# Patient Record
Sex: MALE | Race: WHITE | NOT HISPANIC OR LATINO | Employment: OTHER | URBAN - METROPOLITAN AREA
[De-identification: names, ages, dates, MRNs, and addresses within clinical notes are randomized per-mention and may not be internally consistent; named-entity substitution may affect disease eponyms.]

---

## 2017-01-27 ENCOUNTER — ALLSCRIPTS OFFICE VISIT (OUTPATIENT)
Dept: OTHER | Facility: OTHER | Age: 52
End: 2017-01-27

## 2017-02-01 ENCOUNTER — GENERIC CONVERSION - ENCOUNTER (OUTPATIENT)
Dept: OTHER | Facility: OTHER | Age: 52
End: 2017-02-01

## 2017-04-21 ENCOUNTER — ALLSCRIPTS OFFICE VISIT (OUTPATIENT)
Dept: OTHER | Facility: OTHER | Age: 52
End: 2017-04-21

## 2017-05-04 ENCOUNTER — ALLSCRIPTS OFFICE VISIT (OUTPATIENT)
Dept: OTHER | Facility: OTHER | Age: 52
End: 2017-05-04

## 2017-05-04 DIAGNOSIS — I10 ESSENTIAL (PRIMARY) HYPERTENSION: ICD-10-CM

## 2017-05-04 DIAGNOSIS — E78.2 MIXED HYPERLIPIDEMIA: ICD-10-CM

## 2017-05-09 ENCOUNTER — ALLSCRIPTS OFFICE VISIT (OUTPATIENT)
Dept: OTHER | Facility: OTHER | Age: 52
End: 2017-05-09

## 2017-07-21 ENCOUNTER — ALLSCRIPTS OFFICE VISIT (OUTPATIENT)
Dept: OTHER | Facility: OTHER | Age: 52
End: 2017-07-21

## 2017-08-09 ENCOUNTER — GENERIC CONVERSION - ENCOUNTER (OUTPATIENT)
Dept: OTHER | Facility: OTHER | Age: 52
End: 2017-08-09

## 2017-08-09 LAB
A/G RATIO (HISTORICAL): 1.6 (ref 1.2–2.2)
ALBUMIN SERPL BCP-MCNC: 4.7 G/DL (ref 3.5–5.5)
ALP SERPL-CCNC: 93 IU/L (ref 39–117)
ALT SERPL W P-5'-P-CCNC: 23 IU/L (ref 0–44)
AMBIG ABBREV CMP14 DEFAULT (HISTORICAL): NORMAL
AMBIG ABBREV LP DEFAULT (HISTORICAL): NORMAL
AST SERPL W P-5'-P-CCNC: 22 IU/L (ref 0–40)
BASOPHILS # BLD AUTO: 0 %
BASOPHILS # BLD AUTO: 0 X10E3/UL (ref 0–0.2)
BILIRUB SERPL-MCNC: 0.7 MG/DL (ref 0–1.2)
BUN SERPL-MCNC: 22 MG/DL (ref 6–24)
BUN/CREA RATIO (HISTORICAL): 17 (ref 9–20)
CALCIUM SERPL-MCNC: 9.3 MG/DL (ref 8.7–10.2)
CHLORIDE SERPL-SCNC: 100 MMOL/L (ref 96–106)
CHOLEST SERPL-MCNC: 164 MG/DL (ref 100–199)
CO2 SERPL-SCNC: 25 MMOL/L (ref 18–29)
CREAT SERPL-MCNC: 1.28 MG/DL (ref 0.76–1.27)
DEPRECATED RDW RBC AUTO: 13.2 % (ref 12.3–15.4)
EGFR AFRICAN AMERICAN (HISTORICAL): 74 ML/MIN/1.73
EGFR-AMERICAN CALC (HISTORICAL): 64 ML/MIN/1.73
EOSINOPHIL # BLD AUTO: 0.1 X10E3/UL (ref 0–0.4)
EOSINOPHIL # BLD AUTO: 1 %
GLUCOSE SERPL-MCNC: 86 MG/DL (ref 65–99)
HCT VFR BLD AUTO: 50.7 % (ref 37.5–51)
HDLC SERPL-MCNC: 57 MG/DL
HGB BLD-MCNC: 17.7 G/DL (ref 12.6–17.7)
IMM.GRANULOCYTES (CD4/8) (HISTORICAL): 0 %
IMM.GRANULOCYTES (CD4/8) (HISTORICAL): 0 X10E3/UL (ref 0–0.1)
INTERPRETATION (HISTORICAL): NORMAL
LDLC SERPL CALC-MCNC: 94 MG/DL (ref 0–99)
LYMPHOCYTES # BLD AUTO: 1.6 X10E3/UL (ref 0.7–3.1)
LYMPHOCYTES # BLD AUTO: 24 %
MCH RBC QN AUTO: 30.1 PG (ref 26.6–33)
MCHC RBC AUTO-ENTMCNC: 34.9 G/DL (ref 31.5–35.7)
MCV RBC AUTO: 86 FL (ref 79–97)
MONOCYTES # BLD AUTO: 0.4 X10E3/UL (ref 0.1–0.9)
MONOCYTES (HISTORICAL): 7 %
NEUTROPHILS # BLD AUTO: 4.5 X10E3/UL (ref 1.4–7)
NEUTROPHILS # BLD AUTO: 68 %
PLATELET # BLD AUTO: 198 X10E3/UL (ref 150–379)
POTASSIUM SERPL-SCNC: 4.2 MMOL/L (ref 3.5–5.2)
RBC (HISTORICAL): 5.89 X10E6/UL (ref 4.14–5.8)
SODIUM SERPL-SCNC: 140 MMOL/L (ref 134–144)
TOT. GLOBULIN, SERUM (HISTORICAL): 2.9 G/DL (ref 1.5–4.5)
TOTAL PROTEIN (HISTORICAL): 7.6 G/DL (ref 6–8.5)
TRIGL SERPL-MCNC: 66 MG/DL (ref 0–149)
VLDLC SERPL CALC-MCNC: 13 MG/DL (ref 5–40)
WBC # BLD AUTO: 6.5 X10E3/UL (ref 3.4–10.8)

## 2017-08-10 ENCOUNTER — GENERIC CONVERSION - ENCOUNTER (OUTPATIENT)
Dept: OTHER | Facility: OTHER | Age: 52
End: 2017-08-10

## 2017-08-23 ENCOUNTER — GENERIC CONVERSION - ENCOUNTER (OUTPATIENT)
Dept: OTHER | Facility: OTHER | Age: 52
End: 2017-08-23

## 2017-10-13 ENCOUNTER — ALLSCRIPTS OFFICE VISIT (OUTPATIENT)
Dept: OTHER | Facility: OTHER | Age: 52
End: 2017-10-13

## 2017-10-25 NOTE — PROGRESS NOTES
Assessment  1  Ingrowing nail (703 0) (L60 0)   2  Pain in both feet (729 5) (M79 671,M79 672)    Plan   · You can treat and prevent ingrown toenails ; Status:Complete;   Done: 86PPL3943   · Follow-up visit in 3 months Evaluation and Treatment  Follow-up  Status: Complete   Done: 53VZD3674        Follow-up visit in 3 months Evaluation and Treatment  Follow-up  Status: Hold For - Scheduling  Requested for: 71Fua7896  Ordered; For: Pain in both feet;  Ordered By: Denise Roman  Performed:   Due: 55AHU7316   Follow-up visit in 2 months Evaluation and Treatment  Follow-up  Status: Hold For - Scheduling  Requested for: 74Xjg0442  Ordered; For: Pain in both feet;  Ordered By: Denise Roman  Performed:   Due: 61YDU5828     Discussion/Summary    Daily foot checks monitor for signs of infection  Suggest antifungal powder between toes daily  The patient's caretaker was counseled regarding instructions for management,-- patient and family education,-- importance of compliance with treatment  The treatment plan was reviewed with the patient/guardian  The patient/guardian understands and agrees with the treatment plan      Chief Complaint  Patient Has recurrent ingrown nails bilateral big toes  some pain when walking and wearing shoes  Aids have not noticed any redness or signs of infection      History of Present Illness  HPI: Patient is referred from 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   stage 4 chronic kidney disease (404 10) (I13 10)   8   BMI 30 0-30 9,adult (V85 30) (Z68 30)   9  Capsulitis (726 90) (M77 9)   10  Chronic kidney disease, stage 2 (mild) (585 2) (N18 2)   11  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 (275 03) (E83 10)   12  Constipation (564 00) (K59 00)   13  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)   14  Cracked lip (528 5) (K13 0)   15  Difficulty in walking (719 7) (R26 2)   16  Episodic mood disorder (296 90) (F39)   17  Erythrocytosis (289 0) (D75 1)   18  Generalized nonconvulsive seizure (345 00) (G40 309)   19  Hallux rigidus of both feet (735 2) (M20 21,M20 22)   20  Hemiplegia Dominant Side (342 81)   21  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)   22  Ingrowing nail (703 0) (L60 0)   23  Mild depression (311) (F32 0)   24  Mixed hyperlipidemia (272 2) (E78 2)   25  Nephrocalcinosis (275 49) (E83 59,N29)   26  Obesity, Class I, BMI 30-34 9 (278 00) (E66 9)   27  Osteoporosis (733 00) (M81 0)   28  Pain in both feet (729 5) (M79 671,M79 672)   29   Well adult on routine health check (V70 0) (Z00 00)    Past Medical History   · History of Ankle pain, unspecified laterality   · History of Bilateral impacted cerumen (380 4) (H61 23)   · History of Cellulitis of finger (681 00) (L03 019)   · History of Closed Fracture Of The Phalanges Of The Foot (826 0)   · History of Colon cancer screening (V76 51) (Z12 11)   · History of Encounter for screening for cardiovascular disorders (V81 2) (Z13 6)   · History of Encounter for screening for malignant neoplasm of colon (V76 51) (Z12 11)   · History of Encounter for screening for malignant neoplasm of prostate (V76 44) (Z12 5)   · History of Epilepsy And Recurrent Seizures (345 90)   · History of Foot joint pain (719 47) (M25 579)   · History of athlete's foot (V12 09) (Z86 19)   · History of hypertension (V12 59) (Z86 79)   · History of hypoglycemia (V12 29) (Z86 39)   · History of hypokalemia (V12 29) (Z86 39)   · History of ingrown nail (V13 3) (Z87 2)   · History of ingrown nail (V13 3) (Z87 2)   · History of mental retardation (V11 8) (Z86 59)   · History of onychomycosis (V12 09) (Z86 19)   · History of Impacted cerumen, unspecified laterality (380 4) (H61 20)   · History of Learning difficulty (315 9) (F81 9)   · History of Maceration of skin (709 8) (L98 8)   · History of Need for vaccination (V05 9) (Z23)   · History of Needs flu shot (V04 81) (Z23)   · History of Pain in joints of both feet (719 47) (M79 671,M79 672)   · History of Paronychia of toe (681 11) (L03 039)   · History of Screening for diabetes mellitus (DM) (V77 1) (Z13 1)   · History of Screening for genitourinary condition (V81 6) (Z13 89)   · History of Screening for thyroid disorder (V77 0) (Z13 29)   · History of Special screening examination for neoplasm of prostate (V76 44) (Z12 5)    Surgical History   · History of Brain Surgery    Family History  Mother    · Denied: Family history of Crohn's disease without complication, unspecified  gastrointestinal tract location   · Family history of emphysema (V17 6) (Z82 5)   · Denied: Family history of liver disease   · Denied: Family history of Malignant neoplasm of colon, unspecified part of colon   · Family history of Nephrolithiasis  Father    · Denied: Family history of Crohn's disease without complication, unspecified  gastrointestinal tract location   · Family history of acute myocardial infarction (V17 3) (Z82 49)   · Denied: Family history of liver disease   · Family history of Hypertension (V17 49)   · Denied: Family history of Malignant neoplasm of colon, unspecified part of colon   · Family history of Nephrolithiasis  Brother    · Family history of Nephrolithiasis    Social History   · Denied: History of Alcohol Use (History)   · Never a smoker    Current Meds   1  Atorvastatin Calcium 10 MG Oral Tablet; TAKE ONE TABLET ORALLY DAILY AT 8PM;   Therapy: 88TQY6086 to (Last Rx:10Aug2017)  Requested for: 10Aug2017 Ordered   2   Bystolic 2 5 MG Oral Tablet; TAKE ONE TABLET BY MOUTH DAILY AT 8 A M;   Therapy: 26TMD2432 to (Last CRUZ:64GJC0604)  Requested for: 40OGC6958 Ordered   3  ClonazePAM 0 25 MG Oral Tablet Disintegrating; 2 Every Day; Therapy: 19Apr2012 to  Requested for: 18NJR3910 Recorded   4  LamoTRIgine 150 MG Oral Tablet; 2 Every Day; Therapy: 19Apr2012 to  Requested for: 95BWK0168 Recorded   5  Metamucil 48 57 % Oral Powder; sugar free orange - 1 tsp mixed with 8 oz water daily in   the am;   Therapy: 24YWX7251 to (Last Rx:01Jun2015) Ordered   6  Reguloid 58 6 % Oral Powder; TAKE 1 TEASPOONFUL MIXED WITH 8 OUNCES OF   WATER ORALLY EVERY DAY AT 8AM;   Therapy: 93AOH1933 to (Last Ritu Prost)  Requested for: 28Jun2017 Ordered   7  RisperDAL 0 25 MG Oral Tablet; TAKE 0 5 TABLET Twice daily; Therapy: 19Apr2012 to  Requested for: 89IUH9584 Recorded   8  Senna 8 6 MG Oral Tablet; TAKE ONE TABLET BY MOUTH DAILY AT 8AM FOR   CONSTIPATION; Therapy: 67IXC0153 to (Last Rx:03Oct2017)  Requested for: 03Oct2017 Ordered    The medication list was reviewed and updated today  Allergies  1  ASPIRIN    Vitals   Recorded: 23BHP8380 06:31GO   Systolic 844   Diastolic 77   Height 5 ft 6 in   Weight 187 lb    BMI Calculated 30 18   BSA Calculated 1 94     Physical Exam    Constitutional: no acute distress, well appearing and well nourished  Orthopedic/Biomechanical: abnormal foot type-- and-- hammertoe(s)  Left Foot: Appearance: Normal except as noted: a deformity-- and-- pes planus  Great toe deformities include a bunion  Second toe deformities include hammer toe  Forth toe deformities include hammer toe  Fifth toe deformities include hammer toe  Evaluation of the great toe nail demonstrates paronychia-- and-- an ingrown nail  Tenderness: distal first metatarsal  Hallux limitus first MPJ bilateral,, mild pain on end range of motion, no hypermobility first ray muscle strength is 5  Special Tests: Left hallux ingrown tibial border no erythema no exudate no signs of infection mild pain on palpation     Right Foot: Appearance: Normal except as noted: a deformity-- and-- pes planus  Great toe deformities include a bunion  Second toe deformities include hammer toe  Third toe deformities include hammer toe  Forth toe deformities include hammer toe  Fifth toe deformities include hammer toe  Evaluation of the great toe nail demonstrates paronychia-- and-- an ingrown nail  Tenderness: distal first metatarsal  Hammertoes 2 through 5 bilateral  Plantarflexed metatarsal heads  Right second MPJ plantarflexed metatarsal capsulitis second positive edema negative erythema negative signs of infection  Special Tests: Ingrown bilateral hallux positive pain on palpation negative exudate  Mild edema bilateral legs mild venous stasis no signs of infection ulceration, no maceration  Neurological Exam: performed  Light touch was intact bilaterally  Vibratory sensation was intact bilaterally  Response to monofilament test was intact bilaterally  Vascular Exam: performed Dorsalis pedis pulses were 1/4 bilaterally  Posterior tibial pulses were 1/4 bilaterally  Capillary refill time was greater than 3 seconds bilaterally  Procedure  Aseptic debridement and planing of nails x10, manually, and mechanically       Future Appointments    Date/Time Provider Specialty Site   04/13/2018 03:30 PM Didi Sinclair DPM Podiat[facility] [address] [facility]     Signatures   Electronically signed by :  Ferdinand Mills DPM; Oct 24 2017 10:37AM EST                       (Author)

## 2017-11-09 ENCOUNTER — ALLSCRIPTS OFFICE VISIT (OUTPATIENT)
Dept: OTHER | Facility: OTHER | Age: 52
End: 2017-11-09

## 2017-11-09 DIAGNOSIS — Z00.00 ENCOUNTER FOR GENERAL ADULT MEDICAL EXAMINATION WITHOUT ABNORMAL FINDINGS: ICD-10-CM

## 2017-11-09 LAB — OCCULT BLD, FECAL IMMUNOLOGICAL (HISTORICAL): NEGATIVE

## 2017-11-10 ENCOUNTER — APPOINTMENT (OUTPATIENT)
Dept: AUDIOLOGY | Facility: CLINIC | Age: 52
End: 2017-11-10
Payer: MEDICARE

## 2017-11-10 ENCOUNTER — GENERIC CONVERSION - ENCOUNTER (OUTPATIENT)
Dept: OTHER | Facility: OTHER | Age: 52
End: 2017-11-10

## 2017-11-10 LAB
A/G RATIO (HISTORICAL): 1.6 (ref 1.2–2.2)
ALBUMIN SERPL BCP-MCNC: 4.5 G/DL (ref 3.5–5.5)
ALP SERPL-CCNC: 89 IU/L (ref 39–117)
ALT SERPL W P-5'-P-CCNC: 17 IU/L (ref 0–44)
AST SERPL W P-5'-P-CCNC: 19 IU/L (ref 0–40)
BASOPHILS # BLD AUTO: 0 %
BASOPHILS # BLD AUTO: 0 X10E3/UL (ref 0–0.2)
BILIRUB SERPL-MCNC: 0.6 MG/DL (ref 0–1.2)
BUN SERPL-MCNC: 18 MG/DL (ref 6–24)
BUN/CREA RATIO (HISTORICAL): 14 (ref 9–20)
CALCIUM SERPL-MCNC: 9.5 MG/DL (ref 8.7–10.2)
CHLORIDE SERPL-SCNC: 99 MMOL/L (ref 96–106)
CHOLEST SERPL-MCNC: 172 MG/DL (ref 100–199)
CHOLEST/HDLC SERPL: 2.9 RATIO UNITS (ref 0–5)
CO2 SERPL-SCNC: 27 MMOL/L (ref 18–29)
CREAT SERPL-MCNC: 1.33 MG/DL (ref 0.76–1.27)
DEPRECATED RDW RBC AUTO: 13.1 % (ref 12.3–15.4)
EGFR AFRICAN AMERICAN (HISTORICAL): 71 ML/MIN/1.73
EGFR-AMERICAN CALC (HISTORICAL): 61 ML/MIN/1.73
EOSINOPHIL # BLD AUTO: 0.1 X10E3/UL (ref 0–0.4)
EOSINOPHIL # BLD AUTO: 1 %
GLUCOSE SERPL-MCNC: 83 MG/DL (ref 65–99)
HCT VFR BLD AUTO: 51 % (ref 37.5–51)
HDLC SERPL-MCNC: 59 MG/DL
HGB BLD-MCNC: 17.5 G/DL (ref 12.6–17.7)
IMM.GRANULOCYTES (CD4/8) (HISTORICAL): 0 %
IMM.GRANULOCYTES (CD4/8) (HISTORICAL): 0 X10E3/UL (ref 0–0.1)
LDLC SERPL CALC-MCNC: 96 MG/DL (ref 0–99)
LYMPHOCYTES # BLD AUTO: 1.8 X10E3/UL (ref 0.7–3.1)
LYMPHOCYTES # BLD AUTO: 19 %
MCH RBC QN AUTO: 30.1 PG (ref 26.6–33)
MCHC RBC AUTO-ENTMCNC: 34.3 G/DL (ref 31.5–35.7)
MCV RBC AUTO: 88 FL (ref 79–97)
MONOCYTES # BLD AUTO: 0.7 X10E3/UL (ref 0.1–0.9)
MONOCYTES (HISTORICAL): 7 %
NEUTROPHILS # BLD AUTO: 6.7 X10E3/UL (ref 1.4–7)
NEUTROPHILS # BLD AUTO: 73 %
PLATELET # BLD AUTO: 205 X10E3/UL (ref 150–379)
POTASSIUM SERPL-SCNC: 4.1 MMOL/L (ref 3.5–5.2)
PROSTATE SPECIFIC ANTIGEN (HISTORICAL): 1 NG/ML (ref 0–4)
RBC (HISTORICAL): 5.82 X10E6/UL (ref 4.14–5.8)
SODIUM SERPL-SCNC: 142 MMOL/L (ref 134–144)
TOT. GLOBULIN, SERUM (HISTORICAL): 2.9 G/DL (ref 1.5–4.5)
TOTAL PROTEIN (HISTORICAL): 7.4 G/DL (ref 6–8.5)
TRIGL SERPL-MCNC: 86 MG/DL (ref 0–149)
VLDLC SERPL CALC-MCNC: 17 MG/DL (ref 5–40)
WBC # BLD AUTO: 9.2 X10E3/UL (ref 3.4–10.8)

## 2017-11-10 PROCEDURE — 92557 COMPREHENSIVE HEARING TEST: CPT | Performed by: AUDIOLOGIST

## 2017-11-10 PROCEDURE — 92567 TYMPANOMETRY: CPT | Performed by: AUDIOLOGIST

## 2017-11-11 LAB — INTERPRETATION (HISTORICAL): NORMAL

## 2017-11-13 ENCOUNTER — GENERIC CONVERSION - ENCOUNTER (OUTPATIENT)
Dept: OTHER | Facility: OTHER | Age: 52
End: 2017-11-13

## 2017-11-28 ENCOUNTER — ALLSCRIPTS OFFICE VISIT (OUTPATIENT)
Dept: OTHER | Facility: OTHER | Age: 52
End: 2017-11-28

## 2017-11-29 NOTE — PROGRESS NOTES
Assessment    1  Low back pain with left-sided sciatica (724 3) (M54 42)   2  BMI 30 0-30 9,adult (V85 30) (Z68 30)    Plan  Low back pain with left-sided sciatica    · Acetaminophen 325 MG Oral Tablet; 2 tablets every 4-6 hours as needed for pain   · PredniSONE 20 MG Oral Tablet; 3 tabs for three days, 2 tabs for three days, onetab for three days, 1/2 tab for 4 days    Discussion/Summary    Prednisone taper as directed  May take Tylenol as needed for pain  No work until 12/4/17  Return to office as needed for persistent/worsening symptoms  Consider PT if no improvement  Possible side effects of new medications were reviewed with the patient/guardian today  The treatment plan was reviewed with the patient/guardian  The patient/guardian understands and agrees with the treatment plan      Chief Complaint  Discomfort lower back radiating down L leg started a few weeks ago rmklpn      History of Present Illness  HPI: For the past 2 weeks he has been experiencing left low back radiating into buttocks and upper leg  No known injury  It is very painful to lay flat  He has been given Aleve which alleviates the pain temporarily  Review of Systems   Constitutional: no fever or chills, feels well, no tiredness, no recent weight loss or weight gain  Musculoskeletal: as noted in HPI  Neurological: no numbness-- and-- no tingling  Active Problems    1  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 (736 70) (M21 962)   2  Acquired deformity of right ankle and foot (736 70) (M21 961)   3  Acquired hallux rigidus of left foot (735 2) (M20 22)   4  Acquired valgus deformity of right ankle (736 79) (M21 071)   5  Anxiety, generalized (300 02) (F41 1)   6  Benign essential hypertension (401 1) (I10)   7  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 stage 4 chronic kidney disease (404 10) (I13 10)   8  BMI 30 0-30 9,adult (V85 30) (Z68 30)   9  Capsulitis (726 90) (M77 9)   10  Chronic kidney disease, stage 2 (mild) (585 2) (N18 2)   11  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 (275 03) (E83 10)   12  Constipation (564 00) (K59 00)   13  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)   14  Cracked lip (528 5) (K13 0)   15  Difficulty in walking (719 7) (R26 2)   16  Encounter for screening for cardiovascular disorders (V81 2) (Z13 6)   17  Episodic mood disorder (296 90) (F39)   18  Erythrocytosis (289 0) (D75 1)   19  Generalized nonconvulsive seizure (345 00) (G40 309)   20  Hallux rigidus of both feet (735 2) (M20 21,M20 22)   21  Hemiplegia Dominant Side (342 81)   22  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)   23  Influenza vaccine needed (V04 81) (Z23)   24  Ingrowing nail (703 0) (L60 0)   25  Mild depression (311) (F32 0)   26  Mixed hyperlipidemia (272 2) (E78 2)   27  Nephrocalcinosis (275 49) (E83 59,N29)   28  Obesity, Class I, BMI 30-34 9 (278 00) (E66 9)   29  Osteoporosis (733 00) (M81 0)   30  Pain in both feet (729 5) (M79 671,M79 672)   31  Special screening, prostate cancer (V76 44) (Z12 5)   32  Well adult on routine health check (V70 0) (Z00 00)    Past Medical History    1  History of Ankle pain, unspecified laterality   2  History of Bilateral impacted cerumen (380 4) (H61 23)   3  History of Cellulitis of finger (681 00) (L03 019)   4  History of Closed Fracture Of The Phalanges Of The Foot (826 0)   5  History of Colon cancer screening (V76 51) (Z12 11)   6  History of Encounter for screening for malignant neoplasm of colon (V76 51) (Z12 11)   7  History of Epilepsy And Recurrent Seizures (345 90)   8  History of Foot joint pain (719 47) (M25 579)   9  History of athlete's foot (V12 09) (Z86 19)   10  History of hypertension (V12 59) (Z86 79)   11  History of hypoglycemia (V12 29) (Z86 39)   12  History of hypokalemia (V12 29) (Z86 39)   13  History of ingrown nail (V13 3) (Z87 2)   14  History of ingrown nail (V13 3) (Z87 2)   15  History of mental retardation (V11 8) (Z86 59)   16  History of onychomycosis (V12 09) (Z86 19)   17  History of Impacted cerumen, unspecified laterality (380 4) (H61 20)   18  History of Learning difficulty (315 9) (F81 9)   19  History of Maceration of skin (709 8) (L98 8)   20  History of Need for vaccination (V05 9) (Z23)   21  History of Needs flu shot (V04 81) (Z23)   22  History of Pain in joints of both feet (719 47) (M79 671,M79 672)   23  History of Paronychia of toe (681 11) (L03 039)   24  History of Screening for diabetes mellitus (DM) (V77 1) (Z13 1)   25  History of Screening for genitourinary condition (V81 6) (Z13 89)   26  History of Screening for thyroid disorder (V77 0) (Z13 29)   27  History of Special screening examination for neoplasm of prostate (V76 44) (Z12 5)  Active Problems And Past Medical History Reviewed: The active problems and past medical history were reviewed and updated today  Family History  Mother    1  Denied: Family history of Crohn's disease without complication, unspecified gastrointestinal tract location   2  Family history of emphysema (V17 6) (Z82 5)   3  Denied: Family history of liver disease   4  Denied: Family history of Malignant neoplasm of colon, unspecified part of colon   5  Family history of Nephrolithiasis  Father    6  Denied: Family history of Crohn's disease without complication, unspecified gastrointestinal tract location   7  Family history of acute myocardial infarction (V17 3) (Z82 49)   8  Denied: Family history of liver disease   9  Family history of Hypertension (V17 49)   10  Denied: Family history of Malignant neoplasm of colon, unspecified part of colon   11  Family history of Nephrolithiasis  Brother    15  Family history of Nephrolithiasis    Social History   · Denied: History of Alcohol Use (History)   · Never a smoker  The social history was reviewed and is unchanged  Surgical History    1  History of Brain Surgery    Current Meds   1   Atorvastatin Calcium 10 MG Oral Tablet; TAKE ONE TABLET ORALLY DAILY AT 8PM; Therapy: 34WCP7921 to (Last Rx:10Aug2017)  Requested for: 03WHX8857 Ordered   2  Bystolic 2 5 MG Oral Tablet; TAKE ONE TABLET BY MOUTH DAILY AT 8 A M; Therapy: 54AYW6430 to (Last Rx:14Nov2016)  Requested for: 11MDE5896 Ordered   3  ClonazePAM 0 25 MG Oral Tablet Disintegrating; 2 Every Day; Therapy: 19Apr2012 to  Requested for: 00ZKN2729 Recorded   4  LamoTRIgine 150 MG Oral Tablet; 2 Every Day; Therapy: 19Apr2012 to  Requested for: 25DEC9178 Recorded   5  Metamucil 48 57 % Oral Powder; sugar free orange - 1 tsp mixed with 8 oz water daily in the am; Therapy: 52JCY7989 to (Last Rx:01Jun2015) Ordered   6  Reguloid 58 6 % Oral Powder; TAKE 1 TEASPOONFUL MIXED WITH 8 OUNCES OF WATER ORALLY EVERY DAY AT 8AM; Therapy: 21YNN2306 to (Last Lorenza Overlie)  Requested for: 28Jun2017 Ordered   7  RisperDAL 0 25 MG Oral Tablet; TAKE 0 5 TABLET Twice daily; Therapy: 19Apr2012 to  Requested for: 19OFM7135 Recorded   8  Senna 8 6 MG Oral Tablet; TAKE ONE TABLET BY MOUTH DAILY AT 8AM FOR CONSTIPATION; Therapy: 03DVO0518 to (Last Rx:22Nov2017)  Requested for: 22Nov2017 Ordered    The medication list was reviewed and updated today  Allergies  1  ASPIRIN    Vitals   Recorded: 69ZHW4860 03:58PM   Temperature 97 4 F   Heart Rate 72   Respiration 18   Systolic 059   Diastolic 70   Height 5 ft 6 in   Weight 187 lb    BMI Calculated 30 18   BSA Calculated 1 94       Physical Exam   Constitutional  General appearance: No acute distress, well appearing and well nourished  Eyes  Conjunctiva and lids: No swelling, erythema, or discharge  Ears, Nose, Mouth, and Throat  Otoscopic examination: Tympanic membrance translucent with normal light reflex  Canals patent without erythema  Nasal mucosa, septum, and turbinates: Normal without edema or erythema  Oropharynx: Normal with no erythema, edema, exudate or lesions     Pulmonary  Respiratory effort: No increased work of breathing or signs of respiratory distress  Auscultation of lungs: Clear to auscultation, equal breath sounds bilaterally, no wheezes, no rales, no rhonci  Cardiovascular  Auscultation of heart: Normal rate and rhythm, normal S1 and S2, without murmurs  Examination of extremities for edema and/or varicosities: Normal    Lymphatic  Palpation of lymph nodes in neck: No lymphadenopathy  Musculoskeletal  Inspection/palpation of joints, bones, and muscles: Abnormal  -- left low  on palpation  Positive left SLR  Psychiatric  Mood and affect: Normal          Attending Note  Collaborating Physician Note: Collaborating Note: I agree with the Advanced Practitioner note  Message  Return to work or school:   Princess Jones is under my professional care  He was seen in my office on 11/28/17   He is able to return to work on  12/4/17      Jeanette Stephenson, [facility] Appointments    Date/Time Provider Specialty Site   04/13/2018 03:30 PM Abril Dhillon DPM [facility]       Signatures   Electronically signed by : Nayana Spear; Nov 28 2017  4:57PM EST                       (Author)    Electronically signed by : Raul Tucker DO; Nov 28 2017  9:13PM EST                       (Author)

## 2017-12-27 ENCOUNTER — ALLSCRIPTS OFFICE VISIT (OUTPATIENT)
Dept: OTHER | Facility: OTHER | Age: 52
End: 2017-12-27

## 2017-12-27 DIAGNOSIS — M54.42 LOW BACK PAIN WITH LEFT-SIDED SCIATICA: ICD-10-CM

## 2017-12-28 NOTE — PROGRESS NOTES
Assessment   1  Low back pain with left-sided sciatica (724 3) (M54 42)   2  Never a smoker    Plan   Low back pain with left-sided sciatica    · Cyclobenzaprine HCl - 10 MG Oral Tablet; take one tab at 8 o clock pm prior to    bedtime till done   · PredniSONE 20 MG Oral Tablet; 4 tabs for three days 3 tabs for three days, 2 tabs    for three days, one tab for three days, 1/2 tab for 4 days  Ok to take at 7 am   · * XR SPINE LUMBAR MINIMUM 4 VIEWS NON INJURY; Status:Active; Requested    for:16Xsz2516;    · *1 - [facility] [address] [facility]  *  Status: Active  Requested    for: [id number]  Care Summary provided  : Yes    Chief Complaint   pt present to follow up on lower back pain, states pain is getting better but still there  af/rma      History of Present Illness   pt states he has pain in his left leg - states this started in the end of october  states the pain starts in his left buttock rads down left leg states he took the steroids given at his appt the pain improved with the meds he took but it came back      Review of Systems        Constitutional: No fever or chills, feels well, no tiredness, no recent weight gain or weight loss  Eyes: No complaints of eye pain, no red eyes, no discharge from eyes, no itchy eyes  ENT: no complaints of earache, no hearing loss, no nosebleeds, no nasal discharge, no sore throat, no hoarseness  Cardiovascular: No complaints of slow heart rate, no fast heart rate, no chest pain, no palpitations, no leg claudication, no lower extremity  Musculoskeletal: arthralgias,-- limb pain,-- myalgias-- and-- joint stiffness, but-- as noted in HPI  Active Problems   1  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 (736 70) (M21 962)   2  Acquired deformity of right ankle and foot (736 70) (M21 961)   3  Acquired hallux rigidus of left foot (735 2) (M20 22)   4  Acquired valgus deformity of right ankle (736 79) (M21 071)   5   Anxiety, generalized (300 02) (F41 1)   6  Benign essential hypertension (401 1) (I10)   7  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     stage 4 chronic kidney disease (404 10) (I13 10)   8  BMI 30 0-30 9,adult (V85 30) (Z68 30)   9  Chronic kidney disease, stage 2 (mild) (585 2) (N18 2)   10  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 (275 03) (E83 10)   11  Constipation (564 00) (K59 00)   12  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)   13  Cracked lip (528 5) (K13 0)   14  Difficulty in walking (719 7) (R26 2)   15  Encounter for screening for cardiovascular disorders (V81 2) (Z13 6)   16  Episodic mood disorder (296 90) (F39)   17  Erythrocytosis (289 0) (D75 1)   18  Generalized nonconvulsive seizure (345 00) (G40 309)   19  Hallux rigidus of both feet (735 2) (M20 21,M20 22)   20  Hemiplegia Dominant Side (342 81)   21  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)   22  Influenza vaccine needed (V04 81) (Z23)   23  Ingrowing nail (703 0) (L60 0)   24  Low back pain with left-sided sciatica (724 3) (M54 42)   25  Mild depression (311) (F32 0)   26  Mixed hyperlipidemia (272 2) (E78 2)   27  Nephrocalcinosis (275 49) (E83 59,N29)   28  Obesity, Class I, BMI 30-34 9 (278 00) (E66 9)   29  Osteoporosis (733 00) (M81 0)   30  Pain in both feet (729 5) (M79 671,M79 672)   31  Special screening, prostate cancer (V76 44) (Z12 5)   32  Well adult on routine health check (V70 0) (Z00 00)    Past Medical History   1  History of Ankle pain, unspecified laterality   2  History of Bilateral impacted cerumen (380 4) (H61 23)   3  History of Capsulitis (726 90) (M77 9)   4  History of Cellulitis of finger (681 00) (L03 019)   5  History of Closed Fracture Of The Phalanges Of The Foot (826 0)   6  History of Colon cancer screening (V76 51) (Z12 11)   7  History of Encounter for screening for malignant neoplasm of colon (V76 51) (Z12 11)   8  History of Epilepsy And Recurrent Seizures (141 90)   9   History of Foot joint pain (919 47) (M21 579)   10  History of athlete's foot (V12 09) (Z86 19)   11  History of hypertension (V12 59) (Z86 79)   12  History of hypoglycemia (V12 29) (Z86 39)   13  History of hypokalemia (V12 29) (Z86 39)   14  History of ingrown nail (V13 3) (Z87 2)   15  History of ingrown nail (V13 3) (Z87 2)   16  History of mental retardation (V11 8) (Z86 59)   17  History of onychomycosis (V12 09) (Z86 19)   18  History of Impacted cerumen, unspecified laterality (380 4) (H61 20)   19  History of Learning difficulty (315 9) (F81 9)   20  History of Maceration of skin (709 8) (L98 8)   21  History of Need for vaccination (V05 9) (Z23)   22  History of Needs flu shot (V04 81) (Z23)   23  History of Pain in joints of both feet (719 47) (M79 671,M79 672)   24  History of Paronychia of toe (681 11) (L03 039)   25  History of Screening for diabetes mellitus (DM) (V77 1) (Z13 1)   26  History of Screening for genitourinary condition (V81 6) (Z13 89)   27  History of Screening for thyroid disorder (V77 0) (Z13 29)   28  History of Special screening examination for neoplasm of prostate (V76 44) (Z12 5)     The active problems and past medical history were reviewed and updated today  Surgical History   1  History of Brain Surgery     The surgical history was reviewed and updated today  Family History   Mother    1  Denied: Family history of Crohn's disease without complication, unspecified     gastrointestinal tract location   2  Family history of emphysema (V17 6) (Z82 5)   3  Denied: Family history of liver disease   4  Denied: Family history of Malignant neoplasm of colon, unspecified part of colon   5  Family history of Nephrolithiasis  Father    6  Denied: Family history of Crohn's disease without complication, unspecified     gastrointestinal tract location   7  Family history of acute myocardial infarction (V17 3) (Z82 49)   8  Denied: Family history of liver disease   9  Family history of Hypertension (V17 49)   10  Denied: Family history of Malignant neoplasm of colon, unspecified part of colon   11  Family history of Nephrolithiasis  Brother    15  Family history of Nephrolithiasis     The family history was reviewed and updated today  Social History    · Denied: History of Alcohol Use (History)   · Never a smoker  The social history was reviewed and updated today  Current Meds    1  Acetaminophen 325 MG Oral Tablet; 2 tablets every 4-6 hours as needed for pain; Therapy: 33FPE4754 to (Last 9138 4541)  Requested for: 28Nov2017 Ordered   2  Atorvastatin Calcium 10 MG Oral Tablet; TAKE ONE TABLET ORALLY DAILY AT 8PM;     Therapy: 97GUW1651 to (Last Rx:26Hrj5878)  Requested for: 85AGC9554 Ordered   3  Bystolic 2 5 MG Oral Tablet; TAKE ONE TABLET BY MOUTH DAILY AT 8 A M;     Therapy: 17AZW9377 to (Last Rx:14Nov2016)  Requested for: 42IHQ0592 Ordered   4  ClonazePAM 0 25 MG Oral Tablet Disintegrating; 2 Every Day; Therapy: 19Apr2012 to  Requested for: 02CGI5438 Recorded   5  LamoTRIgine 150 MG Oral Tablet; 2 Every Day; Therapy: 19Apr2012 to  Requested for: 79BQI3925 Recorded   6  Metamucil 48 57 % Oral Powder; sugar free orange - 1 tsp mixed with 8 oz water daily in     the am;     Therapy: 06SQN1516 to (Last Rx:01Jun2015) Ordered   7  Reguloid 58 6 % Oral Powder; TAKE 1 TEASPOONFUL MIXED WITH 8 OUNCES OF     WATER ORALLY EVERY DAY AT 8AM;     Therapy: 53NHK9760 to (Last Matthew Mixer)  Requested for: 28Jun2017 Ordered   8  RisperDAL 0 25 MG Oral Tablet; TAKE 0 5 TABLET Twice daily; Therapy: 19Apr2012 to  Requested for: 14UJJ4446 Recorded   9  Senna 8 6 MG Oral Tablet; TAKE ONE TABLET BY MOUTH DAILY AT 8AM FOR     CONSTIPATION; Therapy: 41VHF7523 to (Last Rx:22Nov2017)  Requested for: 22Nov2017 Ordered     The medication list was reviewed and updated today  Allergies   1   ASPIRIN    Vitals   Vital Signs    Recorded: 79TMH6374 05:45PM   Temperature 97 1 F   Heart Rate 70   Respiration 18 Systolic 209   Diastolic 70   Height 5 ft 6 in   Weight 195 lb 4 oz   BMI Calculated 31 51   BSA Calculated 1 98     Physical Exam        Constitutional      General appearance: No acute distress, well appearing and well nourished  Eyes      Conjunctiva and lids: No swelling, erythema, or discharge  Pupils and irises: Equal, round and reactive to light  Ears, Nose, Mouth, and Throat      External inspection of ears and nose: Normal        Pulmonary      Auscultation of lungs: Clear to auscultation, equal breath sounds bilaterally, no wheezes, no rales, no rhonci  Cardiovascular      Auscultation of heart: Normal rate and rhythm, normal S1 and S2, without murmurs  Musculoskeletal      Gait and station: Abnormal  -- abnormal gait  Inspection/palpation of joints, bones, and muscles: Abnormal  -- pvms l spine, l >R  Skin      Skin and subcutaneous tissue: Normal without rashes or lesions  Health Management   History of Encounter for screening for malignant neoplasm of colon   COLONOSCOPY; every 10 years; Last 97ZSZ2060; Next Due: 43Muk0597;  Active    Future Appointments      Date/Time Provider Specialty Site   04/13/2018 03:30 PM F[facility] Pham [facility]     Signatures    Electronically signed by : Freeman Izaguirre DO; Dec 27 2017  6:03PM EST                       (Author)

## 2017-12-29 ENCOUNTER — HOSPITAL ENCOUNTER (OUTPATIENT)
Dept: RADIOLOGY | Facility: HOSPITAL | Age: 52
Discharge: HOME | End: 2017-12-29
Attending: FAMILY MEDICINE
Payer: MEDICARE

## 2017-12-29 ENCOUNTER — TRANSCRIBE ORDERS (OUTPATIENT)
Dept: ADMINISTRATIVE | Facility: HOSPITAL | Age: 52
End: 2017-12-29

## 2017-12-29 DIAGNOSIS — M54.42 LOW BACK PAIN WITH LEFT-SIDED SCIATICA: ICD-10-CM

## 2017-12-29 PROCEDURE — 72110 X-RAY EXAM L-2 SPINE 4/>VWS: CPT

## 2018-01-02 ENCOUNTER — GENERIC CONVERSION - ENCOUNTER (OUTPATIENT)
Dept: OTHER | Facility: OTHER | Age: 53
End: 2018-01-02

## 2018-01-10 NOTE — RESULT NOTES
Discussion/Summary   labs acceptable     Verified Results  (1) CBC/PLT/DIFF 09LUP7742 11:32AM [employer]     Test Name Result Flag Reference   WBC 9 2 x10E3/uL  3 4-10 8   RBC 5 82 x10E6/uL H 4 14-5 80   Hemoglobin 17 5 g/dL  12 6-17 7   Hematocrit 51 0 %  37 5-51 0   MCV 88 fL  79-97   MCH 30 1 pg  26 6-33 0   MCHC 34 3 g/dL  31 5-35 7   RDW 13 1 %  12 3-15 4   Platelets 719 N63N4/MU  150-379   Neutrophils 73 %  Not Estab  Lymphs 19 %  Not Estab  Monocytes 7 %  Not Estab  Eos 1 %  Not Estab  Basos 0 %  Not Estab  Neutrophils (Absolute) 6 7 x10E3/uL  1 4-7 0   Lymphs (Absolute) 1 8 x10E3/uL  0 7-3 1   Monocytes(Absolute) 0 7 x10E3/uL  0 1-0 9   Eos (Absolute) 0 1 x10E3/uL  0 0-0 4   Baso (Absolute) 0 0 x10E3/uL  0 0-0 2   Immature Granulocytes 0 %  Not Estab  Immature Grans (Abs) 0 0 x10E3/uL  0 0-0 1     (1) COMPREHENSIVE METABOLIC PANEL 14TVQ8632 23:53UE [employer]     Test Name Result Flag Reference   Glucose, Serum 83 mg/dL  65-99   BUN 18 mg/dL  6-24   Creatinine, Serum 1 33 mg/dL H 0 76-1 27   BUN/Creatinine Ratio 14  9-20   Sodium, Serum 142 mmol/L  134-144   Potassium, Serum 4 1 mmol/L  3 5-5 2   Chloride, Serum 99 mmol/L     Carbon Dioxide, Total 27 mmol/L  18-29   Calcium, Serum 9 5 mg/dL  8 7-10 2   Protein, Total, Serum 7 4 g/dL  6 0-8 5   Albumin, Serum 4 5 g/dL  3 5-5 5   Globulin, Total 2 9 g/dL  1 5-4 5   A/G Ratio 1 6  1 2-2 2   Bilirubin, Total 0 6 mg/dL  0 0-1 2   Alkaline Phosphatase, S 89 IU/L     AST (SGOT) 19 IU/L  0-40   ALT (SGPT) 17 IU/L  0-44   eGFR If NonAfricn Am 61 mL/min/1 73  >59   eGFR If Africn Am 71 mL/min/1 73  >59     (1) LIPID PANEL, FASTING 47UNT0376 11:32AM [facility]     Test Name Result Flag Reference   Cholesterol, Total 172 mg/dL  100-199   Triglycerides 86 mg/dL  0-149   HDL Cholesterol 59 mg/dL  >39   VLDL Cholesterol Derek 17 mg/dL  5-40   LDL Cholesterol Calc 96 mg/dL  0-99   T   Chol/HDL Ratio 2 9 ratio units  0 0-5 0   T  Chol/HDL Ratio                                                             Men  Women                                               1/2 Avg  Risk  3 4    3 3                                                   Avg Risk  5 0    4 4                                                2X Avg  Risk  9 6    7 1                                                3X Avg  Risk 23 4   11 0     (1) PSA (SCREEN) (Dx V76 44 Screen for Prostate Cancer) 02FXT9593 11:32AM Matt Baptiste     Test Name Result Flag Reference   Prostate Specific Ag, Serum 1 0 ng/mL  0 0-4 0   Roche ECLIA methodology  According to the American [employer] Association, Serum PSA should  decrease and remain at undetectable levels after radical  prostatectomy  The AUA defines biochemical recurrence as an initial  PSA value 0 2 ng/mL or greater followed by a subsequent confirmatory  PSA value 0 2 ng/mL or greater  Values obtained with different assay methods or kits cannot be used  interchangeably  Results cannot be interpreted as absolute evidence  of the presence or absence of malignant disease  [facility]) Cardiovascular Risk Assessment [id number] 11:32AM Matt Baptiste     Test Name Result Flag Reference   Interpretation Note     Supplemental report is available  PDF Image

## 2018-01-12 VITALS
WEIGHT: 187 LBS | RESPIRATION RATE: 18 BRPM | HEART RATE: 72 BPM | BODY MASS INDEX: 30.05 KG/M2 | DIASTOLIC BLOOD PRESSURE: 70 MMHG | HEIGHT: 66 IN | SYSTOLIC BLOOD PRESSURE: 110 MMHG | TEMPERATURE: 97.4 F

## 2018-01-12 VITALS
DIASTOLIC BLOOD PRESSURE: 70 MMHG | TEMPERATURE: 97.2 F | SYSTOLIC BLOOD PRESSURE: 130 MMHG | HEIGHT: 66 IN | WEIGHT: 188 LBS | RESPIRATION RATE: 18 BRPM | BODY MASS INDEX: 30.22 KG/M2 | HEART RATE: 78 BPM

## 2018-01-13 VITALS
BODY MASS INDEX: 30.05 KG/M2 | HEIGHT: 66 IN | WEIGHT: 187 LBS | DIASTOLIC BLOOD PRESSURE: 77 MMHG | SYSTOLIC BLOOD PRESSURE: 130 MMHG

## 2018-01-13 VITALS
HEIGHT: 66 IN | BODY MASS INDEX: 29.25 KG/M2 | SYSTOLIC BLOOD PRESSURE: 110 MMHG | RESPIRATION RATE: 16 BRPM | WEIGHT: 182 LBS | HEART RATE: 60 BPM | DIASTOLIC BLOOD PRESSURE: 78 MMHG

## 2018-01-13 VITALS
DIASTOLIC BLOOD PRESSURE: 76 MMHG | SYSTOLIC BLOOD PRESSURE: 128 MMHG | TEMPERATURE: 96.7 F | RESPIRATION RATE: 16 BRPM | WEIGHT: 187 LBS | BODY MASS INDEX: 30.05 KG/M2 | HEART RATE: 66 BPM | HEIGHT: 66 IN

## 2018-01-13 VITALS
TEMPERATURE: 97 F | HEIGHT: 66 IN | RESPIRATION RATE: 16 BRPM | WEIGHT: 191 LBS | DIASTOLIC BLOOD PRESSURE: 70 MMHG | HEART RATE: 72 BPM | SYSTOLIC BLOOD PRESSURE: 120 MMHG | BODY MASS INDEX: 30.7 KG/M2

## 2018-01-13 VITALS
DIASTOLIC BLOOD PRESSURE: 78 MMHG | WEIGHT: 182 LBS | RESPIRATION RATE: 16 BRPM | BODY MASS INDEX: 29.25 KG/M2 | SYSTOLIC BLOOD PRESSURE: 110 MMHG | HEART RATE: 60 BPM | HEIGHT: 66 IN

## 2018-01-13 VITALS — BODY MASS INDEX: 30.05 KG/M2 | WEIGHT: 187 LBS | RESPIRATION RATE: 15 BRPM | HEIGHT: 66 IN | HEART RATE: 66 BPM

## 2018-01-13 NOTE — PROGRESS NOTES
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)   6  Encounter for screening for cardiovascular disorders (V81 2) (Z13 6)   7  Special screening, prostate cancer (V76 44) (Z12 5)    Plan  Benign essential hypertension    · Bystolic 2 5 MG Oral Tablet; TAKE ONE TABLET BY MOUTH DAILY AT 8 A  M  Benign essential hypertension, Encounter for screening for cardiovascular disorders, Mild  depression, Mixed hyperlipidemia, Special screening, prostate cancer    · (1) CBC/PLT/DIFF; Status:Active; Requested for:09Nov2017;    · (1) COMPREHENSIVE METABOLIC PANEL; Status:Active; Requested RBM:[id number];    · (1) LIPID PANEL, FASTING; Status:Active; Requested for:09Nov2017;    · (1) PSA (SCREEN) (Dx V76 44 Screen for Prostate Cancer); Status:Active; Requested  [id number]; Health Maintenance    · *1 - [facility]) Co-Management  *  Status: Active  Requested for:  [id number]  Care Summary provided  : Yes   · DIGITAL RECTAL EXAM; Status:Complete;   Done: 75UXM7650 02:03PM   · Hemoccult Screening - POC; Status:Complete;   Done: 63ZSK8478 02:03PM  Mixed hyperlipidemia    · Atorvastatin Calcium 10 MG Oral Tablet (Lipitor); TAKE ONE TABLET ORALLY  DAILY AT 8PM    Chief Complaint  CPE rmklpn      History of Present Illness  HM, Adult Male: The patient is being seen for a health maintenance evaluation  General Health:   Screening:   HPI: pt is here for a full physical    pt denies seizure since being seen last    Does states on occasiona he is confused    caretaker requesting order for ear eval      Review of Systems    Constitutional: No fever or chills, feels well, no tiredness, no recent weight gain or weight loss  Eyes: No complaints of eye pain, no red eyes, no discharge from eyes, no itchy eyes     ENT: no complaints of earache, no hearing loss, no nosebleeds, no nasal discharge, no sore throat, no hoarseness  Cardiovascular: No complaints of slow heart rate, no fast heart rate, no chest pain, no palpitations, no leg claudication, no lower extremity  Respiratory: No complaints of shortness of breath, no wheezing, no cough, no SOB on exertion, no orthopnea or PND  Gastrointestinal: No complaints of abdominal pain, no constipation, no nausea or vomiting, no diarrhea or bloody stools  Genitourinary: No complaints of dysuria, no incontinence, no hesitancy, no nocturia, no genital lesion, no testicular pain  Musculoskeletal: No complaints of arthralgia, no myalgias, no joint swelling or stiffness, no limb pain or swelling  Integumentary: No complaints of skin rash or skin lesions, no itching, no skin wound, no dry skin  Neurological: No compliants of headache, no confusion, no convulsions, no numbness or tingling, no dizziness or fainting, no limb weakness, no difficulty walking  Psychiatric: Is not suicidal, no sleep disturbances, no anxiety or depression, no change in personality, no emotional problems  Endocrine: No complaints of proptosis, no hot flashes, no muscle weakness, no erectile dysfunction, no deepening of the voice, no feelings of weakness  Hematologic/Lymphatic: No complaints of swollen glands, no swollen glands in the neck, does not bleed easily, no easy bruising  Active Problems    1  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 (736 70) (M21 962)   2  Acquired deformity of right ankle and foot (736 70) (M21 961)   3  Acquired hallux rigidus of left foot (735 2) (M20 22)   4  Acquired valgus deformity of right ankle (736 79) (M21 071)   5  Anxiety, generalized (300 02) (F41 1)   6  Benign essential hypertension (401 1) (I10)   7  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   stage 4 chronic kidney disease (404 10) (I13 10)   8   BMI 30 0-30 9,adult (V85 30) (Z68 30)   9  Capsulitis (726 90) (M77 9)   10  Chronic kidney disease, stage 2 (mild) (585 2) (N18 2)   11  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 (275 03) (E83 10)   12  Constipation (564 00) (K59 00)   13  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)   14  Cracked lip (528 5) (K13 0)   15  Difficulty in walking (719 7) (R26 2)   16  Episodic mood disorder (296 90) (F39)   17  Erythrocytosis (289 0) (D75 1)   18  Generalized nonconvulsive seizure (345 00) (G40 309)   19  Hallux rigidus of both feet (735 2) (M20 21,M20 22)   20  Hemiplegia Dominant Side (342 81)   21  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)   22  Influenza vaccine needed (V04 81) (Z23)   23  Ingrowing nail (703 0) (L60 0)   24  Mild depression (311) (F32 0)   25  Mixed hyperlipidemia (272 2) (E78 2)   26  Nephrocalcinosis (275 49) (E83 59,N29)   27  Obesity, Class I, BMI 30-34 9 (278 00) (E66 9)   28  Osteoporosis (733 00) (M81 0)   29  Pain in both feet (729 5) (M79 671,M79 672)   30   Well adult on routine health check (V70 0) (Z00 00)    Past Medical History    · History of Ankle pain, unspecified laterality   · History of Bilateral impacted cerumen (380 4) (H61 23)   · History of Cellulitis of finger (681 00) (L03 019)   · History of Closed Fracture Of The Phalanges Of The Foot (826 0)   · History of Colon cancer screening (V76 51) (Z12 11)   · History of Encounter for screening for malignant neoplasm of colon (V76 51) (Z12 11)   · History of Epilepsy And Recurrent Seizures (345 90)   · History of Foot joint pain (719 47) (M25 579)   · History of athlete's foot (V12 09) (Z86 19)   · History of hypertension (V12 59) (Z86 79)   · History of hypoglycemia (V12 29) (Z86 39)   · History of hypokalemia (V12 29) (Z86 39)   · History of ingrown nail (V13 3) (Z87 2)   · History of ingrown nail (V13 3) (Z87 2)   · History of mental retardation (V11 8) (Z86 59)   · History of onychomycosis (V12 09) (Z86 19)   · History of Impacted cerumen, unspecified laterality (380  4) (H61 20)   · History of Learning difficulty (315 9) (F81 9)   · History of Maceration of skin (709 8) (L98 8)   · History of Need for vaccination (V05 9) (Z23)   · History of Needs flu shot (V04 81) (Z23)   · History of Pain in joints of both feet (719 47) (M79 671,M79 672)   · History of Paronychia of toe (681 11) (L03 039)   · History of Screening for diabetes mellitus (DM) (V77 1) (Z13 1)   · History of Screening for genitourinary condition (V81 6) (Z13 89)   · History of Screening for thyroid disorder (V77 0) (Z13 29)   · History of Special screening examination for neoplasm of prostate (V76 44) (Z12 5)    Surgical History    · History of Brain Surgery    Family History  Mother    · Denied: Family history of Crohn's disease without complication, unspecified  gastrointestinal tract location   · Family history of emphysema (V17 6) (Z82 5)   · Denied: Family history of liver disease   · Denied: Family history of Malignant neoplasm of colon, unspecified part of colon   · Family history of Nephrolithiasis  Father    · Denied: Family history of Crohn's disease without complication, unspecified  gastrointestinal tract location   · Family history of acute myocardial infarction (V17 3) (Z82 49)   · Denied: Family history of liver disease   · Family history of Hypertension (V17 49)   · Denied: Family history of Malignant neoplasm of colon, unspecified part of colon   · Family history of Nephrolithiasis  Brother    · Family history of Nephrolithiasis    Social History    · Denied: History of Alcohol Use (History)   · Never a smoker    Current Meds   1  Atorvastatin Calcium 10 MG Oral Tablet; TAKE ONE TABLET ORALLY DAILY AT 8PM;   Therapy: 25WZA3123 to (Last Rx:10Aug2017)  Requested for: 65Xcw3961 Ordered   2  Bystolic 2 5 MG Oral Tablet; TAKE ONE TABLET BY MOUTH DAILY AT 8 A M;   Therapy: 15JUL7159 to (Last Rx:14Nov2016)  Requested for: 49RFO2145 Ordered   3   ClonazePAM 0 25 MG Oral Tablet Disintegrating; 2 Every Day;   Therapy: 31VLT9935 to  Requested for: 09GAS3986 Recorded   4  LamoTRIgine 150 MG Oral Tablet; 2 Every Day; Therapy: 19Apr2012 to  Requested for: 48JHL8675 Recorded   5  Metamucil 48 57 % Oral Powder; sugar free orange - 1 tsp mixed with 8 oz water daily in   the am;   Therapy: 19CQV8254 to (Last Rx:01Jun2015) Ordered   6  Reguloid 58 6 % Oral Powder; TAKE 1 TEASPOONFUL MIXED WITH 8 OUNCES OF   WATER ORALLY EVERY DAY AT 8AM;   Therapy: 98NSP8044 to (Last Junior Coma)  Requested for: 28Jun2017 Ordered   7  RisperDAL 0 25 MG Oral Tablet; TAKE 0 5 TABLET Twice daily; Therapy: 19Apr2012 to  Requested for: 69RXC2825 Recorded   8  Senna 8 6 MG Oral Tablet; TAKE ONE TABLET BY MOUTH DAILY AT 8AM FOR   CONSTIPATION; Therapy: 75ECF6014 to (Last Rx:72Lnp2736)  Requested for: 93Jou7673 Ordered    Allergies    1  ASPIRIN    Vitals   Recorded: 94VDQ7459 01:42PM   Temperature 97 2 F   Heart Rate 78   Respiration 18   Systolic 062   Diastolic 70   Height 5 ft 6 in   Weight 188 lb    BMI Calculated 30 34   BSA Calculated 1 95     Physical Exam    Constitutional   General appearance: No acute distress, well appearing and well nourished  Head and Face   Head and face: Normal     Eyes   Conjunctiva and lids: No erythema, swelling or discharge  Pupils and irises: Equal, round, reactive to light  Ears, Nose, Mouth, and Throat   External inspection of ears and nose: Normal     Otoscopic examination: Tympanic membranes translucent with normal light reflex  Canals patent without erythema  Neck   Neck: Supple, symmetric, trachea midline, no masses  Thyroid: Normal, no thyromegaly  Pulmonary   Respiratory effort: No increased work of breathing or signs of respiratory distress  Cardiovascular   Palpation of heart: Normal PMI, no thrills  Auscultation of heart: Normal rate and rhythm, normal S1 and S2, no murmurs  Abdomen   Abdomen: Non-tender, no masses      Liver and spleen: No hepatomegaly or splenomegaly  Stool sample for occult blood: Negative  Genitourinary   Scrotal contents: Normal testes, no masses  Penis: Normal, no lesions  Digital rectal exam of prostate: Normal size, no masses  Lymphatic   Palpation of lymph nodes in neck: No lymphadenopathy  Palpation of lymph nodes in axillae: No lymphadenopathy  Musculoskeletal   Muscle strength/tone: Abnormal   rt sided ext weakness and atrophy upper, deformity on rt lower  Results/Data  PHQ-9 Adult Depression Screening 70FLZ8066 01:42PM User, [employer]     Test Name Result Flag Reference   PHQ-9 Adult Depression Score 4     Over the last two weeks, how often have you been bothered by any of the following problems? Little interest or pleasure in doing things: More than half the days - 2  Feeling down, depressed, or hopeless: Several days - 1  Trouble falling or staying asleep, or sleeping too much: Not at all - 0  Feeling tired or having little energy: Several days - 1  Poor appetite or over eating: Not at all - 0  Feeling bad about yourself - or that you are a failure or have let yourself or your family down: Not at all - 0  Trouble concentrating on things, such as reading the newspaper or watching television: Not at all - 0  Moving or speaking so slowly that other people could have noticed  Or the opposite -  being so fidgety or restless that you have been moving around a lot more than usual: Not at all - 0  Thoughts that you would be better off dead, or of hurting yourself in some way: Not at all - 0   PHQ-9 Adult Depression Screening Negative     PHQ-9 Difficulty Level Somewhat difficult     PHQ-9 Severity Minimal Depression         Procedure    Procedure: Visual Acuity Test    Indication: routine screening  Inforrmation supplied by a Snellen chart     Results: 20/20 in both eyes without corrective device, 20/20 in the right eye without corrective device, 20/20 in the left eye without corrective device      Future Appointments    Date/Time Provider Specialty Site   04/13/2018 03:30 PM Gae Hamman, DPM [facility]     Signatures   Electronically signed by : Russ Padilla DO; Nov 9 2017  2:03PM EST                       (Author)

## 2018-01-15 NOTE — PROGRESS NOTES
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   stage 4 chronic kidney disease (404 10) (I13 10)  7  Chronic kidney disease, stage 2 (mild) (585 2) (N18 2)  8  Depression (311) (F32 9)  9  Anxiety, generalized (300 02) (F41 1)  10  Mixed hyperlipidemia (272 2) (E78 2)  11  Need for vaccination (V05 9) (Z23)    Plan  Anxiety, generalized, Benign essential hypertension, Benign hypertensive heart and  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 stage 4  chronic kidney disease, Chronic kidney disease, stage 2 (mild), Convulsion disorder,  Depression, Encounter for screening for malignant neoplasm of prostate, Episodic mood  disorder, Generalized nonconvulsive seizure, Mixed hyperlipidemia, Need for  vaccination, Screening for diabetes mellitus (DM), Well adult on routine  health check    · (1) PSA (SCREEN) (Dx V76 44 Screen for Prostate Cancer); Status:Active; Requested  [facility]:[id number]; Anxiety, generalized, Benign essential hypertension, Benign hypertensive heart and  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 stage 4  chronic kidney disease, Chronic kidney disease, stage 2 (mild), Convulsion disorder,  Depression, Episodic mood disorder, Generalized nonconvulsive seizure, Mixed  hyperlipidemia, Need for vaccination, Screening for diabetes mellitus (DM),  Well adult on routine health check    · (1) CBC/PLT/DIFF; Status:Active; Requested for:02Nov2016;    · (1) COMPREHENSIVE METABOLIC PANEL; Status:Active; Requested for:02Nov2016;    · (1) LIPID PANEL, FASTING; Status:Active;  Requested J:[id number];   Need for vaccination    · Administer: Fluzone Quadrivalent Intramuscular Suspension; 0 5ml; To Be Done:  [id number]    Discussion/Summary    I can refill the psych meds for a month if they need but they will need to acquire psychiatry  Chief Complaint  pt present for CPE  ac/cma      History of Present Illness  HM, Adult Male: The patient is being seen for a health maintenance evaluation  General Health:   Screening:   HPI: Pt is here for a full physical  pt's care taker states his psych doc has retired and want to know if i can do his psych meds      Review of Systems    Constitutional: No fever or chills, feels well, no tiredness, no recent weight gain or weight loss  Eyes: No complaints of eye pain, no red eyes, no discharge from eyes, no itchy eyes  ENT: no complaints of earache, no hearing loss, no nosebleeds, no nasal discharge, no sore throat, no hoarseness  Cardiovascular: No complaints of slow heart rate, no fast heart rate, no chest pain, no palpitations, no leg claudication, no lower extremity  Respiratory: No complaints of shortness of breath, no wheezing, no cough, no SOB on exertion, no orthopnea or PND  Gastrointestinal: No complaints of abdominal pain, no constipation, no nausea or vomiting, no diarrhea or bloody stools  Genitourinary: No complaints of dysuria, no incontinence, no hesitancy, no nocturia, no genital lesion, no testicular pain  Musculoskeletal: No complaints of arthralgia, no myalgias, no joint swelling or stiffness, no limb pain or swelling  Integumentary: No complaints of skin rash or skin lesions, no itching, no skin wound, no dry skin  Neurological: No compliants of headache, no confusion, no convulsions, no numbness or tingling, no dizziness or fainting, no limb weakness, no difficulty walking  Psychiatric: Is not suicidal, no sleep disturbances, no anxiety or depression, no change in personality, no emotional problems     Endocrine: No complaints of proptosis, no hot flashes, no muscle weakness, no erectile dysfunction, no deepening of the voice, no feelings of weakness  Hematologic/Lymphatic: No complaints of swollen glands, no swollen glands in the neck, does not bleed easily, no easy bruising  Active Problems   1  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 (736 70) (M21 962)  2  Acquired deformity of right ankle and foot (736 70) (M21 961)  3  Acquired hallux rigidus of left foot (735 2) (M20 22)  4  Ankle pain, unspecified laterality  5  Anxiety, generalized (300 02) (F41 1)  6  Benign essential hypertension (401 1) (I10)  7  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   stage 4 chronic kidney disease (404 10) (I13 10)  8  Capsulitis (726 90) (M77 9)  9  Chronic kidney disease, stage 2 (mild) (585 2) (N18 2)  10  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 (275 03) (E83 10)  11  Constipation (564 00) (K59 00)  12  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)  13  Depression (311) (F32 9)  14  Difficulty in walking (719 7) (R26 2)  15  Encounter for screening for cardiovascular disorders (V81 2) (Z13 6)  16  Encounter for screening for malignant neoplasm of colon (V76 51) (Z12 11)  17  Encounter for screening for malignant neoplasm of prostate (V76 44) (Z12 5)  18  Episodic mood disorder (296 90) (F39)  19  Erythrocytosis (289 0) (D75 1)  20  Foot joint pain (719 47) (M25 579)  21  Generalized nonconvulsive seizure (345 00) (G40 309)  22  Hallux rigidus of both feet (735 2) (M20 22,M20 21)  23  Hemiplegia Dominant Side (342 81)  24  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)  25  Ingrowing nail (703 0) (L60 0)  26  Mixed hyperlipidemia (272 2) (E78 2)  27  Needs flu shot (V04 81) (Z23)  28  Nephrocalcinosis (275 49) (E83 59,N29)  29  Onychomycosis (110 1) (B35 1)  30  Osteoporosis (733 00) (M81 0)  31  Pain in joints of both feet (719 47) (M79 671,M79 672)  32  Screening for diabetes mellitus (DM) (V77 1) (Z13 1)  33  Screening for genitourinary condition (V81 6) (Z13 89)  34   Screening for thyroid disorder (V77 0) (Z13 29)  35  Well adult on routine health check (V70 0) (Z00 00)    Past Medical History    · History of Bilateral impacted cerumen (380 4) (H61 23)   · History of Cellulitis of finger (681 00) (L03 019)   · History of Closed Fracture Of The Phalanges Of The Foot (826 0)   · History of Colon cancer screening (V76 51) (Z12 11)   · History of Epilepsy And Recurrent Seizures (345 90)   · History of athlete's foot (V12 09) (Z86 19)   · History of hypertension (V12 59) (Z86 79)   · History of hypoglycemia (V12 29) (Z86 39)   · History of hypokalemia (V12 29) (Z86 39)   · History of ingrown nail (V13 3) (Z87 2)   · History of mental retardation (V11 8) (Z86 59)   · History of Impacted cerumen, unspecified laterality (380 4) (H61 20)   · History of Learning difficulty (315 9) (F81 9)   · History of Maceration of skin (709 8) (L98 8)   · History of Paronychia of toe (681 11) (L03 039)   · History of Special screening examination for neoplasm of prostate (V76 44) (Z12 5)    Surgical History    · History of Brain Surgery    Family History  Mother    · Denied: Family history of Crohn's disease without complication, unspecified  gastrointestinal tract location   · Family history of emphysema (V17 6) (Z82 5)   · Denied: Family history of liver disease   · Denied: Family history of Malignant neoplasm of colon, unspecified part of colon   · Family history of Nephrolithiasis  Father    · Denied: Family history of Crohn's disease without complication, unspecified  gastrointestinal tract location   · Family history of acute myocardial infarction (V17 3) (Z82 49)   · Denied: Family history of liver disease   · Family history of Hypertension (V17 49)   · Denied: Family history of Malignant neoplasm of colon, unspecified part of colon   · Family history of Nephrolithiasis  Brother    · Family history of Nephrolithiasis    Social History    · Denied: History of Alcohol Use (History)   · Never A Smoker    Current Meds  1  Atorvastatin Calcium 10 MG Oral Tablet; TAKE ONE TABLET ORALLY DAILY AT 8PM;   Therapy: 34UUK6890 to (Last Rx:01Nov2016)  Requested for: 10SIR8235 Ordered  2  Bystolic 2 5 MG Oral Tablet; TAKE 1 TABLET DAILY; Therapy: 61ZHV8563 to (Evaluate:30Oct2016)  Requested for: 57IML6504; Last   Rx:79Gzh8626 Ordered  3  ClonazePAM 0 25 MG Oral Tablet Dispersible; 2 Every Day; Therapy: 19Apr2012 to  Requested for: 31UML8761 Recorded  4  LamoTRIgine 150 MG Oral Tablet; 2 Every Day; Therapy: 19Apr2012 to  Requested for: 47IOX8128 Recorded  5  Metamucil 48 57 % Oral Powder; sugar free orange - 1 tsp mixed with 8 oz water daily in   the am;   Therapy: 87UZX2078 to (Last Rx:01Jun2015) Ordered  6  Reguloid 58 6 % Oral Powder; TAKE 1 TEASPOONFUL MIXED WITH 8 OUNCES OF   WATER ORALLY EVERY DAY AT 8AM;   Therapy: 42QTK3816 to (Last Rx:22Jun2016)  Requested for: 78QWF7390 Ordered  7  RisperDAL 0 25 MG Oral Tablet; TAKE 0 5 TABLET Twice daily; Therapy: 19Apr2012 to  Requested for: 99RVM0344 Recorded  8  Senna 8 6 MG Oral Tablet; TAKE ONE TABLET BY MOUTH DAILY AT 8 A M  FOR   CONSTIPATION; Therapy: 17AUW4333 to (Last U[facility]:56HJX7171)  Requested for: 25OXS4358 Ordered    Allergies   1  ASPIRIN    Vitals   Recorded: 08PPB0867 78:28TC   Systolic 745   Diastolic 78   Heart Rate 60   Respiration 16   Temperature 96 1 F   Height 5 ft 6 in   Weight 182 lb    BMI Calculated 29 38   BSA Calculated 1 93     Physical Exam    Constitutional   General appearance: No acute distress, well appearing and well nourished  Head and Face   Head and face: Normal     Palpation of the face and sinuses: No sinus tenderness  Eyes   Conjunctiva and lids: No erythema, swelling or discharge  Pupils and irises: Equal, round, reactive to light  Ophthalmoscopic examination: Normal fundi and optic discs      Ears, Nose, Mouth, and Throat   External inspection of ears and nose: Normal     Otoscopic examination: Tympanic membranes translucent with normal light reflex  Canals patent without erythema  Oropharynx: Normal with no erythema, edema, exudate or lesions  Neck   Neck: Supple, symmetric, trachea midline, no masses  Thyroid: Normal, no thyromegaly  Pulmonary   Respiratory effort: No increased work of breathing or signs of respiratory distress  Percussion of chest: Normal     Palpation of chest: Normal     Auscultation of lungs: Clear to auscultation  Cardiovascular   Palpation of heart: Normal PMI, no thrills  Auscultation of heart: Normal rate and rhythm, normal S1 and S2, no murmurs  Carotid pulses: 2+ bilaterally  Abdomen   Abdomen: Non-tender, no masses  Liver and spleen: No hepatomegaly or splenomegaly  Genitourinary   Scrotal contents: Normal testes, no masses  Penis: Normal, no lesions  Digital rectal exam of prostate: Normal size, no masses  Lymphatic   Palpation of lymph nodes in neck: No lymphadenopathy  Palpation of lymph nodes in axillae: No lymphadenopathy  Palpation of lymph nodes in groin: No lymphadenopathy  Palpation of lymph nodes in other areas: No lymphadenopathy  Musculoskeletal   Gait and station: Abnormal   limp  Inspection/palpation of digits and nails: Normal without clubbing or cyanosis  Inspection/palpation of joints, bones, and muscles: Abnormal   rt upper ext contracture  Range of motion: Normal     Stability: Normal     Muscle strength/tone: Abnormal   weak in rt  Skin   Skin and subcutaneous tissue: Normal without rashes or lesions  Palpation of skin and subcutaneous tissue: Normal turgor  Neurologic   Cranial nerves: Cranial nerves 2-12 intact  Procedure    Procedure: Visual Acuity Test    Indication: routine screening  Inforrmation supplied by a Snellen chart     Results: 20/15 in both eyes without corrective device, 20/20 in the right eye without corrective device, 20/20 in the left eye without corrective device      Health Management  Encounter for screening for malignant neoplasm of colon   COLONOSCOPY; every 10 years; Last 75KBA0849; Next Due: 69Kqb9613;  Active    Future Appointments    Date/Time Provider Specialty Site   01/27/2017 03:15 PM Cami Morley DPM Podiatry [address] [facility]     Signatures   Electronically signed by : Jayro Bajwa DO; Nov 2 2016  4:31PM EST                       (Author)

## 2018-01-16 ENCOUNTER — APPOINTMENT (OUTPATIENT)
Dept: PHYSICAL THERAPY | Facility: CLINIC | Age: 53
End: 2018-01-16
Payer: MEDICARE

## 2018-01-16 PROCEDURE — G8978 MOBILITY CURRENT STATUS: HCPCS

## 2018-01-16 PROCEDURE — G8979 MOBILITY GOAL STATUS: HCPCS

## 2018-01-16 PROCEDURE — 97162 PT EVAL MOD COMPLEX 30 MIN: CPT

## 2018-01-16 NOTE — RESULT NOTES
Verified Results  (1) COMPREHENSIVE METABOLIC PANEL [id number] 99:76NJ Remigio Brumfield     Test Name Result Flag Reference   Glucose, Serum 87 mg/dL  65-99   BUN 16 mg/dL  6-24   Creatinine, Serum 1 29 mg/dL H 0 76-1 27   eGFR If NonAfricn Am 64 mL/min/1 73  >59   eGFR If Africn Am 74 mL/min/1 73  >59   BUN/Creatinine Ratio 12  9-20   Sodium, Serum 138 mmol/L  134-144   Potassium, Serum 4 3 mmol/L  3 5-5 2   Chloride, Serum 98 mmol/L     Carbon Dioxide, Total 26 mmol/L  18-29   Calcium, Serum 9 6 mg/dL  8 7-10 2   Protein, Total, Serum 7 1 g/dL  6 0-8 5   Albumin, Serum 4 7 g/dL  3 5-5 5   Globulin, Total 2 4 g/dL  1 5-4 5   A/G Ratio 2 0  1 1-2 5   Bilirubin, Total 0 8 mg/dL  0 0-1 2   Alkaline Phosphatase, S 93 IU/L     AST (SGOT) 23 IU/L  0-40   ALT (SGPT) 26 IU/L  0-44     [facility]) Cardiovascular Risk Assessment [id number] 09:30AM Remigio Brumfield     Test Name Result Flag Reference   Interpretation Note     Supplement report is available  PDF Image   [facility]) Lipid Panel [id number] 09:30AM Remigio Brumfield     Test Name Result Flag Reference   Cholesterol, Total 139 mg/dL  100-199   Triglycerides 42 mg/dL  0-149   HDL Cholesterol 55 mg/dL  >39   According to ATP-III Guidelines, HDL-C >59 mg/dL is considered a  negative risk factor for CHD  VLDL Cholesterol Derek 8 mg/dL  5-40   LDL Cholesterol Calc 76 mg/dL  0-99     [facility]) Ceci Doles CMP14 Default [id number] 09:30AM Remigio Danielzen     Test Name Result Flag Reference   Ceci Doles CMP14 Default Comment     A hand-written panel/profile was received from your office  In  accordance with the [facility] Ambiguous Test Code Policy dated July 0022, we have completed your order by using the closest currently  or formerly recognized AMA panel    We have assigned Comprehensive  Metabolic Panel (14), Test Code #466461 to this request   If this  is not the testing you wished to receive on this specimen, please  contact the [facility] Client Inquiry/Technical Services Department  to clarify the test order  We appreciate your business  [facility]) Cy Pyo LP Default [id number] 09:30AM Damaris Bone     Test Name Result Flag Reference   David Villegas LP Default Comment     A hand-written panel/profile was received from your office  In  accordance with the [facility] Ambiguous Test Code Policy dated July 4404, we have completed your order by using the closest currently  or formerly recognized AMA panel  We have assigned Lipid Panel,  Test Code #792081 to this request  If this is not the testing you  wished to receive on this specimen, please contact the [address] [place] [address]  Client Inquiry/Technical Services Department to clarify the test  order  We appreciate your business         Discussion/Summary   Will discuss labs at follow up appt

## 2018-01-16 NOTE — PROGRESS NOTES
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 (736 70) (M21 962)   3  Acquired deformity of right ankle and foot (736 70) (M21 961)   4  Capsulitis (726 90) (M77 9)    Plan    · Follow-up visit in 2 months Evaluation and Treatment  Follow-up  Status: Hold For -  Scheduling  Requested for: [id number]   · Stretch your plantar fascia 4 times a day ; Status:Complete;   Done: 33NMA3051 04:47PM    Discussion/Summary    Discuss orthotics discussed injection or physical therapy if pain not resolving  Discussed icing NSAIDs discussed removal of ingrown if recurrent  The treatment plan was reviewed with the patient/guardian  The patient/guardian understands and agrees with the treatment plan      Chief Complaint  Patient has been getting some pain in metatarsal heads when walking and standing  Patient has contracted hammertoes and some pain in metatarsal phalangeal joints with standing right worse in left  Patient has history of hemiplegia right  Patient also gets recurrent ingrown nails bilateral big toes  History of Present Illness  HPI: Patient is referred from [facility] for foot care      Active Problems    1  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 (736 70) (M21 962)   2  Acquired deformity of right ankle and foot (736 70) (M21 961)   3  Ankle pain, unspecified laterality (719 47) (M25 579)   4  Anxiety, generalized (300 02) (F41 1)   5  Benign essential hypertension (401 1) (I10)   6  Benign Hypertensive Heart And Chronic Kidney Disease (404 10)   7  Bilateral impacted cerumen (380 4) (H61 23)   8  Capsulitis (726 90) (M77 9)   9  Chronic kidney disease, stage 2 (mild) (585 2) (N18 2)   10  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 (275 03) (E83 10)   11  Constipation (564 00) (K59 00)   12  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)   13  Depression (311) (F32 9)   14  Difficulty in walking (719 7) (R26 2)   15   Encounter for screening for cardiovascular disorders (V81 2) (Z13 6)   16  Encounter for screening for malignant neoplasm of colon (V76 51) (Z12 11)   17  Encounter for screening for malignant neoplasm of prostate (V76 44) (Z12 5)   18  Episodic mood disorder (296 90) (F39)   19  Erythrocytosis (289 0) (D75 1)   20  Foot joint pain (719 47) (M25 579)   21  Generalized nonconvulsive seizure (345 00) (G40 309)   22  Hallux rigidus (735 2) (M20 20)   23  Hemiplegia Dominant Side (342 81)   24  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)   25  Ingrowing nail (703 0) (L60 0)   26  Mixed hyperlipidemia (272 2) (E78 2)   27  Needs flu shot (V04 81) (Z23)   28  Nephrocalcinosis (275 49) (E83 59,N29)   29  Osteoporosis (733 00) (M81 0)   30  Screening for genitourinary condition (V81 6) (Z13 89)   31  Screening for thyroid disorder (V77 0) (Z13 29)   32   Well adult on routine health check (V70 0) (Z00 00)    Past Medical History    · History of Cellulitis of finger (681 00) (L03 019)   · History of Closed Fracture Of The Phalanges Of The Foot (826 0)   · History of Colon cancer screening (V76 51) (Z12 11)   · History of Epilepsy And Recurrent Seizures (345 90)   · History of athlete's foot (V12 09) (Z86 19)   · History of hypertension (V12 59) (Z86 79)   · History of hypoglycemia (V12 29) (Z86 39)   · History of hypokalemia (V12 29) (Z86 39)   · History of ingrown nail (V13 3) (Z87 2)   · History of mental retardation (V11 8) (Z86 59)   · History of Impacted cerumen, unspecified laterality (380 4) (H61 20)   · History of Learning difficulty (315 9) (F81 9)   · History of Maceration of skin (709 8) (L98 8)   · History of Paronychia of toe (681 11) (L03 039)   · History of Special screening examination for neoplasm of prostate (V76 44) (Z12 5)    Surgical History    · History of Brain Surgery    Family History    · Denied: Family history of Crohn's disease without complication, unspecified  gastrointestinal tract location   · Family history of emphysema (V17 6) (Z82 5)   · Denied: Family history of liver disease   · Denied: Family history of Malignant neoplasm of colon, unspecified part of colon   · Family history of Nephrolithiasis    · Denied: Family history of Crohn's disease without complication, unspecified  gastrointestinal tract location   · Family history of acute myocardial infarction (V17 3) (Z82 49)   · Denied: Family history of liver disease   · Family history of Hypertension (V17 49)   · Denied: Family history of Malignant neoplasm of colon, unspecified part of colon   · Family history of Nephrolithiasis    · Family history of Nephrolithiasis    Social History    · Denied: History of Alcohol Use (History)   · Never A Smoker    Current Meds   1  Atorvastatin Calcium 10 MG Oral Tablet; TAKE 1 TABLET Daily at 8pm;   Therapy: 33RYB4547 to (Evaluate:20Jan2016)  Requested for: 06MOY4543; Last   Rx:18Jan2016 Ordered   2  Bystolic 2 5 MG Oral Tablet; TAKE 1 TABLET DAILY; Therapy: 74WSL5070 to Recorded   3  Ciprodex 0 3-0 1 % Otic Suspension; Instill 4 drops in affected ear twice daily; Therapy: 37PRS7955 to (Last Rx:24Nov2015)  Requested for: 99IIA4252 Ordered   4  ClonazePAM 0 25 MG Oral Tablet Dispersible; 2 Every Day; Therapy: 19Apr2012 to  Requested for: 58ULM3388 Recorded   5  LamoTRIgine 150 MG Oral Tablet; 2 Every Day; Therapy: 19Apr2012 to  Requested for: 57LAB9810 Recorded   6  Metamucil 48 57 % Oral Powder; sugar free orange - 1 tsp mixed with 8 oz water daily in   the am;   Therapy: 42IEF4543 to (Last Rx:01Jun2015) Ordered   7  MoviPrep 100 GM Oral Solution Reconstituted; USE AS DIRECTED; Therapy: 27GLC5920 to (Last Rx:82Aob8318)  Requested for: 29MUK1810 Ordered   8  RisperDAL 0 25 MG Oral Tablet; TAKE 0 5 TABLET Twice daily; Therapy: 19Apr2012 to  Requested for: 91UEQ5590 Recorded   9  Senna 8 6 MG Oral Tablet; TAKE ONE TABLET BY MOUTH DAILY AT 8 A M  FOR   CONSTIPATION;    Therapy: 89ANJ8750 to (Last NK:59JZU8275)  Requested for: 85TYK1426 Ordered    The medication list was reviewed and updated today  Allergies    1  ASPIRIN    Physical Exam    Constitutional: no acute distress, well appearing and well nourished  Orthopedic/Biomechanical: abnormal foot type and hammertoe(s)  Left Foot: Appearance: Normal except as noted: a deformity and pes planus  Great toe deformities include a bunion  Second toe deformities include hammer toe  Forth toe deformities include hammer toe  Fifth toe deformities include hammer toe  Evaluation of the great toe nail demonstrates paronychia and an ingrown nail  Tenderness: distal first metatarsal  Hallux limitus first MPJ bilateral, pain on range of motion, no crepitus,  Special Tests: Rigid hammertoes 2 through 5 bilateral, plantar flexed first and fifth metatarsal heads bilateral    Right Foot: Appearance: Normal except as noted: a deformity and pes planus  Great toe deformities include a bunion  Second toe deformities include hammer toe  Third toe deformities include hammer toe  Forth toe deformities include hammer toe  Fifth toe deformities include hammer toe  Evaluation of the great toe nail demonstrates paronychia and an ingrown nail  Tenderness: distal first metatarsal  Hammertoes 2 through 5 bilateral  Plantarflexed metatarsal heads  Right second MPJ plantarflexed metatarsal capsulitis second positive edema negative erythema negative signs of infection  Special Tests: Chronic ingrown nail bilateral big toes negative purulence negative abscess  Neurological Exam: performed  Light touch was intact bilaterally  Vibratory sensation was intact bilaterally  Response to monofilament test was intact bilaterally  Vascular Exam: performed Dorsalis pedis pulses were 1/4 bilaterally  Posterior tibial pulses were 1/4 bilaterally  Capillary refill time was greater than 3 seconds bilaterally        Procedure  Aseptic debridement and planing of nails x10, manually, and mechanically      Future Appointments    Date/Time Provider Specialty Site   02/12/2016 08:45 AM TONY Quinn  Gastroenterology Adult [facility] OUTPATIENT   04/19/2016 01:30 PM Son Méndez, [address] [place] [address]     Signatures   Electronically signed by :  Allison Sebastian DPM; Jan 19 2016  4:49PM EST                       (Author)

## 2018-01-16 NOTE — RESULT NOTES
Verified Results  (1) CBC/PLT/DIFF [id number] 09:25AM Marshall Monas     Test Name Result Flag Reference   WBC 8 6 x10E3/uL  3 4-10 8   RBC 5 68 x10E6/uL  4 14-5 80   Hemoglobin 17 2 g/dL  12 6-17 7   Hematocrit 48 8 %  37 5-51 0   MCV 86 fL  79-97   MCH 30 3 pg  26 6-33 0   MCHC 35 2 g/dL  31 5-35 7   RDW 13 0 %  12 3-15 4   Platelets 792 C35J1/WO  150-379   Neutrophils 70 %     Lymphs 21 %     Monocytes 8 %     Eos 1 %     Basos 0 %     Neutrophils (Absolute) 6 0 x10E3/uL  1 4-7 0   Lymphs (Absolute) 1 8 x10E3/uL  0 7-3 1   Monocytes(Absolute) 0 7 x10E3/uL  0 1-0 9   Eos (Absolute) 0 1 x10E3/uL  0 0-0 4   Baso (Absolute) 0 0 x10E3/uL  0 0-0 2   Immature Granulocytes 0 %     Immature Grans (Abs) 0 0 x10E3/uL  0 0-0 1     [facility]) Winifred Sebastian CMP14 Default [id number] 09:25AM Marshall Monas     Test Name Result Flag Reference   Winifred Sebastian CMP14 Default Comment     A hand-written panel/profile was received from your office  In  accordance with the [facility] Ambiguous Test Code Policy dated July 6286, we have completed your order by using the closest currently  or formerly recognized AMA panel  We have assigned Comprehensive  Metabolic Panel (14), Test Code #493797 to this request   If this  is not the testing you wished to receive on this specimen, please  contact the [address] [place] [address] Client Inquiry/Technical Services Department  to clarify the test order  We appreciate your business  [facility]) [facility] [place] LP Default [id number] 09:25AM Marshall Monas     Test Name Result Flag Reference   Ambig Abbrev LP Default Comment     A hand-written panel/profile was received from your office  In  accordance with the [facility] Ambiguous Test Code Policy dated July 3657, we have completed your order by using the closest currently  or formerly recognized AMA panel    We have assigned Lipid Panel,  Test Code #[id number] to this request  If this is not the testing you  wished to receive on this specimen, please contact the [address] [place] [address] Inquiry/Technical Services Department to clarify the test  order  We appreciate your business  (1) COMPREHENSIVE METABOLIC PANEL [id number] 29:04RE Lorna Ramirez     Test Name Result Flag Reference   Glucose, Serum 79 mg/dL  65-99   BUN 18 mg/dL  6-24   Creatinine, Serum 1 21 mg/dL  0 76-1 27   eGFR If NonAfricn Am 69 mL/min/1 73  >59   eGFR If Africn Am 80 mL/min/1 73  >59   BUN/Creatinine Ratio 15  9-20   Sodium, Serum 133 mmol/L L 134-144   **Please note reference interval change**   Potassium, Serum 4 0 mmol/L  3 5-5 2   Chloride, Serum 93 mmol/L L    **Please note reference interval change**   Carbon Dioxide, Total 24 mmol/L  18-29   Calcium, Serum 9 0 mg/dL  8 7-10 2   Protein, Total, Serum 7 1 g/dL  6 0-8 5   Albumin, Serum 4 5 g/dL  3 5-5 5   Globulin, Total 2 6 g/dL  1 5-4 5   A/G Ratio 1 7  1 1-2 5   Bilirubin, Total 1 2 mg/dL  0 0-1 2   Alkaline Phosphatase, S 82 IU/L     AST (SGOT) 21 IU/L  0-40   ALT (SGPT) 21 IU/L  0-44     ([facility]) Lipid Panel [id number] 09:25AM Lorna Ramirez     Test Name Result Flag Reference   Cholesterol, Total 147 mg/dL  100-199   Triglycerides 54 mg/dL  0-149   HDL Cholesterol 53 mg/dL  >39   VLDL Cholesterol Derek 11 mg/dL  5-40   LDL Cholesterol Calc 83 mg/dL  0-99     (1) PSA (SCREEN) (Dx V76 44 Screen for Prostate Cancer) [id number] 09:25AM Lorna Ramirez     Test Name Result Flag Reference   Prostate Specific Ag, Serum 1 2 ng/mL  0 0-4 0   Roche ECLIA methodology  According to the American Urological Association, Serum PSA should  decrease and remain at undetectable levels after radical  prostatectomy  The AUA defines biochemical recurrence as an initial  PSA value 0 2 ng/mL or greater followed by a subsequent confirmatory  PSA value 0 2 ng/mL or greater  Values obtained with different assay methods or kits cannot be used  interchangeably  Results cannot be interpreted as absolute evidence  of the presence or absence of malignant disease       ([facility]) Cardiovascular Risk Assessment [id number] 09:25AM Barbara Stallworthre     Test Name Result Flag Reference   Interpretation NTAP     PDF Image Not applicable       [facility]) [facility] CKD Program [id number] 09:25AM Barbara Rodriguez     Test Name Result Flag Reference   Interpretation Note     -------------------------------  CHRONIC KIDNEY DISEASE:  EGFR, BLOOD PRESSURE, AND PROTEINURIA ASSESSMENT  Estimated GFR is 60 mL/min/1 73mE2 or above; this patient is  not presently in CKD stage 3 or higher, therefore we are  unable to provide suggestions for treatment or follow-up  EGFR, BLOOD PRESSURE, AND PROTEINURIA TREATMENT SUGGESTIONS  -  Assessment of albuminuria (urine albumin:creatinine ratio or  urine protein:creatinine ratio preferred) is recommended at  least annually in CKD patients for staging and disease  prognosis  -  -------------------------------  DISCLAIMER  These assessments and treatment suggestions are provided as  a convenience in support of the physician-patient  relationship and are not intended to replace the physician's  clinical judgment  They are derived from the national  guidelines in addition to other evidence and expert opinion  The clinician should consider this information within the  context of clinical opinion and the individual patient  SEE GUIDANCE FOR CHRONIC KIDNEY DISEASE PROGRAM: National  Kidney Foundation Kidney Disease Outcomes Quality Initiative  (KDOQI (TM)), with its limitations and disclaimers, are at  www kidney  org/professionals/KDOQI  Kidney Disease Improving  Global Outcomes (KDIGO) clinical practice guidelines are at  http://kdigo  org/home/guidelines/  The members of  Melania Curry national advisory panel are listed at  www  [employer] com  This program is intended for patients who  have been diagnosed with stages 3, 4, or pre-dialysis 5 CKD  It is not intended for children, pregnant patients, or  transplant patients  PDF Image            Discussion/Summary   labs stable

## 2018-01-17 ENCOUNTER — ALLSCRIPTS OFFICE VISIT (OUTPATIENT)
Dept: OTHER | Facility: OTHER | Age: 53
End: 2018-01-17

## 2018-01-17 ENCOUNTER — GENERIC CONVERSION - ENCOUNTER (OUTPATIENT)
Dept: OTHER | Facility: OTHER | Age: 53
End: 2018-01-17

## 2018-01-17 NOTE — MISCELLANEOUS
Message  Return to work or school:   Marline Barboza is under my professional care  He was seen in my office on 11/28/17   He is able to return to work on  12/4/17       Raynaldo Husbands, [address]        Future Appointments    Signatures   Electronically signed by : Brenna Bocanegra; Nov 28 2017  4:57PM EST                       (Author)    Electronically signed by : Agnieszka Holly DO; Nov 28 2017  9:13PM EST                       (Author)

## 2018-01-18 ENCOUNTER — APPOINTMENT (OUTPATIENT)
Dept: PHYSICAL THERAPY | Facility: CLINIC | Age: 53
End: 2018-01-18
Payer: MEDICARE

## 2018-01-18 PROCEDURE — 97140 MANUAL THERAPY 1/> REGIONS: CPT

## 2018-01-18 PROCEDURE — 97110 THERAPEUTIC EXERCISES: CPT

## 2018-01-18 PROCEDURE — 97112 NEUROMUSCULAR REEDUCATION: CPT

## 2018-01-22 ENCOUNTER — APPOINTMENT (OUTPATIENT)
Dept: PHYSICAL THERAPY | Facility: CLINIC | Age: 53
End: 2018-01-22
Payer: MEDICARE

## 2018-01-22 PROCEDURE — 97112 NEUROMUSCULAR REEDUCATION: CPT

## 2018-01-22 PROCEDURE — 97110 THERAPEUTIC EXERCISES: CPT

## 2018-01-23 VITALS
RESPIRATION RATE: 18 BRPM | HEIGHT: 66 IN | BODY MASS INDEX: 31.38 KG/M2 | HEART RATE: 70 BPM | DIASTOLIC BLOOD PRESSURE: 70 MMHG | TEMPERATURE: 97.1 F | WEIGHT: 195.25 LBS | SYSTOLIC BLOOD PRESSURE: 114 MMHG

## 2018-01-23 NOTE — RESULT NOTES
Discussion/Summary   arthritis seen on x ray, if pain persists please call office     Verified Results  * XR SPINE LUMBAR MINIMUM 4 VIEWS NON INJURY [id number] 01:26PM Katia Mc Order Number: [id number]     Test Name Result Flag Reference   XR SPINE LUMBAR MINIMUM 4 VIEWS (Report)     LUMBAR SPINE     INDICATION: Lower back pain  COMPARISON: None     VIEWS: AP, lateral and bilateral oblique projections;      IMAGES: 4     FINDINGS:     Alignment is unremarkable  A mild anterolisthesis at L4-L5 noted     There is no radiographic evidence of acute fracture or destructive osseous lesion  L4-5 disc space narrowing and osteophytosis noted  Bilateral nephrocalcinosis identified  IMPRESSION:     No acute osseous abnormality  Degenerative changes as described         Workstation performed: [id number]     Signed by:   Mihai Mir MD   1/2/18

## 2018-01-24 VITALS
RESPIRATION RATE: 20 BRPM | WEIGHT: 190 LBS | HEART RATE: 82 BPM | DIASTOLIC BLOOD PRESSURE: 70 MMHG | TEMPERATURE: 97.5 F | BODY MASS INDEX: 30.53 KG/M2 | HEIGHT: 66 IN | SYSTOLIC BLOOD PRESSURE: 110 MMHG

## 2018-01-25 ENCOUNTER — OFFICE VISIT (OUTPATIENT)
Dept: PHYSICAL THERAPY | Facility: CLINIC | Age: 53
End: 2018-01-25
Payer: MEDICARE

## 2018-01-25 DIAGNOSIS — M54.42 ACUTE BACK PAIN WITH SCIATICA, LEFT: Primary | ICD-10-CM

## 2018-01-25 PROCEDURE — 97110 THERAPEUTIC EXERCISES: CPT

## 2018-01-25 PROCEDURE — 97112 NEUROMUSCULAR REEDUCATION: CPT

## 2018-01-26 NOTE — PROGRESS NOTES
Daily Note     Today's date: 2018  Patient name: Gema Santiago  : 1965  MRN: [id number]  Referring provider: Peewee Mims DO  Dx:   Encounter Diagnosis   Name Primary?  Acute back pain with sciatica, left Yes                  Subjective: Pt reports improvement in sx since last session  Has had to walk around less in the morning to loosen up as compared to last week  Objective: Pt shows fair upright stability w/ hip abd kicks, requires min UE support throughout     Precautions: R sided hemiplegia, developmental delay, altered cognition, brain surgery and resulting stroke at young age    Specialty Daily Treatment Diary     Manual         hooklying lumbar traction 2x2 min holds                                            Exercise Diary         LTR  x20       Manual HS stretching  3x30 sec holds B        Bridging  2x10       Single airex sit<>stand 2x10       Heel raises in standing  2x10         Standing hip abd kicks  2x10       Standing mini squats 2x10       RTB shoulder rows x15       1# scaption w/ focus on upright posture x10 B       Wall push ups  2x10 total                                                                                           Modalities                                            Assessment: Tolerated treatment well  Patient could benefit from continued PT  He does well w/ new exercises, reporting no pain throughout  Requires cueing for upright postural control and improved core contraction throughout but corrects well  Appropriate for higher level stability drills as sx allow at next session  Plan: Continue per plan of care   Higher level stability training as able, attempt airex balance, med ball pass off

## 2018-01-29 ENCOUNTER — OFFICE VISIT (OUTPATIENT)
Dept: PHYSICAL THERAPY | Facility: CLINIC | Age: 53
End: 2018-01-29
Payer: MEDICARE

## 2018-01-29 DIAGNOSIS — M54.42 ACUTE BACK PAIN WITH SCIATICA, LEFT: Primary | ICD-10-CM

## 2018-01-29 PROCEDURE — 97110 THERAPEUTIC EXERCISES: CPT

## 2018-01-29 PROCEDURE — 97112 NEUROMUSCULAR REEDUCATION: CPT

## 2018-01-29 NOTE — PROGRESS NOTES
Daily Note     Today's date: 2018  Patient name: Pattie Horowitz  : 1965  MRN: [id number]  Referring provider: Aleksandr Strange DO  Dx:   Encounter Diagnosis   Name Primary?  Acute back pain with sciatica, left Yes                  Subjective: Pt reports that pain in L side posterior hip and leg is better, felt good after last session  Offers no significant new complaints overall  Objective: Pt requires cueing for form and CG throughout standing opp arm/leg lifts, no LOB noted throughout  Mod ankle strategy noted throughout SLS airex stance       Precautions: R sided hemiplegia, developmental delay, altered cognition, brain surgery and resulting stroke at young age     Specialty Daily Treatment Diary      Manual             hooklying lumbar traction 2x2 min holds                                                                          Exercise Diary           LTR  x20  x20 B          Manual HS  stretching  3x30 sec holds B   3x30 sec holds B         Bridging  2x10  2x10         Single airex sit<>stand 2x10           Heel raises in standing  2x10              Standing hip abd kicks  2x10           Standing mini squats 2x10           RTB shoulder rows x15           1# scaption w/ focus on upright posture x10 B           Wall push ups  2x10 total            1 5# cuff scaption   2x10 B           TrA isos   3 sec hold x10          feet together on airex w/ EC   3x10-20 sec holds          feet together on airex 4# ball pass off w/ rotation    5 mins total          1 5# cuff opp arm and leg lift in standing   x15 total B           manual glute stretching     2x30-45 sec holds B                                                                       Modalities                                                              Assessment: Tolerated treatment well  Patient demonstrated fatigue post treatment and could benefit from continued PT   He does well w/ higher level balance and stability, initially relying on min UE support w/ airex activities but progressing to CG by end  He does well w/ ball pass off, tolerating mod challenges before need for UE support  Plan: Continue per plan of care   Higher level stability, marching, tandem walking, heel raises, higher level upright shoulder stability

## 2018-02-01 ENCOUNTER — OFFICE VISIT (OUTPATIENT)
Dept: PHYSICAL THERAPY | Facility: CLINIC | Age: 53
End: 2018-02-01
Payer: MEDICARE

## 2018-02-01 DIAGNOSIS — M54.42 ACUTE BACK PAIN WITH SCIATICA, LEFT: Primary | ICD-10-CM

## 2018-02-01 PROCEDURE — G8979 MOBILITY GOAL STATUS: HCPCS

## 2018-02-01 PROCEDURE — 97112 NEUROMUSCULAR REEDUCATION: CPT

## 2018-02-01 PROCEDURE — G8980 MOBILITY D/C STATUS: HCPCS

## 2018-02-01 NOTE — PROGRESS NOTES
Daily Note     Today's date: 2018  Patient name: Gunner Oropeza  : 1965  MRN: [id number]  Referring provider: Damaris Pickering DO  Dx:   Encounter Diagnosis   Name Primary?  Acute back pain with sciatica, left Yes                  Subjective: Pt notes minimal pain through L side low back and hip, better than at last session  Arrives 30 min late s/t schedule mix up  Objective: See treatment diary below    Precautions: R sided hemiplegia, developmental delay, altered cognition, brain surgery and resulting stroke at young age     Specialty Daily Treatment Diary      Manual         hooklying lumbar traction 2x2 min holds                                                                          Exercise Diary         LTR  x20  x20 B   x15 B        Manual HS  stretching  3x30 sec holds B   3x30 sec holds B 3x30 sec holds on L only       Bridging  2x10  2x10         Single airex sit<>stand 2x10           Heel raises in standing  2x10       2x10 total       Standing hip abd kicks  2x10           Standing mini squats 2x10           RTB shoulder rows x15           1# scaption w/ focus on upright posture x10 B          Wall push ups  2x10 total            1 5# cuff scaption   2x10 B    2# 2x10        TrA isos   3 sec hold x10  3 sec hold x10        feet together on airex w/ EC   3x10-20 sec holds  3x10-20 sec holds        feet together on airex 4# ball pass off w/ rotation    5 mins total          1 5# cuff opp arm and leg lift in standing   x15 total B    2# x 10 B        manual glute stretching     2x30-45 sec holds B 2x30-45 sec holds B        standing marching on airex     2x10 B        2# cuff biceps curls and shoulder flexion     x15 eachB                                          Modalities                                                            Assessment: Tolerated treatment well  Patient would benefit from continued PT   Pt shows good tolerance to today's abbreviated session  Further progressions not made s/t time constraints from pt arriving late  He does well w/ all exercises and shows good tolerance to higher level balance and UE conditioning  Pt and caregiver note at end of session that they will call regarding future visits  Pt feels good and may not require future services  Future progressions will be made if pt decides to return  Emphasis on pain free functional movement  Plan: Progress treatment as tolerated  Update 3/13/2018: Pt never returned for further services and has been d/c  Pt was making good progress and was given HEP to continue with

## 2018-02-21 DIAGNOSIS — K59.00 CONSTIPATION, UNSPECIFIED CONSTIPATION TYPE: Primary | ICD-10-CM

## 2018-02-21 DIAGNOSIS — E78.2 MIXED HYPERLIPIDEMIA: Primary | ICD-10-CM

## 2018-02-21 RX ORDER — SENNA PLUS 8.6 MG/1
1 TABLET ORAL DAILY
Qty: 30 TABLET | Refills: 5 | Status: SHIPPED | OUTPATIENT
Start: 2018-02-21 | End: 2018-08-08 | Stop reason: SDUPTHER

## 2018-02-21 RX ORDER — ATORVASTATIN CALCIUM 10 MG/1
10 TABLET, FILM COATED ORAL DAILY
Qty: 30 TABLET | Refills: 3 | Status: SHIPPED | OUTPATIENT
Start: 2018-02-21 | End: 2018-06-21 | Stop reason: SDUPTHER

## 2018-04-10 ENCOUNTER — TELEPHONE (OUTPATIENT)
Dept: FAMILY MEDICINE CLINIC | Facility: CLINIC | Age: 53
End: 2018-04-10

## 2018-04-10 NOTE — TELEPHONE ENCOUNTER
The pharmacy called and left a message on the hotline requesting a sugar free orange powder which is mixed with 1 tsp water  We need to call the pharmacy to clarify what exactly they are looking for  There is something on the med list "unable to find" that states 5 ml daily reguloid) I am not sure if that is what they are asking for?   Vignesh Donato

## 2018-04-11 DIAGNOSIS — Z00.00 WELL ADULT EXAM: Primary | ICD-10-CM

## 2018-04-12 NOTE — TELEPHONE ENCOUNTER
Since pt is in a group home, they need a script for this even though it is OTC  Per Dr Dennis Lovett (since you were in a room) a verbal was given for Reguloid 426mL (which was previous written) continuously so they wont need to contact us every time they need a new script  If you do no agree with this will call back to change if so this message can be complete  No further action needed   Moisés Wadsworth

## 2018-04-13 ENCOUNTER — OFFICE VISIT (OUTPATIENT)
Dept: PODIATRY | Facility: CLINIC | Age: 53
End: 2018-04-13
Payer: MEDICARE

## 2018-04-13 VITALS — HEIGHT: 66 IN | BODY MASS INDEX: 29.41 KG/M2 | WEIGHT: 183 LBS

## 2018-04-13 DIAGNOSIS — M79.671 PAIN IN BOTH FEET: Primary | ICD-10-CM

## 2018-04-13 DIAGNOSIS — M21.961 ACQUIRED DEFORMITY OF RIGHT ANKLE AND FOOT: ICD-10-CM

## 2018-04-13 DIAGNOSIS — M79.672 PAIN IN BOTH FEET: Primary | ICD-10-CM

## 2018-04-13 DIAGNOSIS — L60.0 INGROWN NAIL: ICD-10-CM

## 2018-04-13 DIAGNOSIS — M21.962 ACQUIRED DEFORMITY OF LEFT ANKLE AND FOOT: ICD-10-CM

## 2018-04-13 DIAGNOSIS — B35.1 ONYCHOMYCOSIS: ICD-10-CM

## 2018-04-13 PROCEDURE — 99213 OFFICE O/P EST LOW 20 MIN: CPT | Performed by: PODIATRIST

## 2018-04-13 NOTE — PROGRESS NOTES
Assessment/Plan:  Aseptic debridement and planning of nails x10 and manually and mechanically  Daily foot checks monitor for signs of infection  Follow-up 3 months     Diagnoses and all orders for this visit:    Pain in both feet    Acquired deformity of right ankle and foot    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    Ingrown nail    Onychomycosis          Subjective:      Patient ID: Tanesha Roque is a 48 y o  male  Patient has mild pain in recurrent ingrown nails bilateral big toes right worse than left  Patient has not noticed any redness or signs of infection  Patient does contracted hammertoes  Some pain walking  Has not noticed any redness or signs of infection  Past Medical History:   Diagnosis Date    Ambulatory dysfunction     Anxiety     Chronic kidney disease     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     Constipation     Deformity     left and right foot and ankle ,right hand    Depression     Erythrocytosis     Hemiplegia affecting dominant side (HCC)     Hypertension     Mood disorder (HCC)     Nephrocalcinosis     Seizures ([facility] [address] )     2001         Current Outpatient Prescriptions:     atorvastatin (LIPITOR) 10 mg tablet, Take 1 tablet (10 mg total) by mouth daily, Disp: 30 tablet, Rfl: 3    clonazePAM (KlonoPIN) 0 5 mg tablet, Take 0 25 mg by mouth 2 (two) times a day , Disp: , Rfl:     lamoTRIgine (LaMICtal) 150 MG tablet, Take 150 mg by mouth 2 (two) times a day , Disp: , Rfl:     nebivolol (BYSTOLIC) 2 5 mg tablet, Take 2 5 mg by mouth daily  , Disp: , Rfl:     psyllium (REGULOID) 58 6 % powder, Take by mouth daily, Disp: 425 g, Rfl: 0    risperiDONE (RisperDAL) 0 25 mg tablet, Take 0 25 mg by mouth 2 (two) times a day , Disp: , Rfl:     senna (SENOKOT) 8 6 MG tablet, Take 1 tablet (8 6 mg total) by mouth daily, Disp: 30 tablet, Rfl: 5    UNABLE TO FIND, Take 5 mL by mouth daily   Med Name: reguloid, Disp: , Rfl:     Past Surgical History:   Procedure Laterality Date    BRAIN SURGERY      KS COLONOSCOPY FLX DX W/COLLJ SPEC WHEN PFRMD N/A 2/12/2016    Procedure: COLONOSCOPY;  Surgeon: Fabien Matias MD;  Location: [facility]; Service: Gastroenterology       Allergies   Allergen Reactions    Asa [Aspirin] Other (See Comments)     unknown       Patient Active Problem List   Diagnosis    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    Acquired deformity of right ankle and foot    Ingrown nail    Pain in both feet       Review of Systems   Constitutional: Negative  HENT: Negative  Eyes: Negative  Respiratory: Negative  Cardiovascular: Negative  Gastrointestinal: Negative  Endocrine: Negative  Genitourinary: Negative  Musculoskeletal: Negative  Skin: Negative  Allergic/Immunologic: Negative  Neurological: Negative  Hematological: Negative  Psychiatric/Behavioral: Negative  Objective:      Ht 5' 6" (1 676 m)   Wt 83 kg (183 lb)   BMI 29 54 kg/m²          Physical Exam    Constitutional: no acute distress, well appearing and well nourished  Orthopedic/Biomechanical: abnormal foot type-- and-- hammertoe(s)  Left Foot: Appearance: Normal except as noted: a deformity-- and-- pes planus  Great toe deformities include a bunion  Second toe deformities include hammer toe  Forth toe deformities include hammer toe  Fifth toe deformities include hammer toe  Evaluation of the great toe nail demonstrates paronychia-- and-- an ingrown nail  Tenderness: distal first metatarsal  Hallux limitus first MPJ bilateral,, mild pain on end range of motion, no hypermobility first ray muscle strength is 5  Special Tests: Left hallux ingrown tibial border no erythema no exudate no signs of infection mild pain on palpation  Right Foot: Appearance: Normal except as noted: a deformity-- and-- pes planus  Great toe deformities include a bunion  Second toe deformities include hammer toe  Third toe deformities include hammer toe   Forth toe deformities include hammer toe  Fifth toe deformities include hammer toe  Evaluation of the great toe nail demonstrates paronychia-- and-- an ingrown nail  Tenderness: distal first metatarsal  Hammertoes 2 through 5 bilateral  Plantarflexed metatarsal heads  Right second MPJ plantarflexed metatarsal capsulitis second positive edema negative erythema negative signs of infection  Special Tests: I    Neurological Exam: performed  Light touch was intact bilaterally  Vibratory sensation was intact bilaterally  Response to monofilament test was intact bilaterally  Vascular Exam: performed Dorsalis pedis pulses were 1/4 bilaterally  Posterior tibial pulses were 1/4 bilaterally  Capillary refill time was greater than 3 seconds bilaterally  Rigidly contracted hammertoes 2 through 5 bilateral   Mild ingrown bilateral hallux  No signs of infection no exudate    Discoloration of hallux nails bilateral

## 2018-05-17 ENCOUNTER — TELEPHONE (OUTPATIENT)
Dept: FAMILY MEDICINE CLINIC | Facility: CLINIC | Age: 53
End: 2018-05-17

## 2018-05-17 NOTE — TELEPHONE ENCOUNTER
FORM WAS DROPPED FOR PATIENT BECAUSE THE FAX DOESN'T COME FAST ENOUGH AND THIS FORM IS NEEDED QUICKLY, PLEASE COMPLETE AND CALL WHEN READY FOR   FORM AT THE NURSE'S DESK

## 2018-05-24 DIAGNOSIS — Z00.00 WELL ADULT EXAM: ICD-10-CM

## 2018-06-12 DIAGNOSIS — Z00.00 WELL ADULT EXAM: ICD-10-CM

## 2018-06-12 DIAGNOSIS — K59.00 CONSTIPATION, UNSPECIFIED CONSTIPATION TYPE: Primary | ICD-10-CM

## 2018-06-12 RX ORDER — PSYLLIUM HUSK 3 G/5.8 G
POWDER (GRAM) ORAL
Qty: 425 G | Refills: 0 | Status: SHIPPED | OUTPATIENT
Start: 2018-06-12 | End: 2018-08-02 | Stop reason: SDUPTHER

## 2018-06-21 DIAGNOSIS — E78.2 MIXED HYPERLIPIDEMIA: ICD-10-CM

## 2018-06-21 RX ORDER — ATORVASTATIN CALCIUM 10 MG/1
10 TABLET, FILM COATED ORAL DAILY
Qty: 30 TABLET | Refills: 0 | Status: SHIPPED | OUTPATIENT
Start: 2018-06-21 | End: 2018-07-09 | Stop reason: SDUPTHER

## 2018-07-06 ENCOUNTER — OFFICE VISIT (OUTPATIENT)
Dept: PODIATRY | Facility: CLINIC | Age: 53
End: 2018-07-06
Payer: MEDICARE

## 2018-07-06 VITALS — HEIGHT: 66 IN | BODY MASS INDEX: 29.41 KG/M2 | WEIGHT: 183 LBS

## 2018-07-06 DIAGNOSIS — M79.671 PAIN IN BOTH FEET: ICD-10-CM

## 2018-07-06 DIAGNOSIS — B35.1 ONYCHOMYCOSIS: Primary | ICD-10-CM

## 2018-07-06 DIAGNOSIS — M79.672 PAIN IN BOTH FEET: ICD-10-CM

## 2018-07-06 DIAGNOSIS — L60.0 INGROWN NAIL: ICD-10-CM

## 2018-07-06 PROCEDURE — 99213 OFFICE O/P EST LOW 20 MIN: CPT | Performed by: PODIATRIST

## 2018-07-06 NOTE — PROGRESS NOTES
Assessment/Plan:  Aseptic debridement and planning of nails x10 and manually and mechanically    Discussed permanent removal of ingrown if recurrent  Patient call if any signs of infection  Daily foot checks monitor for signs of infection  Follow-up 3 months     Diagnoses and all orders for this visit:    Onychomycosis    Ingrown nail    Pain in both feet          Subjective:      Patient ID: Jos Espinal is a 48 y o  male  Patient gets recurrent ingrown nails bilateral great toes  Aids have not noticed any redness or signs of infection  Patient does have thick dystrophic nails  Past Medical History:   Diagnosis Date    Ambulatory dysfunction     Anxiety     Chronic kidney disease     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     Constipation     Deformity     left and right foot and ankle ,right hand    Depression     Erythrocytosis     Hemiplegia affecting dominant side (HCC)     Hypertension     Mood disorder (HCC)     Nephrocalcinosis     Seizures ([facility] [address] )     2001         Current Outpatient Prescriptions:     atorvastatin (LIPITOR) 10 mg tablet, Take 1 tablet (10 mg total) by mouth daily, Disp: 30 tablet, Rfl: 0    clonazePAM (KlonoPIN) 0 5 mg tablet, Take 0 25 mg by mouth 2 (two) times a day , Disp: , Rfl:     lamoTRIgine (LaMICtal) 150 MG tablet, Take 150 mg by mouth 2 (two) times a day , Disp: , Rfl:     nebivolol (BYSTOLIC) 2 5 mg tablet, Take 2 5 mg by mouth daily  , Disp: , Rfl:     REGULOID 58 6 % powder, TAKE 1 TEASPOONFUL MIXED WITH 8 OUNCES OF WATER ORALLY EVERY DAY AT 8AM, Disp: 425 g, Rfl: 0    risperiDONE (RisperDAL) 0 25 mg tablet, Take 0 25 mg by mouth 2 (two) times a day , Disp: , Rfl:     senna (SENOKOT) 8 6 MG tablet, Take 1 tablet (8 6 mg total) by mouth daily, Disp: 30 tablet, Rfl: 5    UNABLE TO FIND, Take 5 mL by mouth daily   Med Name: reguloid, Disp: , Rfl:     Past Surgical History:   Procedure Laterality Date    BRAIN SURGERY      [place] COLONOSCOPY FLX DX W/COLLJ SPEC WHEN PFRMD N/A 2/12/2016    Procedure: COLONOSCOPY;  Surgeon: Willian Cameron MD;  Location: [facility]; Service: Gastroenterology       Allergies   Allergen Reactions    Asa [Aspirin] Other (See Comments)     unknown       Patient Active Problem List   Diagnosis    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    Acquired deformity of right ankle and foot    Ingrown nail    Pain in both feet    Constipation       Review of Systems   Constitutional: Negative  HENT: Negative  Eyes: Negative  Respiratory: Negative  Cardiovascular: Negative  Gastrointestinal: Negative  Endocrine: Negative  Genitourinary: Negative  Musculoskeletal: Negative  Skin: Negative  Allergic/Immunologic: Negative  Neurological: Negative  Hematological: Negative  Psychiatric/Behavioral: Negative  Objective:      Ht 5' 6" (1 676 m)   Wt 83 kg (183 lb)   BMI 29 54 kg/m²          Physical Exam    Constitutional: no acute distress, well appearing and well nourished  Orthopedic/Biomechanical: abnormal foot type-- and-- hammertoe(s)  Left Foot: Appearance: Normal except as noted: a deformity-- and-- pes planus  Great toe deformities include a bunion  Second toe deformities include hammer toe  Forth toe deformities include hammer toe  Fifth toe deformities include hammer toe  Evaluation of the great toe nail demonstrates paronychia-- and-- an ingrown nail  Tenderness: distal first metatarsal  Hallux limitus first MPJ bilateral,, mild pain on end range of motion, no hypermobility first ray muscle strength is 5  Special Tests: Left hallux ingrown tibial border no erythema no exudate no signs of infection mild pain on palpation  Right Foot: Appearance: Normal except as noted: a deformity-- and-- pes planus  Great toe deformities include a bunion  Second toe deformities include hammer toe  Third toe deformities include hammer toe  Forth toe deformities include hammer toe   Fifth toe deformities include hammer toe  Evaluation of the great toe nail demonstrates paronychia-- and-- an ingrown nail  Tenderness: distal first metatarsal  Hammertoes 2 through 5 bilateral  Plantarflexed metatarsal heads  Right second MPJ plantarflexed metatarsal capsulitis second positive edema negative erythema negative signs of infection  Special Tests: I    Neurological Exam: performed  Light touch was intact bilaterally  Vibratory sensation was intact bilaterally  Response to monofilament test was intact bilaterally  Vascular Exam: performed Dorsalis pedis pulses were 1/4 bilaterally  Posterior tibial pulses were 1/4 bilaterally  Capillary refill time was greater than 3 seconds bilaterally  Rigidly contracted hammertoes 2 through 5 bilateral       Ingrown bilateral hallux  Mild paronychia  Negative erythema no drainage no signs of infection  Positive pain on palpation

## 2018-07-09 DIAGNOSIS — E78.2 MIXED HYPERLIPIDEMIA: ICD-10-CM

## 2018-07-09 RX ORDER — ATORVASTATIN CALCIUM 10 MG/1
TABLET, FILM COATED ORAL
Qty: 90 TABLET | Refills: 1 | Status: SHIPPED | OUTPATIENT
Start: 2018-07-09 | End: 2018-08-06 | Stop reason: SDUPTHER

## 2018-08-02 DIAGNOSIS — K59.00 CONSTIPATION, UNSPECIFIED CONSTIPATION TYPE: ICD-10-CM

## 2018-08-02 RX ORDER — PSYLLIUM HUSK 3 G/5.8 G
POWDER (GRAM) ORAL
Qty: 425 G | Refills: 0 | Status: SHIPPED | OUTPATIENT
Start: 2018-08-02 | End: 2018-11-09 | Stop reason: SDUPTHER

## 2018-08-06 DIAGNOSIS — E78.2 MIXED HYPERLIPIDEMIA: ICD-10-CM

## 2018-08-06 RX ORDER — ATORVASTATIN CALCIUM 10 MG/1
TABLET, FILM COATED ORAL
Qty: 90 TABLET | Refills: 1 | Status: SHIPPED | OUTPATIENT
Start: 2018-08-06 | End: 2019-02-21 | Stop reason: SDUPTHER

## 2018-08-07 ENCOUNTER — OFFICE VISIT (OUTPATIENT)
Dept: FAMILY MEDICINE CLINIC | Facility: CLINIC | Age: 53
End: 2018-08-07
Payer: MEDICARE

## 2018-08-07 VITALS
DIASTOLIC BLOOD PRESSURE: 80 MMHG | BODY MASS INDEX: 30.67 KG/M2 | HEART RATE: 59 BPM | TEMPERATURE: 97 F | RESPIRATION RATE: 16 BRPM | WEIGHT: 190 LBS | OXYGEN SATURATION: 96 % | SYSTOLIC BLOOD PRESSURE: 120 MMHG

## 2018-08-07 DIAGNOSIS — E78.5 HYPERLIPIDEMIA, UNSPECIFIED HYPERLIPIDEMIA TYPE: Primary | ICD-10-CM

## 2018-08-07 DIAGNOSIS — M54.32 SCIATICA OF LEFT SIDE: ICD-10-CM

## 2018-08-07 DIAGNOSIS — E66.9 CLASS 1 OBESITY WITH BODY MASS INDEX (BMI) OF 30.0 TO 30.9 IN ADULT, UNSPECIFIED OBESITY TYPE, UNSPECIFIED WHETHER SERIOUS COMORBIDITY PRESENT: ICD-10-CM

## 2018-08-07 PROBLEM — N28.1 RENAL CYST: Status: ACTIVE | Noted: 2018-08-07

## 2018-08-07 PROBLEM — G80.9 CEREBRAL PALSY (HCC): Status: ACTIVE | Noted: 2018-08-07

## 2018-08-07 PROBLEM — E66.811 CLASS 1 OBESITY WITH BODY MASS INDEX (BMI) OF 30.0 TO 30.9 IN ADULT: Status: ACTIVE | Noted: 2018-08-07

## 2018-08-07 PROCEDURE — 99202 OFFICE O/P NEW SF 15 MIN: CPT | Performed by: FAMILY MEDICINE

## 2018-08-07 RX ORDER — SENNOSIDES 8.6 MG
CAPSULE ORAL
COMMUNITY
Start: 2015-06-01 | End: 2018-08-07 | Stop reason: SDUPTHER

## 2018-08-07 RX ORDER — ACETAMINOPHEN 325 MG/1
TABLET ORAL
COMMUNITY
Start: 2017-11-28 | End: 2018-10-22 | Stop reason: SDUPTHER

## 2018-08-07 RX ORDER — QUETIAPINE FUMARATE 50 MG/1
50 TABLET, FILM COATED ORAL
COMMUNITY
Start: 2018-07-18

## 2018-08-07 RX ORDER — LAMOTRIGINE 25 MG/1
TABLET ORAL
COMMUNITY
Start: 2018-01-17 | End: 2019-01-24 | Stop reason: SDUPTHER

## 2018-08-07 NOTE — PROGRESS NOTES
[facility]    NAME: Quin Clemente  AGE: 48 y o  SEX: male  : 1965     DATE: 2018    Assessment and Plan     Problem List Items Addressed This Visit     Sciatica of left side    Relevant Orders    Ambulatory referral to Orthopedic Surgery    Class 1 obesity with body mass index (BMI) of 30 0 to 30 9 in adult    Relevant Orders    HEMOGLOBIN A1C W/ EAG ESTIMATION    Comprehensive metabolic panel    Hyperlipidemia - Primary    Relevant Orders    Lipid panel          · Patient Counseling:   · Nutrition: Stressed importance of a well balanced diet, moderation of sodium/saturated fat, caloric balance and sufficient intake of fiber  · Exercise: Stressed the importance of regular exercise with a goal of 150 minutes per week  · Dental Health: Discussed daily flossing and brushing and regular dental visits   · Immunizations reviewed: instructed to return in September for influenza vaccine   · Discussed benefits of screening with patient and representative  · Discussed the patient's BMI with him  The BMI is above average; BMI management plan is completed       History of Present Illness     Patient with past medical history including cerebral palsy and epilepsy presents reporting resolving right upper extremity pain and left lower back and left buttocks pain that radiates down his left lower extremity occasionally  Patient's representative attributes this to history sciatica  Patient localizes pain around right biceps region that he first noted a few months ago  He describes the pain at the time as "sharp" but, it is now not as bothersome  Patient states left sided pain begins in left lower back and radiates down extremity to ankle  Increased physical activity exacerbates the pain  Admits to some associated numbness and tingling especially when first rises in the morning   Received physical therapy for sciatica, patient does home exercises for sciatica daily at 8am  Patient and representative request steroid shot for pain management as this was done in past and successful by PCP Dr Kristine Lindo  Patient reports he had colonoscopy 3 years ago  Patient follows with Neurologist Dr Jo Osuna  It was explained to patient and representative that record release from previous and current specialists including Neurologist, Gastroenterologist for colonoscopy results, and Nephrologist he is following for renal cyst (s) will be needed for appropriate care from our practice  Diet and Physical Activity  Diet: well balanced diet  Weight concerns: Patient has class 1 obesity (BMI 30-34  9)  Exercise: daily      Depression Screen  PHQ-9 Depression Screening    PHQ-9:    Frequency of the following problems over the past two weeks:            8/7/18: PHQ-9 Score: 0; negative     General Health   Hearing: will perform screening at annual well visit  Vision: will perform screening at annual well visit  Dental: regular dental visits and brushes teeth once daily    Reproductive Health    Patient not sexually active  Denies any urethral discharge or penile pain  The following portions of the patient's history were reviewed and updated as appropriate: allergies, current medications, past family history, past medical history, past social history, past surgical history and problem list     Review of Systems     Review of Systems   Constitutional: Negative for activity change, chills, fatigue and fever  HENT: Negative for congestion, dental problem, ear pain, hearing loss, rhinorrhea, sneezing and sore throat  Eyes: Negative for pain  Respiratory: Negative for shortness of breath  Cardiovascular: Negative for chest pain, palpitations and leg swelling  Gastrointestinal: Negative for abdominal pain, blood in stool, constipation, diarrhea, nausea and vomiting  Endocrine: Negative  Genitourinary: Negative for difficulty urinating, dysuria, frequency, penile pain and urgency  Musculoskeletal: Positive for back pain (Left lower back, used heating pad in past) and myalgias  Negative for gait problem, joint swelling and neck pain  Left sided sciatica    Right UE pain occasional "sharp pain"   Skin: Negative for rash and wound  Reports jock itch several years ago   Allergic/Immunologic: Negative for environmental allergies and food allergies  Neurological: Positive for dizziness (several years ago reprots due to hypoglycemia), seizures (past history includes epilepsy, not recently  Last seizure 2001 ) and numbness (LLE)  Negative for light-headedness and headaches  Hematological: Negative  Psychiatric/Behavioral: Positive for agitation (when loud)  Negative for behavioral problems, confusion, decreased concentration and hallucinations  The patient is not nervous/anxious  Past Medical History     Past Medical History:   Diagnosis Date    Ambulatory dysfunction     Anxiety     Bilateral impacted cerumen     last assessed 05/30/2013    Capsulitis     last assessed 04/26/2016    Cellulitis of finger 01/13/2012    Chronic kidney disease     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     Closed fracture of one or more phalanges of foot 01/06/2009    Constipation     Deformity     left and right foot and ankle ,right hand    Depression     Epilepsy ([address] )     Erythrocytosis     Hemiplegia affecting dominant side ([address] )     Hypertension     Hypoglycemia 11/08/2011    Hypokalemia     last assessed 05/30/2013    Mental retardation     Mood disorder ([address] )     Nephrocalcinosis     Seizures ([address] )     2001       Past Surgical History     Past Surgical History:   Procedure Laterality Date    BRAIN SURGERY      DE COLONOSCOPY FLX DX W/COLLJ SPEC WHEN PFRMD N/A 2/12/2016    Procedure: COLONOSCOPY;  Surgeon: Sony Garzon MD;  Location[facility];   Service: Gastroenterology       Social History     Social History     Social History    Marital status: Single Spouse name: N/A    Number of children: N/A    Years of education: N/A     Social History Main Topics    Smoking status: Never Smoker    Smokeless tobacco: Never Used    Alcohol use No    Drug use: No    Sexual activity: Not on file     Other Topics Concern    Not on file     Social History Narrative    No narrative on file       Family History     Family History   Problem Relation Age of Onset    Emphysema Mother     Nephrolithiasis Mother     Heart attack Father         acute MI    Hypertension Father     Nephrolithiasis Father     Nephrolithiasis Brother        Current Medications       Current Outpatient Prescriptions:     acetaminophen (TYLENOL) 325 mg tablet, Take by mouth, Disp: , Rfl:     atorvastatin (LIPITOR) 10 mg tablet, TAKE ONE TABLET BY MOUTH DAILY AT 8PM, Disp: 90 tablet, Rfl: 1    lamoTRIgine (LaMICtal) 150 MG tablet, Take 150 mg by mouth 2 (two) times a day , Disp: , Rfl:     lamoTRIgine (LAMICTAL) 25 mg tablet, Take by mouth, Disp: , Rfl:     nebivolol (BYSTOLIC) 2 5 mg tablet, Take 2 5 mg by mouth daily  , Disp: , Rfl:     QUEtiapine (SEROquel) 50 mg tablet, , Disp: , Rfl:     REGULOID 58 6 % powder, TAKE 1 TEASPOONFUL MIXED WITH 8 OUNCES OF WATER ORALLY EVERY DAY AT 8AM, Disp: 425 g, Rfl: 0    senna (SENOKOT) 8 6 MG tablet, Take 1 tablet (8 6 mg total) by mouth daily, Disp: 30 tablet, Rfl: 5    sertraline (ZOLOFT) 50 mg tablet, Take by mouth daily, Disp: , Rfl:      Allergies     Allergies   Allergen Reactions    Asa [Aspirin] Other (See Comments)     Reaction Date: 25Aug2008;   unknown       Objective     /80   Pulse 59   Temp (!) 97 °F (36 1 °C)   Resp 16   Wt 86 2 kg (190 lb)   SpO2 96%   BMI 30 67 kg/m²      Physical Exam   Constitutional: He is oriented to person, place, and time  He appears well-developed and well-nourished  No distress  HENT:   Head: Normocephalic and atraumatic     Right Ear: External ear normal    Left Ear: External ear normal  Nose: Nose normal    Mouth/Throat: Oropharynx is clear and moist  No oropharyngeal exudate  Tongue fasiculations   Eyes: Conjunctivae and EOM are normal  Pupils are equal, round, and reactive to light  Neck: Normal range of motion  Neck supple  Cardiovascular: Normal rate, regular rhythm and intact distal pulses  Exam reveals no gallop and no friction rub  No murmur heard  Pulmonary/Chest: Effort normal and breath sounds normal  No respiratory distress  He has no wheezes  Abdominal: Soft  Bowel sounds are normal  There is no tenderness  Musculoskeletal: He exhibits deformity  He exhibits no edema  RUE decreased strength    Neurological: He is alert and oriented to person, place, and time  He has normal reflexes  No cranial nerve deficit  Skin: Skin is warm and dry  Psychiatric: He has a normal mood and affect           Health Maintenance     Health Maintenance   Topic Date Due    HIV SCREENING  1965    Hepatitis C Screening  1965    Depression Screening PHQ-9  1965    PT PLAN OF CARE  1965    INFLUENZA VACCINE  09/01/2018    DTaP,Tdap,and Td Vaccines (2 - Td) 09/23/2019    CRC Screening: Colonoscopy  02/12/2026     Immunization History   Administered Date(s) Administered    Hep B, adult 06/13/2011, 07/13/2011    Influenza Quadrivalent Preservative Free 3 years and older IM 10/21/2014    Influenza Quadrivalent, 6-35 Months IM 10/29/2015, 11/02/2016, 10/20/2017    Influenza TIV (IM) 11/11/2008, 09/23/2009, 10/11/2011, 12/13/2011, 10/09/2012, 10/15/2013    Tdap 09/23/2009    Tuberculin Skin Test-PPD Intradermal 05/05/2009, 10/08/2010, 10/11/2011, 10/09/2012, 10/15/2013, 10/21/2014, 10/29/2015       Merced Mccartney, DO  [address]

## 2018-08-08 DIAGNOSIS — K59.00 CONSTIPATION, UNSPECIFIED CONSTIPATION TYPE: ICD-10-CM

## 2018-08-08 RX ORDER — SENNA PLUS 8.6 MG/1
1 TABLET ORAL DAILY
Qty: 30 TABLET | Refills: 5 | Status: SHIPPED | OUTPATIENT
Start: 2018-08-08 | End: 2019-02-21 | Stop reason: SDUPTHER

## 2018-08-23 ENCOUNTER — TRANSCRIBE ORDERS (OUTPATIENT)
Dept: ADMINISTRATIVE | Facility: HOSPITAL | Age: 53
End: 2018-08-23

## 2018-08-23 ENCOUNTER — APPOINTMENT (OUTPATIENT)
Dept: LAB | Facility: HOSPITAL | Age: 53
End: 2018-08-23
Payer: MEDICARE

## 2018-08-23 DIAGNOSIS — E66.9 CLASS 1 OBESITY WITH BODY MASS INDEX (BMI) OF 30.0 TO 30.9 IN ADULT, UNSPECIFIED OBESITY TYPE, UNSPECIFIED WHETHER SERIOUS COMORBIDITY PRESENT: ICD-10-CM

## 2018-08-23 DIAGNOSIS — E78.5 HYPERLIPIDEMIA, UNSPECIFIED HYPERLIPIDEMIA TYPE: ICD-10-CM

## 2018-08-23 LAB
ALBUMIN SERPL BCP-MCNC: 3.6 G/DL (ref 3.5–5)
ALP SERPL-CCNC: 86 U/L (ref 46–116)
ALT SERPL W P-5'-P-CCNC: 29 U/L (ref 12–78)
ANION GAP SERPL CALCULATED.3IONS-SCNC: 7 MMOL/L (ref 4–13)
AST SERPL W P-5'-P-CCNC: 22 U/L (ref 5–45)
BILIRUB SERPL-MCNC: 0.5 MG/DL (ref 0.2–1)
BUN SERPL-MCNC: 17 MG/DL (ref 5–25)
CALCIUM SERPL-MCNC: 8.7 MG/DL (ref 8.3–10.1)
CHLORIDE SERPL-SCNC: 104 MMOL/L (ref 100–108)
CHOLEST SERPL-MCNC: 160 MG/DL (ref 50–200)
CO2 SERPL-SCNC: 30 MMOL/L (ref 21–32)
CREAT SERPL-MCNC: 1.21 MG/DL (ref 0.6–1.3)
EST. AVERAGE GLUCOSE BLD GHB EST-MCNC: 100 MG/DL
GFR SERPL CREATININE-BSD FRML MDRD: 68 ML/MIN/1.73SQ M
GLUCOSE P FAST SERPL-MCNC: 81 MG/DL (ref 65–99)
HBA1C MFR BLD: 5.1 % (ref 4.2–6.3)
HDLC SERPL-MCNC: 52 MG/DL (ref 40–60)
LDLC SERPL CALC-MCNC: 88 MG/DL (ref 0–100)
NONHDLC SERPL-MCNC: 108 MG/DL
POTASSIUM SERPL-SCNC: 3.8 MMOL/L (ref 3.5–5.3)
PROT SERPL-MCNC: 7.4 G/DL (ref 6.4–8.2)
SODIUM SERPL-SCNC: 141 MMOL/L (ref 136–145)
TRIGL SERPL-MCNC: 100 MG/DL

## 2018-08-23 PROCEDURE — 80053 COMPREHEN METABOLIC PANEL: CPT

## 2018-08-23 PROCEDURE — 83036 HEMOGLOBIN GLYCOSYLATED A1C: CPT

## 2018-08-23 PROCEDURE — 80061 LIPID PANEL: CPT

## 2018-08-23 PROCEDURE — 36415 COLL VENOUS BLD VENIPUNCTURE: CPT

## 2018-08-28 ENCOUNTER — OFFICE VISIT (OUTPATIENT)
Dept: OBGYN CLINIC | Facility: CLINIC | Age: 53
End: 2018-08-28
Payer: MEDICARE

## 2018-08-28 VITALS
BODY MASS INDEX: 29.01 KG/M2 | HEIGHT: 68 IN | HEART RATE: 65 BPM | SYSTOLIC BLOOD PRESSURE: 115 MMHG | WEIGHT: 191.4 LBS | DIASTOLIC BLOOD PRESSURE: 80 MMHG

## 2018-08-28 DIAGNOSIS — M54.16 LEFT LUMBAR RADICULOPATHY: Primary | ICD-10-CM

## 2018-08-28 PROCEDURE — 99203 OFFICE O/P NEW LOW 30 MIN: CPT | Performed by: ORTHOPAEDIC SURGERY

## 2018-08-28 NOTE — PROGRESS NOTES
Assessment/Plan:  1  Left lumbar radiculopathy  MRI lumbar spine wo contrast     Pete Dia has left-sided lumbar radiculopathy  His history is difficult to obtained today due to his other medical comorbidities and lack of knowledge by his accompanying staff today  It does seem that he has been dealing with this pain that seems to be worsening for over a year  He does not seem to be responding to any attempts at conservative treatment to this point including physical therapy and over-the-counter medication  I do think it is pertinent to obtain an MRI of the lumbar spine at this time to evaluate for possible disc herniation or nerve impingement causing him his pain  I will see him back after the MRI is complete  Subjective:   Beryl Prince is a 48 y o  male who presents for evaluation of his low back and left-sided sciatica  He was referred by his primary care physician  He is a member of a group home and presents with a caretaker  He has a history of multiple medical issues including hemiplegia on the right side  His caretaker is new to his care and does not know any of his history  The patient is limited in his ability to recall as own history  Most of his history is gathered from the chart and confirmed with him  He initially presented with low back pain and radiating nerve pain down his left leg  He initially presented to his primary care physician where he had an x-ray of lumbar spine showing a grade 1 anterolisthesis without evidence of fracture  He was sent to physical therapy at that time  It does appear that he attended several sessions and stated that it was not helping him and physical therapy was stopped  He has tried conservative treatments of anti-inflammatories which do not seem to have helped either  He states that the pain seems to be getting worse he feels pain that is sharp and stabbing in nature in the left buttock region radiating down the back of his left leg   It worsens with activity and improves with rest   He has tried heating pad at home which he states helps occasionally  He denies any new injury  Review of Systems   Constitutional: Negative for chills, fever and unexpected weight change  HENT: Negative for hearing loss, nosebleeds and sore throat  Eyes: Negative for pain, redness and visual disturbance  Respiratory: Negative for cough, shortness of breath and wheezing  Cardiovascular: Negative for chest pain, palpitations and leg swelling  Gastrointestinal: Negative for abdominal pain, nausea and vomiting  Endocrine: Negative for polyphagia and polyuria  Genitourinary: Negative for dysuria and hematuria  Musculoskeletal:        See HPI   Skin: Negative for rash and wound  Neurological: Negative for dizziness, numbness and headaches  Psychiatric/Behavioral: Negative for decreased concentration and suicidal ideas  The patient is not nervous/anxious  Past Medical History:   Diagnosis Date    Ambulatory dysfunction     Anxiety     Bilateral impacted cerumen     last assessed 05/30/2013    Capsulitis     last assessed 04/26/2016    Cellulitis of finger 01/13/2012    Chronic kidney disease     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     Closed fracture of one or more phalanges of foot 01/06/2009    Constipation     Deformity     left and right foot and ankle ,right hand    Depression     Epilepsy ([address]     Erythrocytosis     Hemiplegia affecting dominant side ([address] )     Hypertension     Hypoglycemia 11/08/2011    Hypokalemia     last assessed 05/30/2013    Mental retardation     Mood disorder ([address] )     Nephrocalcinosis     Seizures ([address] )     2001       Past Surgical History:   Procedure Laterality Date    BRAIN SURGERY      DC COLONOSCOPY FLX DX W/COLLJ SPEC WHEN PFRMD N/A 2/12/2016    Procedure: COLONOSCOPY;  Surgeon: Daksha Angel MD;  Location: [facility];   Service: Gastroenterology       Family History   Problem Relation Age of Onset    Emphysema Mother     Nephrolithiasis Mother     Heart attack Father         acute MI    Hypertension Father     Nephrolithiasis Father     Nephrolithiasis Brother        Social History     Occupational History    Not on file  Social History Main Topics    Smoking status: Never Smoker    Smokeless tobacco: Never Used    Alcohol use No    Drug use: No    Sexual activity: Not on file         Current Outpatient Prescriptions:     acetaminophen (TYLENOL) 325 mg tablet, Take by mouth, Disp: , Rfl:     atorvastatin (LIPITOR) 10 mg tablet, TAKE ONE TABLET BY MOUTH DAILY AT 8PM, Disp: 90 tablet, Rfl: 1    lamoTRIgine (LaMICtal) 150 MG tablet, Take 150 mg by mouth 2 (two) times a day , Disp: , Rfl:     lamoTRIgine (LAMICTAL) 25 mg tablet, Take by mouth, Disp: , Rfl:     nebivolol (BYSTOLIC) 2 5 mg tablet, Take 2 5 mg by mouth daily  , Disp: , Rfl:     QUEtiapine (SEROquel) 50 mg tablet, , Disp: , Rfl:     REGULOID 58 6 % powder, TAKE 1 TEASPOONFUL MIXED WITH 8 OUNCES OF WATER ORALLY EVERY DAY AT 8AM, Disp: 425 g, Rfl: 0    senna (SENOKOT) 8 6 MG tablet, Take 1 tablet (8 6 mg total) by mouth daily, Disp: 30 tablet, Rfl: 5    sertraline (ZOLOFT) 50 mg tablet, Take by mouth daily, Disp: , Rfl:     Allergies   Allergen Reactions    Asa [Aspirin] Other (See Comments)     Reaction Date: 25Aug2008;   unknown       Objective:  Vitals:    08/28/18 1012   BP: 115/80   Pulse: 65       Back Exam     Tenderness   The patient is experiencing no tenderness  Range of Motion   Extension: normal   Flexion: normal     Muscle Strength   The patient has normal back strength  Tests   Straight leg raise right: negative  Straight leg raise left: positive at 90 deg    Reflexes   Patellar: normal    Other   Sensation: normal  Gait: normal             Physical Exam   Constitutional: He is oriented to person, place, and time  He appears well-developed  HENT:   Head: Normocephalic and atraumatic     Eyes: Conjunctivae are normal    Neck: Neck supple  Cardiovascular: Intact distal pulses  Pulmonary/Chest: Effort normal    Abdominal: Soft  Neurological: He is alert and oriented to person, place, and time  Skin: Skin is warm and dry  Psychiatric: He has a normal mood and affect  His behavior is normal    Vitals reviewed  I have personally reviewed pertinent films in PACS and my interpretation is as follows: Three-view x-rays of the lumbar spine dated 12/29/2017 demonstrate a grade 1 anterolisthesis of L4 on L5 without evidence of fracture  No other abnormality visible

## 2018-09-06 ENCOUNTER — HOSPITAL ENCOUNTER (OUTPATIENT)
Dept: RADIOLOGY | Facility: HOSPITAL | Age: 53
Discharge: HOME | End: 2018-09-06
Attending: ORTHOPAEDIC SURGERY

## 2018-09-06 DIAGNOSIS — M54.16 LEFT LUMBAR RADICULOPATHY: ICD-10-CM

## 2018-09-07 ENCOUNTER — TELEPHONE (OUTPATIENT)
Dept: OBGYN CLINIC | Facility: HOSPITAL | Age: 53
End: 2018-09-07

## 2018-09-07 NOTE — TELEPHONE ENCOUNTER
Patient sees Dr Sameer Diamond, [facility]   cb [phone]    Jeff Mosquera is calling because they found out that the patient has metal in his head so he cannot have an mri  Jeff Mosquera would like to know if we could change it to an ct scan? Please advise

## 2018-09-10 DIAGNOSIS — M54.16 LEFT LUMBAR RADICULOPATHY: Primary | ICD-10-CM

## 2018-09-10 NOTE — TELEPHONE ENCOUNTER
I did get a call that his MRI was canceled due to finding previous surgical clips in his brain  I will change his order to a CT arthrogram of his lumbar spine  Could you please call to help arrange this  He is part of a group home and does have some help setting up appointments      thanks

## 2018-09-10 NOTE — TELEPHONE ENCOUNTER
I called and spoke with Camille at [facility]    I have provided her with the central scheduling number to call to schedule his CT Arthrogram

## 2018-09-11 ENCOUNTER — TELEPHONE (OUTPATIENT)
Dept: OBGYN CLINIC | Facility: CLINIC | Age: 53
End: 2018-09-11

## 2018-09-11 NOTE — TELEPHONE ENCOUNTER
Patient is supposed to have MRI but has clips in his head  Should patient keep his 12:00 appointment or cancel?

## 2018-09-11 NOTE — TELEPHONE ENCOUNTER
Patient is no longer on todays schedule for his f/u  Attempted to call patient, keeps ringing  Will try again later

## 2018-09-18 ENCOUNTER — HOSPITAL ENCOUNTER (OUTPATIENT)
Dept: RADIOLOGY | Facility: HOSPITAL | Age: 53
Discharge: HOME | End: 2018-09-18
Attending: ORTHOPAEDIC SURGERY
Payer: MEDICARE

## 2018-09-18 DIAGNOSIS — M54.16 LEFT LUMBAR RADICULOPATHY: ICD-10-CM

## 2018-09-18 PROCEDURE — 72131 CT LUMBAR SPINE W/O DYE: CPT

## 2018-09-20 ENCOUNTER — TELEPHONE (OUTPATIENT)
Dept: FAMILY MEDICINE CLINIC | Facility: CLINIC | Age: 53
End: 2018-09-20

## 2018-09-25 ENCOUNTER — IMMUNIZATION (OUTPATIENT)
Dept: FAMILY MEDICINE CLINIC | Facility: CLINIC | Age: 53
End: 2018-09-25
Payer: MEDICARE

## 2018-09-25 ENCOUNTER — OFFICE VISIT (OUTPATIENT)
Dept: OBGYN CLINIC | Facility: CLINIC | Age: 53
End: 2018-09-25
Payer: MEDICARE

## 2018-09-25 VITALS
WEIGHT: 190 LBS | HEIGHT: 68 IN | BODY MASS INDEX: 28.79 KG/M2 | DIASTOLIC BLOOD PRESSURE: 78 MMHG | SYSTOLIC BLOOD PRESSURE: 120 MMHG | HEART RATE: 60 BPM

## 2018-09-25 DIAGNOSIS — M54.16 LEFT LUMBAR RADICULOPATHY: Primary | ICD-10-CM

## 2018-09-25 DIAGNOSIS — Z23 NEED FOR INFLUENZA VACCINATION: Primary | ICD-10-CM

## 2018-09-25 PROCEDURE — G0008 ADMIN INFLUENZA VIRUS VAC: HCPCS

## 2018-09-25 PROCEDURE — 90682 RIV4 VACC RECOMBINANT DNA IM: CPT

## 2018-09-25 PROCEDURE — 99213 OFFICE O/P EST LOW 20 MIN: CPT | Performed by: ORTHOPAEDIC SURGERY

## 2018-09-25 NOTE — PROGRESS NOTES
Assessment/Plan:  1  Left lumbar radiculopathy  Ambulatory referral to Pain Management       Scribe Attestation    I,:   Bridget Parnell am acting as a scribe while in the presence of the attending physician :        I,:   [place]ley,  personally performed the services described in this documentation    as scribed in my presence :              Darron Miller has continued left sided radiculopathy  Because his pain has improved slightly I think at this time to leave it alone is the best form of treatment  I discussed with him treatment options which could include continued observation and occasional conservative treatment such as therapy or pain medication if the pain continues or an alternative treatment which would include consultation with [facility] and possible epidural injection  He does not seem very interested in epidural injection at this time and states his pain is not bad  If his pain returns I do think he should follow up with Dr Sheron Castellon  He does not need to follow up with me at this time  Subjective:   Clay Bose is a 48 y o  male who returns to the office today along side with his caretaker for continued low back pain  His pain is about the same but has improved slightly  His pain increases after sitting for longer periods of time  He states his pain radiates to about his thigh not all the way down his legs  History is very difficult to obtain due to his limited speech and medical status  His caretaker states he complains on and off of pain in his back and down his left leg  Review of Systems   Constitutional: Negative for chills, fever and unexpected weight change  HENT: Negative for hearing loss, nosebleeds and sore throat  Eyes: Negative for pain, redness and visual disturbance  Respiratory: Negative for cough, shortness of breath and wheezing  Cardiovascular: Negative for chest pain, palpitations and leg swelling     Gastrointestinal: Negative for abdominal pain, nausea and vomiting  Endocrine: Negative for polyphagia and polyuria  Genitourinary: Negative for dysuria and hematuria  Musculoskeletal:        See HPI   Skin: Negative for rash and wound  Neurological: Negative for dizziness, numbness and headaches  Psychiatric/Behavioral: Negative for decreased concentration and suicidal ideas  The patient is not nervous/anxious  Past Medical History:   Diagnosis Date    Ambulatory dysfunction     Anxiety     Bilateral impacted cerumen     last assessed 05/30/2013    Capsulitis     last assessed 04/26/2016    Cellulitis of finger 01/13/2012    Chronic kidney disease     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     Closed fracture of one or more phalanges of foot 01/06/2009    Constipation     Deformity     left and right foot and ankle ,right hand    Depression     [address] [facility] [address]     Erythrocytosis     Hemiplegia affecting dominant side ([facility] [address]     Hypertension     Hypoglycemia 11/08/2011    Hypokalemia     last assessed 05/30/2013    Mental retardation     Mood disorder ([facility] [address] [facility] [address]     Nephrocalcinosis     Seizures ([facility] [address]     2001       Past Surgical History:   Procedure Laterality Date    BRAIN SURGERY      SC COLONOSCOPY FLX DX W/COLLJ SPEC WHEN PFRMD N/A 2/12/2016    Procedure: COLONOSCOPY;  Surgeon: Kirk Herrera MD;  Location: [facility]; Service: Gastroenterology       Family History   Problem Relation Age of Onset    Emphysema Mother     Nephrolithiasis Mother     Heart attack Father         acute MI    Hypertension Father     Nephrolithiasis Father     Nephrolithiasis Brother        Social History     Occupational History    Not on file       Social History Main Topics    Smoking status: Never Smoker    Smokeless tobacco: Never Used    Alcohol use No    Drug use: No    Sexual activity: Not on file         Current Outpatient Prescriptions:     acetaminophen (TYLENOL) 325 mg tablet, Take by mouth, Disp: , Rfl:     atorvastatin (LIPITOR) 10 mg tablet, TAKE ONE TABLET BY MOUTH DAILY AT 8PM, Disp: 90 tablet, Rfl: 1    lamoTRIgine (LaMICtal) 150 MG tablet, Take 150 mg by mouth 2 (two) times a day , Disp: , Rfl:     lamoTRIgine (LAMICTAL) 25 mg tablet, Take by mouth, Disp: , Rfl:     nebivolol (BYSTOLIC) 2 5 mg tablet, Take 2 5 mg by mouth daily  , Disp: , Rfl:     QUEtiapine (SEROquel) 50 mg tablet, , Disp: , Rfl:     REGULOID 58 6 % powder, TAKE 1 TEASPOONFUL MIXED WITH 8 OUNCES OF WATER ORALLY EVERY DAY AT 8AM, Disp: 425 g, Rfl: 0    senna (SENOKOT) 8 6 MG tablet, Take 1 tablet (8 6 mg total) by mouth daily, Disp: 30 tablet, Rfl: 5    sertraline (ZOLOFT) 50 mg tablet, Take by mouth daily, Disp: , Rfl:     Allergies   Allergen Reactions    Asa [Aspirin] Other (See Comments)     Reaction Date: 25Aug2008;   unknown       Objective:  Vitals:    09/25/18 1217   BP: 120/78   Pulse: 60       Back Exam     Tests   Straight leg raise right: negative  Straight leg raise left: negative    Comments:  (-) IR / ER pain of right hip             Physical Exam   Constitutional: He is oriented to person, place, and time  He appears well-developed  HENT:   Head: Normocephalic and atraumatic  Eyes: Conjunctivae are normal    Neck: Neck supple  Cardiovascular: Intact distal pulses  Pulmonary/Chest: Effort normal    Abdominal: Soft  Neurological: He is alert and oriented to person, place, and time  Skin: Skin is warm and dry  Psychiatric: He has a normal mood and affect  His behavior is normal    Vitals reviewed  I have personally reviewed pertinent films in PACS and my interpretation is as follows:  CT Lumbar spine shows multilevel degenerative disc disease worst at L4-L5 where moderate spinal stenosis greater on the left then right  Scoliosis and spondylolisthesis are also present

## 2018-10-01 ENCOUNTER — CONSULT (OUTPATIENT)
Dept: PAIN MEDICINE | Facility: CLINIC | Age: 53
End: 2018-10-01
Payer: MEDICARE

## 2018-10-01 VITALS
HEIGHT: 68 IN | HEART RATE: 55 BPM | WEIGHT: 194.4 LBS | SYSTOLIC BLOOD PRESSURE: 154 MMHG | BODY MASS INDEX: 29.46 KG/M2 | DIASTOLIC BLOOD PRESSURE: 90 MMHG | RESPIRATION RATE: 18 BRPM

## 2018-10-01 DIAGNOSIS — G89.4 CHRONIC PAIN SYNDROME: Primary | ICD-10-CM

## 2018-10-01 DIAGNOSIS — M54.16 LUMBAR RADICULOPATHY: ICD-10-CM

## 2018-10-01 DIAGNOSIS — G89.29 CHRONIC LEFT-SIDED LOW BACK PAIN WITH LEFT-SIDED SCIATICA: ICD-10-CM

## 2018-10-01 DIAGNOSIS — M48.061 SPINAL STENOSIS OF LUMBAR REGION, UNSPECIFIED WHETHER NEUROGENIC CLAUDICATION PRESENT: ICD-10-CM

## 2018-10-01 DIAGNOSIS — M54.42 CHRONIC LEFT-SIDED LOW BACK PAIN WITH LEFT-SIDED SCIATICA: ICD-10-CM

## 2018-10-01 PROCEDURE — 99204 OFFICE O/P NEW MOD 45 MIN: CPT | Performed by: ANESTHESIOLOGY

## 2018-10-01 NOTE — PROGRESS NOTES
Assessment:  1  Chronic pain syndrome    2  Chronic left-sided low back pain with left-sided sciatica    3  Spinal stenosis of lumbar region, unspecified whether neurogenic claudication present    4  Lumbar radiculopathy        Plan:  Orders Placed This Encounter   Procedures    Ambulatory referral to Physical Therapy     Standing Status:   Future     Standing Expiration Date:   4/1/2019     Referral Priority:   Routine     Referral Type:   Physical Therapy     Referral Reason:   Specialty Services Required     Requested Specialty:   Physical Therapy     Number of Visits Requested:   1     Expiration Date:   10/1/2019     My impressions and treatment recommendations were discussed in detail with the patient, who verbalized understanding and had no further questions  There was a significant communication barrier between myself and the patient at today's visit  It was very difficult to understand the responses to the questions I was asking  The patient does complain of low back pain and left lower extremity radicular symptoms in what appears to be the left L4 distribution  He states that involve the entirety of the left L4 distribution when he had the problem last December  He states that his pain is only involving his low back, left buttocks, and the posterior part of the left upper thigh at this point in time  I still believe that the patient's pain is due to the left-sided L4-L5 stenosis seen on the CT of the lumbar spine  I did feel reasonable to have the patient undergo a course of physical therapy 2-3 times per week for 4-6 weeks at this point in time  If the patient does not respond to physical therapy, I will consider either having him undergo left L4 and left L5 transforaminal epidural steroid injections or a trial of a neuro modulating medication such as gabapentin  Follow-up is planned in 6 weeks time or sooner as warranted  Discharge instructions were provided   I personally saw and examined the patient and I agree with the above discussed plan of care  History of Present Illness:    Terry Chirions is a 48 y o  male who presents to [facility] for initial evaluation of the above stated pain complaints  The patient has a past medical and chronic pain history as outlined in the assessment section  He was referred by Dr Jalyn Saucedo  The patient reports a 3 year history of low back pain and left lower extremity radicular symptoms in what appears to be the left L4 and left L5 distribution  He states that his pain has improved in only involves his low back and left buttocks along with the left of upper thigh at this point in time  He states that his pain is moderate to severe and 5/10 on the verbal numerical pain rating scale  His pain is intermittent in nature and worse in the morning and night  He describes his pain as shooting, dull/aching, and throbbing    The patient reports weakness in his left lower extremity  He states that lying down increases pain  There are no pain relieving factors  He states that physical therapy, home exercises, and heat/ice treatment provided moderate pain relief  He is currently unemployed  He is not currently using any pain medications  Review of Systems:    Review of Systems   Constitutional: Negative for fever and unexpected weight change  HENT: Negative for trouble swallowing  Eyes: Negative for visual disturbance  Respiratory: Negative for shortness of breath and wheezing  Cardiovascular: Negative for chest pain and palpitations  Gastrointestinal: Negative for constipation, diarrhea, nausea and vomiting  Endocrine: Negative for cold intolerance, heat intolerance and polydipsia  Genitourinary: Negative for difficulty urinating and frequency  Musculoskeletal: Negative for arthralgias, gait problem, joint swelling and myalgias  Skin: Negative for rash  Neurological: Positive for seizures  Negative for dizziness, syncope, weakness and headaches  Hematological: Does not bruise/bleed easily  Psychiatric/Behavioral: Positive for dysphoric mood (depression)  All other systems reviewed and are negative  Patient Active Problem List   Diagnosis    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    Acquired deformity of right ankle and foot    Ingrown nail    Pain in both feet    Constipation    Cerebral palsy ([address] )    Renal cyst    Sciatica of left side    Class 1 obesity with body mass index (BMI) of 30 0 to 30 9 in adult    Hyperlipidemia    Lumbar radiculopathy    Spinal stenosis of lumbar region    Chronic left-sided low back pain with left-sided sciatica    Chronic pain syndrome       Past Medical History:   Diagnosis Date    Ambulatory dysfunction     Anxiety     Bilateral impacted cerumen     last assessed 05/30/2013    Capsulitis     last assessed 04/26/2016    Cellulitis of finger 01/13/2012    Chronic kidney disease     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     Closed fracture of one or more phalanges of foot 01/06/2009    Constipation     Deformity     left and right foot and ankle ,right hand    Depression     Epilepsy ([address] )     Erythrocytosis     Hemiplegia affecting dominant side ([address] )     Hypertension     Hypoglycemia 11/08/2011    Hypokalemia     last assessed 05/30/2013    Mental retardation     Mood disorder ([address] )     Nephrocalcinosis     [address] [address]     2001       Past Surgical History:   Procedure Laterality Date    BRAIN SURGERY      [place] COLONOSCOPY FLX DX W/COLLJ SPEC WHEN PFRMD N/A 2/12/2016    Procedure: COLONOSCOPY;  Surgeon: Sarah Sweet MD;  Location: [place] [address] GI [facility];   Service: Gastroenterology       Family History   Problem Relation Age of Onset    Emphysema Mother     Nephrolithiasis Mother     Heart attack Father         acute MI    Hypertension Father     Nephrolithiasis Father     Nephrolithiasis Brother        Social History Occupational History    Not on file  Social History Main Topics    Smoking status: Never Smoker    Smokeless tobacco: Never Used    Alcohol use No    Drug use: No    Sexual activity: Not on file         Current Outpatient Prescriptions:     acetaminophen (TYLENOL) 325 mg tablet, Take by mouth, Disp: , Rfl:     atorvastatin (LIPITOR) 10 mg tablet, TAKE ONE TABLET BY MOUTH DAILY AT 8PM, Disp: 90 tablet, Rfl: 1    lamoTRIgine (LaMICtal) 150 MG tablet, Take 150 mg by mouth 2 (two) times a day , Disp: , Rfl:     lamoTRIgine (LAMICTAL) 25 mg tablet, Take by mouth, Disp: , Rfl:     nebivolol (BYSTOLIC) 2 5 mg tablet, Take 2 5 mg by mouth daily  , Disp: , Rfl:     QUEtiapine (SEROquel) 50 mg tablet, , Disp: , Rfl:     REGULOID 58 6 % powder, TAKE 1 TEASPOONFUL MIXED WITH 8 OUNCES OF WATER ORALLY EVERY DAY AT 8AM, Disp: 425 g, Rfl: 0    senna (SENOKOT) 8 6 MG tablet, Take 1 tablet (8 6 mg total) by mouth daily, Disp: 30 tablet, Rfl: 5    sertraline (ZOLOFT) 50 mg tablet, Take by mouth daily, Disp: , Rfl:     Allergies   Allergen Reactions    Asa [Aspirin] Other (See Comments)     Reaction Date: 25Aug2008;   unknown       Physical Exam:    /90 (BP Location: Left arm, Patient Position: Sitting, Cuff Size: Standard)   Pulse 55   Resp 18   Ht 5' 8" (1 727 m)   Wt 88 2 kg (194 lb 6 4 oz)   BMI 29 56 kg/m²     Constitutional: overweight  Eyes: anicteric  HEENT: grossly intact  Neck: supple, symmetric, trachea midline and no masses   Pulmonary:even and unlabored  Cardiovascular:No edema or pitting edema present  Skin:Normal without rashes or lesions and well hydrated  Psychiatric:Mood and affect appropriate  Neurologic:Cranial Nerves II-XII grossly intact  Musculoskeletal:antalgic and shuffling , muscle wasting noted in the right upper and right lower extremities      Lumbar Spine Exam    Appearance:  Normal lordosis  Palpation/Tenderness:  no tenderness or spasm  Sensory:  no sensory deficits noted  Range of Motion:  Flexion:  Minimally limited  with pain  Extension:  Minimally limited  with pain  Lateral Flexion - Left:  Minimally limited  with pain  Lateral Flexion - Right:  Minimally limited  with pain  Rotation - Left:  Minimally limited  with pain  Rotation - Right:  Minimally limited  without pain   Lumbar facet loading is negative bilaterally  Motor Strength:  Left hip flexion:  5/5  Left hip extension:  5/5  Right hip flexion:  5/5  Right hip extension:  5/5  Left knee flexion:  5/5  Left knee extension:  5/5  Right knee flexion:  5/5  Right knee extension:  5/5  Left foot dorsiflexion:  5/5  Left foot plantar flexion:  5/5  Right foot dorsiflexion:  5/5  Right foot plantar flexion:  5/5  Reflexes:  Left Patellar:  absent   Right Patellar:  absent   Left Achilles:  2+   Right Achilles:  2+   Special Tests:  Left Straight Leg Test:  negative  Right Straight Leg Test:  negative  Left Ever's Maneuver:  negative  Right Ever's Maneuver:  negative    Imaging  No orders to display   PACS Images     Show images for CT spine lumbar wo contrast   Order Report      Order Details   Study Result     CT LUMBAR SPINE     INDICATION:   M54 16: Radiculopathy, lumbar region  Left lumbar radiculopathy      COMPARISON: Radiographs of lumbar spine from December 29, 2017     TECHNIQUE:  Contiguous axial images through the lumbar spine were obtained  Sagittal and coronal reconstructions were performed        Radiation dose length product (DLP) for this visit:  626 42 mGy-cm   This examination, like all CT scans performed in the [facility], was performed utilizing techniques to minimize radiation dose exposure, including the use of iterative   reconstruction and automated exposure control        IMAGE QUALITY:  Diagnostic      FINDINGS:     ALIGNMENT:  There is a levoscoliotic curvature to the lumbar spine with the apex at the L5-S1 level    Asymmetric disc space narrowing is noted on the right at this level  There is also grade 1 anterolisthesis of L3 on L4 and L4 on L5  Minimal   degenerative retrolisthesis is suspected at L2-L3 and to a lesser degree L3-L4  Mild endplate concavities are noted with suggestion of underlying osteopenia  No acute fracture is suspected  There is no evidence of spondylolysis      VERTEBRAL BODIES:  Osteopenic with endplate concavities throughout the lumbar spine suggesting mild chronic compression deformities      DEGENERATIVE CHANGES:     Lower Thoracic spine:  Not visualized  ]     L1-2:  Circumferential disc bulge, partially contained by the PLL  Mild ventral indentation on the thecal sac and mild spinal canal encroachment  There is mild bilateral inferior foraminal encroachment by the disc bulge  No nerve root impingement      L2-3:  Circumferential disc bulge with mild spinal stenosis and mild inferior foraminal stenosis without nerve root impingement      L3-4:  Retrolisthesis with uncovering of the posterior disc margin  Circumferential disc bulge and asymmetric soft tissue in the right ventral paracentral region raising the question of right central protrusion  This is seen on the axial soft tissue   series, image 20  There is mild spinal stenosis and bilateral foraminal encroachment      L4-5:  Uncovering of the posterior disc margin with a circumferential disc bulge and severe bilateral facet arthropathy  There is a bony spur arising from the right articular pillar and calcified ligamentum flavum occupying much of the right lateral   recess  There is moderate spinal stenosis and right greater than left foraminal stenosis at this level      L5-S1:  Mild circumferential disc bulge and right greater than left facet arthropathy without spinal canal or foraminal stenosis      PARASPINAL SOFT TISSUES:  There are bilateral renal calculi and scattered nonspecific hypodensities which may represent renal cysts    These are incompletely characterized on this evaluation      Aortoiliac calcification and mild infrarenal aortic ectasia noted at the L2 vertebral level  The transverse diameter of the infrarenal aorta measures 2 2 cm in greatest transverse diameter      IMPRESSION:     Scoliosis, spondylolisthesis and multilevel degenerative discogenic disease and facet arthropathy, worst at the L4-5 level where there is moderate spinal stenosis and left greater than right foraminal stenosis  Of note is a calcified density in the   right lateral recess at L4-L5 which may be arising from the adjacent ligamentum flavum or the articular mass         Workstation performed: [id number]      Imaging     CT spine lumbar wo contrast (Order #[id number]) on 9/18/2018 - Imaging Information   Result History     CT spine lumbar wo contrast (Order #[id number]) on 9/19/2018 - Order Result History Report    Show result history   Result Comparison   CT spine lumbar wo contrast (Order [id number])   Newer Version Older Version   Final result    9/19/2018  9:25 AM    Interface, Radiology Results In         This is the newest version No older versions exist   Narrative     CT LUMBAR SPINE     INDICATION:   M54 16: Radiculopathy, lumbar region   Left lumbar radiculopathy  COMPARISON: Radiographs of lumbar spine from December 29, 2017     TECHNIQUE:  Contiguous axial images through the lumbar spine were obtained  Sagittal and coronal reconstructions were performed        Radiation dose length product (DLP) for this visit:  626 42 mGy-cm    This examination, like all CT scans performed in the [facility], was performed utilizing techniques to minimize radiation dose exposure, including the use of iterative    reconstruction and automated exposure control        IMAGE QUALITY:  Diagnostic       FINDINGS:     ALIGNMENT: [place] is a levoscoliotic curvature to the lumbar spine with the apex at the L5-S1 level   Asymmetric disc space narrowing is noted on the right at this level  Sebastian Tse is also grade 1 anterolisthesis of L3 on L4 and L4 on L5   Minimal    degenerative retrolisthesis is suspected at L2-L3 and to a lesser degree L3-L4   Mild endplate concavities are noted with suggestion of underlying osteopenia   No acute fracture is suspected  Juan Cheek is no evidence of spondylolysis  VERTEBRAL BODIES:  Osteopenic with endplate concavities throughout the lumbar spine suggesting mild chronic compression deformities  DEGENERATIVE CHANGES:     Lower Thoracic spine:  Not visualized ]     L1-2:  Circumferential disc bulge, partially contained by the PLL   Mild ventral indentation on the thecal sac and mild spinal canal encroachment   There is mild bilateral inferior foraminal encroachment by the disc bulge   No nerve root impingement  L2-3:  Circumferential disc bulge with mild spinal stenosis and mild inferior foraminal stenosis without nerve root impingement  L3-4:  Retrolisthesis with uncovering of the posterior disc margin   Circumferential disc bulge and asymmetric soft tissue in the right ventral paracentral region raising the question of right central protrusion   This is seen on the axial soft tissue    series, image 20   There is mild spinal stenosis and bilateral foraminal encroachment  L4-5:  Uncovering of the posterior disc margin with a circumferential disc bulge and severe bilateral facet arthropathy  Juan Cheek is a bony spur arising from the right articular pillar and calcified ligamentum flavum occupying much of the right lateral    recess   There is moderate spinal stenosis and right greater than left foraminal stenosis at this level  L5-S1:  Mild circumferential disc bulge and right greater than left facet arthropathy without spinal canal or foraminal stenosis  PARASPINAL SOFT TISSUES: Juan Cheek are bilateral renal calculi and scattered nonspecific hypodensities which may represent renal cysts   These are incompletely characterized on this evaluation       Aortoiliac calcification and mild infrarenal aortic ectasia noted at the L2 vertebral level   The transverse diameter of the infrarenal aorta measures 2 2 cm in greatest transverse diameter  Impression       Scoliosis, spondylolisthesis and multilevel degenerative discogenic disease and facet arthropathy, worst at the L4-5 level where there is moderate spinal stenosis and left greater than right foraminal stenosis   Of note is a calcified density in the    right lateral recess at L4-L5 which may be arising from the adjacent ligamentum flavum or the articular mass            Orders Placed This Encounter   Procedures    Ambulatory referral to Physical Therapy

## 2018-10-01 NOTE — LETTER
October 1, 2018     Nino Jules DO  [address]  [address] [place], [place] [address] [id number]    Patient: Payal Sender   YOB: 1965   Date of Visit: 10/1/2018       Dear Dr Torres Police: Thank you for referring Kylah Davila to me for evaluation  Below are my notes for this consultation  If you have questions, please do not hesitate to call me  I look forward to following your patient along with you  Sincerely,        Monica Martin MD        CC: DO Monica Fang MD  10/1/2018  2:42 PM  Sign at close encounter  Assessment:  1  Chronic pain syndrome    2  Chronic left-sided low back pain with left-sided sciatica    3  Spinal stenosis of lumbar region, unspecified whether neurogenic claudication present    4  Lumbar radiculopathy        Plan:  Orders Placed This Encounter   Procedures    Ambulatory referral to Physical Therapy     Standing Status:   Future     Standing Expiration Date:   4/1/2019     Referral Priority:   Routine     Referral Type:   Physical Therapy     Referral Reason:   Specialty Services Required     Requested Specialty:   Physical Therapy     Number of Visits Requested:   1     Expiration Date:   10/1/2019     My impressions and treatment recommendations were discussed in detail with the patient, who verbalized understanding and had no further questions  There was a significant communication barrier between myself and the patient at today's visit  It was very difficult to understand the responses to the questions I was asking  The patient does complain of low back pain and left lower extremity radicular symptoms in what appears to be the left L4 distribution  He states that involve the entirety of the left L4 distribution when he had the problem last December  He states that his pain is only involving his low back, left buttocks, and the posterior part of the left upper thigh at this point in time    I still believe that the patient's pain is due to the left-sided L4-L5 stenosis seen on the CT of the lumbar spine  I did feel reasonable to have the patient undergo a course of physical therapy 2-3 times per week for 4-6 weeks at this point in time  If the patient does not respond to physical therapy, I will consider either having him undergo left L4 and left L5 transforaminal epidural steroid injections or a trial of a neuro modulating medication such as gabapentin  Follow-up is planned in 6 weeks time or sooner as warranted  Discharge instructions were provided  I personally saw and examined the patient and I agree with the above discussed plan of care  History of Present Illness:    Carlota Doll is a 48 y o  male who presents to [facility] for initial evaluation of the above stated pain complaints  The patient has a past medical and chronic pain history as outlined in the assessment section  He was referred by Dr Tucker Lawson  The patient reports a 3 year history of low back pain and left lower extremity radicular symptoms in what appears to be the left L4 and left L5 distribution  He states that his pain has improved in only involves his low back and left buttocks along with the left of upper thigh at this point in time  He states that his pain is moderate to severe and 5/10 on the verbal numerical pain rating scale  His pain is intermittent in nature and worse in the morning and night  He describes his pain as shooting, dull/aching, and throbbing    The patient reports weakness in his left lower extremity  He states that lying down increases pain  There are no pain relieving factors  He states that physical therapy, home exercises, and heat/ice treatment provided moderate pain relief  He is currently unemployed  He is not currently using any pain medications  Review of Systems:    Review of Systems   Constitutional: Negative for fever and unexpected weight change     HENT: Negative for trouble swallowing  Eyes: Negative for visual disturbance  Respiratory: Negative for shortness of breath and wheezing  Cardiovascular: Negative for chest pain and palpitations  Gastrointestinal: Negative for constipation, diarrhea, nausea and vomiting  Endocrine: Negative for cold intolerance, heat intolerance and polydipsia  Genitourinary: Negative for difficulty urinating and frequency  Musculoskeletal: Negative for arthralgias, gait problem, joint swelling and myalgias  Skin: Negative for rash  Neurological: Positive for seizures  Negative for dizziness, syncope, weakness and headaches  Hematological: Does not bruise/bleed easily  Psychiatric/Behavioral: Positive for dysphoric mood (depression)  All other systems reviewed and are negative          Patient Active Problem List   Diagnosis    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    Acquired deformity of right ankle and foot    Ingrown nail    Pain in both feet    Constipation    Cerebral palsy (HCC)    Renal cyst    Sciatica of left side    Class 1 obesity with body mass index (BMI) of 30 0 to 30 9 in adult    Hyperlipidemia    Lumbar radiculopathy    Spinal stenosis of lumbar region    Chronic left-sided low back pain with left-sided sciatica    Chronic pain syndrome       Past Medical History:   Diagnosis Date    Ambulatory dysfunction     Anxiety     Bilateral impacted cerumen     last assessed 05/30/2013    Capsulitis     last assessed 04/26/2016    Cellulitis of finger 01/13/2012    Chronic kidney disease     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     Closed fracture of one or more phalanges of foot 01/06/2009    Constipation     Deformity     left and right foot and ankle ,right hand    Depression     Epilepsy ([address]     Erythrocytosis     Hemiplegia affecting dominant side ([address] )     Hypertension     Hypoglycemia 11/08/2011    Hypokalemia     last assessed 05/30/2013    Mental retardation     Mood disorder ([facility] )     Nephrocalcinosis     Seizures ([facility] )     2001       Past Surgical History:   Procedure Laterality Date    BRAIN SURGERY      NC COLONOSCOPY FLX DX W/COLLJ SPEC WHEN PFRMD N/A 2/12/2016    Procedure: COLONOSCOPY;  Surgeon: Brenda Ulloa MD;  Location: [facility]; Service: Gastroenterology       Family History   Problem Relation Age of Onset    Emphysema Mother     Nephrolithiasis Mother     Heart attack Father         acute MI    Hypertension Father     Nephrolithiasis Father     Nephrolithiasis Brother        Social History     Occupational History    Not on file  Social History Main Topics    Smoking status: Never Smoker    Smokeless tobacco: Never Used    Alcohol use No    Drug use: No    Sexual activity: Not on file         Current Outpatient Prescriptions:     acetaminophen (TYLENOL) 325 mg tablet, Take by mouth, Disp: , Rfl:     atorvastatin (LIPITOR) 10 mg tablet, TAKE ONE TABLET BY MOUTH DAILY AT 8PM, Disp: 90 tablet, Rfl: 1    lamoTRIgine (LaMICtal) 150 MG tablet, Take 150 mg by mouth 2 (two) times a day , Disp: , Rfl:     lamoTRIgine (LAMICTAL) 25 mg tablet, Take by mouth, Disp: , Rfl:     nebivolol (BYSTOLIC) 2 5 mg tablet, Take 2 5 mg by mouth daily  , Disp: , Rfl:     QUEtiapine (SEROquel) 50 mg tablet, , Disp: , Rfl:     REGULOID 58 6 % powder, TAKE 1 TEASPOONFUL MIXED WITH 8 OUNCES OF WATER ORALLY EVERY DAY AT 8AM, Disp: 425 g, Rfl: 0    senna (SENOKOT) 8 6 MG tablet, Take 1 tablet (8 6 mg total) by mouth daily, Disp: 30 tablet, Rfl: 5    sertraline (ZOLOFT) 50 mg tablet, Take by mouth daily, Disp: , Rfl:     Allergies   Allergen Reactions    Asa [Aspirin] Other (See Comments)     Reaction Date: 25Aug2008;   unknown       Physical Exam:    /90 (BP Location: Left arm, Patient Position: Sitting, Cuff Size: Standard)   Pulse 55   Resp 18   Ht 5' 8" (1 727 m)   Wt 88 2 kg (194 lb 6 4 oz)   BMI 29 56 kg/m²      Constitutional: overweight  Eyes: anicteric  HEENT: grossly intact  Neck: supple, symmetric, trachea midline and no masses   Pulmonary:even and unlabored  Cardiovascular:No edema or pitting edema present  Skin:Normal without rashes or lesions and well hydrated  Psychiatric:Mood and affect appropriate  Neurologic:Cranial Nerves II-XII grossly intact  Musculoskeletal:antalgic and shuffling , muscle wasting noted in the right upper and right lower extremities  Lumbar Spine Exam    Appearance:  Normal lordosis  Palpation/Tenderness:  no tenderness or spasm  Sensory:  no sensory deficits noted  Range of Motion:  Flexion:  Minimally limited  with pain  Extension:  Minimally limited  with pain  Lateral Flexion - Left:  Minimally limited  with pain  Lateral Flexion - Right:  Minimally limited  with pain  Rotation - Left:  Minimally limited  with pain  Rotation - Right:  Minimally limited  without pain   Lumbar facet loading is negative bilaterally  Motor Strength:  Left hip flexion:  5/5  Left hip extension:  5/5  Right hip flexion:  5/5  Right hip extension:  5/5  Left knee flexion:  5/5  Left knee extension:  5/5  Right knee flexion:  5/5  Right knee extension:  5/5  Left foot dorsiflexion:  5/5  Left foot plantar flexion:  5/5  Right foot dorsiflexion:  5/5  Right foot plantar flexion:  5/5  Reflexes:  Left Patellar:  absent   Right Patellar:  absent   Left Achilles:  2+   Right Achilles:  2+   Special Tests:  Left Straight Leg Test:  negative  Right Straight Leg Test:  negative  Left Ever's Maneuver:  negative  Right Ever's Maneuver:  negative    Imaging  No orders to display   PACS Images     Show images for CT spine lumbar wo contrast   Order Report      Order Details   Study Result     CT LUMBAR SPINE     INDICATION:   M54 16: Radiculopathy, lumbar region  Left lumbar radiculopathy      COMPARISON: Radiographs of lumbar spine from December 29, 2017     TECHNIQUE:  Contiguous axial images through the lumbar spine were obtained   Sagittal and coronal reconstructions were performed        Radiation dose length product (DLP) for this visit:  626 42 mGy-cm   This examination, like all CT scans performed in the [facility], was performed utilizing techniques to minimize radiation dose exposure, including the use of iterative   reconstruction and automated exposure control        IMAGE QUALITY:  Diagnostic      FINDINGS:     ALIGNMENT:  There is a levoscoliotic curvature to the lumbar spine with the apex at the L5-S1 level  Asymmetric disc space narrowing is noted on the right at this level  There is also grade 1 anterolisthesis of L3 on L4 and L4 on L5  Minimal   degenerative retrolisthesis is suspected at L2-L3 and to a lesser degree L3-L4  Mild endplate concavities are noted with suggestion of underlying osteopenia  No acute fracture is suspected  There is no evidence of spondylolysis      VERTEBRAL BODIES:  Osteopenic with endplate concavities throughout the lumbar spine suggesting mild chronic compression deformities      DEGENERATIVE CHANGES:     Lower Thoracic spine:  Not visualized  ]     L1-2:  Circumferential disc bulge, partially contained by the PLL  Mild ventral indentation on the thecal sac and mild spinal canal encroachment  There is mild bilateral inferior foraminal encroachment by the disc bulge  No nerve root impingement      L2-3:  Circumferential disc bulge with mild spinal stenosis and mild inferior foraminal stenosis without nerve root impingement      L3-4:  Retrolisthesis with uncovering of the posterior disc margin  Circumferential disc bulge and asymmetric soft tissue in the right ventral paracentral region raising the question of right central protrusion  This is seen on the axial soft tissue   series, image 20  There is mild spinal stenosis and bilateral foraminal encroachment      L4-5:  Uncovering of the posterior disc margin with a circumferential disc bulge and severe bilateral facet arthropathy  There is a bony spur arising from the right articular pillar and calcified ligamentum flavum occupying much of the right lateral   recess  There is moderate spinal stenosis and right greater than left foraminal stenosis at this level      L5-S1:  Mild circumferential disc bulge and right greater than left facet arthropathy without spinal canal or foraminal stenosis      PARASPINAL SOFT TISSUES:  There are bilateral renal calculi and scattered nonspecific hypodensities which may represent renal cysts  These are incompletely characterized on this evaluation      Aortoiliac calcification and mild infrarenal aortic ectasia noted at the L2 vertebral level  The transverse diameter of the infrarenal aorta measures 2 2 cm in greatest transverse diameter      IMPRESSION:     Scoliosis, spondylolisthesis and multilevel degenerative discogenic disease and facet arthropathy, worst at the L4-5 level where there is moderate spinal stenosis and left greater than right foraminal stenosis  Of note is a calcified density in the   right lateral recess at L4-L5 which may be arising from the adjacent ligamentum flavum or the articular mass         Workstation performed: [id number]      Imaging     CT spine lumbar wo contrast (Order #[id number]) on 9/18/2018 - Imaging Information   Result History     CT spine lumbar wo contrast (Order #[id number]) on 9/19/2018 - Order Result History Report    Show result history   Result Comparison   CT spine lumbar wo contrast (Order [id number])   Newer Version Older Version   Final result    9/19/2018  9:25 AM    Interface, Radiology Results In         This is the newest version No older versions exist   Narrative     CT LUMBAR SPINE     INDICATION:   M54 16: Radiculopathy, lumbar region   Left lumbar radiculopathy  COMPARISON: Radiographs of lumbar spine from December 29, 2017     TECHNIQUE:  Contiguous axial images through the lumbar spine were obtained   Sagittal and coronal reconstructions were performed        Radiation dose length product (DLP) for this visit:  626 42 mGy-cm    This examination, like all CT scans performed in the [facility], was performed utilizing techniques to minimize radiation dose exposure, including the use of iterative    reconstruction and automated exposure control        IMAGE QUALITY:  Diagnostic  FINDINGS:     ALIGNMENT: Annette Grace is a levoscoliotic curvature to the lumbar spine with the apex at the L5-S1 level   Asymmetric disc space narrowing is noted on the right at this level  Annettemaxx Grace is also grade 1 anterolisthesis of L3 on L4 and L4 on L5   Minimal    degenerative retrolisthesis is suspected at L2-L3 and to a lesser degree L3-L4   Mild endplate concavities are noted with suggestion of underlying osteopenia   No acute fracture is suspected  Annette Lesly is no evidence of spondylolysis  VERTEBRAL BODIES:  Osteopenic with endplate concavities throughout the lumbar spine suggesting mild chronic compression deformities  DEGENERATIVE CHANGES:     Lower Thoracic spine:  Not visualized ]     L1-2:  Circumferential disc bulge, partially contained by the PLL   Mild ventral indentation on the thecal sac and mild spinal canal encroachment   There is mild bilateral inferior foraminal encroachment by the disc bulge   No nerve root impingement  L2-3:  Circumferential disc bulge with mild spinal stenosis and mild inferior foraminal stenosis without nerve root impingement  L3-4:  Retrolisthesis with uncovering of the posterior disc margin   Circumferential disc bulge and asymmetric soft tissue in the right ventral paracentral region raising the question of right central protrusion   This is seen on the axial soft tissue    series, image 20   There is mild spinal stenosis and bilateral foraminal encroachment       L4-5:  Uncovering of the posterior disc margin with a circumferential disc bulge and severe bilateral facet arthropathy  Annettemaxx Grace is a bony spur arising from the right articular pillar and calcified ligamentum flavum occupying much of the right lateral    recess   There is moderate spinal stenosis and right greater than left foraminal stenosis at this level  L5-S1:  Mild circumferential disc bulge and right greater than left facet arthropathy without spinal canal or foraminal stenosis  PARASPINAL SOFT TISSUES: Dre Hammed are bilateral renal calculi and scattered nonspecific hypodensities which may represent renal cysts   These are incompletely characterized on this evaluation  Aortoiliac calcification and mild infrarenal aortic ectasia noted at the L2 vertebral level   The transverse diameter of the infrarenal aorta measures 2 2 cm in greatest transverse diameter  Impression       Scoliosis, spondylolisthesis and multilevel degenerative discogenic disease and facet arthropathy, worst at the L4-5 level where there is moderate spinal stenosis and left greater than right foraminal stenosis   Of note is a calcified density in the    right lateral recess at L4-L5 which may be arising from the adjacent ligamentum flavum or the articular mass            Orders Placed This Encounter   Procedures    Ambulatory referral to Physical Therapy

## 2018-10-08 ENCOUNTER — EVALUATION (OUTPATIENT)
Dept: PHYSICAL THERAPY | Facility: CLINIC | Age: 53
End: 2018-10-08
Payer: MEDICARE

## 2018-10-08 DIAGNOSIS — M54.42 CHRONIC LEFT-SIDED LOW BACK PAIN WITH LEFT-SIDED SCIATICA: Primary | ICD-10-CM

## 2018-10-08 DIAGNOSIS — M54.16 LUMBAR RADICULOPATHY: ICD-10-CM

## 2018-10-08 DIAGNOSIS — G89.4 CHRONIC PAIN SYNDROME: ICD-10-CM

## 2018-10-08 DIAGNOSIS — G89.29 CHRONIC LEFT-SIDED LOW BACK PAIN WITH LEFT-SIDED SCIATICA: Primary | ICD-10-CM

## 2018-10-08 DIAGNOSIS — M48.061 SPINAL STENOSIS OF LUMBAR REGION, UNSPECIFIED WHETHER NEUROGENIC CLAUDICATION PRESENT: ICD-10-CM

## 2018-10-08 PROCEDURE — 97161 PT EVAL LOW COMPLEX 20 MIN: CPT | Performed by: PHYSICAL THERAPIST

## 2018-10-08 PROCEDURE — G8979 MOBILITY GOAL STATUS: HCPCS | Performed by: PHYSICAL THERAPIST

## 2018-10-08 PROCEDURE — G8978 MOBILITY CURRENT STATUS: HCPCS | Performed by: PHYSICAL THERAPIST

## 2018-10-08 PROCEDURE — 97110 THERAPEUTIC EXERCISES: CPT | Performed by: PHYSICAL THERAPIST

## 2018-10-08 NOTE — PROGRESS NOTES
PT Evaluation     Today's date: 10/8/2018  Patient name: Shiv Chavarria  : 1965  MRN: [id number]  Referring provider: Temo Bui MD  Dx:   Encounter Diagnosis     ICD-10-CM    1  Chronic left-sided low back pain with left-sided sciatica M54 42 Ambulatory referral to Physical Therapy    G89 29    2  Chronic pain syndrome G89 4 Ambulatory referral to Physical Therapy   3  Spinal stenosis of lumbar region, unspecified whether neurogenic claudication present M48 061 Ambulatory referral to Physical Therapy   4  Lumbar radiculopathy M54 16 Ambulatory referral to Physical Therapy       Start Time: 1530  Stop Time: 1615  Total time in clinic (min): 45 minutes    Assessment  Impairments: abnormal gait, abnormal or restricted ROM, activity intolerance, impaired physical strength, lacks appropriate home exercise program, pain with function, poor posture  and poor body mechanics    Assessment details: Shiv Chavarria is a 48 y o  male who presents with pain, decreased strength, decreased ROM, ambulatory dysfunction and postural  dysfunction  Due to these impairments, patient has difficulty performing ADL's, work-related activities, ambulation, lifting/carrying  Patient's clinical presentation is consistent with their referring diagnosis of Chronic left-sided low back pain with left-sided sciatica  (primary encounter diagnosis), Chronic pain syndrome, Spinal stenosis of lumbar region, unspecified whether neurogenic claudication present, Lumbar radiculopathy  Patient has been educated in home exercise program and plan of care   Patient would benefit from skilled physical therapy services to address their aforementioned functional limitations and progress towards prior level of function and independence with home exercise program      Understanding of Dx/Px/POC: good   Prognosis: good    Goals  Short term goals to be accomplished in 3 weeks:  STG 1: Pt will demo independence with postural management  STG 2: Pt will demo I with HEP to maximize progress between therapy sessions  STG 3: Pt will demo L/S AROM < or = min loss throughout to promote improved functional mobility and body mechanics  STG 4: Pt will demo 1/2 MMT grade core stabilizers to improve lumbar stability with functional challenges  STG 5: Pt will reports pain dec freq and intensity 50%    Long term goals to be accomplished in 6 weeks:  LTG 1: Pt will demo good body mech with >75% functional challenges to prevent reinjury  LTG 2: Pt will be able to return to ADLs and work program activities pain free as per PLOF  LTG 3: Pt will demo Good strength core stabilizers to promote carryover with body mech and posture    Plan  Patient would benefit from: PT eval and skilled physical therapy  Planned modality interventions: cryotherapy and thermotherapy: hydrocollator packs  Planned therapy interventions: manual therapy, neuromuscular re-education, self care, therapeutic activities, therapeutic exercise and home exercise program  Frequency: 2x week (1-2x/week)  Duration in weeks: 6  Treatment plan discussed with: patient  Plan details:  HEP development, stretching, strengthening, A/AA/PROM, joint mobilizations, posture education, STM/MI as needed to reduce muscle tension, muscle reeducation, PLOC discussed and agreed upon with patient  Subjective Evaluation    History of Present Illness  Mechanism of injury: Pt reports long history of L lumbar pain radiating to L buttock, buttock primary location of apin, history of full LE radiation  Pt repots in past he would be rendered immobile form pain  Pt takes Tylenol and uses heating pad to relieve symptoms  Pt reports his pain comes and goes  Rest relieves symptoms whether sitting or laying down  Pt reports he participates in a work program, has back pain during said activities which vary  Pt verbally struggling to describe symptoms and elaborate on causative or relieving factors   Pt aide is present during today's session, confirms he continues to perform HEP from previous therapy HEP  Quality of life: good    Pain  Current pain ratin  At best pain ratin  At worst pain ratin      Diagnostic Tests  CT scan: abnormal (Scoliosis, spondylolisthesis and multilevel degenerative discogenic disease and facet arthropathy, worst at the L4-5 level where there is moderate spinal stenosis and left greater than right foraminal stenosis  Of note is a calcified density in the )  Treatments  Previous treatment: physical therapy and medication  Patient Goals  Patient goals for therapy: increased strength, independence with ADLs/IADLs, increased motion, decreased pain and return to work          Objective     Special Questions  Negative for night pain, disturbed sleep, bladder dysfunction, bowel dysfunction and saddle (S4) numbness    Additional Special Questions  R hand and R LE demo muscle atrophy and limited functionality consistent with hemiplegia    Postural Observations  Seated posture: poor  Standing posture: poor    Additional Postural Observation Details  Inc trunk lean to L sitting and standing  Thoracic scoliosis    Active Range of Motion     Additional Active Range of Motion Details  Lumbar AROM: Mod loss throughout pain free    Strength/Myotome Testing     Additional Strength Details  General Fair strength throughout TrvA and B LEs    General Comments     Lumbar Comments  Function:    Gait: Major trunk lurch and dec mobility for heel strike and push off R LE    STS with L UE dependency    Body mech: Unable to squat, poor body mech      Flowsheet Rows      Most Recent Value   PT/OT G-Codes   FOTO information reviewed  No   Assessment Type  Evaluation   G code set  Mobility: Walking & Moving Around   Mobility: Walking and Moving Around Current Status ()  CK   Mobility: Walking and Moving Around Goal Status ()  CI          Precautions: Standard  Cerebral palsy, R hemiplegia       Daily Treatment Diary     Manual 10/08                                                                                 Exercise Diary  1            B trunk rot 10x            B KTC ball 15x            NuStep 5'                                                                                                                                                                                                               HEP TBI            Time 13'                Modalities

## 2018-10-09 ENCOUNTER — OFFICE VISIT (OUTPATIENT)
Dept: PODIATRY | Facility: CLINIC | Age: 53
End: 2018-10-09
Payer: MEDICARE

## 2018-10-09 VITALS
DIASTOLIC BLOOD PRESSURE: 88 MMHG | BODY MASS INDEX: 29.4 KG/M2 | SYSTOLIC BLOOD PRESSURE: 148 MMHG | HEIGHT: 68 IN | WEIGHT: 194 LBS

## 2018-10-09 DIAGNOSIS — B35.1 ONYCHOMYCOSIS: ICD-10-CM

## 2018-10-09 DIAGNOSIS — M21.962 ACQUIRED DEFORMITY OF LEFT ANKLE AND FOOT: ICD-10-CM

## 2018-10-09 DIAGNOSIS — M79.671 PAIN IN BOTH FEET: Primary | ICD-10-CM

## 2018-10-09 DIAGNOSIS — L60.0 INGROWN NAIL: ICD-10-CM

## 2018-10-09 DIAGNOSIS — M21.961 ACQUIRED DEFORMITY OF RIGHT ANKLE AND FOOT: ICD-10-CM

## 2018-10-09 DIAGNOSIS — M79.672 PAIN IN BOTH FEET: Primary | ICD-10-CM

## 2018-10-09 PROCEDURE — 99213 OFFICE O/P EST LOW 20 MIN: CPT | Performed by: PODIATRIST

## 2018-10-09 NOTE — PROGRESS NOTES
Assessment/Plan:    Aseptic debridement and planning of nails x10 and manually and mechanically  Debrided hyperkeratotic lesions bilateral    Patient will need orthotics discussed proper shoes  Follow-up 3 months     Diagnoses and all orders for this visit:    Pain in both feet    Ingrown nail    Onychomycosis    Acquired deformity of right ankle and foot    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          Subjective:      Patient ID: Phil Reza is a 48 y o  male  Painful recurrent ingrown nails  Patient has flatfoot gets painful calluses pain when walking and standing  Has not noticed any redness or signs of infection  Past Medical History:   Diagnosis Date    Ambulatory dysfunction     Anxiety     Bilateral impacted cerumen     last assessed 05/30/2013    Capsulitis     last assessed 04/26/2016    Cellulitis of finger 01/13/2012    Chronic kidney disease     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     Closed fracture of one or more phalanges of foot 01/06/2009    Constipation     Deformity     left and right foot and ankle ,right hand    Depression     [facility] [address] [facility]     Erythrocytosis     Hemiplegia affecting dominant side ([facility] [address] [facility]     Hypertension     Hypoglycemia 11/08/2011    Hypokalemia     last assessed 05/30/2013    Mental retardation     Mood disorder (HCC)     Nephrocalcinosis     Seizures ([facility] [address] )     2001         Current Outpatient Prescriptions:     acetaminophen (TYLENOL) 325 mg tablet, Take by mouth, Disp: , Rfl:     atorvastatin (LIPITOR) 10 mg tablet, TAKE ONE TABLET BY MOUTH DAILY AT 8PM, Disp: 90 tablet, Rfl: 1    lamoTRIgine (LaMICtal) 150 MG tablet, Take 150 mg by mouth 2 (two) times a day , Disp: , Rfl:     lamoTRIgine (LAMICTAL) 25 mg tablet, Take by mouth, Disp: , Rfl:     nebivolol (BYSTOLIC) 2 5 mg tablet, Take 2 5 mg by mouth daily  , Disp: , Rfl:     QUEtiapine (SEROquel) 50 mg tablet, , Disp: , Rfl:     REGULOID 58 6 % powder, TAKE 1 TEASPOONFUL MIXED WITH 8 OUNCES OF WATER ORALLY EVERY DAY AT 8AM, Disp: 425 g, Rfl: 0    senna (SENOKOT) 8 6 MG tablet, Take 1 tablet (8 6 mg total) by mouth daily, Disp: 30 tablet, Rfl: 5    sertraline (ZOLOFT) 50 mg tablet, Take by mouth daily, Disp: , Rfl:     Past Surgical History:   Procedure Laterality Date    BRAIN SURGERY      MA COLONOSCOPY FLX DX W/COLLJ SPEC WHEN PFRMD N/A 2/12/2016    Procedure: COLONOSCOPY;  Surgeon: Louise Mcelroy MD;  Location: [facility] GI LAB; Service: Gastroenterology       Allergies   Allergen Reactions    Asa [Aspirin] Other (See Comments)     Reaction Date: 25Aug2008;   unknown       Patient Active Problem List   Diagnosis    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    Acquired deformity of right ankle and foot    Ingrown nail    Pain in both feet    Constipation    Cerebral palsy ([address] )    Renal cyst    Sciatica of left side    Class 1 obesity with body mass index (BMI) of 30 0 to 30 9 in adult    Hyperlipidemia    Lumbar radiculopathy    Spinal stenosis of lumbar region    Chronic left-sided low back pain with left-sided sciatica    Chronic pain syndrome       Review of Systems      Objective:  Patient's shoes and socks were removed, feet examined  /88   Ht 5' 8" (1 727 m)   Wt 88 kg (194 lb)   BMI 29 50 kg/m²       Foot Exam  Diabetic Foot Exam     Physical Exam  Assessment/Plan:  Aseptic debridement and planning of nails x10 and manually and mechanically    Discussed permanent removal of ingrown if recurrent  Patient call if any signs of infection  Daily foot checks monitor for signs of infection  Follow-up 3 months     Diagnoses and all orders for this visit:    Onychomycosis    Ingrown nail    Pain in both feet          Subjective:      Patient ID: Pipo Gaines is a 48 y o  male  Patient gets recurrent ingrown nails bilateral great toes  Aids have not noticed any redness or signs of infection  Patient does have thick dystrophic nails          Past Medical History:   Diagnosis Date    Ambulatory dysfunction     Anxiety     Chronic kidney disease     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     Constipation     Deformity     left and right foot and ankle ,right hand    Depression     Erythrocytosis     Hemiplegia affecting dominant side (HCC)     Hypertension     Mood disorder (HCC)     Nephrocalcinosis     Seizures ([facility] [address] )     2001         Current Outpatient Prescriptions:     atorvastatin (LIPITOR) 10 mg tablet, Take 1 tablet (10 mg total) by mouth daily, Disp: 30 tablet, Rfl: 0    clonazePAM (KlonoPIN) 0 5 mg tablet, Take 0 25 mg by mouth 2 (two) times a day , Disp: , Rfl:     lamoTRIgine (LaMICtal) 150 MG tablet, Take 150 mg by mouth 2 (two) times a day , Disp: , Rfl:     nebivolol (BYSTOLIC) 2 5 mg tablet, Take 2 5 mg by mouth daily  , Disp: , Rfl:     REGULOID 58 6 % powder, TAKE 1 TEASPOONFUL MIXED WITH 8 OUNCES OF WATER ORALLY EVERY DAY AT 8AM, Disp: 425 g, Rfl: 0    risperiDONE (RisperDAL) 0 25 mg tablet, Take 0 25 mg by mouth 2 (two) times a day , Disp: , Rfl:     senna (SENOKOT) 8 6 MG tablet, Take 1 tablet (8 6 mg total) by mouth daily, Disp: 30 tablet, Rfl: 5    UNABLE TO FIND, Take 5 mL by mouth daily  Med Name: reguloid, Disp: , Rfl:     Past Surgical History:   Procedure Laterality Date    BRAIN SURGERY      NE COLONOSCOPY FLX DX W/COLLJ SPEC WHEN PFRMD N/A 2/12/2016    Procedure: COLONOSCOPY;  Surgeon: Polo Kendrick MD;  Location: [place] [address] GI LAB; Service: Gastroenterology       Allergies   Allergen Reactions    Asa [Aspirin] Other (See Comments)     unknown       Patient Active Problem List   Diagnosis    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    Acquired deformity of right ankle and foot    Ingrown nail    Pain in both feet    Constipation       Review of Systems   Constitutional: Negative  HENT: Negative  Eyes: Negative  Respiratory: Negative  Cardiovascular: Negative  Gastrointestinal: Negative  Endocrine: Negative  Genitourinary: Negative  Musculoskeletal: Negative  Skin: Negative  Allergic/Immunologic: Negative  Neurological: Negative  Hematological: Negative  Psychiatric/Behavioral: Negative  Objective:      Ht 5' 6" (1 676 m)   Wt 83 kg (183 lb)   BMI 29 54 kg/m²          Physical Exam    Constitutional: no acute distress, well appearing and well nourished  Orthopedic/Biomechanical: abnormal foot type-- and-- hammertoe(s)  Left Foot: Appearance: Normal except as noted: a deformity-- and-- pes planus  Great toe deformities include a bunion  Second toe deformities include hammer toe  Forth toe deformities include hammer toe  Fifth toe deformities include hammer toe  Evaluation of the great toe nail demonstrates paronychia-- and-- an ingrown nail  Tenderness: distal first metatarsal  Hallux limitus first MPJ bilateral,, mild pain on end range of motion, no hypermobility first ray muscle strength is 5  Special Tests: Left hallux ingrown tibial border no erythema no exudate no signs of infection mild pain on palpation  Right Foot: Appearance: Normal except as noted: a deformity-- and-- pes planus  Great toe deformities include a bunion  Second toe deformities include hammer toe  Third toe deformities include hammer toe  Forth toe deformities include hammer toe  Fifth toe deformities include hammer toe  Evaluation of the great toe nail demonstrates paronychia-- and-- an ingrown nail  Tenderness: distal first metatarsal  Hammertoes 2 through 5 bilateral  Plantarflexed metatarsal heads  Right second MPJ plantarflexed metatarsal capsulitis second positive edema negative erythema negative signs of infection  Special Tests: I    Neurological Exam: performed  Light touch was intact bilaterally  Vibratory sensation was intact bilaterally  Response to monofilament test was intact bilaterally  Vascular Exam: performed Dorsalis pedis pulses were 1/4 bilaterally   Posterior tibial pulses were 1/4 bilaterally  Capillary refill time was greater than 3 seconds bilaterally  Rigidly contracted hammertoes 2 through 5 bilateral       Ingrown bilateral hallux  Mild swelling   Negative erythema negative exudate no purulence  Severe flatfoot  Painful hyperkeratotic lesions metatarsal heads and medial heel bilateral

## 2018-10-15 ENCOUNTER — OFFICE VISIT (OUTPATIENT)
Dept: PHYSICAL THERAPY | Facility: CLINIC | Age: 53
End: 2018-10-15
Payer: MEDICARE

## 2018-10-15 DIAGNOSIS — G89.4 CHRONIC PAIN SYNDROME: Primary | ICD-10-CM

## 2018-10-15 DIAGNOSIS — M54.42 CHRONIC LEFT-SIDED LOW BACK PAIN WITH LEFT-SIDED SCIATICA: ICD-10-CM

## 2018-10-15 DIAGNOSIS — M48.061 SPINAL STENOSIS OF LUMBAR REGION, UNSPECIFIED WHETHER NEUROGENIC CLAUDICATION PRESENT: ICD-10-CM

## 2018-10-15 DIAGNOSIS — M54.16 LUMBAR RADICULOPATHY: ICD-10-CM

## 2018-10-15 DIAGNOSIS — G89.29 CHRONIC LEFT-SIDED LOW BACK PAIN WITH LEFT-SIDED SCIATICA: ICD-10-CM

## 2018-10-15 PROCEDURE — 97110 THERAPEUTIC EXERCISES: CPT

## 2018-10-15 PROCEDURE — 97140 MANUAL THERAPY 1/> REGIONS: CPT

## 2018-10-15 NOTE — PROGRESS NOTES
Daily Note     Today's date: 10/15/2018  Patient name: Alexis Nunez  : 1965  MRN: [id number]  Referring provider: Pratima Bunn MD  Dx:   Encounter Diagnosis     ICD-10-CM    1  Chronic pain syndrome G89 4    2  Chronic left-sided low back pain with left-sided sciatica M54 42     G89 29    3  Spinal stenosis of lumbar region, unspecified whether neurogenic claudication present M48 061    4  Lumbar radiculopathy M54 16        Start Time: 1100  Stop Time: 1140  Total time in clinic (min): 40 minutes    Subjective: Pt does not report any new complaints prior to session  Objective: See treatment diary below    Precautions: Standard  Cerebral palsy, R hemiplegia  Daily Treatment Diary     Manual  10/08 10/15           HS str  5'           Piriformis str  5'                                                      Exercise Diary  1 2           B trunk rot 10x 20x           B KTC ball 15x 20x           NuStep 5' 8'           Hip add c ball  20x           Hip abd c tband  20x Red                                                                                                                                                                                    HEP TBI Rev           Time 15' 30'               Modalities                                                           Assessment: Tolerated treatment well  Patient exhibited good technique with therapeutic exercises and would benefit from continued PT  Pt tolerated exercises this session with no inc pain  Plan: Continue per plan of care  Progress treatment as tolerated

## 2018-10-22 ENCOUNTER — APPOINTMENT (OUTPATIENT)
Dept: PHYSICAL THERAPY | Facility: CLINIC | Age: 53
End: 2018-10-22
Payer: MEDICARE

## 2018-10-22 DIAGNOSIS — R52 PAIN: ICD-10-CM

## 2018-10-22 DIAGNOSIS — R52 PAIN: Primary | ICD-10-CM

## 2018-10-22 RX ORDER — ACETAMINOPHEN 325 MG/1
325 TABLET ORAL EVERY 4 HOURS PRN
Qty: 30 TABLET | Refills: 0 | Status: SHIPPED | OUTPATIENT
Start: 2018-10-22 | End: 2019-05-21 | Stop reason: ALTCHOICE

## 2018-10-22 RX ORDER — ACETAMINOPHEN 325 MG/1
325 TABLET ORAL EVERY 4 HOURS PRN
Qty: 30 TABLET | Refills: 2 | Status: SHIPPED | OUTPATIENT
Start: 2018-10-22 | End: 2018-10-22 | Stop reason: CLARIF

## 2018-10-24 ENCOUNTER — APPOINTMENT (OUTPATIENT)
Dept: PHYSICAL THERAPY | Facility: CLINIC | Age: 53
End: 2018-10-24
Payer: MEDICARE

## 2018-10-25 ENCOUNTER — OFFICE VISIT (OUTPATIENT)
Dept: PHYSICAL THERAPY | Facility: CLINIC | Age: 53
End: 2018-10-25
Payer: MEDICARE

## 2018-10-25 DIAGNOSIS — M54.16 LUMBAR RADICULOPATHY: ICD-10-CM

## 2018-10-25 DIAGNOSIS — G89.29 CHRONIC LEFT-SIDED LOW BACK PAIN WITH LEFT-SIDED SCIATICA: ICD-10-CM

## 2018-10-25 DIAGNOSIS — G89.4 CHRONIC PAIN SYNDROME: Primary | ICD-10-CM

## 2018-10-25 DIAGNOSIS — M54.42 CHRONIC LEFT-SIDED LOW BACK PAIN WITH LEFT-SIDED SCIATICA: ICD-10-CM

## 2018-10-25 DIAGNOSIS — M48.061 SPINAL STENOSIS OF LUMBAR REGION, UNSPECIFIED WHETHER NEUROGENIC CLAUDICATION PRESENT: ICD-10-CM

## 2018-10-25 PROCEDURE — 97140 MANUAL THERAPY 1/> REGIONS: CPT

## 2018-10-25 NOTE — PROGRESS NOTES
Daily Note     Today's date: 10/25/2018  Patient name: Kam Martinez  : 1965  MRN: [id number]  Referring provider: Jerry De La Rosa MD  Dx:   Encounter Diagnosis     ICD-10-CM    1  Chronic pain syndrome G89 4    2  Chronic left-sided low back pain with left-sided sciatica M54 42     G89 29    3  Spinal stenosis of lumbar region, unspecified whether neurogenic claudication present M48 061    4  Lumbar radiculopathy M54 16        Start Time: 1530  Stop Time: 1610  Total time in clinic (min): 40 minutes    Subjective: Pt does not report any new complaints prior to session  Pt states "good"      Objective: See treatment diary below    Precautions: Standard  Cerebral palsy, R hemiplegia  Daily Treatment Diary     Manual  10/08 10/15 10/25          HS str  5' 5'          Piriformis str  5' 5'                                                     Exercise Diary  1 2 3          B trunk rot 10x 20x 20x          B KTC ball 15x 20x 30x          NuStep 5' 8' 10'          Hip add c ball  20x 20x          Hip abd c tband  20x Red 20x  Red                                                                                                                                                                                   HEP TBI Rev Rev          Time 15' 30' 30'              Modalities                                                           Assessment: Tolerated treatment well  Patient exhibited good technique with therapeutic exercises and would benefit from continued PT  Tissue restrictions noted in R LE during manual  Pt reported dec symptoms post session  Plan: Continue per plan of care  Progress treatment as tolerated

## 2018-10-29 ENCOUNTER — APPOINTMENT (OUTPATIENT)
Dept: PHYSICAL THERAPY | Facility: CLINIC | Age: 53
End: 2018-10-29
Payer: MEDICARE

## 2018-10-31 ENCOUNTER — APPOINTMENT (OUTPATIENT)
Dept: PHYSICAL THERAPY | Facility: CLINIC | Age: 53
End: 2018-10-31
Payer: MEDICARE

## 2018-10-31 ENCOUNTER — OFFICE VISIT (OUTPATIENT)
Dept: PHYSICAL THERAPY | Facility: CLINIC | Age: 53
End: 2018-10-31
Payer: MEDICARE

## 2018-10-31 DIAGNOSIS — G89.4 CHRONIC PAIN SYNDROME: Primary | ICD-10-CM

## 2018-10-31 DIAGNOSIS — M48.061 SPINAL STENOSIS OF LUMBAR REGION, UNSPECIFIED WHETHER NEUROGENIC CLAUDICATION PRESENT: ICD-10-CM

## 2018-10-31 DIAGNOSIS — G89.29 CHRONIC LEFT-SIDED LOW BACK PAIN WITH LEFT-SIDED SCIATICA: ICD-10-CM

## 2018-10-31 DIAGNOSIS — M54.42 CHRONIC LEFT-SIDED LOW BACK PAIN WITH LEFT-SIDED SCIATICA: ICD-10-CM

## 2018-10-31 DIAGNOSIS — M54.16 LUMBAR RADICULOPATHY: ICD-10-CM

## 2018-10-31 PROCEDURE — 97110 THERAPEUTIC EXERCISES: CPT

## 2018-10-31 NOTE — PROGRESS NOTES
Daily Note     Today's date: 10/31/2018  Patient name: Candance Condon  : 1965  MRN: [id number]  Referring provider: Samantha Carter MD  Dx:   Encounter Diagnosis     ICD-10-CM    1  Chronic pain syndrome G89 4    2  Chronic left-sided low back pain with left-sided sciatica M54 42     G89 29    3  Spinal stenosis of lumbar region, unspecified whether neurogenic claudication present M48 061    4  Lumbar radiculopathy M54 16        Start Time: 1530          Subjective: Pt reports "Its better then when I started"  Pt reports he has been walking and his symptoms have improved  Objective: See treatment diary below    Precautions: Standard  Cerebral palsy, R hemiplegia  Daily Treatment Diary     Manual  10/08 10/15 10/25 10/31         HS str  5' 5' 5'         Piriformis str  5' 5' 5'                                                    Exercise Diary  1 2 3 4         B trunk rot 10x 20x 20x 20x         B KTC ball 15x 20x 30x 30x         NuStep 5' 8' 10' 10'         Hip add c ball  20x 20x 20x         Hip abd c tband  20x Red 20x  Red 20x  Green                                                                                                                                                                                  HEP TBI Rev Rev Rev         Time 15' 30' 30' 25'             Modalities                                                           Assessment: Tolerated treatment well  Patient exhibited good technique with therapeutic exercises and would benefit from continued PT  Pt is progressing with his exercises as he was able to inc his resistance during tband hip abd  Plan: Continue per plan of care  Progress treatment as tolerated

## 2018-11-01 ENCOUNTER — OFFICE VISIT (OUTPATIENT)
Dept: PHYSICAL THERAPY | Facility: CLINIC | Age: 53
End: 2018-11-01
Payer: MEDICARE

## 2018-11-01 DIAGNOSIS — M54.42 CHRONIC LEFT-SIDED LOW BACK PAIN WITH LEFT-SIDED SCIATICA: ICD-10-CM

## 2018-11-01 DIAGNOSIS — M54.16 LUMBAR RADICULOPATHY: ICD-10-CM

## 2018-11-01 DIAGNOSIS — G89.4 CHRONIC PAIN SYNDROME: Primary | ICD-10-CM

## 2018-11-01 DIAGNOSIS — G89.29 CHRONIC LEFT-SIDED LOW BACK PAIN WITH LEFT-SIDED SCIATICA: ICD-10-CM

## 2018-11-01 DIAGNOSIS — M48.061 SPINAL STENOSIS OF LUMBAR REGION, UNSPECIFIED WHETHER NEUROGENIC CLAUDICATION PRESENT: ICD-10-CM

## 2018-11-01 PROCEDURE — 97140 MANUAL THERAPY 1/> REGIONS: CPT

## 2018-11-01 PROCEDURE — 97110 THERAPEUTIC EXERCISES: CPT

## 2018-11-01 NOTE — PROGRESS NOTES
Daily Note     Today's date: 2018  Patient name: Anival Boateng  : 1965  MRN: [id number]  Referring provider: Dickson Mckoy MD  Dx:   Encounter Diagnosis     ICD-10-CM    1  Chronic pain syndrome G89 4    2  Chronic left-sided low back pain with left-sided sciatica M54 42     G89 29    3  Spinal stenosis of lumbar region, unspecified whether neurogenic claudication present M48 061    4  Lumbar radiculopathy M54 16        Start Time: 1530  Stop Time: 1610  Total time in clinic (min): 40 minutes    Subjective: Pt states he is "good" prior to session  He reports he uses heating pad couple times a week  Objective: See treatment diary below    Precautions: Standard  Cerebral palsy, R hemiplegia  Daily Treatment Diary     Manual  10/08 10/15 10/25 10/31 11/1        HS str  5' 5' 5' 5'        Piriformis str  5' 5' 5' 5'                                                   Exercise Diary  1 2 3 4 5        B trunk rot 10x 20x 20x 20x 20x        B KTC ball 15x 20x 30x 30x 30x        NuStep 5' 8' 10' 10' 10'        Hip add c ball  20x 20x 20x 20x        Hip abd c tband  20x Red 20x  Red 20x  Green 20x Green                                                                                                                                                                                 HEP TBI Rev Rev Rev Rev        Time 15' 30' 30' 25' 30'            Modalities                                                           Assessment: Tolerated treatment well  Patient exhibited good technique with therapeutic exercises and would benefit from continued PT  Tissue restrictions noted during manuals this session  Plan: Continue per plan of care  Progress treatment as tolerated

## 2018-11-08 ENCOUNTER — EVALUATION (OUTPATIENT)
Dept: PHYSICAL THERAPY | Facility: CLINIC | Age: 53
End: 2018-11-08
Payer: MEDICARE

## 2018-11-08 DIAGNOSIS — G89.29 CHRONIC LEFT-SIDED LOW BACK PAIN WITH LEFT-SIDED SCIATICA: Primary | ICD-10-CM

## 2018-11-08 DIAGNOSIS — G89.4 CHRONIC PAIN SYNDROME: ICD-10-CM

## 2018-11-08 DIAGNOSIS — M54.42 CHRONIC LEFT-SIDED LOW BACK PAIN WITH LEFT-SIDED SCIATICA: Primary | ICD-10-CM

## 2018-11-08 PROCEDURE — G8978 MOBILITY CURRENT STATUS: HCPCS | Performed by: PHYSICAL THERAPIST

## 2018-11-08 PROCEDURE — 97140 MANUAL THERAPY 1/> REGIONS: CPT | Performed by: PHYSICAL THERAPIST

## 2018-11-08 PROCEDURE — 97110 THERAPEUTIC EXERCISES: CPT | Performed by: PHYSICAL THERAPIST

## 2018-11-08 PROCEDURE — G8979 MOBILITY GOAL STATUS: HCPCS | Performed by: PHYSICAL THERAPIST

## 2018-11-08 NOTE — PROGRESS NOTES
PT Re-Evaluation     Today's date: 2018  Patient name: Kevyn Cortes  : 1965  MRN: [id number]  Referring provider: Ruth Gilmore MD  Dx:   Encounter Diagnosis     ICD-10-CM    1  Chronic left-sided low back pain with left-sided sciatica M54 42     G89 29    2  Chronic pain syndrome G89 4        Start Time: 1530  Stop Time: 1615  Total time in clinic (min): 45 minutes    Assessment  Impairments: abnormal gait, abnormal or restricted ROM, activity intolerance, impaired physical strength, lacks appropriate home exercise program, pain with function, poor posture  and poor body mechanics    Assessment details: Kevyn Cortes is a 48 y o  male who has been regularly participating in skilled PT and is subjectively reporting improvement in his pain however residual symptoms persist of lesser intensity  Pt acknowledges he is reentering work like duties at [employer] and denies limitations at this time, denies pain while working  Pt denies sleep disturbance however does acknowledge difficulty with bed mob, however not necessarily due to pain  At this time pt is able to complete HEP and cont to demo weakness and ROM impairment however dmeo good potential to achieve est LTGs and would benefit from ongoing skilled PT to maximize return to PLOF       Understanding of Dx/Px/POC: good   Prognosis: good    Goals  Short term goals to be accomplished in 3 weeks: - partially met/ongoing  STG 1: Pt will demo independence with postural management  STG 2: Pt will demo I with HEP to maximize progress between therapy sessions  STG 3: Pt will demo L/S AROM < or = min loss throughout to promote improved functional mobility and body mechanics  STG 4: Pt will demo 1/2 MMT grade core stabilizers to improve lumbar stability with functional challenges  STG 5: Pt will reports pain dec freq and intensity 50%    Long term goals to be accomplished in 6 weeks: - ongoing  LTG 1: Pt will demo good body mech with >75% functional challenges to prevent reinjury  LTG 2: Pt will be able to return to ADLs and work program activities pain free as per PLOF  LTG 3: Pt will demo Good strength core stabilizers to promote carryover with body mech and posture    Plan  Patient would benefit from: PT eval and skilled physical therapy  Planned modality interventions: cryotherapy and thermotherapy: hydrocollator packs  Planned therapy interventions: manual therapy, neuromuscular re-education, self care, therapeutic activities, therapeutic exercise and home exercise program  Frequency: 2x week (1-2x/week)  Duration in weeks: 6  Treatment plan discussed with: patient  Plan details: Cont HEP development, stretching, strengthening, A/AA/PROM, joint mobilizations, posture education, STM/MI as needed to reduce muscle tension, muscle reeducation, PLOC discussed and agreed upon with patient  Subjective Evaluation    History of Present Illness  Mechanism of injury: Pt reports long history of L lumbar pain radiating to L buttock, buttock primary location of apin, history of full LE radiation  Pt repots in past he would be rendered immobile form pain  Pt takes Tylenol and uses heating pad to relieve symptoms  Pt reports his pain comes and goes  Rest relieves symptoms whether sitting or laying down  Pt reports he participates in a work program, has back pain during said activities which vary  Pt verbally struggling to describe symptoms and elaborate on causative or relieving factors  Pt aide is present during today's session, confirms he continues to perform HEP from previous therapy HEP  Quality of life: good    Pain  Current pain ratin  At best pain ratin  At worst pain ratin      Diagnostic Tests  CT scan: abnormal (Scoliosis, spondylolisthesis and multilevel degenerative discogenic disease and facet arthropathy, worst at the L4-5 level where there is moderate spinal stenosis and left greater than right foraminal stenosis    Of note is a calcified density in the )  Treatments  Previous treatment: physical therapy and medication  Patient Goals  Patient goals for therapy: increased strength, independence with ADLs/IADLs, increased motion, decreased pain and return to work          Objective     Special Questions  Negative for night pain, disturbed sleep, bladder dysfunction, bowel dysfunction and saddle (S4) numbness    Additional Special Questions  R hand and R LE demo muscle atrophy and limited functionality consistent with hemiplegia    Postural Observations  Seated posture: poor  Standing posture: poor    Additional Postural Observation Details  Inc trunk lean to L sitting and standing  Thoracic scoliosis    Active Range of Motion     Additional Active Range of Motion Details  Lumbar AROM: Mod loss throughout pain free    Strength/Myotome Testing     Additional Strength Details  General Fair strength throughout TrvA and B LEs    General Comments     Lumbar Comments  Function:    Gait: Major trunk lurch and dec mobility for heel strike and push off R LE    STS with L UE dependency    Body mech: Unable to squat, poor body mech      Flowsheet Rows      Most Recent Value   PT/OT G-Codes   FOTO information reviewed  No   Assessment Type  Re-evaluation   G code set  Mobility: Walking & Moving Around   Mobility: Walking and Moving Around Current Status ()  CJ   Mobility: Walking and Moving Around Goal Status ()  CI            Precautions: Standard  Cerebral palsy, R hemiplegia       Daily Treatment Diary     Manual  10/08 10/15 10/25 10/31 11/1 11/8       HS str  5' 5' 5' 5' 5'       Piriformis str  5' 5' 5' 5' 5'                                                  Exercise Diary  1 2 3 4 5 6       B trunk rot 10x 20x 20x 20x 20x 20x       B KTC ball 15x 20x 30x 30x 30x 30x       NuStep 5' 8' 10' 10' 10' 10'       Hip add c ball  20x 20x 20x 20x 20x       Hip abd c tband  20x Red 20x  Red 20x  Green 20x Green 20x HEP TBI Rev Rev Rev Rev rev       Time 13' 30' 30' 25' 30' 30'           Modalities

## 2018-11-09 DIAGNOSIS — K59.00 CONSTIPATION, UNSPECIFIED CONSTIPATION TYPE: ICD-10-CM

## 2018-11-09 RX ORDER — PSYLLIUM HUSK 3 G/5.8 G
POWDER (GRAM) ORAL
Qty: 1040 G | Refills: 5 | Status: SHIPPED | OUTPATIENT
Start: 2018-11-09 | End: 2019-05-21 | Stop reason: SDUPTHER

## 2018-11-13 ENCOUNTER — OFFICE VISIT (OUTPATIENT)
Dept: PAIN MEDICINE | Facility: CLINIC | Age: 53
End: 2018-11-13
Payer: MEDICARE

## 2018-11-13 VITALS
RESPIRATION RATE: 18 BRPM | BODY MASS INDEX: 30.31 KG/M2 | HEIGHT: 68 IN | DIASTOLIC BLOOD PRESSURE: 68 MMHG | WEIGHT: 200 LBS | SYSTOLIC BLOOD PRESSURE: 116 MMHG | HEART RATE: 61 BPM

## 2018-11-13 DIAGNOSIS — M54.16 LUMBAR RADICULOPATHY: ICD-10-CM

## 2018-11-13 DIAGNOSIS — G89.29 CHRONIC LEFT-SIDED LOW BACK PAIN WITH LEFT-SIDED SCIATICA: ICD-10-CM

## 2018-11-13 DIAGNOSIS — M54.42 CHRONIC LEFT-SIDED LOW BACK PAIN WITH LEFT-SIDED SCIATICA: ICD-10-CM

## 2018-11-13 DIAGNOSIS — M48.062 SPINAL STENOSIS OF LUMBAR REGION WITH NEUROGENIC CLAUDICATION: ICD-10-CM

## 2018-11-13 DIAGNOSIS — G89.4 CHRONIC PAIN SYNDROME: Primary | ICD-10-CM

## 2018-11-13 PROCEDURE — 99214 OFFICE O/P EST MOD 30 MIN: CPT | Performed by: ANESTHESIOLOGY

## 2018-11-13 NOTE — LETTER
November 13, 2018     Wilson Kelly, [address]    Patient: Candance Condon   YOB: 1965   Date of Visit: 11/13/2018       Dear Dr Gloria Fontaine: Thank you for referring Shy Jacobo to me for evaluation  Below are my notes for this consultation  If you have questions, please do not hesitate to call me  I look forward to following your patient along with you  Sincerely,        Donell Bingham MD        CC: No Recipients  Donell Bingham MD  11/13/2018 10:36 AM  Sign at close encounter  Pain Medicine Follow-Up Note    Assessment:  1  Chronic pain syndrome    2  Chronic left-sided low back pain with left-sided sciatica    3  Lumbar radiculopathy    4  Spinal stenosis of lumbar region with neurogenic claudication        Plan:  Orders Placed This Encounter   Procedures    Ambulatory referral to Physical Therapy     Standing Status:   Future     Standing Expiration Date:   5/13/2019     Referral Priority:   Routine     Referral Type:   Physical Therapy     Referral Reason:   Specialty Services Required     Requested Specialty:   Physical Therapy     Number of Visits Requested:   1     Expiration Date:   11/13/2019     My impressions and treatment recommendations were discussed in detail with the patient who verbalized understanding and had no further questions  The patient is reporting excellent pain relief with physical therapy  He states that he has completed his course of physical therapy and still has pain  He is willing to trial another course of physical therapy since he did get good clinical benefit with it  As such, I felt a reasonable to continue physical therapy 2-3 times per week for 4-6 weeks  Discharge instructions were provided  I personally saw and examined the patient and I agree with the above discussed plan of care      History of Present Illness:    Candance Condon is a 48 y o  male who presents to [facility] for interval re-evaluation of the above stated pain complaints  The patient has a past medical and chronic pain history as outlined in the assessment section  He was last seen on October 1, 2018 at which time I had him undergo a course of physical therapy  At today's office visit, the patient's pain score is 7/10 on the verbal numerical pain rating scale  The patient states that his pain is primarily involving the right buttocks region  He states that his pain is worse in the morning and occasional in nature  He describes the quality of his pain as cramping    He does report excellent pain relief with physical therapy and states that he has completed his course  He would like to continue physical therapy at this time  Other than as stated above, the patient denies any interval changes in medications, medical condition, mental condition, symptoms, or allergies since the last office visit  Review of Systems:    Review of Systems   Respiratory: Negative for shortness of breath  Cardiovascular: Negative for chest pain  Gastrointestinal: Negative for constipation, diarrhea, nausea and vomiting  Musculoskeletal: Negative for arthralgias, gait problem, joint swelling and myalgias  Skin: Negative for rash  Neurological: Negative for dizziness, seizures and weakness  All other systems reviewed and are negative          Patient Active Problem List   Diagnosis    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    Acquired deformity of right ankle and foot    Ingrown nail    Pain in both feet    Constipation    Cerebral palsy (HCC)    Renal cyst    Sciatica of left side    Class 1 obesity with body mass index (BMI) of 30 0 to 30 9 in adult    Hyperlipidemia    Lumbar radiculopathy    Spinal stenosis of lumbar region    Chronic left-sided low back pain with left-sided sciatica    Chronic pain syndrome       Past Medical History:   Diagnosis Date    Ambulatory dysfunction     Anxiety     Bilateral impacted cerumen     last assessed 05/30/2013    Capsulitis     last assessed 04/26/2016    Cellulitis of finger 01/13/2012    Chronic kidney disease     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     Closed fracture of one or more phalanges of foot 01/06/2009    Constipation     Deformity     left and right foot and ankle ,right hand    Depression     Epile[facility]     Erythrocytosis     Hemiplegia affecting dominant side ([facility]     Hypertension     Hypoglycemia 11/08/2011    Hypokalemia     last assessed 05/30/2013    Mental retardation     [facility]     Nephrocalcinosis     Seizures ([facility] [facility]     2001       Past Surgical History:   Procedure Laterality Date    BRAIN SURGERY      [place] COLONOSCOPY FLX DX W/COLLJ SPEC WHEN PFRMD N/A 2/12/2016    Procedure: COLONOSCOPY;  Surgeon: Brenda Ulloa MD;  Location: [place] [address] [facility]; Service: Gastroenterology       Family History   Problem Relation Age of Onset    Emphysema Mother     Nephrolithiasis Mother     Heart attack Father         acute MI    Hypertension Father     Nephrolithiasis Father     Nephrolithiasis Brother        Social History     Occupational History    Not on file  Social History Main Topics    Smoking status: Never Smoker    Smokeless tobacco: Never Used    Alcohol use No    Drug use: No    Sexual activity: Not on file         Current Outpatient Prescriptions:     acetaminophen (TYLENOL) 325 mg tablet, Take 1 tablet (325 mg total) by mouth every 4 (four) hours as needed for mild pain, Disp: 30 tablet, Rfl: 0    atorvastatin (LIPITOR) 10 mg tablet, TAKE ONE TABLET BY MOUTH DAILY AT 8PM, Disp: 90 tablet, Rfl: 1    lamoTRIgine (LaMICtal) 150 MG tablet, Take 150 mg by mouth 2 (two) times a day , Disp: , Rfl:     lamoTRIgine (LAMICTAL) 25 mg tablet, Take by mouth, Disp: , Rfl:     nebivolol (BYSTOLIC) 2 5 mg tablet, Take 2 5 mg by mouth daily  , Disp: , Rfl:     QUEtiapine (SEROquel) 50 mg tablet, , Disp: , Rfl:     REGULOID 58 6 % powder, TAKE 1 TEASPOONFUL MIXED WITH 8 OUNCES OF WATER ORALLY EVERY DAY AT 8AM, Disp: 1040 g, Rfl: 5    senna (SENOKOT) 8 6 MG tablet, Take 1 tablet (8 6 mg total) by mouth daily, Disp: 30 tablet, Rfl: 5    sertraline (ZOLOFT) 50 mg tablet, Take by mouth daily, Disp: , Rfl:     Allergies   Allergen Reactions    Asa [Aspirin] Other (See Comments)     Reaction Date: 25Aug2008;   unknown       Physical Exam:    /68 (BP Location: Left arm, Patient Position: Sitting, Cuff Size: Standard)   Pulse 61   Resp 18   Ht 5' 8" (1 727 m)   Wt 90 7 kg (200 lb)   BMI 30 41 kg/m²      Constitutional:obese  Eyes:anicteric  HEENT:grossly intact  Neck:supple, symmetric, trachea midline and no masses   Pulmonary:even and unlabored  Cardiovascular:No edema or pitting edema present  Skin:Normal without rashes or lesions and well hydrated  Psychiatric:Mood and affect appropriate  Neurologic:Cranial Nerves II-XII grossly intact  Musculoskeletal:normal      Orders Placed This Encounter   Procedures    Ambulatory referral to Physical Therapy

## 2018-11-13 NOTE — PROGRESS NOTES
Pain Medicine Follow-Up Note    Assessment:  1  Chronic pain syndrome    2  Chronic left-sided low back pain with left-sided sciatica    3  Lumbar radiculopathy    4  Spinal stenosis of lumbar region with neurogenic claudication        Plan:  Orders Placed This Encounter   Procedures    Ambulatory referral to Physical Therapy     Standing Status:   Future     Standing Expiration Date:   5/13/2019     Referral Priority:   Routine     Referral Type:   Physical Therapy     Referral Reason:   Specialty Services Required     Requested Specialty:   Physical Therapy     Number of Visits Requested:   1     Expiration Date:   11/13/2019     My impressions and treatment recommendations were discussed in detail with the patient who verbalized understanding and had no further questions  The patient is reporting excellent pain relief with physical therapy  He states that he has completed his course of physical therapy and still has pain  He is willing to trial another course of physical therapy since he did get good clinical benefit with it  As such, I felt a reasonable to continue physical therapy 2-3 times per week for 4-6 weeks  Discharge instructions were provided  I personally saw and examined the patient and I agree with the above discussed plan of care  History of Present Illness:    Alexis Nunez is a 48 y o  male who presents to [facility] for interval re-evaluation of the above stated pain complaints  The patient has a past medical and chronic pain history as outlined in the assessment section  He was last seen on October 1, 2018 at which time I had him undergo a course of physical therapy  At today's office visit, the patient's pain score is 7/10 on the verbal numerical pain rating scale  The patient states that his pain is primarily involving the right buttocks region  He states that his pain is worse in the morning and occasional in nature    He describes the quality of his pain as cramping    He does report excellent pain relief with physical therapy and states that he has completed his course  He would like to continue physical therapy at this time  Other than as stated above, the patient denies any interval changes in medications, medical condition, mental condition, symptoms, or allergies since the last office visit  Review of Systems:    Review of Systems   Respiratory: Negative for shortness of breath  Cardiovascular: Negative for chest pain  Gastrointestinal: Negative for constipation, diarrhea, nausea and vomiting  Musculoskeletal: Negative for arthralgias, gait problem, joint swelling and myalgias  Skin: Negative for rash  Neurological: Negative for dizziness, seizures and weakness  All other systems reviewed and are negative          Patient Active Problem List   Diagnosis    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    Acquired deformity of right ankle and foot    Ingrown nail    Pain in both feet    Constipation    Cerebral palsy (HCC)    Renal cyst    Sciatica of left side    Class 1 obesity with body mass index (BMI) of 30 0 to 30 9 in adult    Hyperlipidemia    Lumbar radiculopathy    Spinal stenosis of lumbar region    Chronic left-sided low back pain with left-sided sciatica    Chronic pain syndrome       Past Medical History:   Diagnosis Date    Ambulatory dysfunction     Anxiety     Bilateral impacted cerumen     last assessed 05/30/2013    Capsulitis     last assessed 04/26/2016    Cellulitis of finger 01/13/2012    Chronic kidney disease     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     Closed fracture of one or more phalanges of foot 01/06/2009    Constipation     Deformity     left and right foot and ankle ,right hand    Depression     Epilepsy ([address]     Erythrocytosis     Hemiplegia affecting dominant side ([address]     Hypertension     Hypoglycemia 11/08/2011    Hypokalemia     last assessed 05/30/2013    Mental retardation     [facility]     Nephrocalcinosis     Seizures [facility]     2001       Past Surgical History:   Procedure Laterality Date    BRAIN SURGERY      [place] COLONOSCOPY FLX DX W/COLLJ SPEC WHEN PFRMD N/A 2/12/2016    Procedure: COLONOSCOPY;  Surgeon: Polo Kendrick MD;  Location: [facility]; Service: Gastroenterology       Family History   Problem Relation Age of Onset    Emphysema Mother     Nephrolithiasis Mother     Heart attack Father         acute MI    Hypertension Father     Nephrolithiasis Father     Nephrolithiasis Brother        Social History     Occupational History    Not on file  Social History Main Topics    Smoking status: Never Smoker    Smokeless tobacco: Never Used    Alcohol use No    Drug use: No    Sexual activity: Not on file         Current Outpatient Prescriptions:     acetaminophen (TYLENOL) 325 mg tablet, Take 1 tablet (325 mg total) by mouth every 4 (four) hours as needed for mild pain, Disp: 30 tablet, Rfl: 0    atorvastatin (LIPITOR) 10 mg tablet, TAKE ONE TABLET BY MOUTH DAILY AT 8PM, Disp: 90 tablet, Rfl: 1    lamoTRIgine (LaMICtal) 150 MG tablet, Take 150 mg by mouth 2 (two) times a day , Disp: , Rfl:     lamoTRIgine (LAMICTAL) 25 mg tablet, Take by mouth, Disp: , Rfl:     nebivolol (BYSTOLIC) 2 5 mg tablet, Take 2 5 mg by mouth daily  , Disp: , Rfl:     QUEtiapine (SEROquel) 50 mg tablet, , Disp: , Rfl:     REGULOID 58 6 % powder, TAKE 1 TEASPOONFUL MIXED WITH 8 OUNCES OF WATER ORALLY EVERY DAY AT 8AM, Disp: 1040 g, Rfl: 5    senna (SENOKOT) 8 6 MG tablet, Take 1 tablet (8 6 mg total) by mouth daily, Disp: 30 tablet, Rfl: 5    sertraline (ZOLOFT) 50 mg tablet, Take by mouth daily, Disp: , Rfl:     Allergies   Allergen Reactions    Asa [Aspirin] Other (See Comments)     Reaction Date: 25Aug2008;   unknown       Physical Exam:    /68 (BP Location: Left arm, Patient Position: Sitting, Cuff Size: Standard)   Pulse 61   Resp 18 Ht 5' 8" (1 727 m)   Wt 90 7 kg (200 lb)   BMI 30 41 kg/m²     Constitutional:obese  Eyes:anicteric  HEENT:grossly intact  Neck:supple, symmetric, trachea midline and no masses   Pulmonary:even and unlabored  Cardiovascular:No edema or pitting edema present  Skin:Normal without rashes or lesions and well hydrated  Psychiatric:Mood and affect appropriate  Neurologic:Cranial Nerves II-XII grossly intact  Musculoskeletal:normal      Orders Placed This Encounter   Procedures    Ambulatory referral to Physical Therapy

## 2018-11-28 ENCOUNTER — OFFICE VISIT (OUTPATIENT)
Dept: FAMILY MEDICINE CLINIC | Facility: CLINIC | Age: 53
End: 2018-11-28
Payer: MEDICARE

## 2018-11-28 ENCOUNTER — OFFICE VISIT (OUTPATIENT)
Dept: PHYSICAL THERAPY | Facility: CLINIC | Age: 53
End: 2018-11-28
Payer: MEDICARE

## 2018-11-28 VITALS
DIASTOLIC BLOOD PRESSURE: 80 MMHG | HEIGHT: 68 IN | WEIGHT: 194 LBS | BODY MASS INDEX: 29.4 KG/M2 | HEART RATE: 64 BPM | RESPIRATION RATE: 16 BRPM | OXYGEN SATURATION: 99 % | SYSTOLIC BLOOD PRESSURE: 120 MMHG

## 2018-11-28 DIAGNOSIS — M54.16 LUMBAR RADICULOPATHY: ICD-10-CM

## 2018-11-28 DIAGNOSIS — H61.23 BILATERAL IMPACTED CERUMEN: Primary | ICD-10-CM

## 2018-11-28 DIAGNOSIS — G89.29 CHRONIC LEFT-SIDED LOW BACK PAIN WITH LEFT-SIDED SCIATICA: ICD-10-CM

## 2018-11-28 DIAGNOSIS — M54.42 CHRONIC LEFT-SIDED LOW BACK PAIN WITH LEFT-SIDED SCIATICA: ICD-10-CM

## 2018-11-28 DIAGNOSIS — G89.4 CHRONIC PAIN SYNDROME: Primary | ICD-10-CM

## 2018-11-28 DIAGNOSIS — M48.061 SPINAL STENOSIS OF LUMBAR REGION, UNSPECIFIED WHETHER NEUROGENIC CLAUDICATION PRESENT: ICD-10-CM

## 2018-11-28 PROCEDURE — 97110 THERAPEUTIC EXERCISES: CPT

## 2018-11-28 PROCEDURE — 97140 MANUAL THERAPY 1/> REGIONS: CPT

## 2018-11-28 NOTE — PROGRESS NOTES
Daily Note     Today's date: 2018  Patient name: Kevyn Cortes  : 1965  MRN: [id number]  Referring provider: Ruth Gilmore MD  Dx:   Encounter Diagnosis     ICD-10-CM    1  Chronic pain syndrome G89 4    2  Chronic left-sided low back pain with left-sided sciatica M54 42     G89 29    3  Spinal stenosis of lumbar region, unspecified whether neurogenic claudication present M48 061    4  Lumbar radiculopathy M54 16        Start Time: 1545  Stop Time: 1615  Total time in clinic (min): 30 minutes    Subjective: Pt reports "Little bit on left side"      Objective: See treatment diary below    Precautions: Standard  Cerebral palsy, R hemiplegia  Daily Treatment Diary     Manual  10/08 10/15 10/25 10/31 11/1 11/8 11/28      HS str  5' 5' 5' 5' 5' 5'      Piriformis str  5' 5' 5' 5' 5' 5'                                                 Exercise Diary  1 2 3 4 5 6 7      B trunk rot 10x 20x 20x 20x 20x 20x 20x      B KTC ball 15x 20x 30x 30x 30x 30x 30x      NuStep 5' 8' 10' 10' 10' 10' 10'      Hip add c ball  20x 20x 20x 20x 20x 20x      Hip abd c tband  20x Red 20x  Red 20x  Green 20x Green 20x 20x                                                                                                                                                                               HEP TBI Rev Rev Rev Rev Rev Rev      Time 13' 30' 30' 25' 30' 30' 20'          Modalities                                                             Assessment: Tolerated treatment well  Patient would benefit from continued PT  Pt tolerated exercises well this session, with facial grimace during manual stretching but no pain  Plan: Continue per plan of care  Progress treatment as tolerated

## 2018-11-28 NOTE — PROGRESS NOTES
Assessment/Plan:    Bilateral impacted cerumen  A/P:  Patient had bilateral wax buildup, which was irrigated successfully during this visit  Patient tolerated procedure well  Diagnoses and all orders for this visit:    Bilateral impacted cerumen    Other orders  -     Ear cerumen removal          Subjective:      Patient ID: Gema Santiago is a 48 y o  male  Patient is a 17-year-old male with a past medical history of cerebral palsy, hyperlipidemia, lower back pain with radiculopathy here for a follow-up visit  [facility] at [facility] and was accompanied by caretaker  Patient is currently on Lipitor, Lamictal, Seroquel and Zoloft, however there is no recorded psych history on file  As caretaker who reported she has no idea why patient is on these meds  She seems very disinterested and stated that she is only here to accompany him but does not know anything else  Requested that caretaker call the facility in other to find out why patient is on these medications, which she did during this visit however the manager at the facility also seem to not know why patient is on these meds  They state that patient sees a psychiatrist and Sue Colon and believes he is on these meds for behavioral reasons  Advised them to get patient's diagnosis on upcoming psych visit  Last colonoscopy was 3 years ago which was normal   Patient does not smoke, drink alcohol or illicit drug use  He sees an audiologist for routine hearing test due to decreased hearing in the past   Patient also has a history of frequent cerumen impaction   and had a note from the Audiologist during this visit requesting an irrigation prior to his upcoming Audiology visit  Patient denies any loss of hearing  No complaints at this time          Ear cerumen removal  Date/Time: 12/2/2018 11:13 PM  Performed by: Steven Fung  Authorized by: Steven Fung     Consent:     Consent obtained:  Verbal    Consent given by:  Patient and healthcare agent    Risks discussed:  Bleeding, infection, pain, dizziness, incomplete removal and TM perforation    Alternatives discussed:  No treatment  Universal protocol:     Procedure explained and questions answered to patient or proxy's satisfaction: yes      Immediately prior to procedure a time out was called: yes      Patient identity confirmed:  Verbally with patient  Procedure details:     Local anesthetic:  None    Location:  L ear and R ear    Procedure type: irrigation      Approach:  External    Equipment used:  Syringe  Post-procedure details:     Complication:  None    Hearing quality:  Normal    Patient tolerance of procedure: Tolerated well, no immediate complications      The following portions of the patient's history were reviewed and updated as appropriate: allergies, current medications, past family history, past medical history, past social history, past surgical history and problem list     Review of Systems   Constitutional: Negative for fever and unexpected weight change  HENT: Negative  Eyes: Negative  Respiratory: Negative  Cardiovascular: Negative  Gastrointestinal: Negative for abdominal pain, blood in stool, constipation, diarrhea, nausea and vomiting  Endocrine: Negative  Genitourinary: Negative  Musculoskeletal: Positive for arthralgias, back pain and gait problem  Skin: Negative  Objective:      /80   Pulse 64   Resp 16   Ht 5' 7 5" (1 715 m)   Wt 88 kg (194 lb)   SpO2 99%   BMI 29 94 kg/m²          Physical Exam   Constitutional: He is oriented to person, place, and time  He appears well-developed and well-nourished  No distress  HENT:   Head: Normocephalic and atraumatic  Nose: Nose normal    Mouth/Throat: Oropharynx is clear and moist    Bilateral moderate wax buildup   Eyes: Pupils are equal, round, and reactive to light  Conjunctivae and EOM are normal    Neck: Normal range of motion  Neck supple     Cardiovascular: Normal rate, regular rhythm and normal heart sounds  Pulmonary/Chest: Effort normal and breath sounds normal  No respiratory distress  He has no wheezes  He has no rales  He exhibits no tenderness  Abdominal: Soft  Bowel sounds are normal  He exhibits no distension and no mass  There is no tenderness  There is no rebound and no guarding  Musculoskeletal: Normal range of motion  He exhibits deformity  He exhibits no edema or tenderness  Right arm atrophic and contracted, dfficulty ambulating  Lymphadenopathy:     He has no cervical adenopathy  Neurological: He is alert and oriented to person, place, and time  Skin: Skin is warm and dry  No rash noted  No erythema  No pallor  Psychiatric: He has a normal mood and affect  Very pleasant   Nursing note and vitals reviewed

## 2018-11-29 ENCOUNTER — APPOINTMENT (OUTPATIENT)
Dept: PHYSICAL THERAPY | Facility: CLINIC | Age: 53
End: 2018-11-29
Payer: MEDICARE

## 2018-12-02 PROCEDURE — 69209 REMOVE IMPACTED EAR WAX UNI: CPT | Performed by: FAMILY MEDICINE

## 2018-12-03 ENCOUNTER — OFFICE VISIT (OUTPATIENT)
Dept: PHYSICAL THERAPY | Facility: CLINIC | Age: 53
End: 2018-12-03
Payer: MEDICARE

## 2018-12-03 DIAGNOSIS — M54.16 LUMBAR RADICULOPATHY: ICD-10-CM

## 2018-12-03 DIAGNOSIS — G89.4 CHRONIC PAIN SYNDROME: Primary | ICD-10-CM

## 2018-12-03 DIAGNOSIS — M54.42 CHRONIC LEFT-SIDED LOW BACK PAIN WITH LEFT-SIDED SCIATICA: ICD-10-CM

## 2018-12-03 DIAGNOSIS — G89.29 CHRONIC LEFT-SIDED LOW BACK PAIN WITH LEFT-SIDED SCIATICA: ICD-10-CM

## 2018-12-03 DIAGNOSIS — M48.061 SPINAL STENOSIS OF LUMBAR REGION, UNSPECIFIED WHETHER NEUROGENIC CLAUDICATION PRESENT: ICD-10-CM

## 2018-12-03 PROCEDURE — 97110 THERAPEUTIC EXERCISES: CPT

## 2018-12-03 PROCEDURE — 97140 MANUAL THERAPY 1/> REGIONS: CPT

## 2018-12-03 NOTE — PROGRESS NOTES
Daily Note     Today's date: 12/3/2018  Patient name: Anival Boateng  : 1965  MRN: [id number]  Referring provider: Dickson Mckoy MD  Dx:   Encounter Diagnosis     ICD-10-CM    1  Chronic pain syndrome G89 4    2  Chronic left-sided low back pain with left-sided sciatica M54 42     G89 29    3  Spinal stenosis of lumbar region, unspecified whether neurogenic claudication present M48 061    4  Lumbar radiculopathy M54 16        Start Time: 1615  Stop Time: 1655  Total time in clinic (min): 40 minutes    Subjective: Pt reports his symptoms have improved since last session  Objective: See treatment diary below    Precautions: Standard  Cerebral palsy, R hemiplegia  Daily Treatment Diary     Manual  10/08 10/15 10/25 10/31 11/1 11/8 11/28 12/3     HS str  5' 5' 5' 5' 5' 5' 5'     Piriformis str  5' 5' 5' 5' 5' 5' 5'                                                Exercise Diary  1 2 3 4 5 6 7 8     B trunk rot 10x 20x 20x 20x 20x 20x 20x 20x     B KTC ball 15x 20x 30x 30x 30x 30x 30x 30x     NuStep 5' 8' 10' 10' 10' 10' 10' 10'     Hip add c ball  20x 20x 20x 20x 20x 20x 20x     Hip abd c tband  20x Red 20x  Red 20x  Green 20x Green 20x 20x 20x                                                                                                                                                                              HEP TBI Rev Rev Rev Rev Rev Rev Rev     Time 13' 30' 30' 25' 30' 30' 20' 20'         Modalities                                                             Assessment: Tolerated treatment well  Patient would benefit from continued PT  Pt improving exercise tolerance, attempt to progress exercises as tolerated  Plan: Continue per plan of care  Progress treatment as tolerated

## 2018-12-03 NOTE — ASSESSMENT & PLAN NOTE
A/P:  Patient had bilateral wax buildup, which was irrigated successfully during this visit  Patient tolerated procedure well

## 2018-12-05 ENCOUNTER — OFFICE VISIT (OUTPATIENT)
Dept: PHYSICAL THERAPY | Facility: CLINIC | Age: 53
End: 2018-12-05
Payer: MEDICARE

## 2018-12-05 DIAGNOSIS — M54.42 CHRONIC LEFT-SIDED LOW BACK PAIN WITH LEFT-SIDED SCIATICA: ICD-10-CM

## 2018-12-05 DIAGNOSIS — G89.29 CHRONIC LEFT-SIDED LOW BACK PAIN WITH LEFT-SIDED SCIATICA: ICD-10-CM

## 2018-12-05 DIAGNOSIS — M54.16 LUMBAR RADICULOPATHY: ICD-10-CM

## 2018-12-05 DIAGNOSIS — G89.4 CHRONIC PAIN SYNDROME: Primary | ICD-10-CM

## 2018-12-05 DIAGNOSIS — M48.061 SPINAL STENOSIS OF LUMBAR REGION, UNSPECIFIED WHETHER NEUROGENIC CLAUDICATION PRESENT: ICD-10-CM

## 2018-12-05 PROCEDURE — 97110 THERAPEUTIC EXERCISES: CPT

## 2018-12-05 NOTE — PROGRESS NOTES
Daily Note     Today's date: 2018  Patient name: Payal Sender  : 1965  MRN: [id number]  Referring provider: Oseas Jimenez MD  Dx:   Encounter Diagnosis     ICD-10-CM    1  Chronic pain syndrome G89 4    2  Chronic left-sided low back pain with left-sided sciatica M54 42     G89 29    3  Spinal stenosis of lumbar region, unspecified whether neurogenic claudication present M48 061    4  Lumbar radiculopathy M54 16        Start Time: 1615  Stop Time: 1650  Total time in clinic (min): 35 minutes    Subjective: Pt reports no complaints prior to session  Spoke with manager at his facility and agreed to possible discharge Monday 12/10/18  Objective: See treatment diary below    Precautions: Standard  Cerebral palsy, R hemiplegia  Daily Treatment Diary     Manual  10/08 10/15 10/25 10/31 11/1 11/8 11/28 12/3 12/5    HS str  5' 5' 5' 5' 5' 5' 5' 5'    Piriformis str  5' 5' 5' 5' 5' 5' 5' 5'                                               Exercise Diary  1 2 3 4 5 6 7 8 9    B trunk rot 10x 20x 20x 20x 20x 20x 20x 20x 20x    B KTC ball 15x 20x 30x 30x 30x 30x 30x 30x 30x    NuStep 5' 8' 10' 10' 10' 10' 10' 10' 11'    Hip add c ball  20x 20x 20x 20x 20x 20x 20x 20x    Hip abd c tband  20x Red 20x  Red 20x  Green 20x Green 20x 20x 20x 20x                                                                                                                                                                             HEP TBI Rev Rev Rev Rev Rev Rev Rev Rev    Time 13' 30' 30' 25' 30' 30' 20' 20' 20'        Modalities                                                             Assessment: Tolerated treatment well  Patient would benefit from continued PT  Pt improving exercise tolerance, possible discharge next week  Plan: Continue per plan of care  Progress treatment as tolerated

## 2018-12-06 ENCOUNTER — TELEPHONE (OUTPATIENT)
Dept: FAMILY MEDICINE CLINIC | Facility: CLINIC | Age: 53
End: 2018-12-06

## 2018-12-07 ENCOUNTER — OFFICE VISIT (OUTPATIENT)
Dept: FAMILY MEDICINE CLINIC | Facility: CLINIC | Age: 53
End: 2018-12-07
Payer: MEDICARE

## 2018-12-07 VITALS
WEIGHT: 200.6 LBS | OXYGEN SATURATION: 95 % | SYSTOLIC BLOOD PRESSURE: 112 MMHG | DIASTOLIC BLOOD PRESSURE: 80 MMHG | RESPIRATION RATE: 18 BRPM | BODY MASS INDEX: 30.95 KG/M2 | TEMPERATURE: 98.2 F | HEART RATE: 70 BPM

## 2018-12-07 DIAGNOSIS — Z76.89 REFERRAL OF PATIENT: Primary | ICD-10-CM

## 2018-12-07 PROCEDURE — 99212 OFFICE O/P EST SF 10 MIN: CPT | Performed by: FAMILY MEDICINE

## 2018-12-10 ENCOUNTER — OFFICE VISIT (OUTPATIENT)
Dept: AUDIOLOGY | Facility: CLINIC | Age: 53
End: 2018-12-10
Payer: MEDICARE

## 2018-12-10 ENCOUNTER — OFFICE VISIT (OUTPATIENT)
Dept: PHYSICAL THERAPY | Facility: CLINIC | Age: 53
End: 2018-12-10
Payer: MEDICARE

## 2018-12-10 DIAGNOSIS — M48.061 SPINAL STENOSIS OF LUMBAR REGION, UNSPECIFIED WHETHER NEUROGENIC CLAUDICATION PRESENT: ICD-10-CM

## 2018-12-10 DIAGNOSIS — M54.42 CHRONIC LEFT-SIDED LOW BACK PAIN WITH LEFT-SIDED SCIATICA: ICD-10-CM

## 2018-12-10 DIAGNOSIS — G89.29 CHRONIC LEFT-SIDED LOW BACK PAIN WITH LEFT-SIDED SCIATICA: ICD-10-CM

## 2018-12-10 DIAGNOSIS — G89.4 CHRONIC PAIN SYNDROME: Primary | ICD-10-CM

## 2018-12-10 DIAGNOSIS — H90.3 SENSORINEURAL HEARING LOSS, BILATERAL: Primary | ICD-10-CM

## 2018-12-10 DIAGNOSIS — M54.16 LUMBAR RADICULOPATHY: ICD-10-CM

## 2018-12-10 PROCEDURE — G8979 MOBILITY GOAL STATUS: HCPCS | Performed by: PHYSICAL THERAPIST

## 2018-12-10 PROCEDURE — 92557 COMPREHENSIVE HEARING TEST: CPT | Performed by: AUDIOLOGIST

## 2018-12-10 PROCEDURE — 97110 THERAPEUTIC EXERCISES: CPT | Performed by: PHYSICAL THERAPIST

## 2018-12-10 PROCEDURE — G8980 MOBILITY D/C STATUS: HCPCS | Performed by: PHYSICAL THERAPIST

## 2018-12-10 PROCEDURE — 92567 TYMPANOMETRY: CPT | Performed by: AUDIOLOGIST

## 2018-12-10 NOTE — PROGRESS NOTES
HEARING EVALUATION    Name:  Radha William  :  1965  Age:  48 y o  Date of Evaluation: 12/10/18     History: Annual hearing evaluation  Reason for visit: Radha William is being seen today at the request of Dr Michelle Anna for an evaluation of hearing  Caregiver reports no noticeable changes to hearing  Main Mckeon had his ears cleaned last week  EVALUATION:    Otoscopic Evaluation:   Right Ear: Moderate cerumen   Left Ear: Occluded, Could not visualize tympanic membrane    Tympanometry:   Right: Type A - normal middle ear pressure and compliance   Left: Type B - middle ear disorder, 0 4 ECV    Audiogram Results:  Normal with a mild sensorineural notch at 3-4KHz bilaterally  WRS is excellent bilaterally  Audiogram is stable to /[id number] hearing test     *see attached audiogram      RECOMMENDATIONS:  Annual hearing eval, Ear Cleaning and Copy to Patient/Caregiver    PATIENT EDUCATION:   Discussed results and recommendations with patient and caregiver  Questions were addressed and the patient was encouraged to contact our department should concerns arise        Hudson Waterman, Audiology Intern  Patrizia Conn , [address] [facility]  Clinical Audiologist

## 2018-12-10 NOTE — PROGRESS NOTES
Daily Note     Today's date: 12/10/2018  Patient name: Gema Santiago  : 1965  MRN: [id number]  Referring provider: Howard Cunha MD  Dx:   Encounter Diagnosis     ICD-10-CM    1  Chronic pain syndrome G89 4    2  Chronic left-sided low back pain with left-sided sciatica M54 42     G89 29    3  Spinal stenosis of lumbar region, unspecified whether neurogenic claudication present M48 061    4  Lumbar radiculopathy M54 16        Start Time: 1530  Stop Time: 1615  Total time in clinic (min): 45 minutes    Subjective: No new complaints  Objective: See treatment diary below  MD appt next week  Precautions: Standard  Cerebral palsy, R hemiplegia  Daily Treatment Diary     Manual  10/08 10/15 10/25 10/31 11/1 11/8 11/28 12/3 12/5 12/10   HS str  5' 5' 5' 5' 5' 5' 5' 5' 5'   Piriformis str  5' 5' 5' 5' 5' 5' 5' 5' 5'                                              Exercise Diary  1 2 3 4 5 6 7 8 9 10   B trunk rot 10x 20x 20x 20x 20x 20x 20x 20x 20x 20x   B KTC ball 15x 20x 30x 30x 30x 30x 30x 30x 30x 30x   NuStep 5' 8' 10' 10' 10' 10' 10' 10' 11' 15'   Hip add c ball  20x 20x 20x 20x 20x 20x 20x 20x 20x   Hip abd c tband  20x Red 20x  Red 20x  Green 20x Green 20x 20x 20x 20x 20x                                                                                                                                                                            HEP TBI Rev Rev Rev Rev Rev Rev Rev Rev Rev   Time 15' 30' 30' 25' 30' 30' 20' 20' 20' 40'       Modalities                                                         Assessment: Tolerated treatment well  Patient able to complete altasks wqithout fatigue  Plan: Continue per plan of care

## 2018-12-10 NOTE — LETTER
December 10, 2018     Coleen Em, [address]     Patient: Ana M Flores   YOB: 1965   Date of Visit: 12/10/2018       Dear Dr Adele Gerard: Thank you for referring Windle Habermann to me for evaluation  Below are my notes for this consultation  If you have questions, please do not hesitate to call me  I look forward to following your patient along with you  Sincerely,        Sugar Vela        CC: No Recipients  Sugar Vela  12/10/2018 10:16 AM  Sign at close encounter  HEARING EVALUATION    Name:  Ana M Flores  :  1965  Age:  48 y o  Date of Evaluation: 12/10/18     History: Annual hearing evaluation  Reason for visit: Ana M Flores is being seen today at the request of Dr Adele Gerard for an evaluation of hearing  Caregiver reports no noticeable changes to hearing  Jonna Francis had his ears cleaned last week  EVALUATION:    Otoscopic Evaluation:   Right Ear: Moderate cerumen   Left Ear: Occluded, Could not visualize tympanic membrane    Tympanometry:   Right: Type A - normal middle ear pressure and compliance   Left: Type B - middle ear disorder, 0 4 ECV    Audiogram Results:  Normal with a mild sensorineural notch at 3-4KHz bilaterally  WRS is excellent bilaterally  Audiogram is stable to [id number] hearing test     *see attached audiogram      RECOMMENDATIONS:  Annual hearing eval, Ear Cleaning and Copy to Patient/Caregiver    PATIENT EDUCATION:   Discussed results and recommendations with patient and caregiver  Questions were addressed and the patient was encouraged to contact our department should concerns arise        Sugar Vela, Audiology Intern  Patrizia Hargrove , [address]  Clinical Audiologist

## 2018-12-12 ENCOUNTER — OFFICE VISIT (OUTPATIENT)
Dept: PAIN MEDICINE | Facility: CLINIC | Age: 53
End: 2018-12-12
Payer: MEDICARE

## 2018-12-12 VITALS
HEART RATE: 66 BPM | RESPIRATION RATE: 18 BRPM | SYSTOLIC BLOOD PRESSURE: 120 MMHG | WEIGHT: 197.8 LBS | HEIGHT: 68 IN | BODY MASS INDEX: 29.98 KG/M2 | DIASTOLIC BLOOD PRESSURE: 80 MMHG

## 2018-12-12 DIAGNOSIS — M48.062 SPINAL STENOSIS OF LUMBAR REGION WITH NEUROGENIC CLAUDICATION: ICD-10-CM

## 2018-12-12 DIAGNOSIS — G89.4 CHRONIC PAIN SYNDROME: Primary | ICD-10-CM

## 2018-12-12 DIAGNOSIS — M54.42 CHRONIC LEFT-SIDED LOW BACK PAIN WITH LEFT-SIDED SCIATICA: ICD-10-CM

## 2018-12-12 DIAGNOSIS — G89.29 CHRONIC LEFT-SIDED LOW BACK PAIN WITH LEFT-SIDED SCIATICA: ICD-10-CM

## 2018-12-12 DIAGNOSIS — M54.16 LUMBAR RADICULOPATHY: ICD-10-CM

## 2018-12-12 PROBLEM — Z76.89 REFERRAL OF PATIENT: Status: ACTIVE | Noted: 2018-12-12

## 2018-12-12 PROCEDURE — 99213 OFFICE O/P EST LOW 20 MIN: CPT | Performed by: ANESTHESIOLOGY

## 2018-12-12 NOTE — PROGRESS NOTES
Pain Medicine Follow-Up Note    Assessment:  1  Chronic pain syndrome    2  Chronic left-sided low back pain with left-sided sciatica    3  Lumbar radiculopathy    4  Spinal stenosis of lumbar region with neurogenic claudication        Plan:  My impressions and treatment recommendations were discussed in detail with the patient who verbalized understanding and had no further questions  At today's visit, the patient's current pain score is 4/10 on the verbal numerical pain rating scale  The patient's advocate that did accompany him at today's visit states that the physical therapists are stating that he has completed his level of physical therapy and they do not think additional physical therapy would be helpful  As such, I feel reasonable to discontinue physical therapy at this time  The patient does feel as though he is still benefitting from home exercises, so I felt it reasonable to continue him with home exercises as discussed with his physical therapists as long as they continue to help him  At this point in time, the patient does not need to see me on a regular basis  I did discuss with the patient and the patient's advocate that if his pain worsens, I would be happy to see him in the future  Discharge instructions were provided  I personally saw and examined the patient and I agree with the above discussed plan of care  History of Present Illness:    Aguila Beebe is a 48 y o  male who presents to [facility] for interval re-evaluation of the above stated pain complaints  The patient has a past medical and chronic pain history as outlined in the assessment section  He was last seen on November 13, 2018 at which time he was asked to continue physical therapy as well as home exercises  At today's office visit, the patient's pain score is 4/10 on the verbal numerical pain rating scale  The patient states that his pain is worse in the morning    His pain is occasional in nature he reports the quality of his pain as sharp    He reports that he is getting good relief with both physical therapy as well as home exercises  The patient's advocate that accompanied him at today's visit states that the physical therapist her stating that he has achieved his goal of physical therapy and would like to discontinue it  The patient does report that home exercises continue to give him clinical benefit and he would like to continue his home exercises  As such, I feel reasonable to do so  Other than as stated above, the patient denies any interval changes in medications, medical condition, mental condition, symptoms, or allergies since the last office visit  Review of Systems:    Review of Systems   Respiratory: Negative for shortness of breath  Cardiovascular: Negative for chest pain  Gastrointestinal: Negative for constipation, diarrhea, nausea and vomiting  Musculoskeletal: Negative for arthralgias, gait problem, joint swelling and myalgias  Skin: Negative for rash  Neurological: Negative for dizziness, seizures and weakness  All other systems reviewed and are negative          Patient Active Problem List   Diagnosis    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    Acquired deformity of right ankle and foot    Ingrown nail    Pain in both feet    Constipation    Cerebral palsy (HCC)    Renal cyst    Sciatica of left side    Class 1 obesity with body mass index (BMI) of 30 0 to 30 9 in adult    Hyperlipidemia    Lumbar radiculopathy    Spinal stenosis of lumbar region    Chronic left-sided low back pain with left-sided sciatica    Chronic pain syndrome    Bilateral impacted cerumen    Referral of patient       Past Medical History:   Diagnosis Date    Ambulatory dysfunction     Anxiety     Bilateral impacted cerumen     last assessed 05/30/2013    Capsulitis     last assessed 04/26/2016    Cellulitis of finger 01/13/2012    Chronic kidney disease     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     Closed fracture of one or more phalanges of foot 01/06/2009    Constipation     Deformity     left and right foot and ankle ,right hand    Depression     Epilepsy ([facility] [address] [facility]     Erythrocytosis     Hemiplegia affecting dominant side ([facility] [address] [facility]     Hypertension     Hypoglycemia 11/08/2011    Hypokalemia     last assessed 05/30/2013    Mental retardation     Mood disorder ([facility] [address] [facility]     Nephrocalcinosis     Seizures ([facility] )     2001       Past Surgical History:   Procedure Laterality Date    BRAIN SURGERY      OK COLONOSCOPY FLX DX W/COLLJ SPEC WHEN PFRMD N/A 2/12/2016    Procedure: COLONOSCOPY;  Surgeon: Alyx Alonzo MD;  Location: [place] [address] GI LAB; Service: Gastroenterology       Family History   Problem Relation Age of Onset    Emphysema Mother     Nephrolithiasis Mother     Heart attack Father         acute MI    Hypertension Father     Nephrolithiasis Father     Nephrolithiasis Brother        Social History     Occupational History    Not on file  Social History Main Topics    Smoking status: Never Smoker    Smokeless tobacco: Never Used    Alcohol use No    Drug use: No    Sexual activity: Not on file         Current Outpatient Prescriptions:     acetaminophen (TYLENOL) 325 mg tablet, Take 1 tablet (325 mg total) by mouth every 4 (four) hours as needed for mild pain, Disp: 30 tablet, Rfl: 0    atorvastatin (LIPITOR) 10 mg tablet, TAKE ONE TABLET BY MOUTH DAILY AT 8PM, Disp: 90 tablet, Rfl: 1    lamoTRIgine (LaMICtal) 150 MG tablet, Take 150 mg by mouth 2 (two) times a day , Disp: , Rfl:     lamoTRIgine (LAMICTAL) 25 mg tablet, Take by mouth, Disp: , Rfl:     nebivolol (BYSTOLIC) 2 5 mg tablet, Take 2 5 mg by mouth daily  , Disp: , Rfl:     QUEtiapine (SEROquel) 50 mg tablet, , Disp: , Rfl:     REGULOID 58 6 % powder, TAKE 1 TEASPOONFUL MIXED WITH 8 OUNCES OF WATER ORALLY EVERY DAY AT 8AM, Disp: 1040 g, Rfl: 5    senna (SENOKOT) 8 6 MG tablet, Take 1 tablet (8 6 mg total) by mouth daily, Disp: 30 tablet, Rfl: 5    sertraline (ZOLOFT) 50 mg tablet, Take by mouth daily, Disp: , Rfl:     Allergies   Allergen Reactions    Asa [Aspirin] Other (See Comments)     Reaction Date: 25Aug2008;   unknown       Physical Exam:    /80 (BP Location: Left arm, Patient Position: Sitting, Cuff Size: Standard)   Pulse 66   Resp 18   Ht 5' 7 5" (1 715 m)   Wt 89 7 kg (197 lb 12 8 oz)   BMI 30 52 kg/m²     Constitutional:normal, well developed, well nourished, alert, in no distress and non-toxic and no overt pain behavior    Eyes:anicteric  HEENT:grossly intact  Neck:supple, symmetric, trachea midline and no masses   Pulmonary:even and unlabored  Cardiovascular:No edema or pitting edema present  Skin:Normal without rashes or lesions and well hydrated  Psychiatric:Mood and affect appropriate  Neurologic:Cranial Nerves II-XII grossly intact  Musculoskeletal:antalgic

## 2018-12-12 NOTE — LETTER
December 12, 2018     Shivani Mann, [address]    Patient: Leslee Lamb   YOB: 1965   Date of Visit: 12/12/2018       Dear Dr Marysol Ritchie: Thank you for referring Eva Gamboa to me for evaluation  Below are my notes for this consultation  If you have questions, please do not hesitate to call me  I look forward to following your patient along with you  Sincerely,        Tere Mujica MD        CC: No Recipients  Tere Mujica MD  12/12/2018 11:31 AM  Sign at close encounter  Pain Medicine Follow-Up Note    Assessment:  1  Chronic pain syndrome    2  Chronic left-sided low back pain with left-sided sciatica    3  Lumbar radiculopathy    4  Spinal stenosis of lumbar region with neurogenic claudication        Plan:  My impressions and treatment recommendations were discussed in detail with the patient who verbalized understanding and had no further questions  At today's visit, the patient's current pain score is 4/10 on the verbal numerical pain rating scale  The patient's advocate that did accompany him at today's visit states that the physical therapists are stating that he has completed his level of physical therapy and they do not think additional physical therapy would be helpful  As such, I feel reasonable to discontinue physical therapy at this time  The patient does feel as though he is still benefitting from home exercises, so I felt it reasonable to continue him with home exercises as discussed with his physical therapists as long as they continue to help him  At this point in time, the patient does not need to see me on a regular basis  I did discuss with the patient and the patient's advocate that if his pain worsens, I would be happy to see him in the future  Discharge instructions were provided  I personally saw and examined the patient and I agree with the above discussed plan of care      History of Present Illness:    Phil Reza is a 48 y o  male who presents to [facility] for interval re-evaluation of the above stated pain complaints  The patient has a past medical and chronic pain history as outlined in the assessment section  He was last seen on November 13, 2018 at which time he was asked to continue physical therapy as well as home exercises  At today's office visit, the patient's pain score is 4/10 on the verbal numerical pain rating scale  The patient states that his pain is worse in the morning  His pain is occasional in nature he reports the quality of his pain as sharp    He reports that he is getting good relief with both physical therapy as well as home exercises  The patient's advocate that accompanied him at today's visit states that the physical therapist her stating that he has achieved his goal of physical therapy and would like to discontinue it  The patient does report that home exercises continue to give him clinical benefit and he would like to continue his home exercises  As such, I feel reasonable to do so  Other than as stated above, the patient denies any interval changes in medications, medical condition, mental condition, symptoms, or allergies since the last office visit  Review of Systems:    Review of Systems   Respiratory: Negative for shortness of breath  Cardiovascular: Negative for chest pain  Gastrointestinal: Negative for constipation, diarrhea, nausea and vomiting  Musculoskeletal: Negative for arthralgias, gait problem, joint swelling and myalgias  Skin: Negative for rash  Neurological: Negative for dizziness, seizures and weakness  All other systems reviewed and are negative          Patient Active Problem List   Diagnosis    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    Acquired deformity of right ankle and foot    Ingrown nail    Pain in both feet    Constipation    Cerebral palsy ([address] )    Renal cyst  Sciatica of left side    Class 1 obesity with body mass index (BMI) of 30 0 to 30 9 in adult    Hyperlipidemia    Lumbar radiculopathy    Spinal stenosis of lumbar region    Chronic left-sided low back pain with left-sided sciatica    Chronic pain syndrome    Bilateral impacted cerumen    Referral of patient       Past Medical History:   Diagnosis Date    Ambulatory dysfunction     Anxiety     Bilateral impacted cerumen     last assessed 05/30/2013    Capsulitis     last assessed 04/26/2016    Cellulitis of finger 01/13/2012    Chronic kidney disease     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     Closed fracture of one or more phalanges of foot 01/06/2009    Constipation     Deformity     left and right foot and ankle ,right hand    Depression     Epilepsy ([address]     Erythrocytosis     Hemiplegia affecting dominant side ([facility] [address] [facility]     Hypertension     Hypoglycemia 11/08/2011    Hypokalemia     last assessed 05/30/2013    Mental retardation     Mood disorder ([facility] [address] [facility]     Nephrocalcinosis     Seizures ([facility] [address] )     2001       Past Surgical History:   Procedure Laterality Date    BRAIN SURGERY      ID COLONOSCOPY FLX DX W/COLLJ SPEC WHEN PFRMD N/A 2/12/2016    Procedure: COLONOSCOPY;  Surgeon: Antwan Ríos MD;  Location: [facility]; Service: Gastroenterology       Family History   Problem Relation Age of Onset    Emphysema Mother     Nephrolithiasis Mother     Heart attack Father         acute MI    Hypertension Father     Nephrolithiasis Father     Nephrolithiasis Brother        Social History     Occupational History    Not on file       Social History Main Topics    Smoking status: Never Smoker    Smokeless tobacco: Never Used    Alcohol use No    Drug use: No    Sexual activity: Not on file         Current Outpatient Prescriptions:     acetaminophen (TYLENOL) 325 mg tablet, Take 1 tablet (325 mg total) by mouth every 4 (four) hours as needed for mild pain, Disp: 30 tablet, Rfl: 0    atorvastatin (LIPITOR) 10 mg tablet, TAKE ONE TABLET BY MOUTH DAILY AT 8PM, Disp: 90 tablet, Rfl: 1    lamoTRIgine (LaMICtal) 150 MG tablet, Take 150 mg by mouth 2 (two) times a day , Disp: , Rfl:     lamoTRIgine (LAMICTAL) 25 mg tablet, Take by mouth, Disp: , Rfl:     nebivolol (BYSTOLIC) 2 5 mg tablet, Take 2 5 mg by mouth daily  , Disp: , Rfl:     QUEtiapine (SEROquel) 50 mg tablet, , Disp: , Rfl:     REGULOID 58 6 % powder, TAKE 1 TEASPOONFUL MIXED WITH 8 OUNCES OF WATER ORALLY EVERY DAY AT 8AM, Disp: 1040 g, Rfl: 5    senna (SENOKOT) 8 6 MG tablet, Take 1 tablet (8 6 mg total) by mouth daily, Disp: 30 tablet, Rfl: 5    sertraline (ZOLOFT) 50 mg tablet, Take by mouth daily, Disp: , Rfl:     Allergies   Allergen Reactions    Asa [Aspirin] Other (See Comments)     Reaction Date: 25Aug2008;   unknown       Physical Exam:    /80 (BP Location: Left arm, Patient Position: Sitting, Cuff Size: Standard)   Pulse 66   Resp 18   Ht 5' 7 5" (1 715 m)   Wt 89 7 kg (197 lb 12 8 oz)   BMI 30 52 kg/m²      Constitutional:normal, well developed, well nourished, alert, in no distress and non-toxic and no overt pain behavior    Eyes:anicteric  HEENT:grossly intact  Neck:supple, symmetric, trachea midline and no masses   Pulmonary:even and unlabored  Cardiovascular:No edema or pitting edema present  Skin:Normal without rashes or lesions and well hydrated  Psychiatric:Mood and affect appropriate  Neurologic:Cranial Nerves II-XII grossly intact  Musculoskeletal:antalgic

## 2018-12-13 ENCOUNTER — APPOINTMENT (OUTPATIENT)
Dept: PHYSICAL THERAPY | Facility: CLINIC | Age: 53
End: 2018-12-13
Payer: MEDICARE

## 2018-12-13 NOTE — PROGRESS NOTES
I have reviewed the notes, assessments, and/or procedures performed by the resident, I concur with her/his documentation of Leslee Lamb

## 2018-12-17 ENCOUNTER — APPOINTMENT (OUTPATIENT)
Dept: PHYSICAL THERAPY | Facility: CLINIC | Age: 53
End: 2018-12-17
Payer: MEDICARE

## 2018-12-17 ENCOUNTER — OFFICE VISIT (OUTPATIENT)
Dept: FAMILY MEDICINE CLINIC | Facility: CLINIC | Age: 53
End: 2018-12-17
Payer: MEDICARE

## 2018-12-17 VITALS
TEMPERATURE: 98 F | OXYGEN SATURATION: 96 % | SYSTOLIC BLOOD PRESSURE: 118 MMHG | HEART RATE: 58 BPM | RESPIRATION RATE: 18 BRPM | DIASTOLIC BLOOD PRESSURE: 80 MMHG | BODY MASS INDEX: 29.94 KG/M2 | WEIGHT: 194 LBS

## 2018-12-17 DIAGNOSIS — H61.22 IMPACTED CERUMEN OF LEFT EAR: Primary | ICD-10-CM

## 2018-12-17 PROCEDURE — 99213 OFFICE O/P EST LOW 20 MIN: CPT | Performed by: NURSE PRACTITIONER

## 2018-12-17 NOTE — PROGRESS NOTES
Assessment/Plan:  1  Follow medication initiation directions  2  Follow up on Thursday for ear flush  3  Follow up if condition changes or worsens     Diagnoses and all orders for this visit:    Impacted cerumen of left ear  -     carbamide peroxide (DEBROX) 6 5 % otic solution; Administer 5 drops into the left ear 2 (two) times a day Administer 8:00 a m  and 8:00 p m  Rodojavid Lazar Discontinue on 12/20/2018 at 8:00 a m           Subjective:      Patient ID: Alexis Nunez is a 48 y o  male  60-year-old male presents for left ear wax cleaning  Was seen a couple weeks ago  Some ear wax removed  Was also seen at the audiologist an audiologist reports that the tympanic membrane was blocked with ear wax  Patient and caregiver have returned for a 2nd evaluation  Denies any medications  Patient has some hearing loss in the ears well  The following portions of the patient's history were reviewed and updated as appropriate: allergies and current medications  Review of Systems   Constitutional: Negative  HENT: Positive for hearing loss  Objective:      /80 (BP Location: Left arm, Patient Position: Sitting, Cuff Size: Large)   Pulse 58   Temp 98 °F (36 7 °C) (Tympanic)   Resp 18   Wt 88 kg (194 lb)   SpO2 96%   BMI 29 94 kg/m²          Physical Exam   Constitutional: He appears well-developed and well-nourished  HENT:   Head: Microcephalic  Right Ear: External ear normal    Left Ear: A foreign body (Cerumen  ) is present  Decreased hearing is noted

## 2018-12-19 ENCOUNTER — APPOINTMENT (OUTPATIENT)
Dept: PHYSICAL THERAPY | Facility: CLINIC | Age: 53
End: 2018-12-19
Payer: MEDICARE

## 2018-12-20 ENCOUNTER — OFFICE VISIT (OUTPATIENT)
Dept: FAMILY MEDICINE CLINIC | Facility: CLINIC | Age: 53
End: 2018-12-20
Payer: MEDICARE

## 2018-12-20 VITALS
SYSTOLIC BLOOD PRESSURE: 122 MMHG | BODY MASS INDEX: 29.4 KG/M2 | HEIGHT: 68 IN | OXYGEN SATURATION: 98 % | DIASTOLIC BLOOD PRESSURE: 80 MMHG | WEIGHT: 194 LBS | HEART RATE: 60 BPM | RESPIRATION RATE: 18 BRPM

## 2018-12-20 DIAGNOSIS — H61.22 IMPACTED CERUMEN OF LEFT EAR: Primary | ICD-10-CM

## 2018-12-20 PROCEDURE — 99213 OFFICE O/P EST LOW 20 MIN: CPT | Performed by: NURSE PRACTITIONER

## 2018-12-20 NOTE — PROGRESS NOTES
Assessment/Plan:  1  Ear wax removal was successful  2  Follow-up condition changes or worsens       Diagnoses and all orders for this visit:    Impacted cerumen of left ear          Subjective:      Patient ID: Bee Quinones is a 48 y o  male  14-year-old male presents for cerumen impaction evaluation  Has been using Debrox  Reports he is little bit better  The following portions of the patient's history were reviewed and updated as appropriate: allergies and current medications  Review of Systems   Constitutional: Negative  HENT: Positive for hearing loss  Respiratory: Negative  Cardiovascular: Negative  Objective:      /80   Pulse 60   Resp 18   Ht 5' 7 5" (1 715 m)   Wt 88 kg (194 lb)   SpO2 98%   BMI 29 94 kg/m²          Physical Exam   Constitutional: He appears well-developed and well-nourished  HENT:   Head: Normocephalic and atraumatic     Right Ear: External ear normal    Left Ear: Left ear foreign body: Cerumen     Nose: Nose normal    Mouth/Throat: Oropharynx is clear and moist    Pulmonary/Chest: Effort normal and breath sounds normal

## 2019-01-02 ENCOUNTER — OFFICE VISIT (OUTPATIENT)
Dept: PODIATRY | Facility: CLINIC | Age: 54
End: 2019-01-02
Payer: MEDICARE

## 2019-01-02 VITALS — WEIGHT: 194 LBS | HEIGHT: 68 IN | BODY MASS INDEX: 29.4 KG/M2

## 2019-01-02 DIAGNOSIS — L85.3 XEROSIS OF SKIN: Primary | ICD-10-CM

## 2019-01-02 DIAGNOSIS — B35.1 ONYCHOMYCOSIS: ICD-10-CM

## 2019-01-02 DIAGNOSIS — M79.671 PAIN IN BOTH FEET: ICD-10-CM

## 2019-01-02 DIAGNOSIS — M79.672 PAIN IN BOTH FEET: ICD-10-CM

## 2019-01-02 PROCEDURE — 99213 OFFICE O/P EST LOW 20 MIN: CPT | Performed by: PODIATRIST

## 2019-01-02 NOTE — PROGRESS NOTES
Assessment/Plan:    Aseptic debridement and planning of nails x10 and manually and mechanically  Debrided hyperkeratotic lesions bilateral    Rx Amlactin, Lac-Hydrin lotion 120 g applied to feet b i d  8:00 a m  8:00 p m  Follow-up 2 months     Diagnoses and all orders for this visit:    Xerosis of skin    Pain in both feet    Onychomycosis          Subjective:      Patient ID: Serena Roberts is a 48 y o  male  Painful has been having some dryness and cracking of plantar skin bilateral has not noticed any redness or signs of infection  Has tried over-the-counter creams  Patient does get recurrent ingrown nails is not noticed any redness or significant pain        Past Medical History:   Diagnosis Date    Ambulatory dysfunction     Anxiety     Bilateral impacted cerumen     last assessed 05/30/2013    Capsulitis     last assessed 04/26/2016    Cellulitis of finger 01/13/2012    Chronic kidney disease     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     Closed fracture of one or more phalanges of foot 01/06/2009    Constipation     Deformity     left and right foot and ankle ,right hand    Depression     [facility]ile[facility] [address] [facility]     Erythrocytosis     Hemiplegia affecting dominant side ([facility]     Hypertension     Hypoglycemia 11/08/2011    Hypokalemia     last assessed 05/30/2013    Mental retardation     Mood disorder (HCC)     Nephrocalcinosis     Seizures ([facility] [address] )     2001         Current Outpatient Prescriptions:     acetaminophen (TYLENOL) 325 mg tablet, Take 1 tablet (325 mg total) by mouth every 4 (four) hours as needed for mild pain, Disp: 30 tablet, Rfl: 0    atorvastatin (LIPITOR) 10 mg tablet, TAKE ONE TABLET BY MOUTH DAILY AT 8PM, Disp: 90 tablet, Rfl: 1    carbamide peroxide (DEBROX) 6 5 % otic solution, Administer 5 drops into the left ear 2 (two) times a day Administer 8:00 a m  and 8:00 p m  Eva Ramirez   Discontinue on 12/20/2018 at 8:00 a m , Disp: 15 mL, Rfl: 0    lamoTRIgine (LaMICtal) 150 MG tablet, Take 150 mg by mouth 2 (two) times a day , Disp: , Rfl:     lamoTRIgine (LAMICTAL) 25 mg tablet, Take by mouth, Disp: , Rfl:     nebivolol (BYSTOLIC) 2 5 mg tablet, Take 2 5 mg by mouth daily  , Disp: , Rfl:     QUEtiapine (SEROquel) 50 mg tablet, , Disp: , Rfl:     REGULOID 58 6 % powder, TAKE 1 TEASPOONFUL MIXED WITH 8 OUNCES OF WATER ORALLY EVERY DAY AT 8AM, Disp: 1040 g, Rfl: 5    senna (SENOKOT) 8 6 MG tablet, Take 1 tablet (8 6 mg total) by mouth daily, Disp: 30 tablet, Rfl: 5    sertraline (ZOLOFT) 50 mg tablet, Take by mouth daily, Disp: , Rfl:     Past Surgical History:   Procedure Laterality Date    BRAIN SURGERY      [place] COLONOSCOPY FLX DX W/COLLJ SPEC WHEN PFRMD N/A 2/12/2016    Procedure: COLONOSCOPY;  Surgeon: Junior Seymour MD;  Location: [place] [address] GI LAB; Service: Gastroenterology       Allergies   Allergen Reactions    Asa [Aspirin] Other (See Comments)     Reaction Date: 25Aug2008;   unknown       Patient Active Problem List   Diagnosis    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    Acquired deformity of right ankle and foot    Ingrown nail    Pain in both feet    Constipation    Cerebral palsy ([address]    Renal cyst    Sciatica of left side    Class 1 obesity with body mass index (BMI) of 30 0 to 30 9 in adult    Hyperlipidemia    Lumbar radiculopathy    Spinal stenosis of lumbar region    Chronic left-sided low back pain with left-sided sciatica    Chronic pain syndrome    Impacted cerumen of left ear    Referral of patient       Review of Systems      Objective:  Patient's shoes and socks were removed, feet examined        5' 7 5" (1 715 m)   Wt 88 kg (194 lb)   BMI 29 94 kg/m²       Foot Exam  Diabetic Foot Exam     Physical Exam  Assessment/Plan:  Aseptic debridement and planning of nails x10 and manually and mechanically    Discussed permanent removal of ingrown if recurrent  Patient call if any signs of infection  Daily foot checks monitor for signs of infection  Follow-up 3 months     Diagnoses and all orders for this visit:    Onychomycosis    Ingrown nail    Pain in both feet          Subjective:      Patient ID: Sameera Hull is a 48 y o  male  Patient gets recurrent ingrown nails bilateral great toes  Aids have not noticed any redness or signs of infection  Patient does have thick dystrophic nails  Past Medical History:   Diagnosis Date    Ambulatory dysfunction     Anxiety     Chronic kidney disease     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     Constipation     Deformity     left and right foot and ankle ,right hand    Depression     Erythrocytosis     Hemiplegia affecting dominant side (HCC)     Hypertension     Mood disorder (HCC)     Nephrocalcinosis     Seizures ([facility] [address] )     2001         Current Outpatient Prescriptions:     atorvastatin (LIPITOR) 10 mg tablet, Take 1 tablet (10 mg total) by mouth daily, Disp: 30 tablet, Rfl: 0    clonazePAM (KlonoPIN) 0 5 mg tablet, Take 0 25 mg by mouth 2 (two) times a day , Disp: , Rfl:     lamoTRIgine (LaMICtal) 150 MG tablet, Take 150 mg by mouth 2 (two) times a day , Disp: , Rfl:     nebivolol (BYSTOLIC) 2 5 mg tablet, Take 2 5 mg by mouth daily  , Disp: , Rfl:     REGULOID 58 6 % powder, TAKE 1 TEASPOONFUL MIXED WITH 8 OUNCES OF WATER ORALLY EVERY DAY AT 8AM, Disp: 425 g, Rfl: 0    risperiDONE (RisperDAL) 0 25 mg tablet, Take 0 25 mg by mouth 2 (two) times a day , Disp: , Rfl:     senna (SENOKOT) 8 6 MG tablet, Take 1 tablet (8 6 mg total) by mouth daily, Disp: 30 tablet, Rfl: 5    UNABLE TO FIND, Take 5 mL by mouth daily  Med Name: reguloid, Disp: , Rfl:     Past Surgical History:   Procedure Laterality Date    BRAIN SURGERY      AL COLONOSCOPY FLX DX W/COLLJ SPEC WHEN PFRMD N/A 2/12/2016    Procedure: COLONOSCOPY;  Surgeon: Nidhi Thompson MD;  Location: [place] [address] GI LAB;   Service: Gastroenterology       Allergies   Allergen Reactions    Asa [Aspirin] Other (See Comments)     unknown Patient Active Problem List   Diagnosis    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    Acquired deformity of right ankle and foot    Ingrown nail    Pain in both feet    Constipation       Review of Systems   Constitutional: Negative  HENT: Negative  Eyes: Negative  Respiratory: Negative  Cardiovascular: Negative  Gastrointestinal: Negative  Endocrine: Negative  Genitourinary: Negative  Musculoskeletal: Negative  Skin: Negative  Allergic/Immunologic: Negative  Neurological: Negative  Hematological: Negative  Psychiatric/Behavioral: Negative  Objective:      Ht 5' 6" (1 676 m)   Wt 83 kg (183 lb)   BMI 29 54 kg/m²          Physical Exam    Constitutional: no acute distress, well appearing and well nourished  Orthopedic/Biomechanical: abnormal foot type-- and-- hammertoe(s)  Left Foot: Appearance: Normal except as noted: a deformity-- and-- pes planus  Great toe deformities include a bunion  Second toe deformities include hammer toe  Forth toe deformities include hammer toe  Fifth toe deformities include hammer toe  Evaluation of the great toe nail demonstrates paronychia-- and-- an ingrown nail  Tenderness: distal first metatarsal  Hallux limitus first MPJ bilateral,, mild pain on end range of motion, no hypermobility first ray muscle strength is 5  Special Tests: Left hallux ingrown tibial border no erythema no exudate no signs of infection mild pain on palpation  Right Foot: Appearance: Normal except as noted: a deformity-- and-- pes planus  Great toe deformities include a bunion  Second toe deformities include hammer toe  Third toe deformities include hammer toe  Forth toe deformities include hammer toe  Fifth toe deformities include hammer toe  Evaluation of the great toe nail demonstrates paronychia-- and-- an ingrown nail  Tenderness: distal first metatarsal  Hammertoes 2 through 5 bilateral  Plantarflexed metatarsal heads   Right second MPJ plantarflexed metatarsal capsulitis second positive edema negative erythema negative signs of infection  Special Tests: I    Neurological Exam: performed  Light touch was intact bilaterally  Vibratory sensation was intact bilaterally  Response to monofilament test was intact bilaterally  Vascular Exam: performed Dorsalis pedis pulses were 1/4 bilaterally  Posterior tibial pulses were 1/4 bilaterally  Capillary refill time was greater than 3 seconds bilaterally  Rigidly contracted hammertoes 2 through 5 bilateral       Ingrown bilateral hallux  No swelling, mild pain on palpation right worse than left  Nails are thickened dystrophic    No signs of infection  Moderate xerosis plantar feet bilateral some peeling and cracking no signs of tinea pedis or infection

## 2019-01-19 NOTE — RESULT NOTES
Discharge instructions were given to the patient by KVNG Ziegler. The patient left the Emergency Department ambulatory, alert and oriented and in no acute distress with 0 prescriptions. The patient was encouraged to call or return to the ED for worsening issues or problems and was encouraged to schedule a follow up appointment for continuing care. The patient verbalized understanding of discharge instructions and prescriptions, all questions were answered. The patient has no further concerns at this time. Verified Results  (1) CBC/PLT/DIFF [id number] 08:33AM Eric judoaspen     Test Name Result Flag Reference   WBC 6 5 x10E3/uL  3 4-10 8   RBC 5 89 x10E6/uL H 4 14-5 80   Hemoglobin 17 7 g/dL  12 6-17 7   Hematocrit 50 7 %  37 5-51 0   MCV 86 fL  79-97   MCH 30 1 pg  26 6-33 0   MCHC 34 9 g/dL  31 5-35 7   RDW 13 2 %  12 3-15 4   Platelets 591 M79M0/GR  150-379   Neutrophils 68 %     Lymphs 24 %     Monocytes 7 %     Eos 1 %     Basos 0 %     Neutrophils (Absolute) 4 5 x10E3/uL  1 4-7 0   Lymphs (Absolute) 1 6 x10E3/uL  0 7-3 1   Monocytes(Absolute) 0 4 x10E3/uL  0 1-0 9   Eos (Absolute) 0 1 x10E3/uL  0 0-0 4   Baso (Absolute) 0 0 x10E3/uL  0 0-0 2   Immature Granulocytes 0 %     Immature Grans (Abs) 0 0 x10E3/uL  0 0-0 1     [facility]) Junior [place] CMP14 Default [id number] 08:33AM [employer]     Test Name Result Flag Reference   Junior Roel [id number] Default Comment     A hand-written panel/profile was received from your office  In  accordance with the [facility] Ambiguous Test Code Policy dated July 0914, we have completed your order by using the closest currently  or formerly recognized AMA panel  We have assigned Comprehensive  Metabolic Panel (14), Test Code #700957 to this request   If this  is not the testing you wished to receive on this specimen, please  contact the [address] [place] [address] Client Inquiry/Technical Services Department  to clarify the test order  We appreciate your business  [facility]) Junior Sev[place] LP Default [id number] 08:33AM [employer]     Test Name Result Flag Reference   David Royalrev LP Default Comment     A hand-written panel/profile was received from your office  In  accordance with the [facility] Ambiguous Test Code Policy dated July 6817, we have completed your order by using the closest currently  or formerly recognized AMA panel    We have assigned Lipid Panel,  Test Code #851279 to this request  If this is not the testing you  wished to receive on this specimen, please contact the [address] [place] [address]  Client Inquiry/Technical Services Department to clarify the test  order  We appreciate your business  [facility]) Cardiovascular Risk Assessment [id number] 08:33AM [facility]     Test Name Result Flag Reference   Interpretation Note     Supplement report is available  PDF Image          (1) COMPREHENSIVE METABOLIC PANEL [id number] 23:61QO [facility]     Test Name Result Flag Reference   Glucose, Serum 86 mg/dL  65-99   BUN 22 mg/dL  6-24   Creatinine, Serum 1 28 mg/dL H 0 76-1 27   BUN/Creatinine Ratio 17  9-20   Sodium, Serum 140 mmol/L  134-144   Potassium, Serum 4 2 mmol/L  3 5-5 2   Chloride, Serum 100 mmol/L     Carbon Dioxide, Total 25 mmol/L  18-29   Calcium, Serum 9 3 mg/dL  8 7-10 2   Protein, Total, Serum 7 6 g/dL  6 0-8 5   Albumin, Serum 4 7 g/dL  3 5-5 5   Globulin, Total 2 9 g/dL  1 5-4 5   A/G Ratio 1 6  1 2-2 2   Bilirubin, Total 0 7 mg/dL  0 0-1 2   Alkaline Phosphatase, S 93 IU/L     AST (SGOT) 22 IU/L  0-40   ALT (SGPT) 23 IU/L  0-44   eGFR If NonAfricn Am 64 mL/min/1 73  >59   eGFR If Africn Am 74 mL/min/1 73  >59     (LC) Lipid Panel [id number] 08:33AM [facility]     Test Name Result Flag Reference   Cholesterol, Total 164 mg/dL  100-199   Triglycerides 66 mg/dL  0-149   HDL Cholesterol 57 mg/dL  >39   VLDL Cholesterol Derek 13 mg/dL  5-40   LDL Cholesterol Calc 94 mg/dL  0-99       Plan  Mixed hyperlipidemia    · Atorvastatin Calcium 10 MG Oral Tablet (Lipitor); TAKE ONE TABLET ORALLY  DAILY AT 8PM

## 2019-01-24 DIAGNOSIS — G40.802 OTHER EPILEPSY WITHOUT STATUS EPILEPTICUS, NOT INTRACTABLE (HCC): Primary | ICD-10-CM

## 2019-01-30 ENCOUNTER — TELEPHONE (OUTPATIENT)
Dept: FAMILY MEDICINE CLINIC | Facility: CLINIC | Age: 54
End: 2019-01-30

## 2019-01-30 RX ORDER — LAMOTRIGINE 150 MG/1
150 TABLET ORAL 2 TIMES DAILY
Qty: 60 TABLET | Refills: 3 | Status: SHIPPED | OUTPATIENT
Start: 2019-01-30 | End: 2019-05-13 | Stop reason: SDUPTHER

## 2019-01-30 RX ORDER — LAMOTRIGINE 25 MG/1
25 TABLET ORAL
Qty: 30 TABLET | Refills: 3 | Status: SHIPPED | OUTPATIENT
Start: 2019-01-30 | End: 2019-05-13 | Stop reason: SDUPTHER

## 2019-01-30 NOTE — TELEPHONE ENCOUNTER
Pharmacy called and stated that the Lamotrigine is not hoe Dr Gomez Erm had prescribed? ? I suggested they call group home and I also Left message to call to get order correct

## 2019-01-30 NOTE — TELEPHONE ENCOUNTER
I called and spoke with pharmacy and group home  The order is to read as follows: 25 mg two tablets in am and 150 mg twice a day  Morning total dose  is 200 mg and pm dose is 150 mg   The number for the pharmacy is [phone]

## 2019-02-21 DIAGNOSIS — E78.2 MIXED HYPERLIPIDEMIA: ICD-10-CM

## 2019-02-21 DIAGNOSIS — K59.00 CONSTIPATION, UNSPECIFIED CONSTIPATION TYPE: ICD-10-CM

## 2019-02-25 RX ORDER — SENNA PLUS 8.6 MG/1
1 TABLET ORAL DAILY
Qty: 90 TABLET | Refills: 2 | Status: SHIPPED | OUTPATIENT
Start: 2019-02-25 | End: 2019-05-21 | Stop reason: SDUPTHER

## 2019-02-25 RX ORDER — ATORVASTATIN CALCIUM 10 MG/1
10 TABLET, FILM COATED ORAL DAILY
Qty: 90 TABLET | Refills: 2 | Status: SHIPPED | OUTPATIENT
Start: 2019-02-25 | End: 2019-05-21 | Stop reason: SDUPTHER

## 2019-04-02 ENCOUNTER — OFFICE VISIT (OUTPATIENT)
Dept: PODIATRY | Facility: CLINIC | Age: 54
End: 2019-04-02
Payer: MEDICARE

## 2019-04-02 VITALS
WEIGHT: 194 LBS | BODY MASS INDEX: 29.4 KG/M2 | HEIGHT: 68 IN | SYSTOLIC BLOOD PRESSURE: 129 MMHG | DIASTOLIC BLOOD PRESSURE: 76 MMHG

## 2019-04-02 DIAGNOSIS — R23.4 FISSURE IN SKIN OF FOOT: ICD-10-CM

## 2019-04-02 DIAGNOSIS — L85.3 XEROSIS OF SKIN: ICD-10-CM

## 2019-04-02 DIAGNOSIS — M21.961 ACQUIRED DEFORMITY OF RIGHT ANKLE AND FOOT: Primary | ICD-10-CM

## 2019-04-02 DIAGNOSIS — M21.962 ACQUIRED DEFORMITY OF LEFT ANKLE AND FOOT: ICD-10-CM

## 2019-04-02 PROCEDURE — 99213 OFFICE O/P EST LOW 20 MIN: CPT | Performed by: PODIATRIST

## 2019-05-13 DIAGNOSIS — G40.802 OTHER EPILEPSY WITHOUT STATUS EPILEPTICUS, NOT INTRACTABLE (HCC): ICD-10-CM

## 2019-05-20 RX ORDER — LAMOTRIGINE 25 MG/1
TABLET ORAL
Qty: 60 TABLET | Refills: 2 | Status: SHIPPED | OUTPATIENT
Start: 2019-05-20 | End: 2019-08-13 | Stop reason: SDUPTHER

## 2019-05-20 RX ORDER — LAMOTRIGINE 150 MG/1
TABLET ORAL
Qty: 60 TABLET | Refills: 2 | Status: SHIPPED | OUTPATIENT
Start: 2019-05-20 | End: 2019-08-13 | Stop reason: SDUPTHER

## 2019-05-21 ENCOUNTER — OFFICE VISIT (OUTPATIENT)
Dept: FAMILY MEDICINE CLINIC | Facility: CLINIC | Age: 54
End: 2019-05-21
Payer: MEDICARE

## 2019-05-21 VITALS
RESPIRATION RATE: 16 BRPM | DIASTOLIC BLOOD PRESSURE: 62 MMHG | SYSTOLIC BLOOD PRESSURE: 116 MMHG | BODY MASS INDEX: 28.39 KG/M2 | OXYGEN SATURATION: 98 % | WEIGHT: 184 LBS | HEART RATE: 69 BPM

## 2019-05-21 DIAGNOSIS — I10 ESSENTIAL HYPERTENSION: Primary | ICD-10-CM

## 2019-05-21 DIAGNOSIS — K59.00 CONSTIPATION, UNSPECIFIED CONSTIPATION TYPE: ICD-10-CM

## 2019-05-21 DIAGNOSIS — E78.2 MIXED HYPERLIPIDEMIA: ICD-10-CM

## 2019-05-21 PROCEDURE — 99212 OFFICE O/P EST SF 10 MIN: CPT | Performed by: FAMILY MEDICINE

## 2019-05-21 RX ORDER — NEBIVOLOL 2.5 MG/1
2.5 TABLET ORAL DAILY
Qty: 30 TABLET | Refills: 5 | Status: SHIPPED | OUTPATIENT
Start: 2019-05-21 | End: 2019-12-11 | Stop reason: SDUPTHER

## 2019-05-21 RX ORDER — ATORVASTATIN CALCIUM 10 MG/1
10 TABLET, FILM COATED ORAL DAILY
Qty: 90 TABLET | Refills: 5 | Status: SHIPPED | OUTPATIENT
Start: 2019-05-21 | End: 2019-12-11 | Stop reason: SDUPTHER

## 2019-05-21 RX ORDER — SENNA PLUS 8.6 MG/1
1 TABLET ORAL DAILY
Qty: 90 TABLET | Refills: 5 | Status: SHIPPED | OUTPATIENT
Start: 2019-05-21 | End: 2019-12-19 | Stop reason: SDUPTHER

## 2019-05-21 RX ORDER — AMMONIUM LACTATE 12 G/100G
LOTION TOPICAL
COMMUNITY
Start: 2019-05-16 | End: 2019-07-12 | Stop reason: SDUPTHER

## 2019-06-10 ENCOUNTER — TELEPHONE (OUTPATIENT)
Dept: OTHER | Facility: OTHER | Age: 54
End: 2019-06-10

## 2019-06-10 DIAGNOSIS — M79.606 PAIN OF LOWER EXTREMITY, UNSPECIFIED LATERALITY: Primary | ICD-10-CM

## 2019-06-10 RX ORDER — ACETAMINOPHEN 325 MG/1
325 TABLET ORAL EVERY 6 HOURS PRN
Qty: 30 TABLET | Refills: 0 | Status: SHIPPED | OUTPATIENT
Start: 2019-06-10 | End: 2020-01-27 | Stop reason: SDUPTHER

## 2019-06-11 NOTE — TELEPHONE ENCOUNTER
[facility] Page @[phone]  Actual Page:  [phone]/ Dayna/ Marcello 2 / PT  [place] [phone] / medication verification     Informed caller to call service back if no response within 20 minutes

## 2019-06-25 ENCOUNTER — OFFICE VISIT (OUTPATIENT)
Dept: PODIATRY | Facility: CLINIC | Age: 54
End: 2019-06-25
Payer: MEDICARE

## 2019-06-25 VITALS
WEIGHT: 184 LBS | RESPIRATION RATE: 16 BRPM | SYSTOLIC BLOOD PRESSURE: 116 MMHG | BODY MASS INDEX: 27.89 KG/M2 | HEART RATE: 69 BPM | HEIGHT: 68 IN | DIASTOLIC BLOOD PRESSURE: 62 MMHG

## 2019-06-25 DIAGNOSIS — M79.672 PAIN IN BOTH FEET: ICD-10-CM

## 2019-06-25 DIAGNOSIS — M79.671 PAIN IN BOTH FEET: ICD-10-CM

## 2019-06-25 DIAGNOSIS — L60.0 INGROWN NAIL: Primary | ICD-10-CM

## 2019-06-25 DIAGNOSIS — B35.1 ONYCHOMYCOSIS: ICD-10-CM

## 2019-06-25 PROCEDURE — 99212 OFFICE O/P EST SF 10 MIN: CPT | Performed by: PODIATRIST

## 2019-07-12 DIAGNOSIS — L85.3 XEROSIS OF SKIN: Primary | ICD-10-CM

## 2019-07-12 RX ORDER — AMMONIUM LACTATE 12 G/100G
LOTION TOPICAL 2 TIMES DAILY
Qty: 500 G | Refills: 2 | Status: SHIPPED | OUTPATIENT
Start: 2019-07-12 | End: 2020-09-23

## 2019-07-12 RX ORDER — AMMONIUM LACTATE 12 G/100G
LOTION TOPICAL
Qty: 225 ML | Refills: 2 | Status: SHIPPED | OUTPATIENT
Start: 2019-07-12 | End: 2020-02-11 | Stop reason: SDUPTHER

## 2019-07-19 LAB
CREAT ?TM UR-SCNC: 82.5 UMOL/L
EXT PROTEIN URINE: 6.2
PROT/CREAT UR: 75 MG/G{CREAT}

## 2019-08-13 DIAGNOSIS — G40.802 OTHER EPILEPSY WITHOUT STATUS EPILEPTICUS, NOT INTRACTABLE (HCC): ICD-10-CM

## 2019-08-13 RX ORDER — LAMOTRIGINE 150 MG/1
TABLET ORAL
Qty: 60 TABLET | Refills: 1 | Status: SHIPPED | OUTPATIENT
Start: 2019-08-13 | End: 2019-10-10 | Stop reason: SDUPTHER

## 2019-08-13 RX ORDER — LAMOTRIGINE 25 MG/1
TABLET ORAL
Qty: 60 TABLET | Refills: 1 | Status: SHIPPED | OUTPATIENT
Start: 2019-08-13 | End: 2019-10-10 | Stop reason: SDUPTHER

## 2019-09-25 ENCOUNTER — OFFICE VISIT (OUTPATIENT)
Dept: PODIATRY | Facility: CLINIC | Age: 54
End: 2019-09-25
Payer: MEDICARE

## 2019-09-25 VITALS
BODY MASS INDEX: 27.89 KG/M2 | SYSTOLIC BLOOD PRESSURE: 116 MMHG | RESPIRATION RATE: 17 BRPM | HEART RATE: 69 BPM | WEIGHT: 184 LBS | HEIGHT: 68 IN | DIASTOLIC BLOOD PRESSURE: 62 MMHG

## 2019-09-25 DIAGNOSIS — M21.962 ACQUIRED DEFORMITY OF LEFT ANKLE AND FOOT: ICD-10-CM

## 2019-09-25 DIAGNOSIS — M21.961 ACQUIRED DEFORMITY OF RIGHT ANKLE AND FOOT: Primary | ICD-10-CM

## 2019-09-25 DIAGNOSIS — M79.672 PAIN IN BOTH FEET: ICD-10-CM

## 2019-09-25 DIAGNOSIS — L60.0 INGROWN NAIL: ICD-10-CM

## 2019-09-25 DIAGNOSIS — G80.1 SPASTIC DIPLEGIC CEREBRAL PALSY (HCC): ICD-10-CM

## 2019-09-25 DIAGNOSIS — L03.039 PARONYCHIA OF TOENAIL, UNSPECIFIED LATERALITY: ICD-10-CM

## 2019-09-25 DIAGNOSIS — M79.671 PAIN IN BOTH FEET: ICD-10-CM

## 2019-09-25 PROCEDURE — 99212 OFFICE O/P EST SF 10 MIN: CPT | Performed by: PODIATRIST

## 2019-09-25 NOTE — PROGRESS NOTES
Expand All Collapse All    Assessment/Plan:     Continue with ammonium lactate cream  Partial resection of ingrown nail right hallux  Aseptic debridement and planning of nails x10 and manually and mechanically  Follow-up 2 months      Diagnoses and all orders for this visit:     Ingrown nail     Onychomycosis     Pain in both feet            Subjective:       Patient ID: Katharine Bell is a 47 y o  male      Patient has been having some pain in right big toe medial border for the past few weeks  Aids have not noticed any redness or signs of infection  Patient is follow-up for dry skin    Patient has been using ammonium lactate daily         Medical History        Past Medical History:   Diagnosis Date    Ambulatory dysfunction      Anxiety      Bilateral impacted cerumen       last assessed 05/30/2013    Capsulitis       last assessed 04/26/2016    Cellulitis of finger 01/13/2012    Chronic kidney disease      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      Closed fracture of one or more phalanges of foot 01/06/2009    Constipation      Deformity       left and right foot and ankle ,right hand    Depression      Epilepsy ([facility] [address] )      Erythrocytosis      Hemiplegia affecting dominant side (HCC)      Hypertension      Hypoglycemia 11/08/2011    Hypokalemia       last assessed 05/30/2013    Mental retardation      Mood disorder ([facility])      Nephrocalcinosis      Seizures ([facility])       2001               Current Outpatient Medications:     acetaminophen (TYLENOL) 325 mg tablet, Take 1 tablet (325 mg total) by mouth every 6 (six) hours as needed for mild pain, moderate pain or headaches, Disp: 30 tablet, Rfl: 0    ammonium lactate (LAC-HYDRIN) 12 % lotion, , Disp: , Rfl:     atorvastatin (LIPITOR) 10 mg tablet, Take 1 tablet (10 mg total) by mouth daily Take at 8 PM, Disp: 90 tablet, Rfl: 5    lamoTRIgine (LaMICtal) 150 MG tablet, TAKE 1 TABLET BY MOUTH  TWICE A DAY AT 8 AM & AT 8 PM, Disp: 60 tablet, Rfl: 2    lamoTRIgine (LaMICtal) 25 mg tablet, TAKE TWO TABLETS BY MOUTH DAILY AT 8AM WITH 150MG TO TOTAL 200MG, Disp: 60 tablet, Rfl: 2    nebivolol (BYSTOLIC) 2 5 mg tablet, Take 1 tablet (2 5 mg total) by mouth daily, Disp: 30 tablet, Rfl: 5    psyllium (REGULOID) 58 6 % powder, Take 1 teaspoonful mixed with 8oz of water orally every day at 8am, Disp: 1040 g, Rfl: 5    QUEtiapine (SEROquel) 50 mg tablet, , Disp: , Rfl:     senna (SENOKOT) 8 6 MG tablet, Take 1 tablet (8 6 mg total) by mouth daily, Disp: 90 tablet, Rfl: 5    sertraline (ZOLOFT) 50 mg tablet, Take by mouth daily, Disp: , Rfl:      Surgical History         Past Surgical History:   Procedure Laterality Date    BRAIN SURGERY        [place] COLONOSCOPY FLX DX W/COLLJ SPEC WHEN PFRMD N/A 2/12/2016     Procedure: COLONOSCOPY;  Surgeon: Radha New MD;  Location: [place] [address] GI LAB; Service: Gastroenterology                  Allergies   Allergen Reactions    Asa [Aspirin] Other (See Comments)       Reaction Date: 25Aug2008;   unknown             Patient Active Problem List   Diagnosis    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    Acquired deformity of right ankle and foot    Ingrown nail    Pain in both feet    Constipation    Cerebral palsy ([address]    Renal cyst    Class 1 obesity with body mass index (BMI) of 30 0 to 30 9 in adult    Hyperlipidemia    Lumbar radiculopathy    Spinal stenosis of lumbar region    Chronic left-sided low back pain with left-sided sciatica    Chronic pain syndrome    Impacted cerumen of left ear         Review of Systems   Constitutional: Negative  HENT: Negative  Eyes: Negative  Respiratory: Negative  Cardiovascular: Negative  Gastrointestinal: Negative  Endocrine: Negative  Genitourinary: Negative  Musculoskeletal: Negative  Skin: Negative  Allergic/Immunologic: Negative  Neurological: Negative  Hematological: Negative  Psychiatric/Behavioral: Negative     Objective:  Patient's shoes and socks were removed, feet examined       /62   Pulse 69   Resp 16   Ht 5' 7 5" (1 715 m)   Wt 83 5 kg (184 lb)   BMI 28 39 kg/m²         Foot Exam     General  General Appearance: appears stated age and healthy   Orientation: alert and oriented to person, place, and time   Gait: steppage         Right Foot/Ankle      Inspection and Palpation  Arch: pes cavus  Hammertoes: second toe, fifth toe, fourth toe and third toe  Hallux limitus: yes  Skin Exam: callus;         Left Foot/Ankle       Inspection and Palpation  Arch: pes cavus  Hammertoes: second toe, fifth toe, fourth toe and third toe  Hallux limitus: yes  Skin Exam: callus;               Right Foot/Ankle   Right Foot Inspection  Skin Exam: callus and callus                                    Right Toe  - Comprehensive Exam  Arch: pes cavus  Hammertoes: second toe, fifth toe, fourth toe and third toe  Hallux limitus: yes     Left Foot/Ankle  Left Foot Inspection  Skin Exam: callus                                                Left Toe  - Comprehensive Exam  Arch: pes cavus  Hammertoes: second toe, fifth toe, fourth toe and third toe  Hallux limitus: yes          Physical Exam   Feet:   Right Foot:   Skin Integrity: Positive for callus     Left Foot:   Skin Integrity: Positive for callus           No swelling, mild pain on palpation right worse than left   Nails are thickened dystrophic   No signs of infection  Improving tinea pedis  No erythema no open wounds or signs of infection  Moderate xerosis plantar heels bilateral  Right hallux medial border positive ingrown mild paronychia negative erythema no exudate no signs of infection positive pain on palpation

## 2019-10-10 DIAGNOSIS — G40.802 OTHER EPILEPSY WITHOUT STATUS EPILEPTICUS, NOT INTRACTABLE (HCC): ICD-10-CM

## 2019-10-14 RX ORDER — LAMOTRIGINE 150 MG/1
TABLET ORAL
Qty: 60 TABLET | Refills: 0 | Status: SHIPPED | OUTPATIENT
Start: 2019-10-14 | End: 2019-11-18 | Stop reason: SDUPTHER

## 2019-10-14 RX ORDER — LAMOTRIGINE 25 MG/1
TABLET ORAL
Qty: 60 TABLET | Refills: 0 | Status: SHIPPED | OUTPATIENT
Start: 2019-10-14 | End: 2019-11-18 | Stop reason: SDUPTHER

## 2019-11-18 DIAGNOSIS — G40.802 OTHER EPILEPSY WITHOUT STATUS EPILEPTICUS, NOT INTRACTABLE (HCC): ICD-10-CM

## 2019-11-18 RX ORDER — LAMOTRIGINE 25 MG/1
TABLET ORAL
Qty: 60 TABLET | Refills: 0 | Status: SHIPPED | OUTPATIENT
Start: 2019-11-18 | End: 2019-12-11 | Stop reason: SDUPTHER

## 2019-11-18 RX ORDER — LAMOTRIGINE 150 MG/1
TABLET ORAL
Qty: 60 TABLET | Refills: 0 | Status: SHIPPED | OUTPATIENT
Start: 2019-11-18 | End: 2019-12-11 | Stop reason: SDUPTHER

## 2019-11-27 ENCOUNTER — OFFICE VISIT (OUTPATIENT)
Dept: PODIATRY | Facility: CLINIC | Age: 54
End: 2019-11-27
Payer: MEDICARE

## 2019-11-27 VITALS
HEART RATE: 69 BPM | SYSTOLIC BLOOD PRESSURE: 116 MMHG | RESPIRATION RATE: 17 BRPM | HEIGHT: 68 IN | BODY MASS INDEX: 27.89 KG/M2 | WEIGHT: 184 LBS | DIASTOLIC BLOOD PRESSURE: 62 MMHG

## 2019-11-27 DIAGNOSIS — M21.961 ACQUIRED DEFORMITY OF RIGHT ANKLE AND FOOT: Primary | ICD-10-CM

## 2019-11-27 DIAGNOSIS — L03.039 PARONYCHIA OF TOENAIL, UNSPECIFIED LATERALITY: ICD-10-CM

## 2019-11-27 DIAGNOSIS — M79.671 PAIN IN BOTH FEET: ICD-10-CM

## 2019-11-27 DIAGNOSIS — M79.672 PAIN IN BOTH FEET: ICD-10-CM

## 2019-11-27 DIAGNOSIS — B35.1 ONYCHOMYCOSIS: ICD-10-CM

## 2019-11-27 PROCEDURE — 99212 OFFICE O/P EST SF 10 MIN: CPT | Performed by: PODIATRIST

## 2019-11-27 NOTE — PROGRESS NOTES
Assessment/Plan:     Continue with ammonium lactate cream  Partial resection of ingrown nail right hallux  Aseptic debridement and planning of nails x10 and manually and mechanically  Follow-up 2 months      Diagnoses and all orders for this visit:     Ingrown nail     Onychomycosis     Pain in both feet           Subjective:       Patient ID: Mario Rollins is a 47 y  o  male      Patient has been having some pain in right big toe medial border for the past few weeks   Aids have not noticed any redness or signs of infection   Patient is follow-up for dry skin   Patient has been using ammonium lactate daily         Medical History           Past Medical History:   Diagnosis Date    Ambulatory dysfunction      Anxiety      Bilateral impacted cerumen       last assessed 05/30/2013    Capsulitis       last assessed 04/26/2016    Cellulitis of finger 01/13/2012    Chronic kidney disease      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      Closed fracture of one or more phalanges of foot 01/06/2009    Constipation      Deformity       left and right foot and ankle ,right hand    Depression      Epilepsy ([facility] [address] )      Erythrocytosis      Hemiplegia affecting dominant side (HCC)      Hypertension      Hypoglycemia 11/08/2011    Hypokalemia       last assessed 05/30/2013    Mental retardation      Mood disorder ([facility])      Nephrocalcinosis      Seizures ([facility])       2001               Current Outpatient Medications:     acetaminophen (TYLENOL) 325 mg tablet, Take 1 tablet (325 mg total) by mouth every 6 (six) hours as needed for mild pain, moderate pain or headaches, Disp: 30 tablet, Rfl: 0    ammonium lactate (LAC-HYDRIN) 12 % lotion, , Disp: , Rfl:     atorvastatin (LIPITOR) 10 mg tablet, Take 1 tablet (10 mg total) by mouth daily Take at 8 PM, Disp: 90 tablet, Rfl: 5    lamoTRIgine (LaMICtal) 150 MG tablet, TAKE 1 TABLET BY MOUTH  TWICE A DAY AT 8 AM & AT 8 PM, Disp: 60 tablet, Rfl: 2    lamoTRIgine (LaMICtal) 25 mg tablet, TAKE TWO TABLETS BY MOUTH DAILY AT 8AM WITH 150MG TO TOTAL 200MG, Disp: 60 tablet, Rfl: 2    nebivolol (BYSTOLIC) 2 5 mg tablet, Take 1 tablet (2 5 mg total) by mouth daily, Disp: 30 tablet, Rfl: 5    psyllium (REGULOID) 58 6 % powder, Take 1 teaspoonful mixed with 8oz of water orally every day at 8am, Disp: 1040 g, Rfl: 5    QUEtiapine (SEROquel) 50 mg tablet, , Disp: , Rfl:     senna (SENOKOT) 8 6 MG tablet, Take 1 tablet (8 6 mg total) by mouth daily, Disp: 90 tablet, Rfl: 5    sertraline (ZOLOFT) 50 mg tablet, Take by mouth daily, Disp: , Rfl:      Surgical History             Past Surgical History:   Procedure Laterality Date    BRAIN SURGERY        IA COLONOSCOPY FLX DX W/COLLJ SPEC WHEN PFRMD N/A 2/12/2016     Procedure: COLONOSCOPY;  Surgeon: Sim Jiang MD;  Location: [facility];  Service: Gastroenterology                      Allergies   Allergen Reactions    Asa [Aspirin] Other (See Comments)       Reaction Date: 25Aug2008;   unknown               Patient Active Problem List   Diagnosis    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    Acquired deformity of right ankle and foot    Ingrown nail    Pain in both feet    Constipation    Cerebral palsy (HCC)    Renal cyst    Class 1 obesity with body mass index (BMI) of 30 0 to 30 9 in adult    Hyperlipidemia    Lumbar radiculopathy    Spinal stenosis of lumbar region    Chronic left-sided low back pain with left-sided sciatica    Chronic pain syndrome    Impacted cerumen of left ear         Review of Systems   Constitutional: Negative     HENT: Negative     Eyes: Negative     Respiratory: Negative     Cardiovascular: Negative     Gastrointestinal: Negative     Endocrine: Negative     Genitourinary: Negative     Musculoskeletal: Negative     Skin: Negative     Allergic/Immunologic: Negative     Neurological: Negative     Hematological: Negative     Psychiatric/Behavioral: Negative           Objective:  Patient's shoes and socks were removed, feet examined       /62   Pulse 69   Resp 16   Ht 5' 7 5" (1 715 m)   Wt 83 5 kg (184 lb)   BMI 28 39 kg/m²         Foot Exam     General  General Appearance: appears stated age and healthy   Orientation: alert and oriented to person, place, and time   Gait: steppage         Right Foot/Ankle      Inspection and Palpation  Arch: pes cavus  Hammertoes: second toe, fifth toe, fourth toe and third toe  Hallux limitus: yes  Skin Exam: callus;         Left Foot/Ankle       Inspection and Palpation  Arch: pes cavus  Hammertoes: second toe, fifth toe, fourth toe and third toe  Hallux limitus: yes  Skin Exam: callus;               Right Foot/Ankle   Right Foot Inspection  Skin Exam: callus and callus                        Right Toe  - Comprehensive Exam  Arch: pes cavus  Hammertoes: second toe, fifth toe, fourth toe and third toe  Hallux limitus: yes     Left Foot/Ankle  Left Foot Inspection  Skin Exam: callus                       Left Toe  - Comprehensive Exam  Arch: pes cavus  Hammertoes: second toe, fifth toe, fourth toe and third toe  Hallux limitus: yes          Physical Exam   Feet:   Right Foot:   Skin Integrity: Positive for callus     Left Foot:   Skin Integrity: Positive for callus          No swelling, mild pain on palpation right worse than left   Nails are thickened dystrophic   No signs of infection  Improving tinea pedis  No erythema no open wounds or signs of infection  Moderate xerosis plantar heels bilateral  Right hallux medial border positive ingrown mild paronychia negative erythema no exudate no signs of infection positive pain on palpation

## 2019-12-04 ENCOUNTER — TELEPHONE (OUTPATIENT)
Dept: FAMILY MEDICINE CLINIC | Facility: CLINIC | Age: 54
End: 2019-12-04

## 2019-12-04 ENCOUNTER — OFFICE VISIT (OUTPATIENT)
Dept: AUDIOLOGY | Facility: CLINIC | Age: 54
End: 2019-12-04
Payer: MEDICARE

## 2019-12-04 DIAGNOSIS — H90.3 SENSORY HEARING LOSS, BILATERAL: Primary | ICD-10-CM

## 2019-12-04 DIAGNOSIS — H61.23 BILATERAL IMPACTED CERUMEN: Primary | ICD-10-CM

## 2019-12-04 PROCEDURE — 92567 TYMPANOMETRY: CPT | Performed by: AUDIOLOGIST

## 2019-12-04 PROCEDURE — 92557 COMPREHENSIVE HEARING TEST: CPT | Performed by: AUDIOLOGIST

## 2019-12-04 NOTE — TELEPHONE ENCOUNTER
Dr Magdaleno Flaherty - Order generated for audiology as requested since previous order had     I generated the audiology ambulatory order and since Dr Daniel Heard is PCP and not in the office, I used you as ordering

## 2019-12-04 NOTE — PROGRESS NOTES
HEARING EVALUATION    Name:  Xavier Oropeza  :  1965  Age:  47 y o  Date of Evaluation: 19     History: Annual Hearing Test  Reason for visit: Xavier Oropeza is being seen today at the request of Dr Nancy Mcclure for an evaluation of hearing and was in the accompaniment of a member from the home in which he resides  Patient reports no changes or concerns in regards to his hearing sensitives since his last visit with us in 2018; staff member denies any issues  Today both patient and staff member deny any recent ear infections, head injuries, or falls  Patient denied any otalgia, aural fullness, tinnitus, or dizziness  Previous audiologic evaluation:   12/10/2018: Normal hearing 250-3k Hz sloping to a mild hearing loss at 4k Hz then recovering to normal hearing, bilaterally  Tympanograms at that time indicated type A with 1 2 ml ear canal volume for the right ear and type B with 0 3 ml ear canal volume for the left ear; cerumen noted for left ear  EVALUATION:    Otoscopic Evaluation:   Right Ear: Clear and healthy ear canal and tympanic membrane   Left Ear: Mild cerumen    Tympanometry:   Right: Type A - normal middle ear pressure and compliance   Left: Type A - normal middle ear pressure and compliance    Audiogram Results:  Pure tone testing revealed a normal sloping to mild sensorineural hearing loss bilaterally  SRT and PTA are in agreement indicating good test reliability  Word recognition scores were excellent bilaterally  *see attached audiogram      RECOMMENDATIONS:  Annual hearing eval, Return to [facility]  for F/U, Ear Cleaning and Copy to Patient/Caregiver    PATIENT EDUCATION:   Discussed results and recommendations with the patient and his caregiver at length  Overall hearing appears to be stable when in comparison with is audiogram from 2018   Patient was encouraged during his next PCP appointment to have his left ear cleaned to prevent from wax completely occluding his canal; caregiver voiced understanding  At this time it is recommended patient continue on annual audiologic follow-ups; sooner if concerns/changes arise        Patrizia Anderson , CCC-A  Clinical Audiologist

## 2019-12-04 NOTE — TELEPHONE ENCOUNTER
RED TEAM - SHE IS WITH AUDIOLOGY AT [facility]  PT IS THERE FOR HIS YEARLY ROUTINE HEARING TEST  SHE NEEDS A NEW SCRIPT  CAN SOMEONE PLEASE DO THIS     THANKS

## 2019-12-11 DIAGNOSIS — G40.802 OTHER EPILEPSY WITHOUT STATUS EPILEPTICUS, NOT INTRACTABLE (HCC): ICD-10-CM

## 2019-12-11 DIAGNOSIS — E78.2 MIXED HYPERLIPIDEMIA: ICD-10-CM

## 2019-12-11 DIAGNOSIS — I10 ESSENTIAL HYPERTENSION: ICD-10-CM

## 2019-12-11 RX ORDER — NEBIVOLOL HYDROCHLORIDE 2.5 MG/1
TABLET ORAL
Qty: 30 TABLET | Refills: 4 | Status: SHIPPED | OUTPATIENT
Start: 2019-12-11

## 2019-12-11 RX ORDER — ATORVASTATIN CALCIUM 10 MG/1
TABLET, FILM COATED ORAL
Qty: 30 TABLET | Refills: 4 | Status: SHIPPED | OUTPATIENT
Start: 2019-12-11 | End: 2020-01-27 | Stop reason: SDUPTHER

## 2019-12-11 RX ORDER — LAMOTRIGINE 25 MG/1
TABLET ORAL
Qty: 60 TABLET | Refills: 1 | Status: SHIPPED | OUTPATIENT
Start: 2019-12-11 | End: 2020-01-27 | Stop reason: SDUPTHER

## 2019-12-11 RX ORDER — LAMOTRIGINE 150 MG/1
TABLET ORAL
Qty: 60 TABLET | Refills: 1 | Status: SHIPPED | OUTPATIENT
Start: 2019-12-11 | End: 2020-01-27 | Stop reason: SDUPTHER

## 2019-12-12 ENCOUNTER — OFFICE VISIT (OUTPATIENT)
Dept: FAMILY MEDICINE CLINIC | Facility: CLINIC | Age: 54
End: 2019-12-12
Payer: MEDICARE

## 2019-12-12 VITALS
SYSTOLIC BLOOD PRESSURE: 116 MMHG | HEART RATE: 67 BPM | DIASTOLIC BLOOD PRESSURE: 70 MMHG | OXYGEN SATURATION: 95 % | BODY MASS INDEX: 29.63 KG/M2 | WEIGHT: 192 LBS | RESPIRATION RATE: 16 BRPM

## 2019-12-12 DIAGNOSIS — H61.23 BILATERAL IMPACTED CERUMEN: Primary | ICD-10-CM

## 2019-12-12 DIAGNOSIS — Z23 ENCOUNTER FOR IMMUNIZATION: ICD-10-CM

## 2019-12-12 PROCEDURE — 69209 REMOVE IMPACTED EAR WAX UNI: CPT | Performed by: FAMILY MEDICINE

## 2019-12-12 PROCEDURE — 90682 RIV4 VACC RECOMBINANT DNA IM: CPT | Performed by: FAMILY MEDICINE

## 2019-12-12 PROCEDURE — G0008 ADMIN INFLUENZA VIRUS VAC: HCPCS | Performed by: FAMILY MEDICINE

## 2019-12-12 NOTE — PROGRESS NOTES
Subjective:    47year old group home resident presents for ear flush  Left ear is impacted with cerumen, right ear has impacted cerumen, but not as severe as the left side  Also requests flu shot   requests refills on the medications we prescribe (lipitor, bystolic, and lamictal), however these were already filled electronically yesterday by different physicians in this office  Objective:    Procedure:    Ear cerumen removal  Date/Time: 12/12/2019 2:04 PM  Performed by: Abel Burrows DO  Authorized by: Abel Burrows DO     Patient location:  Clinic  Indications / Diagnosis:  Bilateral cerumen impaction  Other Assisting Provider: No    Consent:     Consent obtained:  Verbal    Consent given by:  Patient and healthcare agent    Risks discussed:  Infection, dizziness, incomplete removal, TM perforation and pain    Alternatives discussed:  No treatment and delayed treatment  Universal protocol:     Procedure explained and questions answered to patient or proxy's satisfaction: yes      Relevant documents present and verified: yes      Patient identity confirmed:  Verbally with patient and provided demographic data  Procedure details:     Local anesthetic:  None    Location:  L ear and R ear    Procedure type: irrigation only      Approach:  External    Equipment used:  Plunger syringe   Post-procedure details:     Complication:  None    Hearing quality:  Normal    Patient tolerance of procedure: Tolerated well, no immediate complications      Assessment/plan:    1  Bilateral impacted cerumen  Ear cerumen removal   2  Encounter for immunization  influenza vaccine, [id number], quadrivalent, recombinant, PF, 0 5 mL, for patients 18 yr+ (FLUBLOK)     Copious amount of cerumen was removed from both ears  Influenza vaccine administered without apparent complication after appropriate counseling  All questions & concerns were addressed   The patient and his healthcare agent agree with his treatment plan  RTO prn for further ear flushes  Vesta Bowers, DO  12/12/19  2:05 PM    Some portions of this record may have been generated with voice recognition software  There may be translation, syntax, or grammatical errors  Occasional wrong word or "sound-a-like" substitutions may have occurred due to the inherent limitations of the voice recognition software  Read the chart carefully and recognize, using context, where substations may have occurred   If you have any questions, please contact the dictating provider for clarification or correction, as needed

## 2019-12-19 DIAGNOSIS — K59.00 CONSTIPATION, UNSPECIFIED CONSTIPATION TYPE: ICD-10-CM

## 2019-12-19 RX ORDER — SENNOSIDES 8.6 MG
TABLET ORAL
Qty: 30 TABLET | Refills: 4 | Status: SHIPPED | OUTPATIENT
Start: 2019-12-19 | End: 2020-05-11

## 2020-01-20 ENCOUNTER — TELEPHONE (OUTPATIENT)
Dept: FAMILY MEDICINE CLINIC | Facility: CLINIC | Age: 55
End: 2020-01-20

## 2020-01-20 NOTE — LETTER
January 23, 2020     Carlo Patel   [place]  [address] [facility] [address]    Patient: Saintclair Pinta   YOB: 1965   Date of Visit: 1/20/2020       To whom it may concern,    Please adhere to this diet for Mr  Brittany Petties:  Low Fat diet  Low Sodium diet  Low Cholesterol diet  High Phosphorous          Sincerely,        Ned Steele MD      CC: No Recipients

## 2020-01-20 NOTE — TELEPHONE ENCOUNTER
Trina Carlton from the [address] [place] [address] is calling requesting an order to read,    Low Fat diet  Low Sodium diet  Low Cholesterol diet  High Phosphorous     This can be faxed to the number listed below;  [phone]

## 2020-01-27 ENCOUNTER — OFFICE VISIT (OUTPATIENT)
Dept: FAMILY MEDICINE CLINIC | Facility: CLINIC | Age: 55
End: 2020-01-27
Payer: MEDICARE

## 2020-01-27 VITALS
TEMPERATURE: 98.6 F | SYSTOLIC BLOOD PRESSURE: 130 MMHG | RESPIRATION RATE: 18 BRPM | HEART RATE: 86 BPM | BODY MASS INDEX: 29.25 KG/M2 | HEIGHT: 68 IN | WEIGHT: 193 LBS | OXYGEN SATURATION: 97 % | DIASTOLIC BLOOD PRESSURE: 86 MMHG

## 2020-01-27 DIAGNOSIS — G89.29 CHRONIC LEFT-SIDED LOW BACK PAIN WITH LEFT-SIDED SCIATICA: ICD-10-CM

## 2020-01-27 DIAGNOSIS — M54.42 CHRONIC LEFT-SIDED LOW BACK PAIN WITH LEFT-SIDED SCIATICA: ICD-10-CM

## 2020-01-27 DIAGNOSIS — G89.4 CHRONIC PAIN SYNDROME: ICD-10-CM

## 2020-01-27 DIAGNOSIS — M54.16 LUMBAR RADICULOPATHY: ICD-10-CM

## 2020-01-27 DIAGNOSIS — M79.606 PAIN OF LOWER EXTREMITY, UNSPECIFIED LATERALITY: ICD-10-CM

## 2020-01-27 DIAGNOSIS — Z00.00 ANNUAL PHYSICAL EXAM: Primary | ICD-10-CM

## 2020-01-27 DIAGNOSIS — E66.9 CLASS 1 OBESITY WITH BODY MASS INDEX (BMI) OF 30.0 TO 30.9 IN ADULT, UNSPECIFIED OBESITY TYPE, UNSPECIFIED WHETHER SERIOUS COMORBIDITY PRESENT: ICD-10-CM

## 2020-01-27 DIAGNOSIS — M48.062 SPINAL STENOSIS OF LUMBAR REGION WITH NEUROGENIC CLAUDICATION: ICD-10-CM

## 2020-01-27 DIAGNOSIS — K59.00 CONSTIPATION, UNSPECIFIED CONSTIPATION TYPE: ICD-10-CM

## 2020-01-27 DIAGNOSIS — G40.802 OTHER EPILEPSY WITHOUT STATUS EPILEPTICUS, NOT INTRACTABLE (HCC): ICD-10-CM

## 2020-01-27 DIAGNOSIS — E78.2 MIXED HYPERLIPIDEMIA: ICD-10-CM

## 2020-01-27 PROCEDURE — 99213 OFFICE O/P EST LOW 20 MIN: CPT | Performed by: FAMILY MEDICINE

## 2020-01-27 RX ORDER — LAMOTRIGINE 25 MG/1
TABLET ORAL
Qty: 60 TABLET | Refills: 1 | Status: SHIPPED | OUTPATIENT
Start: 2020-01-27 | End: 2020-04-08

## 2020-01-27 RX ORDER — LAMOTRIGINE 150 MG/1
TABLET ORAL
Qty: 60 TABLET | Refills: 1 | Status: SHIPPED | OUTPATIENT
Start: 2020-01-27 | End: 2020-04-08

## 2020-01-27 RX ORDER — ATORVASTATIN CALCIUM 10 MG/1
10 TABLET, FILM COATED ORAL DAILY
Qty: 30 TABLET | Refills: 4 | Status: SHIPPED | OUTPATIENT
Start: 2020-01-27 | End: 2020-07-17

## 2020-01-27 RX ORDER — ACETAMINOPHEN 325 MG/1
325 TABLET ORAL EVERY 6 HOURS PRN
Qty: 30 TABLET | Refills: 0 | Status: SHIPPED | OUTPATIENT
Start: 2020-01-27 | End: 2020-06-15 | Stop reason: SDUPTHER

## 2020-01-27 NOTE — PROGRESS NOTES
[address] [facility]    NAME: Miladis Love  AGE: 47 y o  SEX: male  : 1965     DATE: 2020     Assessment and Plan:     Problem List Items Addressed This Visit        Nervous and Auditory    Lumbar radiculopathy    Chronic left-sided low back pain with left-sided sciatica       Other    Constipation    Relevant Medications    psyllium (REGULOID) 58 6 % powder    Class 1 obesity with body mass index (BMI) of 30 0 to 30 9 in adult    Relevant Orders    CBC and differential    Hyperlipidemia    Relevant Medications    atorvastatin (LIPITOR) 10 mg tablet    Spinal stenosis of lumbar region    Chronic pain syndrome      Other Visit Diagnoses     Annual physical exam    -  Primary    Pain of lower extremity, unspecified laterality        Relevant Medications    acetaminophen (TYLENOL) 325 mg tablet    Other epilepsy without status epilepticus, not intractable (HCC)        Relevant Medications    lamoTRIgine (LaMICtal) 150 MG tablet    lamoTRIgine (LaMICtal) 25 mg tablet          Vivien Rapp is a pleasant 59-year-old male who presented to the office for a complete physical exam   Patient continues to experience significant left lower back pain radiating down his legs  Patient completed physical therapy in the past and is currently doing home exercises with staff at the group home  Patient reports that the home exercises have been working very well for him and he has noticed significant improvement with the home exercises  Patient at this time refuses physical therapy and would like to continue home exercise  Agree with the patient to continue home exercises since it has been working well for him and he has noticed improvement  Immunizations and preventive care screenings were discussed with patient today  Appropriate education was printed on patient's after visit summary      Counseling:  Alcohol/drug use: discussed moderation in alcohol intake, the recommendations for healthy alcohol use, and avoidance of illicit drug use  Dental Health: discussed importance of regular tooth brushing, flossing, and dental visits  Injury prevention: discussed safety/seat belts, safety helmets, smoke detectors, carbon dioxide detectors, and smoking near bedding or upholstery  · Exercise: the importance of regular exercise/physical activity was discussed  Recommend exercise 3-5 times per week for at least 30 minutes  BMI Counseling: Body mass index is 29 78 kg/m²  The BMI is above normal  Nutrition recommendations include decreasing portion sizes and reducing intake of cholesterol  Exercise recommendations include moderate physical activity 150 minutes/week  No pharmacotherapy was ordered  Patient referred to nutritionist due to patient being overweight  Depression Screening and Follow-up Plan: Patient not screened for depression due to medical reason (urgent/emergent situation, decreased capacity)  No follow-ups on file  Chief Complaint:     Chief Complaint   Patient presents with    Follow-up     f/u visit ,no new food or drug allergies  History of Present Illness:     Adult Annual Physical   Patient here for a comprehensive physical exam  The patient reports problems - complaining of pain in his low back  Pain is mostly over the left side  Radiating down the left leg  Patient has completed physical therapy and is currently doing home exercises with assistance  Reports that the home exercises are very helpful and he has noticed imrpovement in the pain when he is performing the home exercises       Diet and Physical Activity  · Diet/Nutrition: well balanced diet  · Exercise: no formal exercise  Depression Screening  PHQ-9 Depression Screening    PHQ-9:    Frequency of the following problems over the past two weeks:            General Health  · Sleep: sleeps well  · Hearing: normal - bilateral   · Vision: no vision problems  · Dental: regular dental visits   Health  · Symptoms include: none     Review of Systems:     Review of Systems   Constitutional: Negative for chills, fatigue and fever  HENT: Negative  Respiratory: Negative for cough, chest tightness, shortness of breath and wheezing  Cardiovascular: Negative  Gastrointestinal: Negative for abdominal pain, constipation, diarrhea, nausea and vomiting  Genitourinary: Negative  Musculoskeletal: Positive for back pain and gait problem  Negative for neck pain and neck stiffness  Skin: Negative for rash  Neurological: Positive for speech difficulty  Negative for weakness and light-headedness  Post stroke- right upper extremity deficit         Past Medical History:     Past Medical History:   Diagnosis Date    Ambulatory dysfunction     Anxiety     Bilateral impacted cerumen     last assessed 05/30/2013    Capsulitis     last assessed 04/26/2016    Cellulitis of finger 01/13/2012    Chronic kidney disease     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     Closed fracture of one or more phalanges of foot 01/06/2009    Constipation     Deformity     left and right foot and ankle ,right hand    Depression     Epilepsy ([address] )     Erythrocytosis     Hemiplegia affecting dominant side ([facility] [address]     Hypertension     Hypoglycemia 11/08/2011    Hypokalemia     last assessed 05/30/2013    Mental retardation     Mood disorder ([address] )     Nephrocalcinosis     Seizures ([facility] [address] )     2001      Past Surgical History:     Past Surgical History:   Procedure Laterality Date    BRAIN SURGERY      PA COLONOSCOPY FLX DX W/COLLJ SPEC WHEN PFRMD N/A 2/12/2016    Procedure: COLONOSCOPY;  Surgeon: Naren Leyva MD;  Location: [place] [address] GI [facility];   Service: Gastroenterology      Family History:     Family History   Problem Relation Age of Onset    Emphysema Mother     Nephrolithiasis Mother     Heart attack Father         acute MI    Hypertension Father     Nephrolithiasis Father     Nephrolithiasis Brother       Social History:     Social History     Socioeconomic History    Marital status: Single     Spouse name: None    Number of children: None    Years of education: None    Highest education level: None   Occupational History    None   Social Needs    Financial resource strain: None    Food insecurity:     Worry: None     Inability: None    Transportation needs:     Medical: None     Non-medical: None   Tobacco Use    Smoking status: Never Smoker    Smokeless tobacco: Never Used   Substance and Sexual Activity    Alcohol use: No    Drug use: No    Sexual activity: None   Lifestyle    Physical activity:     Days per week: None     Minutes per session: None    Stress: None   Relationships    Social connections:     Talks on phone: None     Gets together: None     Attends Nondenominational service: None     Active member of club or organization: None     Attends meetings of clubs or organizations: None     Relationship status: None    Intimate partner violence:     Fear of current or ex partner: None     Emotionally abused: None     Physically abused: None     Forced sexual activity: None   Other Topics Concern    None   Social History Narrative    None      Current Medications:     Current Outpatient Medications   Medication Sig Dispense Refill    acetaminophen (TYLENOL) 325 mg tablet Take 1 tablet (325 mg total) by mouth every 6 (six) hours as needed for mild pain, moderate pain or headaches 30 tablet 0    ammonium lactate (LAC-HYDRIN) 12 % lotion APPLY TO FEET TWICE DAILY AT 8AM AND 8PM 225 mL 2    atorvastatin (LIPITOR) 10 mg tablet Take 1 tablet (10 mg total) by mouth daily Take at 8 PM 30 tablet 4    BYSTOLIC 2 5 MG tablet TAKE ONE TABLET BY MOUTH DAILY AT 8AM 30 tablet 4    lamoTRIgine (LaMICtal) 150 MG tablet TAKE 1 TABLET BY MOUTH TWICE A DAY AT 8AM & AT 8 PM 60 tablet 1    lamoTRIgine (LaMICtal) 25 mg tablet TAKE TWO TABLETS BY MOUTH DAILY AT 8AM WITH 150MG TO TOTAL 200MG 60 tablet 1    psyllium (REGULOID) 58 6 % powder Take 1 teaspoonful mixed with 8oz of water orally every day at 8am 1040 g 5    QUEtiapine (SEROquel) 50 mg tablet       senna (SENOKOT) 8 6 mg TAKE ONE TABLET BY MOUTH DAILY AT 8AM FOR CONSTIPATION 30 tablet 4    sertraline (ZOLOFT) 50 mg tablet Take by mouth daily      ammonium lactate (LAC-HYDRIN) 12 % lotion Apply topically 2 (two) times a day Applied to feet twice a day 8:00 a m /8:00 p m  500 g 2     No current facility-administered medications for this visit  Allergies: Allergies   Allergen Reactions    Asa [Aspirin] Other (See Comments)     Reaction Date: 25Aug2008;   unknown      Physical Exam:     /86 (BP Location: Left arm, Patient Position: Sitting, Cuff Size: Standard)   Pulse 86   Temp 98 6 °F (37 °C) (Tympanic)   Resp 18   Ht 5' 7 5" (1 715 m)   Wt 87 5 kg (193 lb)   SpO2 97%   BMI 29 78 kg/m²     Physical Exam   Constitutional: He is oriented to person, place, and time  He appears well-developed and well-nourished  No distress  Eyes: Pupils are equal, round, and reactive to light  EOM are normal    Neck: Normal range of motion  Neck supple  Cardiovascular: Normal rate and regular rhythm  No murmur heard  Pulmonary/Chest: Effort normal and breath sounds normal  No respiratory distress  He has no wheezes  Abdominal: Soft  Bowel sounds are normal  He exhibits no mass  There is no tenderness  Musculoskeletal:   Significant weakness and contracture of right upper extremity   Neurological: He is alert and oriented to person, place, and time  Skin: He is not diaphoretic  Psychiatric: He has a normal mood and affect  His behavior is normal    Nursing note and vitals reviewed        Elie Galeana DO  [address] [place] [address] Street

## 2020-01-28 LAB
BASOPHILS # BLD AUTO: 0 X10E3/UL (ref 0–0.2)
BASOPHILS NFR BLD AUTO: 0 %
EOSINOPHIL # BLD AUTO: 0.1 X10E3/UL (ref 0–0.4)
EOSINOPHIL NFR BLD AUTO: 1 %
ERYTHROCYTE [DISTWIDTH] IN BLOOD BY AUTOMATED COUNT: 13.7 % (ref 11.6–15.4)
HCT VFR BLD AUTO: 49.4 % (ref 37.5–51)
HGB BLD-MCNC: 16.9 G/DL (ref 13–17.7)
IMM GRANULOCYTES # BLD: 0 X10E3/UL (ref 0–0.1)
IMM GRANULOCYTES NFR BLD: 0 %
LYMPHOCYTES # BLD AUTO: 2 X10E3/UL (ref 0.7–3.1)
LYMPHOCYTES NFR BLD AUTO: 24 %
MCH RBC QN AUTO: 29.4 PG (ref 26.6–33)
MCHC RBC AUTO-ENTMCNC: 34.2 G/DL (ref 31.5–35.7)
MCV RBC AUTO: 86 FL (ref 79–97)
MONOCYTES # BLD AUTO: 0.7 X10E3/UL (ref 0.1–0.9)
MONOCYTES NFR BLD AUTO: 8 %
NEUTROPHILS # BLD AUTO: 5.5 X10E3/UL (ref 1.4–7)
NEUTROPHILS NFR BLD AUTO: 67 %
PLATELET # BLD AUTO: 211 X10E3/UL (ref 150–450)
RBC # BLD AUTO: 5.74 X10E6/UL (ref 4.14–5.8)
WBC # BLD AUTO: 8.3 X10E3/UL (ref 3.4–10.8)

## 2020-01-29 ENCOUNTER — TELEPHONE (OUTPATIENT)
Dept: FAMILY MEDICINE CLINIC | Facility: CLINIC | Age: 55
End: 2020-01-29

## 2020-01-29 NOTE — TELEPHONE ENCOUNTER
Dr Hadley Nichole Stewardson is looking for the forms   On the date of the physical  Please fill it and let the pt know when is ready    Dallas Mendoza from [facility] [phone]

## 2020-01-31 NOTE — TELEPHONE ENCOUNTER
DR Zach LEVINE IS CALLING AGAIN FOR THE FORMS  SEE PREVIOUS MESSAGES  SHE NEEDS TO HAVE THESE TODAY  SHE WANTS A CALL WHEN THEY ARE READY

## 2020-01-31 NOTE — TELEPHONE ENCOUNTER
Group Home is calling again for physical and PRN sheet for patient they need it as soon as possible  Thank you

## 2020-02-03 NOTE — TELEPHONE ENCOUNTER
Nuzhat Matter calling again for the physical form and PRN med sheet  She said the group home needs this by noon  Fax [phone] states form is with the doctor    Tiger text also sent to Dr Carlito Green

## 2020-02-11 ENCOUNTER — OFFICE VISIT (OUTPATIENT)
Dept: FAMILY MEDICINE CLINIC | Facility: CLINIC | Age: 55
End: 2020-02-11
Payer: MEDICARE

## 2020-02-11 VITALS
DIASTOLIC BLOOD PRESSURE: 82 MMHG | TEMPERATURE: 97.9 F | WEIGHT: 196.38 LBS | HEART RATE: 66 BPM | RESPIRATION RATE: 18 BRPM | BODY MASS INDEX: 30.3 KG/M2 | SYSTOLIC BLOOD PRESSURE: 112 MMHG | OXYGEN SATURATION: 97 %

## 2020-02-11 DIAGNOSIS — L30.9 DERMATITIS OF RIGHT FOOT: Primary | ICD-10-CM

## 2020-02-11 PROCEDURE — 1036F TOBACCO NON-USER: CPT | Performed by: FAMILY MEDICINE

## 2020-02-11 PROCEDURE — 99213 OFFICE O/P EST LOW 20 MIN: CPT | Performed by: FAMILY MEDICINE

## 2020-02-11 PROCEDURE — 3079F DIAST BP 80-89 MM HG: CPT | Performed by: FAMILY MEDICINE

## 2020-02-11 PROCEDURE — 3074F SYST BP LT 130 MM HG: CPT | Performed by: FAMILY MEDICINE

## 2020-02-11 NOTE — PROGRESS NOTES
Assessment/Plan:     Diagnoses and all orders for this visit:    Dermatitis of right foot  -     hydrocortisone 2 5 % cream      Apply hydrocortisone cream to affected area over right shin twice a day until resolution rash  Apply moisturizer motion to dry skin over the legs bilaterally 3 times a day and continue after resolution of rash  Subjective:      Patient ID: Ally Chin is a 47 y o  male  HPI  Patient is a 50yo M with a PMH of Cerebral palsy, hyperlipidemia, Chornic pain, spinal stenosis of the lumbar region, obesity, epilepsy, Chronic kidney disease stage 2 with medulary calcinosis and renal cyst, and HTN, anxiety, and lumbar radiculopathy who presents today for rash on right lower leg which started on Friday, on medial aspect of ankle and shin  Was itchy and evidence seen of scratch marks but patient stated it was no longer itchy  Patient reports no changes in detergent, no knew clothes, no changes in medication or food  No food allergies reported and only allergy is to aspirin  Patient is here with Clearance Leech from [facility] who organize doctor's appointment  The following portions of the patient's history were reviewed and updated as appropriate: allergies, current medications, past medical history, past social history and problem list     Review of Systems   Constitutional: Negative for fever  Respiratory: Negative for shortness of breath  Cardiovascular: Negative for chest pain  Gastrointestinal: Negative for abdominal pain  Skin: Positive for rash  Allergic/Immunologic: Negative for food allergies  Objective: There were no vitals taken for this visit  Physical Exam   Constitutional: He is oriented to person, place, and time  He appears well-developed and well-nourished  No distress  Cardiovascular: Normal rate and regular rhythm  Pulmonary/Chest: Effort normal and breath sounds normal    Abdominal: Soft  He exhibits distension     Neurological: He is alert and oriented to person, place, and time  Skin: Skin is warm  Capillary refill takes less than 2 seconds  Non raised erythematous rash on right shin about 10cm x5 cm  Smller rash present on medial aspect of shin  Skin of legs is somewhat dry  Psychiatric: He has a normal mood and affect  Vitals reviewed

## 2020-02-17 ENCOUNTER — TELEPHONE (OUTPATIENT)
Dept: BARIATRICS | Facility: CLINIC | Age: 55
End: 2020-02-17

## 2020-02-17 NOTE — TELEPHONE ENCOUNTER
Tc to [facility] to f/u on task sent thru in basket message in [employer] for [facility]    They did not want weight management, physician office will reissue referral to correct department

## 2020-02-24 ENCOUNTER — TELEPHONE (OUTPATIENT)
Dept: FAMILY MEDICINE CLINIC | Facility: CLINIC | Age: 55
End: 2020-02-24

## 2020-02-24 NOTE — TELEPHONE ENCOUNTER
Maik Dia from [facility] is requesting a order be placed in Patient's chart for Hydrocortisone 2 5% Cream, for her records and  also she is requesting this be resent to the pharmacy as she was informed Rx has not come thru

## 2020-02-25 DIAGNOSIS — L30.9 DERMATITIS OF RIGHT FOOT: ICD-10-CM

## 2020-02-25 NOTE — PROGRESS NOTES
Pharmacy stated they never received prescription although on medication it says receipt confirmed by pharmacy 2/11   Will refill Hydrocortisone 2 5% and resend to [facility]

## 2020-02-26 ENCOUNTER — OFFICE VISIT (OUTPATIENT)
Dept: PODIATRY | Facility: CLINIC | Age: 55
End: 2020-02-26
Payer: MEDICARE

## 2020-02-26 ENCOUNTER — TELEPHONE (OUTPATIENT)
Dept: FAMILY MEDICINE CLINIC | Facility: CLINIC | Age: 55
End: 2020-02-26

## 2020-02-26 VITALS
HEIGHT: 68 IN | SYSTOLIC BLOOD PRESSURE: 112 MMHG | BODY MASS INDEX: 29.7 KG/M2 | DIASTOLIC BLOOD PRESSURE: 82 MMHG | RESPIRATION RATE: 17 BRPM | HEART RATE: 66 BPM | WEIGHT: 196 LBS

## 2020-02-26 DIAGNOSIS — L03.039 PARONYCHIA OF TOENAIL, UNSPECIFIED LATERALITY: ICD-10-CM

## 2020-02-26 DIAGNOSIS — M21.961 ACQUIRED DEFORMITY OF RIGHT ANKLE AND FOOT: Primary | ICD-10-CM

## 2020-02-26 DIAGNOSIS — B35.9 DERMATOPHYTOSIS: ICD-10-CM

## 2020-02-26 DIAGNOSIS — B35.1 ONYCHOMYCOSIS: ICD-10-CM

## 2020-02-26 PROCEDURE — 99213 OFFICE O/P EST LOW 20 MIN: CPT | Performed by: PODIATRIST

## 2020-02-26 NOTE — PROGRESS NOTES
Assessment/Plan:  Pain upon ambulation  Ingrown toenail  Paronychia  Mycosis of nail and skin  Plan  Foot exam performed  Nails debrided  Abnormal skin debrided  Staff advised on condition  Diagnoses and all orders for this visit:    Acquired deformity of right ankle and foot    Paronychia of toenail, unspecified laterality    Onychomycosis    Dermatophytosis          Subjective:  Patient presents with staff  He has complaint of pain in his toes and feet with ambulation  No history of trauma      Allergies   Allergen Reactions    Asa [Aspirin] Other (See Comments)     Reaction Date: 25Aug2008;   unknown         Current Outpatient Medications:     acetaminophen (TYLENOL) 325 mg tablet, Take 1 tablet (325 mg total) by mouth every 6 (six) hours as needed for mild pain, moderate pain or headaches, Disp: 30 tablet, Rfl: 0    ammonium lactate (LAC-HYDRIN) 12 % lotion, Apply topically 2 (two) times a day Applied to feet twice a day 8:00 a m /8:00 p m , Disp: 500 g, Rfl: 2    atorvastatin (LIPITOR) 10 mg tablet, Take 1 tablet (10 mg total) by mouth daily Take at 8 PM, Disp: 30 tablet, Rfl: 4    BYSTOLIC 2 5 MG tablet, TAKE ONE TABLET BY MOUTH DAILY AT 8AM, Disp: 30 tablet, Rfl: 4    hydrocortisone 2 5 % cream, Apply topically 2 (two) times a day, Disp: 30 g, Rfl: 2    lamoTRIgine (LaMICtal) 150 MG tablet, TAKE 1 TABLET BY MOUTH TWICE A DAY AT 8AM & AT 8 PM, Disp: 60 tablet, Rfl: 1    lamoTRIgine (LaMICtal) 25 mg tablet, TAKE TWO TABLETS BY MOUTH DAILY AT 8AM WITH 150MG TO TOTAL 200MG, Disp: 60 tablet, Rfl: 1    psyllium (REGULOID) 58 6 % powder, Take 1 teaspoonful mixed with 8oz of water orally every day at 8am, Disp: 1040 g, Rfl: 5    QUEtiapine (SEROquel) 50 mg tablet, , Disp: , Rfl:     senna (SENOKOT) 8 6 mg, TAKE ONE TABLET BY MOUTH DAILY AT 8AM FOR CONSTIPATION, Disp: 30 tablet, Rfl: 4    sertraline (ZOLOFT) 50 mg tablet, Take by mouth daily, Disp: , Rfl:     Patient Active Problem List Diagnosis    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    Acquired deformity of right ankle and foot    Ingrown nail    Pain in both feet    Constipation    Cerebral palsy (HCC)    Renal cyst    Class 1 obesity with body mass index (BMI) of 30 0 to 30 9 in adult    Hyperlipidemia    Lumbar radiculopathy    Spinal stenosis of lumbar region    Chronic left-sided low back pain with left-sided sciatica    Chronic pain syndrome    Impacted cerumen of left ear    Paronychia of toenail          Patient ID: Jim Chao is a 47 y o  male  HPI    The following portions of the patient's history were reviewed and updated as appropriate:     family history includes Emphysema in his mother; Heart attack in his father; Hypertension in his father; Nephrolithiasis in his brother, father, and mother  reports that he has never smoked  He has never used smokeless tobacco  He reports that he does not drink alcohol or use drugs  Vitals:    02/26/20 1049   BP: 112/82   Pulse: 66   Resp: 17       Review of Systems      Objective:  Patient's shoes and socks removed     Foot ExamPhysical Exam         Foot Exam     General  General Appearance: appears stated age and healthy   Orientation: alert and oriented to person, place, and time   Gait: steppage         Right Foot/Ankle      Inspection and Palpation  Arch: pes cavus  Hammertoes: second toe, fifth toe, fourth toe and third toe  Hallux limitus: yes  Skin Exam: callus;         Left Foot/Ankle       Inspection and Palpation  Arch: pes cavus  Hammertoes: second toe, fifth toe, fourth toe and third toe  Hallux limitus: yes  Skin Exam: callus;               Right Foot/Ankle   Right Foot Inspection  Skin Exam: callus and callus                        Right Toe  - Comprehensive Exam  Arch: pes cavus  Hammertoes: second toe, fifth toe, fourth toe and third toe  Hallux limitus: yes     Left Foot/Ankle  Left Foot Inspection  Skin Exam: callus                       Left Toe  - Comprehensive Exam  Arch: pes cavus  Hammertoes: second toe, fifth toe, fourth toe and third toe  Hallux limitus: yes          Physical Exam   Feet:   Right Foot:   Skin Integrity: Positive for callus  Left Foot:   Skin Integrity: Positive for callus          No swelling, mild pain on palpation right worse than left   Nails are thickened dystrophic   No signs of infection  Improving tinea pedis  No erythema no open wounds or signs of infection  Moderate xerosis plantar heels bilateral  Right hallux medial border positive ingrown mild paronychia negative erythema no exudate no signs of infection positive pain on palpation      Periungual eczema secondary to modify ptosis noted of all 10 nails

## 2020-02-26 NOTE — TELEPHONE ENCOUNTER
Patients  called requesting hydrocortisone 2 5 % cream to be D/C Patient does not have rash anymore  Is in need of a script faxed [phone] D/C this medication

## 2020-02-27 NOTE — TELEPHONE ENCOUNTER
Admission medication history interview status for the 2/27/2018  admission is complete. See EPIC admission navigator for prior to admission medications     Medication history source reliability:Good    Actions taken by pharmacist (provider contacted, etc):interviewed patient     Additional medication history information not noted on PTA med list :None    Medication reconciliation/reorder completed by provider prior to medication history? No    Time spent in this activity: 5 minutes    Prior to Admission medications    Medication Sig Last Dose Taking? Auth Provider   IBUPROFEN PO Take 600 mg by mouth every 8 hours as needed  2/26/2018 at Unknown time Yes Reported, Patient   mometasone (ELOCON) 0.1 % cream Apply once a day to affected area, ok to use on face 2/27/2018 at Unknown time Yes Reported, Patient          Dr Awilda Councilman, it looks as though the group home needs proof of d/c of hydrocortisone 2 5% cream   They are requesting a script showing it has been d/c, faxed to [phone]

## 2020-04-07 DIAGNOSIS — G40.802 OTHER EPILEPSY WITHOUT STATUS EPILEPTICUS, NOT INTRACTABLE (HCC): ICD-10-CM

## 2020-04-08 RX ORDER — LAMOTRIGINE 25 MG/1
TABLET ORAL
Qty: 60 TABLET | Refills: 0 | Status: SHIPPED | OUTPATIENT
Start: 2020-04-08 | End: 2020-05-11

## 2020-04-08 RX ORDER — LAMOTRIGINE 150 MG/1
TABLET ORAL
Qty: 60 TABLET | Refills: 0 | Status: SHIPPED | OUTPATIENT
Start: 2020-04-08 | End: 2020-05-11

## 2020-05-11 DIAGNOSIS — K59.00 CONSTIPATION, UNSPECIFIED CONSTIPATION TYPE: ICD-10-CM

## 2020-05-11 DIAGNOSIS — G40.802 OTHER EPILEPSY WITHOUT STATUS EPILEPTICUS, NOT INTRACTABLE (HCC): ICD-10-CM

## 2020-05-11 RX ORDER — SENNA 8.6 MG/1
TABLET, FILM COATED ORAL
Qty: 30 TABLET | Refills: 3 | Status: SHIPPED | OUTPATIENT
Start: 2020-05-11 | End: 2020-09-14

## 2020-05-11 RX ORDER — LAMOTRIGINE 25 MG/1
TABLET ORAL
Qty: 60 TABLET | Refills: 0 | Status: SHIPPED | OUTPATIENT
Start: 2020-05-11 | End: 2020-06-05

## 2020-05-11 RX ORDER — LAMOTRIGINE 150 MG/1
TABLET ORAL
Qty: 60 TABLET | Refills: 0 | Status: SHIPPED | OUTPATIENT
Start: 2020-05-11 | End: 2020-06-05

## 2020-05-21 ENCOUNTER — OFFICE VISIT (OUTPATIENT)
Dept: PODIATRY | Facility: CLINIC | Age: 55
End: 2020-05-21
Payer: MEDICARE

## 2020-05-21 VITALS
WEIGHT: 196 LBS | DIASTOLIC BLOOD PRESSURE: 82 MMHG | RESPIRATION RATE: 16 BRPM | BODY MASS INDEX: 29.7 KG/M2 | SYSTOLIC BLOOD PRESSURE: 112 MMHG | HEIGHT: 68 IN | HEART RATE: 66 BPM

## 2020-05-21 DIAGNOSIS — M79.672 PAIN IN BOTH FEET: ICD-10-CM

## 2020-05-21 DIAGNOSIS — B35.9 DERMATOPHYTOSIS: Primary | ICD-10-CM

## 2020-05-21 DIAGNOSIS — M79.671 PAIN IN BOTH FEET: ICD-10-CM

## 2020-05-21 PROCEDURE — 11000 DBRDMT ECZ/INFECTED SKIN<10%: CPT | Performed by: PODIATRIST

## 2020-06-05 DIAGNOSIS — G40.802 OTHER EPILEPSY WITHOUT STATUS EPILEPTICUS, NOT INTRACTABLE (HCC): ICD-10-CM

## 2020-06-05 RX ORDER — LAMOTRIGINE 25 MG/1
TABLET ORAL
Qty: 60 TABLET | Refills: 0 | Status: SHIPPED | OUTPATIENT
Start: 2020-06-05 | End: 2020-07-17

## 2020-06-05 RX ORDER — LAMOTRIGINE 150 MG/1
TABLET ORAL
Qty: 60 TABLET | Refills: 0 | Status: SHIPPED | OUTPATIENT
Start: 2020-06-05 | End: 2020-07-17

## 2020-06-15 DIAGNOSIS — M79.606 PAIN OF LOWER EXTREMITY, UNSPECIFIED LATERALITY: ICD-10-CM

## 2020-06-20 RX ORDER — ACETAMINOPHEN 325 MG/1
325 TABLET ORAL EVERY 6 HOURS PRN
Qty: 30 TABLET | Refills: 0 | Status: SHIPPED | OUTPATIENT
Start: 2020-06-20

## 2020-06-22 ENCOUNTER — TELEPHONE (OUTPATIENT)
Dept: FAMILY MEDICINE CLINIC | Facility: CLINIC | Age: 55
End: 2020-06-22

## 2020-07-10 DIAGNOSIS — E78.2 MIXED HYPERLIPIDEMIA: ICD-10-CM

## 2020-07-10 DIAGNOSIS — G40.802 OTHER EPILEPSY WITHOUT STATUS EPILEPTICUS, NOT INTRACTABLE (HCC): ICD-10-CM

## 2020-07-17 RX ORDER — LAMOTRIGINE 150 MG/1
TABLET ORAL
Qty: 60 TABLET | Refills: 0 | Status: SHIPPED | OUTPATIENT
Start: 2020-07-17 | End: 2020-08-14

## 2020-07-17 RX ORDER — LAMOTRIGINE 25 MG/1
TABLET ORAL
Qty: 60 TABLET | Refills: 0 | Status: SHIPPED | OUTPATIENT
Start: 2020-07-17 | End: 2020-08-14

## 2020-07-17 RX ORDER — ATORVASTATIN CALCIUM 10 MG/1
TABLET, FILM COATED ORAL
Qty: 30 TABLET | Refills: 3 | Status: SHIPPED | OUTPATIENT
Start: 2020-07-17 | End: 2020-11-20 | Stop reason: SDUPTHER

## 2020-07-28 ENCOUNTER — TRANSCRIBE ORDERS (OUTPATIENT)
Dept: LAB | Facility: CLINIC | Age: 55
End: 2020-07-28

## 2020-07-28 ENCOUNTER — OFFICE VISIT (OUTPATIENT)
Dept: PODIATRY | Facility: CLINIC | Age: 55
End: 2020-07-28
Payer: MEDICARE

## 2020-07-28 ENCOUNTER — APPOINTMENT (OUTPATIENT)
Dept: LAB | Facility: CLINIC | Age: 55
End: 2020-07-28
Payer: MEDICARE

## 2020-07-28 DIAGNOSIS — M79.671 PAIN IN BOTH FEET: ICD-10-CM

## 2020-07-28 DIAGNOSIS — B35.9 DERMATOPHYTOSIS: Primary | ICD-10-CM

## 2020-07-28 DIAGNOSIS — F41.1 GENERALIZED ANXIETY DISORDER: ICD-10-CM

## 2020-07-28 DIAGNOSIS — M79.672 PAIN IN BOTH FEET: ICD-10-CM

## 2020-07-28 DIAGNOSIS — E78.49 FAMILIAL COMBINED HYPERLIPIDEMIA: ICD-10-CM

## 2020-07-28 DIAGNOSIS — N18.2 CHRONIC KIDNEY DISEASE, STAGE II (MILD): Primary | ICD-10-CM

## 2020-07-28 LAB
ANION GAP SERPL CALCULATED.3IONS-SCNC: 5 MMOL/L (ref 4–13)
BUN SERPL-MCNC: 23 MG/DL (ref 5–25)
CALCIUM SERPL-MCNC: 9.2 MG/DL (ref 8.3–10.1)
CHLORIDE SERPL-SCNC: 104 MMOL/L (ref 100–108)
CO2 SERPL-SCNC: 28 MMOL/L (ref 21–32)
CREAT SERPL-MCNC: 1.31 MG/DL (ref 0.6–1.3)
GFR SERPL CREATININE-BSD FRML MDRD: 61 ML/MIN/1.73SQ M
GLUCOSE P FAST SERPL-MCNC: 86 MG/DL (ref 65–99)
PHOSPHATE SERPL-MCNC: 2.1 MG/DL (ref 2.7–4.5)
POTASSIUM SERPL-SCNC: 4.1 MMOL/L (ref 3.5–5.3)
SODIUM SERPL-SCNC: 137 MMOL/L (ref 136–145)

## 2020-07-28 PROCEDURE — 84100 ASSAY OF PHOSPHORUS: CPT

## 2020-07-28 PROCEDURE — 11000 DBRDMT ECZ/INFECTED SKIN<10%: CPT | Performed by: PODIATRIST

## 2020-07-28 PROCEDURE — 36415 COLL VENOUS BLD VENIPUNCTURE: CPT

## 2020-07-28 PROCEDURE — 80048 BASIC METABOLIC PNL TOTAL CA: CPT

## 2020-07-28 NOTE — PROGRESS NOTES
Assessment/Plan:  Pain upon ambulation   Ingrown toenail   Paronychia   Mycosis of nail and skin      Plan   Foot exam performed   Nails debrided   Abnormal skin debrided   Staff advised on condition          Diagnoses and all orders for this visit:     Acquired deformity of right ankle and foot     Paronychia of toenail, unspecified laterality     Onychomycosis     Dermatophytosis           Subjective:  Patient presents with staff  [facility] has complaint of pain in his toes and feet with ambulation   No history of trauma                Allergies   Allergen Reactions    Asa [Aspirin] Other (See Comments)       Reaction Date: 25Aug2008;   unknown            Current Outpatient Medications:     acetaminophen (TYLENOL) 325 mg tablet, Take 1 tablet (325 mg total) by mouth every 6 (six) hours as needed for mild pain, moderate pain or headaches, Disp: 30 tablet, Rfl: 0    ammonium lactate (LAC-HYDRIN) 12 % lotion, Apply topically 2 (two) times a day Applied to feet twice a day 8:00 a m /8:00 p m , Disp: 500 g, Rfl: 2    atorvastatin (LIPITOR) 10 mg tablet, Take 1 tablet (10 mg total) by mouth daily Take at 8 PM, Disp: 30 tablet, Rfl: 4    BYSTOLIC 2 5 MG tablet, TAKE ONE TABLET BY MOUTH DAILY AT 8AM, Disp: 30 tablet, Rfl: 4    hydrocortisone 2 5 % cream, Apply topically 2 (two) times a day, Disp: 30 g, Rfl: 2    lamoTRIgine (LaMICtal) 150 MG tablet, TAKE 1 TABLET BY MOUTH TWICE A DAY AT 8AM & AT 8 PM, Disp: 60 tablet, Rfl: 1    lamoTRIgine (LaMICtal) 25 mg tablet, TAKE TWO TABLETS BY MOUTH DAILY AT 8AM WITH 150MG TO TOTAL 200MG, Disp: 60 tablet, Rfl: 1    psyllium (REGULOID) 58 6 % powder, Take 1 teaspoonful mixed with 8oz of water orally every day at 8am, Disp: 1040 g, Rfl: 5    QUEtiapine (SEROquel) 50 mg tablet, , Disp: , Rfl:     senna (SENOKOT) 8 6 mg, TAKE ONE TABLET BY MOUTH DAILY AT 8AM FOR CONSTIPATION, Disp: 30 tablet, Rfl: 4    sertraline (ZOLOFT) 50 mg tablet, Take by mouth daily, Disp: , Rfl:          Patient Active Problem List   Diagnosis    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    Acquired deformity of right ankle and foot    Ingrown nail    Pain in both feet    Constipation    Cerebral palsy (HCC)    Renal cyst    Class 1 obesity with body mass index (BMI) of 30 0 to 30 9 in adult    Hyperlipidemia    Lumbar radiculopathy    Spinal stenosis of lumbar region    Chronic left-sided low back pain with left-sided sciatica    Chronic pain syndrome    Impacted cerumen of left ear    Paronychia of toenail             Patient ID: Mario Rollins is a 54 y  o  male      HPI     The following portions of the patient's history were reviewed and updated as appropriate:      family history includes Emphysema in his mother; Heart attack in his father; Hypertension in his father; Nephrolithiasis in his brother, father, and mother        reports that he has never smoked   He has never used smokeless tobacco  He reports that he does not drink alcohol or use drugs           Objective:  Patient's shoes and socks removed    Foot ExamPhysical Exam          Foot Exam     General  General Appearance: appears stated age and healthy   Orientation: alert and oriented to person, place, and time   Gait: steppage         Right Foot/Ankle      Inspection and Palpation  Arch: pes cavus  Hammertoes: second toe, fifth toe, fourth toe and third toe  Hallux limitus: yes  Skin Exam: callus;         Left Foot/Ankle       Inspection and Palpation  Arch: pes cavus  Hammertoes: second toe, fifth toe, fourth toe and third toe  Hallux limitus: yes  Skin Exam: callus;               Right Foot/Ankle   Right Foot Inspection  Skin Exam: callus and callus                        Right Toe  - Comprehensive Exam  Arch: pes cavus  Hammertoes: second toe, fifth toe, fourth toe and third toe  Hallux limitus: yes     Left Foot/Ankle  Left Foot Inspection  Skin Exam: callus                       Left Toe  - Comprehensive Exam  Arch: pes cavus  Hammertoes: second toe, fifth toe, fourth toe and third toe  Hallux limitus: yes          Physical Exam   Feet:   Right Foot:   Skin Integrity: Positive for callus     Left Foot:   Skin Integrity: Positive for callus          No swelling, mild pain on palpation right worse than left   Nails are thickened dystrophic   No signs of infection  Improving tinea pedis  No erythema no open wounds or signs of infection  Moderate xerosis plantar heels bilateral  Right hallux medial border positive ingrown mild paronychia negative erythema no exudate no signs of infection positive pain on palpation      Periungual eczema secondary to modify ptosis noted of all 10 nails

## 2020-07-29 ENCOUNTER — OFFICE VISIT (OUTPATIENT)
Dept: FAMILY MEDICINE CLINIC | Facility: CLINIC | Age: 55
End: 2020-07-29
Payer: MEDICARE

## 2020-07-29 VITALS
WEIGHT: 190 LBS | DIASTOLIC BLOOD PRESSURE: 70 MMHG | RESPIRATION RATE: 16 BRPM | SYSTOLIC BLOOD PRESSURE: 110 MMHG | HEIGHT: 68 IN | BODY MASS INDEX: 28.79 KG/M2 | TEMPERATURE: 98.5 F | HEART RATE: 72 BPM | OXYGEN SATURATION: 97 %

## 2020-07-29 DIAGNOSIS — M54.32 SCIATICA OF LEFT SIDE: Primary | ICD-10-CM

## 2020-07-29 PROCEDURE — 3074F SYST BP LT 130 MM HG: CPT | Performed by: FAMILY MEDICINE

## 2020-07-29 PROCEDURE — 1036F TOBACCO NON-USER: CPT | Performed by: FAMILY MEDICINE

## 2020-07-29 PROCEDURE — 99213 OFFICE O/P EST LOW 20 MIN: CPT | Performed by: FAMILY MEDICINE

## 2020-07-29 PROCEDURE — 3008F BODY MASS INDEX DOCD: CPT | Performed by: FAMILY MEDICINE

## 2020-07-29 PROCEDURE — 3078F DIAST BP <80 MM HG: CPT | Performed by: FAMILY MEDICINE

## 2020-07-29 NOTE — PROGRESS NOTES
Assessment/Plan:    1  Sciatica of left side  Ambulatory referral to Physical Therapy     Follow-up with PT, treatment plan to be determined per DPT  Subjective:      Patient ID: Kartik Brown is a 54 y o  male  HPI    Patient is a 51-year-old male with history of cerebral palsy, hyperlipidemia, hypertension, labs sided sciatica presenting with exacerbation of his left sciatica  Patient states that his pain starts in the hips and radiates down behind his left thigh  Patient denies any restriction in range of motion from the baseline or radiation of the pain anywhere else  Patient currently denies any back pain or bowel/urinary incontinence  Patient's age from the group home present during the entire encounter  Part of the history was provided by her  Review of Systems    See above    Objective:      /70   Pulse 72   Temp 98 5 °F (36 9 °C)   Resp 16   Ht 5' 8" (1 727 m)   Wt 86 2 kg (190 lb)   SpO2 97%   BMI 28 89 kg/m²          Physical Exam   Constitutional: He is oriented to person, place, and time  No distress  HENT:   Head: Normocephalic and atraumatic  Pulmonary/Chest: Effort normal  No respiratory distress  Musculoskeletal: He exhibits no edema or tenderness  Positive straight leg test on the left  Reduced range of motion at baseline due to cerebral palsy, no further restriction range of motion from baseline; 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

## 2020-08-05 ENCOUNTER — EVALUATION (OUTPATIENT)
Dept: PHYSICAL THERAPY | Facility: CLINIC | Age: 55
End: 2020-08-05
Payer: MEDICARE

## 2020-08-05 DIAGNOSIS — M54.32 SCIATICA OF LEFT SIDE: Primary | ICD-10-CM

## 2020-08-05 PROCEDURE — 97161 PT EVAL LOW COMPLEX 20 MIN: CPT

## 2020-08-05 NOTE — PROGRESS NOTES
PT Evaluation     Today's date: 2020  Patient name: Kevyn Cortes  : 1965  MRN: [id number]  Referring provider: Estelita Ziegler DO  Dx:   Encounter Diagnosis     ICD-10-CM    1  Sciatica of left side  M[age] Ambulatory referral to Physical Therapy       Start Time:   Stop Time:   Total time in clinic (min): 45 minutes    Assessment  Assessment details: Kevyn Cortes is a 54y o  year old male reports to physical therapy with symptoms consistent with referring diagnosis of: Sciatica of left side  (primary encounter diagnosis) Plan: Ambulatory referral to Physical Therapy  Patient demonstrates limitations in functional mobility, lumbar ROM, and LE strength due to symptoms related to lumbar radiculopathy  See impairments below for details  Patient is limited in the following functional activities: sitting greater than 10 minutes, standing greater than 10 minutes, walking greater than 15 minutes, and performing exercises at home for greater than 5 minutes  Impairments/Exam Findings:    Reduced ROM: decreased lumbar AROM globally   Reduced strength: decreased R LE strength due to diagnosis of CP   + TTP and increased soft tissue density: L ITB/TFL, L PSIS, L paraspinals, L QL   Gait abnormalities: L lateral trunk lean over stance limb, trunk lurch B foot flat    Patient responded well to NURIA and prone lying  FOTO score is 56% with a 64% prediction in function  Patient requires skilled physical therapy to restore prior level of function, address functional limitations, and progress towards independence with home exercise program  Patient and caregiver were educated on importance of performing HEP, nature of low back pain, and performing heat modalities to decrease pain  Patient and caregiver verbalized and demonstrated understanding of HEP and plan of care      Symptom irritability: lowBarriers to therapy: Progress limited to chronic nature of condition and diagnosis of cerebral palsy    Understanding of Dx/Px/POC: good   Prognosis: good    Goals  STGs to be achieved in 4 weeks  -STG 1: Patient will be independent with HEP    -STG 2: Patient will have 0/10 low back pain at rest    -STG 3: Patient will increase lumbar A/PROM with less than or equal to min loss  -STG 4: Patient will report 40% functional improvement  -STG 5: Patient to improve strength of core stabilizers to greater than or equal to 4/5 to stabilize lumbar spine  LTGs to be achieved in 8 weeks  -LTG 1: Patient will demonstrate independence with maintenance program    -LTG 2: Patient will perform all functional activities with less than 2/10 low back pain  -LTG 3: Patient will improve FOTO score by 10 points  -LTG 4: Patient will report 80% functional improvement  Plan  Patient would benefit from: PT eval and skilled physical therapy  Planned modality interventions: manual electrical stimulation, cryotherapy, microcurrent electrical stimulation, TENS, ultrasound, unattended electrical stimulation and electrical stimulation/Russian stimulation  Planned therapy interventions: joint mobilization, manual therapy, balance, massage, neuromuscular re-education, patient education, postural training, strengthening, stretching, therapeutic activities, therapeutic exercise, flexibility, functional ROM exercises, graded activity, gait training, graded exercise, graded motor and home exercise program  Frequency: 2x week  Duration in visits: 8  Duration in weeks: 4  Treatment plan discussed with: patient and caregiver        Subjective Evaluation    History of Present Illness  Mechanism of injury: Patient reports low back pain for 2 years, with left sided pain  He has received PT for low back pain in 2018  Patient presents with R sided weakness due to cerebral palsy  Patient presents to clinic with caregiver from senior living  Brianna aided in answering questions for [place]   He is having pain with standing, sitting, and walking around his apartment complex  Patient complains of pain radiating down his left side to the anterior aspect of his L LE  He also complains of increased L knee pain and R triceps pain at this time  Patient has not completed exercise program at the home due to COVID-19 restrictions  He has not completed the exercises from his previous PT sessions  He uses a recumbent bike in his apartment for 15 minutes a day  On days he has increased pain, he does not perform any exercises  Quality of life: good    Pain  Current pain ratin  At best pain ratin  At worst pain ratin  Quality: sharp and radiating  Relieving factors: medications, heat and rest  Aggravating factors: standing, walking and stair climbing  Progression: no change    Social Support  Steps to enter house: no  Stairs in house: no   Lives in: apartment    Employment status: not working    Diagnostic Tests  CT scan: abnormal (Last CT performed 2018 with results of lumbar radiculopathy, scoliosis, and spinal stenosis )    [facility] comments: No imaging performed since 2018  Treatments  Previous treatment: medication and physical therapy  Patient Goals  Patient goals for therapy: decreased pain and return to sport/leisure activities          Objective     Concurrent Complaints  Negative for bladder dysfunction and bowel dysfunction    Static Posture   General Observations  Asymmetrical weight bearing and scoliosis  Shoulders  Asymmetric shoulders  Lumbar Spine   Flattened  Hip   Hip (Right): Externally rotated and abducted  Postural Observations  Seated posture: fair  Standing posture: fair  Correction of posture: makes symptoms better        Palpation   Left   Tenderness of the erector spinae, lumbar paraspinals and quadratus lumborum  Right   No palpable tenderness to the erector spinae, lumbar paraspinals and quadratus lumborum       Additional Palpation Details  TTP L piriformis, L ITB/TFL    Tenderness     Left Hip   Tenderness in the PSIS  Neurological Testing     Sensation     Lumbar   Left   Intact: light touch    Right   Intact: light touch    Reflexes   Left   Patellar (L4): normal (2+)  Achilles (S1): normal (2+)    Right   Patellar (L4): trace (1+)  Achilles (S1): trace (1+)    Active Range of Motion     Lumbar   Flexion:  with pain Restriction level: moderate  Extension:  Restriction level: moderate  Left lateral flexion:  with pain Restriction level: moderate  Right lateral flexion:  with pain Restriction level: moderate  Left rotation:  Restriction level: minimal  Right rotation:  Restriction level: minimal  Mechanical Assessment    Cervical      Thoracic      Lumbar    Standing flexion: repeated movements   Pain location:peripheralized  Pain intensity: worse  Pain level: increased  Sitting flexion: repeated movements  Pain location: peripheralized  Pain intensity: worse  Pain level: increased  Standing extension: repeated movements  Pain location: centralized  Pain intensity: better  Pain level: decreased  Lying extension: sustained positions  Pain location: centralized  Pain intensity: better  Pain level: decreased    Strength/Myotome Testing     Left Hip   Planes of Motion   Flexion: 5  Extension: 5  Abduction: 5  Adduction: 5    Right Hip   Planes of Motion   Flexion: 4  Extension: 4  Abduction: 4  Adduction: 5    Left Knee   Flexion: 5  Extension: 5    Right Knee   Flexion: 4  Extension: 4    Left Ankle/Foot   Dorsiflexion: 5  Plantar flexion: 5    Right Ankle/Foot   Dorsiflexion: 5  Plantar flexion: 5    Tests     Lumbar     Left   Positive passive SLR and quadrant  Right   Positive quadrant  Negative passive SLR       Left Pelvic Girdle/Sacrum   Positive: active SLR test      Right Pelvic Girdle/Sacrum   Positive: active SLR test      Ambulation     Observational Gait   Gait: asymmetric   Left foot contact pattern: foot flat  Right foot contact pattern: foot flat  Left arm swing: within functional limits  Right arm swing: decreased  Base of support: increased    Quality of Movement During Gait   Trunk  Extensor lurch  Trunk (Left): Positive left lateral lean over stance limb  Functional Assessment      Squat    Unable to perform  and pain            Precautions: Standard, Cerebral palsy, chronic low back pain, HTN      Manuals 8/5            L HS stretch              L piriformis stretch              L ITB stretch                          Neuro Re-Ed             Bridging              + add squeeze              + abd with TB             Clamshells                                                    Ther Ex             NuStep warmup             NURIA             Slouch overcorrect             LTR                                                                 Ther Activity                                       Gait Training                                       Modalities

## 2020-08-05 NOTE — LETTER
2020    Rich Faulkner DO  [address] [address]    Patient: Radha William   YOB: 1965   Date of Visit: 2020     Encounter Diagnosis     ICD-10-CM    1  Sciatica of left side  M54 32 Ambulatory referral to Physical Therapy       Dear Dr Rhys Freeman:    Thank you for your recent referral of Radha William  Please review the attached evaluation summary from Mario's recent visit  Please verify that you agree with the plan of care by signing the attached order  If you have any questions or concerns, please do not hesitate to call  I sincerely appreciate the opportunity to share in the care of one of your patients and hope to have another opportunity to work with you in the near future  Sincerely,    Rosendo Galdamez, PT      Referring Provider:      I certify that I have read the below Plan of Care and certify the need for these services furnished under this plan of treatment while under my care  Rich FaulknerDO  [address] [place] [address]  [facility]          PT Evaluation     Today's date: 2020  Patient name: Radha William  : 1965  MRN: [id number]  Referring provider: Rafael Mcgee DO  Dx:   Encounter Diagnosis     ICD-10-CM    1  Sciatica of left side  M54 32 Ambulatory referral to Physical Therapy       Start Time: 8870  Stop Time: 1400  Total time in clinic (min): 45 minutes    Assessment  Assessment details: Radha William is a 54y o  year old male reports to physical therapy with symptoms consistent with referring diagnosis of: Sciatica of left side  (primary encounter diagnosis) Plan: Ambulatory referral to Physical Therapy  Patient demonstrates limitations in functional mobility, lumbar ROM, and LE strength due to symptoms related to lumbar radiculopathy  See impairments below for details       Patient is limited in the following functional activities: sitting greater than 10 minutes, standing greater than 10 minutes, walking greater than 15 minutes, and performing exercises at home for greater than 5 minutes  Impairments/Exam Findings:    Reduced ROM: decreased lumbar AROM globally   Reduced strength: decreased R LE strength due to diagnosis of CP   + TTP and increased soft tissue density: L ITB/TFL, L PSIS, L paraspinals, L QL   Gait abnormalities: L lateral trunk lean over stance limb, trunk lurch B foot flat    Patient responded well to NURIA and prone lying  FOTO score is 56% with a 64% prediction in function  Patient requires skilled physical therapy to restore prior level of function, address functional limitations, and progress towards independence with home exercise program  Patient and caregiver were educated on importance of performing HEP, nature of low back pain, and performing heat modalities to decrease pain  Patient and caregiver verbalized and demonstrated understanding of HEP and plan of care  Symptom irritability: lowBarriers to therapy: Progress limited to chronic nature of condition and diagnosis of cerebral palsy  Understanding of Dx/Px/POC: good   Prognosis: good    Goals  STGs to be achieved in 4 weeks  -STG 1: Patient will be independent with HEP    -STG 2: Patient will have 0/10 low back pain at rest    -STG 3: Patient will increase lumbar A/PROM with less than or equal to min loss  -STG 4: Patient will report 40% functional improvement  -STG 5: Patient to improve strength of core stabilizers to greater than or equal to 4/5 to stabilize lumbar spine  LTGs to be achieved in 8 weeks  -LTG 1: Patient will demonstrate independence with maintenance program    -LTG 2: Patient will perform all functional activities with less than 2/10 low back pain  -LTG 3: Patient will improve FOTO score by 10 points  -LTG 4: Patient will report 80% functional improvement       Plan  Patient would benefit from: PT eval and skilled physical therapy  Planned modality interventions: manual electrical stimulation, cryotherapy, microcurrent electrical stimulation, TENS, ultrasound, unattended electrical stimulation and electrical stimulation/Russian stimulation  Planned therapy interventions: joint mobilization, manual therapy, balance, massage, neuromuscular re-education, patient education, postural training, strengthening, stretching, therapeutic activities, therapeutic exercise, flexibility, functional ROM exercises, graded activity, gait training, graded exercise, graded motor and home exercise program  Frequency: 2x week  Duration in visits: 8  Duration in weeks: 4  Treatment plan discussed with: patient and caregiver        Subjective Evaluation    History of Present Illness  Mechanism of injury: Patient reports low back pain for 2 years, with left sided pain  He has received PT for low back pain in 2018  Patient presents with R sided weakness due to cerebral palsy  Patient presents to clinic with caregiver from [facility]  Brianna aided in answering questions for Ngozi Akins  He is having pain with standing, sitting, and walking around his apartment complex  Patient complains of pain radiating down his left side to the anterior aspect of his L LE  He also complains of increased L knee pain and R triceps pain at this time  Patient has not completed exercise program at the home due to COVID-19 restrictions  He has not completed the exercises from his previous PT sessions  He uses a recumbent bike in his apartment for 15 minutes a day  On days he has increased pain, he does not perform any exercises     Quality of life: good    Pain  Current pain ratin  At best pain ratin  At worst pain ratin  Quality: sharp and radiating  Relieving factors: medications, heat and rest  Aggravating factors: standing, walking and stair climbing  Progression: no change    Social Support  Steps to enter house: no  Stairs in house: no   Lives in: apartment    Employment status: not working    Diagnostic Tests  CT scan: abnormal (Last CT performed 2018 with results of lumbar radiculopathy, scoliosis, and spinal stenosis )    [facility] comments: No imaging performed since 2018  Treatments  Previous treatment: medication and physical therapy  Patient Goals  Patient goals for therapy: decreased pain and return to sport/leisure activities          Objective     Concurrent Complaints  Negative for bladder dysfunction and bowel dysfunction    Static Posture   General Observations  Asymmetrical weight bearing and scoliosis  Shoulders  Asymmetric shoulders  Lumbar Spine   Flattened  Hip   Hip (Right): Externally rotated and abducted  Postural Observations  Seated posture: fair  Standing posture: fair  Correction of posture: makes symptoms better        Palpation   Left   Tenderness of the erector spinae, lumbar paraspinals and quadratus lumborum  Right   No palpable tenderness to the erector spinae, lumbar paraspinals and quadratus lumborum  Additional Palpation Details  TTP L piriformis, L ITB/TFL    Tenderness     Left Hip   Tenderness in the PSIS       Neurological Testing     Sensation     Lumbar   Left   Intact: light touch    Right   Intact: light touch    Reflexes   Left   Patellar (L4): normal (2+)  Achilles (S1): normal (2+)    Right   Patellar (L4): trace (1+)  Achilles (S1): trace (1+)    Active Range of Motion     Lumbar   Flexion:  with pain Restriction level: moderate  Extension:  Restriction level: moderate  Left lateral flexion:  with pain Restriction level: moderate  Right lateral flexion:  with pain Restriction level: moderate  Left rotation:  Restriction level: minimal  Right rotation:  Restriction level: minimal  Mechanical Assessment    Cervical      Thoracic      Lumbar    Standing flexion: repeated movements   Pain location:peripheralized  Pain intensity: worse  Pain level: increased  Sitting flexion: repeated movements  Pain location: peripheralized  Pain intensity: worse  Pain level: increased  Standing extension: repeated movements  Pain location: centralized  Pain intensity: better  Pain level: decreased  Lying extension: sustained positions  Pain location: centralized  Pain intensity: better  Pain level: decreased    Strength/Myotome Testing     Left Hip   Planes of Motion   Flexion: 5  Extension: 5  Abduction: 5  Adduction: 5    Right Hip   Planes of Motion   Flexion: 4  Extension: 4  Abduction: 4  Adduction: 5    Left Knee   Flexion: 5  Extension: 5    Right Knee   Flexion: 4  Extension: 4    Left Ankle/Foot   Dorsiflexion: 5  Plantar flexion: 5    Right Ankle/Foot   Dorsiflexion: 5  Plantar flexion: 5    Tests     Lumbar     Left   Positive passive SLR and quadrant  Right   Positive quadrant  Negative passive SLR  Left Pelvic Girdle/Sacrum   Positive: active SLR test      Right Pelvic Girdle/Sacrum   Positive: active SLR test      Ambulation     Observational Gait   Gait: asymmetric   Left foot contact pattern: foot flat  Right foot contact pattern: foot flat  Left arm swing: within functional limits  Right arm swing: decreased  Base of support: increased    Quality of Movement During Gait   Trunk  Extensor lurch  Trunk (Left): Positive left lateral lean over stance limb  Functional Assessment      Squat    Unable to perform  and pain            Precautions: Standard, Cerebral palsy, chronic low back pain, HTN      Manuals 8/5            L HS stretch              L piriformis stretch              L ITB stretch                          Neuro Re-Ed             Bridging              + add squeeze              + abd with TB             Clamshells                                                    Ther Ex             NuStep warmup             NURIA             Slouch overcorrect             LTR                                                                 Ther Activity                                       Gait Training Modalities

## 2020-08-06 ENCOUNTER — TRANSCRIBE ORDERS (OUTPATIENT)
Dept: PHYSICAL THERAPY | Facility: CLINIC | Age: 55
End: 2020-08-06

## 2020-08-06 DIAGNOSIS — M54.32 SCIATICA OF LEFT SIDE: Primary | ICD-10-CM

## 2020-08-10 ENCOUNTER — OFFICE VISIT (OUTPATIENT)
Dept: PHYSICAL THERAPY | Facility: CLINIC | Age: 55
End: 2020-08-10
Payer: MEDICARE

## 2020-08-10 DIAGNOSIS — G40.802 OTHER EPILEPSY WITHOUT STATUS EPILEPTICUS, NOT INTRACTABLE (HCC): ICD-10-CM

## 2020-08-10 DIAGNOSIS — M54.32 SCIATICA OF LEFT SIDE: Primary | ICD-10-CM

## 2020-08-10 PROCEDURE — 97140 MANUAL THERAPY 1/> REGIONS: CPT

## 2020-08-10 PROCEDURE — 97110 THERAPEUTIC EXERCISES: CPT

## 2020-08-10 NOTE — PROGRESS NOTES
Daily Note     Today's date: 8/10/2020  Patient name: Sabine Lance  : 1965  MRN: [id number]  Referring provider: Reba Sanchez DO  Dx:   Encounter Diagnosis     ICD-10-CM    1  Sciatica of left side  M54 32                   Subjective: Reports having a 'little bit' of pain in LB      Objective: See treatment diary below      Assessment: Tolerated treatment well  Patient would benefit from continued PT  Shows bilat LE tightness raj with piriformis  Atrophy R LE      Plan: Continue per plan of care        Precautions: Standard, Cerebral palsy, chronic low back pain, HTN      Manuals 8/5 8/10/2020           L HS stretch   5 min           L piriformis stretch   5 min           L ITB stretch             STM R LB  5 min           Neuro Re-Ed             Bridging   10           + add squeeze   10           + abd with TB             Clamshells  10 x red                                                  Ther Ex             NuStep warmup  10 min L1           NURIA  5 X 5 sec manual           Slouch overcorrect  5           LTR  10                                                               Ther Activity                                       Gait Training                                       Modalities

## 2020-08-12 ENCOUNTER — OFFICE VISIT (OUTPATIENT)
Dept: PHYSICAL THERAPY | Facility: CLINIC | Age: 55
End: 2020-08-12
Payer: MEDICARE

## 2020-08-12 DIAGNOSIS — M54.32 SCIATICA OF LEFT SIDE: Primary | ICD-10-CM

## 2020-08-12 PROCEDURE — 97140 MANUAL THERAPY 1/> REGIONS: CPT

## 2020-08-12 PROCEDURE — 97110 THERAPEUTIC EXERCISES: CPT

## 2020-08-12 NOTE — PROGRESS NOTES
Daily Note     Today's date: 2020  Patient name: Anival Boateng  : 1965  MRN: [id number]  Referring provider: Lior Steen DO  Dx:   Encounter Diagnosis     ICD-10-CM    1  Sciatica of left side  M54 32        Start Time: 1155  Stop Time: 1235  Total time in clinic (min): 40 minutes    Subjective: Patient denies low back pain and L sided radiculopathy at this time  He is having some increased R LE pain today  Objective: See treatment diary below      Assessment: Patient arrived 10 minutes late to appointment today  Tolerated treatment well  Patient would benefit from continued PT  Patient requires tactile cueing throughout session to perform exercises with proper technique  B HS and piriformis hypertonicity noted  Patient sought resolution of L anterior tibialis pain with DKTC stretch  Continue to assess each visit  Plan: Continue per plan of care        Precautions: Standard, Cerebral palsy, chronic low back pain, HTN      Manuals 8/5 8/10/2020 8/12          L HS stretch   5 min 5'          L piriformis stretch   5 min 5'          L ITB stretch   5'          STM R LB  5 min           Neuro Re-Ed             Bridging   10 10          + add squeeze   10 20                       Clamshells  10 x red x10 RTB                                                 Ther Ex             NuStep   10 min L1 5' L1          NURIA  5 X 5 sec manual           Slouch overcorrect  5           LTR  10 29          DKTC    4 x 20s manual          SKTC   4x20s manual          Supine marches   x15 B          SLR flex   x10 B          Ther Activity                                       Gait Training                                       Modalities

## 2020-08-14 RX ORDER — LAMOTRIGINE 25 MG/1
TABLET ORAL
Qty: 60 TABLET | Refills: 0 | Status: SHIPPED | OUTPATIENT
Start: 2020-08-14 | End: 2020-09-14

## 2020-08-14 RX ORDER — LAMOTRIGINE 150 MG/1
TABLET ORAL
Qty: 60 TABLET | Refills: 0 | Status: SHIPPED | OUTPATIENT
Start: 2020-08-14 | End: 2020-09-14

## 2020-08-17 ENCOUNTER — TELEPHONE (OUTPATIENT)
Dept: NEUROLOGY | Facility: CLINIC | Age: 55
End: 2020-08-17

## 2020-08-17 NOTE — TELEPHONE ENCOUNTER
Best contact number for EQKWBZ[facility]:[phone]    Emergency Contact name and number:None    Referring provider and telephone number:[facility]     Primary Care Provider Name and if affiliated with [facility]: Ever Carmichael      Reason for Appointment/Dx:    Neurology Location patient would like to be seen:    Order received? No order needed [facility]                                                 Records Received? Yes Epic    Have you ever seen another Neurologist?       [address] Name:Medicare A & B     ID/Policy #:    Secondary Insurance:[employer] [facility]    ID/Policy#: Workman's Comp/ Accident/ School  Information      Workman's Comp/Accident/School related?        No    If yes name of Insurance company:    Date of Injury:    Type of Injury:    [address] Name and Telephone Number:    Notes:Appointment schedule New Patient pack sent                    Appointment date: 9-23-20 9:00am with Dr Tona Roca

## 2020-08-18 ENCOUNTER — OFFICE VISIT (OUTPATIENT)
Dept: PHYSICAL THERAPY | Facility: CLINIC | Age: 55
End: 2020-08-18
Payer: MEDICARE

## 2020-08-18 DIAGNOSIS — M54.32 SCIATICA OF LEFT SIDE: Primary | ICD-10-CM

## 2020-08-18 PROCEDURE — 97140 MANUAL THERAPY 1/> REGIONS: CPT

## 2020-08-18 PROCEDURE — 97110 THERAPEUTIC EXERCISES: CPT

## 2020-08-18 PROCEDURE — 97112 NEUROMUSCULAR REEDUCATION: CPT

## 2020-08-18 NOTE — PROGRESS NOTES
Daily Note     Today's date: 2020  Patient name: Shiv Chavarria  : 1965  MRN: [id number]  Referring provider: Shannon Vera DO  Dx:   Encounter Diagnosis     ICD-10-CM    1  Sciatica of left side  M54 32        Start Time: 1230  Stop Time: 1315  Total time in clinic (min): 45 minutes    Subjective: Patient complains of R gastroc pain today  His pain is increased in standing and completing household chores, and relieved in sitting  Patient wants to go back to doctor because pain is intense  Objective: See treatment diary below      Assessment: Patient requires constant verbal and tactile cues to perform exercises  Manual techniques required to perform [facility]  Pain abolished with repeated flexion in seated and DKTC exercise  Patient provided with HEP to include flexion based exercises  Reviewed with patient and care taker  Tolerated treatment well  Patient would benefit from continued PT to decrease radicular symptoms and improve overall functional mobility  Plan: Continue per plan of care        Precautions: Standard, Cerebral palsy, chronic low back pain, HTN      Manuals 8/5 8/10/2020 8/12 8/18         B HS stretch   5 min 5' 5'         B piriformis stretch   5 min 5' 5'         B ITB stretch   5' 5'         STM R LB  5 min           Neuro Re-Ed             Bridging   10 10 x10         + add squeeze   10 20 3s x 10 no bridge                      Clamshells  10 x red x10 RTB x10 RTB                                                Ther Ex             NuStep   10 min L1 5' L1 10' L1         NURIA  5 X 5 sec manual  Hold         Slouch overcorrect  5  Hold         Repeated flexion in sitting    x10         LTR  10 20 20         DKTC    4 x 20s manual 4 x 20s manual         SKTC   4x20s manual 4 x 20s manual         Supine marches   x15 B x15 B         SLR flex   x10 B          Ther Activity                                       Gait Training Modalities

## 2020-08-20 ENCOUNTER — OFFICE VISIT (OUTPATIENT)
Dept: PHYSICAL THERAPY | Facility: CLINIC | Age: 55
End: 2020-08-20
Payer: MEDICARE

## 2020-08-20 DIAGNOSIS — M54.32 SCIATICA OF LEFT SIDE: Primary | ICD-10-CM

## 2020-08-20 PROCEDURE — 97112 NEUROMUSCULAR REEDUCATION: CPT

## 2020-08-20 PROCEDURE — 97140 MANUAL THERAPY 1/> REGIONS: CPT

## 2020-08-20 PROCEDURE — 97110 THERAPEUTIC EXERCISES: CPT

## 2020-08-20 NOTE — PROGRESS NOTES
Daily Note     Today's date: 2020  Patient name: Luke Stanley  : 1965  MRN: [id number]  Referring provider: Kory Prince DO  Dx:   Encounter Diagnosis     ICD-10-CM    1  Sciatica of left side  M54 32        Start Time: 5749  Stop Time: 1145  Total time in clinic (min): 40 minutes    Subjective: Patient reports minimal LE and low back pain today  Objective: See treatment diary below      Assessment: Patient requires constant verbal and tactile cues to perform exercises throughout treatment  Pain dissipates with repeated flexion in seated position  Tolerated treatment well  Patient would benefit from continued PT to improve LE strength, decrease low back pain, and improve overall functional mobility  Plan: Continue per plan of care        Precautions: Standard, Cerebral palsy, chronic low back pain, HTN      Manuals 8/5 8/10/2020 8/12 8/18 8/20        B HS stretch   5 min 5' 5'         B piriformis stretch   5 min 5' 5'         B ITB stretch   5' 5'         STM R LB  5 min           Neuro Re-Ed             Bridging   10 10 x10         + add squeeze   10 20 3s x 10 no bridge 3s x 10 no bridge                     Clamshells  10 x red x10 RTB x10 RTB x10 RTB supine                                               Ther Ex             NuStep   10 min L1 5' L1 10' L1 5' L1        NURIA  5 X 5 sec manual  Hold HOLD        Slouch overcorrect  5  Hold HOLD        Repeated flexion in sitting    x10 x20        LAQ     x15 B        LTR  10 20 20 x20        DKTC    4 x 20s manual 4 x 20s manual 4 x 20s manual        SKTC   4x20s manual 4 x 20s manual 4 x 20s manual        Supine marches   x15 B x15 B x15 B        Peanut hip flex     x20        SLR flex   x10 B          Ther Activity                                       Gait Training                                       Modalities

## 2020-08-25 ENCOUNTER — OFFICE VISIT (OUTPATIENT)
Dept: PHYSICAL THERAPY | Facility: CLINIC | Age: 55
End: 2020-08-25
Payer: MEDICARE

## 2020-08-25 DIAGNOSIS — M54.32 SCIATICA OF LEFT SIDE: Primary | ICD-10-CM

## 2020-08-25 PROCEDURE — 97112 NEUROMUSCULAR REEDUCATION: CPT

## 2020-08-25 PROCEDURE — 97110 THERAPEUTIC EXERCISES: CPT

## 2020-08-25 NOTE — PROGRESS NOTES
Daily Note     Today's date: 2020  Patient name: Jalyn Friend  : 1965  MRN: [id number]  Referring provider: Adan Gallego DO  Dx:   Encounter Diagnosis     ICD-10-CM    1  Sciatica of left side  M54 32                   Subjective: Pt reports that he felt good after last session, has some pain today but does not give a number  Has more pain at night than during day  Objective: Requires cueing for form and pacing w/ squat training and sit<>stand  Mod correction but requires continuous cueing throughout squat training for depth and pacing  Assessment: Tolerated treatment well  Patient demonstrated fatigue post treatment and would benefit from continued PT  Pt reports no pain to end session  Difficult to assess this objectively, however he does well w/ progressions to standing exercises  Discussed importance of these for home exercise program, pt verbalizes understanding  Scheduled for one more session, will check in w/ primary PT regarding plan moving forward  Plan: Continue per plan of care  per primary PT        Precautions: Standard, Cerebral palsy, chronic low back pain, HTN      Manuals 8/5 8/10/2020 8/12 8/18 8/20 8/25       B HS stretch   5 min 5' 5'  x2 min B       B piriformis stretch   5 min 5' 5'  x2 min B        B ITB stretch   5' 5'         STM R LB  5 min    LAD on B LE x 2-3 mins each       Neuro Re-Ed             Bridging   10 10 x10  W/ ad sq 2x10       + add squeeze   10 20 3s x 10 no bridge 3s x 10 no bridge x20                     Clamshells  10 x red x10 RTB x10 RTB x10 RTB supine              TrA isos w/ march x 10, w/ single leg ext 2x10             Squat training x 4-5 min             Sit<>stand x 10  March x 10-15       Ther Ex             NuStep   10 min L1 5' L1 10' L1 5' L1 8 min lvl 2 LE only        NURIA  5 X 5 sec manual  Hold HOLD        Slouch overcorrect  5  Hold HOLD        Repeated flexion in sitting    x10 x20        LAQ     x15 B        LTR  10 20 20 x20 x20 total       DKTC    4 x 20s manual 4 x 20s manual 4 x 20s manual Manual x 10       SKTC   4x20s manual 4 x 20s manual 4 x 20s manual Manual 4x20 B        Supine marches   x15 B x15 B x15 B Above        Peanut hip flex     x20        SLR flex   x10 B          Ther Activity                                       Gait Training                                       Modalities

## 2020-08-27 ENCOUNTER — OFFICE VISIT (OUTPATIENT)
Dept: PHYSICAL THERAPY | Facility: CLINIC | Age: 55
End: 2020-08-27
Payer: MEDICARE

## 2020-08-27 DIAGNOSIS — M54.32 SCIATICA OF LEFT SIDE: Primary | ICD-10-CM

## 2020-08-27 PROCEDURE — 97112 NEUROMUSCULAR REEDUCATION: CPT | Performed by: PHYSICAL THERAPIST

## 2020-08-27 PROCEDURE — 97110 THERAPEUTIC EXERCISES: CPT | Performed by: PHYSICAL THERAPIST

## 2020-08-27 NOTE — PROGRESS NOTES
Daily Note     Today's date: 2020  Patient name: Gema Santiago  : 1965  MRN: [id number]  Referring provider: Negro Hilario DO  Dx:   Encounter Diagnosis     ICD-10-CM    1  Sciatica of left side  M54 32        Subjective: Pt reports that he felt good after last session, has some pain today but does not give a number  Has more pain at night than during day  "my back hurts at night"  Spoke with pt about sleeping positions and pt states he sleeps on his side  Advised pt trial sleeping on back with pillow under knees or on side with knees flexed      Objective: Requires cueing for form and pacing w/ squat training and sit<>stand  Continued nu step to increase LE movement  Assessment: Tolerated treatment well  Patient demonstrated fatigue post treatment and would benefit from continued PT  Pt reports min pain at end session  Difficult to assess this objectively, however pt appears to be having difficulty with standing exercises due to exacerbation of R lateral knee pain  Discussed importance of these for home exercise program, pt verbalizes understanding  Primary PT spoke with caregiver and states pt would like to continue with PT and therapy is "helping" to decrease pain      Plan: Continue per plan of care  per primary PT  PT x 2 additional weeks  PT to reassess next week       Precautions: Standard, Cerebral palsy, chronic low back pain, HTN      Manuals 8/5 8/10/2020 8/12 8/18 8/20 8/25 8/27/20      B HS stretch   5 min 5' 5'  x2 min B x2 min B      B piriformis stretch   5 min 5' 5'  x2 min B  SKTC manual x 2 min B      B ITB stretch   5' 5'   ITB stick rolling x 2 min L LE      STM R LB  5 min    LAD on B LE x 2-3 mins each       Neuro Re-Ed             Bridging   10 10 x10  W/ ad sq 2x10 2x10      + add squeeze   10 20 3s x 10 no bridge 3s x 10 no bridge x20  2x10 hold 5                   Clamshells  10 x red x10 RTB x10 RTB x10 RTB supine              TrA isos w/ march x 10, w/ single leg ext 2x10             Squat training x 4-5 min Squat training x 20 reps            Sit<>stand x 10  March x 10-15 Sit to stand x 5 reps      Ther Ex             NuStep   10 min L1 5' L1 10' L1 5' L1 8 min lvl 2 LE only  5 min level 2 LE only      NURIA  5 X 5 sec manual  Hold HOLD        Slouch overcorrect  5  Hold HOLD        Repeated flexion in sitting    x10 x20        LAQ     x15 B  x20 hold 5      LTR  10 20 20 x20 x20 total x20 hold 5      DKTC    4 x 20s manual 4 x 20s manual 4 x 20s manual Manual x 10 manual x10 hold 5      SKTC   4x20s manual 4 x 20s manual 4 x 20s manual Manual 4x20 B  Manual x10 hold 5      Supine marches   x15 B x15 B x15 B Above        Peanut hip flex     x20        SLR flex   x10 B          Ther Activity                                       Gait Training       Gait training x 30'x2                                 Modalities

## 2020-09-01 ENCOUNTER — OFFICE VISIT (OUTPATIENT)
Dept: PHYSICAL THERAPY | Facility: CLINIC | Age: 55
End: 2020-09-01
Payer: MEDICARE

## 2020-09-01 DIAGNOSIS — M54.32 SCIATICA OF LEFT SIDE: Primary | ICD-10-CM

## 2020-09-01 PROCEDURE — 97112 NEUROMUSCULAR REEDUCATION: CPT

## 2020-09-01 PROCEDURE — 97110 THERAPEUTIC EXERCISES: CPT

## 2020-09-01 PROCEDURE — 97140 MANUAL THERAPY 1/> REGIONS: CPT

## 2020-09-01 NOTE — PROGRESS NOTES
Daily Note     Today's date: 2020  Patient name: Leslee Lamb  : 1965  MRN: [id number]  Referring provider: Tyree Lambert DO  Dx:   Encounter Diagnosis     ICD-10-CM    1  Sciatica of left side  M54 32        Start Time:   Stop Time: 1150  Total time in clinic (min): 45 minutes    Subjective: Patient indicates left low back pain radiating into left leg     Objective: See treatment diary below      Assessment: Tolerated treatment well  Patient & caregiver encouraged to perform seated LS flexion exercises post prolonged standing ; Patient would benefit from continued PT      Plan: Continue per plan of care        Precautions: Standard, Cerebral palsy, chronic low back pain, HTN      Manuals 8/5 8/10/2020 8/12 8/18 8/20 8/25 8/27/20 9/1/20     B HS stretch   5 min 5' 5'  x2 min B x2 min B 3 x 20 sec ea       B piriformis stretch   5 min 5' 5'  x2 min B  SKTC manual x 2 min B SKTC manual x 2 min B      B ITB stretch   5' 5'   ITB stick rolling x 2 min L LE ITB stick rolling x 2 min   LLE     STM R LB  5 min    LAD on B LE x 2-3 mins each       Neuro Re-Ed             Bridging   10 10 x10  W/ ad sq 2x10 2x10 2x10     + add squeeze   10 20 3s x 10 no bridge 3s x 10 no bridge x20  2x10 hold 5 2x10  hold5                  Clamshells  10 x red x10 RTB x10 RTB x10 RTB supine              TrA isos w/ march x 10, w/ single leg ext 2x10  TrA isos w march x 10, w single leg ext x 10            Squat training x 4-5 min Squat training x 20 reps Not today           Sit<>stand x 10  March x 10-15 Sit to stand x 5 reps Sit to stand x 5 reps      Ther Ex             NuStep   10 min L1 5' L1 10' L1 5' L1 8 min lvl 2 LE only  5 min level 2 LE only 5 min lvl 2 LE only      NURIA  5 X 5 sec manual  Hold HOLD        Slouch overcorrect  5  Hold HOLD        Repeated flexion in sitting    x10 x20   x20     LAQ     x15 B  x20 hold 5 x20  Hold 5     LTR  10 20 20 x20 x20 total x20 hold 5 x20 hold 5      DKTC    4 x 20s manual 4 x 20s manual 4 x 20s manual Manual x 10 manual x10 hold 5 peanut  x 10 hold 5     SKTC   4x20s manual 4 x 20s manual 4 x 20s manual Manual 4x20 B  Manual x10 hold 5 manual x 10 hold 5      Supine marches   x15 B x15 B x15 B Above        Peanut hip flex     x20        SLR flex   x10 B          Ther Activity                                       Gait Training       Gait training x 30'x2  GT x 30' x 2                                Modalities

## 2020-09-03 ENCOUNTER — OFFICE VISIT (OUTPATIENT)
Dept: PHYSICAL THERAPY | Facility: CLINIC | Age: 55
End: 2020-09-03
Payer: MEDICARE

## 2020-09-03 DIAGNOSIS — M54.32 SCIATICA OF LEFT SIDE: Primary | ICD-10-CM

## 2020-09-03 PROCEDURE — 97110 THERAPEUTIC EXERCISES: CPT | Performed by: PHYSICAL THERAPIST

## 2020-09-03 PROCEDURE — 97112 NEUROMUSCULAR REEDUCATION: CPT | Performed by: PHYSICAL THERAPIST

## 2020-09-03 PROCEDURE — 97140 MANUAL THERAPY 1/> REGIONS: CPT | Performed by: PHYSICAL THERAPIST

## 2020-09-03 NOTE — PROGRESS NOTES
Daily Note     Today's date: 9/3/2020  Patient name: Pattie Horowitz  : 1965  MRN: [id number]  Referring provider: Maryanne Lopez DO  Dx:   Encounter Diagnosis     ICD-10-CM    1  Sciatica of left side  M54 32          Subjective: Patient indicates pain Left LE  5/10  Difficult to assess pt's pain level as pt not very verbal during PT session    Objective: See treatment diary below      Assessment: Tolerated treatment well  Patient & caregiver encouraged to perform seated LS flexion exercises post prolonged standing; Patient will benefit from re-eval with possible DC to HEP in next 1-2 weeks as per primary PT  Pt reporting he has less pain post PT  Pt completed PT session     Plan: Continue per plan of care  Continue as per primary PT   Re-eval with primary PT NV     Precautions: Standard, Cerebral palsy, chronic low back pain, HTN      Manuals 8/5 8/10/2020 8/12 8/18 8/20 8/25 8/27/20 9/1/20 9/3/20    B HS stretch   5 min 5' 5'  x2 min B x2 min B 3 x 20 sec ea   5x 20 sec    B piriformis stretch   5 min 5' 5'  x2 min B  SKTC manual x 2 min B SKTC manual x 2 min B  SKTC manual B x 10 reps each    B ITB stretch   5' 5'   ITB stick rolling x 2 min L LE ITB stick rolling x 2 min   LLE ITB stick rolling x 3 min LLE    STM R LB  5 min    LAD on B LE x 2-3 mins each       Neuro Re-Ed             Bridging   10 10 x10  W/ ad sq 2x10 2x10 2x10 2x10    + add squeeze   10 20 3s x 10 no bridge 3s x 10 no bridge x20  2x10 hold 5 2x10  hold5 2x10 hold 5                 Clamshells  10 x red x10 RTB x10 RTB x10 RTB supine              TrA isos w/ march x 10, w/ single leg ext 2x10  TrA isos w march x 10, w single leg ext x 10  TrA isometric with march with manual assistance          Squat training x 4-5 min Squat training x 20 reps Not today -          Sit<>stand x 10  March x 10-15 Sit to stand x 5 reps Sit to stand x 5 reps  Sit to stand x 3 reps     Ther Ex             NuStep   10 min L1 5' L1 10' L1 5' L1 8 min lvl 2 LE only  5 min level 2 LE only 5 min lvl 2 LE only  7 min level 2 LEs only    NURIA  5 X 5 sec manual  Hold HOLD        Slouch overcorrect  5  Hold HOLD        Repeated flexion in sitting    x10 x20   x20 x20     LAQ     x15 B  x20 hold 5 x20  Hold 5 x20 hold 5    LTR  10 20 20 x20 x20 total x20 hold 5 x20 hold 5  x20 hold 5    DKTC    4 x 20s manual 4 x 20s manual 4 x 20s manual Manual x 10 manual x10 hold 5 peanut  x 10 hold 5 -    SKTC   4x20s manual 4 x 20s manual 4 x 20s manual Manual 4x20 B  Manual x10 hold 5 manual x 10 hold 5  See above    Supine marches   x15 B x15 B x15 B Above        Peanut hip flex     x20        SLR flex   x10 B          Ther Activity                                       Gait Training       Gait training x 30'x2  GT x 30' x 2                                Modalities

## 2020-09-09 ENCOUNTER — EVALUATION (OUTPATIENT)
Dept: PHYSICAL THERAPY | Facility: CLINIC | Age: 55
End: 2020-09-09
Payer: MEDICARE

## 2020-09-09 DIAGNOSIS — M54.32 SCIATICA OF LEFT SIDE: Primary | ICD-10-CM

## 2020-09-09 PROCEDURE — 97140 MANUAL THERAPY 1/> REGIONS: CPT

## 2020-09-09 PROCEDURE — 97110 THERAPEUTIC EXERCISES: CPT

## 2020-09-09 PROCEDURE — 97164 PT RE-EVAL EST PLAN CARE: CPT

## 2020-09-09 NOTE — LETTER
September 15, 2020    Shweta Cristóbal, [address]    Patient: Phil Reza   YOB: 1965   Date of Visit: 2020     Encounter Diagnosis     ICD-10-CM    1  Sciatica of left side  M54 32        Dear Dr Kady Munoz:    Thank you for your recent referral of Phil Reza  Please review the attached evaluation summary from Mario's recent visit  Please verify that you agree with the plan of care by signing the attached order  If you have any questions or concerns, please do not hesitate to call  I sincerely appreciate the opportunity to share in the care of one of your patients and hope to have another opportunity to work with you in the near future  Sincerely,    Grecia Oneal, PT      Referring Provider:      I certify that I have read the below Plan of Care and certify the need for these services furnished under this plan of treatment while under my care  Shweta Ambrocio DO  [address] [address]  [facility] [place]          PT Re-evaluation/Daily Note     Today's date: 2020  Patient name: Phil Reza  : 1965  MRN: [id number]  Referring provider: Lety Tse DO  Dx:   Encounter Diagnosis     ICD-10-CM    1  Sciatica of left side  M54 32        Start Time: 1615  Stop Time: 1700  Total time in clinic (min): 45 minutes    Subjective: Patient reports L medial thigh pain today  He complains of increased low back pain after walking around house a significant distance this weekend  Patient complains of difficulties ambulating greater than 10 minutes without increased lumbar spine pain, standing greater than an hour without increased lumbar spine pain, and RUE pain  He is having difficulties sleeping through the night due to lumbar spine pain  Objective: See treatment diary below  Goals  STGs to be achieved in 4 weeks     -STG 1: Patient will be independent with HEP - MET  -STG 2: Patient will have 0/10 low back pain at rest - Partially met  -STG 3: Patient will increase lumbar A/PROM with less than or equal to min loss - not met  -STG 4: Patient will report 40% functional improvement - not met  -STG 5: Patient to improve strength of core stabilizers to greater than or equal to 4/5 to stabilize lumbar spine - not met    LTGs to be achieved in 8 weeks  -LTG 1: Patient will demonstrate independence with maintenance program - MET  -LTG 2: Patient will perform all functional activities with less than 2/10 low back pain -partially met   -LTG 3: Patient will improve FOTO score by 10 points - not met  -LTG 4: Patient will report 80% functional improvement - not met    Left Hip   Planes of Motion   Flexion: 5  Extension: 5  Abduction: 5  Adduction: 5    Right Hip   Planes of Motion   Flexion: 4+  Extension: 4  Abduction: 4+  Adduction: 5    Left Knee   Flexion: 5  Extension: 5    Right Knee   Flexion: 4  Extension: 4    Left Ankle/Foot   Dorsiflexion: 5  Plantar flexion: 5    Right Ankle/Foot   Dorsiflexion: 5  Plantar flexion: 5    Lumbar   Lumbar AROM   Flexion: 75%   Extension: 25%   R SB: 25%   L SB: 25%  R rotation: 25%  L rotation: 25%     Repeated motion testing    Repeated flexion in standing: decreases pain    Repeated flexion in sitting: decreases pain     Repeated extension in standing: increases pain     Prone lying: increases pain    Prone on elbows: increases pain     Assessment: Patient is limited in standing tolerance, walking tolerance, and activity tolerance due to lumbar spine pain and fatigue  Patient demonstrates improvements in lumbar spine ROM, with minimal improvement in core strength  Patient was provided with educated regarding referral to OT for R UE spasticity, weakness, and hypertonicity  He was advised to schedule an appointment with OT to receive appropriate care for R UE   Care giver provided with information and verbalized understanding of process to obtain script  He was provided with Hep to include flexion based exercises to improve lumbar spine pain  Tolerated treatment well  Patient would benefit from continued PT to improve core strength, B LE strength, and overall functional mobility  Plan: Continue per plan of care        Precautions: Standard, Cerebral palsy, chronic low back pain, HTN      Manuals 8/5 8/10/2020 8/12 8/18 8/20 8/25 8/27/20 9/1/20 9/3/20 9/9/20   B HS stretch   5 min 5' 5'  x2 min B x2 min B 3 x 20 sec ea   5x 20 sec 5x 20 sec   B piriformis stretch   5 min 5' 5'  x2 min B  SKTC manual x 2 min B SKTC manual x 2 min B  SKTC manual B x 10 reps each SKTC manual B x 10 reps each   B ITB stretch   5' 5'   ITB stick rolling x 2 min L LE ITB stick rolling x 2 min   LLE ITB stick rolling x 3 min LLE ITB stick rolling x 3 min   STM R LB  5 min    LAD on B LE x 2-3 mins each       Neuro Re-Ed             Bridging   10 10 x10  W/ ad sq 2x10 2x10 2x10 2x10    + add squeeze   10 20 3s x 10 no bridge 3s x 10 no bridge x20  2x10 hold 5 2x10  hold5 2x10 hold 5                 Clamshells  10 x red x10 RTB x10 RTB x10 RTB supine              TrA isos w/ march x 10, w/ single leg ext 2x10  TrA isos w march x 10, w single leg ext x 10  TrA isometric with march with manual assistance          Squat training x 4-5 min Squat training x 20 reps Not today -          Sit<>stand x 10  March x 10-15 Sit to stand x 5 reps Sit to stand x 5 reps  Sit to stand x 3 reps     Ther Ex             NuStep   10 min L1 5' L1 10' L1 5' L1 8 min lvl 2 LE only  5 min level 2 LE only 5 min lvl 2 LE only  7 min level 2 LEs only    NURIA  5 X 5 sec manual  Hold HOLD        Slouch overcorrect  5  Hold HOLD        Repeated flexion in sitting    x10 x20   x20 x20     LAQ     x15 B  x20 hold 5 x20  Hold 5 x20 hold 5    LTR  10 20 20 x20 x20 total x20 hold 5 x20 hold 5  x20 hold 5    DKTC    4 x 20s manual 4 x 20s manual 4 x 20s manual Manual x 10 manual x10 hold 5 peanut  x 10 hold 5 - SKTC   4x20s manual 4 x 20s manual 4 x 20s manual Manual 4x20 B  Manual x10 hold 5 manual x 10 hold 5  See above    Supine marches   x15 B x15 B x15 B Above        Peanut hip flex     x20        SLR flex   x10 B          Ther Activity                                       Gait Training       Gait training x 30'x2  GT x 30' x 2                                Modalities

## 2020-09-09 NOTE — PROGRESS NOTES
PT Re-evaluation/Daily Note     Today's date: 2020  Patient name: Pattie Horowitz  : 1965  MRN: [id number]  Referring provider: Maryanne Lopez DO  Dx:   Encounter Diagnosis     ICD-10-CM    1  Sciatica of left side  M54 32        Start Time:   Stop Time: 1700  Total time in clinic (min): 45 minutes    Subjective: Patient reports L medial thigh pain today  He complains of increased low back pain after walking around house a significant distance this weekend  Patient complains of difficulties ambulating greater than 10 minutes without increased lumbar spine pain, standing greater than an hour without increased lumbar spine pain, and RUE pain  He is having difficulties sleeping through the night due to lumbar spine pain  Objective: See treatment diary below  Goals  STGs to be achieved in 4 weeks  -STG 1: Patient will be independent with HEP - MET  -STG 2: Patient will have 0/10 low back pain at rest - Partially met  -STG 3: Patient will increase lumbar A/PROM with less than or equal to min loss - not met  -STG 4: Patient will report 40% functional improvement - not met  -STG 5: Patient to improve strength of core stabilizers to greater than or equal to 4/5 to stabilize lumbar spine - not met    LTGs to be achieved in 8 weeks     -LTG 1: Patient will demonstrate independence with maintenance program - MET  -LTG 2: Patient will perform all functional activities with less than 2/10 low back pain -partially met   -LTG 3: Patient will improve FOTO score by 10 points - not met  -LTG 4: Patient will report 80% functional improvement - not met    Left Hip   Planes of Motion   Flexion: 5  Extension: 5  Abduction: 5  Adduction: 5    Right Hip   Planes of Motion   Flexion: 4+  Extension: 4  Abduction: 4+  Adduction: 5    Left Knee   Flexion: 5  Extension: 5    Right Knee   Flexion: 4  Extension: 4    Left Ankle/Foot   Dorsiflexion: 5  Plantar flexion: 5    Right Ankle/Foot   Dorsiflexion: 5  Plantar flexion: 5    Lumbar   Lumbar AROM   Flexion: 75%   Extension: 25%   R SB: 25%   L SB: 25%  R rotation: 25%  L rotation: 25%     Repeated motion testing    Repeated flexion in standing: decreases pain    Repeated flexion in sitting: decreases pain     Repeated extension in standing: increases pain     Prone lying: increases pain    Prone on elbows: increases pain     Assessment: Patient is limited in standing tolerance, walking tolerance, and activity tolerance due to lumbar spine pain and fatigue  Patient demonstrates improvements in lumbar spine ROM, with minimal improvement in core strength  Patient was provided with educated regarding referral to OT for R UE spasticity, weakness, and hypertonicity  He was advised to schedule an appointment with OT to receive appropriate care for R UE  Care giver provided with information and verbalized understanding of process to obtain script  He was provided with Hep to include flexion based exercises to improve lumbar spine pain  Tolerated treatment well  Patient would benefit from continued PT to improve core strength, B LE strength, and overall functional mobility  Plan: Continue per plan of care        Precautions: Standard, Cerebral palsy, chronic low back pain, HTN      Manuals 8/5 8/10/2020 8/12 8/18 8/20 8/25 8/27/20 9/1/20 9/3/20 9/9/20   B HS stretch   5 min 5' 5'  x2 min B x2 min B 3 x 20 sec ea   5x 20 sec 5x 20 sec   B piriformis stretch   5 min 5' 5'  x2 min B  SKTC manual x 2 min B SKTC manual x 2 min B  SKTC manual B x 10 reps each SKTC manual B x 10 reps each   B ITB stretch   5' 5'   ITB stick rolling x 2 min L LE ITB stick rolling x 2 min   LLE ITB stick rolling x 3 min LLE ITB stick rolling x 3 min   STM R LB  5 min    LAD on B LE x 2-3 mins each       Neuro Re-Ed             Bridging   10 10 x10  W/ ad sq 2x10 2x10 2x10 2x10    + add squeeze   10 20 3s x 10 no bridge 3s x 10 no bridge x20  2x10 hold 5 2x10  hold5 2x10 hold 5                 Clamshells 10 x red x10 RTB x10 RTB x10 RTB supine              TrA isos w/ march x 10, w/ single leg ext 2x10  TrA isos w march x 10, w single leg ext x 10  TrA isometric with march with manual assistance          Squat training x 4-5 min Squat training x 20 reps Not today -          Sit<>stand x 10  March x 10-15 Sit to stand x 5 reps Sit to stand x 5 reps  Sit to stand x 3 reps     Ther Ex             NuStep   10 min L1 5' L1 10' L1 5' L1 8 min lvl 2 LE only  5 min level 2 LE only 5 min lvl 2 LE only  7 min level 2 LEs only    NURIA  5 X 5 sec manual  Hold HOLD        Slouch overcorrect  5  Hold HOLD        Repeated flexion in sitting    x10 x20   x20 x20     LAQ     x15 B  x20 hold 5 x20  Hold 5 x20 hold 5    LTR  10 20 20 x20 x20 total x20 hold 5 x20 hold 5  x20 hold 5    DKTC    4 x 20s manual 4 x 20s manual 4 x 20s manual Manual x 10 manual x10 hold 5 peanut  x 10 hold 5 -    SKTC   4x20s manual 4 x 20s manual 4 x 20s manual Manual 4x20 B  Manual x10 hold 5 manual x 10 hold 5  See above    Supine marches   x15 B x15 B x15 B Above        Peanut hip flex     x20        SLR flex   x10 B          Ther Activity                                       Gait Training       Gait training x 30'x2  GT x 30' x 2                                Modalities

## 2020-09-11 ENCOUNTER — OFFICE VISIT (OUTPATIENT)
Dept: PHYSICAL THERAPY | Facility: CLINIC | Age: 55
End: 2020-09-11
Payer: MEDICARE

## 2020-09-11 DIAGNOSIS — M54.32 SCIATICA OF LEFT SIDE: Primary | ICD-10-CM

## 2020-09-11 PROCEDURE — 97110 THERAPEUTIC EXERCISES: CPT

## 2020-09-11 PROCEDURE — 97140 MANUAL THERAPY 1/> REGIONS: CPT

## 2020-09-11 PROCEDURE — 97112 NEUROMUSCULAR REEDUCATION: CPT

## 2020-09-11 NOTE — PROGRESS NOTES
Daily Note     Today's date: 2020  Patient name: Larry Clemens  : 1965  MRN: [id number]  Referring provider: Martin Ashton DO  Dx:   Encounter Diagnosis     ICD-10-CM    1  Sciatica of left side  M54 32        Start Time: 1055  Stop Time: 1140  Total time in clinic (min): 45 minutes    Subjective: Patient reports minimal pain in L gastroc today  He is completing his HEP as provided  He is to be seen by OT due to complaints of R UE pain  Objective: See treatment diary below      Assessment: Increased B hip flexor pain noted with DKTC exercise  Patient was informed on potential discharge to [facility] next week  Tolerated treatment well  Patient would benefit from continued PT      Plan: Continue per plan of care        Precautions: Standard, Cerebral palsy, chronic low back pain, HTN      Manuals 8/25 8/27/20 9/1/20 9/3/20 9/9/20 9/11/20     B HS stretch  x2 min B x2 min B 3 x 20 sec ea   5x 20 sec 5x 20 sec 5x 20 sec     B piriformis stretch  x2 min B  SKTC manual x 2 min B SKTC manual x 2 min B  SKTC manual B x 10 reps each SKTC manual B x 10 reps each SKTC manual B x 10 reps each     B ITB stretch  ITB stick rolling x 2 min L LE ITB stick rolling x 2 min   LLE ITB stick rolling x 3 min LLE ITB stick rolling x 3 min ITB stick rolling x 3 min     STM R LB LAD on B LE x 2-3 mins each          Neuro Re-Ed           Bridging  W/ ad sq 2x10 2x10 2x10 2x10       + add squeeze  x20  2x10 hold 5 2x10  hold5 2x10 hold 5  2 x 10 hold 5                Clamshells            TrA isos w/ march x 10, w/ single leg ext 2x10  TrA isos w march x 10, w single leg ext x 10  TrA isometric with march with manual assistance  TrA isometric with march with manual assistance      Squat training x 4-5 min Squat training x 20 reps Not today -        Sit<>stand x 10  March x 10-15 Sit to stand x 5 reps Sit to stand x 5 reps  Sit to stand x 3 reps   Sit to stand x 3 reps      Ther Ex           NuStep  8 min lvl 2 LE only  5 min level 2 LE only 5 min lvl 2 LE only  7 min level 2 LEs only  10 min level 2 LEs only     NURIA           Slouch overcorrect           Repeated flexion in sitting   x20 x20  x20 x20     LAQ  x20 hold 5 x20  Hold 5 x20 hold 5 20 x 5s 20 x 5s     LTR x20 total x20 hold 5 x20 hold 5  x20 hold 5 x20 hold 5 x20 hold 5     DKTC  Manual x 10 manual x10 hold 5 peanut  x 10 hold 5 -  manual x10 hold 5     SKTC Manual 4x20 B  Manual x10 hold 5 manual x 10 hold 5  See above  manual x10 hold 5     Supine marches Above      x20     Peanut hip flex      x20     SLR flex           Ther Activity                                 Gait Training  Gait training x 30'x2  GT x 30' x 2                               Modalities

## 2020-09-14 ENCOUNTER — OFFICE VISIT (OUTPATIENT)
Dept: FAMILY MEDICINE CLINIC | Facility: CLINIC | Age: 55
End: 2020-09-14
Payer: MEDICARE

## 2020-09-14 VITALS
OXYGEN SATURATION: 98 % | HEIGHT: 68 IN | SYSTOLIC BLOOD PRESSURE: 118 MMHG | BODY MASS INDEX: 28.49 KG/M2 | WEIGHT: 188 LBS | HEART RATE: 85 BPM | DIASTOLIC BLOOD PRESSURE: 74 MMHG | RESPIRATION RATE: 18 BRPM | TEMPERATURE: 98.2 F

## 2020-09-14 DIAGNOSIS — G40.802 OTHER EPILEPSY WITHOUT STATUS EPILEPTICUS, NOT INTRACTABLE (HCC): ICD-10-CM

## 2020-09-14 DIAGNOSIS — M21.941 ACQUIRED DEFORMITY OF RIGHT HAND: Primary | ICD-10-CM

## 2020-09-14 DIAGNOSIS — K59.00 CONSTIPATION, UNSPECIFIED CONSTIPATION TYPE: ICD-10-CM

## 2020-09-14 PROCEDURE — 99213 OFFICE O/P EST LOW 20 MIN: CPT | Performed by: FAMILY MEDICINE

## 2020-09-14 RX ORDER — LAMOTRIGINE 150 MG/1
TABLET ORAL
Qty: 60 TABLET | Refills: 0 | Status: SHIPPED | OUTPATIENT
Start: 2020-09-14 | End: 2020-09-23 | Stop reason: SDUPTHER

## 2020-09-14 RX ORDER — LAMOTRIGINE 25 MG/1
TABLET ORAL
Qty: 60 TABLET | Refills: 0 | Status: SHIPPED | OUTPATIENT
Start: 2020-09-14 | End: 2020-09-23 | Stop reason: SDUPTHER

## 2020-09-14 RX ORDER — SENNA 8.6 MG/1
TABLET, FILM COATED ORAL
Qty: 30 TABLET | Refills: 2 | Status: SHIPPED | OUTPATIENT
Start: 2020-09-14 | End: 2020-12-10

## 2020-09-14 NOTE — PROGRESS NOTES
Assessment/Plan:     Diagnoses and all orders for this visit:    Acquired deformity of right hand  -     Ambulatory referral to Occupational Therapy; Future      Patient will fu with OT outpatient for appropriate splinting and therapy  Subjective:      Patient ID: Sabine Díaz is a 54 y o  male with history of Cerebral palsy and stroke  He is being seen today for evaluation an diagnosis of a right hand deformity  Deformity has been present since in stroke in 1968  He states that he had some mobility and strength in the bilateral upper extremities  Sensation is also intact  He is being seen by Pt/OT outpatient  The following portions of the patient's history were reviewed and updated as appropriate: allergies, current medications, past family history, past medical history, past social history, past surgical history and problem list     Review of Systems   All other systems reviewed and are negative  Objective:      /74 (BP Location: Left arm, Patient Position: Sitting, Cuff Size: Standard)   Pulse 85   Temp 98 2 °F (36 8 °C) (Tympanic)   Resp 18   Ht 5' 8" (1 727 m)   Wt 85 3 kg (188 lb)   SpO2 98%   BMI 28 59 kg/m²          Physical Exam  Vitals signs reviewed  Constitutional:       Appearance: Normal appearance  Cardiovascular:      Pulses: Normal pulses  Musculoskeletal:      Comments: Diminished strength in right arm at shoulder, elbow, wrist, MCP, PIP, DIP  Sensation grossly intact  Limited active ROM at wrist  Passive ROM limited by muscle spasticity  Skin:     General: Skin is warm and dry  Capillary Refill: Capillary refill takes less than 2 seconds  Neurological:      Mental Status: He is alert and oriented to person, place, and time        Gait: Gait abnormal    Psychiatric:         Mood and Affect: Mood normal          Behavior: Behavior normal

## 2020-09-14 NOTE — PROGRESS NOTES
Presurgical Evaluation    Subjective:      Patient ID: Zena Claudio is a 54 y o  male  Chief Complaint   Patient presents with    Follow-up       HPI    {Common ambulatory SmartLinks:83981}    Procedure date: {DATE:23014}    Surgeon:  ***  Planned procedure:  ***  Diagnosis for procedure:  ***    Prior anesthesia: {[facility] ANESTHSIA :5881046412}    [facility]   NOTE: Patient should see Cardiology if time available before surgery, and if appropriate      Pulmonary History: { AMB PULM HX:2812375628}    Renal history: {[facility] Renal Hx:6705945108}    Diabetes History:  {[facility] DIABETES TYPE:4643308331}     Neurological History: {[facility] neuro:2129696303}     On Immunosuppressant meds/biologics: {[facility] IMMUNOSUPRESSANTS:2813546903}      Review of Systems      Current Outpatient Medications   Medication Sig Dispense Refill    acetaminophen (TYLENOL) 325 mg tablet Take 1 tablet (325 mg total) by mouth every 6 (six) hours as needed for mild pain, moderate pain or headaches 30 tablet 0    atorvastatin (LIPITOR) 10 mg tablet TAKE ONE TABLET BY MOUTH DAILY AT 8PM 30 tablet 3    BYSTOLIC 2 5 MG tablet TAKE ONE TABLET BY MOUTH DAILY AT 8AM 30 tablet 4    lamoTRIgine (LaMICtal) 150 MG tablet TAKE 1 TABLET BY MOUTH  TWICE A DAY AT 8 AM & AT 8 PM 60 tablet 0    lamoTRIgine (LaMICtal) 25 mg tablet TAKE TWO TABLETS BY MOUTH DAILY AT 8AM WITH 150MG TO TOTAL 200MG 60 tablet 0    psyllium (REGULOID) 58 6 % powder Take 1 teaspoonful mixed with 8oz of water orally every day at 8am 1040 g 5    QUEtiapine (SEROquel) 50 mg tablet       Senna-Tabs 8 6 MG tablet TAKE ONE TABLET BY MOUTH DAILY AT 8AM FOR CONSTIPATION 30 tablet 2    sertraline (ZOLOFT) 50 mg tablet Take by mouth daily      ammonium lactate (LAC-HYDRIN) 12 % lotion Apply topically 2 (two) times a day Applied to feet twice a day 8:00 a m /8:00 p m  (Patient not taking: Reported on 9/14/2020) 500 g 2    hydrocortisone 2 5 % cream Apply topically 2 (two) times a day (Patient not taking: Reported on 9/14/2020) 30 g 2     No current facility-administered medications for this visit  Allergies on file:   [address] [facility] [aspirin]    Patient Active Problem List   Diagnosis    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    Acquired deformity of right ankle and foot    Ingrown nail    Pain in both feet    Constipation    Cerebral palsy (HCC)    Renal cyst    Class 1 obesity with body mass index (BMI) of 30 0 to 30 9 in adult    Hyperlipidemia    Lumbar radiculopathy    Spinal stenosis of lumbar region    Chronic left-sided low back pain with left-sided sciatica    Chronic pain syndrome    Impacted cerumen of left ear    Paronychia of toenail        Past Medical History:   Diagnosis Date    Ambulatory dysfunction     Anxiety     Bilateral impacted cerumen     last assessed 05/30/2013    Capsulitis     last assessed 04/26/2016    Cellulitis of finger 01/13/2012    Chronic kidney disease     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     Closed fracture of one or more phalanges of foot 01/06/2009    Constipation     Deformity     left and right foot and ankle ,right hand    Depression     Epilepsy ([address] )     Erythrocytosis     Hemiplegia affecting dominant side ([address] )     Hypertension     Hypoglycemia 11/08/2011    Hypokalemia     last assessed 05/30/2013    Mental retardation     Mood disorder ([address] )     Nephrocalcinosis     Seizures ([address] )     2001       Past Surgical History:   Procedure Laterality Date    BRAIN SURGERY      AR COLONOSCOPY FLX DX W/COLLJ SPEC WHEN PFRMD N/A 2/12/2016    Procedure: COLONOSCOPY;  Surgeon: Jazlyn Junior MD;  Location: [facility] [place] [facility];   Service: Gastroenterology       Family History   Problem Relation Age of Onset    Emphysema Mother     Nephrolithiasis Mother     Heart attack Father         acute MI    Hypertension Father     Nephrolithiasis Father     Nephrolithiasis Brother        Social History Tobacco Use    Smoking status: Never Smoker    Smokeless tobacco: Never Used   Substance Use Topics    Alcohol use: No    Drug use: No       Objective:    Vitals:    20 1420   BP: 118/74   BP Location: Left arm   Patient Position: Sitting   Cuff Size: Standard   Pulse: 85   Resp: 18   Temp: 98 2 °F (36 8 °C)   TempSrc: Tympanic   SpO2: 98%   Weight: 85 3 kg (188 lb)   Height: 5' 8" (1 727 m)        Physical Exam      Preop labs/testing available and reviewed: {YES/NO:}               EKG {YES/NO:}    Echo {YES/NO:}    Stress test/cath {YES/NO:}    PFT/Barron {YES/NO:}    Functional capacity: {sl amb metabolic BEWPTNVYPGJ:8995665420}   Pick the highest level patient can comfortably perform   4 mets or greater for surgery    RCRI  High Risk surgery? 1 Point  CAD History:         1 Point   MI; Positive Stress Test; CP due to Mi;  Nitrate Usage to control Angina; Pathologic Q wave on EKG  CHF Active:         1 Point   Pulm Edema; Paroxysmal Nocturnal Dyspnea;  Bibasilar Rales (crackles);S3; CHF on CXR  Cerebrovascular Disease (TIA or CVA):     1 Point  DM on Insulin:        1 Point  Serum Creat >2 0 mg/dl:       1 Point          Total Points: {Numbers; 0-9:38280}     Scorin: Class I, Very Low Risk (0 4%)     1: Class II, Low risk (0 9%)     2: Class III Moderate (6 6%)     3: Class IV High (>11%)      SALLY Risk:  GFR:        For PCP:  If GFR>60, Hold ACE/ARB/Diuretic on the day of surgery, and NSAIDS 10 days before  If GFR<45, Consider PRE and POST op Nephrology Consult  If 46 <GFR> 59 : Has Patient had SALLY in last 6 Months? {YES/NO:}   If YES: Preop Nephrology consult   If No:  Angy 26 Nephrology consult  Assessment/Plan:    Patient {is/is not:} medically optimized (cleared) for the planned procedure  Further testing/evaluation {is/is not:} required      Postop concerns: {YES/NO:}    Problem List Items Addressed This Visit     None {Assess/PlanSmartLinks:92431}      Pre-Surgery Instructions:   Medication Instructions    acetaminophen (TYLENOL) 325 mg tablet {OR PAT MED INSTRUCTIONS:55288}    atorvastatin (LIPITOR) 10 mg tablet {OR PAT MED ZWXIMEVLROCT:02924}    BYSTOLIC 2 5 MG tablet {OR PAT MED INSTRUCTIONS:74287}    lamoTRIgine (LaMICtal) 150 MG tablet {OR PAT MED INSTRUCTIONS:52574}    lamoTRIgine (LaMICtal) 25 mg tablet {OR PAT MED INSTRUCTIONS:73080}    psyllium (REGULOID) 58 6 % powder {OR PAT MED INSTRUCTIONS:78483}    QUEtiapine (SEROquel) 50 mg tablet {OR PAT MED INSTRUCTIONS:53864}    Senna-Tabs 8 6 MG tablet {OR PAT MED INSTRUCTIONS:96788}    sertraline (ZOLOFT) 50 mg tablet {OR PAT MED INSTRUCTIONS:34689}        NOTE: Please use the above to review important meds for your specialty, the remainder "per anesthesia Guidelines "    NOTE: Please place an Inbasket message for "[facility]" pool for complicated patients

## 2020-09-15 ENCOUNTER — OFFICE VISIT (OUTPATIENT)
Dept: PHYSICAL THERAPY | Facility: CLINIC | Age: 55
End: 2020-09-15
Payer: MEDICARE

## 2020-09-15 ENCOUNTER — TRANSCRIBE ORDERS (OUTPATIENT)
Dept: PHYSICAL THERAPY | Facility: CLINIC | Age: 55
End: 2020-09-15

## 2020-09-15 DIAGNOSIS — M54.32 SCIATICA OF LEFT SIDE: Primary | ICD-10-CM

## 2020-09-15 PROCEDURE — 97110 THERAPEUTIC EXERCISES: CPT

## 2020-09-15 PROCEDURE — 97140 MANUAL THERAPY 1/> REGIONS: CPT

## 2020-09-15 NOTE — PROGRESS NOTES
Daily Note     Today's date: 9/15/2020  Patient name: Alexis Nunez  : 1965  MRN: [id number]  Referring provider: Jennifer November,   Dx:   Encounter Diagnosis     ICD-10-CM    1  Sciatica of left side  M54 32        Start Time: 1100  Stop Time: 1145  Total time in clinic (min): 45 minutes    Subjective: Patient denies lumbar spine or radicular pain today  He is having minimal difficulties performing ADLs independently at this time  He has increased R UE pain and stiffness  He is to see OT next week for evaluation and splinting  Objective: See treatment diary below      Assessment: Patient demonstrates carryover from session  He reports he can perform exercises at home independently and would like to be discharged next visit with comprehensive HEP  No pain noted post tx  Tolerated treatment well  Patient would benefit from continued PT      Plan: Continue per plan of care        Precautions: Standard, Cerebral palsy, chronic low back pain, HTN      Manuals 8/25 8/27/20 9/1/20 9/3/20 9/9/20 9/11/20 9/15/20    B HS stretch  x2 min B x2 min B 3 x 20 sec ea   5x 20 sec 5x 20 sec 5x 20 sec 5x 20 sec    B piriformis stretch  x2 min B  SKTC manual x 2 min B SKTC manual x 2 min B  SKTC manual B x 10 reps each SKTC manual B x 10 reps each SKTC manual B x 10 reps each SKTC manual B x 10 reps each    B ITB stretch  ITB stick rolling x 2 min L LE ITB stick rolling x 2 min   LLE ITB stick rolling x 3 min LLE ITB stick rolling x 3 min ITB stick rolling x 3 min ITB stick rolling x 3 min    STM R LB LAD on B LE x 2-3 mins each          Neuro Re-Ed           Bridging  W/ ad sq 2x10 2x10 2x10 2x10       + add squeeze  x20  2x10 hold 5 2x10  hold5 2x10 hold 5  2 x 10 hold 5 3s x 10     TrA isos w/ march x 10, w/ single leg ext 2x10  TrA isos w march x 10, w single leg ext x 10  TrA isometric with march with manual assistance  TrA isometric with march with manual assistance      Squat training x 4-5 min Squat training x 20 reps Not today -        Sit<>stand x 10  March x 10-15 Sit to stand x 5 reps Sit to stand x 5 reps  Sit to stand x 3 reps   Sit to stand x 3 reps  Sit to stand x 3 reps     Ther Ex           NuStep  8 min lvl 2 LE only  5 min level 2 LE only 5 min lvl 2 LE only  7 min level 2 LEs only  10 min level 2 LEs only 10 min level 2 LEs only    Repeated flexion in sitting   x20 x20  x20 x20 x20    LAQ  x20 hold 5 x20  Hold 5 x20 hold 5 20 x 5s 20 x 5s 20 x 5s    LTR x20 total x20 hold 5 x20 hold 5  x20 hold 5 x20 hold 5 x20 hold 5 x20 hold 5    DKTC  Manual x 10 manual x10 hold 5 peanut  x 10 hold 5 -  manual x10 hold 5 manual x10 hold 5    SKTC Manual 4x20 B  Manual x10 hold 5 manual x 10 hold 5  See above  manual x10 hold 5 manual x10 hold 5    Supine marches Above      x20 Seated x20    Peanut hip flex      x20     SLR flex           Ther Activity                                 Gait Training  Gait training x 30'x2  GT x 30' x 2                               Modalities

## 2020-09-17 ENCOUNTER — TELEPHONE (OUTPATIENT)
Dept: PHYSICAL THERAPY | Facility: CLINIC | Age: 55
End: 2020-09-17

## 2020-09-17 ENCOUNTER — TELEPHONE (OUTPATIENT)
Dept: NEUROLOGY | Facility: CLINIC | Age: 55
End: 2020-09-17

## 2020-09-17 ENCOUNTER — OFFICE VISIT (OUTPATIENT)
Dept: PHYSICAL THERAPY | Facility: CLINIC | Age: 55
End: 2020-09-17
Payer: MEDICARE

## 2020-09-17 DIAGNOSIS — M54.32 SCIATICA OF LEFT SIDE: Primary | ICD-10-CM

## 2020-09-17 PROCEDURE — 97110 THERAPEUTIC EXERCISES: CPT

## 2020-09-17 NOTE — TELEPHONE ENCOUNTER
Called Patient regarding his appointment with Dr Kirill Adams  Spoke to staff Princess  Patient lives in assisted living  Patient has seen [facility] in [place]  Records are scanned into Media  Reminded patient to bring completed new patient questionnaire, list of medications, and to arrive 15 minutes prior to appointment time for registration purposes

## 2020-09-17 NOTE — PROGRESS NOTES
PT Discharge      Today's date: 2020  Patient name: Gunner Oropeza  : 1965  MRN: [id number]  Referring provider: Apolinar Godinez DO  Dx:   Encounter Diagnosis     ICD-10-CM    1  Sciatica of left side  M54 32        Subjective: Pt reports that he has minimal pain right now  Pt states his pain is worse first thing in the morning and in the middle of the night  Objective: See treatment diary below    Assessment: Pt tolerated treatment well  Pt was educated on comprehensive HEP and performed without complaints of increase in pain  Pt had no further questions following this session  Pt will be discharged from outpatient PT due to most goals met and maximum rehab potential met  Pt will continue with OT treatment next week  Goals  STGs to be achieved in 4 weeks  -STG 1: Patient will be independent with HEP - MET  -STG 2: Patient will have 0/10 low back pain at rest - MET  -STG 3: Patient will increase lumbar A/PROM with less than or equal to min loss - not met  -STG 4: Patient will report 40% functional improvement - MET  -STG 5: Patient to improve strength of core stabilizers to greater than or equal to 4/5 to stabilize lumbar spine - not met    LTGs to be achieved in 8 weeks  -LTG 1: Patient will demonstrate independence with maintenance program - MET  -LTG 2: Patient will perform all functional activities with less than 2/10 low back pain -partially met   -LTG 3: Patient will improve FOTO score by 10 points - not met  -LTG 4: Patient will report 80% functional improvement - in progress       Plan: Discharge from PT           Precautions: Standard, Cerebral palsy, chronic low back pain, HTN      Manuals 8/25 8/27/20 9/1/20 9/3/20 9/9/20 9/11/20 9/15/20 9/17/20   B HS stretch  x2 min B x2 min B 3 x 20 sec ea   5x 20 sec 5x 20 sec 5x 20 sec 5x 20 sec    B piriformis stretch  x2 min B  SKTC manual x 2 min B SKTC manual x 2 min B  SKTC manual B x 10 reps each SKTC manual B x 10 reps each [facility] manual B x 10 reps each SKTC manual B x 10 reps each    B ITB stretch  ITB stick rolling x 2 min L LE ITB stick rolling x 2 min   LLE ITB stick rolling x 3 min LLE ITB stick rolling x 3 min ITB stick rolling x 3 min ITB stick rolling x 3 min    STM R LB LAD on B LE x 2-3 mins each          Neuro Re-Ed           Bridging  W/ ad sq 2x10 2x10 2x10 2x10       + add squeeze  x20  2x10 hold 5 2x10  hold5 2x10 hold 5  2 x 10 hold 5 3s x 10     TrA isos w/ march x 10, w/ single leg ext 2x10  TrA isos w march x 10, w single leg ext x 10  TrA isometric with march with manual assistance  TrA isometric with march with manual assistance  TrA iso w/ march     Squat training x 4-5 min Squat training x 20 reps Not today -        Sit<>stand x 10  March x 10-15 Sit to stand x 5 reps Sit to stand x 5 reps  Sit to stand x 3 reps   Sit to stand x 3 reps  Sit to stand x 3 reps     Ther Ex           NuStep  8 min lvl 2 LE only  5 min level 2 LE only 5 min lvl 2 LE only  7 min level 2 LEs only  10 min level 2 LEs only 10 min level 2 LEs only 10 min level 2 LEs only    Repeated flexion in sitting   x20 x20  x20 x20 x20 20x    LAQ  x20 hold 5 x20  Hold 5 x20 hold 5 20 x 5s 20 x 5s 20 x 5s 20x5s   LTR x20 total x20 hold 5 x20 hold 5  x20 hold 5 x20 hold 5 x20 hold 5 x20 hold 5 20x5s   DKTC  Manual x 10 manual x10 hold 5 peanut  x 10 hold 5 -  manual x10 hold 5 manual x10 hold 5    SKTC Manual 4x20 B  Manual x10 hold 5 manual x 10 hold 5  See above  manual x10 hold 5 manual x10 hold 5    Supine marches Above      x20 Seated x20 Supine 20x    Peanut hip flex      x20     SLR flex           Ther Activity                                 Gait Training  Gait training x 30'x2  GT x 30' x 2                               Modalities                                  Pt was educated by Adrienne Milner, PT, DPT in comprehensive HEP provided by Tee Levin PT, DPT   Pt demonstrated the ability to perform all exercises independently and had no further questions  Pt was provided with printed worksheet with instructions and illustrations of these exercises

## 2020-09-17 NOTE — TELEPHONE ENCOUNTER
Attempted calling patient regarding missed appointment  Patient does not have a mailbox set up  Patient is to see OT on 9/22  Will discuss with patient then in regards to discharge status if he does not return phone call

## 2020-09-22 ENCOUNTER — EVALUATION (OUTPATIENT)
Dept: OCCUPATIONAL THERAPY | Facility: CLINIC | Age: 55
End: 2020-09-22
Payer: MEDICARE

## 2020-09-22 DIAGNOSIS — G80.2 SPASTIC HEMIPLEGIC CEREBRAL PALSY (HCC): Primary | ICD-10-CM

## 2020-09-22 PROCEDURE — 97166 OT EVAL MOD COMPLEX 45 MIN: CPT

## 2020-09-22 NOTE — PROGRESS NOTES
OT Evaluation     Today's date: 2020  Patient name: Larry Clemens  : 1965  MRN: [id number]  Referring provider: No ref  provider found  Dx:   Encounter Diagnosis     ICD-10-CM    1  Spastic hemiplegic cerebral palsy ([facility] [address] )  G80 2                     Objective    CASE SUMMARY:   Larry Clemens is a 54y o  year old male who has spastic CP who was seeing PT at [facility] reports increased pain in [address]  Lloyd Carlson is L hand dominant  PMHx includes: See chart for full details with medications  Pt lives on own in an apartment  Lloyd Carlson is able to cook, clean house and ADLs including bathing, dressing,   Some of shaggy interests include: workshop ie making caps and enjoys sitting outside, watering plants, and watching football/baseball    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 following is a summary of  his status  ROM: Lloyd Carlson presents with decreased ROM in the wrist and fingers  ROM of elbow  Flexion 84   Extension 10    Shoulder flexion   74 degrees        Wrist Extension= -20   Wrist Flexion    90     Finger ROM:   MP PIP DIP  Index   35    Middle    35    Ring   25    Small   25      Thumb:     CMC Abd=20 (with stretch)      Opposition to D5 tip 30    Pt appears to be hypermobile in digits and able to hyper extend and flex digits  STRENGTH: Lloyd Carlson presents with decreased UE strength including the following    Shoulder flexion 3-/5   Elbow flexion: 3-/5  Elbow extension 3-/5   Wrist Extension:  0/5  Wrist Flexion:      3-/5       Strength: R=10           PAIN LEVEL:  Current:5/10 At Best: 0/10   At worst: 6/10   Location:  R shoulder- posterior   FUNCTIONAL SUMMARY:   Larry Clemens is independent in basic self care skills, and cooking       IMPAIRMENT SUMMARY:  Lloyd Carlson presents with:,  Increased wrist and hand edema,  Decreased wrist and finger ROM,  Decreased UE  strength,  Increased pain at 6/10  Possibility for skin break down    Kareem stein from OT to return to prior function  Prognosis: Good  Short Term Goals: to be completed in 6-12weeks  1  Pt will be able to don/doff RHS with modified independence and/or caregiver will be able to don/doff RHS   2  To educate on tone reduction strategies and modify splint  3  Increase ROM of wrist to , Wrist ext -10  4  Increase hand strength R by 2-3 lbs and UE strength to be able to complete item transport and increase ease with dressing tasks  5  Decrease pain to 0-3/10  Long Term Goals: To be completed in 8-12 weeks  Increase ROM of wrist to , Wrist ext -5   Increase hand strength R by 4 lbs and UE strength to be able to complete item transport and increase ease with dressing tasks   Decrease pain to 0-2/10  Patient Goals: wants to increase strength to bring UE over head  PLAN:  Therapeutic techniques to include the following:  Scar massage, Graston/IASTM at scar and/or kinesiotape for scar  Manual lymphatic drainage massage, tubigrip stockinette, edema glove and/or kinesiotape  Heat modalities: ultrasound, Hot Packs,   Manual therapy with Myofascial soft tissue mobilization techniques,  Graston/IASTM techniques, PROM, Joint mobilizations, AROM ex, Flexion taping,   Therapeutic exercises, and therapeutic activities, strengthening ex for hand and wrist, coordination and fine motor activities  Frequency/ Duration:  2x/ week for 8-12 weeks  Plan Of Care Dates: 12/22/2020      TREATMENT TODAY:     Subjective: Patient reports pain level as 5/10 today  Objective: Completed initial evaluation  See report for details  Educated and provided theraputty gripping 3 x 10 reps each day  Home Program:See Media Section for details  theraputty (gripping)     Precautions: Seizures(epilepsy), HTN, CARDIAC    Assessment: Patient tolerated session well  Plan: Continue Occupational Therapy ~2x/week to decrease pain and edema and improve ROM, strength and function                 Manuals Neuro Re-Ed                                                                                                        Ther Ex                                                                                                                     Ther Activity                                       Gait Training                                       Modalities

## 2020-09-22 NOTE — LETTER
2020    Connie Barnes, [facility]    Patient: Sabine Lance   YOB: 1965   Date of Visit: 2020     Encounter Diagnosis     ICD-10-CM    1  Spastic hemiplegic cerebral palsy ([facility] [address] )  G80 2 OT Plan of Care Cert/Re-cert       Dear Dr Joy Coleman:    Thank you for your recent referral of Sabine Lance  Please review the attached evaluation summary from Shaggy's recent visit  Please verify that you agree with the plan of care by signing the attached order  If you have any questions or concerns, please do not hesitate to call  I sincerely appreciate the opportunity to share in the care of one of your patients and hope to have another opportunity to work with you in the near future  Sincerely,    Faye Soler, OT      Referring Provider:     I certify that I have read the below Plan of Care and certify the need for these services furnished under this plan of treatment while under my care  MD Renetta [facility] [id number]  [place]        OT Evaluation     Today's date: 2020  Patient name: Sabine Lance  : 1965  MRN: [id number]  Referring provider: No ref  provider found  Dx:   Encounter Diagnosis     ICD-10-CM    1  Spastic hemiplegic cerebral palsy ([facility] [address] )  G80 2                     Objective    CASE SUMMARY:   Sabine Lance is a 54y o  year old male who has spastic CP who was seeing PT at [facility] reports increased pain in [address]  Bere Maddox is L hand dominant  PMHx includes: See chart for full details with medications  Pt lives on own in an apartment  Bere Maddox is able to cook, clean house and ADLs including bathing, dressing,   Some of shaggy interests include: workshop ie making caps and enjoys sitting outside, watering plants, and watching football/baseball     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 following is a summary of  his status  ROM: Denisha Gillespie presents with decreased ROM in the wrist and fingers  ROM of elbow  Flexion 84   Extension 10    Shoulder flexion   74 degrees        Wrist Extension= -20   Wrist Flexion    90     Finger ROM:   MP PIP DIP  Index   35    Middle    35    Ring   25    Small   25      Thumb:     CMC Abd=20 (with stretch)      Opposition to D5 tip 30    Pt appears to be hypermobile in digits and able to hyper extend and flex digits  STRENGTH: Denisha Gillespie presents with decreased UE strength including the following    Shoulder flexion 3-/5   Elbow flexion: 3-/5  Elbow extension 3-/5   Wrist Extension:  0/5  Wrist Flexion:      3-/5       Strength: R=10           PAIN LEVEL:  Current:5/10 At Best: 0/10   At worst: 6/10   Location:  R shoulder- posterior   FUNCTIONAL SUMMARY:   Anival Boateng is independent in basic self care skills, and cooking       IMPAIRMENT SUMMARY:  Denisha Gillespie presents with:,  Increased wrist and hand edema,  Decreased wrist and finger ROM,  Decreased UE  strength,  Increased pain at 6/10  Possibility for skin break down  Michaelwould benefit from OT to return to prior function  Prognosis: Good  Short Term Goals: to be completed in 6-12weeks  1  Pt will be able to don/doff RHS with modified independence and/or caregiver will be able to don/doff RHS   2  To educate on tone reduction strategies and modify splint  3  Increase ROM of wrist to , Wrist ext -10  4  Increase hand strength R by 2-3 lbs and UE strength to be able to complete item transport and increase ease with dressing tasks  5  Decrease pain to 0-3/10  Long Term Goals: To be completed in 8-12 weeks  Increase ROM of wrist to , Wrist ext -5   Increase hand strength R by 4 lbs and UE strength to be able to complete item transport and increase ease with dressing tasks   Decrease pain to 0-2/10  Patient Goals: wants to increase strength to bring UE over head      PLAN:  Therapeutic techniques to include the following:  Scar massage, Graston/IASTM at scar and/or kinesiotape for scar  Manual lymphatic drainage massage, tubigrip stockinette, edema glove and/or kinesiotape  Heat modalities: ultrasound, Hot Packs,   Manual therapy with Myofascial soft tissue mobilization techniques,  Graston/IASTM techniques, PROM, Joint mobilizations, AROM ex, Flexion taping,   Therapeutic exercises, and therapeutic activities, strengthening ex for hand and wrist, coordination and fine motor activities  Frequency/ Duration:  2x/ week for 8-12 weeks  Plan Of Care Dates: 12/22/2020      TREATMENT TODAY:     Subjective: Patient reports pain level as 5/10 today  Objective: Completed initial evaluation  See report for details  Educated and provided theraputty gripping 3 x 10 reps each day  Home Program:See Media Section for details  theraputty (gripping)     Precautions: Seizures(epilepsy), HTN, CARDIAC    Assessment: Patient tolerated session well  Plan: Continue Occupational Therapy ~2x/week to decrease pain and edema and improve ROM, strength and function                 Manuals                                                                 Neuro Re-Ed                                                                                                        Ther Ex                                                                                                                     Ther Activity                                       Gait Training                                       Modalities

## 2020-09-23 ENCOUNTER — CONSULT (OUTPATIENT)
Dept: NEUROLOGY | Facility: CLINIC | Age: 55
End: 2020-09-23
Payer: MEDICARE

## 2020-09-23 VITALS
HEIGHT: 68 IN | SYSTOLIC BLOOD PRESSURE: 117 MMHG | DIASTOLIC BLOOD PRESSURE: 71 MMHG | BODY MASS INDEX: 28.64 KG/M2 | TEMPERATURE: 97.6 F | WEIGHT: 189 LBS | HEART RATE: 76 BPM

## 2020-09-23 DIAGNOSIS — G81.91 RIGHT HEMIPARESIS (HCC): ICD-10-CM

## 2020-09-23 DIAGNOSIS — G40.219 LOCALIZATION-RELATED SYMPTOMATIC EPILEPSY AND EPILEPTIC SYNDROMES WITH COMPLEX PARTIAL SEIZURES, INTRACTABLE, WITHOUT STATUS EPILEPTICUS (HCC): Primary | ICD-10-CM

## 2020-09-23 DIAGNOSIS — G80.2 SPASTIC HEMIPLEGIC CEREBRAL PALSY (HCC): ICD-10-CM

## 2020-09-23 PROBLEM — M21.071 ACQUIRED VALGUS DEFORMITY OF RIGHT ANKLE: Status: ACTIVE | Noted: 2017-07-21

## 2020-09-23 PROCEDURE — 99204 OFFICE O/P NEW MOD 45 MIN: CPT | Performed by: PSYCHIATRY & NEUROLOGY

## 2020-09-23 RX ORDER — LAMOTRIGINE 25 MG/1
TABLET ORAL
Qty: 60 TABLET | Refills: 11 | Status: SHIPPED | OUTPATIENT
Start: 2020-09-23 | End: 2021-09-29 | Stop reason: SDUPTHER

## 2020-09-23 RX ORDER — LAMOTRIGINE 150 MG/1
TABLET ORAL
Qty: 60 TABLET | Refills: 11 | Status: SHIPPED | OUTPATIENT
Start: 2020-09-23 | End: 2021-09-29 | Stop reason: SDUPTHER

## 2020-09-23 NOTE — PATIENT INSTRUCTIONS
Plan:   1 - continue with Lamotrigine 150mg tab take one tab twice a day at [address] along with two Lamotrigine 25mg tabs at 8AM; he takes lamotrigine 200mg in AM and 150mg in PM  2 - check lamotrigine level and LFT in the next 3 months (hold until other blood work orders are requested, but if no blood work is ordered by another provider, please check these labs in 3 months  3 - follow-up in 1 year  4 - call the office for breakthrough seizure  Seizure protocol  If he has a seizure that last less than 5 minutes then allow him to recover at home; just get him to the ground for safety  Call 911 if the following conditions apply  - unwitnessed onset of seizure and there is a potential head injury that needs to be evaluated  - patient stops breathing or turns blue during a seizure  - if the seizure is longer than 5 minutes  - if after the seizure ends and he does not show recovery over the course of 30 minutes  - if there are multiple seizures in 24 hours  - if the patient refused taking his medications for the past 24 hours  - if there is physical injury from the seizure      dennis 15    What to Do If You Witness a Seizure:     Generalized convulsive (called a generalized tonic-clonic or grand mal seizure)  During this seizure, the person may cry out, suddenly stiffen up, make jerking movements, and fall  The person may turn pale or blue from difficulty breathing  Actions:  1  Stay calm  Talk in a soothing voice and if possible keep onlookers away  2  Prevent injury  Move objects away that the person might hit while jerking uncontrollably  3  Time when the seizure starts and ends  Most seizures stop after only a few minutes  4  Turn him or her gently onto one side  This will help keep the airway clear  5  Never place anything in his/her mouth or give him/her anything by mouth during a seizure  -- Do not give the person water, pills, or food until fully alert     6  Loosen tight clothing or jewelry around his/her neck  7  Make the person as comfortable as possible  8  Place something soft under their head  9  Do not hold the person down  If the person having a seizure thrashes around there is no need for you to restrain them  They are more likely to be combative if restrained  Remember to consider your safety as well  10  Keep onlookers away  11  Be sensitive and supportive, and ask others to do the same  12  Stay with the person until he/she is fully alert  Complex partial seizure (confusional spells)  During this kind of seizure, the person may have a glassy stare; give no response or inappropriate responses when questioned; sit, stand, or walk about aimlessly; make lip smacking or chewing motions; fidget with clothes; appear to be drunk, drugged, or confused  Actions:  1  Make sure the person is safe and wont harm themselves  2  Try to remove harmful objects from around the person (tools, utensils, glasses)  3  Do NOT be aggressive or attempt to restrain the person  They are more likely to be combative if restrained  Remember to consider your safety as well  4  Help prevent the person from wandering, and direct the person to chair or safe position  5  Never place anything in his/her mouth or give him/her anything by mouth during a seizure  -- Do not give the person water, pills, or food until fully alert  6  Keep onlookers away  7  Be sensitive and supportive, and ask others to do the same  8  Stay with the person until he/she is fully alert  CALL 911 if:  1  A convulsive seizure lasts more than 5 minutes  2  The person turns blue during the seizure  3  The person does not start breathing after the seizure  Begin mouth to mouth resuscitation if this would occur  4  The person has one seizure right after the other without coming back to normal consciousness between the seizures    5  The person has not regained consciousness or is still confused after 30 minutes  6  You know the person does not have epilepsy  7  You know the person has diabetes or low blood sugar  8  The person is pregnant, ill, or injured  9  The seizure occurred in water, because the person may have inhaled water  10  The person requests an ambulance or medical help  Rescue medication  Your doctor may prescribe a rescue medication such as lorazepam (Ativan), diazepam (Valium / Diastat), or clonazepam (Klonopin) to terminate a seizure or if you have a history of cluster of seizures   Follow the instructions given by your doctor for these medications    Recognizing Common Seizures (examples)   · Simple partial seizures: Isolated twitching, numbness, sweating, dizziness, nausea/vomiting, disturbances to hearing, vision, smell or taste  No loss of consciousness occurs, and the person remains aware of his/her environment  · Complex partial seizures: Staring, motionless, picking at clothes, smacking lips, swallowing repeatedly or wandering around  The person is not aware of their surroundings and is not fully responsive  · Atonic seizures: Drop attacks or sudden, rapid fall to ground with rapid recovery  · Myoclonic seizures: Brief forceful jerks which can affect the whole body or just part of it  · Absence seizures: May appear to be daydreaming or spacing out   The person is momentarily unresponsive and unaware of what is happening around him/her  · Tonic seizures: Stiffening of part or of the entire body  · Generalized Tonic-Clonic Seizures  Grand-mal seizure   Sudden loss of consciousness with body stiffening followed by continuous jerking movements  A blue tinge around the mouth is likely but lack of oxygen is rare  Loss of bladder and/or bowel control may occur

## 2020-09-23 NOTE — PROGRESS NOTES
Review of Systems    {LimROS-complete:24611}      Review of Systems   Constitutional: Negative  Negative for appetite change and fever  HENT: Negative  Negative for hearing loss, tinnitus, trouble swallowing and voice change  Eyes: Negative  Negative for photophobia and pain  Respiratory: Negative  Negative for shortness of breath  Cardiovascular: Negative  Negative for palpitations  Gastrointestinal: Negative  Negative for nausea and vomiting  Endocrine: Negative  Negative for cold intolerance  Genitourinary: Negative  Negative for dysuria, frequency and urgency  Musculoskeletal: Negative  Negative for myalgias and neck pain  Skin: Negative  Negative for rash  Neurological: Positive for speech difficulty  Negative for dizziness, tremors, seizures, syncope, facial asymmetry, weakness, light-headedness, numbness and headaches  Difficulty walking, balance problems   Hematological: Negative  Does not bruise/bleed easily  Psychiatric/Behavioral: Negative  Negative for confusion, hallucinations and sleep disturbance

## 2020-09-23 NOTE — PROGRESS NOTES
[address] [facility]  Patient's Name: Leslee Lamb   Patient's : 1965   Visit Type: new patient  Referring MD / PCP:  Emmette Simmonds, MD    Assessment:  Mr Leslee Lamb is a 54 y o  man with localization related epilepsy and right spastic hemiparesis cerebral palsy due to brain injury early in his life, possibly from a stroke in the  or early childhood years  I do not have records regarding how he sustained the stroke (if it was  or childhood), but CT head study shows both suspected left MCA territorial injury and lack of development of the left hemisphere  He has been stable with lamotrigine for at least two decades without recurrent seizures  Staff at the group home are trained for seizure first aid  Given the stability of his epilepsy without recurrent seizures for many years, we do not need an EEG study at this time  He will continue with occupational therapy to improve use of the right arm and hand  Plan:   1 - continue with Lamotrigine 150mg tab take one tab twice a day at [address] along with two Lamotrigine 25mg tabs at 8AM; he takes lamotrigine 200mg in AM and 150mg in PM  2 - check lamotrigine level and LFT in the next 3 months (hold until other blood work orders are requested, but if no blood work is ordered by another provider, please check these labs in 3 months  3 - follow-up in 1 year  Seizure protocol  If he has a seizure that last less than 5 minutes then allow him to recover at home; just get him to the ground for safety  Call 911 if the following conditions apply  - unwitnessed onset of seizure and there is a potential head injury that needs to be evaluated  - patient stops breathing or turns blue during a seizure  - if the seizure is longer than 5 minutes  - if after the seizure ends and he does not show recovery over the course of 30 minutes    - if there are multiple seizures in 24 hours  - if the patient refused taking his medications for the past 24 hours  - if there is physical injury from the seizure      Problem List Items Addressed This Visit        Nervous and Auditory    Cerebral palsy ([address] )    Right hemiparesis ([address] )      Other Visit Diagnoses     Localization-related symptomatic epilepsy and epileptic syndromes with complex partial seizures, intractable, without status epilepticus ([address] )    -  Primary    Relevant Medications    lamoTRIgine (LaMICtal) 150 MG tablet    lamoTRIgine (LaMICtal) 25 mg tablet    Other Relevant Orders    Lamotrigine level    Hepatic function panel        Chief Complaint:    Chief Complaint   Patient presents with    Seizures      HPI:    Lorelei Roca is a 54 y o  left handed male here for new patient evaluation of epilepsy  He was previously evaluated by Dr Yecenia Ramachandran who was at [address] [place] [address] Neurology  Intake History 9/23/2020  There is one note from Dr Alyse Brunner from [facility] from 10/28/2013  It referred to the patient having left hemispheric onset partial epilepsy  He was seen by Dr Carla Bennett in the past   He has not had a seizure  He was previously on phenytoin, clonazepam, topiramate, phenobarbital for seizures  He was on lamotrigine 150mg twice a day at the time  There is a history of cerebrovascular accident, developmental disability, lives in a group home  He saw Dr Kristi Diop for consultation on 9/5/2018  He had a stroke at age of 3, necessitating brain surgery  He had seizures at age 11 or 10  He reported that his last seizure was in 2001  There is a reference to a prior EEG from 12/6/2017 that found continuous slowing and sharp waves over the left temporal region  There was a video/EEG monitoring study 10/24-11/1/2011, interpreted by Dr Indio Ray that showed left temporal epileptogenic focus  He is here with [facility], his aide from [employer]   She has known Mr Chandra Every for the past 4 years    He had surgery on his brain when he was a child, but there was questionable seizures when he was in the womb  He recalled that he use to turn to the right side, feels like he is woozy or falling asleep  Afterwards he feels like he is knocked out  There have been no seizures for since   He recalled being on Topamax, but it was discontinued due to kidney stones  He reports that he has more use of his right hand  He just started OT for a splint to improve coordination and strengthening  He is very independent; he does his own hygiene, showering, cleaning, and cooking  He goes to a workshop program (which stopped for months due to [place])  He worked on packaging  The patient denies any history of myoclonus, staring spells, automatisms, unexplained hyperkinetic behaviors, unexplained nocturnal enuresis, or nocturnal tongue biting  AED/side effects/compliance:  Lamotrigine 200mg in AM and 150mg in PM  Group home facility needs discontinue and restart prescriptions if there are new orders    Event/Seizure semiology:  1   Unknown seizure type - but presumably right hemibody activity with head version to the right, secondary generalization (none since )    Prior Epilepsy History:  x    Special Features  Status epilepticus: No  Self Injury Seizures: No  Precipitating Factors: None    Epilepsy Risk Factors:  Abnormal pregnancy: unknown  Abnormal birth/: unknown  Abnormal Development: unknown  Febrile seizures, simple: unknown  Febrile seizures, complex: unknown  CNS infection: No  Mental retardation: mild intellectual disability  Cerebral palsy: Yes right spastic hemiparesis  Head injury (moderate/severe): No  CNS neoplasm: No  CNS malformation: No  Neurosurgical procedure: yes left craniectomy with presence of clips  Stroke: possible based on the head CT study  Alcohol abuse: No  Drug abuse: No  Family history Sz/epilepsy: unknown    Prior AEDs:  medication Max dose Time used Reason to stop   phenytoin   Gait difficulty   divalproex   tremors   topiramate   Kidney stones   phenobarbital      lamotrigine        Prior workup:  I reviewed the CT head study myself  Imagin2011  There are multiple metallic artifacts over the left cerebral hemisphere  There is a remote craniotomy  There is extensive encephalomalacia and atrophy over the left cerebral hemisphere over the left MCA territory  Right cerebral hemisphere has normal development  Absence of corpus callosum      EEGs:  None available    Labs:  Component      Latest Ref Rng & Units 2020   White Blood Cell Count      3 4 - 10 8 x10E3/uL 8 3    Red Blood Cell Count      4 14 - 5 80 x10E6/uL 5 74    Hemoglobin      13 0 - 17 7 g/dL 16 9    HCT      37 5 - 51 0 % 49 4    Platelet Count      038 - 450 x10E3/uL 211    Sodium      136 - 145 mmol/L  137   Potassium      3 5 - 5 3 mmol/L  4 1   Chloride      100 - 108 mmol/L  104   CO2      21 - 32 mmol/L  28   Anion Gap      4 - 13 mmol/L  5   BUN      5 - 25 mg/dL  23   Creatinine      0 60 - 1 30 mg/dL  1 31 (H)   GLUCOSE FASTING      65 - 99 mg/dL  86   Calcium      8 3 - 10 1 mg/dL  9 2   eGFR      ml/min/1 73sq m  61   Phosphorus      2 7 - 4 5 mg/dL  2 1 (L)   2019  Lamotrigine 4 0    General exam   /71 (BP Location: Left arm, Patient Position: Sitting, Cuff Size: Large)   Pulse 76   Temp 97 6 °F (36 4 °C)   Ht 5' 8" (1 727 m)   Wt 85 7 kg (189 lb)   BMI 28 74 kg/m²    Appearance: no acute distress, there is right hemiparesis with spastic arm  Carotids: no bruits present  Cardiovascular: regular rate and rhythm and normal heart sounds  Pulmonary: clear to auscultation  Extremities: no edema    HEENT: anicteric   Fundoscopy: unable to assess as he does not keep his eyes open    Mental status  Orientation: alert and oriented to name, place, time  Fund of Knowledge: good degree of knowledge   Attention and Concentration: HOUSE, ESUOH?, slow response  Current and Remote Memory:recalled 3/3 words after five minutes  Language: slow speech, dysarthric, relatively fluent with intact sentences, follows commands to some extent, naming is intact    Cranial Nerves  CN 1: not tested  CN 2: difficult to assess visual fields as he did not understand the concept of maintaining fixation, pupils are symmetric    CN 3, 4, 6: EOMI, no nystagmus  CN 5:sensation intact to all distribution V1, V2, V3  CN 7:muscles of facial expression are symmetric  CN 8:symmetric to finger rubs bilaterally  CN 9, 10:symmetric elevation of soft palate and uvula is midline  CN 11:difficulty with bilateral shoulder shrugs, better with the left than the right  CN 12:tongue is midline    Motor:  Bulk, Tone: there is normal bulk and tone of the left arm, bilateral legs; there is decreased bulk of the right armk with increased spasticity and tone  Pronation: no pronation of the left arm, unable to test the right arm  Strength:  There is limited strength of the right arm, mostly axial movements of the right arm, no biceps or triceps movement, wrist is in flexed contraction, unable to extend at the wrist, fingers are hypotonic, with no extension but intact flexion; full strength of the left arm and fingers, bilateral knee extension and flexion is intact  Abnormal movements: no abnormal movements are present    Sensory:  Lighttouch: intact in all limbs  Romberg:normal    Coordination:  FNF:intact left hand/fingers and unable to perform on the right  ALEXUS:slow incoordinated movements of the left hand, unable to do on the right  FFM:slow finger taps on the left and unable to perform the right  Gait/Station:lurching gait, wide based, unable to tandem    Reflexes:  reflexes are uniformly hyporeflexic    Past Medical/Surgical History:  Patient Active Problem List   Diagnosis    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    Acquired deformity of right ankle and foot    Pain in both feet    Constipation    Cerebral palsy ([address] )    Nephrocalcinosis    Class 1 obesity with body mass index (BMI) of 30 0 to 30 9 in adult    Hyperlipidemia    Lumbar radiculopathy    Spinal stenosis of lumbar region    Chronic left-sided low back pain with left-sided sciatica    Chronic pain syndrome    Impacted cerumen of left ear    Paronychia of toenail    Acquired deformity of right hand    Acquired hallux rigidus of left foot    Acquired valgus deformity of right ankle    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 )     Past Medical History:   Diagnosis Date    Ambulatory dysfunction     Anxiety     Bilateral impacted cerumen     last assessed 05/30/2013    Capsulitis     last assessed 04/26/2016    Cellulitis of finger 01/13/2012    Chronic kidney disease     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     Closed fracture of one or more phalanges of foot 01/06/2009    Constipation     Deformity     left and right foot and ankle ,right hand    Depression     Epilepsy ([facility] )     Erythrocytosis     Hemiplegia affecting dominant side ([facility] )     Hypertension     Hypoglycemia 11/08/2011    Hypokalemia     last assessed 05/30/2013    Mental retardation     Mood disorder ([facility] )     Nephrocalcinosis      Past Surgical History:   Procedure Laterality Date    BRAIN SURGERY      OH COLONOSCOPY FLX DX W/COLLJ SPEC WHEN PFRMD N/A 2/12/2016    Procedure: COLONOSCOPY;  Surgeon: Sheridan Walker MD;  Location[facility];   Service: Gastroenterology       Past Psychiatric History:  Depression: Nonspecific mood disorder; sometimes he gets angry  Anxiety: No  Psychosis: No    Medications:    Current Outpatient Medications:     acetaminophen (TYLENOL) 325 mg tablet, Take 1 tablet (325 mg total) by mouth every 6 (six) hours as needed for mild pain, moderate pain or headaches, Disp: 30 tablet, Rfl: 0    atorvastatin (LIPITOR) 10 mg tablet, TAKE ONE TABLET BY MOUTH DAILY AT 8PM, Disp: 30 tablet, Rfl: 3    BYSTOLIC 2 5 MG tablet, TAKE ONE TABLET BY MOUTH DAILY AT 8AM, Disp: 30 tablet, Rfl: 4    lamoTRIgine (LaMICtal) 150 MG tablet, Take 1 tab by mouth twice a day at 8AM and 8PM, Disp: 60 tablet, Rfl: 11    lamoTRIgine (LaMICtal) 25 mg tablet, Take two tabs by mouth at 8AM (with one 150mg tab, total AM dose of 200mg), Disp: 60 tablet, Rfl: 11    psyllium (REGULOID) 58 6 % powder, Take 1 teaspoonful mixed with 8oz of water orally every day at 8am, Disp: 1040 g, Rfl: 5    QUEtiapine (SEROquel) 50 mg tablet, Take 50 mg by mouth daily at bedtime , Disp: , Rfl:     Senna-Tabs 8 6 MG tablet, TAKE ONE TABLET BY MOUTH DAILY AT 8AM FOR CONSTIPATION, Disp: 30 tablet, Rfl: 2    sertraline (ZOLOFT) 50 mg tablet, Take by mouth daily, Disp: , Rfl:     Allergies: Allergies   Allergen Reactions    Asa [Aspirin] Other (See Comments)     Reaction Date: 25Aug2008;   unknown       Family history:  Family History   Problem Relation Age of Onset    Emphysema Mother     Nephrolithiasis Mother     Heart attack Father         acute MI    Hypertension Father     Nephrolithiasis Father     Nephrolithiasis Brother      There is no family history of seizure, epilepsy or developmental delay  Social History  Living situation:  Lives in his own apartment in a supportive living center  Work:  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, work shops   reports that he has never smoked  He has never used smokeless tobacco  He reports that he does not drink alcohol or use drugs  Review of Systems  A review of at least 12 organ/systems was obtained by the medical assistant and reviewed by me, including additional positives/negatives:  Neurological: Positive for speech difficulty  Negative for dizziness, tremors, seizures, syncope, facial asymmetry, weakness, light-headedness, numbness and headaches          Difficulty walking, balance problems    Decision making was of high-complexity due to the patient's high risk condition (seizures), psychiatric and neuropsychological comorbidities, behavioral problems, memory and cognitive problems and medication side effects

## 2020-09-24 NOTE — PROGRESS NOTES
Daily Note     Today's date: 2020  Patient name: Sabine Lance  : 1965  MRN: [id number]  Referring provider: No ref  provider found  Dx:   Encounter Diagnosis   Name Primary?  Spastic hemiplegic cerebral palsy (HCC) Yes                  Subjective: "Good "      Objective: See treatment below  Fabricated splint for patient to increase wrist extension and prevent tendon shortening of digits  Provided foam spacers between digits to increase digit abduction and also provided mole skin to thumb region to reduce skin breakdown  Educated patient and caregiver on splint wearing schedule to wear for 1 hour today, check for redness or irritation, then increase wear time to 2 hours tomorrow, check again for redness or irritation, and continue to increase by 1 hour each day until reaching 6 hours with no redness  Patient can then wear at night or during the day no longer than 6 hours  Assessment: Tolerated treatment well  Plan: Continued skilled OT per POC      INTERVENTION COMMENTS:  Precautions: Seizures(epilepsy), HTN, CARDIAC  2 of Michelle Vicenteor visits, PN due 10/22

## 2020-09-25 ENCOUNTER — OFFICE VISIT (OUTPATIENT)
Dept: OCCUPATIONAL THERAPY | Facility: CLINIC | Age: 55
End: 2020-09-25
Payer: MEDICARE

## 2020-09-25 DIAGNOSIS — G80.2 SPASTIC HEMIPLEGIC CEREBRAL PALSY (HCC): Primary | ICD-10-CM

## 2020-09-25 PROCEDURE — 97112 NEUROMUSCULAR REEDUCATION: CPT

## 2020-09-25 PROCEDURE — 97530 THERAPEUTIC ACTIVITIES: CPT

## 2020-09-30 ENCOUNTER — OFFICE VISIT (OUTPATIENT)
Dept: OCCUPATIONAL THERAPY | Facility: CLINIC | Age: 55
End: 2020-09-30
Payer: MEDICARE

## 2020-09-30 DIAGNOSIS — G80.2 SPASTIC HEMIPLEGIC CEREBRAL PALSY (HCC): Primary | ICD-10-CM

## 2020-09-30 PROCEDURE — 97530 THERAPEUTIC ACTIVITIES: CPT

## 2020-09-30 PROCEDURE — 97535 SELF CARE MNGMENT TRAINING: CPT

## 2020-09-30 PROCEDURE — 97112 NEUROMUSCULAR REEDUCATION: CPT

## 2020-09-30 NOTE — PROGRESS NOTES
Daily Note     Today's date: 2020  Patient name: Terry Chirinos  : 1965  MRN: [id number]  Referring provider: No ref  provider found  Dx:   Encounter Diagnosis   Name Primary?  Spastic hemiplegic cerebral palsy ([facility] ) Yes       Start Time: 930  Stop Time: 101  Total time in clinic (min): 40 minutes    Subjective: "Good "      Objective: See treatment below  [facility]  Performed IASTM to the forearm, wrist, hand and biceps for tone reduction  Performed PROM in supine for extension to increase ROM to RUE for dressing tasks  Manual resistance 2x10 reps for elbow extension for increased strengthening  Pt utilized 1lb yellow bar in supine for dynamic stretch of bicep for dressing tasks  Pt utilized putty to increase R hand strength to complete item transport  Provided additional foam to splint as 2 pieces had fallen off from [address] session  No redness or irritation was noted on skin  Caregiver also stated no difficulty with staff following splint schedule  Assessment: Tolerated treatment well  Pt had decreased tightness in R wrist this session  Pt tolerated UE exercises without pain  Plan: Continued skilled OT per POC      INTERVENTION COMMENTS:  Precautions: Seizures(epilepsy), HTN, CARDIAC  3 of  [facility] visits, PN due 10/22

## 2020-10-06 ENCOUNTER — OFFICE VISIT (OUTPATIENT)
Dept: PODIATRY | Facility: CLINIC | Age: 55
End: 2020-10-06

## 2020-10-06 VITALS
HEIGHT: 68 IN | SYSTOLIC BLOOD PRESSURE: 117 MMHG | RESPIRATION RATE: 17 BRPM | WEIGHT: 189 LBS | DIASTOLIC BLOOD PRESSURE: 71 MMHG | BODY MASS INDEX: 28.64 KG/M2 | HEART RATE: 76 BPM

## 2020-10-06 DIAGNOSIS — B35.9 DERMATOPHYTOSIS: Primary | ICD-10-CM

## 2020-10-06 DIAGNOSIS — M79.672 PAIN IN BOTH FEET: ICD-10-CM

## 2020-10-06 DIAGNOSIS — M79.671 PAIN IN BOTH FEET: ICD-10-CM

## 2020-10-07 ENCOUNTER — OFFICE VISIT (OUTPATIENT)
Dept: OCCUPATIONAL THERAPY | Facility: CLINIC | Age: 55
End: 2020-10-07
Payer: MEDICARE

## 2020-10-07 DIAGNOSIS — G80.2 SPASTIC HEMIPLEGIC CEREBRAL PALSY (HCC): Primary | ICD-10-CM

## 2020-10-07 PROCEDURE — 97530 THERAPEUTIC ACTIVITIES: CPT

## 2020-10-07 PROCEDURE — 97112 NEUROMUSCULAR REEDUCATION: CPT

## 2020-10-09 ENCOUNTER — OFFICE VISIT (OUTPATIENT)
Dept: OCCUPATIONAL THERAPY | Facility: CLINIC | Age: 55
End: 2020-10-09
Payer: MEDICARE

## 2020-10-09 DIAGNOSIS — G80.2 SPASTIC HEMIPLEGIC CEREBRAL PALSY (HCC): Primary | ICD-10-CM

## 2020-10-09 PROCEDURE — 97140 MANUAL THERAPY 1/> REGIONS: CPT

## 2020-10-09 PROCEDURE — 97112 NEUROMUSCULAR REEDUCATION: CPT

## 2020-10-14 ENCOUNTER — OFFICE VISIT (OUTPATIENT)
Dept: OCCUPATIONAL THERAPY | Facility: CLINIC | Age: 55
End: 2020-10-14
Payer: MEDICARE

## 2020-10-14 DIAGNOSIS — G80.2 SPASTIC HEMIPLEGIC CEREBRAL PALSY (HCC): Primary | ICD-10-CM

## 2020-10-14 PROCEDURE — 97140 MANUAL THERAPY 1/> REGIONS: CPT

## 2020-10-14 PROCEDURE — 97530 THERAPEUTIC ACTIVITIES: CPT

## 2020-10-16 ENCOUNTER — OFFICE VISIT (OUTPATIENT)
Dept: OCCUPATIONAL THERAPY | Facility: CLINIC | Age: 55
End: 2020-10-16
Payer: MEDICARE

## 2020-10-16 DIAGNOSIS — G80.2 SPASTIC HEMIPLEGIC CEREBRAL PALSY (HCC): Primary | ICD-10-CM

## 2020-10-16 PROCEDURE — 97140 MANUAL THERAPY 1/> REGIONS: CPT

## 2020-10-16 PROCEDURE — 97112 NEUROMUSCULAR REEDUCATION: CPT

## 2020-10-21 ENCOUNTER — OFFICE VISIT (OUTPATIENT)
Dept: OCCUPATIONAL THERAPY | Facility: CLINIC | Age: 55
End: 2020-10-21
Payer: MEDICARE

## 2020-10-21 DIAGNOSIS — G80.2 SPASTIC HEMIPLEGIC CEREBRAL PALSY (HCC): Primary | ICD-10-CM

## 2020-10-21 PROCEDURE — 97140 MANUAL THERAPY 1/> REGIONS: CPT

## 2020-10-21 PROCEDURE — 97112 NEUROMUSCULAR REEDUCATION: CPT

## 2020-10-23 ENCOUNTER — OFFICE VISIT (OUTPATIENT)
Dept: OCCUPATIONAL THERAPY | Facility: CLINIC | Age: 55
End: 2020-10-23
Payer: MEDICARE

## 2020-10-23 ENCOUNTER — TELEMEDICINE (OUTPATIENT)
Dept: FAMILY MEDICINE CLINIC | Facility: CLINIC | Age: 55
End: 2020-10-23
Payer: MEDICARE

## 2020-10-23 DIAGNOSIS — L30.9 DERMATITIS: Primary | ICD-10-CM

## 2020-10-23 DIAGNOSIS — G80.2 SPASTIC HEMIPLEGIC CEREBRAL PALSY (HCC): Primary | ICD-10-CM

## 2020-10-23 PROCEDURE — 97140 MANUAL THERAPY 1/> REGIONS: CPT

## 2020-10-23 PROCEDURE — 99214 OFFICE O/P EST MOD 30 MIN: CPT | Performed by: FAMILY MEDICINE

## 2020-10-23 PROCEDURE — 97112 NEUROMUSCULAR REEDUCATION: CPT

## 2020-10-23 RX ORDER — DIAPER,BRIEF,INFANT-TODD,DISP
EACH MISCELLANEOUS 2 TIMES DAILY
Qty: 30 G | Refills: 0 | Status: SHIPPED | OUTPATIENT
Start: 2020-10-23

## 2020-10-28 ENCOUNTER — EVALUATION (OUTPATIENT)
Dept: OCCUPATIONAL THERAPY | Facility: CLINIC | Age: 55
End: 2020-10-28
Payer: MEDICARE

## 2020-10-28 DIAGNOSIS — G80.2 SPASTIC HEMIPLEGIC CEREBRAL PALSY (HCC): Primary | ICD-10-CM

## 2020-10-28 PROCEDURE — 97112 NEUROMUSCULAR REEDUCATION: CPT

## 2020-10-28 PROCEDURE — 97530 THERAPEUTIC ACTIVITIES: CPT

## 2020-10-29 ENCOUNTER — IMMUNIZATIONS (OUTPATIENT)
Dept: FAMILY MEDICINE CLINIC | Facility: CLINIC | Age: 55
End: 2020-10-29
Payer: MEDICARE

## 2020-10-29 DIAGNOSIS — R26.89 BALANCE PROBLEM: Primary | ICD-10-CM

## 2020-10-29 DIAGNOSIS — Z23 ENCOUNTER FOR IMMUNIZATION: Primary | ICD-10-CM

## 2020-10-29 PROCEDURE — G0008 ADMIN INFLUENZA VIRUS VAC: HCPCS

## 2020-10-29 PROCEDURE — 90682 RIV4 VACC RECOMBINANT DNA IM: CPT

## 2020-10-30 ENCOUNTER — OFFICE VISIT (OUTPATIENT)
Dept: OCCUPATIONAL THERAPY | Facility: CLINIC | Age: 55
End: 2020-10-30
Payer: MEDICARE

## 2020-10-30 DIAGNOSIS — G80.2 SPASTIC HEMIPLEGIC CEREBRAL PALSY (HCC): Primary | ICD-10-CM

## 2020-10-30 PROCEDURE — 97112 NEUROMUSCULAR REEDUCATION: CPT

## 2020-10-30 PROCEDURE — 97140 MANUAL THERAPY 1/> REGIONS: CPT

## 2020-11-04 ENCOUNTER — APPOINTMENT (OUTPATIENT)
Dept: OCCUPATIONAL THERAPY | Facility: CLINIC | Age: 55
End: 2020-11-04
Payer: MEDICARE

## 2020-11-05 ENCOUNTER — TELEPHONE (OUTPATIENT)
Dept: FAMILY MEDICINE CLINIC | Facility: CLINIC | Age: 55
End: 2020-11-05

## 2020-11-06 ENCOUNTER — APPOINTMENT (OUTPATIENT)
Dept: OCCUPATIONAL THERAPY | Facility: CLINIC | Age: 55
End: 2020-11-06
Payer: MEDICARE

## 2020-11-11 ENCOUNTER — APPOINTMENT (OUTPATIENT)
Dept: OCCUPATIONAL THERAPY | Facility: CLINIC | Age: 55
End: 2020-11-11
Payer: MEDICARE

## 2020-11-13 ENCOUNTER — APPOINTMENT (OUTPATIENT)
Dept: PHYSICAL THERAPY | Facility: CLINIC | Age: 55
End: 2020-11-13
Payer: MEDICARE

## 2020-11-13 ENCOUNTER — APPOINTMENT (OUTPATIENT)
Dept: OCCUPATIONAL THERAPY | Facility: CLINIC | Age: 55
End: 2020-11-13
Payer: MEDICARE

## 2020-11-18 ENCOUNTER — OFFICE VISIT (OUTPATIENT)
Dept: OCCUPATIONAL THERAPY | Facility: CLINIC | Age: 55
End: 2020-11-18
Payer: MEDICARE

## 2020-11-18 DIAGNOSIS — G80.2 SPASTIC HEMIPLEGIC CEREBRAL PALSY (HCC): Primary | ICD-10-CM

## 2020-11-18 PROCEDURE — 97530 THERAPEUTIC ACTIVITIES: CPT

## 2020-11-18 PROCEDURE — 97140 MANUAL THERAPY 1/> REGIONS: CPT

## 2020-11-18 PROCEDURE — 97112 NEUROMUSCULAR REEDUCATION: CPT

## 2020-11-20 ENCOUNTER — OFFICE VISIT (OUTPATIENT)
Dept: OCCUPATIONAL THERAPY | Facility: CLINIC | Age: 55
End: 2020-11-20
Payer: MEDICARE

## 2020-11-20 DIAGNOSIS — G80.2 SPASTIC HEMIPLEGIC CEREBRAL PALSY (HCC): Primary | ICD-10-CM

## 2020-11-20 DIAGNOSIS — E78.2 MIXED HYPERLIPIDEMIA: ICD-10-CM

## 2020-11-20 PROCEDURE — 97112 NEUROMUSCULAR REEDUCATION: CPT

## 2020-11-20 PROCEDURE — 97530 THERAPEUTIC ACTIVITIES: CPT

## 2020-11-24 RX ORDER — ATORVASTATIN CALCIUM 10 MG/1
10 TABLET, FILM COATED ORAL DAILY
Qty: 30 TABLET | Refills: 3 | Status: SHIPPED | OUTPATIENT
Start: 2020-11-24

## 2020-11-25 ENCOUNTER — EVALUATION (OUTPATIENT)
Dept: PHYSICAL THERAPY | Facility: CLINIC | Age: 55
End: 2020-11-25
Payer: MEDICARE

## 2020-11-25 ENCOUNTER — OFFICE VISIT (OUTPATIENT)
Dept: OCCUPATIONAL THERAPY | Facility: CLINIC | Age: 55
End: 2020-11-25
Payer: MEDICARE

## 2020-11-25 DIAGNOSIS — R26.89 BALANCE DISORDER: Primary | ICD-10-CM

## 2020-11-25 DIAGNOSIS — R26.81 UNSTEADY GAIT: ICD-10-CM

## 2020-11-25 DIAGNOSIS — G80.2 SPASTIC HEMIPLEGIC CEREBRAL PALSY (HCC): Primary | ICD-10-CM

## 2020-11-25 DIAGNOSIS — Z86.69 HX OF CEREBRAL PALSY: ICD-10-CM

## 2020-11-25 PROCEDURE — 97112 NEUROMUSCULAR REEDUCATION: CPT

## 2020-11-25 PROCEDURE — 97162 PT EVAL MOD COMPLEX 30 MIN: CPT | Performed by: PHYSICAL THERAPIST

## 2020-11-25 PROCEDURE — 97140 MANUAL THERAPY 1/> REGIONS: CPT

## 2020-11-25 PROCEDURE — 97530 THERAPEUTIC ACTIVITIES: CPT

## 2020-12-01 ENCOUNTER — OFFICE VISIT (OUTPATIENT)
Dept: OCCUPATIONAL THERAPY | Facility: CLINIC | Age: 55
End: 2020-12-01
Payer: MEDICARE

## 2020-12-01 ENCOUNTER — OFFICE VISIT (OUTPATIENT)
Dept: PHYSICAL THERAPY | Facility: CLINIC | Age: 55
End: 2020-12-01
Payer: MEDICARE

## 2020-12-01 DIAGNOSIS — Z86.69 HX OF CEREBRAL PALSY: ICD-10-CM

## 2020-12-01 DIAGNOSIS — R26.89 BALANCE DISORDER: Primary | ICD-10-CM

## 2020-12-01 DIAGNOSIS — G80.2 SPASTIC HEMIPLEGIC CEREBRAL PALSY (HCC): Primary | ICD-10-CM

## 2020-12-01 DIAGNOSIS — R26.81 UNSTEADY GAIT: ICD-10-CM

## 2020-12-01 PROCEDURE — 97530 THERAPEUTIC ACTIVITIES: CPT

## 2020-12-01 PROCEDURE — 97112 NEUROMUSCULAR REEDUCATION: CPT | Performed by: PHYSICAL THERAPIST

## 2020-12-01 PROCEDURE — 97112 NEUROMUSCULAR REEDUCATION: CPT

## 2020-12-01 PROCEDURE — 97530 THERAPEUTIC ACTIVITIES: CPT | Performed by: PHYSICAL THERAPIST

## 2020-12-03 ENCOUNTER — TRANSCRIBE ORDERS (OUTPATIENT)
Dept: PHYSICAL THERAPY | Facility: CLINIC | Age: 55
End: 2020-12-03

## 2020-12-03 DIAGNOSIS — R26.81 UNSTEADY GAIT: ICD-10-CM

## 2020-12-03 DIAGNOSIS — Z86.69 HX OF CEREBRAL PALSY: ICD-10-CM

## 2020-12-03 DIAGNOSIS — R26.89 BALANCE DISORDER: Primary | ICD-10-CM

## 2020-12-03 LAB
ALBUMIN SERPL-MCNC: 4.2 G/DL (ref 3.8–4.9)
ALP SERPL-CCNC: 111 IU/L (ref 39–117)
ALT SERPL-CCNC: 12 IU/L (ref 0–44)
AST SERPL-CCNC: 15 IU/L (ref 0–40)
BILIRUB DIRECT SERPL-MCNC: 0.13 MG/DL (ref 0–0.4)
BILIRUB SERPL-MCNC: 0.4 MG/DL (ref 0–1.2)
LAMOTRIGINE SERPL-MCNC: 6 UG/ML (ref 2–20)
PROT SERPL-MCNC: 6.9 G/DL (ref 6–8.5)

## 2020-12-04 ENCOUNTER — OFFICE VISIT (OUTPATIENT)
Dept: PHYSICAL THERAPY | Facility: CLINIC | Age: 55
End: 2020-12-04
Payer: MEDICARE

## 2020-12-04 DIAGNOSIS — R26.81 UNSTEADY GAIT: ICD-10-CM

## 2020-12-04 DIAGNOSIS — R26.89 BALANCE DISORDER: Primary | ICD-10-CM

## 2020-12-04 DIAGNOSIS — Z86.69 HX OF CEREBRAL PALSY: ICD-10-CM

## 2020-12-04 PROCEDURE — 97112 NEUROMUSCULAR REEDUCATION: CPT | Performed by: PHYSICAL THERAPIST

## 2020-12-04 PROCEDURE — 97530 THERAPEUTIC ACTIVITIES: CPT | Performed by: PHYSICAL THERAPIST

## 2020-12-08 ENCOUNTER — OFFICE VISIT (OUTPATIENT)
Dept: PHYSICAL THERAPY | Facility: CLINIC | Age: 55
End: 2020-12-08
Payer: MEDICARE

## 2020-12-08 ENCOUNTER — EVALUATION (OUTPATIENT)
Dept: OCCUPATIONAL THERAPY | Facility: CLINIC | Age: 55
End: 2020-12-08
Payer: MEDICARE

## 2020-12-08 DIAGNOSIS — R26.89 BALANCE DISORDER: ICD-10-CM

## 2020-12-08 DIAGNOSIS — R26.81 UNSTEADY GAIT: Primary | ICD-10-CM

## 2020-12-08 DIAGNOSIS — Z86.69 HX OF CEREBRAL PALSY: ICD-10-CM

## 2020-12-08 DIAGNOSIS — G80.2 SPASTIC HEMIPLEGIC CEREBRAL PALSY (HCC): Primary | ICD-10-CM

## 2020-12-08 PROCEDURE — 97530 THERAPEUTIC ACTIVITIES: CPT | Performed by: PHYSICAL THERAPIST

## 2020-12-08 PROCEDURE — 97112 NEUROMUSCULAR REEDUCATION: CPT | Performed by: PHYSICAL THERAPIST

## 2020-12-08 PROCEDURE — 97112 NEUROMUSCULAR REEDUCATION: CPT

## 2020-12-08 PROCEDURE — 97166 OT EVAL MOD COMPLEX 45 MIN: CPT

## 2020-12-09 DIAGNOSIS — K59.00 CONSTIPATION, UNSPECIFIED CONSTIPATION TYPE: ICD-10-CM

## 2020-12-10 ENCOUNTER — OFFICE VISIT (OUTPATIENT)
Dept: OCCUPATIONAL THERAPY | Facility: CLINIC | Age: 55
End: 2020-12-10
Payer: MEDICARE

## 2020-12-10 ENCOUNTER — OFFICE VISIT (OUTPATIENT)
Dept: PHYSICAL THERAPY | Facility: CLINIC | Age: 55
End: 2020-12-10
Payer: MEDICARE

## 2020-12-10 DIAGNOSIS — R26.81 UNSTEADY GAIT: Primary | ICD-10-CM

## 2020-12-10 DIAGNOSIS — G80.2 SPASTIC HEMIPLEGIC CEREBRAL PALSY (HCC): Primary | ICD-10-CM

## 2020-12-10 DIAGNOSIS — R26.89 BALANCE DISORDER: ICD-10-CM

## 2020-12-10 DIAGNOSIS — Z86.69 HX OF CEREBRAL PALSY: ICD-10-CM

## 2020-12-10 PROCEDURE — 97530 THERAPEUTIC ACTIVITIES: CPT | Performed by: PHYSICAL THERAPIST

## 2020-12-10 PROCEDURE — 97112 NEUROMUSCULAR REEDUCATION: CPT

## 2020-12-10 PROCEDURE — 97110 THERAPEUTIC EXERCISES: CPT

## 2020-12-10 PROCEDURE — 97112 NEUROMUSCULAR REEDUCATION: CPT | Performed by: PHYSICAL THERAPIST

## 2020-12-10 RX ORDER — SENNA 8.6 MG/1
TABLET, FILM COATED ORAL
Qty: 30 TABLET | Refills: 1 | Status: SHIPPED | OUTPATIENT
Start: 2020-12-10 | End: 2021-02-08

## 2020-12-15 ENCOUNTER — OFFICE VISIT (OUTPATIENT)
Dept: PHYSICAL THERAPY | Facility: CLINIC | Age: 55
End: 2020-12-15
Payer: MEDICARE

## 2020-12-15 ENCOUNTER — APPOINTMENT (OUTPATIENT)
Dept: OCCUPATIONAL THERAPY | Facility: CLINIC | Age: 55
End: 2020-12-15
Payer: MEDICARE

## 2020-12-15 DIAGNOSIS — R26.89 BALANCE DISORDER: ICD-10-CM

## 2020-12-15 DIAGNOSIS — R26.81 UNSTEADY GAIT: Primary | ICD-10-CM

## 2020-12-15 DIAGNOSIS — Z86.69 HX OF CEREBRAL PALSY: ICD-10-CM

## 2020-12-15 PROCEDURE — 97112 NEUROMUSCULAR REEDUCATION: CPT | Performed by: PHYSICAL THERAPIST

## 2020-12-15 PROCEDURE — 97530 THERAPEUTIC ACTIVITIES: CPT | Performed by: PHYSICAL THERAPIST

## 2020-12-17 ENCOUNTER — APPOINTMENT (OUTPATIENT)
Dept: PHYSICAL THERAPY | Facility: CLINIC | Age: 55
End: 2020-12-17
Payer: MEDICARE

## 2020-12-18 ENCOUNTER — OFFICE VISIT (OUTPATIENT)
Dept: OCCUPATIONAL THERAPY | Facility: CLINIC | Age: 55
End: 2020-12-18
Payer: MEDICARE

## 2020-12-18 ENCOUNTER — OFFICE VISIT (OUTPATIENT)
Dept: PHYSICAL THERAPY | Facility: CLINIC | Age: 55
End: 2020-12-18
Payer: MEDICARE

## 2020-12-18 DIAGNOSIS — G80.2 SPASTIC HEMIPLEGIC CEREBRAL PALSY (HCC): Primary | ICD-10-CM

## 2020-12-18 DIAGNOSIS — Z86.69 HX OF CEREBRAL PALSY: ICD-10-CM

## 2020-12-18 DIAGNOSIS — R26.81 UNSTEADY GAIT: Primary | ICD-10-CM

## 2020-12-18 DIAGNOSIS — R26.89 BALANCE DISORDER: ICD-10-CM

## 2020-12-18 PROCEDURE — 97140 MANUAL THERAPY 1/> REGIONS: CPT

## 2020-12-18 PROCEDURE — 97530 THERAPEUTIC ACTIVITIES: CPT

## 2020-12-18 PROCEDURE — 97112 NEUROMUSCULAR REEDUCATION: CPT

## 2020-12-21 ENCOUNTER — APPOINTMENT (OUTPATIENT)
Dept: PHYSICAL THERAPY | Facility: CLINIC | Age: 55
End: 2020-12-21
Payer: MEDICARE

## 2020-12-21 ENCOUNTER — APPOINTMENT (OUTPATIENT)
Dept: OCCUPATIONAL THERAPY | Facility: CLINIC | Age: 55
End: 2020-12-21
Payer: MEDICARE

## 2020-12-22 ENCOUNTER — APPOINTMENT (OUTPATIENT)
Dept: OCCUPATIONAL THERAPY | Facility: CLINIC | Age: 55
End: 2020-12-22
Payer: MEDICARE

## 2020-12-22 ENCOUNTER — APPOINTMENT (OUTPATIENT)
Dept: PHYSICAL THERAPY | Facility: CLINIC | Age: 55
End: 2020-12-22
Payer: MEDICARE

## 2020-12-23 ENCOUNTER — OFFICE VISIT (OUTPATIENT)
Dept: PHYSICAL THERAPY | Facility: CLINIC | Age: 55
End: 2020-12-23
Payer: MEDICARE

## 2020-12-23 ENCOUNTER — APPOINTMENT (OUTPATIENT)
Dept: OCCUPATIONAL THERAPY | Facility: CLINIC | Age: 55
End: 2020-12-23
Payer: MEDICARE

## 2020-12-23 DIAGNOSIS — R26.89 BALANCE DISORDER: ICD-10-CM

## 2020-12-23 DIAGNOSIS — R26.81 UNSTEADY GAIT: Primary | ICD-10-CM

## 2020-12-23 DIAGNOSIS — Z86.69 HX OF CEREBRAL PALSY: ICD-10-CM

## 2020-12-23 PROCEDURE — 97112 NEUROMUSCULAR REEDUCATION: CPT

## 2020-12-23 PROCEDURE — 97116 GAIT TRAINING THERAPY: CPT

## 2020-12-23 PROCEDURE — 97530 THERAPEUTIC ACTIVITIES: CPT

## 2020-12-29 ENCOUNTER — OFFICE VISIT (OUTPATIENT)
Dept: PHYSICAL THERAPY | Facility: CLINIC | Age: 55
End: 2020-12-29
Payer: MEDICARE

## 2020-12-29 DIAGNOSIS — Z86.69 HX OF CEREBRAL PALSY: ICD-10-CM

## 2020-12-29 DIAGNOSIS — R26.81 UNSTEADY GAIT: Primary | ICD-10-CM

## 2020-12-29 DIAGNOSIS — R26.89 BALANCE DISORDER: ICD-10-CM

## 2020-12-29 PROCEDURE — 97112 NEUROMUSCULAR REEDUCATION: CPT | Performed by: PHYSICAL THERAPIST

## 2020-12-29 PROCEDURE — 97530 THERAPEUTIC ACTIVITIES: CPT | Performed by: PHYSICAL THERAPIST

## 2020-12-31 ENCOUNTER — OFFICE VISIT (OUTPATIENT)
Dept: PHYSICAL THERAPY | Facility: CLINIC | Age: 55
End: 2020-12-31
Payer: MEDICARE

## 2020-12-31 ENCOUNTER — OFFICE VISIT (OUTPATIENT)
Dept: OCCUPATIONAL THERAPY | Facility: CLINIC | Age: 55
End: 2020-12-31
Payer: MEDICARE

## 2020-12-31 DIAGNOSIS — R26.89 BALANCE DISORDER: ICD-10-CM

## 2020-12-31 DIAGNOSIS — Z86.69 HX OF CEREBRAL PALSY: ICD-10-CM

## 2020-12-31 DIAGNOSIS — G80.2 SPASTIC HEMIPLEGIC CEREBRAL PALSY (HCC): Primary | ICD-10-CM

## 2020-12-31 DIAGNOSIS — R26.81 UNSTEADY GAIT: Primary | ICD-10-CM

## 2020-12-31 PROCEDURE — 97140 MANUAL THERAPY 1/> REGIONS: CPT

## 2020-12-31 PROCEDURE — 97530 THERAPEUTIC ACTIVITIES: CPT

## 2020-12-31 PROCEDURE — 97110 THERAPEUTIC EXERCISES: CPT | Performed by: PHYSICAL THERAPIST

## 2020-12-31 PROCEDURE — 97530 THERAPEUTIC ACTIVITIES: CPT | Performed by: PHYSICAL THERAPIST

## 2020-12-31 PROCEDURE — 97112 NEUROMUSCULAR REEDUCATION: CPT | Performed by: PHYSICAL THERAPIST

## 2021-01-05 ENCOUNTER — OFFICE VISIT (OUTPATIENT)
Dept: PHYSICAL THERAPY | Facility: CLINIC | Age: 56
End: 2021-01-05
Payer: MEDICARE

## 2021-01-05 DIAGNOSIS — R26.89 BALANCE DISORDER: ICD-10-CM

## 2021-01-05 DIAGNOSIS — R26.81 UNSTEADY GAIT: Primary | ICD-10-CM

## 2021-01-05 DIAGNOSIS — Z86.69 HX OF CEREBRAL PALSY: ICD-10-CM

## 2021-01-05 PROCEDURE — 97530 THERAPEUTIC ACTIVITIES: CPT | Performed by: PHYSICAL THERAPIST

## 2021-01-05 PROCEDURE — 97110 THERAPEUTIC EXERCISES: CPT | Performed by: PHYSICAL THERAPIST

## 2021-01-05 NOTE — PROGRESS NOTES
Daily Note  MA: 30    Today's date: 2021  Patient name: Sonia Degroot  : 1965  MRN: [id number]  Referring provider: Bo Lilly DO  Dx:   Encounter Diagnosis     ICD-10-CM    1  Unsteady gait  R26 81    2  Hx of cerebral palsy  Z86 69    3  Balance disorder  R26 89        Start Time: 1400  Stop Time: 2567  Total time in clinic (min): 45 minutes    Subjective: Client arrives with caregiver today, no falls reported  Objective: See treatment diary below      TA:  - FWD/bwd walking with noted compensatory trunk rotation throughout, CG-min A without AD  40' x 6  - Bowling activity with emphasis on item retrieval to/from floor, standing tolerance, weight shifting, and coordination (x 18 minutes)  - Block practice of STS x 3 trials with 30 feet of figure-8 walking in between cones and STS x 3 trials x 10 rounds    TE:   - hip flexion with 2# ankle weights 20x BLE  - LAQ with 2# ankle weights 20x BLE       Assessment: Client with improved activity tolerance and quality of gait today compared to previous session  Treatment focus on strengthening, dynamic standing balance, and improving standing tolerance throughout ambulation and transfers  Noted difficulty with item retrieval from floor (requires increased time and visible instability noted)  Recommending possible use of reacher to aid in safety with transfers  He will benefit from continued skilled PT services to maximize functional mobility and reduce burden of care  Plan: Continue per plan of care  Client [place] moving [place] in [place]       Precautions:   Past Medical History:   Diagnosis Date    Ambulatory dysfunction     Anxiety     Bilateral impacted cerumen     last assessed 2013    Capsulitis     last assessed 2016    Cellulitis of finger 2012    Chronic kidney disease     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     Closed fracture of one or more phalanges of foot 2009    Constipation     Deformity     left and right foot and ankle ,right hand    Depression     Epilepsy [facility]     Erythrocytosis     Hemiplegia affecting dominant side ([facility] )     Hypertension     Hypoglycemia 11/08/2011    Hypokalemia     last assessed 05/30/2013    Mental retardation     Mood disorder ([facility] )     Nephrocalcinosis        Outcome Assessments 11/25/2020 12/29/20     5XSTS 33 93 sec 31 2     30 sec chair rise 4 4     TUG 37 19 sec  38 9 seconds     10MWT 30 88 sec   0 32 m/sec 42 seconds=  23 m/sec     2MWT 240 feet 150 ft      Supine to sit:  16 44 sec w/ 1 pillow 8 9 seconds w/ 1 pillow

## 2021-01-08 ENCOUNTER — OFFICE VISIT (OUTPATIENT)
Dept: OCCUPATIONAL THERAPY | Facility: CLINIC | Age: 56
End: 2021-01-08
Payer: MEDICARE

## 2021-01-08 ENCOUNTER — OFFICE VISIT (OUTPATIENT)
Dept: PHYSICAL THERAPY | Facility: CLINIC | Age: 56
End: 2021-01-08
Payer: MEDICARE

## 2021-01-08 DIAGNOSIS — R26.81 UNSTEADY GAIT: Primary | ICD-10-CM

## 2021-01-08 DIAGNOSIS — R26.89 BALANCE DISORDER: ICD-10-CM

## 2021-01-08 DIAGNOSIS — G80.2 SPASTIC HEMIPLEGIC CEREBRAL PALSY (HCC): Primary | ICD-10-CM

## 2021-01-08 DIAGNOSIS — Z86.69 HX OF CEREBRAL PALSY: ICD-10-CM

## 2021-01-08 PROCEDURE — 97110 THERAPEUTIC EXERCISES: CPT

## 2021-01-08 PROCEDURE — 97140 MANUAL THERAPY 1/> REGIONS: CPT

## 2021-01-08 PROCEDURE — 97112 NEUROMUSCULAR REEDUCATION: CPT | Performed by: PHYSICAL THERAPIST

## 2021-01-08 NOTE — PROGRESS NOTES
Daily Note     Today's date: 2021  Patient name: Kelsie Davidson  : 1965  MRN: [id number]  Referring provider: Dayo Wright DO  Dx:   Encounter Diagnosis   Name Primary?  Spastic hemiplegic cerebral palsy ([facility] ) Yes       Start Time: 1019  Stop Time: 1058  Total time in clinic (min): 39 minutes    Subjective: "I've been stretching" referring to legs      Objective: See treatment below  Initiated session in sitting with passive stretch and IASTM to elbow and wrist to maintain current range and prevent risk of contractures  Transfer color cubes in PNF pattern to work on voluntary grasp and transferring of objects  [place] A provided to grasp cubes and transfer  Unable to drop cubes in control manner  Passive stretch to wrist provided to increase ROM  Assessment: Tolerated treatment well  Patient is continuing to maintain current abilities in session  Required hand over hand assist for placement of cubes and to release objects in a controlled manner  Plan: Continued skilled OT per POC      INTERVENTION COMMENTS:  Precautions: Seizures(epilepsy), HTN, CARDIAC  7 QO 94 CVUISA, PN due

## 2021-01-08 NOTE — PROGRESS NOTES
Daily Note  DE: 20    Today's date: 2021  Patient name: Marlena Wagner  : 1965  MRN: [id number]  Referring provider: Eugenio Anderson DO  Dx:   Encounter Diagnosis     ICD-10-CM    1  Unsteady gait  R26 81    2  Hx of cerebral palsy  Z86 69    3  Balance disorder  R26 89        Start Time: 1103  Stop Time: 1147  Total time in clinic (min): 44 minutes    Subjective: Client arrives from OT without complaints    Objective: See treatment diary below      NMR:  - FWD/bwd walking with noted compensatory trunk rotation throughout, CG-min A without AD  40' x 6  - FWD Step up-downs from foam to/from 8-inch steps with 1 UE support 20x each LE  - LAT Step up-downs from foam to/from 8-inch steps with 1 UE support 20x each LE  - Block practice of STS x 3 trials with 20 feet of figure-8 walking in between cones and STS x 3 trials x 10 rounds  - EO on blue airex with CS-CG 30 sec x 3  - EC on blue airex with CS-CG 30 sec x 3      Not Today:  - hip flexion with 2# ankle weights 20x BLE  - LAQ with 2# ankle weights 20x BLE  - Bowling activity with emphasis on item retrieval to/from floor, standing tolerance, weight shifting, and coordination (x 18 minutes)      Assessment: Treatment focus on strengthening, dynamic standing balance, and improving standing tolerance throughout ambulation and transfers  He continues to require increased time to complete tasks and is motivated to participate throughout  Recommending possible use of reacher to aid in safety with item retrieval  Short duration rest breaks throughout  He will benefit from continued skilled PT services to maximize functional mobility and reduce burden of care  Plan: Continue per plan of care  Client is moving [place]       Precautions:   Past Medical History:   Diagnosis Date    Ambulatory dysfunction     Anxiety     Bilateral impacted cerumen     last assessed 2013    Capsulitis     last assessed 2016    Cellulitis of finger 01/13/2012    Chronic kidney disease     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     Closed fracture of one or more phalanges of foot 01/06/2009    Constipation     Deformity     left and right foot and ankle ,right hand    Depression     Epilepsy (HCC)     Erythrocytosis     Hemiplegia affecting dominant side ([facility]     Hypertension     Hypoglycemia 11/08/2011    Hypokalemia     last assessed 05/30/2013    Mental retardation     Mood disorder ([facility] )     Nephrocalcinosis        Outcome Assessments 11/25/2020 12/29/20     5XSTS 33 93 sec 31 2     30 sec chair rise 4 4     TUG 37 19 sec  38 9 seconds     10MWT 30 88 sec   0 32 m/sec 42 seconds=  23 m/sec     2MWT 240 feet 150 ft      Supine to sit:  16 44 sec w/ 1 pillow 8 9 seconds w/ 1 pillow

## 2021-01-12 ENCOUNTER — OFFICE VISIT (OUTPATIENT)
Dept: PHYSICAL THERAPY | Facility: CLINIC | Age: 56
End: 2021-01-12
Payer: MEDICARE

## 2021-01-12 DIAGNOSIS — R26.81 UNSTEADY GAIT: Primary | ICD-10-CM

## 2021-01-12 DIAGNOSIS — Z86.69 HX OF CEREBRAL PALSY: ICD-10-CM

## 2021-01-12 DIAGNOSIS — R26.89 BALANCE DISORDER: ICD-10-CM

## 2021-01-12 PROCEDURE — 97112 NEUROMUSCULAR REEDUCATION: CPT | Performed by: PHYSICAL THERAPIST

## 2021-01-12 PROCEDURE — 97116 GAIT TRAINING THERAPY: CPT | Performed by: PHYSICAL THERAPIST

## 2021-01-12 NOTE — PROGRESS NOTES
Daily Note  IN: 20    Today's date: 2021  Patient name: Norrine Lombard  : 1965  MRN: [id number]  Referring provider: Caro Seo DO  Dx:   Encounter Diagnosis     ICD-10-CM    1  Unsteady gait  R26 81    2  Hx of cerebral palsy  Z86 69    3  Balance disorder  R26 89        Start Time: 1400  Stop Time: 1445  Total time in clinic (min): 45 minutes    Subjective: Client arrives from OT without complaints    Objective: See treatment diary below      NMR:  - FWD/bwd walking with noted compensatory trunk rotation throughout, CG-min A without AD  40' x 6  - Figure-8 walking 20 feet x 6  - EO on blue airex with CS-CG 30 sec x 3  - EC on blue airex with CS-CG 30 sec x 3  - STS 2x10 with emphasis on hip extension and upright posturing  GAIT:  150' x 2 without AD in crowded environment, with good obstacle clearance  No AD, CS  Not Today:  - hip flexion with 2# ankle weights 20x BLE  - LAQ with 2# ankle weights 20x BLE  - Bowling activity with emphasis on item retrieval to/from floor, standing tolerance, weight shifting, and coordination (x 18 minutes)  - Block practice of STS x 3 trials with 20 feet of figure-8 walking in between cones and STS x 3 trials x 10 rounds  - FWD Step up-downs from foam to/from 8-inch steps with 1 UE support 20x each LE  - LAT Step up-downs from foam to/from 8-inch steps with 1 UE support 20x each LE    Assessment: Treatment focus on strengthening, dynamic standing balance, and improving standing tolerance throughout ambulation and transfers  He continues to require increased time to complete tasks and is motivated to participate throughout  Recommending possible use of reacher to aid in safety with item retrieval  Short duration rest breaks throughout  He will benefit from continued skilled PT services to maximize functional mobility and reduce burden of care  Plan: Continue per plan of care  Client is moving [place] in [place]       Precautions: Past Medical History:   Diagnosis Date    Ambulatory dysfunction     Anxiety     Bilateral impacted cerumen     last assessed 05/30/2013    Capsulitis     last assessed 04/26/2016    Cellulitis of finger 01/13/2012    Chronic kidney disease     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     Closed fracture of one or more phalanges of foot 01/06/2009    Constipation     Deformity     left and right foot and ankle ,right hand    Depression     Epile[facility] [address] [facility]     Erythrocytosis     Hemiplegia affecting dominant side ([facility] [address] [facility]     Hypertension     Hypoglycemia 11/08/2011    Hypokalemia     last assessed 05/30/2013    Mental retardation     Mood disorder ([facility] [address] [facility]     Nephrocalcinosis        Outcome Assessments 11/25/2020 12/29/20     5XSTS 33 93 sec 31 2     30 sec chair rise 4 4     TUG 37 19 sec  38 9 seconds     10MWT 30 88 sec   0 32 m/sec 42 seconds=  23 m/sec     2MWT 240 feet 150 ft      Supine to sit:  16 44 sec w/ 1 pillow 8 9 seconds w/ 1 pillow

## 2021-01-15 ENCOUNTER — OFFICE VISIT (OUTPATIENT)
Dept: PHYSICAL THERAPY | Facility: CLINIC | Age: 56
End: 2021-01-15
Payer: MEDICARE

## 2021-01-15 ENCOUNTER — EVALUATION (OUTPATIENT)
Dept: OCCUPATIONAL THERAPY | Facility: CLINIC | Age: 56
End: 2021-01-15
Payer: MEDICARE

## 2021-01-15 DIAGNOSIS — R26.81 UNSTEADY GAIT: Primary | ICD-10-CM

## 2021-01-15 DIAGNOSIS — R26.89 BALANCE DISORDER: ICD-10-CM

## 2021-01-15 DIAGNOSIS — G80.2 SPASTIC HEMIPLEGIC CEREBRAL PALSY (HCC): Primary | ICD-10-CM

## 2021-01-15 DIAGNOSIS — Z86.69 HX OF CEREBRAL PALSY: ICD-10-CM

## 2021-01-15 PROCEDURE — 97112 NEUROMUSCULAR REEDUCATION: CPT | Performed by: PHYSICAL THERAPIST

## 2021-01-15 PROCEDURE — 97110 THERAPEUTIC EXERCISES: CPT

## 2021-01-15 PROCEDURE — 97116 GAIT TRAINING THERAPY: CPT | Performed by: PHYSICAL THERAPIST

## 2021-01-15 PROCEDURE — 97140 MANUAL THERAPY 1/> REGIONS: CPT

## 2021-01-15 PROCEDURE — 97530 THERAPEUTIC ACTIVITIES: CPT

## 2021-01-15 NOTE — PROGRESS NOTES
OT Re-EVALUATION      Today's date: 1/15/2021  Patient name: Sammy Merrill  : 1965  MRN: [id number]  Referring provider: Deb Glover DO  Dx:   Encounter Diagnosis     ICD-10-CM    1  Spastic hemiplegic cerebral palsy ([address] )  G80 2        Start Time:   Stop Time: 1100  Total time in clinic (min): 45 minutes    Subjective:   "It takes me a couple of minutes to get dressed"    Objective:     ROM of elbow  Flexion 132  Extension 2     Shoulder flexion   115 degrees         Wrist Extension= 0    Wrist Flexion    90            Finger ROM:               MP       PIP      DIP  Index                           20                    Middle                          60                      Ring                             55                      Small                           50                     Thumb:     CMC Abd=24 (with stretch)                Opposition to D5 tip 30     Pt appears to be hypermobile in digits and able to hyper extend and flex digits          STRENGTH: Mario presents with decreased UE strength including the following     Shoulder flexion 3+/5   Elbow flexion: 3-/5  Elbow extension 4/5   Wrist Extension:  1/5  Wrist Flexion:      3/5         Strength: R=4                           PAIN LEVEL:  Current:0/10   At Best: 0/10   At worst: 6/10   Location:  elbow     0/10 in R shoulder- posterior        FUNCTIONAL SUMMARY:   Rober Rollisn is independent in most basic self care skills and cooking         IMPAIRMENT SUMMARY:  Mario presents with:,  Increased wrist and hand edema,  Decreased wrist and finger ROM,  Decreased UE  strength,  Increased pain at 6/10  Possibility for skin break down  Saskia Medico would benefit from OT to return to prior function  Prognosis: Good         Short Term Goals: to be completed in 6-12weeks    1  Pt will be able to don/doff RHS with modified independence and/or caregiver will be able to don/doff RHS ACHIEVED    2  To educate on tone reduction strategies and modify splint ACHIEVED   3  Increase ROM of wrist to , Wrist ext -10 ACHIEVED   4  Increase hand strength R by 2-3 lbs and UE strength to be able to complete item transport and increase ease with dressing tasks  ACHIEVED   5  Decrease pain to 0-3/10  NOT ACHIEVED         Long Term Goals: To be completed in 8-12 weeks  Increase ROM of wrist to , Wrist ext -5 ACHIEVED    Increase hand strength R by 4 lbs and UE strength to be able to complete item transport and increase ease with dressing tasks ACHIEVED    Decrease pain to 0-2/10 NOT ACHIEVED     NEW GOALS on 12/8/2020 8-12 weeks   Pt will prevent muscle deteriation of wrist ROM and maintain to 0 degrees PROGRESSING  Pt will prevent muscle deteriation of elbow strength and maintain extension 2 degrees to complete IADLs PROGRESSING  Pt will prevent muscle deteriation of  Hand strength and maintain at 5 lbs to complete IADLs PROGRESSING    NEW GOALS on 1/15/2021 2-4 weeks  Pt will zipper coat using compensatory method with increase ease to maintain current abilities with ADLs  TREATMENT:  Zipped coat x1 within 3 minutes and min VCs  Assessment: Tolerated treatment well  Patient would benefit from continued OT  Pt reports continued pain since last re-evaluation during bed mobility on occassion  Pt reports splint has been wearing each night  Pt demonstrating improvements in ROM and strength however no significant changes  Recommending to continue OT 1 time per week for 2-4 more weeks for prevention of deteriation and pt unable to perform HEP on own  Mohsen Engle (2013): a [facility] decision that sought to clarify that Medicare will in fact cover skilled therapy services to maintain a patients current condition or prevent/slow further deterioration  Pt lives in group home and will be switching group homes February 1st, 2021  He does not have the means of someone to provided A with HEP and will be transitioning to new home       Plan: Continue per plan of care  Precautions:   Past Medical History:   Diagnosis Date    Ambulatory dysfunction     Anxiety     Bilateral impacted cerumen     last assessed 05/30/2013    Capsulitis     last assessed 04/26/2016    Cellulitis of finger 01/13/2012    Chronic kidney disease     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     Closed fracture of one or more phalanges of foot 01/06/2009    Constipation     Deformity     left and right foot and ankle ,right hand    Depression     Epilepsy [facility] [address] [facility]     Erythrocytosis     Hemiplegia affecting dominant side ([facility] [address] [facility]     Hypertension     Hypoglycemia 11/08/2011    Hypokalemia     last assessed 05/30/2013    Mental retardation     Mood disorder ([facility] [address] [facility]     Nephrocalcinosis            Outcome Assessments 11/25/2020         5XSTS 33 93 sec         30 sec chair rise 4         TUG 37 19 sec          10MWT 30 88 sec   0 32 m/sec         2MWT 240 feet         Supine to sit:  16 44 sec w/ 1 pillow

## 2021-01-15 NOTE — PROGRESS NOTES
Daily Note  AZ: 20    Today's date: 1/15/2021  Patient name: Gumaro Veliz  : 1965  MRN: [id number]  Referring provider: Melvin   Dx:   Encounter Diagnosis     ICD-10-CM    1  Unsteady gait  R26 81    2  Hx of cerebral palsy  Z86 69    3  Balance disorder  R26 89        Start Time: 1102  Stop Time: 1145  Total time in clinic (min): 43 minutes    Subjective: Client arrives from OT without complaints    Objective: See treatment diary below      NMR:  - Bowling activity with emphasis on item retrieval to/from floor, standing tolerance, weight shifting, and coordination (x 7 minutes)  - EO on blue airex with CS-CG 30 sec x 3  - EC on blue airex with CS-CG 30 sec x 3  - STS 2x10 with emphasis on hip extension and upright posturing  GAIT:  - 2 x 2 min on treadmill at 0 5-0 6 mph  (solo step utilized) limited to 2 minutes due to fatigue and limited foot clearance  - 150' X 1 without AD within crowded environment    Not Today:  - hip flexion with 2# ankle weights 20x BLE  - LAQ with 2# ankle weights 20x BLE  - Block practice of STS x 3 trials with 20 feet of figure-8 walking in between cones and STS x 3 trials x 10 rounds  - FWD Step up-downs from foam to/from 8-inch steps with 1 UE support 20x each LE  - LAT Step up-downs from foam to/from 8-inch steps with 1 UE support 20x each LE    Assessment: Treatment focus on strengthening, dynamic standing balance, and improving standing tolerance throughout ambulation and transfers  He continues to require increased time to complete tasks and is motivated to participate throughout  He continues to be limited in LLE weight shifting and L knee flexion when bending down to retrieve an item from the floor  Recommending possible use of reacher to aid in safety with item retrieval  Short duration rest breaks throughout  He will benefit from continued skilled PT services to maximize functional mobility and reduce burden of care       Plan: Continue per plan of care  Client is moving to [place] in [place] 2021, as per his supervisor, he plans to continue with PT services if appropriate  Also plan to incorporate unlevel ambulation in future sessions  Precautions:   Past Medical History:   Diagnosis Date    Ambulatory dysfunction     Anxiety     Bilateral impacted cerumen     last assessed 05/30/2013    Capsulitis     last assessed 04/26/2016    Cellulitis of finger 01/13/2012    Chronic kidney disease     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     Closed fracture of one or more phalanges of foot 01/06/2009    Constipation     Deformity     left and right foot and ankle ,right hand    Depression     Epilepsy ([facility] [address] [facility]     Erythrocytosis     Hemiplegia affecting dominant side ([facility] [address] [facility]     Hypertension     Hypoglycemia 11/08/2011    Hypokalemia     last assessed 05/30/2013    Mental retardation     Mood disorder ([facility] [address] [facility]     Nephrocalcinosis        Outcome Assessments 11/25/2020 12/29/20     5XSTS 33 93 sec 31 2     30 sec chair rise 4 4     TUG 37 19 sec  38 9 seconds     10MWT 30 88 sec   0 32 m/sec 42 seconds=  23 m/sec     2MWT 240 feet 150 ft      Supine to sit:  16 44 sec w/ 1 pillow 8 9 seconds w/ 1 pillow

## 2021-01-15 NOTE — LETTER
January 15, 2021    Elbert Cox [address]    Patient: Scott Chin   YOB: 1965   Date of Visit: 1/15/2021     Encounter Diagnosis     ICD-10-CM    1  Spastic hemiplegic cerebral palsy [facility])  G80 2        Dear Dr Artis Velasco:    Thank you for your recent referral of Scott Chin  Please review the attached evaluation summary from Mario's recent visit  Please verify that you agree with the plan of care by signing the attached order  If you have any questions or concerns, please do not hesitate to call  I sincerely appreciate the opportunity to share in the care of one of your patients and hope to have another opportunity to work with you in the near future  Sincerely,    Gladys Skaggs OT      Referring Provider:     I certify that I have read the below Plan of Care and certify the need for these services furnished under this plan of treatment while under my care                      Elbert Cox DO  [address] [address]  [address] [place]        OT Re-EVALUATION      Today's date: 1/15/2021  Patient name: Scott Chin  : 1965  MRN: [id number]  Referring provider: Dann Dominguez DO  Dx:   Encounter Diagnosis     ICD-10-CM    1  Spastic hemiplegic cerebral palsy ([facility] [address] )  G80 2        Start Time: 4115  Stop Time: 1100  Total time in clinic (min): 45 minutes    Subjective:   "It takes me a couple of minutes to get dressed"    Objective:     ROM of elbow  Flexion 132  Extension 2     Shoulder flexion   115 degrees         Wrist Extension= 0    Wrist Flexion    90            Finger ROM:               MP       PIP      DIP  Index                           20                    [id number]                          53                      Ring                             55                      Small                           50                     Thumb:     CMC Abd=24 (with stretch)                Opposition to D5 tip 30     Pt appears to be hypermobile in digits and able to hyper extend and flex digits          STRENGTH: Mario presents with decreased UE strength including the following     Shoulder flexion 3+/5   Elbow flexion: 3-/5  Elbow extension 4/5   Wrist Extension:  1/5  Wrist Flexion:      3/5         Strength: R=4                           PAIN LEVEL:  Current:0/10   At Best: 0/10   At worst: 6/10   Location:  elbow     0/10 in R shoulder- posterior        FUNCTIONAL SUMMARY:   Adonay Rollins is independent in most basic self care skills and cooking         IMPAIRMENT SUMMARY:  Mario presents with:,  Increased wrist and hand edema,  Decreased wrist and finger ROM,  Decreased UE  strength,  Increased pain at 6/10  Possibility for skin break down  Moisés Dominguez would benefit from OT to return to prior function  Prognosis: Good         Short Term Goals: to be completed in 6-12weeks  1  Pt will be able to don/doff RHS with modified independence and/or caregiver will be able to don/doff RHS ACHIEVED    2  To educate on tone reduction strategies and modify splint ACHIEVED   3  Increase ROM of wrist to , Wrist ext -10 ACHIEVED   4  Increase hand strength R by 2-3 lbs and UE strength to be able to complete item transport and increase ease with dressing tasks  ACHIEVED   5  Decrease pain to 0-3/10  NOT ACHIEVED         Long Term Goals: To be completed in 8-12 weeks    Increase ROM of wrist to , Wrist ext -5 ACHIEVED    Increase hand strength R by 4 lbs and UE strength to be able to complete item transport and increase ease with dressing tasks ACHIEVED    Decrease pain to 0-2/10 NOT ACHIEVED     NEW GOALS on 12/8/2020 8-12 weeks   Pt will prevent muscle deteriation of wrist ROM and maintain to 0 degrees PROGRESSING  Pt will prevent muscle deteriation of elbow strength and maintain extension 2 degrees to complete IADLs PROGRESSING  Pt will prevent muscle deteriation of  Hand strength and maintain at 5 lbs to complete IADLs PROGRESSING    NEW GOALS on 1/15/2021 2-4 weeks  Pt will zipper coat using compensatory method with increase ease to maintain current abilities with ADLs  TREATMENT:  Zipped coat x1 within 3 minutes and min VCs  Assessment: Tolerated treatment well  Patient would benefit from continued OT  Pt reports continued pain since last re-evaluation during bed mobility on occassion  Pt reports splint has been wearing each night  Pt demonstrating improvements in ROM and strength however no significant changes  Recommending to continue OT 1 time per week for 2-4 more weeks for prevention of deteriation and pt unable to perform HEP on own  Mohsen nEgle (2013): a [facility] decision that sought to clarify that Medicare will in fact cover skilled therapy services to maintain a patients current condition or prevent/slow further deterioration  Pt lives in group home and will be switching group homes February 1st, 2021  He does not have the means of someone to provided A with HEP and will be transitioning to new home  Plan: Continue per plan of care        Precautions:   Past Medical History:   Diagnosis Date    Ambulatory dysfunction     Anxiety     Bilateral impacted cerumen     last assessed 05/30/2013    Capsulitis     last assessed 04/26/2016    Cellulitis of finger 01/13/2012    Chronic kidney disease     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     Closed fracture of one or more phalanges of foot 01/06/2009    Constipation     Deformity     left and right foot and ankle ,right hand    Depression     Epilepsy ([address] )     Erythrocytosis     Hemiplegia affecting dominant side ([address] )     Hypertension     Hypoglycemia 11/08/2011    Hypokalemia     last assessed 05/30/2013    Mental retardation     Mood disorder ([facility] [address] [facility]     Nephrocalcinosis            Outcome Assessments 11/25/2020         5XSTS 33 93 sec         30 sec chair rise 4         TUG 37 19 sec          10MWT 30 88 sec  0 32 m/sec         2MWT 240 feet         Supine to sit:  16 44 sec w/ 1 pillow

## 2021-01-18 ENCOUNTER — OFFICE VISIT (OUTPATIENT)
Dept: AUDIOLOGY | Facility: CLINIC | Age: 56
End: 2021-01-18
Payer: MEDICARE

## 2021-01-18 ENCOUNTER — OFFICE VISIT (OUTPATIENT)
Dept: OTOLARYNGOLOGY | Facility: CLINIC | Age: 56
End: 2021-01-18
Payer: MEDICARE

## 2021-01-18 VITALS — BODY MASS INDEX: 27.89 KG/M2 | TEMPERATURE: 97.1 F | WEIGHT: 184 LBS | HEIGHT: 68 IN

## 2021-01-18 DIAGNOSIS — H90.5 SENSORY HEARING LOSS: Primary | ICD-10-CM

## 2021-01-18 DIAGNOSIS — Z01.10 ENCOUNTER FOR HEARING EXAMINATION WITHOUT ABNORMAL FINDINGS: ICD-10-CM

## 2021-01-18 DIAGNOSIS — H61.23 BILATERAL IMPACTED CERUMEN: Primary | ICD-10-CM

## 2021-01-18 PROCEDURE — 69210 REMOVE IMPACTED EAR WAX UNI: CPT | Performed by: NURSE PRACTITIONER

## 2021-01-18 PROCEDURE — 92567 TYMPANOMETRY: CPT | Performed by: AUDIOLOGIST

## 2021-01-18 PROCEDURE — 99202 OFFICE O/P NEW SF 15 MIN: CPT | Performed by: NURSE PRACTITIONER

## 2021-01-18 PROCEDURE — 92557 COMPREHENSIVE HEARING TEST: CPT | Performed by: AUDIOLOGIST

## 2021-01-18 NOTE — ASSESSMENT & PLAN NOTE
On exam noted bilateral cerumen impaction and unable to fully view tympanic membrane  Cerumen impaction removed bilateral eac with alligator forceps and suction also irrigation on left ear, pt tolerated procedure well  Upon removal, improved hearing and decreased clogged sensation of bilateral ears  Discussed routine cerumen care including avoidance of q-tips and cerumen softeners  Encourage ongoing follow up annually to monitor for cerumen and hearing

## 2021-01-18 NOTE — PROGRESS NOTES
HEARING EVALUATION    Name:  Jim Chao  :  1965  Age:  54 y o  Date of Evaluation: 21     History: Annual Hearing Test  Reason for visit: Jim Chao is being seen today at the request of Dr Donna Casey for an evaluation of hearing and was in the accompaniment of his caregiver, Ru Metzger  Today Beatriz Shay reports no changes with his hearing since his last visit with us in 2019  Beatriz Shay does report feeling as if his left ear is clogged  Last audiogram completed on 2019 indicated normal hearing with a mild sensorineural hearing loss noted at 3-4k Hz, bilaterally  EVALUATION:    Otoscopic Evaluation:   Right Ear: Clear and healthy ear canal and tympanic membrane   Left Ear: Occluded    Tympanometry:   Right: Type A - normal middle ear pressure and compliance   Left: Type B - middle ear disorder; Upon repeat after ENT cerumen management, Type A  Audiogram Results:  Pure tone testing revealed a normal sloping to mild sensorineural hearing loss in the  right ear and a normal sloping to moderate sensorineural hearing loss in the  left  ear  SRT and PTA are in agreement indicating good test reliability  Word recognition scores were excellent bilaterally  *see attached audiogram      RECOMMENDATIONS:  Annual hearing eval, Consult ENT and Copy to Patient/Caregiver    PATIENT EDUCATION:   Discussed results and recommendations with the patient and his caregiver  Overall hearing does appear to have changed in the high frequencies when in comparison with his last audiogram  Caregiver voiced no concerns with Mario's hearing and reports he is primarily in a quiet environment  At this time I would like for Beatriz Shay to continue on annual audiograms  He is scheduled for another ENT follow-up for cerumen management  At that time of ENT visit I would like for hearing change to be evaluated; caregiver voiced understanding   Questions were addressed and both Beatriz Shay and his caregiver were encouraged to contact our department should concerns arise        Patrizia Osei , CCC-A  Clinical Audiologist

## 2021-01-18 NOTE — PROGRESS NOTES
Assessment/Plan:    Bilateral impacted cerumen    On exam noted bilateral cerumen impaction and unable to fully view tympanic membrane  Cerumen impaction removed bilateral eac with alligator forceps and suction also irrigation on left ear, pt tolerated procedure well  Upon removal, improved hearing and decreased clogged sensation of bilateral ears  Discussed routine cerumen care including avoidance of q-tips and cerumen softeners  Encourage ongoing follow up annually to monitor for cerumen and hearing  Encounter for hearing examination  Follow up with audiology for routine audiogram         Diagnoses and all orders for this visit:    Bilateral impacted cerumen  -     Ambulatory referral to Audiology    Encounter for hearing examination without abnormal findings  -     Ambulatory referral to Audiology    Other orders  -     Ear cerumen removal          Subjective:      Patient ID: Marlena Wagner is a 54 y o  male  Presents today as a new patient due to blocked ears  During routine audiology appt, found blocked ears  No otalgia  No otorrhea  No current hearing aids  No other ENT concerns          The following portions of the patient's history were reviewed and updated as appropriate: allergies, current medications, past family history, past medical history, past social history, past surgical history and problem list     Review of Systems   Constitutional: Negative  HENT: Positive for ear pain and hearing loss  Negative for congestion, ear discharge, nosebleeds, postnasal drip, rhinorrhea, sinus pressure, sinus pain, sore throat, tinnitus and voice change  Eyes: Negative  Respiratory: Negative for chest tightness and shortness of breath  Cardiovascular: Negative  Gastrointestinal: Negative  Endocrine: Negative  Musculoskeletal: Negative  Skin: Negative for color change  Neurological: Negative for dizziness, numbness and headaches  Psychiatric/Behavioral: Negative  Objective:      Temp (!) 97 1 °F (36 2 °C) (Temporal)   Ht 5' 8" (1 727 m)   Wt 83 5 kg (184 lb)   BMI 27 98 kg/m²            Physical Exam  Constitutional:       Appearance: He is well-developed  HENT:      Head: Normocephalic  Right Ear: Hearing, tympanic membrane, ear canal and external ear normal  No decreased hearing noted  No drainage or tenderness  There is impacted cerumen  Tympanic membrane is not perforated or erythematous  Left Ear: Hearing, tympanic membrane, ear canal and external ear normal  No decreased hearing noted  No drainage or tenderness  There is impacted cerumen  Tympanic membrane is not perforated or erythematous  Nose: Nose normal  No nasal deformity or septal deviation  Mouth/Throat:      Mouth: Mucous membranes are not pale and not dry  No oral lesions  Dentition: Normal dentition  Pharynx: Uvula midline  No oropharyngeal exudate  Neck:      Musculoskeletal: Full passive range of motion without pain, normal range of motion and neck supple  Trachea: No tracheal deviation  Cardiovascular:      Rate and Rhythm: Normal rate  Pulmonary:      Effort: Pulmonary effort is normal  No accessory muscle usage or respiratory distress  Musculoskeletal:      Right shoulder: He exhibits normal range of motion  Lymphadenopathy:      Cervical: No cervical adenopathy  Skin:     General: Skin is warm and dry  Neurological:      Mental Status: He is alert and oriented to person, place, and time  Cranial Nerves: No cranial nerve deficit  Sensory: No sensory deficit  Psychiatric:         Behavior: Behavior is cooperative  Ear cerumen removal    Date/Time: 1/18/2021 11:33 AM  Performed by: ZHANNA Plaza  Authorized by: ZHANNA Plaza   Universal Protocol:  Consent: Verbal consent obtained    Risks and benefits: risks, benefits and alternatives were discussed  Consent given by: patient  Patient understanding: patient states understanding of the procedure being performed      Patient location:  Clinic  Procedure details:     Local anesthetic:  None    Location:  L ear and R ear    Procedure type: irrigation with instrumentation      Instrumentation: suction      Approach:  External  Post-procedure details:     Complication:  None    Hearing quality:  Normal    Patient tolerance of procedure:   Tolerated well, no immediate complications

## 2021-01-19 ENCOUNTER — APPOINTMENT (OUTPATIENT)
Dept: PHYSICAL THERAPY | Facility: CLINIC | Age: 56
End: 2021-01-19
Payer: MEDICARE

## 2021-01-20 ENCOUNTER — OFFICE VISIT (OUTPATIENT)
Dept: PHYSICAL THERAPY | Facility: CLINIC | Age: 56
End: 2021-01-20
Payer: MEDICARE

## 2021-01-20 DIAGNOSIS — R26.81 UNSTEADY GAIT: Primary | ICD-10-CM

## 2021-01-20 DIAGNOSIS — Z86.69 HX OF CEREBRAL PALSY: ICD-10-CM

## 2021-01-20 DIAGNOSIS — R26.89 BALANCE DISORDER: ICD-10-CM

## 2021-01-20 PROCEDURE — 97112 NEUROMUSCULAR REEDUCATION: CPT | Performed by: PHYSICAL THERAPIST

## 2021-01-20 PROCEDURE — 97116 GAIT TRAINING THERAPY: CPT | Performed by: PHYSICAL THERAPIST

## 2021-01-20 NOTE — PROGRESS NOTES
Daily Note  RI: 20    Today's date: 2021  Patient name: Scott Chin  : 1965  MRN: [id number]  Referring provider: Dann Dominguez DO  Dx:   Encounter Diagnosis     ICD-10-CM    1  Unsteady gait  R26 81    2  Hx of cerebral palsy  Z86 69    3  Balance disorder  R26 89                   Subjective: Pt reports no issues at this time, caregiver did not provide as much feedback had to run out  Objective: See treatment diary below      NMR:  - Bowling activity with emphasis on item retrieval to/from floor, standing tolerance, weight shifting, and coordination (x 5 minutes)  - EO on blue airex with CS-CG 30 sec x 3  - EC on blue airex with CS-CG 30 sec x 3  - STS 2x10 with emphasis on hip extension and upright posturing   - FWD Step up-downs from foam to/from 8-inch steps with 1 UE support 20x each LE  - LAT Step up-downs from foam to/from 8-inch steps with 1 UE support 20x each LE    GAIT:  - 150' X 2 without AD within crowded environment        Assessment: Pt required increased rest breaks due to fatigue and cuing to maintain task this date secondary to " being out of it per pt"  Pt had improvement with eyes closed activity  Improvement in FWD steps ups, challenged with lateral step ups secondary to compensate with turning body due to weakness on glut med  Short duration rest breaks throughout  He will benefit from continued skilled PT services to maximize functional mobility and reduce burden of care  Plan: Continue per plan of care  Client is moving to [place] in [place], as per his supervisor, he plans to continue with PT services if appropriate  Also plan to incorporate unlevel ambulation in future sessions       Precautions:   Past Medical History:   Diagnosis Date    Ambulatory dysfunction     Anxiety     Bilateral impacted cerumen     last assessed 2013    Capsulitis     last assessed 2016    Cellulitis of finger 2012    Chronic kidney disease     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     Closed fracture of one or more phalanges of foot 01/06/2009    Constipation     Deformity     left and right foot and ankle ,right hand    Depression     Epilepsy (HCC)     Erythrocytosis     [facility] )     Hypertension     Hypoglycemia 11/08/2011    Hypokalemia     last assessed 05/30/2013    Mental retardation     Mood disorder ([facility] )     Nephrocalcinosis        Outcome Assessments 11/25/2020 12/29/20     5XSTS 33 93 sec 31 2     30 sec chair rise 4 4     TUG 37 19 sec  38 9 seconds     10MWT 30 88 sec   0 32 m/sec 42 seconds=  23 m/sec     2MWT 240 feet 150 ft      Supine to sit:  16 44 sec w/ 1 pillow 8 9 seconds w/ 1 pillow

## 2021-01-22 ENCOUNTER — OFFICE VISIT (OUTPATIENT)
Dept: OCCUPATIONAL THERAPY | Facility: CLINIC | Age: 56
End: 2021-01-22
Payer: MEDICARE

## 2021-01-22 ENCOUNTER — OFFICE VISIT (OUTPATIENT)
Dept: PHYSICAL THERAPY | Facility: CLINIC | Age: 56
End: 2021-01-22
Payer: MEDICARE

## 2021-01-22 DIAGNOSIS — R26.81 UNSTEADY GAIT: Primary | ICD-10-CM

## 2021-01-22 DIAGNOSIS — G80.2 SPASTIC HEMIPLEGIC CEREBRAL PALSY (HCC): Primary | ICD-10-CM

## 2021-01-22 DIAGNOSIS — Z86.69 HX OF CEREBRAL PALSY: ICD-10-CM

## 2021-01-22 DIAGNOSIS — R26.89 BALANCE DISORDER: ICD-10-CM

## 2021-01-22 PROCEDURE — 97112 NEUROMUSCULAR REEDUCATION: CPT

## 2021-01-22 PROCEDURE — 97110 THERAPEUTIC EXERCISES: CPT

## 2021-01-22 PROCEDURE — 97112 NEUROMUSCULAR REEDUCATION: CPT | Performed by: PHYSICAL THERAPIST

## 2021-01-22 NOTE — PROGRESS NOTES
Daily Note  VA: 20  VA Next VISIT     Today's date: 2021  Patient name: Saintclair Pinta  : 1965  MRN: [id number]  Referring provider: Karey Ly DO  Dx:   Encounter Diagnosis     ICD-10-CM    1  Unsteady gait  R26 81    2  Hx of cerebral palsy  Z86 69    3  Balance disorder  R26 89                   Subjective: Pt reports no issues at this time, caregiver did not provide as much feedback had to run out  Objective: See treatment diary below      NMR:  - STS 2x10 with emphasis on hip extension and upright posturing  - EO on blue airex with CS-CG 30 sec x 3  - EC on blue airex with CS-CG 30 sec x 3  - FWD Step up-downs from foam to/from 8-inch steps with 1 UE support 20x each LE  - LAT Step up-downs from foam to/from 8-inch steps with 1 UE support 20x each LE  - Weaving around cones 4x    GAIT:  - 150' X 1 without AD within crowded environment      Assessment: Pt tolerated treatment session well today although demonstrates difficulty with step ups to 8" step  CG required t/o session  He will benefit from continued skilled PT services to maximize functional mobility and reduce burden of care  Plan: Continue per plan of care  Client is moving to [place] in [place], as per his supervisor, he plans to continue with PT services if appropriate  Also plan to incorporate unlevel ambulation in future sessions       Precautions:   Past Medical History:   Diagnosis Date    Ambulatory dysfunction     Anxiety     Bilateral impacted cerumen     last assessed 2013    Capsulitis     last assessed 2016    Cellulitis of finger 2012    Chronic kidney disease     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     Closed fracture of one or more phalanges of foot 2009    Constipation     Deformity     left and right foot and ankle ,right hand    Depression     Epilepsy ([address] )     Erythrocytosis     Hemiplegia affecting dominant side [facility]     Hypertension     Hypoglycemia 11/08/2011    Hypokalemia     last assessed 05/30/2013    Mental retardation     Mood disorder ([facility]     Nephrocalcinosis        Outcome Assessments 11/25/2020 12/29/20     5XSTS 33 93 sec 31 2     30 sec chair rise 4 4     TUG 37 19 sec  38 9 seconds     10MWT 30 88 sec   0 32 m/sec 42 seconds=  23 m/sec     2MWT 240 feet 150 ft      Supine to sit:  16 44 sec w/ 1 pillow 8 9 seconds w/ 1 pillow

## 2021-01-22 NOTE — PROGRESS NOTES
OT Discharge      Today's date: 2021  Patient name: Jorge Morales  : 1965  MRN: [id number]  Referring provider: Kolton Iqbal DO  Dx:   Encounter Diagnosis     ICD-10-CM    1  Spastic hemiplegic cerebral palsy ([address] )  G80 2        Start Time: 7720  Stop Time: 9970  Total time in clinic (min): 38 minutes    Subjective:   "No" referring to if pain was present  Objective:     ROM of elbow  Flexion 132  Extension 2     Shoulder flexion   115 degrees         Wrist Extension= 0    Wrist Flexion    90            Finger ROM:               MP       PIP      DIP  Index                           20                    Middle                          60                      Ring                             55                      Small                           50                     Thumb:     CMC Abd=24 (with stretch)                Opposition to D5 tip 30     Pt appears to be hypermobile in digits and able to hyper extend and flex digits          STRENGTH: Mario presents with decreased UE strength including the following     Shoulder flexion 3+/5   Elbow flexion: 3-/5  Elbow extension 4/5   Wrist Extension:  1/5  Wrist Flexion:      3/5         Strength: R=4                           PAIN LEVEL:  Current:0/10   At Best: 0/10   At worst: 6/10   Location:  elbow     0/10 in R shoulder- posterior        FUNCTIONAL SUMMARY:   Nicki Rollins is independent in most basic self care skills and cooking         IMPAIRMENT SUMMARY:  Mario presents with:,  Increased wrist and hand edema,  Decreased wrist and finger ROM,  Decreased UE  strength,  Increased pain at 6/10  Possibility for skin break down  Kern Kristen would benefit from OT to return to prior function  Prognosis: Good         Short Term Goals: to be completed in 6-12weeks    1  Pt will be able to don/doff RHS with modified independence and/or caregiver will be able to don/doff RHS ACHIEVED    2  To educate on tone reduction strategies and modify splint ACHIEVED   3  Increase ROM of wrist to , Wrist ext -10 ACHIEVED   4  Increase hand strength R by 2-3 lbs and UE strength to be able to complete item transport and increase ease with dressing tasks  ACHIEVED   5  Decrease pain to 0-3/10  NOT ACHIEVED         Long Term Goals: To be completed in 8-12 weeks  Increase ROM of wrist to , Wrist ext -5 ACHIEVED    Increase hand strength R by 4 lbs and UE strength to be able to complete item transport and increase ease with dressing tasks ACHIEVED    Decrease pain to 0-2/10 ACHIEVED     NEW GOALS on 12/8/2020 8-12 weeks   Pt will prevent muscle deteriation of wrist ROM and maintain to 0 degrees ACHEIVED  Pt will prevent muscle deteriation of elbow strength and maintain extension 2 degrees to complete IADLs ACHIEVED  Pt will prevent muscle deteriation of  Hand strength and maintain at 5 lbs to complete IADLs ACHIEVED    NEW GOALS on 1/15/2021 2-4 weeks  Pt will zipper coat using compensatory method with increase ease to maintain current abilities with ADLs  DISCONTINUED DUE TO MOVING SOONER THAN EXPECTED    TREATMENT:  IASTM to bicep and wrist flexors to decrease spasticity and maintain current ROM  PROM to wrist to maintain current ROM    Completed PNF patterns to grab medium pieces of piping to work on ROM, UE strengthening and voluntary grasping    Completed PNF patterns with yellow theraband to work on UE strengthening and ROM  Patient completed x15 inferior to superior and x30 superior to inferior  Pt required min VCs for full ROM of elbow  Completed shoulder flexion exercise with yellow theraband to work on UE strengthening  Assessment: Tolerated treatment well  Patient would benefit from continued OT  Pt reports no arm pain since last re-evaluation  However, he continues to have 4/10 pain in his leg  Pt reports splint has been wearing each night  Pt demonstrating improvements in ROM and strength however no significant changes   Pt demonstrating fair strength and ROM with spasticity management techniques  Recommending discharge from OT services due to moving 1/25/2021  Pt in understanding and agrees with POC  Plan: Continue per plan of care  Precautions:   Past Medical History:   Diagnosis Date    Ambulatory dysfunction     Anxiety     Bilateral impacted cerumen     last assessed 05/30/2013    Capsulitis     last assessed 04/26/2016    Cellulitis of finger 01/13/2012    Chronic kidney disease     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     Closed fracture of one or more phalanges of foot 01/06/2009    Constipation     Deformity     left and right foot and ankle ,right hand    Depression     Epile[facility]     Erythrocytosis     Hemiplegia affecting dominant side ([facility]     Hypertension     Hypoglycemia 11/08/2011    Hypokalemia     last assessed 05/30/2013    Mental retardation     Mood disorder ([facility]     Nephrocalcinosis            Outcome Assessments 11/25/2020         5XSTS 33 93 sec         30 sec chair rise 4         TUG 37 19 sec          10MWT 30 88 sec   0 32 m/sec         2MWT 240 feet         Supine to sit:  16 44 sec w/ 1 pillow

## 2021-01-29 ENCOUNTER — APPOINTMENT (OUTPATIENT)
Dept: OCCUPATIONAL THERAPY | Facility: CLINIC | Age: 56
End: 2021-01-29
Payer: MEDICARE

## 2021-02-03 DIAGNOSIS — K59.00 CONSTIPATION, UNSPECIFIED CONSTIPATION TYPE: Primary | ICD-10-CM

## 2021-02-06 DIAGNOSIS — K59.00 CONSTIPATION, UNSPECIFIED CONSTIPATION TYPE: ICD-10-CM

## 2021-02-08 DIAGNOSIS — K59.00 CONSTIPATION, UNSPECIFIED CONSTIPATION TYPE: ICD-10-CM

## 2021-02-08 RX ORDER — SENNA 8.6 MG/1
TABLET, FILM COATED ORAL
Qty: 30 TABLET | Refills: 0 | Status: SHIPPED | OUTPATIENT
Start: 2021-02-08

## 2021-02-08 RX ORDER — PSYLLIUM HUSK 3 G/5.8 G
POWDER (GRAM) ORAL
Qty: 1040 G | Refills: 5 | Status: CANCELLED | OUTPATIENT
Start: 2021-02-08

## 2021-02-08 NOTE — TELEPHONE ENCOUNTER
Pharmacy left message requesting refill on attached medication  Patient last visit 10/23/2020  Please advise  Thank you

## 2021-02-09 ENCOUNTER — EVALUATION (OUTPATIENT)
Dept: OCCUPATIONAL THERAPY | Facility: CLINIC | Age: 56
End: 2021-02-09
Payer: MEDICARE

## 2021-02-09 ENCOUNTER — OFFICE VISIT (OUTPATIENT)
Dept: PHYSICAL THERAPY | Facility: CLINIC | Age: 56
End: 2021-02-09
Payer: MEDICARE

## 2021-02-09 DIAGNOSIS — Z86.69 HX OF CEREBRAL PALSY: ICD-10-CM

## 2021-02-09 DIAGNOSIS — R26.89 BALANCE DISORDER: ICD-10-CM

## 2021-02-09 DIAGNOSIS — G80.2 SPASTIC HEMIPLEGIC CEREBRAL PALSY (HCC): Primary | ICD-10-CM

## 2021-02-09 DIAGNOSIS — R26.81 UNSTEADY GAIT: Primary | ICD-10-CM

## 2021-02-09 PROCEDURE — 97166 OT EVAL MOD COMPLEX 45 MIN: CPT

## 2021-02-09 PROCEDURE — 97530 THERAPEUTIC ACTIVITIES: CPT | Performed by: PHYSICAL THERAPIST

## 2021-02-09 PROCEDURE — 97112 NEUROMUSCULAR REEDUCATION: CPT | Performed by: PHYSICAL THERAPIST

## 2021-02-09 RX ORDER — PSYLLIUM HUSK 3 G/5.4 G
POWDER (GRAM) ORAL
Qty: 1040 G | Refills: 7 | Status: SHIPPED | OUTPATIENT
Start: 2021-02-09

## 2021-02-09 NOTE — PROGRESS NOTES
PT Progress Note    Today's date: 2021  Patient name: Danny Barrios  : 1965  MRN: [id number]  Referring provider: Juan Diego Isbell DO  Dx:   Encounter Diagnosis     ICD-10-CM    1  Unsteady gait  R26 81    2  Hx of cerebral palsy  Z86 69    3  Balance disorder  R26 89        Start Time: 1233  Stop Time: 1315  Total time in clinic (min): 42 minutes    Assessment  Assessment details: Danny Barrios is a 54 y o  male here today 20 for an initial physical therapy evaluation due to frequent falls and unsteady gait/balance  Danny Barrios presents to PT with impairments in active ROM, passive ROM, spasticity (UE worse than LE), standing tolerance, balance, and endurance  Danny Barrios has been educated in current PT POC and his personalized short term and long term goals were reviewed  Currently, his 5XSTS score of 33 93 sec regressed to 59 22 sec which suggests increased risk for falls compared to age matched norms  His 10MWT also regressed from 0 32 m/s to 0 20 m/s and currently reflects a limited household ambulator as per [employer] research guidelines and a regression in his 2 MWT from 150 feet to 97 feet which also suggests limited functional endurance as per research from the [employer]  Regressions likely, due to not having PT in almost a month due to moving to a different location  Bernie Chopra is active, 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  His main goals are being able to get back to work without falls and improve his ability to walk around his apartment unrestricted and with improved stability/stamina  This PT discussed current POC with client and his group home coordinator, Thiago Bolanos and suggested possible use of reacher to aid in item retrieval to/from floor   He also has difficulties getting off of "soft and squishy surfaces " Danny Barrios would benefit from skilled outpatient physical therapy in order to address impairments listed above in order to maximize ability to return to life roles within home and community safely and independently  All questions addressed at this time  Thank you for this referral         Impairments: abnormal gait, abnormal or restricted ROM, impaired balance, impaired physical strength and safety issue    Symptom irritability: moderateUnderstanding of Dx/Px/POC: excellent   Prognosis: good    Goals  STG 30 days    Client will be able to ambulate 260 feet within 2MWT to reflect improvements in functional endurance  NOT MET  Client will achieve   14 m/sec improvement from 0 32 m/sec to 0 46 m/sec with 10 Meter Walk test, demonstrating Minimal Detectable change per current research standards for this objective test which assesses fall risk  NOT MET  Client will demonstrate a decrease by 4 14 points on Timed Up and Go from 37 19 to 33 05 sec which is Minimal Detectable Change per current research standards for this objective test which assesses fall risk  NOT MET  Client will be able to complete a Five Timed sit to/from stand within 30 0 sec to suggest reduced risk for falls  NOT MET  Client will complete timed supine to sit in 15 seconds or less to reflect improvements in motor planning and abdominal control during transitional movements  MET     LT days   Client will score low risk for falls with 3/4 fall risk measures  NOT MET  Client will achieve 112 feet improvement with overall distance from 240 to 352 feet with 2 minute walk test which is Minimal Detectable Change pre current research standards with endurance to demonstrate enhance functional capacity  NOT MET  Client will be able to complete static/dynamic standing x 8 minutes without needing to sit down to improve 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  NOT MET  Client will be able to complete a Timed up and Go within 25 sec to suggest reduced risk for falls  NOT MET  Client will be able to complete a Five Timed sit to/from stand within 27 sec to suggest reduced risk for falls   NOT MET      Plan  Plan details: Core strengthening, standing tolerance, tone management, strengthening, standing balance, dual tasking with gait, gait training  Possible assessment for AFO for joint protection at L ankle  Patient would benefit from: PT eval and skilled physical therapy  Planned modality interventions: electrical stimulation/Russian stimulation and biofeedback  Planned therapy interventions: neuromuscular re-education, motor coordination training, therapeutic activities, therapeutic exercise, transfer training, gait training, patient education, strengthening, stretching, manual therapy, activity modification, balance, orthotic fitting/training, home exercise program, graded exercise and graded activity  Frequency: 2x week  Duration in weeks: 12  Plan of Care beginning date: 2/9/2021  Plan of Care expiration date: 5/4/2021  Treatment plan discussed with: patient        Subjective Evaluation    History of Present Illness  Mechanism of injury: Negin Fitzpatrick is a 54y o  year old male who has spastic CP who was originally seeing PT at [facility] for his foot/ankle pain and was referred by primary OT for the balance center due to history of falls and difficulty walking  He reports increased pain in RUE as well as having multiple falls  Alandshani Rom is L hand dominant  He lives on own in an apartment  Alatania Rom is able to cook, clean house and ADLs including bathing, dressing  His interests include: workshop ie making caps,  sitting outside, watering plants, and watching football/baseball  He works at the work shop     Quality of life: good    Social Support  Steps to enter house: no  Stairs in house: no   Lives in: apartment  Lives with: alone    Employment status: working  Hand dominance: left    Treatments  Previous treatment: physical therapy and occupational therapy  Current treatment: physical therapy and occupational therapy  Patient Goals  Patient goals for therapy: increased strength and improved balance          Objective     Functional Assessment        Comments  Posture: mild R convex scoliotic curve from approx T5-T8 with compensatory R lateral trunk lean and L pelvic elevation and anterior pelvic rotation on L  (+) R shoulder elevation with scapular abduction compared to left  R UE flexor synergy (mild L thumb adduction on L)  Noted in both seated and standing, likely fixed deformities  Gait analysis:  No AD  Leads with LLE with increased R trunk/pelvic rotation with associated prolonged R footdrag throughout swing  (+) R trunk and pelvic rotation with windswept pelvic positioning  (+) L hip IR and forefoot pronation with R genu valgus and R ankle ER with associated R forefoot and midfoot pronation    SKIP:  2 beat clonus at L ankle  R Knee flexors: 1  L Knee Flexors: 2    ROM:  PROM: plan to complete formal ankle ROM assessment next session  AROM limited by approx 25% at B ankles and B hips               Precautions:   Past Medical History:   Diagnosis Date    Ambulatory dysfunction     Anxiety     Bilateral impacted cerumen     last assessed 05/30/2013    Capsulitis     last assessed 04/26/2016    Cellulitis of finger 01/13/2012    Chronic kidney disease     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     Closed fracture of one or more phalanges of foot 01/06/2009    Constipation     Deformity     left and right foot and ankle ,right hand    Depression     Epilepsy ([address]     Erythrocytosis     Hemiplegia affecting dominant side ([address] )     Hypertension     Hypoglycemia 11/08/2011    Hypokalemia     last assessed 05/30/2013    Mental retardation     Mood disorder ([facility]     Nephrocalcinosis            Outcome Assessments 11/25/2020 12/29/20 2/9/21  Progress Note    5XSTS 33 93 sec 31 2 59 22 sec    30 sec chair rise 4 4 2    TUG 37 19 sec  38 9 seconds 48 25 sec    10MWT 30 88 sec  0 32 m/sec 42 seconds=  23 m/sec 49 8 sec   0 20 m/s    2MWT 240 feet 150 ft  97 feet    Supine to sit: 16 44 sec w/ 1 pillow 8 9 seconds w/ 1 pillow

## 2021-02-09 NOTE — LETTER
2021    Rashel Hamlin, [address]    Patient: Diana Grider   YOB: 1965   Date of Visit: 2021     Encounter Diagnosis     ICD-10-CM    1  Unsteady gait  R26 81    2  Hx of cerebral palsy  Z86 69    3  Balance disorder  R26 89        Dear Dr King Cap:    Thank you for your recent referral of Diana Grider  Please review the attached evaluation summary from Mario's recent visit  Please verify that you agree with the plan of care by signing the attached order  If you have any questions or concerns, please do not hesitate to call  I sincerely appreciate the opportunity to share in the care of one of your patients and hope to have another opportunity to work with you in the near future  Sincerely,    Gavino Ramos, PT      Referring Provider:      I certify that I have read the below Plan of Care and certify the need for these services furnished under this plan of treatment while under my care  Rashel Hamlin, [address] [address]  [place] [place]          PT Progress Note    Today's date: 2021  Patient name: Diana Grider  : 1965  MRN: [id number]  Referring provider: Cindia Spurling, DO  Dx:   Encounter Diagnosis     ICD-10-CM    1  Unsteady gait  R26 81    2  Hx of cerebral palsy  Z86 69    3  Balance disorder  R26 89        Start Time: 1233  Stop Time: 1315  Total time in clinic (min): 42 minutes    Assessment  Assessment details: Diana Grider is a 54 y o  male here today 20 for an initial physical therapy evaluation due to frequent falls and unsteady gait/balance  Diana Grider presents to PT with impairments in active ROM, passive ROM, spasticity (UE worse than LE), standing tolerance, balance, and endurance  Diana Grider has been educated in current PT POC and his personalized short term and long term goals were reviewed   Currently, his 5XSTS score of 33 93 sec regressed to 59 22 sec which suggests increased risk for falls compared to age matched norms  His 10MWT also regressed from 0 32 m/s to 0 20 m/s and currently reflects a limited household ambulator as per [employer]TA research guidelines and a regression in his 2 MWT from 150 feet to 97 feet which also suggests limited functional endurance as per research from the [employer]  Regressions likely, due to not having PT in almost a month due to moving to a different location  Felix Zhang is active, 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  His main goals are being able to get back to work without falls and improve his ability to walk around his apartment unrestricted and with improved stability/stamina  This PT discussed current POC with client and his group home coordinator, Marquise Miller and suggested possible use of reacher to aid in item retrieval to/from floor  He also has difficulties getting off of "soft and squishy surfaces " Gordon Sherman would benefit from skilled outpatient physical therapy in order to address impairments listed above in order to maximize ability to return to life roles within home and community safely and independently  All questions addressed at this time  Thank you for this referral         Impairments: abnormal gait, abnormal or restricted ROM, impaired balance, impaired physical strength and safety issue    Symptom irritability: moderateUnderstanding of Dx/Px/POC: excellent   Prognosis: good    Goals  STG 30 days    Client will be able to ambulate 260 feet within 2MWT to reflect improvements in functional endurance  NOT MET  Client will achieve   14 m/sec improvement from 0 32 m/sec to 0 46 m/sec with 10 Meter Walk test, demonstrating Minimal Detectable change per current research standards for this objective test which assesses fall risk   NOT MET  Client will demonstrate a decrease by 4 14 points on Timed Up and Go from 37 19 to 33 05 sec which is Minimal Detectable Change per current research standards for this objective test which assesses fall risk  NOT MET  Client will be able to complete a Five Timed sit to/from stand within 30 0 sec to suggest reduced risk for falls  NOT MET  Client will complete timed supine to sit in 15 seconds or less to reflect improvements in motor planning and abdominal control during transitional movements  MET     LT days   Client will score low risk for falls with 3/4 fall risk measures  NOT MET  Client will achieve 112 feet improvement with overall distance from 240 to 352 feet with 2 minute walk test which is Minimal Detectable Change pre current research standards with endurance to demonstrate enhance functional capacity  NOT MET  Client will be able to complete static/dynamic standing x 8 minutes without needing to sit down to improve 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  NOT MET  Client will be able to complete a Timed up and Go within 25 sec to suggest reduced risk for falls  NOT MET  Client will be able to complete a Five Timed sit to/from stand within 27 sec to suggest reduced risk for falls  NOT MET      Plan  Plan details: Core strengthening, standing tolerance, tone management, strengthening, standing balance, dual tasking with gait, gait training  Possible assessment for AFO for joint protection at L ankle     Patient would benefit from: PT eval and skilled physical therapy  Planned modality interventions: electrical stimulation/Russian stimulation and biofeedback  Planned therapy interventions: neuromuscular re-education, motor coordination training, therapeutic activities, therapeutic exercise, transfer training, gait training, patient education, strengthening, stretching, manual therapy, activity modification, balance, orthotic fitting/training, home exercise program, graded exercise and graded activity  Frequency: 2x week  Duration in weeks: 12  Plan of Care beginning date: 2021  Plan of Care expiration date: 5/4/2021  Treatment plan discussed with: patient        Subjective Evaluation    History of Present Illness  Mechanism of injury: Tika Burton is a 54y o  year old male who has spastic CP who was originally seeing PT at [facility] for his foot/ankle pain and was referred by primary OT for the balance center due to history of falls and difficulty walking  He reports increased pain in RUE as well as having multiple falls  Jade Dsouza is L hand dominant  He lives on own in an apartment  Jade Dsouza is able to cook, clean house and ADLs including bathing, dressing  His interests include: workshop ie making caps,  sitting outside, watering plants, and watching football/baseball  He works at the work shop  Quality of life: good    Social Support  Steps to enter house: no  Stairs in house: no   Lives in: apartment  Lives with: alone    Employment status: working  Hand dominance: left    Treatments  Previous treatment: physical therapy and occupational therapy  Current treatment: physical therapy and occupational therapy  Patient Goals  Patient goals for therapy: increased strength and improved balance          Objective     Functional Assessment        Comments  Posture: mild R convex scoliotic curve from approx T5-T8 with compensatory R lateral trunk lean and L pelvic elevation and anterior pelvic rotation on L  (+) R shoulder elevation with scapular abduction compared to left  R UE flexor synergy (mild L thumb adduction on L)  Noted in both seated and standing, likely fixed deformities  Gait analysis:  No AD   Leads with LLE with increased R trunk/pelvic rotation with associated prolonged R footdrag throughout swing  (+) R trunk and pelvic rotation with windswept pelvic positioning  (+) L hip IR and forefoot pronation with R genu valgus and R ankle ER with associated R forefoot and midfoot pronation    SKIP:  2 beat clonus at L ankle  R Knee flexors: 1  L Knee Flexors: 2    ROM:  PROM: plan to complete formal ankle ROM assessment next session  AROM limited by approx 25% at B ankles and B hips               Precautions:   Past Medical History:   Diagnosis Date    Ambulatory dysfunction     Anxiety     Bilateral impacted cerumen     last assessed 05/30/2013    Capsulitis     last assessed 04/26/2016    Cellulitis of finger 01/13/2012    Chronic kidney disease     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     Closed fracture of one or more phalanges of foot 01/06/2009    Constipation     Deformity     left and right foot and ankle ,right hand    Depression     [facility]     Erythrocytosis     Hemiplegia affecting dominant side [facility]     Hypertension     Hypoglycemia 11/08/2011    Hypokalemia     last assessed 05/30/2013    Mental retardation     Mood disorder ([facility]     Nephrocalcinosis            Outcome Assessments 11/25/2020 12/29/20 2/9/21  Progress Note    5XSTS 33 93 sec 31 2 59 22 sec    30 sec chair rise 4 4 2    TUG 37 19 sec  38 9 seconds 48 25 sec    10MWT 30 88 sec  0 32 m/sec 42 seconds=  23 m/sec 49 8 sec   0 20 m/s    2MWT 240 feet 150 ft  97 feet    Supine to sit:  16 44 sec w/ 1 pillow 8 9 seconds w/ 1 pillow

## 2021-02-09 NOTE — PROGRESS NOTES
OT Evaluation     Today's date: 2021  Patient name: Diana Grider  : 1965  MRN: [id number]  Referring provider: Cindia Spurling, DO  Dx:   Encounter Diagnosis     ICD-10-CM    1  Spastic hemiplegic cerebral palsy ([facility] )  G80 2                   Assessment  Assessment details: Pt reports requiring A with IADLs and demonstrating slight decreased UE strength and increase in spasticity  Pt has just moved to new group home and would benefit from continued OT services for maintenance therapy  Pt demonstrating improvements in ROM and strength however no significant changes  Recommending to discharge from 2 times per week and transition to 1 time per week for prevention of deteriation and pt unable to perform HEP on own  Mohsen Engle (2013): a [facility] decision that sought to clarify that Medicare will in fact cover skilled therapy services to maintain a patient's current condition or prevent/slow further deterioration  Pt does not have the means/supervision required to complete exercises to prevent deteriation from CP and would benefit from maintenance OT services for spasticity management  Pt lives in group home and will be switching group homes this month  He does not have the means of someone to provided A with HEP and will be transitioning to new home  Pt is a moderate complexity secondary to extensive PMH     Impairments: abnormal coordination, abnormal gait, abnormal muscle firing, abnormal muscle tone, abnormal or restricted ROM, activity intolerance, impaired balance, impaired physical strength, pain with function, safety issue, poor posture  and poor body mechanics  Understanding of Dx/Px/POC: excellent   Prognosis: good    Plan  Patient would benefit from: orthotics, OT eval and skilled occupational therapy  Planned modality interventions: manual electrical stimulation, contrast bath immersion, TENS, electrical stimulation/Estonian stimulation and ultrasound  Planned therapy interventions: IADL retraining, abdominal trunk stabilization, activity modification, joint mobilization, manual therapy, ADL retraining, ADL training, Garcia taping, balance, motor coordination training, balance/weight bearing training, neuromuscular re-education, body mechanics training, orthotic management and training, orthotic fitting/training, breathing training, patient education, postural training, cognitive skills, community reintegration, coordination, self care, strengthening, stretching, fine motor coordination training, therapeutic activities, therapeutic exercise, flexibility, functional ROM exercises, therapeutic training, transfer training, gait training, graded exercise, graded activity and graded motor  Frequency: 1-2 per week  Plan of Care beginning date: 2/9/2021  Plan of Care expiration date: 5/9/2021  Treatment plan discussed with: patient and caregiver (discussed with group home coordinator )        Subjective   FUNCTIONAL SUMMARY:   Adonay Rollins is independent in most basic self care skills; Pt reports A with IADLs including cooking/cleaning   Pt reports UE has gotten increase spasticity RUE and slight decrease in strength           Objective    ROM of elbow  Flexion PROM 120, AROM 90  Extension 0     Shoulder flexion   115 degrees         Wrist Extension= -18   Wrist Flexion    90            Finger ROM:               MP       PIP      DIP  Index                           51                   Middle                          43                     Ring                             50                     Small                           30                    Thumb:                  Opposition to D5 tip 38     Pt appears to be hypermobile in digits and able to hyper extend and flex digits          STRENGTH: Mario presents with decreased UE strength including the following     Shoulder flexion 3/5   Elbow flexion: 3-/5  Elbow extension 4-/5   Wrist Extension:  1/5  Wrist Flexion:      3/5         Strength: R=7                     PAIN LEVEL:  Current:0/10   At Best: 0/10   At worst: 0/10     0/10 in R shoulder- posterior         4 weeks STGs   Pt will prevent muscle deteriation of  Hand strength and maintain at 7 lbs to complete IADLs   Pt will prevent muscle deteriation demonstrating 3+/5 on elbow extension on MMT to complete IADLs        LONG TERM GOALS 8-12 weeks    Pt will prevent muscle deteriation of wrist ROM and maintain to -18 degrees to complete ADLs   Pt will prevent muscle deteriation of elbow strength and maintain extension 2 degrees to complete IADLs   Pt will prevent muscle deteriation of shoulder strength to maintain at 3+/5 to complete IADLs          Precautions: SEIZURES       Manuals                                                                 Neuro Re-Ed                                                                                                        Ther Ex                                                                                                                     Ther Activity                                       Gait Training                                       Modalities

## 2021-02-10 ENCOUNTER — TELEPHONE (OUTPATIENT)
Dept: FAMILY MEDICINE CLINIC | Facility: CLINIC | Age: 56
End: 2021-02-10

## 2021-02-10 NOTE — TELEPHONE ENCOUNTER
Samir Braun from [facility] called requesting all medical records for patient  Patient has changed PCP's  She is requesting all documents be faxed to [facility] at [address] [place] [address]  Fax# - [phone]    Called patient at [facility]#- [phone] (given to me by [facility])     Spoke with  at assisted living  Asked if I could fax over [address] for patient to fill out  Needs to be completed in order to be sent to [facility] [place]  She provided fax #- [phone]

## 2021-02-11 ENCOUNTER — TRANSCRIBE ORDERS (OUTPATIENT)
Dept: PHYSICAL THERAPY | Facility: CLINIC | Age: 56
End: 2021-02-11

## 2021-02-11 ENCOUNTER — OFFICE VISIT (OUTPATIENT)
Dept: OCCUPATIONAL THERAPY | Facility: CLINIC | Age: 56
End: 2021-02-11
Payer: MEDICARE

## 2021-02-11 DIAGNOSIS — R26.81 UNSTEADY GAIT: ICD-10-CM

## 2021-02-11 DIAGNOSIS — G80.2 SPASTIC HEMIPLEGIC CEREBRAL PALSY (HCC): Primary | ICD-10-CM

## 2021-02-11 DIAGNOSIS — Z86.69 HX OF CEREBRAL PALSY: ICD-10-CM

## 2021-02-11 DIAGNOSIS — R26.89 BALANCE DISORDER: ICD-10-CM

## 2021-02-11 PROCEDURE — 97110 THERAPEUTIC EXERCISES: CPT

## 2021-02-11 PROCEDURE — 97140 MANUAL THERAPY 1/> REGIONS: CPT

## 2021-02-11 PROCEDURE — 97530 THERAPEUTIC ACTIVITIES: CPT

## 2021-02-11 NOTE — TELEPHONE ENCOUNTER
Triston Terrazas from [facility] called and said she did not receive the medical release confirmed  Fax # of [phone] and faxed over

## 2021-02-11 NOTE — PROGRESS NOTES
Daily Note     Today's date: 2021  Patient name: Bryce Vásquez  : 1965  MRN: [id number]  Referring provider: Natalee Glover DO  Dx:   Encounter Diagnosis     ICD-10-CM    1  Spastic hemiplegic cerebral palsy ([facility] )  G80 2        Start Time: 319  Stop Time:   Total time in clinic (min): 46 minutes    Subjective: Patient reported being compliant with home exercise program       Objective:   Initiated treatment with 5 min Moist heat for pre-treatment soft tissue extensibility  Performed IASTM to wrist flexors with passive stretch of wrist extension  Performed passive range of motion elbow flexion/extension and thumb abduction  These exercises performed to maintain current range of motion and prevent contractures  Patient moved small cubes into container to work on voluntary grasp and release focusing on maintaining patients functional use of right hand  Patient required the cube to be held off the table by therapist to get digits under the cube for grasp  Patient performed bilateral shoulder active range of motion holding foam ball with BUE for shoulder flex/ext and add/abduction  Assessment: Tolerated treatment well  Patient demonstrated good elbow range of motion  Patient unable to actively flex digits with wrist held in neutral        Plan: Continue per plan of care       Precautions:   Past Medical History:   Diagnosis Date    Ambulatory dysfunction     Anxiety     Bilateral impacted cerumen     last assessed 2013    Capsulitis     last assessed 2016    Cellulitis of finger 2012    Chronic kidney disease     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     Closed fracture of one or more phalanges of foot 2009    Constipation     Deformity     left and right foot and ankle ,right hand    Depression     Epilepsy [facility]     Erythrocytosis     Hemiplegia affecting dominant side ([facility]     Hypertension     Hypoglycemia 2011    Hypokalemia     last assessed 05/30/2013    Mental retardation     Mood disorder [facility]     Nephrocalcinosis            Outcome Assessments 11/25/2020 12/29/20 2/9/21  Progress Note    5XSTS 33 93 sec 31 2 59 22 sec    30 sec chair rise 4 4 2    TUG 37 19 sec  38 9 seconds 48 25 sec    10MWT 30 88 sec  0 32 m/sec 42 seconds=  23 m/sec 49 8 sec   0 20 m/s    2MWT 240 feet 150 ft  97 feet    Supine to sit:  16 44 sec w/ 1 pillow 8 9 seconds w/ 1 pillow

## 2021-02-12 ENCOUNTER — OFFICE VISIT (OUTPATIENT)
Dept: PHYSICAL THERAPY | Facility: CLINIC | Age: 56
End: 2021-02-12
Payer: MEDICARE

## 2021-02-12 DIAGNOSIS — R26.89 BALANCE DISORDER: ICD-10-CM

## 2021-02-12 DIAGNOSIS — Z86.69 HX OF CEREBRAL PALSY: ICD-10-CM

## 2021-02-12 DIAGNOSIS — R26.81 UNSTEADY GAIT: Primary | ICD-10-CM

## 2021-02-12 PROCEDURE — 97116 GAIT TRAINING THERAPY: CPT | Performed by: PHYSICAL THERAPIST

## 2021-02-12 PROCEDURE — 97112 NEUROMUSCULAR REEDUCATION: CPT | Performed by: PHYSICAL THERAPIST

## 2021-02-12 NOTE — PROGRESS NOTES
Daily Note     Today's date: 2021  Patient name: Irvin Harada  : 1965  MRN: [id number]  Referring provider: Mulugeta Cortez DO  Dx:   Encounter Diagnosis     ICD-10-CM    1  Unsteady gait  R26 81    2  Hx of cerebral palsy  Z86 69    3  Balance disorder  R26 89        Start Time: 1015  Stop Time: 1100  Total time in clinic (min): 45 minutes    Subjective: Client denies any falls      Objective: See treatment diary below    NMR:  - STS 2x10 with emphasis on hip extension and upright posturing   - Walking with dual tasking of ball bouncing, alternating UE 50' x 6  - Walking with dual tasking of ball kicking, alternating LE 50' x 6  - FWD Step up-downs from foam to/from 8-inch steps with 1 UE support 20x each LE  - LAT Step up-downs from foam to/from 8-inch steps with 1 UE support 20x each LE  - Weaving around cones x 10' x 6    GAIT:  - 150' X 2 without AD within crowded environment      Assessment: Client presented to PT with good participation throughout session  Improved activity tolerance which may be related to reduction in L hip and flank pain  Initiated and trained client on dual tasking with walking, with noted difficulty with weight shifting onto LLE and difficulty coordinating task with RUE (likely due to spasticity of UE)  Client required min cues to prevent compensatory trunk rotation with step ups  Short duration rest breaks promoted throughout  Client will benefit from continued skilled OPPT services in order to maximize functional ability, mobility and participation throughout life roles in home and community  Plan: Continue per plan of care        Precautions:   Past Medical History:   Diagnosis Date    Ambulatory dysfunction     Anxiety     Bilateral impacted cerumen     last assessed 2013    Capsulitis     last assessed 2016    Cellulitis of finger 2012    Chronic kidney disease     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     Closed fracture of one or more phalanges of foot 01/06/2009    Constipation     Deformity     left and right foot and ankle ,right hand    Depression     Epilepsy (HCC)     Erythrocytosis     [facility]     Hypertension     Hypoglycemia 11/08/2011    Hypokalemia     last assessed 05/30/2013    Mental retardation     Mood disorder ([facility]     Nephrocalcinosis            Outcome Assessments 11/25/2020 12/29/20 2/9/21  Progress Note    5XSTS 33 93 sec 31 2 59 22 sec    30 sec chair rise 4 4 2    TUG 37 19 sec  38 9 seconds 48 25 sec    10MWT 30 88 sec  0 32 m/sec 42 seconds=  23 m/sec 49 8 sec   0 20 m/s    2MWT 240 feet 150 ft  97 feet    Supine to sit:  16 44 sec w/ 1 pillow 8 9 seconds w/ 1 pillow

## 2021-02-17 ENCOUNTER — OFFICE VISIT (OUTPATIENT)
Dept: PHYSICAL THERAPY | Facility: CLINIC | Age: 56
End: 2021-02-17
Payer: MEDICARE

## 2021-02-17 DIAGNOSIS — Z86.69 HX OF CEREBRAL PALSY: ICD-10-CM

## 2021-02-17 DIAGNOSIS — R26.81 UNSTEADY GAIT: Primary | ICD-10-CM

## 2021-02-17 DIAGNOSIS — R26.89 BALANCE DISORDER: ICD-10-CM

## 2021-02-17 PROCEDURE — 97116 GAIT TRAINING THERAPY: CPT | Performed by: PHYSICAL THERAPIST

## 2021-02-17 PROCEDURE — 97112 NEUROMUSCULAR REEDUCATION: CPT | Performed by: PHYSICAL THERAPIST

## 2021-02-17 NOTE — PROGRESS NOTES
Daily Note     Today's date: 2021  Patient name: Kelsie Davidson  : 1965  MRN: [id number]  Referring provider: Dayo Wright DO  Dx:   Encounter Diagnosis     ICD-10-CM    1  Unsteady gait  R26 81    2  Hx of cerebral palsy  Z86 69    3  Balance disorder  R26 89                   Subjective: Client denies any falls        Objective: See treatment diary below    NMR:  - STS 2x10 with emphasis on hip extension and upright posturing   - Walking with dual tasking of ball bouncing, alternating UE 50' x 6- Not today 21   - Walking with dual tasking of ball kicking, alternating LE 50' x 6- Not today 21   - FWD Step up-downs from foam to/from 8-inch steps with 1 UE support 20x each LE  - LAT Step up-downs from foam to/from 8-inch steps with 1 UE support 20x each LE  - Weaving around cones x 10' x 6  - Standing ball kicks - 5 min     GAIT:  - 150' X 2 without AD within crowded environment- [facility] <> neuro gym          Assessment: Client presented to PT with good participation throughout session  Cueing needed for patient to avoid excessive hip IR with step up-downs today, good carryover after cueing given  Verbal cueing for patient to take big steps with ambulation  Short duration rest breaks promoted throughout  Client will benefit from continued skilled OPPT services in order to maximize functional ability, mobility and participation throughout life roles in home and community  Plan: Continue per plan of care        Precautions:   Past Medical History:   Diagnosis Date    Ambulatory dysfunction     Anxiety     Bilateral impacted cerumen     last assessed 2013    Capsulitis     last assessed 2016    Cellulitis of finger 2012    Chronic kidney disease     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     Closed fracture of one or more phalanges of foot 2009    Constipation     Deformity     left and right foot and ankle ,right hand    Depression     Ep[facility]     Erythrocytosis     Hemiplegia affecting dominant side ([facility]     Hypertension     Hypoglycemia 11/08/2011    Hypokalemia     last assessed 05/30/2013    Mental retardation     Mood disorder ([facility]     Nephrocalcinosis            Outcome Assessments 11/25/2020 12/29/20 2/9/21  Progress Note    5XSTS 33 93 sec 31 2 59 22 sec    30 sec chair rise 4 4 2    TUG 37 19 sec  38 9 seconds 48 25 sec    10MWT 30 88 sec  0 32 m/sec 42 seconds=  23 m/sec 49 8 sec   0 20 m/s    2MWT 240 feet 150 ft  97 feet    Supine to sit:  16 44 sec w/ 1 pillow 8 9 seconds w/ 1 pillow

## 2021-02-18 ENCOUNTER — TELEPHONE (OUTPATIENT)
Dept: PHYSICAL THERAPY | Facility: CLINIC | Age: 56
End: 2021-02-18

## 2021-02-18 ENCOUNTER — APPOINTMENT (OUTPATIENT)
Dept: OCCUPATIONAL THERAPY | Facility: CLINIC | Age: 56
End: 2021-02-18
Payer: MEDICARE

## 2021-02-18 NOTE — TELEPHONE ENCOUNTER
Phone call to [facility] new , Dayana Arboleda in regards to use of AD for ambulation  Discussed this PT will assess AD on next session tomorrow, 2/19

## 2021-02-19 ENCOUNTER — APPOINTMENT (OUTPATIENT)
Dept: PHYSICAL THERAPY | Facility: CLINIC | Age: 56
End: 2021-02-19
Payer: MEDICARE

## 2021-02-24 ENCOUNTER — OFFICE VISIT (OUTPATIENT)
Dept: PHYSICAL THERAPY | Facility: CLINIC | Age: 56
End: 2021-02-24
Payer: MEDICARE

## 2021-02-24 DIAGNOSIS — R26.89 BALANCE DISORDER: ICD-10-CM

## 2021-02-24 DIAGNOSIS — R26.81 UNSTEADY GAIT: Primary | ICD-10-CM

## 2021-02-24 DIAGNOSIS — Z86.69 HX OF CEREBRAL PALSY: ICD-10-CM

## 2021-02-24 PROCEDURE — 97112 NEUROMUSCULAR REEDUCATION: CPT

## 2021-02-24 NOTE — PROGRESS NOTES
Daily Note     Today's date: 2021  Patient name: Marlena Wagner  : 1965  MRN: [id number]  Referring provider: Eugenio Anderson DO  Dx:   Encounter Diagnosis     ICD-10-CM    1  Unsteady gait  R26 81    2  Hx of cerebral palsy  Z86 69    3  Balance disorder  R26 89        Start Time: 8143  Stop Time: 1230  Total time in clinic (min): 45 minutes    Subjective: Pt denies any falls        Objective: See treatment diary below    NMR:    - EO on blue airex with CS-CG 30 sec x 3  - EC on blue airex with CS-CG 30 sec x 3  - STS 2x10 with emphasis on hip extension and upright posturing   - FWD Step up-downs from foam to/from 8-inch steps with 1 UE support 20x each LE  - LAT Step up-downs from foam to/from 8-inch steps with 1 UE support 20x each LE    GAIT:  - 300' total with NBQC  Assessment: Client presented to PT with good participation throughout session  Pt able to perform gait training with NBQC  Recommended to use at group home  Client will benefit from continued skilled OPPT services in order to maximize functional ability, mobility and participation throughout life roles in home and community  Plan: Continue per plan of care        Precautions:   Past Medical History:   Diagnosis Date    Ambulatory dysfunction     Anxiety     Bilateral impacted cerumen     last assessed 2013    Capsulitis     last assessed 2016    Cellulitis of finger 2012    Chronic kidney disease     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     Closed fracture of one or more phalanges of foot 2009    Constipation     Deformity     left and right foot and ankle ,right hand    Depression     Ep[facility] [address] [facility]     Erythrocytosis     Hemiplegia affecting dominant side ([facility] [address] [facility]     Hypertension     Hypoglycemia 2011    Hypokalemia     last assessed 2013    Mental retardation     Mood disorder ([facility] [address] [facility]     Nephrocalcinosis            Outcome Assessments 2020 2/9/21  Progress Note    5XSTS 33 93 sec 31 2 59 22 sec    30 sec chair rise 4 4 2    TUG 37 19 sec  38 9 seconds 48 25 sec    10MWT 30 88 sec  0 32 m/sec 42 seconds=  23 m/sec 49 8 sec   0 20 m/s    2MWT 240 feet 150 ft  97 feet    Supine to sit:  16 44 sec w/ 1 pillow 8 9 seconds w/ 1 pillow

## 2021-02-25 ENCOUNTER — OFFICE VISIT (OUTPATIENT)
Dept: OCCUPATIONAL THERAPY | Facility: CLINIC | Age: 56
End: 2021-02-25
Payer: MEDICARE

## 2021-02-25 DIAGNOSIS — G80.2 SPASTIC HEMIPLEGIC CEREBRAL PALSY (HCC): Primary | ICD-10-CM

## 2021-02-25 PROCEDURE — 97110 THERAPEUTIC EXERCISES: CPT

## 2021-02-25 PROCEDURE — 97140 MANUAL THERAPY 1/> REGIONS: CPT

## 2021-02-25 PROCEDURE — 97112 NEUROMUSCULAR REEDUCATION: CPT

## 2021-02-25 NOTE — PROGRESS NOTES
Daily Note     Today's date: 2021  Patient name: Delia Gallo  : 1965  MRN: [id number]  Referring provider: Romy Lobo DO  Dx:   Encounter Diagnosis     ICD-10-CM    1  Spastic hemiplegic cerebral palsy ([facility] [address] )  G80 2        Start Time: 0845  Stop Time: 930  Total time in clinic (min): 45 minutes    Subjective: Patient settling into his apt  Staff is assisting with cooking      Objective:   Pt completed functional mobility with quad cane for improved mobility    Performed IASTM to wrist flexors with passive stretch of wrist extension  Performed passive range of motion and IASTM TO elbow flexion/extension  These exercises performed to maintain current range of motion and prevent contractures  Patient attempted to move cones in PNF pattern but unable to manage grasp on cones  Changed to transferring ping pong balls  Assist provided to ROM of thumb to maintain grasp on ping pongs  Patient performed shoulder active range of motion with AAROM shoulder flexion 3x15 and shoulder abduction 1x15  Assessment: Tolerated treatment well  Patient demonstrated good elbow range of motion  Patient unable to actively flex digits with wrist held in neutral        Plan: Continue per plan of care       Precautions:   Past Medical History:   Diagnosis Date    Ambulatory dysfunction     Anxiety     Bilateral impacted cerumen     last assessed 2013    Capsulitis     last assessed 2016    Cellulitis of finger 2012    Chronic kidney disease     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     Closed fracture of one or more phalanges of foot 2009    Constipation     Deformity     left and right foot and ankle ,right hand    Depression     Epilepsy ([facility] [address] [facility]     Erythrocytosis     Hemiplegia affecting dominant side ([facility] [address] [facility]     Hypertension     Hypoglycemia 2011    Hypokalemia     last assessed 2013    Mental retardation     Mood disorder ([facility] [address] [facility]     Nephrocalcinosis Outcome Assessments 11/25/2020 12/29/20 2/9/21  Progress Note    5XSTS 33 93 sec 31 2 59 22 sec    30 sec chair rise 4 4 2    TUG 37 19 sec  38 9 seconds 48 25 sec    10MWT 30 88 sec  0 32 m/sec 42 seconds=  23 m/sec 49 8 sec   0 20 m/s    2MWT 240 feet 150 ft  97 feet    Supine to sit:  16 44 sec w/ 1 pillow 8 9 seconds w/ 1 pillow

## 2021-02-26 ENCOUNTER — OFFICE VISIT (OUTPATIENT)
Dept: PHYSICAL THERAPY | Facility: CLINIC | Age: 56
End: 2021-02-26
Payer: MEDICARE

## 2021-02-26 DIAGNOSIS — R26.89 BALANCE DISORDER: ICD-10-CM

## 2021-02-26 DIAGNOSIS — Z86.69 HX OF CEREBRAL PALSY: ICD-10-CM

## 2021-02-26 DIAGNOSIS — R26.81 UNSTEADY GAIT: Primary | ICD-10-CM

## 2021-02-26 PROCEDURE — 97112 NEUROMUSCULAR REEDUCATION: CPT | Performed by: PHYSICAL THERAPIST

## 2021-02-26 PROCEDURE — 97116 GAIT TRAINING THERAPY: CPT | Performed by: PHYSICAL THERAPIST

## 2021-02-26 NOTE — PROGRESS NOTES
Daily Note     Today's date: 2021  Patient name: Sonia Degroot  : 1965  MRN: [id number]  Referring provider: Bo Lilly DO  Dx:   Encounter Diagnosis     ICD-10-CM    1  Unsteady gait  R26 81    2  Hx of cerebral palsy  Z86 69    3  Balance disorder  R26 89        Start Time: 1024  Stop Time: 1100  Total time in clinic (min): 36 minutes    Subjective: Client denies any falls  He was later to session and his supervisor was not present at  or drop off and did not have his paperwork  He states its getting "easier" to pick things up from the floor  Objective: See treatment diary below    NMR:    - Item retrieval to/from floor and various heights with emphasis on weight shifting and balance recovery    - STS 2x10 with emphasis on hip extension and upright posturing   - Alternating step taps x 10 with 1UE support    GAIT:  - Figure 8 walking with use of L NBQC with incidental cues for sequencing  - Lateral stepping within agility ladder with use of L NBQC with min cues for sequencing AD to improve step length                Assessment: Client presented to PT with good participation throughout session  Treatment focus on various dynamic tasks with AD as well as reinforcing safety with item retrieval to/from floor  He continues to demonstrate good carryover with step length and posturing with use of NBQC throughout gait and left message for  to tell his supervisor to use at group home  Client will benefit from continued skilled OPPT services in order to maximize functional ability, mobility and participation throughout life roles in home and community  Plan: Continue per plan of care        Precautions:   Past Medical History:   Diagnosis Date    Ambulatory dysfunction     Anxiety     Bilateral impacted cerumen     last assessed 2013    Capsulitis     last assessed 2016    Cellulitis of finger 2012    Chronic kidney disease     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     Closed fracture of one or more phalanges of foot 01/06/2009    Constipation     Deformity     left and right foot and ankle ,right hand    Depression     Epilepsy (HCC)     Erythrocytosis     Hemiplegia affecting dominant side (HCC)     Hypertension     Hypoglycemia 11/08/2011    Hypokalemia     last assessed 05/30/2013    Mental retardation     Mood disorder ([facility] [address] [facility]     Nephrocalcinosis            Outcome Assessments 11/25/2020 12/29/20 2/9/21  Progress Note    5XSTS 33 93 sec 31 2 59 22 sec    30 sec chair rise 4 4 2    TUG 37 19 sec  38 9 seconds 48 25 sec    10MWT 30 88 sec  0 32 m/sec 42 seconds=  23 m/sec 49 8 sec   0 20 m/s    2MWT 240 feet 150 ft  97 feet    Supine to sit:  16 44 sec w/ 1 pillow 8 9 seconds w/ 1 pillow

## 2021-03-01 ENCOUNTER — OFFICE VISIT (OUTPATIENT)
Dept: PHYSICAL THERAPY | Facility: CLINIC | Age: 56
End: 2021-03-01
Payer: MEDICARE

## 2021-03-01 ENCOUNTER — OFFICE VISIT (OUTPATIENT)
Dept: OCCUPATIONAL THERAPY | Facility: CLINIC | Age: 56
End: 2021-03-01
Payer: MEDICARE

## 2021-03-01 DIAGNOSIS — Z86.69 HX OF CEREBRAL PALSY: ICD-10-CM

## 2021-03-01 DIAGNOSIS — R26.81 UNSTEADY GAIT: Primary | ICD-10-CM

## 2021-03-01 DIAGNOSIS — R26.89 BALANCE DISORDER: ICD-10-CM

## 2021-03-01 DIAGNOSIS — G80.2 SPASTIC HEMIPLEGIC CEREBRAL PALSY (HCC): Primary | ICD-10-CM

## 2021-03-01 PROCEDURE — 97110 THERAPEUTIC EXERCISES: CPT

## 2021-03-01 PROCEDURE — 97116 GAIT TRAINING THERAPY: CPT | Performed by: PHYSICAL THERAPIST

## 2021-03-01 PROCEDURE — 97112 NEUROMUSCULAR REEDUCATION: CPT

## 2021-03-01 PROCEDURE — 97112 NEUROMUSCULAR REEDUCATION: CPT | Performed by: PHYSICAL THERAPIST

## 2021-03-01 NOTE — PROGRESS NOTES
Daily Note     Today's date: 3/1/2021  Patient name: Felecia Montero  : 1965  MRN: [id number]  Referring provider: Cas Bowman DO  Dx:   Encounter Diagnosis     ICD-10-CM    1  Unsteady gait  R26 81    2  Hx of cerebral palsy  Z86 69    3  Balance disorder  R26 89                   Subjective: Patient reports for treatment from OT, reports he's doing alright          Objective: See treatment diary below    NMR:  - Item retrieval to/from floor and various heights with emphasis on weight shifting and balance recovery    - STS 2x10 with emphasis on hip extension and upright posturing   - Alternating step taps x 10 with 1UE support    GAIT:  - Walking to lobby, 150 ft, NBQC  - FWD/LAT stepping within agility ladder with use of L NBQC with min cues for sequencing AD to improve step length                Assessment: Patient tolerated treatment well  Treatment focus today on gait and object retrieval  Patient demonstrated good navigation with NBQC and maintained balance while reaching for objects on the floor  Side stepping with NBQC using ladder required occasional verbal cueing to increase step length and CGA to maintain balance  Patient will continue to benefit from skilled PT services to maximize functional mobility and improve participation in life roles in home and in the community  Plan: Continue per plan of care        Precautions:   Past Medical History:   Diagnosis Date    Ambulatory dysfunction     Anxiety     Bilateral impacted cerumen     last assessed 2013    Capsulitis     last assessed 2016    Cellulitis of finger 2012    Chronic kidney disease     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     Closed fracture of one or more phalanges of foot 2009    Constipation     Deformity     left and right foot and ankle ,right hand    Depression     Epilepsy ([facility] [address] [facility]     Erythrocytosis     Hemiplegia affecting dominant side ([facility] [address] [facility]     Hypertension     Hypoglycemia 11/08/2011    Hypokalemia     last assessed 05/30/2013    Mental retardation     Mood disorder ([facility] [address] [facility]     Nephrocalcinosis            Outcome Assessments 11/25/2020 12/29/20 2/9/21  Progress Note    5XSTS 33 93 sec 31 2 59 22 sec    30 sec chair rise 4 4 2    TUG 37 19 sec  38 9 seconds 48 25 sec    10MWT 30 88 sec  0 32 m/sec 42 seconds=  23 m/sec 49 8 sec   0 20 m/s    2MWT 240 feet 150 ft  97 feet    Supine to sit:  16 44 sec w/ 1 pillow 8 9 seconds w/ 1 pillow

## 2021-03-01 NOTE — PROGRESS NOTES
Daily Note     Today's date: 3/1/2021  Patient name: Xavier Oropeza  : 1965  MRN: [id number]  Referring provider: Uli Rosas DO  Dx:   Encounter Diagnosis   Name Primary?  Spastic hemiplegic cerebral palsy ([facility] [address] ) Yes       Start Time: 1100  Stop Time: 1145  Total time in clinic (min): 45 minutes    Subjective: "It feels fine"       Objective: See treatment below  Initiated session in sitting with  5 minutes of MH and passive stretch to elbow and wrist to maintain current range and prevent risk of contractures  30x  PNF  Pattern functional reach exercise   to maintain strength and reach in RUE for functional tasks  Completed ball 30x placement  Moving through PNF patterns  for grasp and release  Into a bowl focusing on maintaining patients functional use of right hand  Assessment: Tolerated treatment well  Patient is continuing to maintain current abilities in session  Required hand over hand assist for placement of balls into bowl  and maintaining grasp on cones  Pt tolerated treatment well, pt continues to demonstrate need for maintenance due to defecits of fmc , ue strength, and use of right hand  Pt would benefit from continuing maintenance program     Plan: Continued skilled OT per POC      INTERVENTION COMMENTS:  Precautions: Seizures(epilepsy), HTN, CARDIAC  9 ZP 40 RTUNGE, PN due

## 2021-03-03 ENCOUNTER — OFFICE VISIT (OUTPATIENT)
Dept: PHYSICAL THERAPY | Facility: CLINIC | Age: 56
End: 2021-03-03
Payer: MEDICARE

## 2021-03-03 ENCOUNTER — APPOINTMENT (OUTPATIENT)
Dept: OCCUPATIONAL THERAPY | Facility: CLINIC | Age: 56
End: 2021-03-03
Payer: MEDICARE

## 2021-03-03 DIAGNOSIS — Z86.69 HX OF CEREBRAL PALSY: ICD-10-CM

## 2021-03-03 DIAGNOSIS — R26.89 BALANCE DISORDER: ICD-10-CM

## 2021-03-03 DIAGNOSIS — R26.81 UNSTEADY GAIT: Primary | ICD-10-CM

## 2021-03-03 PROCEDURE — 97116 GAIT TRAINING THERAPY: CPT

## 2021-03-03 PROCEDURE — 97112 NEUROMUSCULAR REEDUCATION: CPT

## 2021-03-03 NOTE — PROGRESS NOTES
Daily Note  PN: 2021 (POC: 2021)    Today's date: 3/3/2021  Patient name: Jorge Morales  : 1965  MRN: [id number]  Referring provider: Kolton Iqbal DO  Dx:   Encounter Diagnosis     ICD-10-CM    1  Unsteady gait  R26 81    2  Hx of cerebral palsy  Z86 69    3  Balance disorder  R26 89                   Subjective: Caregiver reports no new changes, complaints, or falls  Patient stated his knees are a little sore when he first stands up, but they get better  Objective: See treatment diary below    NMR:  - STS 2x10 with emphasis on hip extension and upright posturing  - Marches on foam: 20 reps B/L, 1 UE  - Static standing on foam: 2 min, 0 UE, PTCS  - Alternating step taps x 10 with 1UE support      GAIT:  - Walking to lobby: 2 reps, 150 ft, NBQC      Assessment: Patient able to tolerate treatment sessio well today with use of foam pad today as he noted he has difficulty on uneven surfaces  Improving step length especially with LLE following verbal cues  Increased postural sway with feet apart on foam and no UE support and fair self correction  Verbal and tactile cues to improve upright posture and hip extension  Patient will continue to benefit from skilled PT services to maximize functional mobility and improve participation in life roles in home and in the community  Plan: Continue per plan of care        Precautions:   Past Medical History:   Diagnosis Date    Ambulatory dysfunction     Anxiety     Bilateral impacted cerumen     last assessed 2013    Capsulitis     last assessed 2016    Cellulitis of finger 2012    Chronic kidney disease     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     Closed fracture of one or more phalanges of foot 2009    Constipation     Deformity     left and right foot and ankle ,right hand    Depression     Epilepsy ([address]     Erythrocytosis     Hemiplegia affecting dominant side ([facility] [address]     Hypertension     Hypoglycemia 11/08/2011    Hypokalemia     last assessed 05/30/2013    Mental retardation     Mood disorder ([facility] [address] [facility]     Nephrocalcinosis            Outcome Assessments 11/25/2020 12/29/20 2/9/21  Progress Note    5XSTS 33 93 sec 31 2 59 22 sec    30 sec chair rise 4 4 2    TUG 37 19 sec  38 9 seconds 48 25 sec    10MWT 30 88 sec  0 32 m/sec 42 seconds=  23 m/sec 49 8 sec   0 20 m/s    2MWT 240 feet 150 ft  97 feet    Supine to sit:  16 44 sec w/ 1 pillow 8 9 seconds w/ 1 pillow

## 2021-03-08 ENCOUNTER — OFFICE VISIT (OUTPATIENT)
Dept: PHYSICAL THERAPY | Facility: CLINIC | Age: 56
End: 2021-03-08
Payer: MEDICARE

## 2021-03-08 ENCOUNTER — EVALUATION (OUTPATIENT)
Dept: OCCUPATIONAL THERAPY | Facility: CLINIC | Age: 56
End: 2021-03-08
Payer: MEDICARE

## 2021-03-08 DIAGNOSIS — R26.81 UNSTEADY GAIT: Primary | ICD-10-CM

## 2021-03-08 DIAGNOSIS — Z86.69 HX OF CEREBRAL PALSY: ICD-10-CM

## 2021-03-08 DIAGNOSIS — G80.2 SPASTIC HEMIPLEGIC CEREBRAL PALSY (HCC): Primary | ICD-10-CM

## 2021-03-08 DIAGNOSIS — R26.89 BALANCE DISORDER: ICD-10-CM

## 2021-03-08 PROCEDURE — 97168 OT RE-EVAL EST PLAN CARE: CPT

## 2021-03-08 PROCEDURE — 97116 GAIT TRAINING THERAPY: CPT | Performed by: PHYSICAL THERAPIST

## 2021-03-08 PROCEDURE — 97112 NEUROMUSCULAR REEDUCATION: CPT | Performed by: PHYSICAL THERAPIST

## 2021-03-08 NOTE — PROGRESS NOTES
OT Discharge     Today's date: 3/8/2021  Patient name: Norrine Lombard  : 1965  MRN: [id number]  Referring provider: Caro Seo DO  Dx:   Encounter Diagnosis     ICD-10-CM    1  Spastic hemiplegic cerebral palsy ([address] )  G80 2        Start Time:   Stop Time: 355  Total time in clinic (min): 40 minutes    Subjective   FUNCTIONAL SUMMARY:   Lana Rollins is independent in most basic self care skills; Pt reports A with IADLs including cooking/cleaning  Pt reports UE strength has been about the same  Objective    ROM of elbow  Flexion PROM 120, AROM 115  Extension 0     Shoulder flexion   AROM 115 degrees, PROM 130        Wrist Extension= PROM -12  Wrist Flexion    90            Finger ROM:               MP       PIP      DIP  Index                           60                   Middle                          43                     Ring                             50                     Small                           30                    Thumb:                  Opposition to D5 tip 38     Pt appears to be hypermobile in digits and able to hyper extend and flex digits          STRENGTH: Mario presents with decreased UE strength including the following     Shoulder flexion 3/5   Elbow flexion: 3+/5  Elbow extension 4-/5   Wrist Extension:  1/5  Wrist Flexion:      3+/5         Strength: R=7                    PAIN LEVEL:  Current:0/10   At Best: 0/10   At worst: 0/10     0/10 in R shoulder- posterior       Treatment  Educated and provided HEP for hand strength using yellow resistive sponge and UE strength using yellow theraband   Education on elbow flexion, shoulder flexion and shoulder abduction       4 weeks STGs   Pt will prevent muscle deteriation of  Hand strength and maintain at 7 lbs to complete IADLs ACHEIVED  Pt will prevent muscle deteriation demonstrating 3+/5 on elbow extension on MMT to complete IADLs ACHEIVED        LONG TERM GOALS 8-12 weeks    Pt will prevent muscle deteriation of wrist ROM and maintain to -18 degrees to complete ADLs ACHIEVED  Pt will prevent muscle deteriation of elbow strength and maintain extension 2 degrees to complete IADLs ACHIEVED  Pt will prevent muscle deteriation of shoulder strength to maintain at 3+/5 to complete IADLs ACHIEVED    Assessment  Pt reports requires mod I ADLs and A with IADLs  He demonstrate slight decreased UE strength and increase in spasticity  However, pt has been able to maintain current status of LTG and STGs throughout OT services  Recommending discharge with HEP to assist with maintenance of ROM and UE strength at this time  Instructions provided with equipment to ensure understanding of HEP within his group home  Pt understands and agrees with the POC             Precautions: SEIZURES       Manuals                                                                 Neuro Re-Ed                                                                                                        Ther Ex                                                                                                                     Ther Activity                                       Gait Training                                       Modalities

## 2021-03-08 NOTE — PROGRESS NOTES
Daily Note  PN: 2021 (POC: 2021)    Today's date: 3/8/2021  Patient name: Gordon Sherman  : 1965  MRN: [id number]  Referring provider: Tiburcio Zurita DO  Dx:   Encounter Diagnosis     ICD-10-CM    1  Unsteady gait  R26 81    2  Hx of cerebral palsy  Z86 69    3  Balance disorder  R26 89                   Subjective: Caregiver ask to assess [facility] for proper fit       Objective: See treatment diary below    Gait:    adjustment to [facility] with one notch higher, gait 50 feet 4 times with improvement in swing phase with weight tolerance through cane  150 feet from [employer] to [employer] gym x 2   - gait with cone weave around different colored disc 5 minutes     NMR:  - STS 2x10 with emphasis on hip extension and upright posturing  - standing 3 way up no foam pad 1 UE support 20 reps BL   - Static standing on foam: 2 min, 0 UE, PTCS  - Alternating step taps x 20 reps no UE support   - seated upright with manual resisted core exercises 30 reps             Assessment: Pt continues to require cuing for correct form with standing 3 way hip, use of TC with tapping hand  Challenged with alt step taps without UE support with maintaining upright posture  Adjustment to [facility] by 1 notch  Noted improvement with stability with use of device post adjustment  Patient will continue to benefit from skilled PT services to maximize functional mobility and improve participation in life roles in home and in the community  Plan: Continue per plan of care        Precautions:   Past Medical History:   Diagnosis Date    Ambulatory dysfunction     Anxiety     Bilateral impacted cerumen     last assessed 2013    Capsulitis     last assessed 2016    Cellulitis of finger 2012    Chronic kidney disease     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     Closed fracture of one or more phalanges of foot 2009    Constipation     Deformity     left and right foot and ankle ,right hand    Depression     Epile[facility]     Erythrocytosis     Hemiplegia affecting dominant side ([facility]     Hypertension     Hypoglycemia 11/08/2011    Hypokalemia     last assessed 05/30/2013    Mental retardation     Mood disorder ([facility]     Nephrocalcinosis            Outcome Assessments 11/25/2020 12/29/20 2/9/21  Progress Note    5XSTS 33 93 sec 31 2 59 22 sec    30 sec chair rise 4 4 2    TUG 37 19 sec  38 9 seconds 48 25 sec    10MWT 30 88 sec  0 32 m/sec 42 seconds=  23 m/sec 49 8 sec   0 20 m/s    2MWT 240 feet 150 ft  97 feet    Supine to sit:  16 44 sec w/ 1 pillow 8 9 seconds w/ 1 pillow

## 2021-03-08 NOTE — LETTER
2021    Vicky Good [address]    Patient: Jim Chao   YOB: 1965   Date of Visit: 3/8/2021     Encounter Diagnosis     ICD-10-CM    1  Spastic hemiplegic cerebral palsy [facility])  G80 2        Dear Dr Rader Serve:    Thank you for your recent referral of Jim Chao  Please review the attached evaluation summary from Mario's recent visit  Beatriz Shay will be discharged from therapy at this time  Please verify that you agree with the plan of care by signing the attached order  If you have any questions or concerns, please do not hesitate to call  I sincerely appreciate the opportunity to share in the care of one of your patients and hope to have another opportunity to work with you in the near future  Sincerely,    Wayne Mehta OT      Referring Provider:     I certify that I have read the below Plan of Care and certify the need for these services furnished under this plan of treatment while under my care  Vicky Good DO  [facility] [address]  [place]        OT Discharge     Today's date: 3/8/2021  Patient name: Jim Chao  : 1965  MRN: [id number]  Referring provider: Debo Samuel DO  Dx:   Encounter Diagnosis     ICD-10-CM    1  Spastic hemiplegic cerebral palsy ([facility] [address] )  G80 2        Start Time: 9856  Stop Time: 7698  Total time in clinic (min): 40 minutes    Subjective   FUNCTIONAL SUMMARY:   Joellen Done Ayo is independent in most basic self care skills; Pt reports A with IADLs including cooking/cleaning  Pt reports UE strength has been about the same      Objective    ROM of elbow  Flexion PROM 120, AROM 115  Extension 0     Shoulder flexion   AROM 115 degrees, PROM 130        Wrist Extension= PROM -12  Wrist Flexion    90            Finger ROM:               MP       PIP      DIP  Index                           60                   HFYCOS                          91                     Ring                             50                     Small                           30                    Thumb:                  Opposition to D5 tip 38     Pt appears to be hypermobile in digits and able to hyper extend and flex digits          STRENGTH: Mario presents with decreased UE strength including the following     Shoulder flexion 3/5   Elbow flexion: 3+/5  Elbow extension 4-/5   Wrist Extension:  1/5  Wrist Flexion:      3+/5         Strength: R=7                    PAIN LEVEL:  Current:0/10   At Best: 0/10   At worst: 0/10     0/10 in R shoulder- posterior       Treatment  Educated and provided HEP for hand strength using yellow resistive sponge and UE strength using yellow theraband  Education on elbow flexion, shoulder flexion and shoulder abduction       4 weeks STGs   Pt will prevent muscle deteriation of  Hand strength and maintain at 7 lbs to complete IADLs ACHEIVED  Pt will prevent muscle deteriation demonstrating 3+/5 on elbow extension on MMT to complete IADLs ACHEIVED        LONG TERM GOALS 8-12 weeks    Pt will prevent muscle deteriation of wrist ROM and maintain to -18 degrees to complete ADLs ACHIEVED  Pt will prevent muscle deteriation of elbow strength and maintain extension 2 degrees to complete IADLs ACHIEVED  Pt will prevent muscle deteriation of shoulder strength to maintain at 3+/5 to complete IADLs ACHIEVED    Assessment  Pt reports requires mod I ADLs and A with IADLs  He demonstrate slight decreased UE strength and increase in spasticity  However, pt has been able to maintain current status of LTG and STGs throughout OT services  Recommending discharge with HEP to assist with maintenance of ROM and UE strength at this time  Instructions provided with equipment to ensure understanding of HEP within his group home  Pt understands and agrees with the POC             Precautions: SEIZURES Manuals                                                                 Neuro Re-Ed                                                                                                        Ther Ex                                                                                                                     Ther Activity                                       Gait Training                                       Modalities

## 2021-03-10 ENCOUNTER — EVALUATION (OUTPATIENT)
Dept: PHYSICAL THERAPY | Facility: CLINIC | Age: 56
End: 2021-03-10
Payer: MEDICARE

## 2021-03-10 ENCOUNTER — APPOINTMENT (OUTPATIENT)
Dept: OCCUPATIONAL THERAPY | Facility: CLINIC | Age: 56
End: 2021-03-10
Payer: MEDICARE

## 2021-03-10 DIAGNOSIS — R26.89 BALANCE DISORDER: ICD-10-CM

## 2021-03-10 DIAGNOSIS — Z86.69 HX OF CEREBRAL PALSY: ICD-10-CM

## 2021-03-10 DIAGNOSIS — R26.81 UNSTEADY GAIT: Primary | ICD-10-CM

## 2021-03-10 PROCEDURE — 97530 THERAPEUTIC ACTIVITIES: CPT

## 2021-03-10 NOTE — PROGRESS NOTES
PT Progress Note    Today's date: 3/12/2021  Patient name: Kay Martínez  : 1965  MRN: [id number]  Referring provider: Ghazala Guzmán DO  Dx:   Encounter Diagnosis     ICD-10-CM    1  Unsteady gait  R26 81    2  Hx of cerebral palsy  Z86 69    3  Balance disorder  R26 89        Start Time: 1200  Stop Time: 1230  Total time in clinic (min): 30 minutes    Assessment  Assessment details: Kay Martínez is a 54 y o  male here today 20 for an initial physical therapy evaluation due to frequent falls and unsteady gait/balance  Kay Martínez presents to PT with impairments in active ROM, passive ROM, spasticity (UE worse than LE), standing tolerance, balance, and endurance  Kay Martínez has been educated in current PT POC and his personalized short term and long term goals were reviewed  Currently, his 5XSTS score of 33 93 sec regressed to 59 22 sec which suggests increased risk for falls compared to age matched norms  His 10MWT also regressed from 0 32 m/s to 0 20 m/s and currently reflects a limited household ambulator as per [employer] research guidelines and a regression in his 2 MWT from 150 feet to 97 feet which also suggests limited functional endurance as per research from the [employer]  Regressions likely, due to not having PT in almost a month due to moving to a different location  Jaquelin Dyson is active, 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  His main goals are being able to get back to work without falls and improve his ability to walk around his apartment unrestricted and with improved stability/stamina  This PT discussed current POC with client and his group home coordinator, Ninfa Crigler and suggested possible use of reacher to aid in item retrieval to/from floor   He also has difficulties getting off of "soft and squishy surfaces " Kay Martínez would benefit from skilled outpatient physical therapy in order to address impairments listed above in order to maximize ability to return to life roles within home and community safely and independently  All questions addressed at this time  Thank you for this referral         Impairments: abnormal gait, abnormal or restricted ROM, impaired balance, impaired physical strength and safety issue    Symptom irritability: moderateUnderstanding of Dx/Px/POC: excellent   Prognosis: good    Goals  STG 30 days    Client will be able to ambulate 260 feet within 2MWT to reflect improvements in functional endurance  NOT MET  Client will achieve   14 m/sec improvement from 0 32 m/sec to 0 46 m/sec with 10 Meter Walk test, demonstrating Minimal Detectable change per current research standards for this objective test which assesses fall risk  NOT MET  Client will demonstrate a decrease by 4 14 points on Timed Up and Go from 37 19 to 33 05 sec which is Minimal Detectable Change per current research standards for this objective test which assesses fall risk  NOT MET  Client will be able to complete a Five Timed sit to/from stand within 30 0 sec to suggest reduced risk for falls  NOT MET  Client will complete timed supine to sit in 15 seconds or less to reflect improvements in motor planning and abdominal control during transitional movements  MET     LT days   Client will score low risk for falls with 3/4 fall risk measures  NOT MET  Client will achieve 112 feet improvement with overall distance from 240 to 352 feet with 2 minute walk test which is Minimal Detectable Change pre current research standards with endurance to demonstrate enhance functional capacity  NOT MET  Client will be able to complete static/dynamic standing x 8 minutes without needing to sit down to improve 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  NOT MET  Client will be able to complete a Timed up and Go within 25 sec to suggest reduced risk for falls  NOT MET  Client will be able to complete a Five Timed sit to/from stand within 27 sec to suggest reduced risk for falls   NOT MET      Plan  Plan details: Core strengthening, standing tolerance, tone management, strengthening, standing balance, dual tasking with gait, gait training  Possible assessment for AFO for joint protection at L ankle  Patient would benefit from: PT eval and skilled physical therapy  Planned modality interventions: electrical stimulation/Russian stimulation and biofeedback  Planned therapy interventions: neuromuscular re-education, motor coordination training, therapeutic activities, therapeutic exercise, transfer training, gait training, patient education, strengthening, stretching, manual therapy, activity modification, balance, orthotic fitting/training, home exercise program, graded exercise and graded activity  Frequency: 2x week  Duration in weeks: 12  Plan of Care beginning date: 2/9/2021  Plan of Care expiration date: 5/4/2021  Treatment plan discussed with: patient        Subjective Evaluation    History of Present Illness  Mechanism of injury: Miladis Love is a 54y o  year old male who has spastic CP who was originally seeing PT at [facility] for his foot/ankle pain and was referred by primary OT for the balance center due to history of falls and difficulty walking  He reports increased pain in RUE as well as having multiple falls  [facility] is L hand dominant  He lives on own in an apartment  [facility] is able to cook, clean house and ADLs including bathing, dressing  His interests include: workshop ie making caps,  sitting outside, watering plants, and watching football/baseball  He works at the work shop     Quality of life: good    Social Support  Steps to enter house: no  Stairs in house: no   Lives in: apartment  Lives with: alone    Employment status: working  Hand dominance: left    Treatments  Previous treatment: physical therapy and occupational therapy  Current treatment: physical therapy and occupational therapy  Patient Goals  Patient goals for therapy: increased strength and improved balance          Objective     Functional Assessment        Comments  Posture: mild R convex scoliotic curve from approx T5-T8 with compensatory R lateral trunk lean and L pelvic elevation and anterior pelvic rotation on L  (+) R shoulder elevation with scapular abduction compared to left  R UE flexor synergy (mild L thumb adduction on L)  Noted in both seated and standing, likely fixed deformities  Gait analysis:  No AD  Leads with LLE with increased R trunk/pelvic rotation with associated prolonged R footdrag throughout swing  (+) R trunk and pelvic rotation with windswept pelvic positioning  (+) L hip IR and forefoot pronation with R genu valgus and R ankle ER with associated R forefoot and midfoot pronation    SKIP:  2 beat clonus at L ankle  R Knee flexors: 1  L Knee Flexors: 2    ROM:  PROM: plan to complete formal ankle ROM assessment next session  AROM limited by approx 25% at B ankles and B hips               Precautions:   Past Medical History:   Diagnosis Date    Ambulatory dysfunction     Anxiety     Bilateral impacted cerumen     last assessed 05/30/2013    Capsulitis     last assessed 04/26/2016    Cellulitis of finger 01/13/2012    Chronic kidney disease     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     Closed fracture of one or more phalanges of foot 01/06/2009    Constipation     Deformity     left and right foot and ankle ,right hand    Depression     Epilepsy [facility] [address] )     Erythrocytosis     Hemiplegia affecting dominant side ([address] )     Hypertension     Hypoglycemia 11/08/2011    Hypokalemia     last assessed 05/30/2013    Mental retardation     Mood disorder ([facility] [address] )     Nephrocalcinosis            Outcome Assessments 11/25/2020 12/29/20 2/9/21  Progress Note PN  3-   5XSTS 33 93 sec 31 2 59 22 sec 21 32 sec w/ 1 UE   30 sec chair rise 4 4 2 7 reps   TUG 37 19 sec  38 9 seconds 48 25 sec 77 80 sec w/ SPC   10MWT 30 88 sec  0 32 m/sec 42 seconds=  23 m/sec 49 8 sec   0 20 m/s 69 03 sec   2MWT 240 feet 150 ft  97 feet    Supine to sit:  16 44 sec w/ 1 pillow 8 9 seconds w/ 1 pillow     6MWT    175 ft w/ SPC

## 2021-03-15 ENCOUNTER — APPOINTMENT (OUTPATIENT)
Dept: OCCUPATIONAL THERAPY | Facility: CLINIC | Age: 56
End: 2021-03-15
Payer: MEDICARE

## 2021-03-17 ENCOUNTER — APPOINTMENT (OUTPATIENT)
Dept: OCCUPATIONAL THERAPY | Facility: CLINIC | Age: 56
End: 2021-03-17
Payer: MEDICARE

## 2021-03-17 ENCOUNTER — OFFICE VISIT (OUTPATIENT)
Dept: PHYSICAL THERAPY | Facility: CLINIC | Age: 56
End: 2021-03-17
Payer: MEDICARE

## 2021-03-17 DIAGNOSIS — Z86.69 HX OF CEREBRAL PALSY: ICD-10-CM

## 2021-03-17 DIAGNOSIS — R26.89 BALANCE DISORDER: ICD-10-CM

## 2021-03-17 DIAGNOSIS — R26.81 UNSTEADY GAIT: Primary | ICD-10-CM

## 2021-03-17 PROCEDURE — 97530 THERAPEUTIC ACTIVITIES: CPT | Performed by: PHYSICAL THERAPIST

## 2021-03-17 PROCEDURE — 97112 NEUROMUSCULAR REEDUCATION: CPT | Performed by: PHYSICAL THERAPIST

## 2021-03-17 NOTE — PROGRESS NOTES
Daily Note  PN: 2021 (POC: 2021)    Today's date: 3/17/2021  Patient name: Irvin Harada  : 1965  MRN: [id number]  Referring provider: Mulugeta Cortze DO  Dx:   Encounter Diagnosis     ICD-10-CM    1  Unsteady gait  R26 81    2  Hx of cerebral palsy  Z86 69    3  Balance disorder  R26 89                   Subjective: Caregiver ask to assess [email or web address] for proper fit       Objective: See treatment diary below    Gait:    adjustment to [email or web address] with one notch higher, gait 50 feet 4 times with improvement in swing phase with weight tolerance through cane  150 feet from [employer] to [employer] [facility] x 2   - gait with cone weave around different colored disc 5 minutes     NMR:  - STS 2x10 with emphasis on hip extension and upright posturing  - standing 3 way up no foam pad 1 UE support 20 reps BL   - Static standing on foam: 2 min, 0 UE, PTCS  - Alternating step taps x 20 reps no UE support   - seated upright with manual resisted core exercises 30 reps             Assessment: Pt continues to require cuing for correct form with standing 3 way hip, use of TC with tapping hand  Patient will continue to benefit from skilled PT services to maximize functional mobility and improve participation in life roles in home and in the community  Plan: Continue per plan of care        Precautions:   Past Medical History:   Diagnosis Date    Ambulatory dysfunction     Anxiety     Bilateral impacted cerumen     last assessed 2013    Capsulitis     last assessed 2016    Cellulitis of finger 2012    Chronic kidney disease     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     Closed fracture of one or more phalanges of foot 2009    Constipation     Deformity     left and right foot and ankle ,right hand    Depression     Epilepsy ([facility] [address] [facility]     Erythrocytosis     Hemiplegia affecting dominant side ([facility] [address] )     Hypertension     Hypoglycemia 2011    Hypokalemia     last assessed 2013    Mental retardation     Mood disorder ([facility] [address] [facility]     Nephrocalcinosis            Outcome Assessments 11/25/2020 12/29/20 2/9/21  Progress Note    5XSTS 33 93 sec 31 2 59 22 sec    30 sec chair rise 4 4 2    TUG 37 19 sec  38 9 seconds 48 25 sec    10MWT 30 88 sec  0 32 m/sec 42 seconds=  23 m/sec 49 8 sec   0 20 m/s    2MWT 240 feet 150 ft  97 feet    Supine to sit:  16 44 sec w/ 1 pillow 8 9 seconds w/ 1 pillow

## 2021-03-22 ENCOUNTER — APPOINTMENT (OUTPATIENT)
Dept: OCCUPATIONAL THERAPY | Facility: CLINIC | Age: 56
End: 2021-03-22
Payer: MEDICARE

## 2021-03-22 ENCOUNTER — OFFICE VISIT (OUTPATIENT)
Dept: PHYSICAL THERAPY | Facility: CLINIC | Age: 56
End: 2021-03-22
Payer: MEDICARE

## 2021-03-22 DIAGNOSIS — Z86.69 HX OF CEREBRAL PALSY: ICD-10-CM

## 2021-03-22 DIAGNOSIS — R26.89 BALANCE DISORDER: ICD-10-CM

## 2021-03-22 DIAGNOSIS — R26.81 UNSTEADY GAIT: Primary | ICD-10-CM

## 2021-03-22 PROCEDURE — 97112 NEUROMUSCULAR REEDUCATION: CPT

## 2021-03-22 PROCEDURE — 97116 GAIT TRAINING THERAPY: CPT

## 2021-03-22 NOTE — PROGRESS NOTES
Daily Note  PN: 3- (POC: 2021)    Today's date: 3/22/2021  Patient name: Norrine Lombard  : 1965  MRN: [id number]  Referring provider: Caro Seo DO  Dx:   Encounter Diagnosis     ICD-10-CM    1  Unsteady gait  R26 81    2  Hx of cerebral palsy  Z86 69    3  Balance disorder  R26 89                   Subjective: Caregiver reports she has been seeing improvements in his ambulation and balance  Overall no new changes, complaints, or falls  Objective: See treatment diary below    Gait:   - Ambulation w/ SBQC: 2 reps, 150 ft w/ laser pointer for visual cue for foot placement    NMR:  - STS 20 reps with emphasis on hip extension and upright posturing  - Standing 3 way up no foam pad 1 UE support 20 reps BL  - Static standing on foam: 2 min, 0 UE, PTCS  - Alternating step taps x 20 reps no UE support   - Step ups: 6", 10 reps, 1 UE  - Standing core rotations: 10 reps B/L, 5# med ball       Assessment: Patient able to tolerate treatment session well today  Utilized laser pointer as visual cue for increased step length today with improvements noted and fair carryover  Challenged with step ups today with mild L knee buckling by final 2 repetitions and required PTCG/Serene to maintain balance  Patient will continue to benefit from skilled PT services to maximize functional mobility and improve participation in life roles in home and in the community  Plan: Continue per plan of care        Precautions:   Past Medical History:   Diagnosis Date    Ambulatory dysfunction     Anxiety     Bilateral impacted cerumen     last assessed 2013    Capsulitis     last assessed 2016    Cellulitis of finger 2012    Chronic kidney disease     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     Closed fracture of one or more phalanges of foot 2009    Constipation     Deformity     left and right foot and ankle ,right hand    Depression     Epilepsy (HCC)     Erythrocytosis     Hemiplegia affecting dominant side ([facility] )     Hypertension     Hypoglycemia 11/08/2011    Hypokalemia     last assessed 05/30/2013    Mental retardation     Mood disorder ([facility] )     Nephrocalcinosis            Outcome Assessments 11/25/2020 12/29/20 2/9/21  Progress Note    5XSTS 33 93 sec 31 2 59 22 sec    30 sec chair rise 4 4 2    TUG 37 19 sec  38 9 seconds 48 25 sec    10MWT 30 88 sec  0 32 m/sec 42 seconds=  23 m/sec 49 8 sec   0 20 m/s    2MWT 240 feet 150 ft  97 feet    Supine to sit:  16 44 sec w/ 1 pillow 8 9 seconds w/ 1 pillow

## 2021-03-24 ENCOUNTER — OFFICE VISIT (OUTPATIENT)
Dept: PHYSICAL THERAPY | Facility: CLINIC | Age: 56
End: 2021-03-24
Payer: MEDICARE

## 2021-03-24 ENCOUNTER — APPOINTMENT (OUTPATIENT)
Dept: OCCUPATIONAL THERAPY | Facility: CLINIC | Age: 56
End: 2021-03-24
Payer: MEDICARE

## 2021-03-24 DIAGNOSIS — Z86.69 HX OF CEREBRAL PALSY: ICD-10-CM

## 2021-03-24 DIAGNOSIS — R26.89 BALANCE DISORDER: ICD-10-CM

## 2021-03-24 DIAGNOSIS — R26.81 UNSTEADY GAIT: Primary | ICD-10-CM

## 2021-03-24 PROCEDURE — 97116 GAIT TRAINING THERAPY: CPT

## 2021-03-24 PROCEDURE — 97112 NEUROMUSCULAR REEDUCATION: CPT

## 2021-03-24 NOTE — PROGRESS NOTES
Daily Note  PN: 3- (POC: 2021)    Today's date: 3/24/2021  Patient name: Robyn León  : 1965  MRN: [id number]  Referring provider: Jamari Ramos DO  Dx:   Encounter Diagnosis     ICD-10-CM    1  Unsteady gait  R26 81    2  Hx of cerebral palsy  Z86 69    3  Balance disorder  R26 89                   Subjective: Caregiver reports she has been seeing improvements in his ambulation and balance  Overall no new changes, complaints, or falls  Objective: See treatment diary below    Gait:   - Ambulation w/ SBQC: 2 reps, 150 ft    NMR:  - STS 20 reps with emphasis on hip extension and upright posturing  - Standing hip flexion: 1 UE support 20 reps BL  - Standing core rotations: 10 reps B/L, 5# med ball  - Bowling w/ 5# med ball w/ walk up: 3 reps, 0 UE  - Kicking PBall against wall: 20 reps, 1 UE      Assessment: Patient able to tolerate treatment session well today with increased focus on dynamic balance tasks  Challenged with bowling activity with 1 LoB anteriorly requiring PT modA to maintain balance  R knee buckling with standing hip flexion activity today with last 3 repetitions  Patient will continue to benefit from skilled PT services to maximize functional mobility and improve participation in life roles in home and in the community  Plan: Continue per plan of care        Precautions:   Past Medical History:   Diagnosis Date    Ambulatory dysfunction     Anxiety     Bilateral impacted cerumen     last assessed 2013    Capsulitis     last assessed 2016    Cellulitis of finger 2012    Chronic kidney disease     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     Closed fracture of one or more phalanges of foot 2009    Constipation     Deformity     left and right foot and ankle ,right hand    Depression     Epilepsy ([facility] [address] )     Erythrocytosis     Hemiplegia affecting dominant side ([facility] [address] )     Hypertension     Hypoglycemia 2011    Hypokalemia     last assessed 05/30/2013    Mental retardation     Mood disorder ([facility] [address] )     Nephrocalcinosis            Outcome Assessments 11/25/2020 12/29/20 2/9/21  Progress Note    5XSTS 33 93 sec 31 2 59 22 sec    30 sec chair rise 4 4 2    TUG 37 19 sec  38 9 seconds 48 25 sec    10MWT 30 88 sec  0 32 m/sec 42 seconds=  23 m/sec 49 8 sec   0 20 m/s    2MWT 240 feet 150 ft  97 feet    Supine to sit:  16 44 sec w/ 1 pillow 8 9 seconds w/ 1 pillow

## 2021-03-29 ENCOUNTER — OFFICE VISIT (OUTPATIENT)
Dept: PHYSICAL THERAPY | Facility: CLINIC | Age: 56
End: 2021-03-29
Payer: MEDICARE

## 2021-03-29 ENCOUNTER — APPOINTMENT (OUTPATIENT)
Dept: OCCUPATIONAL THERAPY | Facility: CLINIC | Age: 56
End: 2021-03-29
Payer: MEDICARE

## 2021-03-29 DIAGNOSIS — R26.89 BALANCE DISORDER: ICD-10-CM

## 2021-03-29 DIAGNOSIS — Z86.69 HX OF CEREBRAL PALSY: ICD-10-CM

## 2021-03-29 DIAGNOSIS — R26.81 UNSTEADY GAIT: Primary | ICD-10-CM

## 2021-03-29 PROCEDURE — 97112 NEUROMUSCULAR REEDUCATION: CPT | Performed by: PHYSICAL THERAPIST

## 2021-03-29 PROCEDURE — 97116 GAIT TRAINING THERAPY: CPT | Performed by: PHYSICAL THERAPIST

## 2021-03-29 NOTE — PROGRESS NOTES
Daily Note  PN: 3- (POC: 2021)    Today's date: 3/29/2021  Patient name: Felecia Montero  : 1965  MRN: [id number]  Referring provider: Cas Bowman DO  Dx:   Encounter Diagnosis     ICD-10-CM    1  Unsteady gait  R26 81    2  Hx of cerebral palsy  Z86 69    3  Balance disorder  R26 89                   Subjective: Caregiver reports no new changes, complaints, or falls  Objective: See treatment diary below    Gait:   - Ambulation w/ SBQC: 2 reps, 150 ft    NMR:  - STS 20 reps with emphasis on hip extension and upright posturing, 1 UE  - Step taps: 6", 10x, 1 UE  - Standing core rotations: 10 reps B/L, 5# med ball  - Picking up cone from floor: 6 cones, 2x,       Assessment: Patient tolerated treatment well  Patient challenged with STS exercises without UE support, and displays reduced hip extension on right side  Patient demonstrates step to gait pattern during ambulation w/ SBQC  Patient had right knee buckle during step tap exercises, but was able to maintain balance  Patient will benefit form skilled PT services to improve his functional mobility and improve participation in life roles in home and in the community  Plan: Continue per plan of care        Precautions:   Past Medical History:   Diagnosis Date    Ambulatory dysfunction     Anxiety     Bilateral impacted cerumen     last assessed 2013    Capsulitis     last assessed 2016    Cellulitis of finger 2012    Chronic kidney disease     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     Closed fracture of one or more phalanges of foot 2009    Constipation     Deformity     left and right foot and ankle ,right hand    Depression     Epilepsy ([facility] [address] [facility]     Erythrocytosis     Hemiplegia affecting dominant side ([facility] [address] [facility]     Hypertension     Hypoglycemia 2011    Hypokalemia     last assessed 2013    Mental retardation     Mood disorder ([facility] [address] [facility]     Nephrocalcinosis            Outcome Assessments 11/25/2020 12/29/20 2/9/21  Progress Note    5XSTS 33 93 sec 31 2 59 22 sec    30 sec chair rise 4 4 2    TUG 37 19 sec  38 9 seconds 48 25 sec    10MWT 30 88 sec  0 32 m/sec 42 seconds=  23 m/sec 49 8 sec   0 20 m/s    2MWT 240 feet 150 ft  97 feet    Supine to sit:  16 44 sec w/ 1 pillow 8 9 seconds w/ 1 pillow

## 2021-03-31 ENCOUNTER — OFFICE VISIT (OUTPATIENT)
Dept: PHYSICAL THERAPY | Facility: CLINIC | Age: 56
End: 2021-03-31
Payer: MEDICARE

## 2021-03-31 ENCOUNTER — APPOINTMENT (OUTPATIENT)
Dept: OCCUPATIONAL THERAPY | Facility: CLINIC | Age: 56
End: 2021-03-31
Payer: MEDICARE

## 2021-03-31 DIAGNOSIS — Z86.69 HX OF CEREBRAL PALSY: ICD-10-CM

## 2021-03-31 DIAGNOSIS — R26.89 BALANCE DISORDER: ICD-10-CM

## 2021-03-31 DIAGNOSIS — R26.81 UNSTEADY GAIT: Primary | ICD-10-CM

## 2021-03-31 PROCEDURE — 97112 NEUROMUSCULAR REEDUCATION: CPT

## 2021-03-31 PROCEDURE — 97116 GAIT TRAINING THERAPY: CPT

## 2021-03-31 NOTE — PROGRESS NOTES
Daily Note  PN: 3- (POC: 2021)    Today's date: 3/31/2021  Patient name: Sabine Galeano  : 1965  MRN: [id number]  Referring provider: Melissa Vasquez DO  Dx:   Encounter Diagnosis     ICD-10-CM    1  Unsteady gait  R26 81    2  Hx of cerebral palsy  Z86 69    3  Balance disorder  R26 89                   Subjective: Patient arrives with complaints of L H/S and hip pain today  He noted that his pain is worse in the morning and improves as he continues to walk  Objective: See treatment diary below    Gait:   - Ambulation w/ SBQC: 2 reps, 150 ft    NMR:  - STS 20 reps with emphasis on hip extension and upright posturing, 1 UE  - Step taps: 6", 10x, 1 UE  - Step ups: 6", 10 reps, 1 UE  - Standing core rotations: 10 reps B/L, 5# med ball  - Bowling w/ 5# med ball: 10 reps    TE:  - Seated H/S stretch: 3 reps, 30 sec      Assessment: Patient able to tolerate treatment session well today with reduced overall hip pain following seated hamstring stretch  Increasing focus on dynamic balance tasks, with occasional R knee buckling during step ups as he fatigued that required UE support to maintain balance  Improved carryover with step length today with 25% reduction in PT verbal cues  Patient will benefit form skilled PT services to improve his functional mobility and improve participation in life roles in home and in the community  Plan: Continue per plan of care        Precautions:   Past Medical History:   Diagnosis Date    Ambulatory dysfunction     Anxiety     Bilateral impacted cerumen     last assessed 2013    Capsulitis     last assessed 2016    Cellulitis of finger 2012    Chronic kidney disease     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     Closed fracture of one or more phalanges of foot 2009    Constipation     Deformity     left and right foot and ankle ,right hand    Depression     [facility] [address] [facility]     Erythrocytosis     [facility] affecting [facility]     Hypertension     Hypoglycemia 11/08/2011    Hypokalemia     last assessed 05/30/2013    Mental retardation     Mood disorder ([facility] )     Nephrocalcinosis            Outcome Assessments 11/25/2020 12/29/20 2/9/21  Progress Note    5XSTS 33 93 sec 31 2 59 22 sec    30 sec chair rise 4 4 2    TUG 37 19 sec  38 9 seconds 48 25 sec    10MWT 30 88 sec  0 32 m/sec 42 seconds=  23 m/sec 49 8 sec   0 20 m/s    2MWT 240 feet 150 ft  97 feet    Supine to sit:  16 44 sec w/ 1 pillow 8 9 seconds w/ 1 pillow

## 2021-04-05 ENCOUNTER — APPOINTMENT (OUTPATIENT)
Dept: PHYSICAL THERAPY | Facility: CLINIC | Age: 56
End: 2021-04-05
Payer: MEDICARE

## 2021-04-07 ENCOUNTER — OFFICE VISIT (OUTPATIENT)
Dept: PHYSICAL THERAPY | Facility: CLINIC | Age: 56
End: 2021-04-07
Payer: MEDICARE

## 2021-04-07 DIAGNOSIS — R26.81 UNSTEADY GAIT: Primary | ICD-10-CM

## 2021-04-07 DIAGNOSIS — Z86.69 HX OF CEREBRAL PALSY: ICD-10-CM

## 2021-04-07 DIAGNOSIS — R26.89 BALANCE DISORDER: ICD-10-CM

## 2021-04-07 PROCEDURE — 97112 NEUROMUSCULAR REEDUCATION: CPT | Performed by: PHYSICAL THERAPIST

## 2021-04-07 NOTE — PROGRESS NOTES
Daily Note  PN: 3- (POC: 2021)    Today's date: 2021  Patient name: Maryanne Valdez  : 1965  MRN: [id number]  Referring provider: Dipesh Andrade DO  Dx:   Encounter Diagnosis     ICD-10-CM    1  Unsteady gait  R26 81    2  Hx of cerebral palsy  Z86 69    3  Balance disorder  R26 89                   Subjective: Patient arrives to therapy 25 minutes late      Objective: See treatment diary below    Gait:   - Ambulation w/ SBQC: 2 reps, 150 ft    NMR:  - STS 20 reps with emphasis on hip extension and upright posturing, 1 UE  - Step ups: 6", 10 reps, 1 UE  - Standing core rotations: 10 reps B/L, 5# med ball      Assessment: Patient tolerated treatment well  He requires 1 UE support in parallel bars when performing step ups to 6" step  During ambulation, he displays step-to gait pattern with reduced hip extension  He will benefit from skilled PT services to improve his functional mobility and improve participation in life roles at home and in the community           Plan: Continue per plan of care  Precautions:   Past Medical History:   Diagnosis Date    Ambulatory dysfunction     Anxiety     Bilateral impacted cerumen     last assessed 2013    Capsulitis     last assessed 2016    Cellulitis of finger 2012    Chronic kidney disease     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     Closed fracture of one or more phalanges of foot 2009    Constipation     Deformity     left and right foot and ankle ,right hand    Depression     Epilepsy ([address]     Erythrocytosis     Hemiplegia affecting dominant side ([address]     Hypertension     Hypoglycemia 2011    Hypokalemia     last assessed 2013    Mental retardation     Mood disorder ([facility] [address] [facility]     Nephrocalcinosis            Outcome Assessments 2020  Progress Note    5XSTS 33 93 sec 31 2 59 22 sec    30 sec chair rise 4 4 2    TUG 37 19 sec  38 9 seconds 48 25 sec    10MWT 30 88 sec   0 32 m/sec 42 seconds=  23 m/sec 49 8 sec   0 20 m/s    2MWT 240 feet 150 ft  97 feet    Supine to sit:  16 44 sec w/ 1 pillow 8 9 seconds w/ 1 pillow

## 2021-04-12 ENCOUNTER — OFFICE VISIT (OUTPATIENT)
Dept: PHYSICAL THERAPY | Facility: CLINIC | Age: 56
End: 2021-04-12
Payer: MEDICARE

## 2021-04-12 DIAGNOSIS — R26.89 BALANCE DISORDER: ICD-10-CM

## 2021-04-12 DIAGNOSIS — R26.81 UNSTEADY GAIT: Primary | ICD-10-CM

## 2021-04-12 DIAGNOSIS — Z86.69 HX OF CEREBRAL PALSY: ICD-10-CM

## 2021-04-12 PROCEDURE — 97530 THERAPEUTIC ACTIVITIES: CPT | Performed by: PHYSICAL THERAPIST

## 2021-04-12 PROCEDURE — 97112 NEUROMUSCULAR REEDUCATION: CPT | Performed by: PHYSICAL THERAPIST

## 2021-04-12 NOTE — PROGRESS NOTES
PT Progress Note    Today's date: 2021  Patient name: Anival Boateng  : 1965  MRN: [id number]  Referring provider: Lior Steen DO  Dx:   Encounter Diagnosis     ICD-10-CM    1  Unsteady gait  R26 81    2  Hx of cerebral palsy  Z86 69    3  Balance disorder  R26 89                   Assessment  Assessment details: Anival Boateng is a 54 y o  male here today 20 for an initial physical therapy evaluation due to frequent falls and unsteady gait/balance  Anival Boateng presents to PT with impairments in active ROM, passive ROM, spasticity (UE worse than LE), standing tolerance, balance, and endurance  Anival Boateng has been educated in current PT POC and his personalized short term and long term goals were reviewed  Currently, his 5XSTS score of 59 22 sec  to 20 12 sec with pushing up from arm rest  Minimal change with 10 MWT, 2 MWT/ 6 MWT  Sofia Stringer Regressions likely, due to not having PT in almost a month due to moving to a different location  Denisha Jose Miguel is active, 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  Has been doing well with SPC with [facility])  Patient will continue for this month with plan to DC at end of the month  Focus will be HEP with assistance from staff as able  Impairments: abnormal gait, abnormal or restricted ROM, impaired balance, impaired physical strength and safety issue    Symptom irritability: moderateUnderstanding of Dx/Px/POC: excellent   Prognosis: good    Goals  STG 30 days    Client will be able to ambulate 260 feet within 2MWT to reflect improvements in functional endurance  NOT MET  Client will achieve   14 m/sec improvement from 0 32 m/sec to 0 46 m/sec with 10 Meter Walk test, demonstrating Minimal Detectable change per current research standards for this objective test which assesses fall risk   NOT MET  Client will demonstrate a decrease by 4 14 points on Timed Up and Go from 37 19 to 33 05 sec which is Minimal Detectable Change per current research standards for this objective test which assesses fall risk  NOT MET  Client will be able to complete a Five Timed sit to/from stand within 30 0 sec to suggest reduced risk for falls  NOT MET  Client will complete timed supine to sit in 15 seconds or less to reflect improvements in motor planning and abdominal control during transitional movements  MET     LT days   Client will score low risk for falls with 3/4 fall risk measures  NOT MET  Client will achieve 112 feet improvement with overall distance from 240 to 352 feet with 2 minute walk test which is Minimal Detectable Change pre current research standards with endurance to demonstrate enhance functional capacity  NOT MET  Client will be able to complete static/dynamic standing x 8 minutes without needing to sit down to improve 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  NOT MET  Client will be able to complete a Timed up and Go within 25 sec to suggest reduced risk for falls  NOT MET  Client will be able to complete a Five Timed sit to/from stand within 27 sec to suggest reduced risk for falls  NOT MET      Plan  Plan details: Core strengthening, standing tolerance, tone management, strengthening, standing balance, dual tasking with gait, gait training  Possible assessment for AFO for joint protection at L ankle     Patient would benefit from: PT eval and skilled physical therapy  Planned modality interventions: electrical stimulation/Russian stimulation and biofeedback  Planned therapy interventions: neuromuscular re-education, motor coordination training, therapeutic activities, therapeutic exercise, transfer training, gait training, patient education, strengthening, stretching, manual therapy, activity modification, balance, orthotic fitting/training, home exercise program, graded exercise and graded activity  Frequency: 2x week  Duration in weeks: 12  Plan of Care beginning date: 2021  Plan of Care expiration date: 5/4/2021  Treatment plan discussed with: patient        Subjective Evaluation    History of Present Illness  Mechanism of injury: Alexis Nunez is a 54y o  year old male who has spastic CP who was originally seeing PT at [facility] for his foot/ankle pain and was referred by primary OT for the balance center due to history of falls and difficulty walking  He reports increased pain in RUE as well as having multiple falls  Roxy Skiff is L hand dominant  He lives on own in an apartment  Roxy Skiff is able to cook, clean house and ADLs including bathing, dressing  His interests include: workshop ie making caps,  sitting outside, watering plants, and watching football/baseball  He works at the work shop  Quality of life: good    Social Support  Steps to enter house: no  Stairs in house: no   Lives in: apartment  Lives with: alone    Employment status: working  Hand dominance: left    Treatments  Previous treatment: physical therapy and occupational therapy  Current treatment: physical therapy and occupational therapy  Patient Goals  Patient goals for therapy: increased strength and improved balance          Objective     Functional Assessment        Comments  Posture: mild R convex scoliotic curve from approx T5-T8 with compensatory R lateral trunk lean and L pelvic elevation and anterior pelvic rotation on L  (+) R shoulder elevation with scapular abduction compared to left  R UE flexor synergy (mild L thumb adduction on L)  Noted in both seated and standing, likely fixed deformities  Gait analysis:  No AD   Leads with LLE with increased R trunk/pelvic rotation with associated prolonged R footdrag throughout swing  (+) R trunk and pelvic rotation with windswept pelvic positioning  (+) L hip IR and forefoot pronation with R genu valgus and R ankle ER with associated R forefoot and midfoot pronation    SKIP:  2 beat clonus at L ankle  R Knee flexors: 1  L Knee Flexors: 2    ROM:  PROM: plan to complete formal ankle ROM assessment next session  AROM limited by approx 25% at B ankles and B hips               Precautions:   Past Medical History:   Diagnosis Date    Ambulatory dysfunction     Anxiety     Bilateral impacted cerumen     last assessed 05/30/2013    Capsulitis     last assessed 04/26/2016    Cellulitis of finger 01/13/2012    Chronic kidney disease     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     Closed fracture of one or more phalanges of foot 01/06/2009    Constipation     Deformity     left and right foot and ankle ,right hand    Depression     [facility]     Erythrocytosis     Hemiplegia affecting dominant side ([facility]     Hypertension     Hypoglycemia 11/08/2011    Hypokalemia     last assessed 05/30/2013    Mental retardation     Mood disorder ([facility]     Nephrocalcinosis            Outcome Assessments 11/25/2020 12/29/20 2/9/21  Progress Note PN  3- PN:  4/12/2021   5XSTS 33 93 sec 31 2 59 22 sec 21 32 sec w/ 1 UE 20 12 seconds   30 sec chair rise 4 4 2 7 reps 7 reps    TUG 37 19 sec  38 9 seconds 48 25 sec 77 80 sec w/ SPC 62 31 seconds with SPC   10MWT 30 88 sec  0 32 m/sec 42 seconds=  23 m/sec 49 8 sec   0 20 m/s 69 03 sec 65 21 sec    2MWT 240 feet 150 ft  97 feet     Supine to sit:  16 44 sec w/ 1 pillow 8 9 seconds w/ 1 pillow   8 5 seconds with 1 pillow    6MWT    175 ft w/  ft w/SPC

## 2021-04-14 ENCOUNTER — OFFICE VISIT (OUTPATIENT)
Dept: PHYSICAL THERAPY | Facility: CLINIC | Age: 56
End: 2021-04-14
Payer: MEDICARE

## 2021-04-14 DIAGNOSIS — R26.89 BALANCE DISORDER: ICD-10-CM

## 2021-04-14 DIAGNOSIS — R26.81 UNSTEADY GAIT: Primary | ICD-10-CM

## 2021-04-14 DIAGNOSIS — Z86.69 HX OF CEREBRAL PALSY: ICD-10-CM

## 2021-04-14 PROCEDURE — 97112 NEUROMUSCULAR REEDUCATION: CPT | Performed by: PHYSICAL THERAPIST

## 2021-04-14 PROCEDURE — 97116 GAIT TRAINING THERAPY: CPT | Performed by: PHYSICAL THERAPIST

## 2021-04-14 NOTE — PROGRESS NOTES
Daily Note  PN: 2021 (POC: 2021)    Today's date: 2021  Patient name: Candance Condon  : 1965  MRN: [id number]  Referring provider: Meme Chapa DO  Dx:   Encounter Diagnosis     ICD-10-CM    1  Unsteady gait  R26 81    2  Hx of cerebral palsy  Z86 69    3  Balance disorder  R26 89                   Subjective: Patient arrives to therapy with [facility]      Objective: See treatment diary below    Gait:   - Ambulation w/ SBQC: 2 reps, 150 ft  - Cone weave w/ SBQC: 5 cones, 3x    NMR:  - STS 20 reps with emphasis on hip extension and upright posturing, 1 UE  - FTEO w/ foam: 60 sec, 3x  - FTEC w/ foam: 30 sec, 3x  - Standing core rotations: 10 reps B/L, 5# med ball      Assessment: Patient tolerated treatment well  He utilized 1 UE on chair arm during STS exercise to help him stand from seated position  Patient uses SBQC during cone weaving exercise to maintain balance  During ambulation, frequent verbal cueing helped to increase his step length  He will continue to benefit from skilled PT services to improve his functional mobility and improve participation in life roles at home and in the community  Plan: Continue per plan of care        Precautions:   Past Medical History:   Diagnosis Date    Ambulatory dysfunction     Anxiety     Bilateral impacted cerumen     last assessed 2013    Capsulitis     last assessed 2016    Cellulitis of finger 2012    Chronic kidney disease     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     Closed fracture of one or more phalanges of foot 2009    Constipation     Deformity     left and right foot and ankle ,right hand    Depression     Epilepsy ([address]     Erythrocytosis     Hemiplegia affecting dominant side ([address]     Hypertension     Hypoglycemia 2011    Hypokalemia     last assessed 2013    Mental retardation     [facility] disorder [facility] [address] [facility]     Nephrocalcinosis        Outcome Assessments 2020 2/9/21  Progress Note PN  3- PN:  4/12/2021   5XSTS 33 93 sec 31 2 59 22 sec 21 32 sec w/ 1 UE 20 12 seconds   30 sec chair rise 4 4 2 7 reps 7 reps    TUG 37 19 sec  38 9 seconds 48 25 sec 77 80 sec w/ SPC 62 31 seconds with SPC   10MWT 30 88 sec  0 32 m/sec 42 seconds=  23 m/sec 49 8 sec   0 20 m/s 69 03 sec 65 21 sec    2MWT 240 feet 150 ft  97 feet     Supine to sit:  16 44 sec w/ 1 pillow 8 9 seconds w/ 1 pillow   8 5 seconds with 1 pillow    6MWT    175 ft w/  ft w/SPC

## 2021-04-19 ENCOUNTER — OFFICE VISIT (OUTPATIENT)
Dept: PHYSICAL THERAPY | Facility: CLINIC | Age: 56
End: 2021-04-19
Payer: MEDICARE

## 2021-04-19 DIAGNOSIS — R26.89 BALANCE DISORDER: ICD-10-CM

## 2021-04-19 DIAGNOSIS — Z86.69 HX OF CEREBRAL PALSY: ICD-10-CM

## 2021-04-19 DIAGNOSIS — R26.81 UNSTEADY GAIT: Primary | ICD-10-CM

## 2021-04-19 PROCEDURE — 97116 GAIT TRAINING THERAPY: CPT

## 2021-04-19 PROCEDURE — 97112 NEUROMUSCULAR REEDUCATION: CPT

## 2021-04-19 NOTE — PROGRESS NOTES
Daily Note  PN: 2021 (POC: 2021)    Today's date: 2021  Patient name: Kartik Brown  : 1965  MRN: [id number]  Referring provider: Surinder Soto DO  Dx:   Encounter Diagnosis     ICD-10-CM    1  Unsteady gait  R26 81    2  Hx of cerebral palsy  Z86 69    3  Balance disorder  R26 89        Start Time: 1100  Stop Time: 1145  Total time in clinic (min): 45 minutes    Subjective: Patient arrives to therapy with [facility] [facility]  No change is status since last session  Objective: See treatment diary below    Gait:   - Ambulation w/ SBQC: 2 reps, 150 ft  - Cone weave w/ SBQC: 5 cones, 3x    NMR:  - STS 2x10 reps with emphasis on hip extension and upright posturing, 1 UE 1' rest break between sets  - FTEO w/ foam: 60 sec, 2x, CGx1  - FTEC w/ foam: 30 sec, 2x occasional UE support when needed therapist CGx1  - Standing core rotations: 10 reps B/L, 5# med ball      Assessment: Patient tolerated treatment well  Increased weight bearing on his LLE especially with balance exercises on the foam  Increased hip flexion in stance after sit to stands requiring verbal cues from therapist  Patient displayed need for occasional UE use while performing balance exercises  He will continue to benefit from skilled PT services to improve his functional mobility and improve participation in life roles at home and in the community  Plan: Continue per plan of care        Precautions:   Past Medical History:   Diagnosis Date    Ambulatory dysfunction     Anxiety     Bilateral impacted cerumen     last assessed 2013    Capsulitis     last assessed 2016    Cellulitis of finger 2012    Chronic kidney disease     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     Closed fracture of one or more phalanges of foot 2009    Constipation     Deformity     left and right foot and ankle ,right hand    Depression     Epilepsy ([address]     Erythrocytosis     Hemiplegia affecting dominant side ([address]     Hypertension     Hypoglycemia 11/08/2011    Hypokalemia     last assessed 05/30/2013    Mental retardation     Mood disorder ([facility] [address] [facility]     Nephrocalcinosis        Outcome Assessments 11/25/2020 12/29/20 2/9/21  Progress Note PN  3- PN:  4/12/2021   5XSTS 33 93 sec 31 2 59 22 sec 21 32 sec w/ 1 UE 20 12 seconds   30 sec chair rise 4 4 2 7 reps 7 reps    TUG 37 19 sec  38 9 seconds 48 25 sec 77 80 sec w/ SPC 62 31 seconds with SPC   10MWT 30 88 sec  0 32 m/sec 42 seconds=  23 m/sec 49 8 sec   0 20 m/s 69 03 sec 65 21 sec    2MWT 240 feet 150 ft  97 feet     Supine to sit:  16 44 sec w/ 1 pillow 8 9 seconds w/ 1 pillow   8 5 seconds with 1 pillow    6MWT    175 ft w/  ft w/SPC

## 2021-04-21 ENCOUNTER — OFFICE VISIT (OUTPATIENT)
Dept: PHYSICAL THERAPY | Facility: CLINIC | Age: 56
End: 2021-04-21
Payer: MEDICARE

## 2021-04-21 DIAGNOSIS — R26.89 BALANCE DISORDER: ICD-10-CM

## 2021-04-21 DIAGNOSIS — Z86.69 HX OF CEREBRAL PALSY: ICD-10-CM

## 2021-04-21 DIAGNOSIS — R26.81 UNSTEADY GAIT: Primary | ICD-10-CM

## 2021-04-21 PROCEDURE — 97112 NEUROMUSCULAR REEDUCATION: CPT

## 2021-04-21 NOTE — PROGRESS NOTES
Daily Note  PN: 2021 (POC: 2021)    Today's date: 2021  Patient name: Phil Reza  : 1965  MRN: [id number]  Referring provider: Lety Tse DO  Dx:   Encounter Diagnosis     ICD-10-CM    1  Unsteady gait  R26 81    2  Hx of cerebral palsy  Z86 69    3  Balance disorder  R26 89        Start Time: 1100  Stop Time: 1145  Total time in clinic (min): 45 minutes    Subjective: Patient arrives to therapy with [facility]  No change reported      Objective: See treatment diary below    Gait:   - Ambulation w/ SBQC: 2 reps, 150 ft  - Cone weave w/ SBQC: 5 cones, 3x    NMR:  - STS 2x10 reps with emphasis on hip extension and upright posturing, 1 UE 1' rest break between sets  - FTEO w/ foam: 60 sec, 2x, CGx1  - FTEC w/ foam: 30 sec, 2x occasional UE support when needed therapist CGx1  - Standing core rotations: 20 reps B/L, 5# med ball      Assessment: Patient tolerated treatment well  Increased weight bearing on his LLE especially with balance exercises on the foam  Pt reported pain in R calf today  Helene Skipper He will continue to benefit from skilled PT services to improve his functional mobility and improve participation in life roles at home and in the community  Plan: Continue per plan of care        Precautions:   Past Medical History:   Diagnosis Date    Ambulatory dysfunction     Anxiety     Bilateral impacted cerumen     last assessed 2013    Capsulitis     last assessed 2016    Cellulitis of finger 2012    Chronic kidney disease     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     Closed fracture of one or more phalanges of foot 2009    Constipation     Deformity     left and right foot and ankle ,right hand    Depression     Epilepsy ([address]     Erythrocytosis     Hemiplegia affecting dominant side ([address]     Hypertension     Hypoglycemia 2011    Hypokalemia     last assessed 2013    Mental retardation     [facility] disorder [facility] [address] [facility]     Nephrocalcinosis Outcome Assessments 11/25/2020 12/29/20 2/9/21  Progress Note PN  3- PN:  4/12/2021   5XSTS 33 93 sec 31 2 59 22 sec 21 32 sec w/ 1 UE 20 12 seconds   30 sec chair rise 4 4 2 7 reps 7 reps    TUG 37 19 sec  38 9 seconds 48 25 sec 77 80 sec w/ SPC 62 31 seconds with SPC   10MWT 30 88 sec  0 32 m/sec 42 seconds=  23 m/sec 49 8 sec   0 20 m/s 69 03 sec 65 21 sec    2MWT 240 feet 150 ft  97 feet     Supine to sit:  16 44 sec w/ 1 pillow 8 9 seconds w/ 1 pillow   8 5 seconds with 1 pillow    6MWT    175 ft w/  ft w/SPC

## 2021-04-26 ENCOUNTER — OFFICE VISIT (OUTPATIENT)
Dept: PHYSICAL THERAPY | Facility: CLINIC | Age: 56
End: 2021-04-26
Payer: MEDICARE

## 2021-04-26 DIAGNOSIS — R26.81 UNSTEADY GAIT: Primary | ICD-10-CM

## 2021-04-26 DIAGNOSIS — Z86.69 HX OF CEREBRAL PALSY: ICD-10-CM

## 2021-04-26 DIAGNOSIS — R26.89 BALANCE DISORDER: ICD-10-CM

## 2021-04-26 PROCEDURE — 97116 GAIT TRAINING THERAPY: CPT

## 2021-04-26 PROCEDURE — 97112 NEUROMUSCULAR REEDUCATION: CPT

## 2021-04-26 NOTE — PROGRESS NOTES
Daily Note  PN: 2021 (POC: 2021)    Today's date: 2021  Patient name: Radha William  : 1965  MRN: [id number]  Referring provider: Rafael Mcgee DO  Dx:   Encounter Diagnosis     ICD-10-CM    1  Unsteady gait  R26 81    2  Hx of cerebral palsy  Z86 69    3  Balance disorder  R26 89        Start Time: 1100  Stop Time: 1145  Total time in clinic (min): 45 minutes    Subjective: Patient arrives to therapy with [employer] [facility]  No changes in status since last session  Objective: See treatment diary below    Gait:   - Ambulation w/ SBQC: 2 reps, 200 ft      NMR:  - STS 2x10 reps with emphasis on hip extension and upright posturing, 1 UE 1' rest break between sets  - FTEO w/ foam: 60 sec, 2x, CGx1  - FTEC w/ foam: 30 sec, 2x occasional UE support when needed therapist CGx1  - Standing core rotations: 20 reps B/L, 5# med ball  - step taps 4" 2x10 with UE support  - weight shifting in parallel bars 20x2 UE support therapist CGx1      Assessment: Patient tolerated treatment well  VCs for form with sit to stands in order to use the R leg more and for upright posture in standing  Patient displayed fair balance with occasional UE use when needed  He will continue to benefit from skilled PT services to improve his functional mobility and improve participation in life roles at home and in the community  Plan: Continue per plan of care        Precautions:   Past Medical History:   Diagnosis Date    Ambulatory dysfunction     Anxiety     Bilateral impacted cerumen     last assessed 2013    Capsulitis     last assessed 2016    Cellulitis of finger 2012    Chronic kidney disease     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     Closed fracture of one or more phalanges of foot 2009    Constipation     Deformity     left and right foot and ankle ,right hand    Depression     Epilepsy ([address]     Erythrocytosis     Hemiplegia affecting dominant side ([address]     Hypertension     Hypoglycemia 11/08/2011    Hypokalemia     last assessed 05/30/2013    Mental retardation     [facility]     Nephrocalcinosis        Outcome Assessments 11/25/2020 12/29/20 2/9/21  Progress Note PN  3- PN:  4/12/2021   5XSTS 33 93 sec 31 2 59 22 sec 21 32 sec w/ 1 UE 20 12 seconds   30 sec chair rise 4 4 2 7 reps 7 reps    TUG 37 19 sec  38 9 seconds 48 25 sec 77 80 sec w/ SPC 62 31 seconds with SPC   10MWT 30 88 sec  0 32 m/sec 42 seconds=  23 m/sec 49 8 sec   0 20 m/s 69 03 sec 65 21 sec    2MWT 240 feet 150 ft  97 feet     Supine to sit:  16 44 sec w/ 1 pillow 8 9 seconds w/ 1 pillow   8 5 seconds with 1 pillow    6MWT    175 ft w/  ft w/SPC

## 2021-04-28 ENCOUNTER — TELEPHONE (OUTPATIENT)
Dept: FAMILY MEDICINE CLINIC | Facility: CLINIC | Age: 56
End: 2021-04-28

## 2021-04-28 ENCOUNTER — OFFICE VISIT (OUTPATIENT)
Dept: PHYSICAL THERAPY | Facility: CLINIC | Age: 56
End: 2021-04-28
Payer: MEDICARE

## 2021-04-28 DIAGNOSIS — Z86.69 HX OF CEREBRAL PALSY: ICD-10-CM

## 2021-04-28 DIAGNOSIS — R26.89 BALANCE DISORDER: ICD-10-CM

## 2021-04-28 DIAGNOSIS — R26.81 UNSTEADY GAIT: Primary | ICD-10-CM

## 2021-04-28 PROCEDURE — 97112 NEUROMUSCULAR REEDUCATION: CPT | Performed by: PHYSICAL THERAPIST

## 2021-04-28 NOTE — PROGRESS NOTES
Dis-Charge     Today's date: 2021  Patient name: Luke Stanley  : 1965  MRN: [id number]  Referring provider: Kory Prince DO  Dx:   Encounter Diagnosis     ICD-10-CM    1  Unsteady gait  R26 81    2  Hx of cerebral palsy  Z86 69    3  Balance disorder  R26 89                   Subjective: Patient arrives to therapy with SPC with base extender  Objective: See treatment diary below    Goals  STG 30 days    Client will be able to ambulate 260 feet within 2MWT to reflect improvements in functional endurance  NOT MET  Client will achieve   14 m/sec improvement from 0 32 m/sec to 0 46 m/sec with 10 Meter Walk test, demonstrating Minimal Detectable change per current research standards for this objective test which assesses fall risk  NOT MET  Client will demonstrate a decrease by 4 14 points on Timed Up and Go from 37 19 to 33 05 sec which is Minimal Detectable Change per current research standards for this objective test which assesses fall risk  NOT MET  Client will be able to complete a Five Timed sit to/from stand within 30 0 sec to suggest reduced risk for falls  MET  Client will complete timed supine to sit in 15 seconds or less to reflect improvements in motor planning and abdominal control during transitional movements  MET     LT days   Client will score low risk for falls with 3/4 fall risk measures  NOT MET  Client will achieve 112 feet improvement with overall distance from 240 to 352 feet with 2 minute walk test which is Minimal Detectable Change pre current research standards with endurance to demonstrate enhance functional capacity  NOT MET  Client will be able to complete static/dynamic standing x 8 minutes without needing to sit down to improve 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  MET  Client will be able to complete a Timed up and Go within 25 sec to suggest reduced risk for falls   NOT MET  Client will be able to complete a Five Timed sit to/from stand within 27 sec to suggest reduced risk for falls  MET    Outcome Assessments 12/29/20 2/9/21  Progress Note PN  3- PN:  4/12/2021 4/28/21   5XSTS 31 2 59 22 sec 21 32 sec w/ 1 UE 20 12 seconds 23 21 seconds    30 sec chair rise 4 2 7 reps 7 reps  7 reps    TUG 38 9 seconds 48 25 sec 77 80 sec w/ SPC 62 31 seconds with SPC 54 90 seconds with SPC    10MWT 42 seconds=  23 m/sec 49 8 sec  0 20 m/s 69 03 sec 65 21 sec  62 34 =  16 m/s   2MWT 150 ft  97 feet   89 feet    Supine to sit:  8 9 seconds w/ 1 pillow   8 5 seconds with 1 pillow  9 12 second with 1 pillow    6MWT   175 ft w/  ft w/SPC  200 ft w/SPC                                        Assessment: Final update for DC this date with outcome measures re-tested with patient achiving 2 STG and 2 LTG  All other goals unmet and will remain unmet due to patient medical history and complaint of low back pain which he was treat in ortho for  Pt gait speed is slower but is steadier since using SPC with base extender  Pt is able to complete all task within group home and will be DC at this time  Caregivers are agreeable to PT recommendation              Plan: Discharged      Precautions:   Past Medical History:   Diagnosis Date    Ambulatory dysfunction     Anxiety     Bilateral impacted cerumen     last assessed 05/30/2013    Capsulitis     last assessed 04/26/2016    Cellulitis of finger 01/13/2012    Chronic kidney disease     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     Closed fracture of one or more phalanges of foot 01/06/2009    Constipation     Deformity     left and right foot and ankle ,right hand    Depression     Epilepsy ([address]     Erythrocytosis     Hemiplegia affecting dominant side ([address]     Hypertension     Hypoglycemia 11/08/2011    Hypokalemia     last assessed 05/30/2013    Mental retardation     Mood disorder ([address]     Nephrocalcinosis        Outcome Assessments 12/29/20 2/9/21  Progress Note PN  3- PN:  4/12/2021 4/28/21   5XSTS 31 2 59 22 sec 21 32 sec w/ 1 UE 20 12 seconds 23 21 seconds    30 sec chair rise 4 2 7 reps 7 reps  7 reps    TUG 38 9 seconds 48 25 sec 77 80 sec w/ SPC 62 31 seconds with SPC 54 90 seconds with SPC    10MWT 42 seconds=  23 m/sec 49 8 sec   0 20 m/s 69 03 sec 65 21 sec  62 34 =  16 m/s   2MWT 150 ft  97 feet   89 feet    Supine to sit:  8 9 seconds w/ 1 pillow   8 5 seconds with 1 pillow  9 12 second with 1 pillow    6MWT   175 ft w/  ft w/SPC  200 ft w/SPC

## 2021-05-03 ENCOUNTER — APPOINTMENT (OUTPATIENT)
Dept: RADIOLOGY | Facility: CLINIC | Age: 56
End: 2021-05-03
Payer: MEDICARE

## 2021-05-03 ENCOUNTER — OFFICE VISIT (OUTPATIENT)
Dept: OBGYN CLINIC | Facility: CLINIC | Age: 56
End: 2021-05-03
Payer: MEDICARE

## 2021-05-03 VITALS — DIASTOLIC BLOOD PRESSURE: 65 MMHG | SYSTOLIC BLOOD PRESSURE: 115 MMHG | HEART RATE: 64 BPM

## 2021-05-03 DIAGNOSIS — M25.552 PAIN IN LEFT HIP: Primary | ICD-10-CM

## 2021-05-03 DIAGNOSIS — M51.36 LUMBAR DEGENERATIVE DISC DISEASE: ICD-10-CM

## 2021-05-03 DIAGNOSIS — M16.12 PRIMARY OSTEOARTHRITIS OF ONE HIP, LEFT: ICD-10-CM

## 2021-05-03 DIAGNOSIS — M25.552 PAIN IN LEFT HIP: ICD-10-CM

## 2021-05-03 DIAGNOSIS — M47.816 FACET ARTHROPATHY, LUMBAR: ICD-10-CM

## 2021-05-03 PROCEDURE — 99214 OFFICE O/P EST MOD 30 MIN: CPT | Performed by: ORTHOPAEDIC SURGERY

## 2021-05-03 PROCEDURE — 73502 X-RAY EXAM HIP UNI 2-3 VIEWS: CPT

## 2021-05-03 NOTE — PROGRESS NOTES
Assessment/Plan:  1  Pain in left hip  XR hip/pelv 2-3 vws left if performed   2  Primary osteoarthritis of one hip, left     3  Lumbar degenerative disc disease     4  Facet arthropathy, lumbar       Patient has significant left hip pain with severe osteoarthritis  He would benefit from meeting with Dr Whitney Webb for possible hip treatment options  May not be a surgical candidate given other comorbid conditions  Patient may benefit from hip CSI  He has tried physical therapy without significant relief  He can call with any questions or concerns  Subjective:   Jovany Julian is a 64 y o  male with hx of CP, unsteady gait who presents today with assistance from alternatives staff worker for left leg pain  Pt states he has had it for several years  He has had no new injuries or traumas  He denies any falls  Recently 2 months ago switched into alternatives care  He has a hx of lumbar radiculopathy L>R  He had prior CT lumbar spine 2/2 MRI contraindication with metal surgical clips in head  Reports pain is moderate and moves from lateral hip down to lateral lower leg  He has no knee pain  Wraps hand around left hip and states pain goes into groin  No significant improvement with formal physical therapy, and was just discharged this past week  Patient is a slightly difficult historian, but pleasant and attentive  States his brother had a back surgery, and his mother had a hip fx and replacement  He is his own guardian and lives alone  Typically walks at home with cane in left hand  Review of Systems   Constitutional: Negative for chills, fever and unexpected weight change  HENT: Negative for hearing loss, nosebleeds and sore throat  Eyes: Negative for pain, redness and visual disturbance  Respiratory: Negative for cough, shortness of breath and wheezing  Cardiovascular: Negative for chest pain and leg swelling  Gastrointestinal: Negative for abdominal pain, nausea and vomiting     Endocrine: Negative for polydipsia and polyuria  Genitourinary: Negative for dysuria and hematuria  Skin: Negative for rash and wound  Neurological: Negative for dizziness and headaches  Psychiatric/Behavioral: Negative for decreased concentration and dysphoric mood  Past Medical History:   Diagnosis Date    Ambulatory dysfunction     Anxiety     Bilateral impacted cerumen     last assessed 05/30/2013    Capsulitis     last assessed 04/26/2016    Cellulitis of finger 01/13/2012    Chronic kidney disease     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     Closed fracture of one or more phalanges of foot 01/06/2009    Constipation     Deformity     left and right foot and ankle ,right hand    Depression     [facility] [address] [facility]     Erythrocytosis     Hemiplegia affecting dominant side ([facility]     Hypertension     Hypoglycemia 11/08/2011    Hypokalemia     last assessed 05/30/2013    Mental retardation     Mood disorder ([facility] [facility]     Nephrocalcinosis        Past Surgical History:   Procedure Laterality Date    BRAIN SURGERY      [place] COLONOSCOPY FLX DX W/COLLJ SPEC WHEN PFRMD N/A 2/12/2016    Procedure: COLONOSCOPY;  Surgeon: Cedrick Frias MD;  Location: [facility];   Service: Gastroenterology       Family History   Problem Relation Age of Onset    Emphysema Mother     Nephrolithiasis Mother     Heart attack Father         acute MI    Hypertension Father     Nephrolithiasis Father     Nephrolithiasis Brother        Social History     Occupational History    Not on file   Tobacco Use    Smoking status: Never Smoker    Smokeless tobacco: Never Used   Substance and Sexual Activity    Alcohol use: No    Drug use: No    Sexual activity: Not on file         Current Outpatient Medications:     acetaminophen (TYLENOL) 325 mg tablet, Take 1 tablet (325 mg total) by mouth every 6 (six) hours as needed for mild pain, moderate pain or headaches, Disp: 30 tablet, Rfl: 0    atorvastatin (LIPITOR) 10 mg tablet, Take 1 tablet (10 mg total) by mouth daily Take at 8 PM, Disp: 30 tablet, Rfl: 3    BYSTOLIC 2 5 MG tablet, TAKE ONE TABLET BY MOUTH DAILY AT 8AM, Disp: 30 tablet, Rfl: 4    hydrocortisone 1 % ointment, Apply topically 2 (two) times a day (Patient not taking: Reported on 1/18/2021), Disp: 30 g, Rfl: 0    lamoTRIgine (LaMICtal) 150 MG tablet, Take 1 tab by mouth twice a day at 8AM and 8PM, Disp: 60 tablet, Rfl: 11    lamoTRIgine (LaMICtal) 25 mg tablet, Take two tabs by mouth at 8AM (with one 150mg tab, total AM dose of 200mg) (Patient not taking: Reported on 1/18/2021), Disp: 60 tablet, Rfl: 11    psyllium (REGULOID) 58 6 % powder, Take 1 teaspoonful mixed with 8oz of water orally every day at 8am, Disp: 1040 g, Rfl: 5    QUEtiapine (SEROquel) 50 mg tablet, Take 50 mg by mouth daily at bedtime , Disp: , Rfl:     Reguloid 25 % POWD, TAKE 1 TEASPOONFUL MIXED WITH 8 OUNCES OF WATER ORALLY EVERY DAY AT 8AM, Disp: 1040 g, Rfl: 7    Senna-Tabs 8 6 MG tablet, TAKE ONE TABLET BY MOUTH DAILY AT 8AM FOR CONSTIPATION, Disp: 30 tablet, Rfl: 0    sertraline (ZOLOFT) 50 mg tablet, Take by mouth daily, Disp: , Rfl:     Allergies   Allergen Reactions    Asa [Aspirin] Other (See Comments)     Reaction Date: 25Aug2008;   unknown       Objective:  Vitals:    05/03/21 1359   BP: 115/65   Pulse: 64     Left Hip Exam     Tenderness   Left hip tenderness location: "C" sign  Range of Motion   Extension: -10   Flexion: 110   External rotation: 30 (with significant pain)   Internal rotation: 10 (with significant pain)     Muscle Strength   The patient has normal left hip strength  Other   Sensation: normal  Pulse: present      Back Exam     Tenderness   The patient is experiencing no tenderness  Range of Motion   Extension: normal   Flexion: normal     Muscle Strength   The patient has normal back strength      Tests   Straight leg raise right: negative  Straight leg raise left: negative    Other   Sensation: normal  Gait: antalgic   Erythema: no back redness  Scars: absent    Comments:  Pedal pulses 2+ B/L                    Physical Exam  Vitals signs reviewed  Constitutional:       Appearance: He is well-developed  HENT:      Head: Normocephalic and atraumatic  Eyes:      General: No scleral icterus  Right eye: No discharge  Left eye: No discharge  Neck:      Musculoskeletal: Normal range of motion  Cardiovascular:      Rate and Rhythm: Normal rate  Pulmonary:      Effort: Pulmonary effort is normal  No respiratory distress  Breath sounds: No stridor  Skin:     General: Skin is warm and dry  Neurological:      Mental Status: He is alert and oriented to person, place, and time     Psychiatric:         Behavior: Behavior normal          I have personally reviewed pertinent films in PACS and my interpretation is as follows:  XR Left Hip 5/3/2021 - Severe Left hip osteoarthritis, moderate right hip osteoarthritis

## 2021-05-05 ENCOUNTER — TELEPHONE (OUTPATIENT)
Dept: FAMILY MEDICINE CLINIC | Facility: CLINIC | Age: 56
End: 2021-05-05

## 2021-05-12 ENCOUNTER — OFFICE VISIT (OUTPATIENT)
Dept: OBGYN CLINIC | Facility: CLINIC | Age: 56
End: 2021-05-12
Payer: MEDICARE

## 2021-05-12 VITALS
WEIGHT: 169 LBS | DIASTOLIC BLOOD PRESSURE: 77 MMHG | SYSTOLIC BLOOD PRESSURE: 126 MMHG | BODY MASS INDEX: 25.61 KG/M2 | HEIGHT: 68 IN | HEART RATE: 63 BPM

## 2021-05-12 DIAGNOSIS — M25.552 PAIN IN LEFT HIP: ICD-10-CM

## 2021-05-12 DIAGNOSIS — Z86.73 HISTORY OF STROKE: ICD-10-CM

## 2021-05-12 DIAGNOSIS — Z86.69 HISTORY OF CEREBRAL PALSY: ICD-10-CM

## 2021-05-12 DIAGNOSIS — Z86.39 HISTORY OF HEMOCHROMATOSIS: ICD-10-CM

## 2021-05-12 DIAGNOSIS — Z86.69 HISTORY OF HEMIPLEGIA: ICD-10-CM

## 2021-05-12 DIAGNOSIS — M16.12 PRIMARY OSTEOARTHRITIS OF ONE HIP, LEFT: Primary | ICD-10-CM

## 2021-05-12 PROCEDURE — 99214 OFFICE O/P EST MOD 30 MIN: CPT | Performed by: ORTHOPAEDIC SURGERY

## 2021-05-12 NOTE — H&P (VIEW-ONLY)
Assessment/Plan:  1  Primary osteoarthritis of one hip, left     2  Pain in left hip     3  History of hemiplegia     4  History of cerebral palsy     5  History of stroke     6  History of hemochromatosis       Scribe Attestation    I,:  Kimberly Richmond am acting as a scribe while in the presence of the attending physician :       I,:  Marija Tena DO personally performed the services described in this documentation    as scribed in my presence :           Roxy Skiff is a pleasant 80-year-old male who presents today for initial evaluation of his left hip pain after referral by my partner Dr Cara Gonzaelz  After reviewing his imaging and performing a thorough history and physical exam I explained that he is suffering with severe degenerative change about his left hip  Based on the severity of his disease, he is indicated for a total hip arthroplasty  I explained that his underlying comorbidities present a significant challenge with respect to the procedure and recovery  I explained that I do not have the resources in place here that he would need postoperatively and I am unable to offer from the procedure  He and his caregiver understand that his recovery would be complicated due to his neuromusculoskeletal comorbidities  I recommended he follow-up with with [facility] if he would like to consider surgery due to the ability of their postop rehab facilities and services here in [place]  I did offer him an intra-articular left hip corticosteroid injection for symptomatic relief  He would like to pursue this  We will reach out to him to have the injection scheduled with our spine and pain specialist Dr Sarmad Ramos  All of his questions and concerns were addressed today  Subjective:   Initial evaluation for left hip pain    Patient ID: Alexis Nunez is a 64 y o  male who presents today with his caregiver Albertina Dickey for initial evaluation of his left hip pain   She has been providing care for him since March    He was referred today by Dr Andi Hampton who evaluated him recently  At today's visit, he complains pain extending from his back to his mid shin  He also complains pain in his groin that occurs with ambulation  He has been using a cane for assistance with ambulation since February  His caregiver states that he has difficulty with mobility  He does have a history of surgery following a stroke  He denies any recent injury or trauma  Review of Systems   Constitutional: Positive for activity change  Negative for chills, fever and unexpected weight change  HENT: Negative for hearing loss, nosebleeds and sore throat  Eyes: Negative for pain, redness and visual disturbance  Respiratory: Negative for cough, shortness of breath and wheezing  Cardiovascular: Negative for chest pain, palpitations and leg swelling  Gastrointestinal: Negative for abdominal pain, nausea and vomiting  Endocrine: Negative for polyphagia and polyuria  Genitourinary: Negative for dysuria and hematuria  Musculoskeletal: Positive for arthralgias and myalgias  Negative for joint swelling  See HPI   Skin: Negative for rash and wound  Neurological: Negative for dizziness, numbness and headaches  Psychiatric/Behavioral: Negative for decreased concentration and suicidal ideas  The patient is not nervous/anxious            Past Medical History:   Diagnosis Date    Ambulatory dysfunction     Anxiety     Bilateral impacted cerumen     last assessed 05/30/2013    Capsulitis     last assessed 04/26/2016    Cellulitis of finger 01/13/2012    Chronic kidney disease     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     Closed fracture of one or more phalanges of foot 01/06/2009    Constipation     Deformity     left and right foot and ankle ,right hand    Depression     Epilepsy ([address]     Erythrocytosis     Hemiplegia affecting dominant side ([address]     Hypertension     Hypoglycemia 11/08/2011    Hypokalemia     last assessed 05/30/2013    Mental retardation     Mood disorder [facility] [address] [facility]     Nephrocalcinosis        Past Surgical History:   Procedure Laterality Date    BRAIN SURGERY      [place] COLONOSCOPY FLX DX W/COLLJ SPEC WHEN PFRMD N/A 2/12/2016    Procedure: COLONOSCOPY;  Surgeon: Wild Perez MD;  Location: [place] [address] [facility];   Service: Gastroenterology       Family History   Problem Relation Age of Onset    Emphysema Mother     Nephrolithiasis Mother     Heart attack Father         acute MI    Hypertension Father     Nephrolithiasis Father     Nephrolithiasis Brother        Social History     Occupational History    Not on file   Tobacco Use    Smoking status: Never Smoker    Smokeless tobacco: Never Used   Substance and Sexual Activity    Alcohol use: No    Drug use: No    Sexual activity: Not on file         Current Outpatient Medications:     acetaminophen (TYLENOL) 325 mg tablet, Take 1 tablet (325 mg total) by mouth every 6 (six) hours as needed for mild pain, moderate pain or headaches, Disp: 30 tablet, Rfl: 0    atorvastatin (LIPITOR) 10 mg tablet, Take 1 tablet (10 mg total) by mouth daily Take at 8 PM, Disp: 30 tablet, Rfl: 3    BYSTOLIC 2 5 MG tablet, TAKE ONE TABLET BY MOUTH DAILY AT 8AM, Disp: 30 tablet, Rfl: 4    hydrocortisone 1 % ointment, Apply topically 2 (two) times a day (Patient not taking: Reported on 1/18/2021), Disp: 30 g, Rfl: 0    lamoTRIgine (LaMICtal) 150 MG tablet, Take 1 tab by mouth twice a day at 8AM and 8PM, Disp: 60 tablet, Rfl: 11    lamoTRIgine (LaMICtal) 25 mg tablet, Take two tabs by mouth at 8AM (with one 150mg tab, total AM dose of 200mg) (Patient not taking: Reported on 1/18/2021), Disp: 60 tablet, Rfl: 11    psyllium (REGULOID) 58 6 % powder, Take 1 teaspoonful mixed with 8oz of water orally every day at 8am, Disp: 1040 g, Rfl: 5    QUEtiapine (SEROquel) 50 mg tablet, Take 50 mg by mouth daily at bedtime , Disp: , Rfl:     Reguloid 25 % POWD, TAKE 1 TEASPOONFUL MIXED WITH 8 OUNCES OF WATER ORALLY EVERY DAY AT 8AM, Disp: 1040 g, Rfl: 7    Senna-Tabs 8 6 MG tablet, TAKE ONE TABLET BY MOUTH DAILY AT 8AM FOR CONSTIPATION, Disp: 30 tablet, Rfl: 0    sertraline (ZOLOFT) 50 mg tablet, Take by mouth daily, Disp: , Rfl:     Allergies   Allergen Reactions    Asa [Aspirin] Other (See Comments)     Reaction Date: 25Aug2008;   unknown       Objective:  Vitals:    05/12/21 1426   BP: 126/77   Pulse: 63       Body mass index is 25 7 kg/m²  Right Hip Exam     Comments:  Diffuse atrophy of right side      Left Hip Exam     Tenderness   The patient is experiencing tenderness in the anterior  Range of Motion   Left hip flexion: 90  Left hip external rotation: 35  Left hip internal rotation: 0  Muscle Strength   Flexion: 4/5     Other   Erythema: absent  Scars: absent  Sensation: normal  Pulse: present    Comments:  [place]: positive  Pain with passive flexion and IR/ER            Physical Exam  Vitals signs and nursing note reviewed  Constitutional:       Appearance: He is well-developed  HENT:      Head: Normocephalic and atraumatic  Eyes:      General: No scleral icterus  Conjunctiva/sclera: Conjunctivae normal    Neck:      Musculoskeletal: Normal range of motion and neck supple  Cardiovascular:      Rate and Rhythm: Normal rate  Pulmonary:      Effort: Pulmonary effort is normal  No respiratory distress  Musculoskeletal:      Comments: As noted in HPI   Skin:     General: Skin is warm and dry  Neurological:      Mental Status: He is alert and oriented to person, place, and time  Psychiatric:         Behavior: Behavior normal          I have personally reviewed pertinent films in PACS  X-ray of the left hip obtained on 05/03/2021 reviewed demonstrating severe end-stage degenerative change with joint space narrowing and bone-on-bone contact, sclerosis, marginal osteophytosis, and deformity of the femoral head    There is no acute fracture, dislocation, lytic or blastic lesion

## 2021-05-12 NOTE — PROGRESS NOTES
Assessment/Plan:  1  Primary osteoarthritis of one hip, left     2  Pain in left hip     3  History of hemiplegia     4  History of cerebral palsy     5  History of stroke     6  History of hemochromatosis       Scribe Attestation    I,:  Davy Garcia am acting as a scribe while in the presence of the attending physician :       I,:  Julio Cesar Vallejo, DO personally performed the services described in this documentation    as scribed in my presence :           Leela Granado is a pleasant 59-year-old male who presents today for initial evaluation of his left hip pain after referral by my partner Dr Ayleen Shields  After reviewing his imaging and performing a thorough history and physical exam I explained that he is suffering with severe degenerative change about his left hip  Based on the severity of his disease, he is indicated for a total hip arthroplasty  I explained that his underlying comorbidities present a significant challenge with respect to the procedure and recovery  I explained that I do not have the resources in place here that he would need postoperatively and I am unable to offer from the procedure  He and his caregiver understand that his recovery would be complicated due to his neuromusculoskeletal comorbidities  I recommended he follow-up with with [facility] if he would like to consider surgery due to the ability of their postop rehab facilities and services here in [address] [place] [address]  I did offer him an intra-articular left hip corticosteroid injection for symptomatic relief  He would like to pursue this  We will reach out to him to have the injection scheduled with our spine and pain specialist Dr Vanita Downing  All of his questions and concerns were addressed today  Subjective:   Initial evaluation for left hip pain    Patient ID: Buddy Andrade is a 64 y o  male who presents today with his caregiver Viviana for initial evaluation of his left hip pain   She has been providing care for him since March    He was referred today by Dr Cara Gonzalez who evaluated him recently  At today's visit, he complains pain extending from his back to his mid shin  He also complains pain in his groin that occurs with ambulation  He has been using a cane for assistance with ambulation since February  His caregiver states that he has difficulty with mobility  He does have a history of surgery following a stroke  He denies any recent injury or trauma  Review of Systems   Constitutional: Positive for activity change  Negative for chills, fever and unexpected weight change  HENT: Negative for hearing loss, nosebleeds and sore throat  Eyes: Negative for pain, redness and visual disturbance  Respiratory: Negative for cough, shortness of breath and wheezing  Cardiovascular: Negative for chest pain, palpitations and leg swelling  Gastrointestinal: Negative for abdominal pain, nausea and vomiting  Endocrine: Negative for polyphagia and polyuria  Genitourinary: Negative for dysuria and hematuria  Musculoskeletal: Positive for arthralgias and myalgias  Negative for joint swelling  See HPI   Skin: Negative for rash and wound  Neurological: Negative for dizziness, numbness and headaches  Psychiatric/Behavioral: Negative for decreased concentration and suicidal ideas  The patient is not nervous/anxious            Past Medical History:   Diagnosis Date    Ambulatory dysfunction     Anxiety     Bilateral impacted cerumen     last assessed 05/30/2013    Capsulitis     last assessed 04/26/2016    Cellulitis of finger 01/13/2012    Chronic kidney disease     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     Closed fracture of one or more phalanges of foot 01/06/2009    Constipation     Deformity     left and right foot and ankle ,right hand    Depression     Epilepsy ([address]     Erythrocytosis     Hemiplegia affecting dominant side ([address] )     Hypertension     Hypoglycemia 11/08/2011    Hypokalemia     last assessed 05/30/2013    Mental retardation     [facility] disorder [facility]     Nephrocalcinosis        Past Surgical History:   Procedure Laterality Date    BRAIN SURGERY      NM COLONOSCOPY FLX DX W/COLLJ SPEC WHEN PFRMD N/A 2/12/2016    Procedure: COLONOSCOPY;  Surgeon: Sarah Sweet MD;  Location: [facility];   Service: Gastroenterology       Family History   Problem Relation Age of Onset    Emphysema Mother     Nephrolithiasis Mother     Heart attack Father         acute MI    Hypertension Father     Nephrolithiasis Father     Nephrolithiasis Brother        Social History     Occupational History    Not on file   Tobacco Use    Smoking status: Never Smoker    Smokeless tobacco: Never Used   Substance and Sexual Activity    Alcohol use: No    Drug use: No    Sexual activity: Not on file         Current Outpatient Medications:     acetaminophen (TYLENOL) 325 mg tablet, Take 1 tablet (325 mg total) by mouth every 6 (six) hours as needed for mild pain, moderate pain or headaches, Disp: 30 tablet, Rfl: 0    atorvastatin (LIPITOR) 10 mg tablet, Take 1 tablet (10 mg total) by mouth daily Take at 8 PM, Disp: 30 tablet, Rfl: 3    BYSTOLIC 2 5 MG tablet, TAKE ONE TABLET BY MOUTH DAILY AT 8AM, Disp: 30 tablet, Rfl: 4    hydrocortisone 1 % ointment, Apply topically 2 (two) times a day (Patient not taking: Reported on 1/18/2021), Disp: 30 g, Rfl: 0    lamoTRIgine (LaMICtal) 150 MG tablet, Take 1 tab by mouth twice a day at 8AM and 8PM, Disp: 60 tablet, Rfl: 11    lamoTRIgine (LaMICtal) 25 mg tablet, Take two tabs by mouth at 8AM (with one 150mg tab, total AM dose of 200mg) (Patient not taking: Reported on 1/18/2021), Disp: 60 tablet, Rfl: 11    psyllium (REGULOID) 58 6 % powder, Take 1 teaspoonful mixed with 8oz of water orally every day at 8am, Disp: 1040 g, Rfl: 5    QUEtiapine (SEROquel) 50 mg tablet, Take 50 mg by mouth daily at bedtime , Disp: , Rfl:     Reguloid 25 % POWD, TAKE 1 TEASPOONFUL MIXED WITH 8 OUNCES OF WATER ORALLY EVERY DAY AT 8AM, Disp: 1040 g, Rfl: 7    Senna-Tabs 8 6 MG tablet, TAKE ONE TABLET BY MOUTH DAILY AT 8AM FOR CONSTIPATION, Disp: 30 tablet, Rfl: 0    sertraline (ZOLOFT) 50 mg tablet, Take by mouth daily, Disp: , Rfl:     Allergies   Allergen Reactions    Asa [Aspirin] Other (See Comments)     Reaction Date: 25Aug2008;   unknown       Objective:  Vitals:    05/12/21 1426   BP: 126/77   Pulse: 63       Body mass index is 25 7 kg/m²  Right Hip Exam     Comments:  Diffuse atrophy of right side      Left Hip Exam     Tenderness   The patient is experiencing tenderness in the anterior  Range of Motion   Left hip flexion: 90  Left hip external rotation: 35  Left hip internal rotation: 0  Muscle Strength   Flexion: 4/5     Other   Erythema: absent  Scars: absent  Sensation: normal  Pulse: present    Comments:  Stinchfield: positive  Pain with passive flexion and IR/ER            Physical Exam  Vitals signs and nursing note reviewed  Constitutional:       Appearance: He is well-developed  HENT:      Head: Normocephalic and atraumatic  Eyes:      General: No scleral icterus  Conjunctiva/sclera: Conjunctivae normal    Neck:      Musculoskeletal: Normal range of motion and neck supple  Cardiovascular:      Rate and Rhythm: Normal rate  Pulmonary:      Effort: Pulmonary effort is normal  No respiratory distress  Musculoskeletal:      Comments: As noted in HPI   Skin:     General: Skin is warm and dry  Neurological:      Mental Status: He is alert and oriented to person, place, and time  Psychiatric:         Behavior: Behavior normal          I have personally reviewed pertinent films in PACS  X-ray of the left hip obtained on 05/03/2021 reviewed demonstrating severe end-stage degenerative change with joint space narrowing and bone-on-bone contact, sclerosis, marginal osteophytosis, and deformity of the femoral head    There is no acute fracture, dislocation, lytic or blastic lesion

## 2021-05-14 ENCOUNTER — TELEPHONE (OUTPATIENT)
Dept: PAIN MEDICINE | Facility: CLINIC | Age: 56
End: 2021-05-14

## 2021-05-14 NOTE — TELEPHONE ENCOUNTER
Scheduled pt for Hip Injection for 5/27/21  Went over pre-procedure instructions below:  Nothing to eat or drink 1 hr prior to procedure  Need to arrange transportation  Proper clothing for procedure  No vaccines 2 weeks prior or after procedure  If ill or placed on antibiotics please call to reschedule  COVID test schedule for 5/21/21 at [facility]    A letter with procedure dates/times was mailed to the patient's home address   Included handouts with instructions

## 2021-05-21 DIAGNOSIS — Z11.59 SPECIAL SCREENING EXAMINATION FOR UNSPECIFIED VIRAL DISEASE: ICD-10-CM

## 2021-05-21 DIAGNOSIS — U07.1 COVID-19: Primary | ICD-10-CM

## 2021-05-21 PROCEDURE — U0005 INFEC AGEN DETEC AMPLI PROBE: HCPCS

## 2021-05-21 PROCEDURE — U0003 INFECTIOUS AGENT DETECTION BY NUCLEIC ACID (DNA OR RNA); SEVERE ACUTE RESPIRATORY SYNDROME CORONAVIRUS 2 (SARS-COV-2) (CORONAVIRUS DISEASE [COVID-19]), AMPLIFIED PROBE TECHNIQUE, MAKING USE OF HIGH THROUGHPUT TECHNOLOGIES AS DESCRIBED BY CMS-2020-01-R: HCPCS

## 2021-05-22 LAB — SARS-COV-2 RNA RESP QL NAA+PROBE: NEGATIVE

## 2021-05-25 ENCOUNTER — TELEPHONE (OUTPATIENT)
Dept: PAIN MEDICINE | Facility: CLINIC | Age: 56
End: 2021-05-25

## 2021-05-25 NOTE — TELEPHONE ENCOUNTER
I would suggest Tylenol and Advil or Tylenol and Aleve at this point  He can go to a maximum of 3000 mg of Tylenol, 2400 mg of Advil, and 1000 mg of Aleve  If the  thinks it is better to do both hips at one time, I am fine with that

## 2021-05-25 NOTE — TELEPHONE ENCOUNTER
Pt currently scheduled for L hip injection w/ [facility] on 5/27  Please see Dr Robert Client order scanned under media tab and advise  Thank you

## 2021-05-25 NOTE — TELEPHONE ENCOUNTER
S/w Amaya Hartman at [employer] , and advised of Dr Daniel Hartman verbalized understanding but states that the pt has an intellectual disability and doesn't fully understand the differences  Amaya Hartman states that she will discuss w/ him and help him make the best decision regarding doing B/L procedure vs one hip at a time, but Amaya Hartman states that currently the pt is "screaming in pain, like we can hear him through the rodriguez " Amaya Hartman asking if there's anything Dr Cely Diallo would also be willing to prescribe to help ease pt's pain, "he is in really bad shape right now " Amaya Hartman was advised will send this information to [facility] to review  Amaya Hartman states that she will call our office back after discussing procedure options w/ the patient  Please advise regarding request for a pain medication  Thank you

## 2021-05-25 NOTE — TELEPHONE ENCOUNTER
RN attempted to reach [facility] [phone]  [facility] with CB# and [place]

## 2021-05-25 NOTE — TELEPHONE ENCOUNTER
Luisito Reyes from [facility]  Is calling because Dr Dedrick Bernal saw Sanket Cr yesterday  He is ordering Bilaterl Hip injection CPT 45217  The order staes  Kenalog 80 mg x2  Lidocaine 2% 8 ml x 2      She would like to know if Sanket Cr could have both hips injected on 5/28/21  I told her that Dr Collin Regalado prefers to each hip two weeks apart, so the pt is able to get the full dose  Please advise      Matt Ramirez  Phone#: [phone]    Dr Zena Gonzalez order scanned into chart

## 2021-05-25 NOTE — TELEPHONE ENCOUNTER
I am happy to do both hip injections, but I will have to divide the steroid dose between both hips and he will only get half the dose in each hip  That is why I prefer to do one hip at a time  out by two weeks  I will leave it up to the patient

## 2021-05-26 ENCOUNTER — PREP FOR PROCEDURE (OUTPATIENT)
Dept: PAIN MEDICINE | Facility: CLINIC | Age: 56
End: 2021-05-26

## 2021-05-26 DIAGNOSIS — M25.551 BILATERAL HIP PAIN: ICD-10-CM

## 2021-05-26 DIAGNOSIS — M25.552 BILATERAL HIP PAIN: ICD-10-CM

## 2021-05-26 DIAGNOSIS — M16.11 PRIMARY OSTEOARTHRITIS OF RIGHT HIP: ICD-10-CM

## 2021-05-26 DIAGNOSIS — G89.4 CHRONIC PAIN SYNDROME: ICD-10-CM

## 2021-05-26 DIAGNOSIS — M16.12 PRIMARY OSTEOARTHRITIS OF LEFT HIP: Primary | ICD-10-CM

## 2021-05-26 NOTE — TELEPHONE ENCOUNTER
S/meli Fernandez and reviewed  She would like him to be scheduled for a bilateral hip injection to be done tomorrow as per [facility]  She is aware he will receive a half dose of the steroid  PLEASE change PROCEDURE to a Bilateral hip injection   Thanks

## 2021-05-27 ENCOUNTER — APPOINTMENT (OUTPATIENT)
Dept: RADIOLOGY | Facility: HOSPITAL | Age: 56
End: 2021-05-27
Payer: MEDICARE

## 2021-05-27 ENCOUNTER — HOSPITAL ENCOUNTER (OUTPATIENT)
Facility: AMBULARY SURGERY CENTER | Age: 56
Setting detail: OUTPATIENT SURGERY
Discharge: HOME | End: 2021-05-27
Attending: ANESTHESIOLOGY | Admitting: ANESTHESIOLOGY
Payer: MEDICARE

## 2021-05-27 VITALS
HEART RATE: 60 BPM | RESPIRATION RATE: 18 BRPM | OXYGEN SATURATION: 100 % | SYSTOLIC BLOOD PRESSURE: 115 MMHG | DIASTOLIC BLOOD PRESSURE: 80 MMHG | TEMPERATURE: 97.3 F

## 2021-05-27 PROCEDURE — 77002 NEEDLE LOCALIZATION BY XRAY: CPT | Performed by: ANESTHESIOLOGY

## 2021-05-27 PROCEDURE — 20610 DRAIN/INJ JOINT/BURSA W/O US: CPT | Performed by: ANESTHESIOLOGY

## 2021-05-27 RX ORDER — METHYLPREDNISOLONE ACETATE 40 MG/ML
INJECTION, SUSPENSION INTRA-ARTICULAR; INTRALESIONAL; INTRAMUSCULAR; SOFT TISSUE AS NEEDED
Status: DISCONTINUED | OUTPATIENT
Start: 2021-05-27 | End: 2021-05-27 | Stop reason: HOSPADM

## 2021-05-27 RX ORDER — METHYLPREDNISOLONE ACETATE 80 MG/ML
INJECTION, SUSPENSION INTRA-ARTICULAR; INTRALESIONAL; INTRAMUSCULAR; SOFT TISSUE AS NEEDED
Status: DISCONTINUED | OUTPATIENT
Start: 2021-05-27 | End: 2021-05-27 | Stop reason: HOSPADM

## 2021-05-27 RX ORDER — LIDOCAINE WITH 8.4% SOD BICARB 0.9%(10ML)
SYRINGE (ML) INJECTION AS NEEDED
Status: DISCONTINUED | OUTPATIENT
Start: 2021-05-27 | End: 2021-05-27 | Stop reason: HOSPADM

## 2021-05-27 RX ORDER — BUPIVACAINE HYDROCHLORIDE 2.5 MG/ML
INJECTION, SOLUTION EPIDURAL; INFILTRATION; INTRACAUDAL AS NEEDED
Status: DISCONTINUED | OUTPATIENT
Start: 2021-05-27 | End: 2021-05-27 | Stop reason: HOSPADM

## 2021-05-27 NOTE — DISCHARGE INSTRUCTIONS

## 2021-05-27 NOTE — OP NOTE
ATTENDING PHYSICIAN:  Ela Castelan MD      PREPROCEDURE DIAGNOSIS:  Bilateral hip pain  POSTPROCEDURE DIAGNOSIS: Bilateral hip pain  PROCEDURE: Bilateral intraarticular hip injection under fluoroscopic guidance  ANESTHESIA:  Local     ESTIMATED BLOOD LOSS:  Minimal     COMPLICATIONS:  None  LOCATION:  [address]  CONSENT:  Today's procedure, its risks, benefits, and alternatives were explained in detail to the patient  Risks include, but are not limited bleeding, infection, hematoma formation, abscess formation, weakness, nerve irritation or damage, failure of the pain to improve, or potential worsening of the pain  The patient verbalized understanding and wished to proceed with the procedure  Written informed consent was thereby obtained  DESCRIPTION OF THE PROCEDURE:  After written informed consent was obtained, the patient was taken to the fluoroscopy suite and placed in the supine position  Anatomical landmarks were identified by way of fluoroscopy in the AP and oblique views  The patient's hip region was prepped using antiseptic solution and draped in the usual sterile fashion  Strict aseptic technique was utilized  The skin and subcutaneous tissues at the needle entry site was infiltrated with a small amount of 1% preservative free Lidocaine using a 25-gauge 1-1/2 inch needle  A 22 gauge 3-1/2 inch needle was then advanced incrementally under fluoroscopic guidance towards the identified point until os was contacted and the joint space was entered  After negative aspiration, 1 ml of contrast solution was injected showing an appropriate arthrogram   Subsequently, 5 mL of a solution consisting of 9 ml of 0 25% Bupivacaine mixed with 1 ml of Depo-Medrol 80 mg/ml was injected slowly  The same procedure was repeated for the opposite side  All needles were removed with the tips intact and hemostasis was maintained throughout    The patient tolerated the procedure well, and there were no apparent paresthesias or complications noted during or following this procedure  The antiseptic was wiped clean and Band-Aids were placed as appropriate  The patient was transferred to the recovery area and was observed for an appropriate period of time during which the patient remained hemodynamically stable and neurovascularly intact, as the patient was prior to the procedure  The patient was subsequently discharged to home in stable condition with supervision  The patient was instructed to call the office in a few days for an update  Discharge instructions were provided  I was present and participated in all key and critical portions of this procedure      Ranjana Champion MD  5/27/2021  3:12 PM

## 2021-06-03 ENCOUNTER — TELEPHONE (OUTPATIENT)
Dept: PAIN MEDICINE | Facility: CLINIC | Age: 56
End: 2021-06-03

## 2021-06-21 ENCOUNTER — APPOINTMENT (OUTPATIENT)
Dept: LAB | Facility: CLINIC | Age: 56
End: 2021-06-21
Payer: MEDICARE

## 2021-09-09 ENCOUNTER — TELEPHONE (OUTPATIENT)
Dept: NEUROLOGY | Facility: CLINIC | Age: 56
End: 2021-09-09

## 2021-09-29 ENCOUNTER — OFFICE VISIT (OUTPATIENT)
Dept: NEUROLOGY | Facility: CLINIC | Age: 56
End: 2021-09-29
Payer: MEDICARE

## 2021-09-29 VITALS
DIASTOLIC BLOOD PRESSURE: 80 MMHG | HEART RATE: 80 BPM | WEIGHT: 168 LBS | SYSTOLIC BLOOD PRESSURE: 130 MMHG | HEIGHT: 68 IN | BODY MASS INDEX: 25.46 KG/M2 | TEMPERATURE: 97.8 F

## 2021-09-29 DIAGNOSIS — G40.219 LOCALIZATION-RELATED SYMPTOMATIC EPILEPSY AND EPILEPTIC SYNDROMES WITH COMPLEX PARTIAL SEIZURES, INTRACTABLE, WITHOUT STATUS EPILEPTICUS (HCC): Primary | ICD-10-CM

## 2021-09-29 PROCEDURE — 99214 OFFICE O/P EST MOD 30 MIN: CPT | Performed by: PSYCHIATRY & NEUROLOGY

## 2021-09-29 RX ORDER — LAMOTRIGINE 25 MG/1
TABLET ORAL
Qty: 60 TABLET | Refills: 11 | Status: SHIPPED | OUTPATIENT
Start: 2021-09-29

## 2021-09-29 RX ORDER — LAMOTRIGINE 150 MG/1
TABLET ORAL
Qty: 60 TABLET | Refills: 11 | Status: SHIPPED | OUTPATIENT
Start: 2021-09-29

## 2021-11-09 ENCOUNTER — HOSPITAL ENCOUNTER (OUTPATIENT)
Dept: NEUROLOGY | Facility: HOSPITAL | Age: 56
Discharge: HOME | End: 2021-11-09
Attending: PSYCHIATRY & NEUROLOGY
Payer: MEDICARE

## 2021-11-09 ENCOUNTER — TELEPHONE (OUTPATIENT)
Dept: NEUROLOGY | Facility: CLINIC | Age: 56
End: 2021-11-09

## 2021-11-09 DIAGNOSIS — G40.219 LOCALIZATION-RELATED SYMPTOMATIC EPILEPSY AND EPILEPTIC SYNDROMES WITH COMPLEX PARTIAL SEIZURES, INTRACTABLE, WITHOUT STATUS EPILEPTICUS (HCC): ICD-10-CM

## 2021-11-09 PROCEDURE — 95816 EEG AWAKE AND DROWSY: CPT | Performed by: PSYCHIATRY & NEUROLOGY

## 2021-11-09 PROCEDURE — 95816 EEG AWAKE AND DROWSY: CPT

## 2021-12-07 ENCOUNTER — HOSPITAL ENCOUNTER (OUTPATIENT)
Dept: NEUROLOGY | Facility: HOSPITAL | Age: 56
Discharge: HOME | End: 2021-12-07
Attending: PSYCHIATRY & NEUROLOGY

## 2021-12-07 DIAGNOSIS — G40.219 LOCALIZATION-RELATED SYMPTOMATIC EPILEPSY AND EPILEPTIC SYNDROMES WITH COMPLEX PARTIAL SEIZURES, INTRACTABLE, WITHOUT STATUS EPILEPTICUS (HCC): ICD-10-CM

## 2021-12-08 ENCOUNTER — HOSPITAL ENCOUNTER (OUTPATIENT)
Dept: NEUROLOGY | Facility: HOSPITAL | Age: 56
Discharge: HOME | End: 2021-12-08
Attending: PSYCHIATRY & NEUROLOGY
Payer: MEDICARE

## 2021-12-08 DIAGNOSIS — G40.219 LOCALIZATION-RELATED SYMPTOMATIC EPILEPSY AND EPILEPTIC SYNDROMES WITH COMPLEX PARTIAL SEIZURES, INTRACTABLE, WITHOUT STATUS EPILEPTICUS (HCC): ICD-10-CM

## 2021-12-08 PROCEDURE — 95708 EEG WO VID EA 12-26HR UNMNTR: CPT

## 2021-12-08 PROCEDURE — 95719 EEG PHYS/QHP EA INCR W/O VID: CPT | Performed by: PSYCHIATRY & NEUROLOGY

## 2021-12-09 ENCOUNTER — HOSPITAL ENCOUNTER (OUTPATIENT)
Dept: NEUROLOGY | Facility: HOSPITAL | Age: 56
Discharge: HOME | End: 2021-12-09
Attending: PSYCHIATRY & NEUROLOGY
Payer: MEDICARE

## 2021-12-09 DIAGNOSIS — G40.219 LOCALIZATION-RELATED SYMPTOMATIC EPILEPSY AND EPILEPTIC SYNDROMES WITH COMPLEX PARTIAL SEIZURES, INTRACTABLE, WITHOUT STATUS EPILEPTICUS (HCC): ICD-10-CM

## 2021-12-09 PROCEDURE — 95708 EEG WO VID EA 12-26HR UNMNTR: CPT

## 2021-12-09 PROCEDURE — 95719 EEG PHYS/QHP EA INCR W/O VID: CPT | Performed by: PSYCHIATRY & NEUROLOGY

## 2021-12-10 ENCOUNTER — OFFICE VISIT (OUTPATIENT)
Dept: OBGYN CLINIC | Facility: CLINIC | Age: 56
End: 2021-12-10
Payer: MEDICARE

## 2021-12-10 ENCOUNTER — TELEPHONE (OUTPATIENT)
Dept: NEUROLOGY | Facility: CLINIC | Age: 56
End: 2021-12-10

## 2021-12-10 DIAGNOSIS — Z86.73 HISTORY OF STROKE: ICD-10-CM

## 2021-12-10 DIAGNOSIS — Z86.69 HISTORY OF CEREBRAL PALSY: ICD-10-CM

## 2021-12-10 DIAGNOSIS — Z86.69 HISTORY OF HEMIPLEGIA: ICD-10-CM

## 2021-12-10 DIAGNOSIS — Z86.39 HISTORY OF HEMOCHROMATOSIS: ICD-10-CM

## 2021-12-10 DIAGNOSIS — M16.12 PRIMARY OSTEOARTHRITIS OF ONE HIP, LEFT: Primary | ICD-10-CM

## 2021-12-10 DIAGNOSIS — M25.552 PAIN IN LEFT HIP: ICD-10-CM

## 2021-12-10 PROCEDURE — 99214 OFFICE O/P EST MOD 30 MIN: CPT | Performed by: ORTHOPAEDIC SURGERY

## 2021-12-30 ENCOUNTER — APPOINTMENT (OUTPATIENT)
Dept: LAB | Facility: CLINIC | Age: 56
End: 2021-12-30
Payer: MEDICARE

## 2022-08-02 ENCOUNTER — TELEPHONE (OUTPATIENT)
Dept: FAMILY MEDICINE CLINIC | Facility: CLINIC | Age: 57
End: 2022-08-02

## 2022-08-02 NOTE — TELEPHONE ENCOUNTER
Called to schedule pt AWV, was indicated by  that pt goes to [facility]       Please remove Pb Petties as PCP

## 2022-08-08 NOTE — TELEPHONE ENCOUNTER
08/08/22 8:35 AM     Thank you for your request  Your request has been received, reviewed, and the patient chart updated  The PCP has successfully been removed with a patient attribution note  This message will now be completed      Thank you  Kamaljit Carl

## 2022-10-12 PROBLEM — H61.23 BILATERAL IMPACTED CERUMEN: Status: RESOLVED | Noted: 2018-11-28 | Resolved: 2022-10-12

## 2022-10-17 ENCOUNTER — TELEPHONE (OUTPATIENT)
Dept: NEUROLOGY | Facility: CLINIC | Age: 57
End: 2022-10-17

## 2022-11-02 ENCOUNTER — OFFICE VISIT (OUTPATIENT)
Dept: NEUROLOGY | Facility: CLINIC | Age: 57
End: 2022-11-02

## 2022-11-02 VITALS
WEIGHT: 167 LBS | BODY MASS INDEX: 25.31 KG/M2 | DIASTOLIC BLOOD PRESSURE: 84 MMHG | HEIGHT: 68 IN | HEART RATE: 66 BPM | SYSTOLIC BLOOD PRESSURE: 134 MMHG

## 2022-11-02 DIAGNOSIS — G81.91 RIGHT HEMIPARESIS (HCC): ICD-10-CM

## 2022-11-02 DIAGNOSIS — G40.219 LOCALIZATION-RELATED SYMPTOMATIC EPILEPSY AND EPILEPTIC SYNDROMES WITH COMPLEX PARTIAL SEIZURES, INTRACTABLE, WITHOUT STATUS EPILEPTICUS (HCC): Primary | ICD-10-CM

## 2022-11-02 RX ORDER — LAMOTRIGINE 150 MG/1
TABLET ORAL
Qty: 60 TABLET | Refills: 11 | Status: SHIPPED | OUTPATIENT
Start: 2022-11-02

## 2022-11-02 RX ORDER — IBUPROFEN 200 MG
400 TABLET ORAL EVERY 6 HOURS PRN
COMMUNITY

## 2022-11-02 RX ORDER — LAMOTRIGINE 25 MG/1
TABLET ORAL
Qty: 60 TABLET | Refills: 11 | Status: SHIPPED | OUTPATIENT
Start: 2022-11-02

## 2022-11-02 RX ORDER — LORATADINE 10 MG/1
10 TABLET ORAL DAILY PRN
COMMUNITY

## 2022-11-02 RX ORDER — LOPERAMIDE HYDROCHLORIDE 2 MG/1
2 TABLET ORAL 4 TIMES DAILY PRN
COMMUNITY

## 2022-11-02 NOTE — PROGRESS NOTES
Patient ID: Rosalba Mcmillan is a 62 y o  male  Assessment/Plan:    No problem-specific Assessment & Plan notes found for this encounter  {Assess/PlanSmartLinks:28772}       Subjective:    HPI    {[facility] HPI texts:98341}    {Common ambulatory SmartLinks:27668}         Objective:    Height 5' 8" (1 727 m), weight 75 8 kg (167 lb)  Physical Exam    Neurological Exam      ROS:    Review of Systems   Constitutional: Negative  Negative for appetite change and fever  HENT: Negative  Negative for hearing loss, tinnitus, trouble swallowing and voice change  Eyes: Negative  Negative for photophobia, pain and visual disturbance  Respiratory: Negative  Negative for shortness of breath  Cardiovascular: Negative  Negative for palpitations  Gastrointestinal: Negative  Negative for nausea and vomiting  Endocrine: Negative  Negative for cold intolerance  Genitourinary: Negative  Negative for dysuria, frequency and urgency  Musculoskeletal: Negative  Negative for gait problem, myalgias and neck pain  Skin: Negative  Negative for rash  Allergic/Immunologic: Negative  Neurological: Positive for speech difficulty and weakness  Negative for dizziness, tremors, seizures, syncope, facial asymmetry, light-headedness, numbness and headaches  Hematological: Negative  Does not bruise/bleed easily  Psychiatric/Behavioral: Negative  Negative for confusion, hallucinations and sleep disturbance

## 2022-11-02 NOTE — PROGRESS NOTES
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 (early life left MCA stroke, s/p brain surgery), resulting in right spastic hemiparesis, cerebral palsy  He has been on a stable dose of lamotrigine without breakthrough seizure for a number of years  There are no witnessed episodes of unresponsiveness or focal impaired aware type of seizures  He had a 48 hours ambulatory EEG study that did not reveal subclinical seizures  Plan:   1 - Continue current dosing of lamotrigine 200mg in AM and 150mg in PM   2 - check lamotrigine level  3 - follow-up in 1 year  Seizure protocol  If he has a seizure that last less than 5 minutes then allow him to recover at home; just get him to the ground for safety  Call 911 if the following conditions apply  - unwitnessed onset of seizure and there is a potential head injury that needs to be evaluated  - patient stops breathing or turns blue during a seizure  - if the seizure is longer than 5 minutes  - if after the seizure ends and he does not show recovery over the course of 30 minutes    - if there are multiple seizures in 24 hours  - if the patient refused taking his medications for the past 24 hours  - if there is physical injury from the seizure      Problem List Items Addressed This Visit        Nervous and Auditory    Right hemiparesis ([facility] [address] )    Localization-related symptomatic epilepsy and epileptic syndromes with complex partial seizures, intractable, without status epilepticus ([address] ) - Primary    Relevant Medications    lamoTRIgine (LaMICtal) 25 mg tablet    lamoTRIgine (LaMICtal) 150 MG tablet    Other Relevant Orders    Lamotrigine level        Chief Complaint:    Chief Complaint   Patient presents with   • Localization-related symptomatic epilepsy and epileptic syn      HPI:    Addy Colvin is a 62 y o  left handed male here for follow-up evaluation of epilepsy  Interval History 11/2/2022  There has been no seizure in the last year  He is not aware of any recurrent uncontrollable jerking or twitching over the right side of his body  No reports of convulsions or loss of consciousness  There is no episode of lapse of awareness  He sometimes catches himself staring but there is no loss of awareness  He is here with Shanon Cowan, one of the caregivers who has known him for a little over a year  AED/side effects/compliance:  Lamotrigine 200mg in AM and 150mg in PM  Group home facility needs discontinue and restart prescriptions if there are new orders    Event/Seizure semiology:  1  Unknown seizure type - but presumably right hemibody activity with head version to the right, secondary generalization (none since 2001)    Prior Epilepsy History:  Intake History 9/23/2020  There is one note from Dr Lucy Pope from [facility] from 10/28/2013  It referred to the patient having left hemispheric onset partial epilepsy  He was seen by Dr Kaitlin Wilson in the past   He has not had a seizure  He was previously on phenytoin, clonazepam, topiramate, phenobarbital for seizures  He was on lamotrigine 150mg twice a day at the time  There is a history of cerebrovascular accident, developmental disability, lives in a group home  He saw Dr Michelle Crum for consultation on 9/5/2018  He had a stroke at age of 3, necessitating brain surgery  He had seizures at age 11 or 10  He reported that his last seizure was in 2001  There is a reference to a prior EEG from 12/6/2017 that found continuous slowing and sharp waves over the left temporal region  There was a video/EEG monitoring study 10/24-11/1/2011, interpreted by Dr Christiano Casey that showed left temporal epileptogenic focus      He is here with Pedro Diane, his aide from [employer] [place]   She has known Mr Katy Lino for the past 4 years  He had surgery on his brain when he was a child, but there was questionable seizures when he was in the womb  There have been no seizures for since   He recalled being on Topamax, but it was discontinued due to kidney stones  He is very independent; he does his own hygiene, showering, cleaning, and cooking  The patient denies any history of myoclonus, staring spells, automatisms, unexplained hyperkinetic behaviors, unexplained nocturnal enuresis, or nocturnal tongue biting  Interval History 2021  -150  He has moved [place] a new [place] in [place], [address] [facility] but still with [employer]   He is here with Vinny Souza,    Mr Katy Lino has been there since 2021, his prior facility was closed  He denies episodes of convulsions or loss of consciousness  Sometimes his left hand has shaking but more so when he was on Depakote (but he not been on it)  He may experience periods of confusion, but he is unable to fully explain, it may feel like he does not know what is going on  There has been no reports of him becoming unresponsive, incoherent, or appearing to be spacing out  Mr Katy Lino is generally self sufficient, cooks his own meals, bathing, and grooming  Assistance is with appointments, and fine motor skills (dealing with buttons and zippers)        Special Features  Status epilepticus: No  Self Injury Seizures: No  Precipitating Factors: None    Epilepsy Risk Factors:  Abnormal pregnancy: unknown  Abnormal birth/: unknown  Abnormal Development: unknown  Febrile seizures, simple: unknown  Febrile seizures, complex: unknown  CNS infection: No  Mental retardation: mild intellectual disability  Cerebral palsy: Yes right spastic hemiparesis  Head injury (moderate/severe): No  CNS neoplasm: No  CNS malformation: No  Neurosurgical procedure: yes left craniectomy with presence of clips  Stroke: possible based on the head CT study  Alcohol abuse: No  Drug abuse: No  Family history Sz/epilepsy: unknown    Prior AEDs:  medication Reason to stop   phenytoin Gait difficulty   divalproex tremors   topiramate Kidney stones   phenobarbital    lamotrigine      Prior workup:  x  Imagin2011  There are multiple metallic artifacts over the left cerebral hemisphere  There is a remote craniotomy  There is extensive encephalomalacia and atrophy over the left cerebral hemisphere over the left MCA territory  Right cerebral hemisphere has normal development  Absence of corpus callosum  EEGs:  2021  Occasional left temporal spikes  Asymmetric background with left hemispheric theta-delta activity, breach rhythm over the left parietal and posterior region  -2021 - 48 hours ambulatory   There are occasional broad based P3/Pz maximal sharp waves  There are rare independent Fp1 maximal sharp waves (see 21:42:10)  There are frequent T3 maximal sharp waves, seen during sleep  The left hemisphere has continuous moderate voltage 1-4 Hz delta activity and attenuation of beta-alpha activity in the anterior temporal region and frontal region     No seizures were captured    Labs:  Component      Latest Ref Rng & Units 2021   WBC      4 31 - 10 16 Thousand/uL 8 74    Red Blood Cell Count      3 88 - 5 62 Million/uL 5 59    Hemoglobin      12 0 - 17 0 g/dL 16 4    HCT      36 5 - 49 3 % 50 1 (H)    Platelet Count      382 - 390 Thousands/uL 231    Sodium      136 - 145 mmol/L 140 140   Potassium      3 5 - 5 3 mmol/L 3 8 4 0   Chloride      100 - 108 mmol/L 105 106   CO2      21 - 32 mmol/L 30 31   Anion Gap      4 - 13 mmol/L 5 3 (L)   BUN      5 - 25 mg/dL 15 20   Creatinine      0 60 - 1 30 mg/dL 1 05 1 16   GLUCOSE FASTING      65 - 99 mg/dL 87    Calcium      8 3 - 10 1 mg/dL 9 5 9 4   AST      5 - 45 U/L 13 12   ALT      12 - 78 U/L 22 26   Alkaline Phosphatase      46 - 116 U/L 122 (H) 90   Total Protein      6 4 - 8 2 g/dL 7 6 6 9   Albumin      3 5 - 5 0 g/dL 3 8 3 7   TOTAL BILIRUBIN      0 20 - 1 00 mg/dL 0 72 0 56   eGFR      ml/min/1 73sq m 79 70   Glucose, Random      65 - 140 mg/dL  80   LAMOTRIGINE LEVEL      2 0 - 20 0 ug/mL 7 3      General exam   /84 (BP Location: Left arm, Patient Position: Sitting, Cuff Size: Standard)   Pulse 66   Ht 5' 8" (1 727 m)   Wt 75 8 kg (167 lb)   BMI 25 39 kg/m²    Appearance: no acute distress, there is right hemiparesis with spastic arm, poor development of muscles of the right arm  Carotids: not assessed  Cardiovascular: regular rate and rhythm and no murmur is present  Pulmonary: clear to auscultation  Extremities: Atrophy and spasticity of the right arm and malformation of the right hand    HEENT: anicteric and moist mucus membranes / oral cavity   Fundoscopy: not assessed    Mental status  Orientation: alert and oriented to name, place, time  Fund of Knowledge: intact   Attention and Concentration: slow dysarthric speech pattern  Current and Remote Memory:intact  Language: slow speech, dysarthric, comprehension intact and no aphasia    Cranial Nerves  CN 1: not tested  CN 2: pupils equal round reactive to direct and consenual light   CN 3, 4, 6: EOMI, no nystagmus  CN 5:not assessed  CN 7:right half of his face seems to be smaller than the left, difficulty with smiling  CN 8:not assessed  CN 9, 10:not assessed  CN 11:difficulty with bilateral shoulder shrugs, better with the left than the right  CN 12:tongue is midline    Motor:  Bulk, Tone: there is normal bulk and tone of the left arm, and left leg; there is decreased bulk of the right arm and right leg (smaller quadraceps and hamstring muscles) with increased spasticity and tone  Pronation: not assessed  Strength:  There is limited strength of the right arm, mostly axial movements of the right arm, no biceps or triceps movement, wrist is in flexed contraction, unable to extend at the wrist, fingers are hypotonic, with no extension, and weak and uncoordinated flexion of the fingers; he has weakness with right hip flexion; he has full strength of the left arm and fingers, bilateral knee extension and flexion is intact  Abnormal movements: no abnormal movements are present    Sensory:  Lighttouch: sensation on the right leg and arm is different from the left  Romberg:not assessed    Coordination:  FNF:intact left hand/fingers and unable to perform on the right  ALEXUS:slow incoordinated movements of the left hand, unable to do on the right  FFM:slow finger taps on the left and unable to perform the right  Gait/Station:lurching gait, wide based, uses a walker    Reflexes:  Not assessed    Past Medical/Surgical History:  Patient Active Problem List   Diagnosis   • 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   • Acquired deformity of right ankle and foot   • Constipation   • Cerebral palsy (HCC)   • Nephrocalcinosis   • Class 1 obesity with body mass index (BMI) of 30 0 to 30 9 in adult   • Hyperlipidemia   • Lumbar radiculopathy   • Spinal stenosis of lumbar region   • Chronic left-sided low back pain with left-sided sciatica   • Chronic pain syndrome   • Paronychia of toenail   • Acquired deformity of right hand   • Acquired hallux rigidus of left foot   • Acquired valgus deformity of right ankle   • 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 stage 4 chronic kidney disease   • 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 [facility])   • Right hemiparesis ([facility] [address] )   • Encounter for hearing examination   • Localization-related symptomatic epilepsy and epileptic syndromes with complex partial seizures, intractable, without status epilepticus ([facility] [address] )     Past Surgical History:   Procedure Laterality Date   • BRAIN SURGERY     • LA ARTHROCENTESIS ASPIR&/INJ MAJOR JT/BURSA W/O US Bilateral 5/27/2021    Procedure: Hip intra-articular corticosteroid injection ( 20610 92488-80); Surgeon: Lakia Lemos MD;  Location: [facility] MAIN OR;  Service: Pain Management    • TX COLONOSCOPY FLX DX W/COLLJ SPEC WHEN PFRMD N/A 2/12/2016    Procedure: COLONOSCOPY;  Surgeon: Christian Bro MD;  Location: [facility] GI LAB;   Service: Gastroenterology       Past Psychiatric History:  Depression: Nonspecific mood disorder; sometimes he gets angry  Anxiety: No  Psychosis: No    Medications:    Current Outpatient Medications:   •  atorvastatin (LIPITOR) 10 mg tablet, Take 1 tablet (10 mg total) by mouth daily Take at 8 PM, Disp: 30 tablet, Rfl: 3  •  lamoTRIgine (LaMICtal) 150 MG tablet, Take 1 tab by mouth twice a day at 8AM and 8PM, Disp: 60 tablet, Rfl: 11  •  lamoTRIgine (LaMICtal) 25 mg tablet, TAKE TWO TABLETS BY MOUTH DAILY AT 8AM WITH 150MG TO TOTAL 200MG, Disp: 60 tablet, Rfl: 11  •  magnesium hydroxide (MILK OF MAGNESIA) 400 mg/5 mL oral suspension, Take by mouth, Disp: , Rfl:   •  QUEtiapine (SEROquel) 50 mg tablet, Take 50 mg by mouth daily at bedtime , Disp: , Rfl:   •  Reguloid 25 % POWD, TAKE 1 TEASPOONFUL MIXED WITH 8 OUNCES OF WATER ORALLY EVERY DAY AT 8AM, Disp: 1040 g, Rfl: 7  •  Senna-Tabs 8 6 MG tablet, TAKE ONE TABLET BY MOUTH DAILY AT 8AM FOR CONSTIPATION, Disp: 30 tablet, Rfl: 0  •  sertraline (ZOLOFT) 50 mg tablet, Take by mouth daily, Disp: , Rfl:   •  acetaminophen (TYLENOL) 325 mg tablet, Take 1 tablet (325 mg total) by mouth every 6 (six) hours as needed for mild pain, moderate pain or headaches, Disp: 30 tablet, Rfl: 0  •  Dextromethorphan-guaiFENesin (ROBITUSSIN DM SUGAR FREE PO), Take by mouth 2 tsp q4hrs PRN, Disp: , Rfl:   •  ibuprofen (MOTRIN) 200 mg tablet, Take 400 mg by mouth every 6 (six) hours as needed for mild pain, Disp: , Rfl:   •  loperamide (IMODIUM A-D) 2 MG tablet, Take 2 mg by mouth 4 (four) times a day as needed for diarrhea, Disp: , Rfl:   •  loratadine (CLARITIN) 10 mg tablet, Take 10 mg by mouth daily as needed for allergies, Disp: , Rfl: Allergies: Allergies   Allergen Reactions   • Asa [Aspirin] Other (See Comments)     Reaction Date: 25Aug2008;   unknown       Family history:  Family History   Problem Relation Age of Onset   • Emphysema Mother    • Nephrolithiasis Mother    • Heart attack Father         acute MI   • Hypertension Father    • Nephrolithiasis Father    • Nephrolithiasis Brother      There is no family history of seizure, epilepsy or developmental delay  Social History  Living situation:  Lives in his own apartment, in [place], [place], with [address]   Work:  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, work shops   reports that he has never smoked  He has never used smokeless tobacco  He reports that he does not drink alcohol and does not use drugs  Review of Systems  A review of at least 12 organ/systems was obtained by the medical assistant and reviewed by me, including additional positives/negatives:  Neurological: Positive for speech difficulty and weakness  Decision making was of high-complexity due to the patient's high risk condition (seizures), psychiatric and neuropsychological comorbidities, behavioral problems, memory and cognitive problems and medication side effects  The total amount of time spent with the patient along with pre-chart and post-chart preparation was 23 minutes on the calendar day of the date of service  This included history taking, physical exam, review of ancillary testing, counseling provided to the patient regarding diagnosis, medications, treatment, and risk management, and other communication to the patient's providers and/or family    Start time: 11:37AM  End time: 12PM

## 2022-11-07 ENCOUNTER — TELEPHONE (OUTPATIENT)
Dept: NEUROLOGY | Facility: CLINIC | Age: 57
End: 2022-11-07

## 2022-11-07 NOTE — TELEPHONE ENCOUNTER
Hi, my name is Darrell Liner  I'm calling on behalf of my client Stormy Dandy,  [birth year]  I'm calling because he is in need of an updated script for his Lamotrigine 25 mg tablet  If this could be emailed or faxed to us, that would be great  Our fax number is [phone]  If you want to call back the call back number is [phone]  Thank you so much  Have a good day  Amy cabrera

## 2022-11-07 NOTE — TELEPHONE ENCOUNTER
Notified Anita Betancourt script was sent 11/2/2022  Requested call back if anything further needed

## 2023-01-27 ENCOUNTER — OFFICE VISIT (OUTPATIENT)
Dept: OBGYN CLINIC | Facility: CLINIC | Age: 58
End: 2023-01-27

## 2023-01-27 VITALS — DIASTOLIC BLOOD PRESSURE: 80 MMHG | HEART RATE: 84 BPM | SYSTOLIC BLOOD PRESSURE: 130 MMHG

## 2023-01-27 DIAGNOSIS — M46.1 SACROILIITIS (HCC): Primary | ICD-10-CM

## 2023-01-27 DIAGNOSIS — M25.552 PAIN IN LEFT HIP: ICD-10-CM

## 2023-01-27 DIAGNOSIS — Z86.39 HISTORY OF HEMOCHROMATOSIS: ICD-10-CM

## 2023-01-27 DIAGNOSIS — Z86.69 HISTORY OF CEREBRAL PALSY: ICD-10-CM

## 2023-01-27 DIAGNOSIS — M16.12 PRIMARY OSTEOARTHRITIS OF ONE HIP, LEFT: ICD-10-CM

## 2023-01-27 DIAGNOSIS — Z86.73 HISTORY OF STROKE: ICD-10-CM

## 2023-01-27 DIAGNOSIS — Z86.69 HISTORY OF HEMIPLEGIA: ICD-10-CM

## 2023-01-27 NOTE — PROGRESS NOTES
Assessment/Plan:  1  Sacroiliitis (HCC)  Diclofenac Sodium (VOLTAREN) 1 %      2  Primary osteoarthritis of one hip, left        3  Pain in left hip  Diclofenac Sodium (VOLTAREN) 1 %      4  History of hemiplegia        5  History of cerebral palsy        6  History of stroke        7  History of hemochromatosis          Scribe Attestation    I,:  Sameer Soto am acting as a scribe while in the presence of the attending physician :       I,:  Sheree Ellison, DO personally performed the services described in this documentation    as scribed in my presence :         Carolann Menard is a pleasant 70-year-old gentleman who returns today for follow-up evaluation of his left hip pain  After reviewing his imaging and from a thorough history and physical exam I explained that he is symptomatic of sacroiliitis about the left side  I recommended that he undergo an image guided corticosteroid injection to relieve his symptoms  We will help to facilitate this through our spine and pain team   I am pleased he is still receiving relief from his left groin pain from previous injection  I did provide him with a prescription for Voltaren gel to help mitigate his symptoms until he is able to undergo injection  All of his questions and concerns were addressed today  We will see him as needed  Subjective: Follow-up evaluation for left hip pain    Patient ID: Ghazala Sahni is a 62 y o  male who returns today for follow-up evaluation of his left hip pain  At today's visit, he reports that the injection he received a few years ago was beneficial for him  He continues to experience relief of his groin pain  He complains of posterior pain about his left hip for the last 2 months  He describes his pain as sharp  It increases with weightbearing activity and is only somewhat better at rest   He denies any new injury or trauma      Review of Systems   Constitutional: Negative for activity change, chills, fever and unexpected weight change  HENT: Negative for hearing loss, nosebleeds and sore throat  Eyes: Negative for pain, redness and visual disturbance  Respiratory: Negative for cough, shortness of breath and wheezing  Cardiovascular: Negative for chest pain, palpitations and leg swelling  Gastrointestinal: Negative for abdominal pain, nausea and vomiting  Endocrine: Negative for polyphagia and polyuria  Genitourinary: Negative for dysuria and hematuria  Musculoskeletal: Positive for arthralgias  Negative for joint swelling and myalgias  See HPI   Skin: Negative for rash and wound  Neurological: Negative for dizziness, numbness and headaches  Psychiatric/Behavioral: Negative for decreased concentration and suicidal ideas  The patient is not nervous/anxious  Past Medical History:   Diagnosis Date   • Ambulatory dysfunction    • Anxiety    • Bilateral impacted cerumen     last assessed 05/30/2013   • Capsulitis     last assessed 04/26/2016   • Cellulitis of finger 01/13/2012   • Chronic kidney disease    • 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 [facility])    • Closed fracture of one or more phalanges of foot 01/06/2009   • Constipation    • Deformity     left and right foot and ankle ,right hand   • Depression    • Epilepsy ([address] )    • Erythrocytosis    • Hemiplegia affecting dominant side ([address] )    • Hypertension    • Hypoglycemia 11/08/2011   • Hypokalemia     last assessed 05/30/2013   • Mental retardation    • Mood disorder ([address] )    • Nephrocalcinosis        Past Surgical History:   Procedure Laterality Date   • BRAIN SURGERY     • AR ARTHROCENTESIS ASPIR&/INJ MAJOR JT/BURSA W/O US Bilateral 5/27/2021    Procedure: Hip intra-articular corticosteroid injection ( 80258 70066-05); Surgeon: Herrera Kennedy MD;  Location: [facility] MAIN OR;  Service: Pain Management    • AR COLONOSCOPY FLX DX W/COLLJ SPEC WHEN PFRMD N/A 2/12/2016    Procedure: COLONOSCOPY;  Surgeon: Michael Julian MD;  Location: [facility] GI LAB;   Service: Gastroenterology       Family History   Problem Relation Age of Onset   • Emphysema Mother    • Nephrolithiasis Mother    • Heart attack Father         acute MI   • Hypertension Father    • Nephrolithiasis Father    • Nephrolithiasis Brother        Social History     Occupational History   • Not on file   Tobacco Use   • Smoking status: Never   • Smokeless tobacco: Never   Vaping Use   • Vaping Use: Never used   Substance and Sexual Activity   • Alcohol use: No   • Drug use: No   • Sexual activity: Not on file         Current Outpatient Medications:   •  Diclofenac Sodium (VOLTAREN) 1 %, Apply 2 g topically 4 (four) times a day, Disp: 240 g, Rfl: 0  •  acetaminophen (TYLENOL) 325 mg tablet, Take 1 tablet (325 mg total) by mouth every 6 (six) hours as needed for mild pain, moderate pain or headaches, Disp: 30 tablet, Rfl: 0  •  atorvastatin (LIPITOR) 10 mg tablet, Take 1 tablet (10 mg total) by mouth daily Take at 8 PM, Disp: 30 tablet, Rfl: 3  •  Dextromethorphan-guaiFENesin (ROBITUSSIN DM SUGAR FREE PO), Take by mouth 2 tsp q4hrs PRN, Disp: , Rfl:   •  ibuprofen (MOTRIN) 200 mg tablet, Take 400 mg by mouth every 6 (six) hours as needed for mild pain, Disp: , Rfl:   •  lamoTRIgine (LaMICtal) 150 MG tablet, Take 1 tab by mouth twice a day at 8AM and 8PM, Disp: 60 tablet, Rfl: 11  •  lamoTRIgine (LaMICtal) 25 mg tablet, TAKE TWO TABLETS BY MOUTH DAILY AT 8AM WITH 150MG TO TOTAL 200MG, Disp: 60 tablet, Rfl: 11  •  loperamide (IMODIUM A-D) 2 MG tablet, Take 2 mg by mouth 4 (four) times a day as needed for diarrhea, Disp: , Rfl:   •  loratadine (CLARITIN) 10 mg tablet, Take 10 mg by mouth daily as needed for allergies, Disp: , Rfl:   •  magnesium hydroxide (MILK OF MAGNESIA) 400 mg/5 mL oral suspension, Take by mouth, Disp: , Rfl:   •  QUEtiapine (SEROquel) 50 mg tablet, Take 50 mg by mouth daily at bedtime , Disp: , Rfl:   •  Reguloid 25 % POWD, TAKE 1 TEASPOONFUL MIXED WITH 8 OUNCES OF WATER ORALLY EVERY DAY AT 8AM, Disp: 1040 g, Rfl: 7  •  Senna-Tabs 8 6 MG tablet, TAKE ONE TABLET BY MOUTH DAILY AT 8AM FOR CONSTIPATION, Disp: 30 tablet, Rfl: 0  •  sertraline (ZOLOFT) 50 mg tablet, Take by mouth daily, Disp: , Rfl:     Allergies   Allergen Reactions   • Asa [Aspirin] Other (See Comments)     Reaction Date: 25Aug2008;   unknown       Objective:  Vitals:    01/27/23 1405   BP: 130/80   Pulse: 84       There is no height or weight on file to calculate BMI  Left Hip Exam     Tenderness   The patient is experiencing tenderness in the posterior  Tests   SHENG: positive    Other   Erythema: absent  Scars: absent  Sensation: normal  Pulse: present    Comments:  No groin pain with passive flexion and IR/ER of the hip  Compression: positive  Gaenslan: unable to perform due to underlying comorbidities            Physical Exam  Vitals and nursing note reviewed  Constitutional:       Appearance: He is well-developed  Comments: thin   HENT:      Head: Normocephalic and atraumatic  Right Ear: External ear normal       Left Ear: External ear normal    Eyes:      General: No scleral icterus  Extraocular Movements: Extraocular movements intact  Conjunctiva/sclera: Conjunctivae normal    Cardiovascular:      Rate and Rhythm: Normal rate  Pulmonary:      Effort: Pulmonary effort is normal  No respiratory distress  Musculoskeletal:      Cervical back: Normal range of motion and neck supple  Comments: See Ortho exam   Skin:     General: Skin is warm and dry  Neurological:      Mental Status: He is alert and oriented to person, place, and time  Psychiatric:         Behavior: Behavior normal          This document was created using speech voice recognition software  Grammatical errors, random word insertions, pronoun errors, and incomplete sentences are an occasional consequence of this system due to software limitations, ambient noise, and hardware issues     Any formal questions or concerns about content, text, or information contained within the body of this dictation should be directly addressed to the provider for clarification

## 2023-01-27 NOTE — PATIENT INSTRUCTIONS
Voltaren Gel - on painful area up to 4 times per day  Left sacroiliac joint injection for sacroiliitis to be performed by spine and pain  F/U as needed

## 2023-01-30 ENCOUNTER — TELEPHONE (OUTPATIENT)
Dept: PAIN MEDICINE | Facility: CLINIC | Age: 58
End: 2023-01-30

## 2023-01-30 ENCOUNTER — TELEPHONE (OUTPATIENT)
Dept: OBGYN CLINIC | Facility: CLINIC | Age: 58
End: 2023-01-30

## 2023-01-30 DIAGNOSIS — M46.1 SACROILIITIS (HCC): ICD-10-CM

## 2023-01-30 DIAGNOSIS — M25.552 PAIN IN LEFT HIP: ICD-10-CM

## 2023-01-30 NOTE — TELEPHONE ENCOUNTER
Dr Thiago Wilson I am unable to do this, please place updated script that states, "2 G daily as needed"  Thanks

## 2023-01-30 NOTE — TELEPHONE ENCOUNTER
Dr Fer Segundo is calling because you wrote for Diclofenac gel 2 gs 4 times a day, but you wrote on the paper for Alernatives Apply 2 daily as needed  They will need a discontinuation on the script that states "2g 4 times daily" and it would need to say 2 G as needed       Phone# [phone] ([address] [place] [address])

## 2023-01-30 NOTE — TELEPHONE ENCOUNTER
----- Message from Yobani Gallardo sent at 1/27/2023  2:37 PM EST -----  Regarding: left SI joint injection  Please call this patient to schedule a fluoroscopic guided left sacroiliac joint injection  Please take note of his underlying comorbidities  Thank you!

## 2023-01-30 NOTE — TELEPHONE ENCOUNTER
Scheduled pt for SIJ for 3/22/22  Went over pre-procedure instructions below:  Nothing to eat or drink 1 hr prior to procedure  Need to arrange transportation  Proper clothing for procedure  No vaccines 2 weeks prior or after procedure  If ill or placed on antibiotics please call to reschedule  COVID will bring card  Spoke with [facility]   Also faxing the instructions to her at [phone]

## 2023-01-31 NOTE — TELEPHONE ENCOUNTER
LMOM that states it is to be 2 g 4 times daily as needed is what the physician wants  I called [facility] to advise of below

## 2023-02-04 DIAGNOSIS — M25.552 PAIN IN LEFT HIP: ICD-10-CM

## 2023-02-04 DIAGNOSIS — M46.1 SACROILIITIS (HCC): ICD-10-CM

## 2023-03-22 ENCOUNTER — HOSPITAL ENCOUNTER (OUTPATIENT)
Facility: AMBULARY SURGERY CENTER | Age: 58
Setting detail: OUTPATIENT SURGERY
Discharge: HOME | End: 2023-03-22
Attending: PHYSICAL MEDICINE & REHABILITATION | Admitting: PHYSICAL MEDICINE & REHABILITATION

## 2023-03-22 ENCOUNTER — APPOINTMENT (OUTPATIENT)
Dept: RADIOLOGY | Facility: HOSPITAL | Age: 58
End: 2023-03-22

## 2023-03-22 VITALS
HEART RATE: 70 BPM | OXYGEN SATURATION: 96 % | TEMPERATURE: 97.8 F | SYSTOLIC BLOOD PRESSURE: 135 MMHG | DIASTOLIC BLOOD PRESSURE: 73 MMHG | RESPIRATION RATE: 18 BRPM

## 2023-03-22 RX ORDER — METHYLPREDNISOLONE ACETATE 80 MG/ML
INJECTION, SUSPENSION INTRA-ARTICULAR; INTRALESIONAL; INTRAMUSCULAR; SOFT TISSUE AS NEEDED
Status: DISCONTINUED | OUTPATIENT
Start: 2023-03-22 | End: 2023-03-22 | Stop reason: HOSPADM

## 2023-03-22 RX ORDER — LIDOCAINE HYDROCHLORIDE 10 MG/ML
INJECTION, SOLUTION EPIDURAL; INFILTRATION; INTRACAUDAL; PERINEURAL AS NEEDED
Status: DISCONTINUED | OUTPATIENT
Start: 2023-03-22 | End: 2023-03-22 | Stop reason: HOSPADM

## 2023-03-22 RX ORDER — BUPIVACAINE HYDROCHLORIDE 2.5 MG/ML
INJECTION, SOLUTION EPIDURAL; INFILTRATION; INTRACAUDAL AS NEEDED
Status: DISCONTINUED | OUTPATIENT
Start: 2023-03-22 | End: 2023-03-22 | Stop reason: HOSPADM

## 2023-03-22 NOTE — OP NOTE
OPERATIVE REPORT  PATIENT NAME: Cristian Greenwood    :  1965  MRN: [id number]  Pt Location: [facility] MINOR/PAIN ROOM 01    SURGERY DATE: 3/22/2023    Surgeon(s) and Role:     * DO Angie Sorto Primary    Preop Diagnosis:  Sacroiliitis ([address] ) [M46 1]  Primary osteoarthritis of left hip [M16 12]    Post-Op Diagnosis Codes:     * Sacroiliitis ([address] ) [M46 1]     * Primary osteoarthritis of left hip [M16 12]    Procedure(s):  Left - SACROILIAC joint injection (66989 )      Indication: Low back/buttock pain  Preoperative Diagnosis: 1  Sacroiliac Joint Pain  2  Sacroiliitis  Postoperative Diagnosis: 1  Sacroiliac Joint Pain  2  Sacroiliitis  Procedure: Fluoroscopically-guided injection of the left sacroiliac joint(s)  EBL: none  Specimens: not applicable  After discussing the risks, benefits, and alternatives to the procedure, the patient expressed understanding and wished to proceed  The patient was brought to the fluoroscopy suite and placed in the prone position  Procedural pause conducted to verify: correct patient identity, procedure to be performed and as applicable, correct side and site, correct patient position, and availability of implants, special equipment and special requirements  Using fluoroscopy, the inferior portion of the sacroiliac joint was identified  The skin was sterilely prepped and draped in the usual fashion using Chloraprep skin prep  The skin and subcutaneous tissue were anesthetized with 1% buffered lidocaine  Using fluoroscopic guidance, a 3 5 inch 22-gauge spinal needle was advanced into joint  Proper needle positioning was confirmed using multiple fluoroscopic views  After negative aspiration, Omnipaque 240 contrast was injected, showing intraarticular spread of contrast without any evidence of intravascular uptake  A 4 mL solution consisting of 80 mg of Depo-Medrol in 0 25% bupivacaine was injected slowly and incrementally into the joint   Following the injection, the needle was withdrawn  The patient tolerated the procedure well and there were no apparent complications  After appropriate observation, the patient was dismissed from the clinic in good condition under their own power        SIGNATURE: Berto Jain DO  DATE: March 22, 2023  TIME: 11:51 AM

## 2023-03-22 NOTE — H&P
History of Present Illness: The patient is a 62 y o  male who presents with complaints of left sided low back pain    Past Medical History:   Diagnosis Date   • Ambulatory dysfunction    • Anxiety    • Bilateral impacted cerumen     last assessed 05/30/2013   • Capsulitis     last assessed 04/26/2016   • Cellulitis of finger 01/13/2012   • Chronic kidney disease    • 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 [facility])    • Closed fracture of one or more phalanges of foot 01/06/2009   • Constipation    • Deformity     left and right foot and ankle ,right hand   • Depression    • Epilepsy ([facility] [address] )    • Erythrocytosis    • Hemiplegia affecting dominant side ([facility] [address] )    • Hypertension    • Hypoglycemia 11/08/2011   • Hypokalemia     last assessed 05/30/2013   • Mental retardation    • Mood disorder ([facility] [address] )    • Nephrocalcinosis        Past Surgical History:   Procedure Laterality Date   • BRAIN SURGERY     • DC ARTHROCENTESIS ASPIR&/INJ MAJOR JT/BURSA W/O US Bilateral 5/27/2021    Procedure: Hip intra-articular corticosteroid injection ( 58655 40080-96); Surgeon: Chilo Sampson MD;  [facility]: [facility] MAIN OR;  Service: Pain Management    • DC COLONOSCOPY FLX DX W/COLLJ SPEC WHEN PFRMD N/A 2/12/2016    Procedure: COLONOSCOPY;  Surgeon: Kay Gates MD;  Location: [facility] GI LAB; Service: Gastroenterology       No current facility-administered medications for this encounter      Allergies   Allergen Reactions   • Asa [Aspirin] Other (See Comments)     Reaction Date: 25Aug2008;   unknown       Physical Exam:   Vitals:    03/22/23 1122   BP: 133/76   Pulse: 76   Resp: 18   Temp: 97 8 °F (36 6 °C)   SpO2: 96%     General: Awake, Alert, Oriented x 3, Mood and affect appropriate  Respiratory: Respirations even and unlabored  Cardiovascular: Peripheral pulses intact; no edema  Musculoskeletal Exam: Tenderness to palpation left-sided glutes    ASA Score: 2    Patient/Chart Verification  Patient ID Verified: Verbal, Armband  ID Band Applied: Yes  Consents Confirmed: Procedural  H&P( within 30 days) Verified: Yes  Interval H&P(within 24 hr) Complete (required for Outpatients and Surgery Admit only): Yes  Beta Blocker given : N/A  Pre-op Lab/Test Results Available: N/A  Does Patient Have a Prosthetic Device/Implant: No    Assessment: Sacroiliitis  Plan: Left SI joint injection

## 2023-03-22 NOTE — DISCHARGE INSTRUCTIONS
Steroid Joint Injection   WHAT YOU NEED TO KNOW:   A steroid joint injection is a procedure to inject steroid medicine into a joint  Steroid medicine decreases pain and inflammation  The injection may also contain an anesthetic (numbing medicine) to decrease pain  It may be done to treat conditions such as arthritis, gout, or carpal tunnel syndrome  The injections may be given in your knee, ankle, shoulder, elbow, wrist, ankle or sacroiliac joint  Do not apply heat to any area that is numb  If you have discomfort or soreness at the injection site, you may apply ice today, 20 minutes on and 20 minutes off  Tomorrow you may use ice or warm, moist heat  Do not apply ice or heat directly to the skin  You may have an increase or change in the discomfort for 36-48 hours after your treatment  Apply ice and continue with any pain medicine you have been prescribed  Do not do anything strenuous today  You may shower, but no tub baths or hot tubs today  You may resume your normal activities tomorrow, but do not “overdo it”  Resume normal activities slowly when you are feeling better  If you experience redness, drainage or swelling at the injection site, or if you develop a fever above 100 degrees, please call [facility] at [phone] or go to the Emergency Room  Continue to take all routine medicines prescribed by your primary care physician unless otherwise instructed by our staff  Most blood thinners should be started again according to your regularly scheduled dosing  If you have any questions, please give our office a call  As no general anesthesia was used in today's procedure, you should not experience any side effects related to anesthesia  If you are diabetic, the steroids used in today's injection may temporarily increase your blood sugar levels after the first few days after your injection   Please keep a close eye on your sugars and alert the doctor who manages your diabetes if your sugars are significantly high from your baseline or you are symptomatic  If you have a problem specifically related to your procedure, please call our office at [phone]  Problems not related to your procedure should be directed to your primary care physician

## 2023-03-29 ENCOUNTER — TELEPHONE (OUTPATIENT)
Dept: RADIOLOGY | Facility: CLINIC | Age: 58
End: 2023-03-29

## 2023-04-03 NOTE — TELEPHONE ENCOUNTER
Caller: Margarita Cardona    Doctor: Dr Cecilia Molina     Reason for call: Pt returned the f/u call with 50  % improvement and pain level 3 /10        Call back#: [phone]

## 2023-10-09 DIAGNOSIS — G40.219 LOCALIZATION-RELATED SYMPTOMATIC EPILEPSY AND EPILEPTIC SYNDROMES WITH COMPLEX PARTIAL SEIZURES, INTRACTABLE, WITHOUT STATUS EPILEPTICUS (HCC): ICD-10-CM

## 2023-10-09 RX ORDER — LAMOTRIGINE 150 MG/1
TABLET ORAL
Qty: 60 TABLET | Refills: 2 | Status: SHIPPED | OUTPATIENT
Start: 2023-10-09

## 2023-10-09 NOTE — TELEPHONE ENCOUNTER
Jace Moncada. Spoke w/Yur[facility]. She advised they need refill for Lamictal 150mg    LOV - 11/2022  F/U appt: 11/8/23  Per OV note:  Plan:   1 - Continue current dosing of lamotrigine 200mg in AM and 150mg in PM     Script pended below. Dr. Opal Kim - please sign if agreeable.

## 2023-12-12 ENCOUNTER — TELEPHONE (OUTPATIENT)
Age: 58
End: 2023-12-12

## 2023-12-12 NOTE — TELEPHONE ENCOUNTER
Group Home  [phone]    They need to have the Voltaren Gel Discontinued in the patients chart per the state. They need to have it sent to the pharmacy on paper and they will .     Pharmacy:   [address] [place] [address]  Phone - [phone]  Fax: [phone]

## 2023-12-12 NOTE — TELEPHONE ENCOUNTER
Spoke with Christiano Colunga and discontinued medication in Epic. Faxed paper notice of discontinued medication as requested.

## 2024-01-10 ENCOUNTER — TELEPHONE (OUTPATIENT)
Dept: OBGYN CLINIC | Facility: CLINIC | Age: 59
End: 2024-01-10

## 2024-01-10 NOTE — TELEPHONE ENCOUNTER
"Telephone call from Rosa Elena with [employer]. She states this is her second call on trying to get a \"discontinued script\" on the patient's Voltaren that he is no longer using. She would like a script that it was discontinued faxed over to her ASAP.    Best number to reach her at is her cell: [phone]  Fax number to fax the script to:  [phone]  "

## 2024-02-19 ENCOUNTER — TELEPHONE (OUTPATIENT)
Dept: NEUROLOGY | Facility: CLINIC | Age: 59
End: 2024-02-19

## 2024-02-19 NOTE — TELEPHONE ENCOUNTER
ADD-ON dx Localization-related symptomatic epilepsy and epileptic syndromes with complex partial seizures, intractable, without status epilepticus (HCC)  LOV11/2/22  INS- MEDICARE A & B, [employer] - verified via phone     [facility] to rita a f/u appt. Patient moved back to this group home.    Accepted open slot on 2/21 at 130pm w/ Dr Salazar. Location address was provided.

## 2024-02-20 NOTE — PROGRESS NOTES
[facility]  Patient's Name: Mario Rollins   Patient's : 1965   Visit Type: follow-up  Referring MD / PCP:  No primary care provider on file.    Assessment:  Mr. Mario Rollins is a 58 y.o. man with focal epilepsy and right spastic hemiplegia, cerebral palsy, and intellectual disability from an early life left MCA stroke, s/p brain surgery.  He has not had a seizure for many years now on a stable dose of lamotrigine.  He has had moved from different homes over the past couple of years.  His caretaker asked if he has an appropriate splint for his right hand-arm that was ordered by his prior neurologist.  I am not certain what is the reason for the wrist splint as it is out of my expertise.  He will need to see a physiatrist about splints for the contraction of his right arm.      Plan:   1 - Continue with Lamotrigine dosing of 200mg in the morning and 150mg at bedtime  (Give lamotrigine 150mg twice a day and additional two 25mg tabs in the morning)   2 - check lamotrigine level  3 - follow-up in 1 year.    Right spastic hemiparesis with contraction of the right wrist  - referral to physical and rehab medicine (Physiatry) for evaluation of wrist splints    Seizure protocol  If he has a seizure that last less than 5 minutes then allow him to recover at home; just get him to the ground for safety.  Call 911 if the following conditions apply  - unwitnessed onset of seizure and there is a potential head injury that needs to be evaluated  - patient stops breathing or turns blue during a seizure  - if the seizure is longer than 5 minutes  - if after the seizure ends and he does not show recovery over the course of 30 minutes.  - if there are multiple seizures in 24 hours  - if the patient refused taking his medications for the past 24 hours  - if there is physical injury from the seizure      Problem List Items Addressed This Visit          Nervous and Auditory    Cerebral palsy (HCC)     Relevant Medications    lamoTRIgine (LaMICtal) 150 MG tablet    lamoTRIgine (LaMICtal) 25 mg tablet    Right spastic hemiparesis (HCC)    Relevant Medications    lamoTRIgine (LaMICtal) 150 MG tablet    lamoTRIgine (LaMICtal) 25 mg tablet    Other Relevant Orders    Ambulatory Referral to Physiatry    Localization-related symptomatic epilepsy and epileptic syndromes with complex partial seizures, intractable, without status epilepticus (HCC) - Primary    Relevant Medications    lamoTRIgine (LaMICtal) 150 MG tablet    lamoTRIgine (LaMICtal) 25 mg tablet       Chief Complaint:    Chief Complaint   Patient presents with    Seizures      HPI:    Mario Rollins is a 58 y.o. left handed male here for follow-up evaluation of epilepsy.    Interval History 2/21/2024  He had moved to [place], [place], near [place], [place]; and he just moved back to [place], [place] just about a week ago.  His brother, Lyle, lives in [place], [place].  So this location is lost.    He is here with Prabha .  Mario has not had any recurrent seizure involving the right side of his body.  He may feel shaky in the morning until he eats something.    He had a right femur fracture in June 2023, when he slipped off a chair while trying to put on compression stockings.  He was discharged from .  He was seen by [facility] - Dr. Michelle on 11/9/2023 who wrote for a right hand splint to help straighten out his right hand and fingers from becoming more contracted.      AED/side effects/compliance:  Lamotrigine 200mg in AM and 150mg in PM  Group home facility needs discontinue and restart prescriptions if there are new orders    Event/Seizure semiology:  Unknown seizure type - but presumably right hemibody activity with head version to the right, secondary generalization (none since 2001)    Prior Epilepsy History:  Intake History 9/23/2020  There is one note from Dr Minh Julian from [facility] from 10/28/2013. It referred to the patient having left hemispheric onset partial epilepsy.  He was seen by Dr. Sherman in the past.  He has not had a seizure.  He was previously on phenytoin, clonazepam, topiramate, phenobarbital for seizures.  He was on lamotrigine 150mg twice a day at the time.  There is a history of cerebrovascular accident, developmental disability, lives in a group home.  He saw Dr. Yepez for consultation on 9/5/2018.  He had a stroke at age of 4, necessitating brain surgery.  He had seizures at age 5 or 6.  He reported that his last seizure was in 2001.  There is a reference to a prior EEG from 12/6/2017 that found continuous slowing and sharp waves over the left temporal region.  There was a video/EEG monitoring study 10/24-11/1/2011, interpreted by Dr. Tona Pike that showed left temporal epileptogenic focus.    He is here with Prabha, his aide from [place].  She has known Mr. Rollins for the past 4 years.  He had surgery on his brain when he was a child, but there was questionable seizures when he was in the womb.    There have been no seizures for since 2001.  He recalled being on Topamax, but it was discontinued due to kidney stones.   He is very independent; he does his own hygiene, showering, cleaning, and cooking.    The patient denies any history of myoclonus, staring spells, automatisms, unexplained hyperkinetic behaviors, unexplained nocturnal enuresis, or nocturnal tongue biting.    Interval History 9/29/2021  -150.  He denies episodes of convulsions or loss of consciousness.  Sometimes his left hand has shaking but more so when he was on Depakote (but he not been on it).  He may experience periods of confusion, but he is unable to fully explain, it may feel like he does not know what is going on.  There has been no reports of him becoming unresponsive, incoherent, or appearing to be spacing out.  Mr. Rollins is generally self sufficient, cooks  his own meals, bathing, and grooming.     Interval History 2022  -150.  There has been no seizure in the last year.  He is not aware of any recurrent uncontrollable jerking or twitching over the right side of his body.     Special Features  Status epilepticus: No  Self Injury Seizures: No  Precipitating Factors: None    Epilepsy Risk Factors:  Abnormal pregnancy: unknown  Abnormal birth/: unknown  Abnormal Development: unknown  Febrile seizures, simple: unknown  Febrile seizures, complex: unknown  CNS infection: No  Mental retardation: mild intellectual disability  Cerebral palsy: Yes right spastic hemiparesis  Head injury (moderate/severe): No  CNS neoplasm: No  CNS malformation: No  Neurosurgical procedure: yes left craniectomy with presence of clips  Stroke: possible based on the head CT study  Alcohol abuse: No  Drug abuse: No  Family history Sz/epilepsy: unknown    Prior AEDs:  medication Reason to stop   phenytoin Gait difficulty   divalproex tremors   topiramate Kidney stones   phenobarbital    lamotrigine      Prior workup:  x  Imagin2011  There are multiple metallic artifacts over the left cerebral hemisphere.  There is a remote craniotomy.  There is extensive encephalomalacia and atrophy over the left cerebral hemisphere over the left MCA territory.  Right cerebral hemisphere has normal development.  Absence of corpus callosum.    EEGs:  2021  Occasional left temporal spikes  Asymmetric background with left hemispheric theta-delta activity, breach rhythm over the left parietal and posterior region.    -2021 - 48 hours ambulatory   There are occasional broad based P3/Pz maximal sharp waves.  There are rare independent Fp1 maximal sharp waves (see 21:42:10).  There are frequent T3 maximal sharp waves, seen during sleep.  The left hemisphere has continuous moderate voltage 1-4 Hz delta activity and attenuation of beta-alpha activity in the anterior temporal region  "and frontal region.   No seizures were captured    Labs:  Component      Latest Ref Rng & Units 6/21/2021   LAMOTRIGINE LEVEL      2.0 - 20.0 ug/mL 7.3   10/10/2023  LFT 7.0/4.3/0.5/1/12/126   10/25/2023  CBC 7.2/15/47/225    General exam   /60 (BP Location: Left arm, Patient Position: Sitting, Cuff Size: Adult)   Pulse 64   Ht 5' 8\" (1.727 m)   Wt 77.6 kg (171 lb)   BMI 26.00 kg/m²    Appearance:  delayed cognitive response with dysarthric speech, poor coordination and mobililty of the right arm and leg  Carotids: not assessed  Cardiovascular: regular rate and rhythm and normal heart sounds  Pulmonary: clear to auscultation  Extremities: Atrophy and spasticity of the right arm and malformation of the right hand (he is flexed at the wrist, unable to passively extend at the right wrist or fully grasp with the right fingers    HEENT: anicteric and moist mucus membranes / oral cavity   Fundoscopy: not assessed    Mental status  Orientation: alert and oriented to February 24, 2024, P-Rosey, Thursday, unable to give specific location  Fund of Knowledge: intact   Attention and Concentration:  slow dysarthric speech pattern  Current and Remote Memory:not assessed  Language:  slow speech, dysarthric, comprehension intact    Cranial Nerves  CN 1: not tested  CN 2: pupils equal round reactive to direct and consenual light   CN 3, 4, 6: EOMI, no nystagmus  CN 5:not assessed  CN 7: right half of his face seems to be smaller than the left, difficulty with smiling  CN 8:not assessed  CN 9, 10:not assessed  CN 11:not assessed  CN 12:tongue is midline    Motor:  Bulk, Tone:  there is normal bulk and tone of the left arm, and left leg; there is decreased bulk of the right arm and right leg with increased spasticity  Pronation: not assessed  Strength: he has poor coordination of both arms and legs, worse on the right; he has better strength on the left arm with abduction at the shoulder, flexion at the elbow, extension at " the elbow, finger flexion and extension at the wrist joint.  He is unable to fully abduct his fingers on the left.  He has left better than right strength to confrontation with hip flexion, knee flexion, knee extension, dorsiflexion.  He is unable to fully dorsiflex on the right foot, he has strength on the right with HF, KF, KE but it is weaker and less coordinated.  He is able to extend the right elbow better than flexion.    Abnormal movements: no abnormal movements are present    Sensory:  Lighttouch: not assessed  Romberg:not assessed    Coordination:  FNF:intact left hand/fingers and unable to perform on the right  ALEXUS: slow incoordinated movements of the left hand, unable to do on the right  FFM:slow finger taps on the left and unable to perform the right  Gait/Station: lurching gait, wide based, uses a walker    Reflexes:  Not assessed    Past Medical/Surgical History:  Patient Active Problem List   Diagnosis    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    Acquired deformity of right ankle and foot    Constipation    Cerebral palsy (HCC)    Nephrocalcinosis    Class 1 obesity with body mass index (BMI) of 30.0 to 30.9 in adult    Hyperlipidemia    Lumbar radiculopathy    Spinal stenosis of lumbar region    Chronic left-sided low back pain with left-sided sciatica    Chronic pain syndrome    Paronychia of toenail    Acquired deformity of right hand    Acquired hallux rigidus of left foot    Acquired valgus deformity of right ankle    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 stage 4 chronic kidney disease    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     Right spastic hemiparesis (HCC)    Encounter for hearing examination    Localization-related symptomatic epilepsy and epileptic syndromes with complex partial seizures, intractable, without status epilepticus (HCC)    Sacroiliitis (HCC)     Past Surgical History:   Procedure Laterality Date    BRAIN SURGERY      MT  ARTHROCENTESIS ASPIR&/INJ MAJOR JT/BURSA W/O US Bilateral 5/27/2021    Procedure: Hip intra-articular corticosteroid injection ( 32306 64047-34);  Surgeon: Lianet Astorga MD;  Location: [facility] MAIN OR;  Service: Pain Management     FL COLONOSCOPY FLX DX W/COLLJ SPEC WHEN PFRMD N/A 2/12/2016    Procedure: COLONOSCOPY;  Surgeon: bAhilash Ace MD;  Location: [facility] GI LAB;  Service: Gastroenterology    FL INJECT SI JOINT ARTHRGRPHY&/ANES/STEROID W/RIMA Left 3/22/2023    Procedure: SACROILIAC joint injection ([id number] );  Surgeon: Larry Smith DO;  Location: [facility] MAIN OR;  Service: Pain Management        Past Psychiatric History:  Depression: Nonspecific mood disorder; sometimes he gets angry  Anxiety: No  Psychosis: No    Medications:    Current Outpatient Medications:     acetaminophen (TYLENOL) 325 mg tablet, Take 1 tablet (325 mg total) by mouth every 6 (six) hours as needed for mild pain, moderate pain or headaches, Disp: 30 tablet, Rfl: 0    atorvastatin (LIPITOR) 10 mg tablet, Take 1 tablet (10 mg total) by mouth daily Take at 8 PM, Disp: 30 tablet, Rfl: 3    Dextromethorphan-guaiFENesin (ROBITUSSIN DM SUGAR FREE PO), Take by mouth 2 tsp q4hrs PRN, Disp: , Rfl:     ibuprofen (MOTRIN) 200 mg tablet, Take 400 mg by mouth every 6 (six) hours as needed for mild pain, Disp: , Rfl:     lamoTRIgine (LaMICtal) 150 MG tablet, Take 1 tab by mouth twice a day at 8AM and 8PM, Disp: 60 tablet, Rfl: 11    lamoTRIgine (LaMICtal) 25 mg tablet, TAKE TWO TABLETS BY MOUTH DAILY AT 8AM WITH 150MG TO TOTAL 200MG, Disp: 60 tablet, Rfl: 11    loperamide (IMODIUM A-D) 2 MG tablet, Take 2 mg by mouth 4 (four) times a day as needed for diarrhea, Disp: , Rfl:     loratadine (CLARITIN) 10 mg tablet, Take 10 mg by mouth daily as needed for allergies, Disp: , Rfl:     magnesium hydroxide (MILK OF MAGNESIA) 400 mg/5 mL oral suspension, Take by mouth, Disp: , Rfl:     nebivolol (BYSTOLIC) 2.5 mg tablet, , Disp: , Rfl:     QUEtiapine  (SEROquel) 50 mg tablet, Take 50 mg by mouth daily at bedtime , Disp: , Rfl:     Reguloid 25 % POWD, TAKE 1 TEASPOONFUL MIXED WITH 8 OUNCES OF WATER ORALLY EVERY DAY AT 8AM, Disp: 1040 g, Rfl: 7    Senna-Tabs 8.6 MG tablet, TAKE ONE TABLET BY MOUTH DAILY AT 8AM FOR CONSTIPATION, Disp: 30 tablet, Rfl: 0    sertraline (ZOLOFT) 50 mg tablet, Take by mouth daily, Disp: , Rfl:     Allergies:  Allergies   Allergen Reactions    Asa [Aspirin] Other (See Comments)     Reaction Date: 25Aug2008;   unknown       Family history:  Family History   Problem Relation Age of Onset    Emphysema Mother     Nephrolithiasis Mother     Heart attack Father         acute MI    Hypertension Father     Nephrolithiasis Father     Nephrolithiasis Brother      There is no family history of seizure, epilepsy or developmental delay.      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 He has never used smokeless tobacco. He reports that he does not drink alcohol and does not use drugs.    Review of Systems  A review of at least 12 organ/systems was obtained by the medical assistant and reviewed by me, including additional positives/negatives:  Neurological:  Positive for seizures.        The total amount of time spent with the patient along with pre-chart and post-chart preparation was 38 minutes on the calendar day of the date of service.  This included history taking, physical exam, review of ancillary testing, counseling provided to the patient regarding diagnosis, medications, treatment, and risk management, and other communication to the patient's providers and/or family.  Start time: 1:35PM  End time: 2:13PM

## 2024-02-21 ENCOUNTER — OFFICE VISIT (OUTPATIENT)
Dept: NEUROLOGY | Facility: CLINIC | Age: 59
End: 2024-02-21
Payer: MEDICARE

## 2024-02-21 VITALS
BODY MASS INDEX: 25.91 KG/M2 | WEIGHT: 171 LBS | HEIGHT: 68 IN | SYSTOLIC BLOOD PRESSURE: 110 MMHG | DIASTOLIC BLOOD PRESSURE: 60 MMHG | HEART RATE: 64 BPM

## 2024-02-21 DIAGNOSIS — G81.11 RIGHT SPASTIC HEMIPARESIS (HCC): ICD-10-CM

## 2024-02-21 DIAGNOSIS — G80.2 SPASTIC HEMIPLEGIC CEREBRAL PALSY (HCC): ICD-10-CM

## 2024-02-21 DIAGNOSIS — G40.219 LOCALIZATION-RELATED SYMPTOMATIC EPILEPSY AND EPILEPTIC SYNDROMES WITH COMPLEX PARTIAL SEIZURES, INTRACTABLE, WITHOUT STATUS EPILEPTICUS (HCC): Primary | ICD-10-CM

## 2024-02-21 PROCEDURE — 99214 OFFICE O/P EST MOD 30 MIN: CPT | Performed by: PSYCHIATRY & NEUROLOGY

## 2024-02-21 RX ORDER — LAMOTRIGINE 25 MG/1
TABLET ORAL
Qty: 60 TABLET | Refills: 11 | Status: SHIPPED | OUTPATIENT
Start: 2024-02-21

## 2024-02-21 RX ORDER — NEBIVOLOL 2.5 MG/1
TABLET ORAL
COMMUNITY
Start: 2024-01-30

## 2024-02-21 RX ORDER — LAMOTRIGINE 150 MG/1
TABLET ORAL
Qty: 60 TABLET | Refills: 11 | Status: SHIPPED | OUTPATIENT
Start: 2024-02-21

## 2024-02-21 NOTE — PROGRESS NOTES
Review of Systems   Constitutional:  Negative for appetite change, fatigue and fever.   HENT: Negative.  Negative for hearing loss, tinnitus, trouble swallowing and voice change.    Eyes: Negative.  Negative for photophobia, pain and visual disturbance.   Respiratory: Negative.  Negative for shortness of breath.    Cardiovascular: Negative.  Negative for palpitations.   Gastrointestinal: Negative.  Negative for nausea and vomiting.   Endocrine: Negative.  Negative for cold intolerance.   Genitourinary: Negative.  Negative for dysuria, frequency and urgency.   Musculoskeletal:  Negative for back pain, gait problem, myalgias, neck pain and neck stiffness.   Skin: Negative.  Negative for rash.   Allergic/Immunologic: Negative.    Neurological:  Positive for seizures. Negative for dizziness, tremors, syncope, facial asymmetry, speech difficulty, weakness, light-headedness, numbness and headaches.   Hematological: Negative.  Does not bruise/bleed easily.   Psychiatric/Behavioral: Negative.  Negative for confusion, hallucinations and sleep disturbance.

## 2024-02-21 NOTE — PATIENT INSTRUCTIONS
Plan:   1 - Continue with Lamotrigine dosing of 200mg in the morning and 150mg at bedtime  (Give lamotrigine 150mg twice a day and additional two 25mg tabs in the morning)   2 - check lamotrigine level  3 - follow-up in 1 year.    Right spastic hemiparesis with contraction of the right wrist  - referral to physical and rehab medicine (Physiatry) for evaluation of wrist splints    Seizure protocol  If he has a seizure that last less than 5 minutes then allow him to recover at home; just get him to the ground for safety.  Call 911 if the following conditions apply  - unwitnessed onset of seizure and there is a potential head injury that needs to be evaluated  - patient stops breathing or turns blue during a seizure  - if the seizure is longer than 5 minutes  - if after the seizure ends and he does not show recovery over the course of 30 minutes.  - if there are multiple seizures in 24 hours  - if the patient refused taking his medications for the past 24 hours  - if there is physical injury from the seizure

## 2024-03-14 ENCOUNTER — EVALUATION (OUTPATIENT)
Dept: OCCUPATIONAL THERAPY | Facility: CLINIC | Age: 59
End: 2024-03-14
Payer: MEDICARE

## 2024-03-14 DIAGNOSIS — R25.2 SPASTICITY: Primary | ICD-10-CM

## 2024-03-14 PROCEDURE — 97165 OT EVAL LOW COMPLEX 30 MIN: CPT

## 2024-03-14 NOTE — PROGRESS NOTES
Physician:  Dr. Idania Heller  Diagnosis:  Spasticity  Type of DME: Resting Hand Splint  Side: Right  Estimated length of time the patient will use orthotic:  until deemed necessary as per physician orders  Reason for Orthotic: protection and positioning     HPI: Patient is a 58 y.o. male who presents for OT IE and treatment for right upper extremity spasticity secondary to cerebral palsy. Patient referred by Dr. Idania Heller from [facility] at [place] to initiate treatment including hand therapy for bracing fitting/education for current over the counter brace the patient current has since December; and/or fabrication of new forearm based thermoplastic brace for night time wear with wrist and fingers in neutral, thumb abducted.         Objective:   AROM - AROM limited at this time due to spasticity secondary to cerebral palsy  Strength - not assessed secondary to acute injury.     Assessment:   Evaluated current fit of over the counter brace, securing all straps with moderate tension. Orthotic was then re-evaluated to confirm fit and verify that no compromise was occurring. Current brace demonstrates appropriate positioning and fit for the patient this date. Educated patient and caregiver on how to appropriately don and doff brace, additional strapping options and wear schedule. Further discussed options and education of over the counter current rest hand orthosis vs custom forearm based thermoplastic brace with patient and caregiver. Patient verbalized and agreed to continue to utilize the OTC brace at this time and will contact the office if he will like a custom splint.     Application instructions and care of the orthotic were reviewed in detail with the patient, including need for regular inspection of the orthotic.  It was verified that the patient was able to perform moderate independent application and removal of the orthotic with appropriate technique.  Usage instructions were reviewed as  per the physician's order.  The patient verbalized an understanding of all instructions.      Goals:  Patient to demonstrate ability to independently don and doff splint in clinic following IE.     Patient to demonstrate knowledge and understand of orthotic wear schedule and compliance with use of orthotic.     Plan: Focus on HEP and proper use of orthotic for positioning to ensure safe return to PLOF.

## 2024-03-14 NOTE — LETTER
March 15, 2024    Idania Heller MD  [address]  [address]  [place] [address]    Patient: Mario Rollins   YOB: 1965   Date of Visit: 3/14/2024     Encounter Diagnosis     ICD-10-CM    1. Spasticity  R25.2           Dear Dr. Heller:    Thank you for your recent referral of Mario Rollins. Please review the attached evaluation summary from Mario's recent visit.     Please verify that you agree with the plan of care by signing the attached order.     If you have any questions or concerns, please do not hesitate to call.     I sincerely appreciate the opportunity to share in the care of one of your patients and hope to have another opportunity to work with you in the near future.     Sincerely,    Morena Sanchez, OT      Referring Provider:     I certify that I have read the below Plan of Care and certify the need for these services furnished under this plan of treatment while under my care.                    Idania Heller MD  [address]  [address]  [place] [address]  Via Fax: [phone]        Physician:  Dr. Idania Heller  Diagnosis:  Spasticity  Type of DME: Resting Hand Splint  Side: Right  Estimated length of time the patient will use orthotic:  until deemed necessary as per physician orders  Reason for Orthotic: protection and positioning     HPI: Patient is a 58 y.o. male who presents for OT IE and treatment for right upper extremity spasticity secondary to cerebral palsy. Patient referred by Dr. Idania Heller from [facility] at [place] to initiate treatment including hand therapy for bracing fitting/education for current over the counter brace the patient current has since December; and/or fabrication of new forearm based thermoplastic brace for night time wear with wrist and fingers in neutral, thumb abducted.         Objective:   AROM - AROM limited at this time due to spasticity secondary to cerebral palsy  Strength - not assessed secondary to acute injury.      Assessment:   Evaluated current fit of over the counter brace, securing all straps with moderate tension. Orthotic was then re-evaluated to confirm fit and verify that no compromise was occurring. Current brace demonstrates appropriate positioning and fit for the patient this date. Educated patient and caregiver on how to appropriately don and doff brace, additional strapping options and wear schedule. Further discussed options and education of over the counter current rest hand orthosis vs custom forearm based thermoplastic brace with patient and caregiver. Patient verbalized and agreed to continue to utilize the OTC brace at this time and will contact the office if he will like a custom splint.     Application instructions and care of the orthotic were reviewed in detail with the patient, including need for regular inspection of the orthotic.  It was verified that the patient was able to perform moderate independent application and removal of the orthotic with appropriate technique.  Usage instructions were reviewed as per the physician's order.  The patient verbalized an understanding of all instructions.      Goals:  Patient to demonstrate ability to independently don and doff splint in clinic following IE.     Patient to demonstrate knowledge and understand of orthotic wear schedule and compliance with use of orthotic.     Plan: Focus on HEP and proper use of orthotic for positioning to ensure safe return to PLOF.

## 2024-03-18 ENCOUNTER — OFFICE VISIT (OUTPATIENT)
Age: 59
End: 2024-03-18
Payer: MEDICARE

## 2024-03-18 VITALS
SYSTOLIC BLOOD PRESSURE: 118 MMHG | WEIGHT: 171 LBS | HEIGHT: 68 IN | BODY MASS INDEX: 25.91 KG/M2 | HEART RATE: 65 BPM | DIASTOLIC BLOOD PRESSURE: 70 MMHG

## 2024-03-18 DIAGNOSIS — L60.2 OVERGROWN TOENAILS: Primary | ICD-10-CM

## 2024-03-18 PROCEDURE — 99202 OFFICE O/P NEW SF 15 MIN: CPT | Performed by: STUDENT IN AN ORGANIZED HEALTH CARE EDUCATION/TRAINING PROGRAM

## 2024-03-19 NOTE — PROGRESS NOTES
"This patient was seen on  3/18/24 .    My role is Foot , Ankle, and Wound Specialist    ASSESSMENT     Diagnoses and all orders for this visit:    Overgrown toenails         Problem List Items Addressed This Visit    None  Visit Diagnoses       Overgrown toenails    -  Primary          PLAN  -Mario, his caretaker, and I discussed his bilateral feet  -Toenails x 10 trimmed out of courtesy  -Return to clinic as needed for new or worsening podiatric concerns    SUBJECTIVE    Chief Complaint:  Overgrown toenails     Patient ID: Mario Rollins     3/18/2024: Mario is a pleasant 58-year-old male who presents today with his caregiver for routine nail care.  He denies any pain to his bilateral feet.        The following portions of the patient's history were reviewed and updated as appropriate: allergies, current medications, past family history, past medical history, past social history, past surgical history and problem list.    Review of Systems   Constitutional: Negative.    Respiratory: Negative.     Cardiovascular: Negative.    Gastrointestinal: Negative.    Genitourinary: Negative.          OBJECTIVE      /70   Pulse 65   Ht 5' 8\" (1.727 m)   Wt 77.6 kg (171 lb)   BMI 26.00 kg/m²        Physical Exam  Constitutional:       Appearance: Normal appearance.   HENT:      Head: Normocephalic and atraumatic.   Eyes:      General:         Right eye: No discharge.         Left eye: No discharge.   Cardiovascular:      Rate and Rhythm: Normal rate and regular rhythm.      Pulses:           Dorsalis pedis pulses are 2+ on the right side and 2+ on the left side.        Posterior tibial pulses are 2+ on the right side and 2+ on the left side.   Pulmonary:      Effort: Pulmonary effort is normal.      Breath sounds: Normal breath sounds.   Feet:      Right foot:      Toenail Condition: Right toenails are long.      Left foot:      Toenail Condition: Left toenails are long.   Skin:     General: Skin is warm.      " Capillary Refill: Capillary refill takes less than 2 seconds.   Neurological:      Mental Status: He is alert and oriented to person, place, and time.      Sensory: Sensation is intact. No sensory deficit.   Psychiatric:         Mood and Affect: Mood normal.

## 2024-04-06 ENCOUNTER — ANESTHESIA (INPATIENT)
Dept: PERIOP | Facility: HOSPITAL | Age: 59
End: 2024-04-06
Payer: MEDICARE

## 2024-04-06 ENCOUNTER — HOSPITAL ENCOUNTER (INPATIENT)
Facility: HOSPITAL | Age: 59
LOS: 9 days | Discharge: NURSING, REHAB OR LONG-TERM CARE | DRG: 330 | End: 2024-04-15
Attending: EMERGENCY MEDICINE | Admitting: SPECIALIST
Payer: MEDICARE

## 2024-04-06 ENCOUNTER — APPOINTMENT (EMERGENCY)
Dept: RADIOLOGY | Facility: HOSPITAL | Age: 59
DRG: 330 | End: 2024-04-06
Payer: MEDICARE

## 2024-04-06 ENCOUNTER — ANESTHESIA EVENT (INPATIENT)
Dept: PERIOP | Facility: HOSPITAL | Age: 59
End: 2024-04-06
Payer: MEDICARE

## 2024-04-06 DIAGNOSIS — K56.609 SMALL BOWEL OBSTRUCTION (HCC): Primary | ICD-10-CM

## 2024-04-06 DIAGNOSIS — K63.89 PNEUMATOSIS OF INTESTINES: ICD-10-CM

## 2024-04-06 DIAGNOSIS — G40.219 LOCALIZATION-RELATED SYMPTOMATIC EPILEPSY AND EPILEPTIC SYNDROMES WITH COMPLEX PARTIAL SEIZURES, INTRACTABLE, WITHOUT STATUS EPILEPTICUS (HCC): ICD-10-CM

## 2024-04-06 LAB
ALBUMIN SERPL BCP-MCNC: 4.4 G/DL (ref 3.5–5)
ALP SERPL-CCNC: 97 U/L (ref 34–104)
ALT SERPL W P-5'-P-CCNC: 14 U/L (ref 7–52)
ANION GAP SERPL CALCULATED.3IONS-SCNC: 11 MMOL/L (ref 4–13)
APTT PPP: 27 SECONDS (ref 23–37)
AST SERPL W P-5'-P-CCNC: 20 U/L (ref 13–39)
ATRIAL RATE: 83 BPM
BASOPHILS # BLD AUTO: 0.02 THOUSANDS/ÂΜL (ref 0–0.1)
BASOPHILS NFR BLD AUTO: 0 % (ref 0–1)
BILIRUB SERPL-MCNC: 0.56 MG/DL (ref 0.2–1)
BUN SERPL-MCNC: 20 MG/DL (ref 5–25)
CALCIUM SERPL-MCNC: 9.6 MG/DL (ref 8.4–10.2)
CHLORIDE SERPL-SCNC: 100 MMOL/L (ref 96–108)
CO2 SERPL-SCNC: 25 MMOL/L (ref 21–32)
CREAT SERPL-MCNC: 1.44 MG/DL (ref 0.6–1.3)
EOSINOPHIL # BLD AUTO: 0 THOUSAND/ÂΜL (ref 0–0.61)
EOSINOPHIL NFR BLD AUTO: 0 % (ref 0–6)
ERYTHROCYTE [DISTWIDTH] IN BLOOD BY AUTOMATED COUNT: 13.9 % (ref 11.6–15.1)
FLUAV RNA RESP QL NAA+PROBE: NEGATIVE
FLUBV RNA RESP QL NAA+PROBE: NEGATIVE
GFR SERPL CREATININE-BSD FRML MDRD: 53 ML/MIN/1.73SQ M
GLUCOSE SERPL-MCNC: 134 MG/DL (ref 65–140)
HCT VFR BLD AUTO: 57.9 % (ref 36.5–49.3)
HGB BLD-MCNC: 19.2 G/DL (ref 12–17)
IMM GRANULOCYTES # BLD AUTO: 0.05 THOUSAND/UL (ref 0–0.2)
IMM GRANULOCYTES NFR BLD AUTO: 0 % (ref 0–2)
INR PPP: 1.08 (ref 0.84–1.19)
LACTATE SERPL-SCNC: 1.1 MMOL/L (ref 0.5–2)
LIPASE SERPL-CCNC: <6 U/L (ref 11–82)
LYMPHOCYTES # BLD AUTO: 0.51 THOUSANDS/ÂΜL (ref 0.6–4.47)
LYMPHOCYTES NFR BLD AUTO: 4 % (ref 14–44)
MCH RBC QN AUTO: 28.8 PG (ref 26.8–34.3)
MCHC RBC AUTO-ENTMCNC: 33.2 G/DL (ref 31.4–37.4)
MCV RBC AUTO: 87 FL (ref 82–98)
MONOCYTES # BLD AUTO: 0.7 THOUSAND/ÂΜL (ref 0.17–1.22)
MONOCYTES NFR BLD AUTO: 6 % (ref 4–12)
NEUTROPHILS # BLD AUTO: 10.48 THOUSANDS/ÂΜL (ref 1.85–7.62)
NEUTS SEG NFR BLD AUTO: 90 % (ref 43–75)
NRBC BLD AUTO-RTO: 0 /100 WBCS
P AXIS: 63 DEGREES
PLATELET # BLD AUTO: 183 THOUSANDS/UL (ref 149–390)
PLATELET # BLD AUTO: 217 THOUSANDS/UL (ref 149–390)
PLATELET BLD QL SMEAR: ADEQUATE
PMV BLD AUTO: 10.1 FL (ref 8.9–12.7)
PMV BLD AUTO: 10.3 FL (ref 8.9–12.7)
POTASSIUM SERPL-SCNC: 3.6 MMOL/L (ref 3.5–5.3)
PR INTERVAL: 168 MS
PROT SERPL-MCNC: 7.9 G/DL (ref 6.4–8.4)
PROTHROMBIN TIME: 14.3 SECONDS (ref 11.6–14.5)
QRS AXIS: 28 DEGREES
QRSD INTERVAL: 90 MS
QT INTERVAL: 390 MS
QTC INTERVAL: 458 MS
RBC # BLD AUTO: 6.66 MILLION/UL (ref 3.88–5.62)
RBC MORPH BLD: NORMAL
RSV RNA RESP QL NAA+PROBE: NEGATIVE
SARS-COV-2 RNA RESP QL NAA+PROBE: NEGATIVE
SODIUM SERPL-SCNC: 136 MMOL/L (ref 135–147)
T WAVE AXIS: 46 DEGREES
VENTRICULAR RATE: 83 BPM
WBC # BLD AUTO: 11.76 THOUSAND/UL (ref 4.31–10.16)

## 2024-04-06 PROCEDURE — 0DN80ZZ RELEASE SMALL INTESTINE, OPEN APPROACH: ICD-10-PCS | Performed by: SPECIALIST

## 2024-04-06 PROCEDURE — 85730 THROMBOPLASTIN TIME PARTIAL: CPT | Performed by: PHYSICIAN ASSISTANT

## 2024-04-06 PROCEDURE — 0DQA0ZZ REPAIR JEJUNUM, OPEN APPROACH: ICD-10-PCS | Performed by: SPECIALIST

## 2024-04-06 PROCEDURE — 44005 FREEING OF BOWEL ADHESION: CPT | Performed by: PHYSICIAN ASSISTANT

## 2024-04-06 PROCEDURE — 85610 PROTHROMBIN TIME: CPT | Performed by: PHYSICIAN ASSISTANT

## 2024-04-06 PROCEDURE — 96361 HYDRATE IV INFUSION ADD-ON: CPT

## 2024-04-06 PROCEDURE — 83605 ASSAY OF LACTIC ACID: CPT | Performed by: PHYSICIAN ASSISTANT

## 2024-04-06 PROCEDURE — 44005 FREEING OF BOWEL ADHESION: CPT | Performed by: SPECIALIST

## 2024-04-06 PROCEDURE — 93005 ELECTROCARDIOGRAM TRACING: CPT

## 2024-04-06 PROCEDURE — 85025 COMPLETE CBC W/AUTO DIFF WBC: CPT | Performed by: PHYSICIAN ASSISTANT

## 2024-04-06 PROCEDURE — 99223 1ST HOSP IP/OBS HIGH 75: CPT | Performed by: SPECIALIST

## 2024-04-06 PROCEDURE — 96365 THER/PROPH/DIAG IV INF INIT: CPT

## 2024-04-06 PROCEDURE — 99285 EMERGENCY DEPT VISIT HI MDM: CPT

## 2024-04-06 PROCEDURE — 87081 CULTURE SCREEN ONLY: CPT | Performed by: SPECIALIST

## 2024-04-06 PROCEDURE — 0DS80ZZ REPOSITION SMALL INTESTINE, OPEN APPROACH: ICD-10-PCS | Performed by: SPECIALIST

## 2024-04-06 PROCEDURE — C1765 ADHESION BARRIER: HCPCS | Performed by: SPECIALIST

## 2024-04-06 PROCEDURE — 0241U HB NFCT DS VIR RESP RNA 4 TRGT: CPT | Performed by: PHYSICIAN ASSISTANT

## 2024-04-06 PROCEDURE — 85049 AUTOMATED PLATELET COUNT: CPT | Performed by: SPECIALIST

## 2024-04-06 PROCEDURE — 96375 TX/PRO/DX INJ NEW DRUG ADDON: CPT

## 2024-04-06 PROCEDURE — 99285 EMERGENCY DEPT VISIT HI MDM: CPT | Performed by: PHYSICIAN ASSISTANT

## 2024-04-06 PROCEDURE — 83690 ASSAY OF LIPASE: CPT | Performed by: PHYSICIAN ASSISTANT

## 2024-04-06 PROCEDURE — 74177 CT ABD & PELVIS W/CONTRAST: CPT

## 2024-04-06 PROCEDURE — 36415 COLL VENOUS BLD VENIPUNCTURE: CPT | Performed by: PHYSICIAN ASSISTANT

## 2024-04-06 PROCEDURE — 87040 BLOOD CULTURE FOR BACTERIA: CPT | Performed by: PHYSICIAN ASSISTANT

## 2024-04-06 PROCEDURE — 80053 COMPREHEN METABOLIC PANEL: CPT | Performed by: PHYSICIAN ASSISTANT

## 2024-04-06 RX ORDER — SODIUM CHLORIDE, SODIUM LACTATE, POTASSIUM CHLORIDE, CALCIUM CHLORIDE 600; 310; 30; 20 MG/100ML; MG/100ML; MG/100ML; MG/100ML
125 INJECTION, SOLUTION INTRAVENOUS CONTINUOUS
Status: DISCONTINUED | OUTPATIENT
Start: 2024-04-06 | End: 2024-04-09

## 2024-04-06 RX ORDER — FENTANYL CITRATE/PF 50 MCG/ML
50 SYRINGE (ML) INJECTION
Status: DISCONTINUED | OUTPATIENT
Start: 2024-04-06 | End: 2024-04-06 | Stop reason: HOSPADM

## 2024-04-06 RX ORDER — SODIUM CHLORIDE 9 MG/ML
125 INJECTION, SOLUTION INTRAVENOUS CONTINUOUS
Status: DISCONTINUED | OUTPATIENT
Start: 2024-04-06 | End: 2024-04-06

## 2024-04-06 RX ORDER — CHLORHEXIDINE GLUCONATE 4 G/100ML
SOLUTION TOPICAL DAILY PRN
Status: CANCELLED | OUTPATIENT
Start: 2024-04-06

## 2024-04-06 RX ORDER — DEXAMETHASONE SODIUM PHOSPHATE 10 MG/ML
INJECTION, SOLUTION INTRAMUSCULAR; INTRAVENOUS AS NEEDED
Status: DISCONTINUED | OUTPATIENT
Start: 2024-04-06 | End: 2024-04-06

## 2024-04-06 RX ORDER — LEVOFLOXACIN 5 MG/ML
500 INJECTION, SOLUTION INTRAVENOUS EVERY 24 HOURS
Status: COMPLETED | OUTPATIENT
Start: 2024-04-06 | End: 2024-04-06

## 2024-04-06 RX ORDER — METRONIDAZOLE 500 MG/100ML
500 INJECTION, SOLUTION INTRAVENOUS ONCE
Status: COMPLETED | OUTPATIENT
Start: 2024-04-06 | End: 2024-04-06

## 2024-04-06 RX ORDER — ONDANSETRON 2 MG/ML
4 INJECTION INTRAMUSCULAR; INTRAVENOUS EVERY 6 HOURS PRN
Status: DISCONTINUED | OUTPATIENT
Start: 2024-04-06 | End: 2024-04-15 | Stop reason: HOSPADM

## 2024-04-06 RX ORDER — ONDANSETRON 2 MG/ML
4 INJECTION INTRAMUSCULAR; INTRAVENOUS ONCE
Status: COMPLETED | OUTPATIENT
Start: 2024-04-06 | End: 2024-04-06

## 2024-04-06 RX ORDER — ONDANSETRON 2 MG/ML
INJECTION INTRAMUSCULAR; INTRAVENOUS AS NEEDED
Status: DISCONTINUED | OUTPATIENT
Start: 2024-04-06 | End: 2024-04-06

## 2024-04-06 RX ORDER — HYDROMORPHONE HCL/PF 1 MG/ML
0.5 SYRINGE (ML) INJECTION EVERY 4 HOURS PRN
Status: DISCONTINUED | OUTPATIENT
Start: 2024-04-06 | End: 2024-04-13

## 2024-04-06 RX ORDER — ONDANSETRON 2 MG/ML
4 INJECTION INTRAMUSCULAR; INTRAVENOUS ONCE AS NEEDED
Status: DISCONTINUED | OUTPATIENT
Start: 2024-04-06 | End: 2024-04-06 | Stop reason: HOSPADM

## 2024-04-06 RX ORDER — SUCCINYLCHOLINE/SOD CL,ISO/PF 100 MG/5ML
SYRINGE (ML) INTRAVENOUS AS NEEDED
Status: DISCONTINUED | OUTPATIENT
Start: 2024-04-06 | End: 2024-04-06

## 2024-04-06 RX ORDER — PROPOFOL 10 MG/ML
INJECTION, EMULSION INTRAVENOUS AS NEEDED
Status: DISCONTINUED | OUTPATIENT
Start: 2024-04-06 | End: 2024-04-06

## 2024-04-06 RX ORDER — FENTANYL CITRATE 50 UG/ML
INJECTION, SOLUTION INTRAMUSCULAR; INTRAVENOUS AS NEEDED
Status: DISCONTINUED | OUTPATIENT
Start: 2024-04-06 | End: 2024-04-06

## 2024-04-06 RX ORDER — HEPARIN SODIUM 5000 [USP'U]/ML
5000 INJECTION, SOLUTION INTRAVENOUS; SUBCUTANEOUS EVERY 8 HOURS SCHEDULED
Status: DISCONTINUED | OUTPATIENT
Start: 2024-04-06 | End: 2024-04-15 | Stop reason: HOSPADM

## 2024-04-06 RX ORDER — CEFAZOLIN SODIUM 2 G/50ML
2000 SOLUTION INTRAVENOUS ONCE
Status: DISCONTINUED | OUTPATIENT
Start: 2024-04-06 | End: 2024-04-06 | Stop reason: HOSPADM

## 2024-04-06 RX ORDER — SODIUM CHLORIDE, SODIUM LACTATE, POTASSIUM CHLORIDE, CALCIUM CHLORIDE 600; 310; 30; 20 MG/100ML; MG/100ML; MG/100ML; MG/100ML
INJECTION, SOLUTION INTRAVENOUS CONTINUOUS PRN
Status: DISCONTINUED | OUTPATIENT
Start: 2024-04-06 | End: 2024-04-06

## 2024-04-06 RX ORDER — MAGNESIUM HYDROXIDE 1200 MG/15ML
LIQUID ORAL AS NEEDED
Status: DISCONTINUED | OUTPATIENT
Start: 2024-04-06 | End: 2024-04-06 | Stop reason: HOSPADM

## 2024-04-06 RX ORDER — HYDROMORPHONE HCL/PF 1 MG/ML
0.5 SYRINGE (ML) INJECTION ONCE
Status: COMPLETED | OUTPATIENT
Start: 2024-04-06 | End: 2024-04-06

## 2024-04-06 RX ORDER — ROCURONIUM BROMIDE 10 MG/ML
INJECTION, SOLUTION INTRAVENOUS AS NEEDED
Status: DISCONTINUED | OUTPATIENT
Start: 2024-04-06 | End: 2024-04-06

## 2024-04-06 RX ORDER — LAMOTRIGINE 25 MG/1
50 TABLET ORAL DAILY
Status: DISCONTINUED | OUTPATIENT
Start: 2024-04-07 | End: 2024-04-07

## 2024-04-06 RX ORDER — LIDOCAINE HYDROCHLORIDE 10 MG/ML
INJECTION, SOLUTION EPIDURAL; INFILTRATION; INTRACAUDAL; PERINEURAL AS NEEDED
Status: DISCONTINUED | OUTPATIENT
Start: 2024-04-06 | End: 2024-04-06

## 2024-04-06 RX ORDER — ACETAMINOPHEN 650 MG/1
650 SUPPOSITORY RECTAL EVERY 4 HOURS PRN
Status: DISCONTINUED | OUTPATIENT
Start: 2024-04-06 | End: 2024-04-13

## 2024-04-06 RX ADMIN — FENTANYL CITRATE 50 MCG: 50 INJECTION, SOLUTION INTRAMUSCULAR; INTRAVENOUS at 15:47

## 2024-04-06 RX ADMIN — HEPARIN SODIUM 5000 UNITS: 5000 INJECTION, SOLUTION INTRAVENOUS; SUBCUTANEOUS at 21:49

## 2024-04-06 RX ADMIN — FENTANYL CITRATE 25 MCG: 50 INJECTION, SOLUTION INTRAMUSCULAR; INTRAVENOUS at 16:32

## 2024-04-06 RX ADMIN — FENTANYL CITRATE 50 MCG: 50 INJECTION, SOLUTION INTRAMUSCULAR; INTRAVENOUS at 16:11

## 2024-04-06 RX ADMIN — SODIUM CHLORIDE 1000 ML: 0.9 INJECTION, SOLUTION INTRAVENOUS at 10:35

## 2024-04-06 RX ADMIN — METRONIDAZOLE 500 MG: 500 INJECTION, SOLUTION INTRAVENOUS at 20:25

## 2024-04-06 RX ADMIN — LEVOFLOXACIN 500 MG: 5 INJECTION, SOLUTION INTRAVENOUS at 19:23

## 2024-04-06 RX ADMIN — PROPOFOL 150 MG: 10 INJECTION, EMULSION INTRAVENOUS at 15:33

## 2024-04-06 RX ADMIN — FENTANYL CITRATE 25 MCG: 50 INJECTION, SOLUTION INTRAMUSCULAR; INTRAVENOUS at 16:39

## 2024-04-06 RX ADMIN — Medication 140 MG: at 15:33

## 2024-04-06 RX ADMIN — ROCURONIUM BROMIDE 40 MG: 10 INJECTION, SOLUTION INTRAVENOUS at 15:47

## 2024-04-06 RX ADMIN — HYDROMORPHONE HYDROCHLORIDE 0.5 MG: 1 INJECTION, SOLUTION INTRAMUSCULAR; INTRAVENOUS; SUBCUTANEOUS at 22:17

## 2024-04-06 RX ADMIN — MORPHINE SULFATE 2 MG: 2 INJECTION, SOLUTION INTRAMUSCULAR; INTRAVENOUS at 20:12

## 2024-04-06 RX ADMIN — SODIUM CHLORIDE, SODIUM LACTATE, POTASSIUM CHLORIDE, AND CALCIUM CHLORIDE 125 ML/HR: .6; .31; .03; .02 INJECTION, SOLUTION INTRAVENOUS at 18:57

## 2024-04-06 RX ADMIN — HYDROMORPHONE HYDROCHLORIDE 0.5 MG: 1 INJECTION, SOLUTION INTRAMUSCULAR; INTRAVENOUS; SUBCUTANEOUS at 13:43

## 2024-04-06 RX ADMIN — PIPERACILLIN AND TAZOBACTAM 4.5 G: 4; .5 INJECTION, POWDER, FOR SOLUTION INTRAVENOUS at 13:22

## 2024-04-06 RX ADMIN — DEXAMETHASONE SODIUM PHOSPHATE 10 MG: 10 INJECTION, SOLUTION INTRAMUSCULAR; INTRAVENOUS at 15:36

## 2024-04-06 RX ADMIN — IOHEXOL 100 ML: 350 INJECTION, SOLUTION INTRAVENOUS at 12:12

## 2024-04-06 RX ADMIN — SODIUM CHLORIDE 1000 ML: 0.9 INJECTION, SOLUTION INTRAVENOUS at 13:44

## 2024-04-06 RX ADMIN — SODIUM CHLORIDE, SODIUM LACTATE, POTASSIUM CHLORIDE, AND CALCIUM CHLORIDE: .6; .31; .03; .02 INJECTION, SOLUTION INTRAVENOUS at 15:15

## 2024-04-06 RX ADMIN — ONDANSETRON 4 MG: 2 INJECTION INTRAMUSCULAR; INTRAVENOUS at 16:12

## 2024-04-06 RX ADMIN — SUGAMMADEX 200 MG: 100 INJECTION, SOLUTION INTRAVENOUS at 16:31

## 2024-04-06 RX ADMIN — ONDANSETRON 4 MG: 2 INJECTION INTRAMUSCULAR; INTRAVENOUS at 13:37

## 2024-04-06 RX ADMIN — FENTANYL CITRATE 50 MCG: 50 INJECTION, SOLUTION INTRAMUSCULAR; INTRAVENOUS at 15:33

## 2024-04-06 RX ADMIN — LIDOCAINE HYDROCHLORIDE 50 MG: 10 INJECTION, SOLUTION EPIDURAL; INFILTRATION; INTRACAUDAL; PERINEURAL at 15:33

## 2024-04-06 RX ADMIN — SUGAMMADEX 100 MG: 100 INJECTION, SOLUTION INTRAVENOUS at 16:34

## 2024-04-06 RX ADMIN — LAMOTRIGINE 150 MG: 100 TABLET ORAL at 20:04

## 2024-04-06 NOTE — ED PROVIDER NOTES
History  No chief complaint on file.    59 y/o male, sent from [facility], for evaluation of left sided abdominal pain over the past few days. Patient reports multiple episodes of N/V and believes he had an episode of diarrhea. No changes in urination. States he had a BM today, cannot tell me if he is passing gas. Denies back pain, cough, congestion, fevers, changes in urination, etc.         Prior to Admission Medications   Prescriptions Last Dose Informant Patient Reported? Taking?   Dextromethorphan-guaiFENesin (ROBITUSSIN DM SUGAR FREE PO)   Yes No   Sig: Take by mouth 2 tsp q4hrs PRN   QUEtiapine (SEROquel) 50 mg tablet   Yes No   Sig: Take 50 mg by mouth daily at bedtime    Reguloid 25 % POWD   No No   Sig: TAKE 1 TEASPOONFUL MIXED WITH 8 OUNCES OF WATER ORALLY EVERY DAY AT 8AM   Senna-Tabs 8.6 MG tablet   No No   Sig: TAKE ONE TABLET BY MOUTH DAILY AT 8AM FOR CONSTIPATION   acetaminophen (TYLENOL) 325 mg tablet   No No   Sig: Take 1 tablet (325 mg total) by mouth every 6 (six) hours as needed for mild pain, moderate pain or headaches   atorvastatin (LIPITOR) 10 mg tablet   No No   Sig: Take 1 tablet (10 mg total) by mouth daily Take at 8 PM   ibuprofen (MOTRIN) 200 mg tablet   Yes No   Sig: Take 400 mg by mouth every 6 (six) hours as needed for mild pain   lamoTRIgine (LaMICtal) 150 MG tablet   No No   Sig: Take 1 tab by mouth twice a day at 8AM and 8PM   lamoTRIgine (LaMICtal) 25 mg tablet   No No   Sig: TAKE TWO TABLETS BY MOUTH DAILY AT 8AM WITH 150MG TO TOTAL 200MG   loperamide (IMODIUM A-D) 2 MG tablet   Yes No   Sig: Take 2 mg by mouth 4 (four) times a day as needed for diarrhea   loratadine (CLARITIN) 10 mg tablet   Yes No   Sig: Take 10 mg by mouth daily as needed for allergies   magnesium hydroxide (MILK OF MAGNESIA) 400 mg/5 mL oral suspension   Yes No   Sig: Take by mouth   nebivolol (BYSTOLIC) 2.5 mg tablet   Yes No   sertraline (ZOLOFT) 50 mg tablet   Yes No   Sig: Take by mouth daily       Facility-Administered Medications: None       Past Medical History:   Diagnosis Date    Ambulatory dysfunction     Anxiety     Bilateral impacted cerumen     last assessed 05/30/2013    Capsulitis     last assessed 04/26/2016    Cellulitis of finger 01/13/2012    Chronic kidney disease     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)     Closed fracture of one or more phalanges of foot 01/06/2009    Constipation     Deformity     left and right foot and ankle ,right hand    Depression     Epilepsy (HCC)     Erythrocytosis     Hemiplegia affecting dominant side (HCC)     Hypertension     Hypoglycemia 11/08/2011    Hypokalemia     last assessed 05/30/2013    Mental retardation     Mood disorder (HCC)     Nephrocalcinosis        Past Surgical History:   Procedure Laterality Date    BRAIN SURGERY      MN ARTHROCENTESIS ASPIR&/INJ MAJOR JT/BURSA W/O US Bilateral 5/27/2021    Procedure: Hip intra-articular corticosteroid injection ( 26010 91655-95);  Surgeon: Lianet Astorga MD;  Location: [facility] MAIN OR;  Service: Pain Management     MN COLONOSCOPY FLX DX W/COLLJ SPEC WHEN PFRMD N/A 2/12/2016    Procedure: COLONOSCOPY;  Surgeon: Abhilash Ace MD;  Location: [facility] GI LAB;  Service: Gastroenterology    MN INJECT SI JOINT ARTHRGRPHY&/ANES/STEROID W/RIMA Left 3/22/2023    Procedure: SACROILIAC joint injection (82187 );  Surgeon: Larry Smith DO;  Location: [facility] MAIN OR;  Service: Pain Management        Family History   Problem Relation Age of Onset    Emphysema Mother     Nephrolithiasis Mother     Heart attack Father         acute MI    Hypertension Father     Nephrolithiasis Father     Nephrolithiasis Brother      I have reviewed and agree with the history as documented.    E-Cigarette/Vaping    E-Cigarette Use Never User      E-Cigarette/Vaping Substances    Nicotine No     THC No     CBD No     Flavoring No     Other No     Unknown No      Social History     Tobacco Use    Smoking status: Never    Smokeless  tobacco: Never   Vaping Use    Vaping status: Never Used   Substance Use Topics    Alcohol use: No    Drug use: No       Review of Systems   Constitutional: Negative.  Negative for chills, fatigue and fever.   HENT: Negative.  Negative for congestion, postnasal drip, rhinorrhea and sore throat.    Eyes: Negative.    Respiratory: Negative.  Negative for cough, shortness of breath and wheezing.    Cardiovascular: Negative.    Gastrointestinal:  Positive for abdominal pain, diarrhea, nausea and vomiting. Negative for abdominal distention, anal bleeding, blood in stool, constipation and rectal pain.   Endocrine: Negative.    Genitourinary: Negative.    Musculoskeletal: Negative.    Skin: Negative.    Neurological: Negative.    Hematological: Negative.    Psychiatric/Behavioral: Negative.     All other systems reviewed and are negative.      Physical Exam  Physical Exam  Vitals and nursing note reviewed. Exam conducted with a chaperone present.   Constitutional:       General: He is not in acute distress.     Appearance: Normal appearance. He is well-developed and normal weight. He is not diaphoretic.   HENT:      Head: Normocephalic and atraumatic.      Right Ear: External ear normal.      Left Ear: External ear normal.      Nose: Nose normal.      Mouth/Throat:      Pharynx: No oropharyngeal exudate.   Eyes:      General: No scleral icterus.        Right eye: No discharge.         Left eye: No discharge.      Conjunctiva/sclera: Conjunctivae normal.      Pupils: Pupils are equal, round, and reactive to light.   Cardiovascular:      Rate and Rhythm: Normal rate and regular rhythm.      Pulses: Normal pulses.      Heart sounds: Normal heart sounds. No murmur heard.     No friction rub. No gallop.   Pulmonary:      Effort: Pulmonary effort is normal. No respiratory distress.      Breath sounds: Normal breath sounds. No stridor. No wheezing, rhonchi or rales.   Chest:      Chest wall: No tenderness.   Abdominal:       General: Bowel sounds are normal. There is no distension.      Palpations: Abdomen is soft. There is no mass.      Tenderness: There is abdominal tenderness. There is no right CVA tenderness, left CVA tenderness, guarding or rebound.      Hernia: No hernia is present.       Musculoskeletal:      Cervical back: Normal range of motion and neck supple.   Lymphadenopathy:      Cervical: No cervical adenopathy.   Skin:     General: Skin is warm and dry.      Capillary Refill: Capillary refill takes less than 2 seconds.      Coloration: Skin is not pale.      Findings: No erythema or rash.   Neurological:      General: No focal deficit present.      Mental Status: He is alert and oriented to person, place, and time. Mental status is at baseline.   Psychiatric:         Thought Content: Thought content normal.         Judgment: Judgment normal.         Vital Signs  ED Triage Vitals   Temp Pulse Resp BP SpO2   -- -- -- -- --      Temp src Heart Rate Source Patient Position - Orthostatic VS BP Location FiO2 (%)   -- -- -- -- --      Pain Score       --           There were no vitals filed for this visit.      Visual Acuity      ED Medications  Medications   sodium chloride 0.9 % bolus 1,000 mL (has no administration in time range)       Diagnostic Studies  Results Reviewed       Procedure Component Value Units Date/Time    CBC and differential [[id number]]     Lab Status: No result Specimen: Blood     Comprehensive metabolic panel [[id number]]     Lab Status: No result Specimen: Blood     Lipase [[id number]]     Lab Status: No result Specimen: Blood     UA w Reflex to Microscopic w Reflex to Culture [[id number]]     Lab Status: No result Specimen: Urine     FLU/RSV/COVID - if FLU/RSV clinically relevant [[id number]]     Lab Status: No result Specimen: Nares from Nose                    CT abdomen pelvis with contrast    (Results Pending)              Procedures  Procedures         ED Course  ED Course as of 04/06/24 1326   Sat Apr  06, 2024   1131 Hemoglobin(!): 19.2  Elevated hemoglobin, patient has history of hemochromatosis.   1131 Creatinine(!): 1.44  Creatinine is elevated today at 1.44, compared to 10/10/23 this is elevated.    1256 Messaged Dr. Carter, general surgery    1310 Discussed case with Dr. Woods who is coming in. He would like [facility]   1316 Updated patient who according to group home makes his own medical decisions.   1324 Calling patient's group home-the one listed is no longer patient's group home.  Patient goes to [facility] and [facility] [place], attempted to reach without any answer. Group home number: [phone]. Nursing staff had called earlier and was told that patient can make his own decisions.                                             Medical Decision Making  High grade bowel obstruction with pneumatosis- discussed with Dr. Woods who took patient directly to the OR.  Lactic was normal at 1.1. patient. Please refer to patient's ED course for further documentation.     Amount and/or Complexity of Data Reviewed  Labs: ordered. Decision-making details documented in ED Course.  Radiology: ordered.    Risk  Prescription drug management.  Decision regarding hospitalization.             Disposition  Final diagnoses:   None     ED Disposition       None          Follow-up Information    None         Patient's Medications   Discharge Prescriptions    No medications on file       No discharge procedures on file.    PDMP Review       None            ED Provider  Electronically Signed by             Alba Braxton PA-C  04/06/24 2643

## 2024-04-06 NOTE — ANESTHESIA PREPROCEDURE EVALUATION
Procedure:  LAPAROTOMY EXPLORATORY W/ BOWEL RESECTION (Abdomen)    Relevant Problems   ANESTHESIA (within normal limits)      CARDIO   (+) Hyperlipidemia      GI/HEPATIC   (+) 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)   (+) Small bowel obstruction (HCC)      /RENAL   (+) 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 stage 4 chronic kidney disease   (+) Nephrocalcinosis      MUSCULOSKELETAL   (+) Chronic left-sided low back pain with left-sided sciatica   (+) Sacroiliitis (HCC)      NEURO/PSYCH   (+) Chronic left-sided low back pain with left-sided sciatica   (+) Chronic pain syndrome   (+) Localization-related symptomatic epilepsy and epileptic syndromes with complex partial seizures, intractable, without status epilepticus (HCC)      PULMONARY (within normal limits)        Physical Exam    Airway    Mallampati score: III  TM Distance: >3 FB  Neck ROM: full     Dental   No notable dental hx     Cardiovascular  Rhythm: regular, Rate: normal    Pulmonary   Breath sounds clear to auscultation    Other Findings        Anesthesia Plan  ASA Score- 3 Emergent    Anesthesia Type- general with ASA Monitors.         Additional Monitors:     Airway Plan: ETT.           Plan Factors-    Chart reviewed. EKG reviewed. Imaging results reviewed. Existing labs reviewed. Patient summary reviewed.    Patient is not a current smoker.              Induction- intravenous and rapid sequence induction.    Postoperative Plan- Plan for postoperative opioid use. Planned trial extubation    Informed Consent- Anesthetic plan and risks discussed with patient and sibling.  I personally reviewed this patient with the CRNA. Discussed and agreed on the Anesthesia Plan with the CRNA..

## 2024-04-06 NOTE — ED NOTES
Patient attempting to urinate to collect specimen. RN prop urinal.     Daksha Malhotra RN  04/06/24 1634

## 2024-04-06 NOTE — PLAN OF CARE
Problem: GASTROINTESTINAL - ADULT  Goal: Minimal or absence of nausea and/or vomiting  Description: INTERVENTIONS:  - Administer IV fluids if ordered to ensure adequate hydration  - Maintain NPO status until nausea and vomiting are resolved  - Nasogastric tube if ordered  - Administer ordered antiemetic medications as needed  - Provide nonpharmacologic comfort measures as appropriate  - Advance diet as tolerated, if ordered  - Consider nutrition services referral to assist patient with adequate nutrition and appropriate food choices  Outcome: Progressing  Goal: Maintains or returns to baseline bowel function  Description: INTERVENTIONS:  - Assess bowel function  - Encourage oral fluids to ensure adequate hydration  - Administer IV fluids if ordered to ensure adequate hydration  - Administer ordered medications as needed  - Encourage mobilization and activity  - Consider nutritional services referral to assist patient with adequate nutrition and appropriate food choices  Outcome: Progressing  Goal: Maintains adequate nutritional intake  Description: INTERVENTIONS:  - Monitor percentage of each meal consumed  - Identify factors contributing to decreased intake, treat as appropriate  - Assist with meals as needed  - Monitor I&O, weight, and lab values if indicated  - Obtain nutrition services referral as needed  Outcome: Progressing  Goal: Establish and maintain optimal ostomy function  Description: INTERVENTIONS:  - Assess bowel function  - Encourage oral fluids to ensure adequate hydration  - Administer IV fluids if ordered to ensure adequate hydration   - Administer ordered medications as needed  - Encourage mobilization and activity  - Nutrition services referral to assist patient with appropriate food choices  - Assess stoma site  - Consider wound care consult   Outcome: Progressing  Goal: Oral mucous membranes remain intact  Description: INTERVENTIONS  - Assess oral mucosa and hygiene practices  - Implement  preventative oral hygiene regimen  - Implement oral medicated treatments as ordered  - Initiate Nutrition services referral as needed  Outcome: Progressing     Problem: GENITOURINARY - ADULT  Goal: Maintains or returns to baseline urinary function  Description: INTERVENTIONS:  - Assess urinary function  - Encourage oral fluids to ensure adequate hydration if ordered  - Administer IV fluids as ordered to ensure adequate hydration  - Administer ordered medications as needed  - Offer frequent toileting  - Follow urinary retention protocol if ordered  Outcome: Progressing  Goal: Absence of urinary retention  Description: INTERVENTIONS:  - Assess patient’s ability to void and empty bladder  - Monitor I/O  - Bladder scan as needed  - Discuss with physician/AP medications to alleviate retention as needed  - Discuss catheterization for long term situations as appropriate  Outcome: Progressing  Goal: Urinary catheter remains patent  Description: INTERVENTIONS:  - Assess patency of urinary catheter  - If patient has a chronic dyer, consider changing catheter if non-functioning  - Follow guidelines for intermittent irrigation of non-functioning urinary catheter  Outcome: Progressing     Problem: METABOLIC, FLUID AND ELECTROLYTES - ADULT  Goal: Electrolytes maintained within normal limits  Description: INTERVENTIONS:  - Monitor labs and assess patient for signs and symptoms of electrolyte imbalances  - Administer electrolyte replacement as ordered  - Monitor response to electrolyte replacements, including repeat lab results as appropriate  - Instruct patient on fluid and nutrition as appropriate  Outcome: Progressing  Goal: Fluid balance maintained  Description: INTERVENTIONS:  - Monitor labs   - Monitor I/O and WT  - Instruct patient on fluid and nutrition as appropriate  - Assess for signs & symptoms of volume excess or deficit  Outcome: Progressing  Goal: Glucose maintained within target range  Description:  INTERVENTIONS:  - Monitor Blood Glucose as ordered  - Assess for signs and symptoms of hyperglycemia and hypoglycemia  - Administer ordered medications to maintain glucose within target range  - Assess nutritional intake and initiate nutrition service referral as needed  Outcome: Progressing     Problem: MUSCULOSKELETAL - ADULT  Goal: Maintain or return mobility to safest level of function  Description: INTERVENTIONS:  - Assess patient's ability to carry out ADLs; assess patient's baseline for ADL function and identify physical deficits which impact ability to perform ADLs (bathing, care of mouth/teeth, toileting, grooming, dressing, etc.)  - Assess/evaluate cause of self-care deficits   - Assess range of motion  - Assess patient's mobility  - Assess patient's need for assistive devices and provide as appropriate  - Encourage maximum independence but intervene and supervise when necessary  - Involve family in performance of ADLs  - Assess for home care needs following discharge   - Consider OT consult to assist with ADL evaluation and planning for discharge  - Provide patient education as appropriate  Outcome: Progressing  Goal: Maintain proper alignment of affected body part  Description: INTERVENTIONS:  - Support, maintain and protect limb and body alignment  - Provide patient/ family with appropriate education  Outcome: Progressing

## 2024-04-06 NOTE — ANESTHESIA POSTPROCEDURE EVALUATION
Post-Op Assessment Note    CV Status:  Stable  Pain Score: 2    Pain management: adequate       Mental Status:  Alert and awake   Hydration Status:  Euvolemic   PONV Controlled:  Controlled   Airway Patency:  Patent     Post Op Vitals Reviewed: Yes    No anethesia notable event occurred.    Staff: Anesthesiologist               BP   137/77   Temp      Pulse  87   Resp   14   SpO2   98

## 2024-04-06 NOTE — ED NOTES
Unable to void still at this time even with a liter of fluids given earlier, will let provider know     Daksha Malhotra RN  04/06/24 [id number]

## 2024-04-06 NOTE — OP NOTE
General SurgeryLAPAROTOMY EXPLORATORY WITH LYSIS OF ADHESIONS AND DECOMPRESSION  Op Note    Mario CALERO Ayo  4/6/2024    Pre-op Diagnosis:   Small bowel obstruction (HCC) [K56.609]  Pneumatosis of intestines [K63.89]  PMH:  Past Medical History:   Diagnosis Date    Ambulatory dysfunction     Anxiety     Bilateral impacted cerumen     last assessed 05/30/2013    Capsulitis     last assessed 04/26/2016    Cellulitis of finger 01/13/2012    Chronic kidney disease     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)     Closed fracture of one or more phalanges of foot 01/06/2009    Constipation     Deformity     left and right foot and ankle ,right hand    Depression     Epilepsy (HCC)     Erythrocytosis     Hemiplegia affecting dominant side (HCC)     Hypertension     Hypoglycemia 11/08/2011    Hypokalemia     last assessed 05/30/2013    Mental retardation     Mood disorder (HCC)     Nephrocalcinosis        Post-op Diagnosis:  Post-Op Diagnosis Codes:     * Small bowel obstruction (HCC) [K56.609]     * Pneumatosis of intestines [K63.89]    Patient Active Problem List   Diagnosis    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    Acquired deformity of right ankle and foot    Constipation    Cerebral palsy (HCC)    Nephrocalcinosis    Class 1 obesity with body mass index (BMI) of 30.0 to 30.9 in adult    Hyperlipidemia    Lumbar radiculopathy    Spinal stenosis of lumbar region    Chronic left-sided low back pain with left-sided sciatica    Chronic pain syndrome    Paronychia of toenail    Acquired deformity of right hand    Acquired hallux rigidus of left foot    Acquired valgus deformity of right ankle    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 stage 4 chronic kidney disease    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)    Right spastic hemiparesis (HCC)    Encounter for hearing examination    Localization-related symptomatic epilepsy and epileptic syndromes with complex partial  seizures, intractable, without status epilepticus (HCC)    Sacroiliitis (HCC)    Small bowel obstruction (HCC)    Pneumatosis of intestines       Procedure(s):  LAPAROTOMY EXPLORATORY WITH LYSIS OF ADHESIONS AND DECOMPRESSION    Surgeon(s):  MD Morena Fatima PA-C    Anesthesia:  General    Assistant:  Morena Broderick PA-C  Services of [facility] was utilized as a first assistant as there is no surgical residency program at [facility]    Staff:   Circulator: Mykel Lee RN  Scrub Person: Aron Rand    Operative Findings:  Volvulus of the small bowel  Collapsed cecum and transverse colon  Markedly dilated thick walled distal small bowel  Adhesions from terminal ileum to retroperitoneum causing kink      OPERATIVE TECHNIQUE    Mario Rollins was identified by me. The risks, benefits, alternatives,and probabilities of success were discussed in detail with no guarantee made as to outcome. All questions were answered to the patient's satisfaction. Site was marked prior coming to OR  Operative site     Mario Rollins was given pre-operative antibiotics. There is no height or weight on file to calculate BMI. Mario Rollins is ASA 3E and Fire risk- 3. Mario Rollins was re-identified in the OR. Site was identified and detailed timeout was performed with Anesthesia and OR staff.    Anterior abdominal was exposed. Anterior abdominal was painted with the ChloraPrep and draped in the usual sterile fashion.  Midline incision was made the skin and subcutaneous tissue was cut along the line of incision proper hemostasis was achieved with Bovie cautery external oblique/rectus sheath in midline was cut in the midline peritoneum was grasped with two grasper and was retracted upward the incision was made with a scalpel and extended superiorly inferiorly with the Bovie cautery safeguarding the bowel patient has massively dilated small bowel up to the terminal ileum after  externalizing the transverse colon which is collapsed and omentum the small bowel was delivered through the midline incision due to massive dilatation they just popped out from the belly proximal bowel was dilated and distal bowel were markedly thickened and hemorrhagic looking large volume of fluid which was turbid aspirated from right and left iliac fossa lesion was made that bowel so distended it cannot be pushed back into the abdominal cavity and cannot be explored without causing injury or perforation a posting sutures were placed and antimesenteric surface at the mid jejunum with a stab incision the small opening was made which was enlarged with a hemostat and a pool suction was introduced through this opening all the proximal bowel was aspirated and sucked out large volume of intestinal succus and fluid were tests and air was sucked out patient device was partially pulled and advanced to over the distal bowel and all the fluid was sucked out so the bowel completely collapsed Yankauer suction was removed and pursestring suture was tied with 3-0 silk and easily explored distal small bowel volvulus was noted which was derotated around the adhesion which was in the pelvis arising from terminal ileum which was cut with Metzenbaum scissors the area was inspected no active bleeding was noted all adhesions were released    abdominal cavity was explored small bowel was explored from DJ to ileocecal valve dilated loops of bowel and collapsed cecum some of the fluid contents were pushed into the cecum which was filled with air transverse colon descending colon and sigmoid colon was palpated no abnormality or mass was identified     Enterotomy site was then which was closed with pursestring was again reinforced with interrupted 3-0 suture and the suture line was buried with the Lembert sutures  .  No active bleeding was noted abdominal cavity was thoroughly lavaged with copious amounts of fluid for irrigation G-tube was  checked was noted to be in the stomach the stomach was massively dilated which was sucked out all the fluid for irrigation was sucked out and abdominal cavity was then covered with Seprafilm which was placed over the omentum covering the small bowel. Abdominal cavity was closed in single layer with PDS 0 loop on sutures 2 sutures were used 1 from the upper end of the incision and 1 from the lower end of the incision.  And skin was approximated with skin stapling device     Mario Rollins tolerated the procedure well, remain stable during and after the procedure. At the end of the procedure No active bleeding was noted and all the instruments, needle and sponge counts were noted to be correct. Patient was transfered to recovery area in stable condition.     I was present for the entire procedure     No qualified resident was available.     A physician's assistant was required due to the intensity of the surgery. This no residency program in this hospital no resident was available to assist.   Physician assistant was required for, hemostasis retraction and the closure of the surgical wound.     Physician assistant was present during the entire procedure    Estimated Blood Loss:  Minimal    Specimens:  No specimens collected during this procedure.      Drains:  NG/OG/Enteral Tube Nasogastric 16 Fr (Active)       Urethral Catheter Latex 16 Fr. (Active)   Amt returned on insertion(mL) 200 mL 04/06/24 1555       Complications:  None    Total OR Time  * Missing case tracking time(s) * .or    I was present for the entire procedure    Patient Disposition:  PACU       Saúl Woods MD MS FRCS FACS  [facility]    Date: 4/6/2024  Time: 4:36 PM    Copy to PCP: No primary care provider on file.

## 2024-04-06 NOTE — ED NOTES
RN attempting to place an NG tube when patient vomited  a large amount of liquid and undigested food, colored reddish and brown,      Daksha Malhotra, FELIPE  04/06/24 2711

## 2024-04-06 NOTE — H&P
History and Physical Examination - General Surgery   Mario Rollins 58 y.o. male MRN: [id number]  Unit/Bed#: ED 16 Encounter: [id number] PCP: No primary care provider on file.    History of Present Illness   Chief Complaint:  abdominal pain today    HPI:  Mario Rollins is a 58 y.o. male who presents with 3-day history of nausea vomiting diarrhea and now worsening abdominal pain overnight.  Workup in ER revealed a high-grade small bowel obstruction  Surgical consult was obtained for possible exploratory laparotomy bowel resection    Patient is living at the assisted living at [facility] and signed his own consent.    Historical Information   Past Medical History:   Diagnosis Date    Ambulatory dysfunction     Anxiety     Bilateral impacted cerumen     last assessed 05/30/2013    Capsulitis     last assessed 04/26/2016    Cellulitis of finger 01/13/2012    Chronic kidney disease     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)     Closed fracture of one or more phalanges of foot 01/06/2009    Constipation     Deformity     left and right foot and ankle ,right hand    Depression     Epilepsy (HCC)     Erythrocytosis     Hemiplegia affecting dominant side (HCC)     Hypertension     Hypoglycemia 11/08/2011    Hypokalemia     last assessed 05/30/2013    Mental retardation     Mood disorder (HCC)     Nephrocalcinosis      Past Surgical History:   Procedure Laterality Date    BRAIN SURGERY      LA ARTHROCENTESIS ASPIR&/INJ MAJOR JT/BURSA W/O US Bilateral 5/27/2021    Procedure: Hip intra-articular corticosteroid injection ( [id number] 24853-60);  Surgeon: Lianet Astorga MD;  Location: [facility] MAIN OR;  Service: Pain Management     LA COLONOSCOPY FLX DX W/COLLJ SPEC WHEN PFRMD N/A 2/12/2016    Procedure: COLONOSCOPY;  Surgeon: Abhilash Ace MD;  Location: [facility] GI LAB;  Service: Gastroenterology    LA INJECT SI JOINT ARTHRGRPHY&/ANES/STEROID W/RIMA Left 3/22/2023    Procedure: SACROILIAC joint injection (62610 );   Surgeon: Larry Smith DO;  Location: [facility] MAIN OR;  Service: Pain Management      Social History   Social History     Substance and Sexual Activity   Alcohol Use No     Social History     Substance and Sexual Activity   Drug Use No     Social History     Tobacco Use   Smoking Status Never   Smokeless Tobacco Never     Family History:   Family History   Problem Relation Age of Onset    Emphysema Mother     Nephrolithiasis Mother     Heart attack Father         acute MI    Hypertension Father     Nephrolithiasis Father     Nephrolithiasis Brother        Meds/Allergies   Allergies   Allergen Reactions    Asa [Aspirin] Other (See Comments)     Reaction Date: 25Aug2008;   unknown       Current Facility-Administered Medications:     heparin (porcine) subcutaneous injection 5,000 Units, 5,000 Units, Subcutaneous, Q8H SEBASTIAN **AND** Platelet count, , , Once, Saúl Woods MD    sodium chloride 0.9 % bolus 1,000 mL, 1,000 mL, Intravenous, Once, Alba Brxaton PA-C, Last Rate: 1,000 mL/hr at 04/06/24 1344, 1,000 mL at 04/06/24 1344    sodium chloride 0.9 % infusion, 125 mL/hr, Intravenous, Continuous, Salú Woods MD    Current Outpatient Medications:     atorvastatin (LIPITOR) 10 mg tablet, Take 1 tablet (10 mg total) by mouth daily Take at 8 PM, Disp: 30 tablet, Rfl: 3    lamoTRIgine (LaMICtal) 150 MG tablet, Take 1 tab by mouth twice a day at 8AM and 8PM, Disp: 60 tablet, Rfl: 11    lamoTRIgine (LaMICtal) 25 mg tablet, TAKE TWO TABLETS BY MOUTH DAILY AT 8AM WITH 150MG TO TOTAL 200MG, Disp: 60 tablet, Rfl: 11    nebivolol (BYSTOLIC) 2.5 mg tablet, , Disp: , Rfl:     QUEtiapine (SEROquel) 50 mg tablet, Take 50 mg by mouth daily at bedtime , Disp: , Rfl:     Reguloid 25 % POWD, TAKE 1 TEASPOONFUL MIXED WITH 8 OUNCES OF WATER ORALLY EVERY DAY AT 8AM, Disp: 1040 g, Rfl: 7    Senna-Tabs 8.6 MG tablet, TAKE ONE TABLET BY MOUTH DAILY AT 8AM FOR CONSTIPATION, Disp: 30 tablet, Rfl: 0    sertraline (ZOLOFT) 50 mg  tablet, Take by mouth daily, Disp: , Rfl:     acetaminophen (TYLENOL) 325 mg tablet, Take 1 tablet (325 mg total) by mouth every 6 (six) hours as needed for mild pain, moderate pain or headaches, Disp: 30 tablet, Rfl: 0    Dextromethorphan-guaiFENesin (ROBITUSSIN DM SUGAR FREE PO), Take by mouth 2 tsp q4hrs PRN, Disp: , Rfl:     ibuprofen (MOTRIN) 200 mg tablet, Take 400 mg by mouth every 6 (six) hours as needed for mild pain, Disp: , Rfl:     loperamide (IMODIUM A-D) 2 MG tablet, Take 2 mg by mouth 4 (four) times a day as needed for diarrhea (Patient not taking: Reported on 4/6/2024), Disp: , Rfl:     loratadine (CLARITIN) 10 mg tablet, Take 10 mg by mouth daily as needed for allergies (Patient not taking: Reported on 4/6/2024), Disp: , Rfl:     magnesium hydroxide (MILK OF MAGNESIA) 400 mg/5 mL oral suspension, Take by mouth (Patient not taking: Reported on 4/6/2024), Disp: , Rfl:      REVIEW OF SYSTEMS  Constitutional:  Denies fever or chills   Eyes:  Denies change in visual acuity   HENT:  Denies nasal congestion or sore throat   Respiratory:  Denies cough or shortness of breath   Cardiovascular:  Denies chest pain or edema   GI c/o abdominal pain, nausea, vomiting, diarrhea abdominal distention   Chronic constipation  :  Denies dysuria, frequency, difficulty in micturition and nocturia  Chronic kidney disease with elevated creatinine nephro calcinosis   Musculoskeletal: Hemiparesis on the dominant side back pain sacroiliitis  Neurologic:  Denies headache, focal weakness or sensory changes   Endocrine:  Denies polyuria or polydipsia   Lymphatic:  Denies swollen glands   Psychiatric: Patient anxiety depression seizure disorder till retardation    Objective   Current Vitals:   Blood Pressure: 141/91 (04/06/24 1307)  Pulse: 94 (04/06/24 1307)  Temperature: (!) 97 °F (36.1 °C) (04/06/24 1307)  Temp Source: Tympanic (04/06/24 1307)  Respirations: 18 (04/06/24 1307)  SpO2: 93 % (04/06/24 1307)  No intake or output  data in the 24 hours ending 04/06/24 1427  There is no height or weight on file to calculate BMI.     PHYSICAL EXAMS  General:  Patient is restless and having some pain has vomited large amount in the ER while placement of NG tube awake, alert responding to commands,   HEENT:  Both pupils normal-size atraumatic, normocephalic, nonicteric  Neck:  JVP not raised. Trachea central  Respiratory:  normal Breath sounds clear to auscultation,  Cardiovascular:  S1-S2 normal without any murmur   GI:  Abdomen Marked Distention marked tenderness hernial sites unremarkable without any cough impulse  Musculoskeletal: Extensive history of back pain has recent injections on March 22, 2024  Integument:  No skin rashes or ulceration  Lymphatic:  No cervical or inguinal lymphadenopathy  Neurologic:  Patient is awake alert, responding to command, well-oriented to time and place and person ambulatory dysfunction  Secondary to spastic hemiplegia dominant side  Lab Results: CBC:   Lab Results   Component Value Date    WBC 11.76 (H) 04/06/2024    HGB 19.2 (H) 04/06/2024    HCT 57.9 (H) 04/06/2024    MCV 87 04/06/2024     04/06/2024    RBC 6.66 (H) 04/06/2024    MCH 28.8 04/06/2024    MCHC 33.2 04/06/2024    RDW 13.9 04/06/2024    MPV 10.1 04/06/2024    NRBC 0 04/06/2024   , CMP:   Lab Results   Component Value Date     11/10/2017     04/06/2024    CL 99 11/10/2017    CO2 25 04/06/2024    CO2 27 11/10/2017    BUN 20 04/06/2024    BUN 18 11/10/2017    CREATININE 1.44 (H) 04/06/2024    CREATININE 1.33 (H) 11/10/2017    GLUCOSE 83 11/10/2017    CALCIUM 9.6 04/06/2024    CALCIUM 9.5 11/10/2017    AST 20 04/06/2024    AST 15 12/01/2020    ALT 14 04/06/2024    ALT 12 12/01/2020    ALKPHOS 97 04/06/2024    ALKPHOS 89 11/10/2017    PROT 7.4 11/10/2017    BILITOT 0.6 11/10/2017    EGFR 53 04/06/2024   , Coagulation:   Lab Results   Component Value Date    INR 1.08 04/06/2024     Lab Results   Component Value Date    LIPASE <6 (L)  04/06/2024        Imaging: CT abdomen pelvis with contrast    Result Date: 4/6/2024  Narrative: CT ABDOMEN AND PELVIS WITH IV CONTRAST INDICATION: LLQ pain with N/V. COMPARISON: CT abdomen 9/2/2011. CT lumbar spine 9/18/2018. TECHNIQUE: CT examination of the abdomen and pelvis was performed. Multiplanar 2D reformatted images were created from the source data. This examination, like all CT scans performed in the [facility], was performed utilizing techniques to minimize radiation dose exposure, including the use of iterative reconstruction and automated exposure control. Radiation dose length product (DLP) for this visit: 792.88 mGy-cm IV Contrast: 100 mL of iohexol (OMNIPAQUE) Enteric Contrast: Not administered. FINDINGS: ABDOMEN LOWER CHEST: Right basilar scarring or atelectasis. Circumferential distal esophageal thickening in keeping with a nonspecific esophagitis. LIVER/BILIARY TREE: Unremarkable. GALLBLADDER: No calcified gallstones. No pericholecystic inflammatory change. SPLEEN: Unremarkable. PANCREAS: Unremarkable. ADRENAL GLANDS: Unremarkable. KIDNEYS/URETERS: Bilateral renal cysts and extensive medullary nephrocalcinosis. No hydronephrosis. STOMACH AND BOWEL: Distended stomach and proximal/mid small bowel with probable transition point in the midabdomen #2/123-135. Two of the segments of dilated small bowel in the right lower quadrant show wall thickening with potential pneumatosis #2/111. Decompressed colon and terminal ileum. APPENDIX: No findings to suggest appendicitis. ABDOMINOPELVIC CAVITY: Small amount of abdominal ascites. No pneumoperitoneum. No lymphadenopathy. Mesenteric edema and vasa recta engorgement most prominent through the right anterior abdomen #2/113. VESSELS: Unremarkable for patient's age. PELVIS REPRODUCTIVE ORGANS: Unremarkable for patient's age. URINARY BLADDER: Unremarkable. ABDOMINAL WALL/INGUINAL REGIONS: Unremarkable. BONES: No acute fracture or suspicious  osseous lesion. Spinal degenerative changes. Chronic appearing mild L1 compression deformity. Partially imaged right hip prosthesis. Severe left hip degenerative changes. However     Impression: High-grade bowel obstruction. Potential mid abdominal transition point. Thickened segments of small bowel in the right lower quadrant with potential pneumatosis which may indicate bowel ischemia. I personally discussed this study with WM ZHANG on 4/6/2024 12:55 PM. Workstation performed: [id number]      EKG, Pathology, and Other Studies: No specimens collected during this procedure.  VTE Pharmacologic Prophylaxis: Heparin  VTE Mechanical Prophylaxis: sequential compression device    Admitting Diagnosis: Abdominal pain [R10.9]  Assessment/Plan   Code Status: Level 1 - Full Code    Assessment:  High-grade small bowel obstruction query cause with pneumatosis possible bowel ischemia  Chronic kidney disease with elevated creatinine  Chronic constipation  Mental retardation with depression and anxiety  Ambulatory dysfunction with spastic hemiplegia    Plan:  Care discussed patient's and patient's brother  Possible exploratory laparotomy possible bowel resection    Proper consent was obtained  IV antibiotics given  Preop EKG  Skin prep With Hibiclens wipes  Anesthesia in OR team informed    Counseling / Coordination of Care  Total floor / unit time spent today 45 minutes.  Greater than 50% of total time was spent with the patient and / or family counseling and / or coordination of care. A description of the counseling / coordination of care:  I performed an interim history, pertinent images and labs, performed a physical examination to arrive at the plan delineated above with associated thought processes.     Family member/primary contact updated -brother Charli Rollins phone #[phone] informed made aware of  Patient condition and informed about possible that bowel hemochromatosis hemoconcentration exploratory  laparotomy no  possible bowel resection risk and benefits were discussed and no guarantee was made patient is a high risk for stroke high risk for death on table high risk on heart failure renal failure respiratory failure    Saúl Woods MD MS FRCS FACS  [facility]:  [address] [place] [address]  [address] [place] [id number]  Office - [phone]  Fax - [phone]    04/06/24  2:27 PM

## 2024-04-06 NOTE — ED NOTES
RN Will attempt to try NG tube insertion in  a little bit since zofran was just administered.     Daksha Malhotra RN  04/06/24 3398

## 2024-04-06 NOTE — ED NOTES
Dr. Woods at bedside, told RN to hold off the placing of NG tube, since patient is going to the OR at 2:45 pm then NG will will placed there     Daksha Malhotra RN  04/06/24 2777

## 2024-04-07 PROBLEM — R79.89 ELEVATED SERUM CREATININE: Status: ACTIVE | Noted: 2024-04-07

## 2024-04-07 LAB
ALBUMIN SERPL BCP-MCNC: 2.7 G/DL (ref 3.5–5)
ALP SERPL-CCNC: 53 U/L (ref 34–104)
ALT SERPL W P-5'-P-CCNC: 9 U/L (ref 7–52)
ANION GAP SERPL CALCULATED.3IONS-SCNC: 6 MMOL/L (ref 4–13)
AST SERPL W P-5'-P-CCNC: 16 U/L (ref 13–39)
BACTERIA UR QL AUTO: ABNORMAL /HPF
BASOPHILS # BLD AUTO: 0.02 THOUSANDS/ÂΜL (ref 0–0.1)
BASOPHILS NFR BLD AUTO: 0 % (ref 0–1)
BILIRUB SERPL-MCNC: 0.56 MG/DL (ref 0.2–1)
BILIRUB UR QL STRIP: NEGATIVE
BUN SERPL-MCNC: 18 MG/DL (ref 5–25)
CALCIUM ALBUM COR SERPL-MCNC: 8.6 MG/DL (ref 8.3–10.1)
CALCIUM SERPL-MCNC: 7.6 MG/DL (ref 8.4–10.2)
CHLORIDE SERPL-SCNC: 109 MMOL/L (ref 96–108)
CLARITY UR: CLEAR
CO2 SERPL-SCNC: 24 MMOL/L (ref 21–32)
COLOR UR: YELLOW
CREAT SERPL-MCNC: 1.24 MG/DL (ref 0.6–1.3)
EOSINOPHIL # BLD AUTO: 0 THOUSAND/ÂΜL (ref 0–0.61)
EOSINOPHIL NFR BLD AUTO: 0 % (ref 0–6)
ERYTHROCYTE [DISTWIDTH] IN BLOOD BY AUTOMATED COUNT: 13.8 % (ref 11.6–15.1)
GFR SERPL CREATININE-BSD FRML MDRD: 63 ML/MIN/1.73SQ M
GLUCOSE SERPL-MCNC: 115 MG/DL (ref 65–140)
GLUCOSE SERPL-MCNC: 94 MG/DL (ref 65–140)
GLUCOSE UR STRIP-MCNC: NEGATIVE MG/DL
HCT VFR BLD AUTO: 47.4 % (ref 36.5–49.3)
HGB BLD-MCNC: 15.6 G/DL (ref 12–17)
HGB UR QL STRIP.AUTO: ABNORMAL
IMM GRANULOCYTES # BLD AUTO: 0.03 THOUSAND/UL (ref 0–0.2)
IMM GRANULOCYTES NFR BLD AUTO: 0 % (ref 0–2)
KETONES UR STRIP-MCNC: ABNORMAL MG/DL
LEUKOCYTE ESTERASE UR QL STRIP: ABNORMAL
LYMPHOCYTES # BLD AUTO: 0.86 THOUSANDS/ÂΜL (ref 0.6–4.47)
LYMPHOCYTES NFR BLD AUTO: 7 % (ref 14–44)
MCH RBC QN AUTO: 29 PG (ref 26.8–34.3)
MCHC RBC AUTO-ENTMCNC: 33.3 G/DL (ref 31.4–37.4)
MCV RBC AUTO: 87 FL (ref 82–98)
MONOCYTES # BLD AUTO: 1.16 THOUSAND/ÂΜL (ref 0.17–1.22)
MONOCYTES NFR BLD AUTO: 10 % (ref 4–12)
MRSA NOSE QL CULT: NORMAL
NEUTROPHILS # BLD AUTO: 9.87 THOUSANDS/ÂΜL (ref 1.85–7.62)
NEUTS SEG NFR BLD AUTO: 83 % (ref 43–75)
NITRITE UR QL STRIP: NEGATIVE
NON-SQ EPI CELLS URNS QL MICRO: ABNORMAL /HPF
NRBC BLD AUTO-RTO: 0 /100 WBCS
PH UR STRIP.AUTO: 6 [PH]
PLATELET # BLD AUTO: 187 THOUSANDS/UL (ref 149–390)
PMV BLD AUTO: 10.5 FL (ref 8.9–12.7)
POTASSIUM SERPL-SCNC: 3.6 MMOL/L (ref 3.5–5.3)
PROT SERPL-MCNC: 5.2 G/DL (ref 6.4–8.4)
PROT UR STRIP-MCNC: ABNORMAL MG/DL
RBC # BLD AUTO: 5.45 MILLION/UL (ref 3.88–5.62)
RBC #/AREA URNS AUTO: ABNORMAL /HPF
SODIUM SERPL-SCNC: 139 MMOL/L (ref 135–147)
SP GR UR STRIP.AUTO: 1.02 (ref 1–1.03)
UROBILINOGEN UR QL STRIP.AUTO: 0.2 E.U./DL
WBC # BLD AUTO: 11.94 THOUSAND/UL (ref 4.31–10.16)
WBC #/AREA URNS AUTO: ABNORMAL /HPF

## 2024-04-07 PROCEDURE — 99024 POSTOP FOLLOW-UP VISIT: CPT | Performed by: SPECIALIST

## 2024-04-07 PROCEDURE — 81001 URINALYSIS AUTO W/SCOPE: CPT | Performed by: PHYSICIAN ASSISTANT

## 2024-04-07 PROCEDURE — 99221 1ST HOSP IP/OBS SF/LOW 40: CPT | Performed by: FAMILY MEDICINE

## 2024-04-07 PROCEDURE — 80053 COMPREHEN METABOLIC PANEL: CPT | Performed by: PHYSICIAN ASSISTANT

## 2024-04-07 PROCEDURE — 82948 REAGENT STRIP/BLOOD GLUCOSE: CPT

## 2024-04-07 PROCEDURE — 85025 COMPLETE CBC W/AUTO DIFF WBC: CPT | Performed by: PHYSICIAN ASSISTANT

## 2024-04-07 RX ORDER — LAMOTRIGINE 25 MG/1
50 TABLET ORAL
Status: DISCONTINUED | OUTPATIENT
Start: 2024-04-07 | End: 2024-04-15 | Stop reason: HOSPADM

## 2024-04-07 RX ADMIN — LAMOTRIGINE 150 MG: 100 TABLET ORAL at 08:10

## 2024-04-07 RX ADMIN — HEPARIN SODIUM 5000 UNITS: 5000 INJECTION, SOLUTION INTRAVENOUS; SUBCUTANEOUS at 05:46

## 2024-04-07 RX ADMIN — HEPARIN SODIUM 5000 UNITS: 5000 INJECTION, SOLUTION INTRAVENOUS; SUBCUTANEOUS at 21:12

## 2024-04-07 RX ADMIN — LAMOTRIGINE 50 MG: 25 TABLET ORAL at 08:10

## 2024-04-07 RX ADMIN — MORPHINE SULFATE 2 MG: 2 INJECTION, SOLUTION INTRAMUSCULAR; INTRAVENOUS at 08:25

## 2024-04-07 RX ADMIN — SODIUM CHLORIDE, SODIUM LACTATE, POTASSIUM CHLORIDE, AND CALCIUM CHLORIDE 125 ML/HR: .6; .31; .03; .02 INJECTION, SOLUTION INTRAVENOUS at 10:56

## 2024-04-07 RX ADMIN — SODIUM CHLORIDE, SODIUM LACTATE, POTASSIUM CHLORIDE, AND CALCIUM CHLORIDE 125 ML/HR: .6; .31; .03; .02 INJECTION, SOLUTION INTRAVENOUS at 04:25

## 2024-04-07 RX ADMIN — LAMOTRIGINE 50 MG: 25 TABLET ORAL at 21:12

## 2024-04-07 RX ADMIN — HYDROMORPHONE HYDROCHLORIDE 0.5 MG: 1 INJECTION, SOLUTION INTRAMUSCULAR; INTRAVENOUS; SUBCUTANEOUS at 22:11

## 2024-04-07 RX ADMIN — LAMOTRIGINE 150 MG: 100 TABLET ORAL at 17:13

## 2024-04-07 RX ADMIN — HYDROMORPHONE HYDROCHLORIDE 0.5 MG: 1 INJECTION, SOLUTION INTRAMUSCULAR; INTRAVENOUS; SUBCUTANEOUS at 04:27

## 2024-04-07 RX ADMIN — MORPHINE SULFATE 2 MG: 2 INJECTION, SOLUTION INTRAMUSCULAR; INTRAVENOUS at 19:40

## 2024-04-07 RX ADMIN — HEPARIN SODIUM 5000 UNITS: 5000 INJECTION, SOLUTION INTRAVENOUS; SUBCUTANEOUS at 15:02

## 2024-04-07 RX ADMIN — SODIUM CHLORIDE, SODIUM LACTATE, POTASSIUM CHLORIDE, AND CALCIUM CHLORIDE 125 ML/HR: .6; .31; .03; .02 INJECTION, SOLUTION INTRAVENOUS at 17:15

## 2024-04-07 NOTE — CONSULTS
[facility]  Consult  Name: Mario Rollins 58 y.o. male I MRN: [id number]  Unit/Bed#: 2 [address] Date of Admission: 4/6/2024   Date of Service: 4/7/2024 I Hospital Day: 1    Inpatient consult to Internal Medicine  Consult performed by: Rosy Womack PA-C  Consult ordered by: Morena Broderick PA-C          Assessment/Plan   * Small bowel obstruction (HCC)  Assessment & Plan  Patient presented to the ED from his group home for LLQ abdominal pain with associated nausea and vomiting. He was admitted to the general surgery service due to high grade bowel obstruction.  CT A/P (4/6/24): High-grade bowel obstruction. Potential mid abdominal transition point. Thickened segments of small bowel in the right lower quadrant with potential pneumatosis which may indicate bowel ischemia.  S/p exploratory laparotomy with lysis of adhesions on 4/6/24  Currently with NG tube  Keep NPO  Care per General Surgery service      Elevated serum creatinine  Assessment & Plan  Creatinine mildly elevated on admission 1.44  Baseline 1.0-1.2  Improving today after IVF  Currently creatinine 1.24  Monitor I&O  Daily BMP    Localization-related symptomatic epilepsy and epileptic syndromes with complex partial seizures, intractable, without status epilepticus (HCC)  Assessment & Plan  History of focal epilepsy  Home regimen includes lamotrigine 150mg BID and 50mg HS  Spoke with pharmacy and neurology. Okay to continue oral Lamictal via NGT if can tolerate as there is no reasonable IV conversion alternative  Seizure precautions  Follow up with Neurology    Right spastic hemiparesis (HCC)  Assessment & Plan  Secondary to left MCA stroke and is s/p brain surgery  Follows outpatient with Neurology    Hyperlipidemia  Assessment & Plan  Takes Lipitor at home, resume once no longer NPO    Cerebral palsy (HCC)  Assessment & Plan  Currently resides at group [facility]  Supportive care           VTE Prophylaxis:   Moderate  Risk (Score 3-4) - Pharmacological DVT Prophylaxis Ordered: heparin.    Mobility:   Basic Mobility Inpatient Raw Score: 16  JH-HLM Goal: 5: Stand one or more mins  JH-HLM Achieved: 2: Bed activities/Dependent transfer  JH-HLM Goal achieved. Continue to encourage appropriate mobility.    Recommendations for Discharge:  Discharge per General Surgery service    Total Time Spent on Date of Encounter in care of patient: 45 mins. This time was spent on one or more of the following: performing physical exam; counseling and coordination of care; obtaining or reviewing history; documenting in the medical record; reviewing/ordering tests, medications or procedures; communicating with other healthcare professionals and discussing with patient's family/caregivers.    Collaboration of Care: Were Recommendations Directly Discussed with Primary Treatment Team? Yes    History of Present Illness:  Mario Rollins is a 58 y.o. male who is originally admitted to the general surgery service due to small bowel obstruction. We are consulted for management of medical comorbidities.Patient presented to the ED from his group home for LLQ abdominal pain with associated nausea and vomiting. He was admitted to the general surgery service due to high grade bowel obstruction. Patient underwent emergent exploratory laparotomy.     Review of Systems:  Review of Systems   Constitutional:  Negative for chills and fever.   HENT:  Negative for ear pain and sore throat.    Eyes:  Negative for pain and visual disturbance.   Respiratory:  Negative for cough and shortness of breath.    Cardiovascular:  Negative for chest pain and palpitations.   Gastrointestinal:  Positive for abdominal pain and nausea. Negative for vomiting.   Genitourinary:  Negative for dysuria and hematuria.   Musculoskeletal:  Negative for arthralgias and back pain.   Skin:  Negative for color change and rash.   Neurological:  Negative for seizures and syncope.   All other systems  reviewed and are negative.      Past Medical and Surgical History:   Past Medical History:   Diagnosis Date    Ambulatory dysfunction     Anxiety     Bilateral impacted cerumen     last assessed 05/30/2013    Capsulitis     last assessed 04/26/2016    Cellulitis of finger 01/13/2012    Chronic kidney disease     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)     Closed fracture of one or more phalanges of foot 01/06/2009    Constipation     Deformity     left and right foot and ankle ,right hand    Depression     Epilepsy (HCC)     Erythrocytosis     Hemiplegia affecting dominant side (HCC)     Hypertension     Hypoglycemia 11/08/2011    Hypokalemia     last assessed 05/30/2013    Mental retardation     Mood disorder (HCC)     Nephrocalcinosis        Past Surgical History:   Procedure Laterality Date    BRAIN SURGERY      OH ARTHROCENTESIS ASPIR&/INJ MAJOR JT/BURSA W/O US Bilateral 5/27/2021    Procedure: Hip intra-articular corticosteroid injection ( [id number]);  Surgeon: Lianet Astorga MD;  Location: [facility] MAIN OR;  Service: Pain Management     OH COLONOSCOPY FLX DX W/COLLJ SPEC WHEN PFRMD N/A 2/12/2016    Procedure: COLONOSCOPY;  Surgeon: Abhilash Ace MD;  Location: [facility] GI LAB;  Service: Gastroenterology    OH INJECT SI JOINT ARTHRGRPHY&/ANES/STEROID W/RIMA Left 3/22/2023    Procedure: SACROILIAC joint injection (39531 );  Surgeon: Larry Smith DO;  Location: [facility] MAIN OR;  Service: Pain Management        Meds/Allergies:  all medications and allergies reviewed    Allergies:   Allergies   Allergen Reactions    Asa [Aspirin] Other (See Comments)     Reaction Date: 25Aug2008;   unknown       Social History:  Marital Status: Single  Substance Use History:   Social History     Substance and Sexual Activity   Alcohol Use No     Social History     Tobacco Use   Smoking Status Never   Smokeless Tobacco Never     Social History     Substance and Sexual Activity   Drug Use No       Family History:  Family  "History   Problem Relation Age of Onset    Emphysema Mother     Nephrolithiasis Mother     Heart attack Father         acute MI    Hypertension Father     Nephrolithiasis Father     Nephrolithiasis Brother        Physical Exam:   Vitals:   Blood Pressure: 107/73 (04/07/24 0808)  Pulse: 95 (04/07/24 0808)  Temperature: 97.5 °F (36.4 °C) (04/07/24 0808)  Temp Source: Oral (04/07/24 0808)  Respirations: 15 (04/07/24 0808)  Height: 5' 8\" (172.7 cm) (04/06/24 1725)  Weight - Scale: 77.6 kg (171 lb) (04/06/24 1725)  SpO2: 93 % (04/07/24 0808)    Physical Exam  Vitals and nursing note reviewed.   Constitutional:       General: He is not in acute distress.     Appearance: He is well-developed.   HENT:      Head: Normocephalic and atraumatic.   Eyes:      Conjunctiva/sclera: Conjunctivae normal.   Cardiovascular:      Rate and Rhythm: Normal rate and regular rhythm.      Heart sounds: No murmur heard.  Pulmonary:      Effort: Pulmonary effort is normal. No respiratory distress.      Breath sounds: Normal breath sounds.   Abdominal:      Palpations: Abdomen is soft.      Tenderness: There is abdominal tenderness.      Comments: Surgical dressing is clean, dry, intact   Musculoskeletal:         General: No swelling.      Cervical back: Neck supple.   Skin:     General: Skin is warm and dry.      Capillary Refill: Capillary refill takes less than 2 seconds.   Neurological:      Mental Status: He is alert. Mental status is at baseline.   Psychiatric:         Mood and Affect: Mood normal.          Additional Data:   Lab Results:    Results from last 7 days   Lab Units 04/07/24  0544   WBC Thousand/uL 11.94*   HEMOGLOBIN g/dL 15.6   HEMATOCRIT % 47.4   PLATELETS Thousands/uL 187   NEUTROS PCT % 83*   LYMPHS PCT % 7*   MONOS PCT % 10   EOS PCT % 0     Results from last 7 days   Lab Units 04/07/24  0544   SODIUM mmol/L 139   POTASSIUM mmol/L 3.6   CHLORIDE mmol/L 109*   CO2 mmol/L 24   BUN mg/dL 18   CREATININE mg/dL 1.24   ANION " GAP mmol/L 6   CALCIUM mg/dL 7.6*   ALBUMIN g/dL 2.7*   TOTAL BILIRUBIN mg/dL 0.56   ALK PHOS U/L 53   ALT U/L 9   AST U/L 16   GLUCOSE RANDOM mg/dL 115     Results from last 7 days   Lab Units 04/06/24  1323   INR  1.08         Lab Results   Component Value Date/Time    HGBA1C 5.1 06/21/2021 11:38 AM    HGBA1C 5.1 08/23/2018 08:37 AM         Results from last 7 days   Lab Units 04/06/24  1305   LACTIC ACID mmol/L 1.1       Imaging: Reviewed radiology reports from this admission including: abdominal/pelvic CT  CT abdomen pelvis with contrast   Final Result by Augusto Rubin MD (04/06 1256)      High-grade bowel obstruction. Potential mid abdominal transition point.      Thickened segments of small bowel in the right lower quadrant with potential pneumatosis which may indicate bowel ischemia.         I personally discussed this study with WM ZHANG on 4/6/2024 12:55 PM.               Workstation performed: [id number]             EKG, Pathology, and Other Studies Reviewed on Admission:   EKG: NSR. HR 83.    ** Please Note: This note may have been constructed using a voice recognition system. **

## 2024-04-07 NOTE — ASSESSMENT & PLAN NOTE
History of focal epilepsy  Home regimen includes lamotrigine 150mg BID and 50mg HS  Spoke with pharmacy and neurology. Okay to continue oral Lamictal via NGT if can tolerate as there is no reasonable IV conversion alternative  Seizure precautions  Follow up with Neurology

## 2024-04-07 NOTE — PLAN OF CARE
Problem: GASTROINTESTINAL - ADULT  Goal: Minimal or absence of nausea and/or vomiting  Description: INTERVENTIONS:  - Administer IV fluids if ordered to ensure adequate hydration  - Maintain NPO status until nausea and vomiting are resolved  - Nasogastric tube if ordered  - Administer ordered antiemetic medications as needed  - Provide nonpharmacologic comfort measures as appropriate  - Advance diet as tolerated, if ordered  - Consider nutrition services referral to assist patient with adequate nutrition and appropriate food choices  Outcome: Progressing     Problem: METABOLIC, FLUID AND ELECTROLYTES - ADULT  Goal: Electrolytes maintained within normal limits  Description: INTERVENTIONS:  - Monitor labs and assess patient for signs and symptoms of electrolyte imbalances  - Administer electrolyte replacement as ordered  - Monitor response to electrolyte replacements, including repeat lab results as appropriate  - Instruct patient on fluid and nutrition as appropriate  Outcome: Progressing     Problem: Prexisting or High Potential for Compromised Skin Integrity  Goal: Skin integrity is maintained or improved  Description: INTERVENTIONS:  - Identify patients at risk for skin breakdown  - Assess and monitor skin integrity  - Assess and monitor nutrition and hydration status  - Monitor labs   - Assess for incontinence   - Turn and reposition patient  - Assist with mobility/ambulation  - Relieve pressure over bony prominences  - Avoid friction and shearing  - Provide appropriate hygiene as needed including keeping skin clean and dry  - Evaluate need for skin moisturizer/barrier cream  - Collaborate with interdisciplinary team   - Patient/family teaching  - Consider wound care consult   Outcome: Progressing

## 2024-04-07 NOTE — ASSESSMENT & PLAN NOTE
Creatinine mildly elevated on admission 1.44  Baseline 1.0-1.2  Improving today after IVF  Currently creatinine 1.24  Monitor I&O  Daily BMP

## 2024-04-07 NOTE — PLAN OF CARE
Problem: GASTROINTESTINAL - ADULT  Goal: Maintains or returns to baseline bowel function  Description: INTERVENTIONS:  - Assess bowel function  - Encourage oral fluids to ensure adequate hydration  - Administer IV fluids if ordered to ensure adequate hydration  - Administer ordered medications as needed  - Encourage mobilization and activity  - Consider nutritional services referral to assist patient with adequate nutrition and appropriate food choices  Outcome: Progressing     Problem: GENITOURINARY - ADULT  Goal: Urinary catheter remains patent  Description: INTERVENTIONS:  - Assess patency of urinary catheter  - If patient has a chronic dyer, consider changing catheter if non-functioning  - Follow guidelines for intermittent irrigation of non-functioning urinary catheter  Outcome: Progressing     Problem: MUSCULOSKELETAL - ADULT  Goal: Maintain or return mobility to safest level of function  Description: INTERVENTIONS:  - Assess patient's ability to carry out ADLs; assess patient's baseline for ADL function and identify physical deficits which impact ability to perform ADLs (bathing, care of mouth/teeth, toileting, grooming, dressing, etc.)  - Assess/evaluate cause of self-care deficits   - Assess range of motion  - Assess patient's mobility  - Assess patient's need for assistive devices and provide as appropriate  - Encourage maximum independence but intervene and supervise when necessary  - Involve family in performance of ADLs  - Assess for home care needs following discharge   - Consider OT consult to assist with ADL evaluation and planning for discharge  - Provide patient education as appropriate  Outcome: Progressing

## 2024-04-07 NOTE — PROGRESS NOTES
"Progress Note - General Surgery   Patient: Mario Rollins   : 1965 Sex: male MRN: [id number]   CSN: [id number] PCP: No primary care provider on file.  Unit/Bed#: [address] [place] [address]     SUBJECTIVE:   Patient is not verbalizing any pain but having some abdominal pain    OBJECTIVE  Stable overnight bilious NG output good urine output 1210  Vitals:   I/O last 24 hours:  In: 900 [I.V.:900]  Out: 1635 [Urine:1210; Emesis/NG output:425]Blood pressure 107/73, pulse 95, temperature 97.5 °F (36.4 °C), temperature source Oral, resp. rate 15, height 5' 8\" (1.727 m), weight 77.6 kg (171 lb), SpO2 93%.,Body mass index is 26 kg/m².  Invasive Devices       Peripheral Intravenous Line  Duration             Peripheral IV 24 Distal;Left;Upper;Ventral (anterior) Arm <1 day              Drain  Duration             NG/OG/Enteral Tube Nasogastric 16 Fr <1 day    Urethral Catheter Latex 16 Fr. <1 day                  Active medications:    Current Facility-Administered Medications:     acetaminophen (TYLENOL) rectal suppository 650 mg, 650 mg, Rectal, Q4H PRN    heparin (porcine) subcutaneous injection 5,000 Units, 5,000 Units, Subcutaneous, Q8H SEBASTIAN, 5,000 Units at 24 0546 **AND** [COMPLETED] Platelet count, , , Once    HYDROmorphone (DILAUDID) injection 0.5 mg, 0.5 mg, Intravenous, Q4H PRN, 0.5 mg at 24 0427    lactated ringers infusion, 125 mL/hr, Intravenous, Continuous, 125 mL/hr at 24 0425    lamoTRIgine (LaMICtal) tablet 150 mg, 150 mg, Oral, Q12H, 150 mg at 24 0810    lamoTRIgine (LaMICtal) tablet 50 mg, 50 mg, Oral, Daily, 50 mg at 24 0810    morphine injection 2 mg, 2 mg, Intravenous, Q4H PRN, 2 mg at 24 0825    ondansetron (ZOFRAN) injection 4 mg, 4 mg, Intravenous, Q6H PRN    Physical Exam:   General Alert awake   Normocephalic atraumatic PERRLA  Lungs clear bilaterally  Cardiac normal S1 normal S2  Abdomen soft,Tender around the incision site and all lower abdominal but tender Bowel " sounds present  Skin: surgical dressing is C/D/I  Ext: No clubbing, cyanosis, edema    Lab, Imaging and other studies:  Recent Results (from the past 24 hour(s))   CBC and differential    Collection Time: 04/06/24 10:35 AM   Result Value Ref Range    WBC 11.76 (H) 4.31 - 10.16 Thousand/uL    RBC 6.66 (H) 3.88 - 5.62 Million/uL    Hemoglobin 19.2 (H) 12.0 - 17.0 g/dL    Hematocrit 57.9 (H) 36.5 - 49.3 %    MCV 87 82 - 98 fL    MCH 28.8 26.8 - 34.3 pg    MCHC 33.2 31.4 - 37.4 g/dL    RDW 13.9 11.6 - 15.1 %    MPV 10.1 8.9 - 12.7 fL    Platelets 217 149 - 390 Thousands/uL    nRBC 0 /100 WBCs    Neutrophils Relative 90 (H) 43 - 75 %    Immature Grans % 0 0 - 2 %    Lymphocytes Relative 4 (L) 14 - 44 %    Monocytes Relative 6 4 - 12 %    Eosinophils Relative 0 0 - 6 %    Basophils Relative 0 0 - 1 %    Neutrophils Absolute 10.48 (H) 1.85 - 7.62 Thousands/µL    Absolute Immature Grans 0.05 0.00 - 0.20 Thousand/uL    Absolute Lymphocytes 0.51 (L) 0.60 - 4.47 Thousands/µL    Absolute Monocytes 0.70 0.17 - 1.22 Thousand/µL    Eosinophils Absolute 0.00 0.00 - 0.61 Thousand/µL    Basophils Absolute 0.02 0.00 - 0.10 Thousands/µL   Comprehensive metabolic panel    Collection Time: 04/06/24 10:35 AM   Result Value Ref Range    Sodium 136 135 - 147 mmol/L    Potassium 3.6 3.5 - 5.3 mmol/L    Chloride 100 96 - 108 mmol/L    CO2 25 21 - 32 mmol/L    ANION GAP 11 4 - 13 mmol/L    BUN 20 5 - 25 mg/dL    Creatinine 1.44 (H) 0.60 - 1.30 mg/dL    Glucose 134 65 - 140 mg/dL    Calcium 9.6 8.4 - 10.2 mg/dL    AST 20 13 - 39 U/L    ALT 14 7 - 52 U/L    Alkaline Phosphatase 97 34 - 104 U/L    Total Protein 7.9 6.4 - 8.4 g/dL    Albumin 4.4 3.5 - 5.0 g/dL    Total Bilirubin 0.56 0.20 - 1.00 mg/dL    eGFR 53 ml/min/1.73sq m   Lipase    Collection Time: 04/06/24 10:35 AM   Result Value Ref Range    Lipase <6 (L) 11 - 82 u/L   FLU/RSV/COVID - if FLU/RSV clinically relevant    Collection Time: 04/06/24 10:35 AM    Specimen: Nose; Nares   Result  Value Ref Range    SARS-CoV-2 Negative Negative    INFLUENZA A PCR Negative Negative    INFLUENZA B PCR Negative Negative    RSV PCR Negative Negative   Smear Review(Phlebs Do Not Order)    Collection Time: 04/06/24 10:35 AM   Result Value Ref Range    RBC Morphology Normal     Platelet Estimate Adequate Adequate   Lactic acid, plasma (w/reflex if result > 2.0)    Collection Time: 04/06/24  1:05 PM   Result Value Ref Range    LACTIC ACID 1.1 0.5 - 2.0 mmol/L   Protime-INR    Collection Time: 04/06/24  1:23 PM   Result Value Ref Range    Protime 14.3 11.6 - 14.5 seconds    INR 1.08 0.84 - 1.19   APTT    Collection Time: 04/06/24  1:23 PM   Result Value Ref Range    PTT 27 23 - 37 seconds   Blood culture #1    Collection Time: 04/06/24  1:23 PM    Specimen: Arm, Left; Blood   Result Value Ref Range    Blood Culture Received in Microbiology Lab. Culture in Progress.    Blood culture #2    Collection Time: 04/06/24  2:02 PM    Specimen: Hand, Left; Blood   Result Value Ref Range    Blood Culture Received in Microbiology Lab. Culture in Progress.    ECG 12 lead    Collection Time: 04/06/24  2:51 PM   Result Value Ref Range    Ventricular Rate 83 BPM    Atrial Rate 83 BPM    NJ Interval 168 ms    QRSD Interval 90 ms    QT Interval 390 ms    QTC Interval 458 ms    P Axis 63 degrees    QRS Axis 28 degrees    T Wave Axis 46 degrees   Platelet count    Collection Time: 04/06/24  2:53 PM   Result Value Ref Range    Platelets 183 149 - 390 Thousands/uL    MPV 10.3 8.9 - 12.7 fL   Comprehensive metabolic panel    Collection Time: 04/07/24  5:44 AM   Result Value Ref Range    Sodium 139 135 - 147 mmol/L    Potassium 3.6 3.5 - 5.3 mmol/L    Chloride 109 (H) 96 - 108 mmol/L    CO2 24 21 - 32 mmol/L    ANION GAP 6 4 - 13 mmol/L    BUN 18 5 - 25 mg/dL    Creatinine 1.24 0.60 - 1.30 mg/dL    Glucose 115 65 - 140 mg/dL    Calcium 7.6 (L) 8.4 - 10.2 mg/dL    Corrected Calcium 8.6 8.3 - 10.1 mg/dL    AST 16 13 - 39 U/L    ALT 9 7 - 52  U/L    Alkaline Phosphatase 53 34 - 104 U/L    Total Protein 5.2 (L) 6.4 - 8.4 g/dL    Albumin 2.7 (L) 3.5 - 5.0 g/dL    Total Bilirubin 0.56 0.20 - 1.00 mg/dL    eGFR 63 ml/min/1.73sq m   CBC and differential    Collection Time: 04/07/24  5:44 AM   Result Value Ref Range    WBC 11.94 (H) 4.31 - 10.16 Thousand/uL    RBC 5.45 3.88 - 5.62 Million/uL    Hemoglobin 15.6 12.0 - 17.0 g/dL    Hematocrit 47.4 36.5 - 49.3 %    MCV 87 82 - 98 fL    MCH 29.0 26.8 - 34.3 pg    MCHC 33.3 31.4 - 37.4 g/dL    RDW 13.8 11.6 - 15.1 %    MPV 10.5 8.9 - 12.7 fL    Platelets 187 149 - 390 Thousands/uL    nRBC 0 /100 WBCs    Neutrophils Relative 83 (H) 43 - 75 %    Immature Grans % 0 0 - 2 %    Lymphocytes Relative 7 (L) 14 - 44 %    Monocytes Relative 10 4 - 12 %    Eosinophils Relative 0 0 - 6 %    Basophils Relative 0 0 - 1 %    Neutrophils Absolute 9.87 (H) 1.85 - 7.62 Thousands/µL    Absolute Immature Grans 0.03 0.00 - 0.20 Thousand/uL    Absolute Lymphocytes 0.86 0.60 - 4.47 Thousands/µL    Absolute Monocytes 1.16 0.17 - 1.22 Thousand/µL    Eosinophils Absolute 0.00 0.00 - 0.61 Thousand/µL    Basophils Absolute 0.02 0.00 - 0.10 Thousands/µL     VTE Pharmacologic Prophylaxis: Heparin  VTE Mechanical Prophylaxis: sequential compression device       Diet Orders   (From admission, onward)                 Start     Ordered    04/06/24 1811  Diet NPO; Sips with meds  Diet effective now        References:    Adult Nutrition Support Algorithm    RD Therapeutic Diet Order Protocol   Question Answer Comment   Diet Type NPO    NPO Except: Sips with meds    RD to adjust diet per protocol? No        04/06/24 1810 04/06/24 1752  Room Service  Once        Question:  Type of Service  Answer:  Room Service-Appropriate    04/06/24 1751                .  Admitting Diagnosis: Small bowel obstruction (HCC) [K56.609]  Abdominal pain [R10.9]  Pneumatosis of intestines [K63.89]  Code Status: Level 1 - Full Code  Patient Active Problem List   Diagnosis     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    Acquired deformity of right ankle and foot    Constipation    Cerebral palsy (HCC)    Nephrocalcinosis    Class 1 obesity with body mass index (BMI) of 30.0 to 30.9 in adult    Hyperlipidemia    Lumbar radiculopathy    Spinal stenosis of lumbar region    Chronic left-sided low back pain with left-sided sciatica    Chronic pain syndrome    Paronychia of toenail    Acquired deformity of right hand    Acquired hallux rigidus of left foot    Acquired valgus deformity of right ankle    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  Plan was coordinated with Nursing staff & Case management.  Plan of care was discussed with patient and nursing staff    Assessment/ Plan:  Mario Rollins is a 58 y.o. male 1 day(s) POD # first s/p exploratory laparotomy  And decompression of small bowel derotation of the volvulus and lysis of adhesion    Pain control  Pulmonary hygiene  DVT prophylaxis  D/c dyer catheter in a.m.  OOB/Ambulation  Labs reviewed hemoglobin from 19-15 possible secondary to hemoconcentration/hemochromatosis  Labs in the morning    Counseling / Coordination of Care  Total floor / unit time spent today 30minutes.  Greater than 50% of total time was spent with the patient and / or family counseling and / or coordination of care.     Saúl Woods MD MS FRCS FACS  [facility]  04/07/24 9:06 AM  [facility]:  [address], [address] [place] [address]  Office  [phone] Fax [phone]  [phone]    Portions of the record may have been created with voice recognition software. Occasional wrong word or \"sound a like\" substitutions may have occurred due to the inherent limitations of voice recognition software. Read the chart carefully and recognize, using context, where substitutions have occurred.        "

## 2024-04-07 NOTE — ASSESSMENT & PLAN NOTE
Patient presented to the ED from his group home for LLQ abdominal pain with associated nausea and vomiting. He was admitted to the general surgery service due to high grade bowel obstruction.  CT A/P (4/6/24): High-grade bowel obstruction. Potential mid abdominal transition point. Thickened segments of small bowel in the right lower quadrant with potential pneumatosis which may indicate bowel ischemia.  S/p exploratory laparotomy with lysis of adhesions on 4/6/24  Currently with NG tube  Keep NPO  Care per General Surgery service

## 2024-04-08 PROBLEM — E87.8 ELECTROLYTE ABNORMALITY: Status: ACTIVE | Noted: 2024-04-08

## 2024-04-08 LAB
ANION GAP SERPL CALCULATED.3IONS-SCNC: 7 MMOL/L (ref 4–13)
ATRIAL RATE: 83 BPM
BUN SERPL-MCNC: 14 MG/DL (ref 5–25)
CALCIUM SERPL-MCNC: 7.9 MG/DL (ref 8.4–10.2)
CHLORIDE SERPL-SCNC: 105 MMOL/L (ref 96–108)
CO2 SERPL-SCNC: 28 MMOL/L (ref 21–32)
CREAT SERPL-MCNC: 0.94 MG/DL (ref 0.6–1.3)
ERYTHROCYTE [DISTWIDTH] IN BLOOD BY AUTOMATED COUNT: 13.6 % (ref 11.6–15.1)
GFR SERPL CREATININE-BSD FRML MDRD: 89 ML/MIN/1.73SQ M
GLUCOSE SERPL-MCNC: 89 MG/DL (ref 65–140)
HCT VFR BLD AUTO: 44 % (ref 36.5–49.3)
HGB BLD-MCNC: 14.1 G/DL (ref 12–17)
MAGNESIUM SERPL-MCNC: 1.7 MG/DL (ref 1.9–2.7)
MCH RBC QN AUTO: 28.6 PG (ref 26.8–34.3)
MCHC RBC AUTO-ENTMCNC: 32 G/DL (ref 31.4–37.4)
MCV RBC AUTO: 89 FL (ref 82–98)
P AXIS: 63 DEGREES
PHOSPHATE SERPL-MCNC: 1.2 MG/DL (ref 2.7–4.5)
PLATELET # BLD AUTO: 148 THOUSANDS/UL (ref 149–390)
PMV BLD AUTO: 10.2 FL (ref 8.9–12.7)
POTASSIUM SERPL-SCNC: 3.3 MMOL/L (ref 3.5–5.3)
PR INTERVAL: 168 MS
QRS AXIS: 28 DEGREES
QRSD INTERVAL: 90 MS
QT INTERVAL: 390 MS
QTC INTERVAL: 458 MS
RBC # BLD AUTO: 4.93 MILLION/UL (ref 3.88–5.62)
SODIUM SERPL-SCNC: 140 MMOL/L (ref 135–147)
T WAVE AXIS: 46 DEGREES
VENTRICULAR RATE: 83 BPM
WBC # BLD AUTO: 8.9 THOUSAND/UL (ref 4.31–10.16)

## 2024-04-08 PROCEDURE — 99232 SBSQ HOSP IP/OBS MODERATE 35: CPT

## 2024-04-08 PROCEDURE — 93010 ELECTROCARDIOGRAM REPORT: CPT | Performed by: INTERNAL MEDICINE

## 2024-04-08 PROCEDURE — 83735 ASSAY OF MAGNESIUM: CPT | Performed by: SPECIALIST

## 2024-04-08 PROCEDURE — 84100 ASSAY OF PHOSPHORUS: CPT | Performed by: SPECIALIST

## 2024-04-08 PROCEDURE — 99024 POSTOP FOLLOW-UP VISIT: CPT | Performed by: PHYSICIAN ASSISTANT

## 2024-04-08 PROCEDURE — 80048 BASIC METABOLIC PNL TOTAL CA: CPT | Performed by: SPECIALIST

## 2024-04-08 PROCEDURE — 85027 COMPLETE CBC AUTOMATED: CPT | Performed by: SPECIALIST

## 2024-04-08 RX ORDER — MAGNESIUM SULFATE HEPTAHYDRATE 40 MG/ML
2 INJECTION, SOLUTION INTRAVENOUS ONCE
Status: COMPLETED | OUTPATIENT
Start: 2024-04-08 | End: 2024-04-08

## 2024-04-08 RX ORDER — METOPROLOL TARTRATE 1 MG/ML
2.5 INJECTION, SOLUTION INTRAVENOUS EVERY 6 HOURS PRN
Status: DISCONTINUED | OUTPATIENT
Start: 2024-04-08 | End: 2024-04-15 | Stop reason: HOSPADM

## 2024-04-08 RX ORDER — QUETIAPINE FUMARATE 25 MG/1
50 TABLET, FILM COATED ORAL
Status: DISCONTINUED | OUTPATIENT
Start: 2024-04-08 | End: 2024-04-15 | Stop reason: HOSPADM

## 2024-04-08 RX ORDER — MAGNESIUM SULFATE HEPTAHYDRATE 40 MG/ML
2 INJECTION, SOLUTION INTRAVENOUS ONCE
Status: DISCONTINUED | OUTPATIENT
Start: 2024-04-08 | End: 2024-04-15 | Stop reason: HOSPADM

## 2024-04-08 RX ADMIN — LAMOTRIGINE 150 MG: 100 TABLET ORAL at 08:59

## 2024-04-08 RX ADMIN — HEPARIN SODIUM 5000 UNITS: 5000 INJECTION, SOLUTION INTRAVENOUS; SUBCUTANEOUS at 05:11

## 2024-04-08 RX ADMIN — QUETIAPINE FUMARATE 50 MG: 25 TABLET ORAL at 22:21

## 2024-04-08 RX ADMIN — HEPARIN SODIUM 5000 UNITS: 5000 INJECTION, SOLUTION INTRAVENOUS; SUBCUTANEOUS at 15:45

## 2024-04-08 RX ADMIN — LAMOTRIGINE 50 MG: 25 TABLET ORAL at 22:21

## 2024-04-08 RX ADMIN — MORPHINE SULFATE 2 MG: 2 INJECTION, SOLUTION INTRAMUSCULAR; INTRAVENOUS at 16:38

## 2024-04-08 RX ADMIN — MAGNESIUM SULFATE HEPTAHYDRATE 2 G: 40 INJECTION, SOLUTION INTRAVENOUS at 08:59

## 2024-04-08 RX ADMIN — LAMOTRIGINE 150 MG: 100 TABLET ORAL at 17:48

## 2024-04-08 RX ADMIN — SODIUM CHLORIDE, SODIUM LACTATE, POTASSIUM CHLORIDE, AND CALCIUM CHLORIDE 125 ML/HR: .6; .31; .03; .02 INJECTION, SOLUTION INTRAVENOUS at 22:37

## 2024-04-08 RX ADMIN — POTASSIUM & SODIUM PHOSPHATES POWDER PACK 280-160-250 MG 2 PACKET: 280-160-250 PACK at 16:14

## 2024-04-08 RX ADMIN — POTASSIUM PHOSPHATE, MONOBASIC AND POTASSIUM PHOSPHATE, DIBASIC 12 MMOL: 224; 236 INJECTION, SOLUTION, CONCENTRATE INTRAVENOUS at 12:13

## 2024-04-08 RX ADMIN — POTASSIUM & SODIUM PHOSPHATES POWDER PACK 280-160-250 MG 2 PACKET: 280-160-250 PACK at 12:13

## 2024-04-08 RX ADMIN — HEPARIN SODIUM 5000 UNITS: 5000 INJECTION, SOLUTION INTRAVENOUS; SUBCUTANEOUS at 22:16

## 2024-04-08 RX ADMIN — POTASSIUM & SODIUM PHOSPHATES POWDER PACK 280-160-250 MG 2 PACKET: 280-160-250 PACK at 08:59

## 2024-04-08 RX ADMIN — SODIUM CHLORIDE, SODIUM LACTATE, POTASSIUM CHLORIDE, AND CALCIUM CHLORIDE 125 ML/HR: .6; .31; .03; .02 INJECTION, SOLUTION INTRAVENOUS at 00:20

## 2024-04-08 RX ADMIN — POTASSIUM & SODIUM PHOSPHATES POWDER PACK 280-160-250 MG 2 PACKET: 280-160-250 PACK at 22:22

## 2024-04-08 NOTE — PLAN OF CARE
Problem: GASTROINTESTINAL - ADULT  Goal: Minimal or absence of nausea and/or vomiting  Description: INTERVENTIONS:  - Administer IV fluids if ordered to ensure adequate hydration  - Maintain NPO status until nausea and vomiting are resolved  - Nasogastric tube if ordered  - Administer ordered antiemetic medications as needed  - Provide nonpharmacologic comfort measures as appropriate  - Advance diet as tolerated, if ordered  - Consider nutrition services referral to assist patient with adequate nutrition and appropriate food choices  Outcome: Progressing  Goal: Maintains or returns to baseline bowel function  Description: INTERVENTIONS:  - Assess bowel function  - Encourage oral fluids to ensure adequate hydration  - Administer IV fluids if ordered to ensure adequate hydration  - Administer ordered medications as needed  - Encourage mobilization and activity  - Consider nutritional services referral to assist patient with adequate nutrition and appropriate food choices  Outcome: Progressing  Goal: Maintains adequate nutritional intake  Description: INTERVENTIONS:  - Monitor percentage of each meal consumed  - Identify factors contributing to decreased intake, treat as appropriate  - Assist with meals as needed  - Monitor I&O, weight, and lab values if indicated  - Obtain nutrition services referral as needed  Outcome: Progressing  Goal: Establish and maintain optimal ostomy function  Description: INTERVENTIONS:  - Assess bowel function  - Encourage oral fluids to ensure adequate hydration  - Administer IV fluids if ordered to ensure adequate hydration   - Administer ordered medications as needed  - Encourage mobilization and activity  - Nutrition services referral to assist patient with appropriate food choices  - Assess stoma site  - Consider wound care consult   Outcome: Progressing  Goal: Oral mucous membranes remain intact  Description: INTERVENTIONS  - Assess oral mucosa and hygiene practices  - Implement  preventative oral hygiene regimen  - Implement oral medicated treatments as ordered  - Initiate Nutrition services referral as needed  Outcome: Progressing     Problem: GENITOURINARY - ADULT  Goal: Maintains or returns to baseline urinary function  Description: INTERVENTIONS:  - Assess urinary function  - Encourage oral fluids to ensure adequate hydration if ordered  - Administer IV fluids as ordered to ensure adequate hydration  - Administer ordered medications as needed  - Offer frequent toileting  - Follow urinary retention protocol if ordered  Outcome: Progressing  Goal: Absence of urinary retention  Description: INTERVENTIONS:  - Assess patient’s ability to void and empty bladder  - Monitor I/O  - Bladder scan as needed  - Discuss with physician/AP medications to alleviate retention as needed  - Discuss catheterization for long term situations as appropriate  Outcome: Progressing  Goal: Urinary catheter remains patent  Description: INTERVENTIONS:  - Assess patency of urinary catheter  - If patient has a chronic dyer, consider changing catheter if non-functioning  - Follow guidelines for intermittent irrigation of non-functioning urinary catheter  Outcome: Progressing     Problem: METABOLIC, FLUID AND ELECTROLYTES - ADULT  Goal: Electrolytes maintained within normal limits  Description: INTERVENTIONS:  - Monitor labs and assess patient for signs and symptoms of electrolyte imbalances  - Administer electrolyte replacement as ordered  - Monitor response to electrolyte replacements, including repeat lab results as appropriate  - Instruct patient on fluid and nutrition as appropriate  Outcome: Progressing  Goal: Fluid balance maintained  Description: INTERVENTIONS:  - Monitor labs   - Monitor I/O and WT  - Instruct patient on fluid and nutrition as appropriate  - Assess for signs & symptoms of volume excess or deficit  Outcome: Progressing  Goal: Glucose maintained within target range  Description:  INTERVENTIONS:  - Monitor Blood Glucose as ordered  - Assess for signs and symptoms of hyperglycemia and hypoglycemia  - Administer ordered medications to maintain glucose within target range  - Assess nutritional intake and initiate nutrition service referral as needed  Outcome: Progressing     Problem: MUSCULOSKELETAL - ADULT  Goal: Maintain or return mobility to safest level of function  Description: INTERVENTIONS:  - Assess patient's ability to carry out ADLs; assess patient's baseline for ADL function and identify physical deficits which impact ability to perform ADLs (bathing, care of mouth/teeth, toileting, grooming, dressing, etc.)  - Assess/evaluate cause of self-care deficits   - Assess range of motion  - Assess patient's mobility  - Assess patient's need for assistive devices and provide as appropriate  - Encourage maximum independence but intervene and supervise when necessary  - Involve family in performance of ADLs  - Assess for home care needs following discharge   - Consider OT consult to assist with ADL evaluation and planning for discharge  - Provide patient education as appropriate  Outcome: Progressing  Goal: Maintain proper alignment of affected body part  Description: INTERVENTIONS:  - Support, maintain and protect limb and body alignment  - Provide patient/ family with appropriate education  Outcome: Progressing     Problem: Prexisting or High Potential for Compromised Skin Integrity  Goal: Skin integrity is maintained or improved  Description: INTERVENTIONS:  - Identify patients at risk for skin breakdown  - Assess and monitor skin integrity  - Assess and monitor nutrition and hydration status  - Monitor labs   - Assess for incontinence   - Turn and reposition patient  - Assist with mobility/ambulation  - Relieve pressure over bony prominences  - Avoid friction and shearing  - Provide appropriate hygiene as needed including keeping skin clean and dry  - Evaluate need for skin  moisturizer/barrier cream  - Collaborate with interdisciplinary team   - Patient/family teaching  - Consider wound care consult   Outcome: Progressing

## 2024-04-08 NOTE — PROGRESS NOTES
[facility]  Progress Note  Name: Mario Rollins I  MRN: [id number]  Unit/Bed#: 2 [address] Date of Admission: 4/6/2024   Date of Service: 4/8/2024 I Hospital Day: 2    Assessment/Plan   * Small bowel obstruction (HCC)  Assessment & Plan  Patient presented to the ED from his group home for LLQ abdominal pain with associated nausea and vomiting. He was admitted to the general surgery service due to high grade bowel obstruction.  CT A/P (4/6/24): High-grade bowel obstruction. Potential mid abdominal transition point. Thickened segments of small bowel in the right lower quadrant with potential pneumatosis which may indicate bowel ischemia.  S/p exploratory laparotomy with lysis of adhesions on 4/6/24  Currently with NG tube  Keep NPO  Care per General Surgery service      Elevated serum creatinine  Assessment & Plan  Creatinine mildly elevated on admission 1.44  Baseline 1.0-1.2  Improved with IVF, currently back at baseline  Monitor I&O  Daily BMP    Electrolyte abnormality  Assessment & Plan  Mag, phos, and potassium low this morning  Monitor and replete as needed    Localization-related symptomatic epilepsy and epileptic syndromes with complex partial seizures, intractable, without status epilepticus (HCC)  Assessment & Plan  History of focal epilepsy  Home regimen includes lamotrigine 150mg BID and 50mg HS  Spoke with pharmacy and neurology. Okay to continue oral Lamictal via NGT if can tolerate as there is no reasonable IV conversion alternative  Seizure precautions  Follow up with Neurology    Right spastic hemiparesis (HCC)  Assessment & Plan  Secondary to left MCA stroke and is s/p brain surgery  Follows outpatient with Neurology    Hyperlipidemia  Assessment & Plan  Takes Lipitor at home, resume once no longer NPO    Cerebral palsy (HCC)  Assessment & Plan  Currently resides at group [facility]  Supportive care           VTE Pharmacologic Prophylaxis:   Moderate Risk (Score 3-4) -  "Pharmacological DVT Prophylaxis Ordered: heparin.    Mobility:   Basic Mobility Inpatient Raw Score: 16  JH-HLM Goal: 5: Stand one or more mins  JH-HLM Achieved: 2: Bed activities/Dependent transfer  JH-HLM Goal NOT achieved. Continue with multidisciplinary rounding and encourage appropriate mobility to improve upon JH-HLM goals.    Patient Centered Rounds: I performed bedside rounds with nursing staff today.   Discussions with Specialists or Other Care Team Provider: [email or web address]    Education and Discussions with Family / Patient:  per primary.     Total Time Spent on Date of Encounter in care of patient: 30 mins. This time was spent on one or more of the following: performing physical exam; counseling and coordination of care; obtaining or reviewing history; documenting in the medical record; reviewing/ordering tests, medications or procedures; communicating with other healthcare professionals and discussing with patient's family/caregivers.    Current Length of Stay: 2 day(s)  Current Patient Status: Inpatient   Certification Statement: The patient will continue to require additional inpatient hospital stay due to post op monitoring, NG tube  Discharge Plan: [facility] is following this patient on consult. They are not yet medically stable for discharge secondary to electrolyte abnormalities, NG tube.    Code Status: Level 1 - Full Code    Subjective:   Patient reports he has \"a little bit\"of pain in his stomach but reports it is overall getting better. Denies nausea or vomiting. Said he has not had a bowel movement or passed gas.    Objective:     Vitals:   Temp (24hrs), Av.2 °F (36.8 °C), Min:97.1 °F (36.2 °C), Max:99.1 °F (37.3 °C)    Temp:  [97.1 °F (36.2 °C)-99.1 °F (37.3 °C)] 98.3 °F (36.8 °C)  HR:  [78-88] 78  Resp:  [16-20] 20  BP: (138-144)/(76-82) 143/82  SpO2:  [93 %-96 %] 96 %  Body mass index is 26 kg/m².     Input and Output Summary (last 24 hours):     Intake/Output Summary (Last 24 hours) at 2024 " 1251  Last data filed at 4/8/2024 0507  Gross per 24 hour   Intake --   Output 2225 ml   Net -2225 ml       Physical Exam:   Physical Exam  Vitals and nursing note reviewed.   Constitutional:       General: He is not in acute distress.     Appearance: He is well-developed.   HENT:      Head:      Comments: NG tube in place  Cardiovascular:      Rate and Rhythm: Normal rate and regular rhythm.   Pulmonary:      Effort: Pulmonary effort is normal. No respiratory distress.      Breath sounds: Normal breath sounds.   Abdominal:      Palpations: Abdomen is soft.      Tenderness: There is abdominal tenderness (mild).   Genitourinary:     Comments: Barker draining clear yellow urine  Musculoskeletal:         General: No swelling.   Skin:     General: Skin is warm and dry.      Capillary Refill: Capillary refill takes less than 2 seconds.   Neurological:      Mental Status: He is alert.   Psychiatric:         Mood and Affect: Mood normal.          Additional Data:     Labs:  Results from last 7 days   Lab Units 04/08/24  0442 04/07/24  0544   WBC Thousand/uL 8.90 11.94*   HEMOGLOBIN g/dL 14.1 15.6   HEMATOCRIT % 44.0 47.4   PLATELETS Thousands/uL 148* 187   NEUTROS PCT %  --  83*   LYMPHS PCT %  --  7*   MONOS PCT %  --  10   EOS PCT %  --  0     Results from last 7 days   Lab Units 04/08/24  0442 04/07/24  0544   SODIUM mmol/L 140 139   POTASSIUM mmol/L 3.3* 3.6   CHLORIDE mmol/L 105 109*   CO2 mmol/L 28 24   BUN mg/dL 14 18   CREATININE mg/dL 0.94 1.24   ANION GAP mmol/L 7 6   CALCIUM mg/dL 7.9* 7.6*   ALBUMIN g/dL  --  2.7*   TOTAL BILIRUBIN mg/dL  --  0.56   ALK PHOS U/L  --  53   ALT U/L  --  9   AST U/L  --  16   GLUCOSE RANDOM mg/dL 89 115     Results from last 7 days   Lab Units 04/06/24  1323   INR  1.08     Results from last 7 days   Lab Units 04/07/24  1725   POC GLUCOSE mg/dl 94         Results from last 7 days   Lab Units 04/06/24  1305   LACTIC ACID mmol/L 1.1       Lines/Drains:  Invasive Devices        Peripheral Intravenous Line  Duration             Peripheral IV 04/06/24 Distal;Left;Upper;Ventral (anterior) Arm 2 days              Drain  Duration             NG/OG/Enteral Tube Nasogastric 16 Fr 1 day    Urethral Catheter Latex 16 Fr. 1 day                  Urinary Catheter:  Goal for removal: Remove after 48 hrs of I/O monitoring               Imaging: Reviewed radiology reports from this admission including: procedure reports    Recent Cultures (last 7 days):   Results from last 7 days   Lab Units 04/06/24  1402 04/06/24  1323   BLOOD CULTURE  No Growth at 24 hrs. No Growth at 24 hrs.       Last 24 Hours Medication List:   Current Facility-Administered Medications   Medication Dose Route Frequency Provider Last Rate    acetaminophen  650 mg Rectal Q4H PRN Morena Broderick PA-C      heparin (porcine)  5,000 Units Subcutaneous Q8H [facility] Morena Broderick PA-C      HYDROmorphone  0.5 mg Intravenous Q4H PRN Morena Broderick PA-C      lactated ringers  125 mL/hr Intravenous Continuous Paul Acevedo  mL/hr (04/08/24 0020)    lamoTRIgine  150 mg Oral BID Rosy Womack PA-C      lamoTRIgine  50 mg Oral HS Rosy Womack PA-C      magnesium sulfate  2 g Intravenous Once David Shore PA-C      metoprolol  2.5 mg Intravenous Q6H PRN David Shore PA-C      morphine injection  2 mg Intravenous Q4H PRN Morena Broderick PA-C      ondansetron  4 mg Intravenous Q6H PRN Morena Broderick PA-C      potassium phosphate  12 mmol Intravenous Once David Shore PA-C 12 mmol (04/08/24 1213)    potassium-sodium phosphates  2 packet Per NG Tube 4x Daily (with meals and at bedtime) Prabha Rawls PA-C      QUEtiapine  50 mg Oral HS David Shore PA-C          Today, Patient Was Seen By: Prabha Rawls PA-C    **Please Note: This note may have been constructed using a voice recognition system.**

## 2024-04-08 NOTE — ASSESSMENT & PLAN NOTE
Creatinine mildly elevated on admission 1.44  Baseline 1.0-1.2  Improved with IVF, currently back at baseline  Monitor I&O  Daily BMP

## 2024-04-08 NOTE — PROGRESS NOTES
"Progress Note - General Surgery   Mario Rollins 58 y.o. male MRN: [id number]  Unit/Bed#: 2 [place] [address] Encounter: [id number]    Assessment:  1) POD #2 s/p exploratory laparotomy with lysis of adhesions -AVSS, NG tube output of approximately 900 cc, appropriate urinary output at 1.3 mL/kg/h, uncomfortable from NG tube, not verbalizing much, potassium/magnesium/phosphorus all decreased but otherwise labs are within normal limits    Plan:  1)   -Continue IV fluids  -Continue NG tube  -N.p.o.  -I/Os  -Continue Barker for accurate measurement of output today  -Stool cultures and C. difficile PCR assay  -Trend abdominal exam  -As needed analgesia  -Review home medications for psychiatric medicines  -As needed analgesia  -As needed Zofran  -DVT prophylaxis  -Replete potassium, phosphorus, magnesium    Subjective/Objective   Chief Complaint: Not verbalizing much at this moment in time    Subjective: Patient was seen examined at bedside.  Patient voices that he is slightly uncomfortable.  Patient does not seem to indicate that he is passing gas or bowel movements yet.  Otherwise patient is not verbalizing much currently.    Objective:     Blood pressure 143/82, pulse 78, temperature 98.3 °F (36.8 °C), resp. rate 20, height 5' 8\" (1.727 m), weight 77.6 kg (171 lb), SpO2 96%.,Body mass index is 26 kg/m².      Intake/Output Summary (Last 24 hours) at 4/8/2024 1007  Last data filed at 4/8/2024 0507  Gross per 24 hour   Intake --   Output 2225 ml   Net -2225 ml       Invasive Devices       Peripheral Intravenous Line  Duration             Peripheral IV 04/06/24 Distal;Left;Upper;Ventral (anterior) Arm 1 day              Drain  Duration             NG/OG/Enteral Tube Nasogastric 16 Fr 1 day    Urethral Catheter Latex 16 Fr. 1 day                    Physical Exam: /82   Pulse 78   Temp 98.3 °F (36.8 °C)   Resp 20   Ht 5' 8\" (1.727 m)   Wt 77.6 kg (171 lb)   SpO2 96%   BMI 26.00 kg/m²   General appearance: alert and " oriented, in no acute distress  Head: Normocephalic, without obvious abnormality, atraumatic  Lungs: clear to auscultation bilaterally  Heart: regular rate and rhythm, S1, S2 normal, no murmur, click, rub or gallop  Abdomen:  Soft, mild distention, no active bowel sounds, moderate tenderness to palpation diffusely  Skin: Skin color, texture, turgor normal. No rashes or lesions or incision is c/d/i    Lab, Imaging and other studies:I have personally reviewed pertinent lab results.  , CBC:   Lab Results   Component Value Date    WBC 8.90 04/08/2024    HGB 14.1 04/08/2024    HCT 44.0 04/08/2024    MCV 89 04/08/2024     (L) 04/08/2024    RBC 4.93 04/08/2024    MCH 28.6 04/08/2024    MCHC 32.0 04/08/2024    RDW 13.6 04/08/2024    MPV 10.2 04/08/2024   , CMP:   Lab Results   Component Value Date    SODIUM 140 04/08/2024    K 3.3 (L) 04/08/2024     04/08/2024    CO2 28 04/08/2024    BUN 14 04/08/2024    CREATININE 0.94 04/08/2024    CALCIUM 7.9 (L) 04/08/2024    EGFR 89 04/08/2024     VTE Pharmacologic Prophylaxis: Heparin  VTE Mechanical Prophylaxis: sequential compression device

## 2024-04-09 LAB
ANION GAP SERPL CALCULATED.3IONS-SCNC: 6 MMOL/L (ref 4–13)
BASOPHILS # BLD AUTO: 0.02 THOUSANDS/ÂΜL (ref 0–0.1)
BASOPHILS NFR BLD AUTO: 0 % (ref 0–1)
BUN SERPL-MCNC: 13 MG/DL (ref 5–25)
CALCIUM SERPL-MCNC: 8 MG/DL (ref 8.4–10.2)
CHLORIDE SERPL-SCNC: 105 MMOL/L (ref 96–108)
CO2 SERPL-SCNC: 29 MMOL/L (ref 21–32)
CREAT SERPL-MCNC: 0.77 MG/DL (ref 0.6–1.3)
EOSINOPHIL # BLD AUTO: 0.02 THOUSAND/ÂΜL (ref 0–0.61)
EOSINOPHIL NFR BLD AUTO: 0 % (ref 0–6)
ERYTHROCYTE [DISTWIDTH] IN BLOOD BY AUTOMATED COUNT: 13.4 % (ref 11.6–15.1)
GFR SERPL CREATININE-BSD FRML MDRD: 100 ML/MIN/1.73SQ M
GLUCOSE SERPL-MCNC: 94 MG/DL (ref 65–140)
HCT VFR BLD AUTO: 42.1 % (ref 36.5–49.3)
HGB BLD-MCNC: 13.7 G/DL (ref 12–17)
IMM GRANULOCYTES # BLD AUTO: 0.02 THOUSAND/UL (ref 0–0.2)
IMM GRANULOCYTES NFR BLD AUTO: 0 % (ref 0–2)
LYMPHOCYTES # BLD AUTO: 1.08 THOUSANDS/ÂΜL (ref 0.6–4.47)
LYMPHOCYTES NFR BLD AUTO: 14 % (ref 14–44)
MAGNESIUM SERPL-MCNC: 2 MG/DL (ref 1.9–2.7)
MCH RBC QN AUTO: 28.4 PG (ref 26.8–34.3)
MCHC RBC AUTO-ENTMCNC: 32.5 G/DL (ref 31.4–37.4)
MCV RBC AUTO: 87 FL (ref 82–98)
MONOCYTES # BLD AUTO: 0.49 THOUSAND/ÂΜL (ref 0.17–1.22)
MONOCYTES NFR BLD AUTO: 7 % (ref 4–12)
NEUTROPHILS # BLD AUTO: 5.96 THOUSANDS/ÂΜL (ref 1.85–7.62)
NEUTS SEG NFR BLD AUTO: 79 % (ref 43–75)
NRBC BLD AUTO-RTO: 0 /100 WBCS
PHOSPHATE SERPL-MCNC: 2 MG/DL (ref 2.7–4.5)
PLATELET # BLD AUTO: 150 THOUSANDS/UL (ref 149–390)
PMV BLD AUTO: 9.9 FL (ref 8.9–12.7)
POTASSIUM SERPL-SCNC: 3.3 MMOL/L (ref 3.5–5.3)
RBC # BLD AUTO: 4.83 MILLION/UL (ref 3.88–5.62)
SODIUM SERPL-SCNC: 140 MMOL/L (ref 135–147)
WBC # BLD AUTO: 7.59 THOUSAND/UL (ref 4.31–10.16)

## 2024-04-09 PROCEDURE — 80048 BASIC METABOLIC PNL TOTAL CA: CPT | Performed by: PHYSICIAN ASSISTANT

## 2024-04-09 PROCEDURE — 83735 ASSAY OF MAGNESIUM: CPT | Performed by: PHYSICIAN ASSISTANT

## 2024-04-09 PROCEDURE — 85025 COMPLETE CBC W/AUTO DIFF WBC: CPT | Performed by: PHYSICIAN ASSISTANT

## 2024-04-09 PROCEDURE — 99024 POSTOP FOLLOW-UP VISIT: CPT | Performed by: PHYSICIAN ASSISTANT

## 2024-04-09 PROCEDURE — 84100 ASSAY OF PHOSPHORUS: CPT | Performed by: PHYSICIAN ASSISTANT

## 2024-04-09 PROCEDURE — 99232 SBSQ HOSP IP/OBS MODERATE 35: CPT

## 2024-04-09 RX ORDER — DEXTROSE MONOHYDRATE, SODIUM CHLORIDE, AND POTASSIUM CHLORIDE 50; 1.49; 4.5 G/1000ML; G/1000ML; G/1000ML
100 INJECTION, SOLUTION INTRAVENOUS CONTINUOUS
Status: DISCONTINUED | OUTPATIENT
Start: 2024-04-09 | End: 2024-04-13

## 2024-04-09 RX ADMIN — DEXTROSE, SODIUM CHLORIDE, AND POTASSIUM CHLORIDE 100 ML/HR: 5; .45; .15 INJECTION INTRAVENOUS at 18:05

## 2024-04-09 RX ADMIN — POTASSIUM PHOSPHATE, MONOBASIC POTASSIUM PHOSPHATE, DIBASIC 12 MMOL: 224; 236 INJECTION, SOLUTION, CONCENTRATE INTRAVENOUS at 12:54

## 2024-04-09 RX ADMIN — SODIUM CHLORIDE, SODIUM LACTATE, POTASSIUM CHLORIDE, AND CALCIUM CHLORIDE 125 ML/HR: .6; .31; .03; .02 INJECTION, SOLUTION INTRAVENOUS at 06:29

## 2024-04-09 RX ADMIN — LAMOTRIGINE 50 MG: 25 TABLET ORAL at 22:49

## 2024-04-09 RX ADMIN — HEPARIN SODIUM 5000 UNITS: 5000 INJECTION, SOLUTION INTRAVENOUS; SUBCUTANEOUS at 14:12

## 2024-04-09 RX ADMIN — HEPARIN SODIUM 5000 UNITS: 5000 INJECTION, SOLUTION INTRAVENOUS; SUBCUTANEOUS at 06:30

## 2024-04-09 RX ADMIN — LAMOTRIGINE 150 MG: 100 TABLET ORAL at 17:42

## 2024-04-09 RX ADMIN — QUETIAPINE FUMARATE 50 MG: 25 TABLET ORAL at 22:49

## 2024-04-09 RX ADMIN — HEPARIN SODIUM 5000 UNITS: 5000 INJECTION, SOLUTION INTRAVENOUS; SUBCUTANEOUS at 22:49

## 2024-04-09 RX ADMIN — LAMOTRIGINE 150 MG: 100 TABLET ORAL at 09:55

## 2024-04-09 NOTE — ASSESSMENT & PLAN NOTE
Patient presented to the ED from his group home for LLQ abdominal pain with associated nausea and vomiting. He was admitted to the general surgery service due to high grade bowel obstruction.  CT A/P (4/6/24): High-grade bowel obstruction. Potential mid abdominal transition point. Thickened segments of small bowel in the right lower quadrant with potential pneumatosis which may indicate bowel ischemia.  S/p exploratory laparotomy with lysis of adhesions on 4/6/24  Currently with NG tube  Care per General Surgery service

## 2024-04-09 NOTE — PLAN OF CARE
Problem: GASTROINTESTINAL - ADULT  Goal: Minimal or absence of nausea and/or vomiting  Description: INTERVENTIONS:  - Administer IV fluids if ordered to ensure adequate hydration  - Maintain NPO status until nausea and vomiting are resolved  - Nasogastric tube if ordered  - Administer ordered antiemetic medications as needed  - Provide nonpharmacologic comfort measures as appropriate  - Advance diet as tolerated, if ordered  - Consider nutrition services referral to assist patient with adequate nutrition and appropriate food choices  Outcome: Progressing     Problem: GASTROINTESTINAL - ADULT  Goal: Maintains adequate nutritional intake  Description: INTERVENTIONS:  - Monitor percentage of each meal consumed  - Identify factors contributing to decreased intake, treat as appropriate  - Assist with meals as needed  - Monitor I&O, weight, and lab values if indicated  - Obtain nutrition services referral as needed  Outcome: Progressing     Problem: GENITOURINARY - ADULT  Goal: Maintains or returns to baseline urinary function  Description: INTERVENTIONS:  - Assess urinary function  - Encourage oral fluids to ensure adequate hydration if ordered  - Administer IV fluids as ordered to ensure adequate hydration  - Administer ordered medications as needed  - Offer frequent toileting  - Follow urinary retention protocol if ordered  Outcome: Progressing     Problem: Prexisting or High Potential for Compromised Skin Integrity  Goal: Skin integrity is maintained or improved  Description: INTERVENTIONS:  - Identify patients at risk for skin breakdown  - Assess and monitor skin integrity  - Assess and monitor nutrition and hydration status  - Monitor labs   - Assess for incontinence   - Turn and reposition patient  - Assist with mobility/ambulation  - Relieve pressure over bony prominences  - Avoid friction and shearing  - Provide appropriate hygiene as needed including keeping skin clean and dry  - Evaluate need for skin  moisturizer/barrier cream  - Collaborate with interdisciplinary team   - Patient/family teaching  - Consider wound care consult   Outcome: Progressing

## 2024-04-09 NOTE — PLAN OF CARE
Problem: GASTROINTESTINAL - ADULT  Goal: Minimal or absence of nausea and/or vomiting  Description: INTERVENTIONS:  - Administer IV fluids if ordered to ensure adequate hydration  - Maintain NPO status until nausea and vomiting are resolved  - Nasogastric tube if ordered  - Administer ordered antiemetic medications as needed  - Provide nonpharmacologic comfort measures as appropriate  - Advance diet as tolerated, if ordered  - Consider nutrition services referral to assist patient with adequate nutrition and appropriate food choices  Outcome: Progressing     Problem: GENITOURINARY - ADULT  Goal: Maintains or returns to baseline urinary function  Description: INTERVENTIONS:  - Assess urinary function  - Encourage oral fluids to ensure adequate hydration if ordered  - Administer IV fluids as ordered to ensure adequate hydration  - Administer ordered medications as needed  - Offer frequent toileting  - Follow urinary retention protocol if ordered  Outcome: Progressing     Problem: MUSCULOSKELETAL - ADULT  Goal: Maintain or return mobility to safest level of function  Description: INTERVENTIONS:  - Assess patient's ability to carry out ADLs; assess patient's baseline for ADL function and identify physical deficits which impact ability to perform ADLs (bathing, care of mouth/teeth, toileting, grooming, dressing, etc.)  - Assess/evaluate cause of self-care deficits   - Assess range of motion  - Assess patient's mobility  - Assess patient's need for assistive devices and provide as appropriate  - Encourage maximum independence but intervene and supervise when necessary  - Involve family in performance of ADLs  - Assess for home care needs following discharge   - Consider OT consult to assist with ADL evaluation and planning for discharge  - Provide patient education as appropriate  Outcome: Progressing     Problem: METABOLIC, FLUID AND ELECTROLYTES - ADULT  Goal: Electrolytes maintained within normal limits  Description:  INTERVENTIONS:  - Monitor labs and assess patient for signs and symptoms of electrolyte imbalances  - Administer electrolyte replacement as ordered  - Monitor response to electrolyte replacements, including repeat lab results as appropriate  - Instruct patient on fluid and nutrition as appropriate  Outcome: Progressing     Problem: Prexisting or High Potential for Compromised Skin Integrity  Goal: Skin integrity is maintained or improved  Description: INTERVENTIONS:  - Identify patients at risk for skin breakdown  - Assess and monitor skin integrity  - Assess and monitor nutrition and hydration status  - Monitor labs   - Assess for incontinence   - Turn and reposition patient  - Assist with mobility/ambulation  - Relieve pressure over bony prominences  - Avoid friction and shearing  - Provide appropriate hygiene as needed including keeping skin clean and dry  - Evaluate need for skin moisturizer/barrier cream  - Collaborate with interdisciplinary team   - Patient/family teaching  - Consider wound care consult   Outcome: Progressing

## 2024-04-09 NOTE — PROGRESS NOTES
[facility]  Progress Note  Name: Mario Rollins I  MRN: [id number]  Unit/Bed#: 2 [address] Date of Admission: 4/6/2024   Date of Service: 4/9/2024 I Hospital Day: 3    Assessment/Plan   * Small bowel obstruction (HCC)  Assessment & Plan  Patient presented to the ED from his group home for LLQ abdominal pain with associated nausea and vomiting. He was admitted to the general surgery service due to high grade bowel obstruction.  CT A/P (4/6/24): High-grade bowel obstruction. Potential mid abdominal transition point. Thickened segments of small bowel in the right lower quadrant with potential pneumatosis which may indicate bowel ischemia.  S/p exploratory laparotomy with lysis of adhesions on 4/6/24  Currently with NG tube  Care per General Surgery service    Elevated serum creatinine  Assessment & Plan  Creatinine mildly elevated on admission 1.44  Baseline 1.0-1.2  Improved with IVF, currently back at baseline  Monitor I&O  Daily BMP    Electrolyte abnormality  Assessment & Plan  Mag, phos, and potassium low  Monitor and replete as needed    Localization-related symptomatic epilepsy and epileptic syndromes with complex partial seizures, intractable, without status epilepticus (HCC)  Assessment & Plan  History of focal epilepsy  Home regimen includes lamotrigine 150mg BID and 50mg HS  Spoke with pharmacy and neurology. Okay to continue oral Lamictal via NGT if can tolerate as there is no reasonable IV conversion alternative  Seizure precautions  Follow up with Neurology    Right spastic hemiparesis (HCC)  Assessment & Plan  Secondary to left MCA stroke and is s/p brain surgery  Follows outpatient with Neurology    Hyperlipidemia  Assessment & Plan  Takes Lipitor at home, resume once no longer NPO    Cerebral palsy (HCC)  Assessment & Plan  Currently resides at group [facility]  Supportive care           VTE Pharmacologic Prophylaxis:   Moderate Risk (Score 3-4) - Pharmacological DVT  Prophylaxis Ordered: heparin.    Mobility:   Basic Mobility Inpatient Raw Score: 18  JH-HLM Goal: 6: Walk 10 steps or more  JH-HLM Achieved: 2: Bed activities/Dependent transfer  JH-HLM Goal NOT achieved. Continue with multidisciplinary rounding and encourage appropriate mobility to improve upon JH-HLM goals.    Patient Centered Rounds: I performed bedside rounds with nursing staff today.   Discussions with Specialists or Other Care Team Provider: rn    Education and Discussions with Family / Patient:  per primary team.     Total Time Spent on Date of Encounter in care of patient: 30 mins. This time was spent on one or more of the following: performing physical exam; counseling and coordination of care; obtaining or reviewing history; documenting in the medical record; reviewing/ordering tests, medications or procedures; communicating with other healthcare professionals and discussing with patient's family/caregivers.    Current Length of Stay: 3 day(s)  Current Patient Status: Inpatient   Certification Statement: The patient will continue to require additional inpatient hospital stay due to NG tube, npo, post op monitoring  Discharge Plan: [facility] is following this patient on consult. They are not yet medically stable for discharge secondary to npo with ng tube, post op monitoring.    Code Status: Level 1 - Full Code    Subjective:   Patient reports mild abdominal pain. Denies bowel movements or passing gas. Denies nausea or vomiting.     Objective:     Vitals:   Temp (24hrs), Av.7 °F (37.1 °C), Min:97.8 °F (36.6 °C), Max:99.9 °F (37.7 °C)    Temp:  [97.8 °F (36.6 °C)-99.9 °F (37.7 °C)] 98.9 °F (37.2 °C)  HR:  [69-85] 71  Resp:  [18-19] 18  BP: (144-151)/(77-80) 144/77  SpO2:  [92 %-96 %] 95 %  Body mass index is 26 kg/m².     Input and Output Summary (last 24 hours):     Intake/Output Summary (Last 24 hours) at 2024 1259  Last data filed at 2024 0941  Gross per 24 hour   Intake 6458.33 ml   Output 5125 ml    Net 1333.33 ml       Physical Exam:   Physical Exam  Vitals and nursing note reviewed.   Constitutional:       General: He is not in acute distress.     Appearance: He is well-developed.   HENT:      Head:      Comments: NG tube in place  Cardiovascular:      Rate and Rhythm: Normal rate and regular rhythm.   Pulmonary:      Effort: Pulmonary effort is normal. No respiratory distress.      Breath sounds: Normal breath sounds.   Abdominal:      Palpations: Abdomen is soft.      Tenderness: There is abdominal tenderness (mild, diffuse).   Musculoskeletal:         General: No swelling.   Skin:     General: Skin is warm and dry.      Capillary Refill: Capillary refill takes less than 2 seconds.   Neurological:      Mental Status: He is alert.   Psychiatric:         Mood and Affect: Mood normal.          Additional Data:     Labs:  Results from last 7 days   Lab Units 04/09/24  0651   WBC Thousand/uL 7.59   HEMOGLOBIN g/dL 13.7   HEMATOCRIT % 42.1   PLATELETS Thousands/uL 150   SEGS PCT % 79*   LYMPHO PCT % 14   MONO PCT % 7   EOS PCT % 0     Results from last 7 days   Lab Units 04/09/24  0651 04/08/24  0442 04/07/24  0544   SODIUM mmol/L 140   < > 139   POTASSIUM mmol/L 3.3*   < > 3.6   CHLORIDE mmol/L 105   < > 109*   CO2 mmol/L 29   < > 24   BUN mg/dL 13   < > 18   CREATININE mg/dL 0.77   < > 1.24   ANION GAP mmol/L 6   < > 6   CALCIUM mg/dL 8.0*   < > 7.6*   ALBUMIN g/dL  --   --  2.7*   TOTAL BILIRUBIN mg/dL  --   --  0.56   ALK PHOS U/L  --   --  53   ALT U/L  --   --  9   AST U/L  --   --  16   GLUCOSE RANDOM mg/dL 94   < > 115    < > = values in this interval not displayed.     Results from last 7 days   Lab Units 04/06/24  1323   INR  1.08     Results from last 7 days   Lab Units 04/07/24  1725   POC GLUCOSE mg/dl 94         Results from last 7 days   Lab Units 04/06/24  1305   LACTIC ACID mmol/L 1.1       Lines/Drains:  Invasive Devices       Peripheral Intravenous Line  Duration             Peripheral IV  04/06/24 Distal;Left;Upper;Ventral (anterior) Arm 3 days              Drain  Duration             NG/OG/Enteral Tube Nasogastric 16 Fr 2 days    Urethral Catheter Latex 16 Fr. 2 days                  Urinary Catheter:  Goal for removal:  removed per primary team               Imaging: No pertinent imaging reviewed.    Recent Cultures (last 7 days):   Results from last 7 days   Lab Units 04/06/24  1402 04/06/24  1323   BLOOD CULTURE  No Growth at 48 hrs. No Growth at 48 hrs.       Last 24 Hours Medication List:   Current Facility-Administered Medications   Medication Dose Route Frequency Provider Last Rate    acetaminophen  650 mg Rectal Q4H PRN Morena Broderick PA-C      dextrose 5 % and sodium chloride 0.45 % with KCl 20 mEq/L  100 mL/hr Intravenous Continuous Trevor Fletcher PA-C      heparin (porcine)  5,000 Units Subcutaneous Q8H Count includes the [facility] Morena Broderick PA-C      HYDROmorphone  0.5 mg Intravenous Q4H PRN Morena Broderick PA-C      lamoTRIgine  150 mg Oral BID Rosy Womack PA-C      lamoTRIgine  50 mg Oral HS Rosy Womack PA-C      magnesium sulfate  2 g Intravenous Once David Shore PA-C      metoprolol  2.5 mg Intravenous Q6H PRN David Shore PA-C      morphine injection  2 mg Intravenous Q4H PRN Morena Broderick PA-C      ondansetron  4 mg Intravenous Q6H PRN Morena Broderick PA-C      potassium phosphate  12 mmol Intravenous Once Trevor Fletcher PA-C 12 mmol (04/09/24 1254)    QUEtiapine  50 mg Oral HS David Shore PA-C          Today, Patient Was Seen By: Prabha Rawls PA-C    **Please Note: This note may have been constructed using a voice recognition system.**

## 2024-04-09 NOTE — PROGRESS NOTES
"    Progress Note - General Surgery   Mario Rollins 58 y.o. male MRN: [id number]  Unit/Bed#: [address] [place] [address] Encounter: [id number]    Assessment:  57yo male with history of cerebral palsy, right hemiparesis secondary to left MCA stroke, and focal epilepsy whom is   POD #3 s/p exploratory laparotomy with lysis of adhesions   Diarrhea -- chief complaint upon admission, now resolved, no sample for stool cultures as of yet       Plan:  -NPO with NG tube  - NG tube clamp trial today  -switch to maintenance IV fluids   -Discontinue Barker   -Stool cultures and C. difficile PCR assay pending sample collection   -Trend abdominal exam  -DVT prophylaxis  -Replete potassium, phosphorus      Subjective/Objective     Subjective:  Patient doing well overall. His pain has improved. He denies any nausea. He is thirsty and asking to eat. He is unsure if he has been passing flatus    Objective:   AVSS  NG tube output 800cc  UOP 2.1    Blood pressure 147/78, pulse 77, temperature 99.9 °F (37.7 °C), resp. rate 18, height 5' 8\" (1.727 m), weight 77.6 kg (171 lb), SpO2 92%.,Body mass index is 26 kg/m².      Intake/Output Summary (Last 24 hours) at 4/9/2024 0750  Last data filed at 4/9/2024 0726  Gross per 24 hour   Intake 6458.33 ml   Output 4700 ml   Net 1758.33 ml       Invasive Devices       Peripheral Intravenous Line  Duration             Peripheral IV 04/06/24 Distal;Left;Upper;Ventral (anterior) Arm 2 days              Drain  Duration             NG/OG/Enteral Tube Nasogastric 16 Fr 2 days    Urethral Catheter Latex 16 Fr. 2 days                    Physical Exam: /78   Pulse 77   Temp 99.9 °F (37.7 °C)   Resp 18   Ht 5' 8\" (1.727 m)   Wt 77.6 kg (171 lb)   SpO2 92%   BMI 26.00 kg/m²   General appearance: alert and oriented, in no acute distress  Head: Normocephalic, without obvious abnormality, atraumatic  Lungs: clear to auscultation bilaterally  Heart: regular rate and rhythm, S1, S2 normal, no murmur, click, rub or " gallop  Abdomen:  Soft, mild distention, active bowel sounds, moderate tenderness to palpation diffusely, incision is c/d/I with staples and mepilex  Extremities: No pitting edema  Skin: Skin color, texture, turgor normal. No rashes or lesions or incision is c/d/i    Lab, Imaging and other studies:I have personally reviewed pertinent lab results.  , CBC:   Lab Results   Component Value Date    WBC 7.59 04/09/2024    HGB 13.7 04/09/2024    HCT 42.1 04/09/2024    MCV 87 04/09/2024     04/09/2024    RBC 4.83 04/09/2024    MCH 28.4 04/09/2024    MCHC 32.5 04/09/2024    RDW 13.4 04/09/2024    MPV 9.9 04/09/2024    NRBC 0 04/09/2024   , CMP:   Lab Results   Component Value Date    SODIUM 140 04/09/2024    K 3.3 (L) 04/09/2024     04/09/2024    CO2 29 04/09/2024    BUN 13 04/09/2024    CREATININE 0.77 04/09/2024    CALCIUM 8.0 (L) 04/09/2024    EGFR 100 04/09/2024     VTE Pharmacologic Prophylaxis: Heparin SQ  VTE Mechanical Prophylaxis: sequential compression device

## 2024-04-10 ENCOUNTER — APPOINTMENT (INPATIENT)
Dept: RADIOLOGY | Facility: HOSPITAL | Age: 59
DRG: 330 | End: 2024-04-10
Payer: MEDICARE

## 2024-04-10 LAB
ANION GAP SERPL CALCULATED.3IONS-SCNC: 7 MMOL/L (ref 4–13)
BASOPHILS # BLD AUTO: 0.02 THOUSANDS/ÂΜL (ref 0–0.1)
BASOPHILS NFR BLD AUTO: 0 % (ref 0–1)
BUN SERPL-MCNC: 12 MG/DL (ref 5–25)
CALCIUM SERPL-MCNC: 8.1 MG/DL (ref 8.4–10.2)
CHLORIDE SERPL-SCNC: 105 MMOL/L (ref 96–108)
CO2 SERPL-SCNC: 27 MMOL/L (ref 21–32)
CREAT SERPL-MCNC: 0.79 MG/DL (ref 0.6–1.3)
EOSINOPHIL # BLD AUTO: 0.07 THOUSAND/ÂΜL (ref 0–0.61)
EOSINOPHIL NFR BLD AUTO: 1 % (ref 0–6)
ERYTHROCYTE [DISTWIDTH] IN BLOOD BY AUTOMATED COUNT: 13.3 % (ref 11.6–15.1)
GFR SERPL CREATININE-BSD FRML MDRD: 98 ML/MIN/1.73SQ M
GLUCOSE SERPL-MCNC: 107 MG/DL (ref 65–140)
HCT VFR BLD AUTO: 43.6 % (ref 36.5–49.3)
HGB BLD-MCNC: 14.2 G/DL (ref 12–17)
IMM GRANULOCYTES # BLD AUTO: 0.03 THOUSAND/UL (ref 0–0.2)
IMM GRANULOCYTES NFR BLD AUTO: 0 % (ref 0–2)
LYMPHOCYTES # BLD AUTO: 1.1 THOUSANDS/ÂΜL (ref 0.6–4.47)
LYMPHOCYTES NFR BLD AUTO: 13 % (ref 14–44)
MAGNESIUM SERPL-MCNC: 2.1 MG/DL (ref 1.9–2.7)
MCH RBC QN AUTO: 28.3 PG (ref 26.8–34.3)
MCHC RBC AUTO-ENTMCNC: 32.6 G/DL (ref 31.4–37.4)
MCV RBC AUTO: 87 FL (ref 82–98)
MONOCYTES # BLD AUTO: 0.72 THOUSAND/ÂΜL (ref 0.17–1.22)
MONOCYTES NFR BLD AUTO: 9 % (ref 4–12)
NEUTROPHILS # BLD AUTO: 6.32 THOUSANDS/ÂΜL (ref 1.85–7.62)
NEUTS SEG NFR BLD AUTO: 77 % (ref 43–75)
NRBC BLD AUTO-RTO: 0 /100 WBCS
PHOSPHATE SERPL-MCNC: 2 MG/DL (ref 2.7–4.5)
PLATELET # BLD AUTO: 206 THOUSANDS/UL (ref 149–390)
PMV BLD AUTO: 9.9 FL (ref 8.9–12.7)
POTASSIUM SERPL-SCNC: 3.3 MMOL/L (ref 3.5–5.3)
RBC # BLD AUTO: 5.01 MILLION/UL (ref 3.88–5.62)
SODIUM SERPL-SCNC: 139 MMOL/L (ref 135–147)
WBC # BLD AUTO: 8.26 THOUSAND/UL (ref 4.31–10.16)

## 2024-04-10 PROCEDURE — 80048 BASIC METABOLIC PNL TOTAL CA: CPT | Performed by: PHYSICIAN ASSISTANT

## 2024-04-10 PROCEDURE — 85025 COMPLETE CBC W/AUTO DIFF WBC: CPT | Performed by: PHYSICIAN ASSISTANT

## 2024-04-10 PROCEDURE — 83735 ASSAY OF MAGNESIUM: CPT | Performed by: PHYSICIAN ASSISTANT

## 2024-04-10 PROCEDURE — 99222 1ST HOSP IP/OBS MODERATE 55: CPT | Performed by: NURSE PRACTITIONER

## 2024-04-10 PROCEDURE — 97110 THERAPEUTIC EXERCISES: CPT

## 2024-04-10 PROCEDURE — 99024 POSTOP FOLLOW-UP VISIT: CPT | Performed by: PHYSICIAN ASSISTANT

## 2024-04-10 PROCEDURE — 74019 RADEX ABDOMEN 2 VIEWS: CPT

## 2024-04-10 PROCEDURE — 97163 PT EVAL HIGH COMPLEX 45 MIN: CPT

## 2024-04-10 PROCEDURE — 84100 ASSAY OF PHOSPHORUS: CPT | Performed by: PHYSICIAN ASSISTANT

## 2024-04-10 RX ADMIN — LAMOTRIGINE 50 MG: 25 TABLET ORAL at 21:08

## 2024-04-10 RX ADMIN — POTASSIUM PHOSPHATE, MONOBASIC AND POTASSIUM PHOSPHATE, DIBASIC 12 MMOL: 224; 236 INJECTION, SOLUTION, CONCENTRATE INTRAVENOUS at 08:28

## 2024-04-10 RX ADMIN — HEPARIN SODIUM 5000 UNITS: 5000 INJECTION, SOLUTION INTRAVENOUS; SUBCUTANEOUS at 05:37

## 2024-04-10 RX ADMIN — DEXTROSE, SODIUM CHLORIDE, AND POTASSIUM CHLORIDE 100 ML/HR: 5; .45; .15 INJECTION INTRAVENOUS at 03:25

## 2024-04-10 RX ADMIN — DEXTROSE, SODIUM CHLORIDE, AND POTASSIUM CHLORIDE 100 ML/HR: 5; .45; .15 INJECTION INTRAVENOUS at 20:25

## 2024-04-10 RX ADMIN — HEPARIN SODIUM 5000 UNITS: 5000 INJECTION, SOLUTION INTRAVENOUS; SUBCUTANEOUS at 18:09

## 2024-04-10 RX ADMIN — LAMOTRIGINE 150 MG: 100 TABLET ORAL at 08:28

## 2024-04-10 RX ADMIN — QUETIAPINE FUMARATE 50 MG: 25 TABLET ORAL at 21:03

## 2024-04-10 RX ADMIN — LAMOTRIGINE 150 MG: 100 TABLET ORAL at 18:09

## 2024-04-10 RX ADMIN — IOHEXOL 200 ML: 350 INJECTION, SOLUTION INTRAVENOUS at 10:30

## 2024-04-10 NOTE — ASSESSMENT & PLAN NOTE
History of focal epilepsy  Home regimen includes lamotrigine 150mg BID and 50mg HS  Previous provider spoke with pharmacy and neurology. Okay to continue oral Lamictal via NGT if can tolerate as there is no reasonable IV conversion alternative  Seizure precautions  Follow up with Neurology outpatient

## 2024-04-10 NOTE — PROGRESS NOTES
"Progress Note - General Surgery   Mario Rollins 59 y.o. male MRN: [id number]  Unit/Bed#: [address] [place] [address] Encounter: [id number]    Assessment:  1) POD #4 s/p exploratory laparotomy with lysis of adhesions -AVSS, appropriate urinary output, NG tube output still high at 800 mL, potassium 3.3, NG tube still in place, not passing gas or bowel movements, mild distention, incision is clean, dry, intact, no abdominal pain    Plan:  1)   -Gastrografin/Omnipaque challenge with serial x-rays  -N.p.o.  -Management of NG tube on moderate continuous suction  -Continue maintenance fluids  -Replete potassium with potassium of 12 mmol  -Observe for spontaneous onset of bowel function  -Pending C. difficile and stool cultures  -The a.m. labs with CBC, BMP, mag, Phos tomorrow  -Continue reordered home medications  -As needed Zofran  -Continue as needed analgesic regiment    Subjective/Objective   Chief Complaint: I am okay    Subjective: Patient was seen examined at bedside.  Patient is somewhat quiet.  Patient denies any nausea or vomiting.  Patient denies any passing of gas.  Patient denies any abdominal pain.  Patient is urinating.    Objective:     Blood pressure 149/91, pulse 79, temperature 98.2 °F (36.8 °C), temperature source Axillary, resp. rate 16, height 5' 8\" (1.727 m), weight 77.6 kg (171 lb), SpO2 94%.,Body mass index is 26 kg/m².      Intake/Output Summary (Last 24 hours) at 4/10/2024 0907  Last data filed at 4/10/2024 0749  Gross per 24 hour   Intake 933.33 ml   Output 3925 ml   Net -2991.67 ml       Invasive Devices       Peripheral Intravenous Line  Duration             Peripheral IV 04/06/24 Distal;Left;Upper;Ventral (anterior) Arm 3 days              Drain  Duration             NG/OG/Enteral Tube Nasogastric 16 Fr 3 days                    Physical Exam: /91 (BP Location: Left arm)   Pulse 79   Temp 98.2 °F (36.8 °C) (Axillary)   Resp 16   Ht 5' 8\" (1.727 m)   Wt 77.6 kg (171 lb)   SpO2 94%   BMI 26.00 " kg/m²   General appearance: alert  Lungs: clear to auscultation bilaterally  Heart:  regular rate,   Abdomen:  soft, mild distension, non tender  Skin: Skin color, texture, turgor normal. No rashes or lesions or incision is c/d/i    Lab, Imaging and other studies:I have personally reviewed pertinent lab results.  , CBC:   Lab Results   Component Value Date    WBC 8.26 04/10/2024    HGB 14.2 04/10/2024    HCT 43.6 04/10/2024    MCV 87 04/10/2024     04/10/2024    RBC 5.01 04/10/2024    MCH 28.3 04/10/2024    MCHC 32.6 04/10/2024    RDW 13.3 04/10/2024    MPV 9.9 04/10/2024    NRBC 0 04/10/2024   , CMP:   Lab Results   Component Value Date    SODIUM 139 04/10/2024    K 3.3 (L) 04/10/2024     04/10/2024    CO2 27 04/10/2024    BUN 12 04/10/2024    CREATININE 0.79 04/10/2024    CALCIUM 8.1 (L) 04/10/2024    EGFR 98 04/10/2024     VTE Pharmacologic Prophylaxis: Heparin  VTE Mechanical Prophylaxis: sequential compression device

## 2024-04-10 NOTE — PHYSICAL THERAPY NOTE
PHYSICAL THERAPY EVALUATION/TREATMENT     04/10/24 1435   PT Last Visit   PT Visit Date 04/10/24   Note Type   Note type Evaluation   Pain Assessment   Pain Assessment Tool Robert-Baker FACES   Robert-Baker FACES Pain Rating 4  (Abdominal area with movement)   Restrictions/Precautions   Other Precautions Chair Alarm;Cognitive;Bed Alarm;Fall Risk;Pain   Home Living   Type of Home Group Home   Home Layout One level   Home Equipment Hemiwalker   Additional Comments Patient using hemiwalker on left side prior to admission   Prior Function   Lives With Facility staff   Receives Help From Personal care attendant   IADLs Family/Friend/Other provides transportation;Family/Friend/Other provides meals;Family/Friend/Other provides medication management   Comments Patient on disability from a group home with history of right-sided hemiplegia with a left MCA and brain surgery history.  Patient is a questionable historian at times although states ambulating with his hemiwalker and able to assist with his ADLs (dressing and bathing) all prior to admission   General   Additional Pertinent History Chart reviewed, patient admitted with small bowel obstruction.  Patient seen with NG tube present although not connected to wall suction.  Patient has extended history of gait dysfunction, mental retardation, right-sided hemiplegia with limitations to functional mobility all prior to admission.  Patient is from a group home appears to have assist for ADLs and IADLs prior to admission   Family/Caregiver Present No   Cognition   Overall Cognitive Status Impaired   Arousal/Participation Cooperative   Orientation Level Oriented to person;Oriented to place   Following Commands Follows one step commands with increased time or repetition   Subjective   Subjective Patient states having abdominal pain with mobility   RUE Assessment   RUE Assessment   (nonfunctional R UE with deformity)   RLE Assessment   RLE Assessment   (Range of motion within  functional limits although with deformity of the right ankle/ foot, strength grossly 3/3+)   LLE Assessment   LLE Assessment   (Range of motion within functional limits, strength 3+/4 -)   Coordination   Movements are Fluid and Coordinated 0   Coordination and Movement Description Decreased coordination with all transfers and gait activity   Bed Mobility   Supine to Sit 3  Moderate assistance   Additional items Assist x 1;Verbal cues;LE management   Sit to Supine 3  Moderate assistance   Additional items Assist x 1;Verbal cues;LE management   Transfers   Sit to Stand 2  Maximal assistance   Additional items Assist x 1   Stand to Sit 3  Moderate assistance   Additional items Assist x 1   Additional Comments Patient required max assist to maintain upright standing position unable to advance lower extremities for gait assessment at this time   Balance   Static Sitting Fair -   Dynamic Sitting Poor +   Static Standing Poor   Dynamic Standing Poor -   Activity Tolerance   Activity Tolerance Patient limited by fatigue;Patient limited by pain;Other (Comment)  (Limited by cognitive deficits at times)   Nurse Made Aware Yes   Assessment   Prognosis Good   Problem List Decreased strength;Decreased range of motion;Decreased endurance;Impaired balance;Decreased mobility;Decreased coordination;Pain;Decreased cognition;Impaired judgement;Decreased safety awareness   Assessment Patient seen for Physical Therapy evaluation. Patient admitted with Small bowel obstruction (HCC).  Comorbidities affecting patient's physical performance include: .  Personal factors affecting patient at time of initial evaluation include: ambulating with assistive device, inability to ambulate household distances, inability to navigate community distances, inability to navigate level surfaces without external assistance, inability to perform dynamic tasks in community, decreased cognition, inability to perform physical activity, limited insight into  impairments, inability to perform ADLS, inability to perform IADLS , and inability to live alone. Prior to admission, patient was independent with functional mobility with hemiwalker, requiring assist for ADLS, requiring assist for IADLS, ambulating household distance, having 24 hour care , and living in a group home.  Please find objective findings from Physical Therapy assessment regarding body systems outlined above with impairments and limitations including weakness, decreased ROM, impaired balance, decreased endurance, impaired coordination, gait deviations, pain, decreased activity tolerance, decreased functional mobility tolerance, impaired judgement, fall risk, SOB upon exertion, and decreased cognition.  The Barthel Index was used as a functional outcome tool presenting with a score of Barthel Index Score: 35 today indicating marked limitations of functional mobility and ADLS.  Patient's clinical presentation is currently unstable/unpredictable as seen in patient's presentation of vital sign response, changing level of pain, varying levels of cognitive performance, increased fall risk, new onset of impairment of functional mobility, decreased endurance, and new onset of weakness. Pt would benefit from continued Physical Therapy treatment to address deficits as defined above and maximize level of functional mobility. As demonstrated by objective findings, the assigned level of complexity for this evaluation is high.The patient's -[facility] Basic Mobility Inpatient Short Form Raw Score is 9. A Raw score of less than or equal to 16 suggests the patient may benefit from discharge to post-acute rehabilitation services. Please also refer to the recommendation of the Physical Therapist for safe discharge planning.   Goals   Patient Goals to have less pain   STG Expiration Date 04/17/24   Short Term Goal #1 transfers and gait with hemiwalker with mod assist   Short Term Goal #2 Gait endurance to 20 feet   LTG  Expiration Date 04/24/24   Long Term Goal #1 Strength left lower extremity 4/5, right lower extremity 3+/4 - as able   Long Term Goal #2 Transfers and gait independently with hemiwalker for functional household distances as prior to admission   Plan   Treatment/Interventions ADL retraining;Functional transfer training;LE strengthening/ROM;Therapeutic exercise;Endurance training;Cognitive reorientation;Patient/family training;Equipment eval/education;Bed mobility;Gait training;Compensatory technique education   PT Frequency Other (Comment)  (5 times per week)   Discharge Recommendation   Rehab Resource Intensity Level, PT II (Moderate Resource Intensity)   AM-PAC Basic Mobility Inpatient   Turning in Flat Bed Without Bedrails 2   Lying on Back to Sitting on Edge of Flat Bed Without Bedrails 2   Moving Bed to Chair 1   Standing Up From Chair Using Arms 2   Walk in Room 1   Climb 3-5 Stairs With Railing 1   Basic Mobility Inpatient Raw Score 9   [facility] Highest Level Of Mobility   -[facility] Goal 3: Sit at edge of bed   -HL Achieved 3: Sit at edge of bed   Barthel Index   Feeding 5   Bathing 0   Grooming Score 0   Dressing Score 5   Bladder Score 5   Bowels Score 10   Toilet Use Score 5   Transfers (Bed/Chair) Score 5   Mobility (Level Surface) Score 0   Stairs Score 0   Barthel Index Score 35   Additional Treatment Session   Start Time 1420   End Time 1435   Treatment Assessment s: Patient reports abdominal pain with activity O: Bilateral lower extremity exercise completed as listed below A: Patient presents with limitations to functional mobility prior to admission, living in a group home using a hemiwalker on the left side due to deformity of the right.  Patient now presents as generally weak and deconditioned and will benefit from continued physical therapy with progression as tolerated   Exercises   Hamstring Stretch Supine;5 reps;PROM;Bilateral   Heelslides Supine;10 reps;AAROM;AROM;Bilateral   Hip Flexion  Sitting;5 reps;AAROM;AROM;Bilateral   Balance training  Sitting and standing balance activity completed with weight shifting and reaching activity   Licensure   [place] License Number  Kirsten Aragon, PT 4 0 [id number]

## 2024-04-10 NOTE — ASSESSMENT & PLAN NOTE
Patient presented to the ED from his group home for LLQ abdominal pain with associated nausea and vomiting. He was admitted to the general surgery service due to high grade bowel obstruction.  CT A/P (4/6/24): High-grade bowel obstruction. Potential mid abdominal transition point. Thickened segments of small bowel in the right lower quadrant with potential pneumatosis which may indicate bowel ischemia.  S/p exploratory laparotomy with lysis of adhesions on 4/6/24  Currently with NG tube  No bowel sounds appreciated on exam, patient reports he is not passing gas at this point.  Care per General Surgery service

## 2024-04-10 NOTE — ASSESSMENT & PLAN NOTE
Mag, phos, and potassium low  Received K-Phos 12 mmol this morning.  Monitor and replete as needed

## 2024-04-10 NOTE — PROGRESS NOTES
[facility]  Progress Note  Name: Mario Rollins I  MRN: [id number]  Unit/Bed#: 2 [address] Date of Admission: 4/6/2024   Date of Service: 4/10/2024 I Hospital Day: 4    Assessment/Plan   * Small bowel obstruction (HCC)  Assessment & Plan  Patient presented to the ED from his group home for LLQ abdominal pain with associated nausea and vomiting. He was admitted to the general surgery service due to high grade bowel obstruction.  CT A/P (4/6/24): High-grade bowel obstruction. Potential mid abdominal transition point. Thickened segments of small bowel in the right lower quadrant with potential pneumatosis which may indicate bowel ischemia.  S/p exploratory laparotomy with lysis of adhesions on 4/6/24  Currently with NG tube  No bowel sounds appreciated on exam, patient reports he is not passing gas at this point.  Care per General Surgery service    Elevated serum creatinine  Assessment & Plan  Creatinine mildly elevated on admission 1.44  Baseline 1.0-1.2  Improved with IVF, currently back at baseline  Monitor I&O  Daily BMP    Electrolyte abnormality  Assessment & Plan  Mag, phos, and potassium low  Received K-Phos 12 mmol this morning.  Monitor and replete as needed    Localization-related symptomatic epilepsy and epileptic syndromes with complex partial seizures, intractable, without status epilepticus (HCC)  Assessment & Plan  History of focal epilepsy  Home regimen includes lamotrigine 150mg BID and 50mg HS  Previous provider spoke with pharmacy and neurology. Okay to continue oral Lamictal via NGT if can tolerate as there is no reasonable IV conversion alternative  Seizure precautions  Follow up with Neurology outpatient    Cerebral palsy (HCC)  Assessment & Plan  Currently resides at group facility  Supportive care    Right spastic hemiparesis (HCC)  Assessment & Plan  Secondary to left MCA stroke and is s/p brain surgery  Follows outpatient with  Neurology    Hyperlipidemia  Assessment & Plan  Takes Lipitor at home, resume once no longer NPO               VTE Pharmacologic Prophylaxis:   High Risk (Score >/= 5) - Pharmacological DVT Prophylaxis Ordered: heparin. Sequential Compression Devices Ordered.    Mobility:   Basic Mobility Inpatient Raw Score: 9  JH-HLM Goal: 3: Sit at edge of bed  JH-HLM Achieved: 3: Sit at edge of bed  JH-HLM Goal achieved. Continue to encourage appropriate mobility.    Patient Centered Rounds: I performed bedside rounds with nursing staff today.   Discussions with Specialists or Other Care Team Provider: Multidisciplinary team    Education and Discussions with Family / Patient:  deferred to primary service.     Total Time Spent on Date of Encounter in care of patient: Greater than 45 mins. This time was spent on one or more of the following: performing physical exam; counseling and coordination of care; obtaining or reviewing history; documenting in the medical record; reviewing/ordering tests, medications or procedures; communicating with other healthcare professionals and discussing with patient's family/caregivers.    Current Length of Stay: 4 day(s)  Current Patient Status: Inpatient   Certification Statement: The patient will continue to require additional inpatient hospital stay due to NG tube to suction, awaiting return of bowel sounds, obstructive series, repeat labs  Discharge Plan: [facility] is following this patient on consult. They are not yet medically stable for discharge secondary to electrolyte abnormalities, absence of bowel sounds, NG tube to suction.    Code Status: Level 1 - Full Code    Subjective:   Patient was seen sitting up in bed watching TV.  He is watching the game show which he says he likes to watch very much.  Denies any pain.  Hopes that he can get the tube out of his nose soon.  Is pleased that it is [age].    Objective:     Vitals:   Temp (24hrs), Av.2 °F (36.8 °C), Min:98.2 °F (36.8 °C),  Max:98.2 °F (36.8 °C)    Temp:  [98.2 °F (36.8 °C)] 98.2 °F (36.8 °C)  HR:  [79-80] 79  Resp:  [16-18] 16  BP: (149-154)/(90-91) 149/91  SpO2:  [93 %-94 %] 94 %  Body mass index is 26 kg/m².     Input and Output Summary (last 24 hours):     Intake/Output Summary (Last 24 hours) at 4/10/2024 1922  Last data filed at 4/10/2024 1801  Gross per 24 hour   Intake 933.33 ml   Output 1750 ml   Net -816.67 ml       Physical Exam:   Physical Exam  Vitals and nursing note reviewed.   Constitutional:       General: He is not in acute distress.  HENT:      Head: Normocephalic.      Nose:      Comments: NG tube present to suction, noted bile colored drainage.  Eyes:      Extraocular Movements: Extraocular movements intact.      Conjunctiva/sclera: Conjunctivae normal.   Cardiovascular:      Rate and Rhythm: Normal rate and regular rhythm.      Pulses: Normal pulses.      Heart sounds: Normal heart sounds.   Pulmonary:      Effort: Pulmonary effort is normal.      Breath sounds: Normal breath sounds.   Abdominal:      Palpations: Abdomen is soft.      Tenderness: There is no abdominal tenderness.      Comments: Midline surgical incision dressing present clean dry intact no strikethrough drainage noted.  No bowel sounds noted on auscultation   Genitourinary:     Comments: Voiding spontaneously  Musculoskeletal:      Cervical back: Normal range of motion.      Right lower leg: No edema.      Left lower leg: No edema.   Skin:     General: Skin is warm and dry.      Capillary Refill: Capillary refill takes less than 2 seconds.   Neurological:      Mental Status: He is alert.      Comments: Patient was able to tell me that he is in the hospital, that is 2024.  Follows all commands   Psychiatric:         Mood and Affect: Mood normal.         Behavior: Behavior normal.      Comments: Pleasant and cooperative with exam.          Additional Data:     Labs:  Results from last 7 days   Lab Units 04/10/24  0528   WBC Thousand/uL 8.26    HEMOGLOBIN g/dL 14.2   HEMATOCRIT % 43.6   PLATELETS Thousands/uL 206   SEGS PCT % 77*   LYMPHO PCT % 13*   MONO PCT % 9   EOS PCT % 1     Results from last 7 days   Lab Units 04/10/24  0528 04/08/24  0442 04/07/24  0544   SODIUM mmol/L 139   < > 139   POTASSIUM mmol/L 3.3*   < > 3.6   CHLORIDE mmol/L 105   < > 109*   CO2 mmol/L 27   < > 24   BUN mg/dL 12   < > 18   CREATININE mg/dL 0.79   < > 1.24   ANION GAP mmol/L 7   < > 6   CALCIUM mg/dL 8.1*   < > 7.6*   ALBUMIN g/dL  --   --  2.7*   TOTAL BILIRUBIN mg/dL  --   --  0.56   ALK PHOS U/L  --   --  53   ALT U/L  --   --  9   AST U/L  --   --  16   GLUCOSE RANDOM mg/dL 107   < > 115    < > = values in this interval not displayed.     Results from last 7 days   Lab Units 04/06/24  1323   INR  1.08     Results from last 7 days   Lab Units 04/07/24  1725   POC GLUCOSE mg/dl 94         Results from last 7 days   Lab Units 04/06/24  1305   LACTIC ACID mmol/L 1.1       Lines/Drains:  Invasive Devices       Peripheral Intravenous Line  Duration             Peripheral IV 04/06/24 Distal;Left;Upper;Ventral (anterior) Arm 4 days              Drain  Duration             NG/OG/Enteral Tube Nasogastric 16 Fr 4 days                          Imaging: No pertinent imaging reviewed.    Recent Cultures (last 7 days):   Results from last 7 days   Lab Units 04/06/24  1402 04/06/24  1323   BLOOD CULTURE  No Growth at 72 hrs. No Growth at 72 hrs.       Last 24 Hours Medication List:   Current Facility-Administered Medications   Medication Dose Route Frequency Provider Last Rate    acetaminophen  650 mg Rectal Q4H PRN Morena Broderick PA-C      dextrose 5 % and sodium chloride 0.45 % with KCl 20 mEq/L  100 mL/hr Intravenous Continuous Trevor Fletcher PA-C 100 mL/hr (04/10/24 0325)    heparin (porcine)  5,000 Units Subcutaneous Q8H SEBASTIAN Morena Broderick PA-C      HYDROmorphone  0.5 mg Intravenous Q4H PRN Morena Broderick PA-C      lamoTRIgine  150 mg Oral BID Rosy  KENZIE Womack      lamoTRIgine  50 mg Oral HS Rosy Womack PA-C      magnesium sulfate  2 g Intravenous Once David Shore PA-C      metoprolol  2.5 mg Intravenous Q6H PRN David Shore PA-C      morphine injection  2 mg Intravenous Q4H PRN Morena Broderick PA-C      ondansetron  4 mg Intravenous Q6H PRN Morena Broderick PA-C      potassium phosphate  12 mmol Intravenous Once aDvid Shore PA-C      QUEtiapine  50 mg Oral HS David Shore PA-C          Today, Patient Was Seen By: ZHANNA Bañuelos    **Please Note: This note may have been constructed using a voice recognition system.**

## 2024-04-10 NOTE — PLAN OF CARE
Problem: GASTROINTESTINAL - ADULT  Goal: Minimal or absence of nausea and/or vomiting  Description: INTERVENTIONS:  - Administer IV fluids if ordered to ensure adequate hydration  - Maintain NPO status until nausea and vomiting are resolved  - Nasogastric tube if ordered  - Administer ordered antiemetic medications as needed  - Provide nonpharmacologic comfort measures as appropriate  - Advance diet as tolerated, if ordered  - Consider nutrition services referral to assist patient with adequate nutrition and appropriate food choices  Outcome: Progressing     Problem: GENITOURINARY - ADULT  Goal: Maintains or returns to baseline urinary function  Description: INTERVENTIONS:  - Assess urinary function  - Encourage oral fluids to ensure adequate hydration if ordered  - Administer IV fluids as ordered to ensure adequate hydration  - Administer ordered medications as needed  - Offer frequent toileting  - Follow urinary retention protocol if ordered  Outcome: Progressing     Problem: METABOLIC, FLUID AND ELECTROLYTES - ADULT  Goal: Electrolytes maintained within normal limits  Description: INTERVENTIONS:  - Monitor labs and assess patient for signs and symptoms of electrolyte imbalances  - Administer electrolyte replacement as ordered  - Monitor response to electrolyte replacements, including repeat lab results as appropriate  - Instruct patient on fluid and nutrition as appropriate  Outcome: Progressing

## 2024-04-10 NOTE — CASE MANAGEMENT
Case Management Assessment & Discharge Planning Note    Patient name Mario Rollins  Location 2 [facility]/2 [facility] MRN [id number]  : 1965 Date 4/10/2024       Current Admission Date: 2024  Current Admission Diagnosis:Small bowel obstruction (HCC)   Patient Active Problem List    Diagnosis Date Noted    Electrolyte abnormality 2024    Elevated serum creatinine 2024    Small bowel obstruction (HCC) 2024    Pneumatosis of intestines 2024    Sacroiliitis (HCC)     Localization-related symptomatic epilepsy and epileptic syndromes with complex partial seizures, intractable, without status epilepticus (HCC)     Encounter for hearing examination 2021    Right spastic hemiparesis (HCC) 2020    Acquired deformity of right hand 2020    Paronychia of toenail 2019    Lumbar radiculopathy 10/01/2018    Spinal stenosis of lumbar region 10/01/2018    Chronic left-sided low back pain with left-sided sciatica 10/01/2018    Chronic pain syndrome 10/01/2018    Cerebral palsy (HCC) 2018    Class 1 obesity with body mass index (BMI) of 30.0 to 30.9 in adult 2018    Hyperlipidemia 2018    Constipation 2018    Acquired valgus deformity of right ankle 2017    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 10/20/2015    Acquired deformity of right ankle and foot 10/20/2015    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) 2015    Nephrocalcinosis 2015    Acquired hallux rigidus of left foot 2015    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    Primary Insurance: MEDICARE  Secondary Insurance: [employer] [facility]    ASSESSMENT:  Active Health Care Proxies    There are no active Health Care Proxies on file.       Readmission Root Cause  30 Day Readmission: No    Patient Information  Admitted from:: Other (comment) (Group home)  Mental Status: Alert  During Assessment patient was accompanied by: Not accompanied during assessment  Assessment information provided by:: Other - please comment (Group home staff)  Primary Caregiver: Other (Comment) (Group home staff)  Caregiver's Name:: Rufina  Caregiver's Relationship to Patient:: Other (Specify) (Group home staff)  Caregiver's Telephone Number:: [phone]  Support Systems: Other (Comment) (group home staff)  County of Residence: [place]  What [facility] do you live in?: [place]  Type of Current Residence: [facility]    Activities of Daily Living Prior to Admission  Functional Status: Assistance  Completes ADLs independently?: No  Level of ADL dependence: Assistance  Ambulates independently?: No  Level of ambulatory dependence: Assistance  Does patient use assisted devices?: Yes  Assisted Devices (DME) used: Walker, Wheelchair  Does patient currently own DME?: Yes  What DME does the patient currently own?: Walker, Wheelchair  Does patient have a history of Outpatient Therapy (PT/OT)?: No  Does the patient have a history of Short-Term Rehab?: No  Does patient have a history of HHC?: No  Does patient currently have HHC?: No     Patient Information Continued  Income Source: SSI/SSD  Does patient have prescription coverage?: Yes  Does patient receive dialysis treatments?: No     Means of Transportation  Means of Transport to Appts:: Other (Comment) (Group home)      Social Determinants of Health (SDOH)      Flowsheet Row Most Recent Value   Housing Stability    In the last 12 months, was there a time  when you were not able to pay the mortgage or rent on time? N   In the last 12 months, how many places have you lived? 1   In the last 12 months, was there a time when you did not have a steady place to sleep or slept in a shelter (including now)? N   Transportation Needs    In the past 12 months, has lack of transportation kept you from medical appointments or from getting medications? no   In the past 12 months, has lack of transportation kept you from meetings, work, or from getting things needed for daily living? No   Food Insecurity    Within the past 12 months, you worried that your food would run out before you got the money to buy more. Never true   Within the past 12 months, the food you bought just didn't last and you didn't have money to get more. Never true   Utilities    In the past 12 months has the electric, gas, oil, or water company threatened to shut off services in your home? No            DISCHARGE DETAILS:    Discharge planning discussed with:: Patient and group home staff Rufina  Freedom of Choice: Yes  Comments - Freedom of Choice: Return to group home  CM contacted family/caregiver?: Yes  Were Treatment Team discharge recommendations reviewed with patient/caregiver?: Yes  Did patient/caregiver verbalize understanding of patient care needs?: Yes  Were patient/caregiver advised of the risks associated with not following Treatment Team discharge recommendations?: Yes    Contacts  Patient Contacts: Rufina  Relationship to Patient::  (Group home staff)  Contact Method: Phone  Phone Number: [phone]  Reason/Outcome: Discharge Planning    Requested Home Health Care         Is the patient interested in HHC at discharge?: No    DME Referral Provided  Referral made for DME?: No    Other Referral/Resources/Interventions Provided:  Interventions: Facility Return  Referral Comments: CM spoke with patient at bedside and then called  Theresa as mentioned in emergency contacts. Per Theresa  patient does not live in the same group home anymore and provided contact number for Rufina. CM called and spoke with Rufina, introduced self and role and to discuss return to group home. CM informed Rufina patient not yet stable for discharge. Per Rufina, she can be called when patient is stable for discharge and someone from group home will come pick him up.     Treatment Team Recommendation: Group Home  Discharge Destination Plan:: Group Home  Transport at Discharge : Other (Comment) (group home to provide transport)

## 2024-04-10 NOTE — PLAN OF CARE
Problem: GASTROINTESTINAL - ADULT  Goal: Minimal or absence of nausea and/or vomiting  Description: INTERVENTIONS:  - Administer IV fluids if ordered to ensure adequate hydration  - Maintain NPO status until nausea and vomiting are resolved  - Nasogastric tube if ordered  - Administer ordered antiemetic medications as needed  - Provide nonpharmacologic comfort measures as appropriate  - Advance diet as tolerated, if ordered  - Consider nutrition services referral to assist patient with adequate nutrition and appropriate food choices  Outcome: Progressing     Problem: GENITOURINARY - ADULT  Goal: Maintains or returns to baseline urinary function  Description: INTERVENTIONS:  - Assess urinary function  - Encourage oral fluids to ensure adequate hydration if ordered  - Administer IV fluids as ordered to ensure adequate hydration  - Administer ordered medications as needed  - Offer frequent toileting  - Follow urinary retention protocol if ordered  Outcome: Progressing     Problem: MUSCULOSKELETAL - ADULT  Goal: Maintain or return mobility to safest level of function  Description: INTERVENTIONS:  - Assess patient's ability to carry out ADLs; assess patient's baseline for ADL function and identify physical deficits which impact ability to perform ADLs (bathing, care of mouth/teeth, toileting, grooming, dressing, etc.)  - Assess/evaluate cause of self-care deficits   - Assess range of motion  - Assess patient's mobility  - Assess patient's need for assistive devices and provide as appropriate  - Encourage maximum independence but intervene and supervise when necessary  - Involve family in performance of ADLs  - Assess for home care needs following discharge   - Consider OT consult to assist with ADL evaluation and planning for discharge  - Provide patient education as appropriate  Outcome: Progressing     Problem: Prexisting or High Potential for Compromised Skin Integrity  Goal: Skin integrity is maintained or  improved  Description: INTERVENTIONS:  - Identify patients at risk for skin breakdown  - Assess and monitor skin integrity  - Assess and monitor nutrition and hydration status  - Monitor labs   - Assess for incontinence   - Turn and reposition patient  - Assist with mobility/ambulation  - Relieve pressure over bony prominences  - Avoid friction and shearing  - Provide appropriate hygiene as needed including keeping skin clean and dry  - Evaluate need for skin moisturizer/barrier cream  - Collaborate with interdisciplinary team   - Patient/family teaching  - Consider wound care consult   Outcome: Progressing

## 2024-04-11 ENCOUNTER — APPOINTMENT (INPATIENT)
Dept: RADIOLOGY | Facility: HOSPITAL | Age: 59
DRG: 330 | End: 2024-04-11
Payer: MEDICARE

## 2024-04-11 LAB
ANION GAP SERPL CALCULATED.3IONS-SCNC: 2 MMOL/L (ref 4–13)
BASOPHILS # BLD AUTO: 0.03 THOUSANDS/ÂΜL (ref 0–0.1)
BASOPHILS NFR BLD AUTO: 0 % (ref 0–1)
BUN SERPL-MCNC: 15 MG/DL (ref 5–25)
CALCIUM SERPL-MCNC: 8.7 MG/DL (ref 8.4–10.2)
CHLORIDE SERPL-SCNC: 106 MMOL/L (ref 96–108)
CO2 SERPL-SCNC: 29 MMOL/L (ref 21–32)
CREAT SERPL-MCNC: 0.98 MG/DL (ref 0.6–1.3)
EOSINOPHIL # BLD AUTO: 0.1 THOUSAND/ÂΜL (ref 0–0.61)
EOSINOPHIL NFR BLD AUTO: 1 % (ref 0–6)
ERYTHROCYTE [DISTWIDTH] IN BLOOD BY AUTOMATED COUNT: 13.2 % (ref 11.6–15.1)
GFR SERPL CREATININE-BSD FRML MDRD: 84 ML/MIN/1.73SQ M
GLUCOSE SERPL-MCNC: 113 MG/DL (ref 65–140)
HCT VFR BLD AUTO: 47.1 % (ref 36.5–49.3)
HGB BLD-MCNC: 15.6 G/DL (ref 12–17)
IMM GRANULOCYTES # BLD AUTO: 0.05 THOUSAND/UL (ref 0–0.2)
IMM GRANULOCYTES NFR BLD AUTO: 1 % (ref 0–2)
LYMPHOCYTES # BLD AUTO: 1.16 THOUSANDS/ÂΜL (ref 0.6–4.47)
LYMPHOCYTES NFR BLD AUTO: 12 % (ref 14–44)
MAGNESIUM SERPL-MCNC: 2.3 MG/DL (ref 1.9–2.7)
MCH RBC QN AUTO: 28.6 PG (ref 26.8–34.3)
MCHC RBC AUTO-ENTMCNC: 33.1 G/DL (ref 31.4–37.4)
MCV RBC AUTO: 86 FL (ref 82–98)
MONOCYTES # BLD AUTO: 0.99 THOUSAND/ÂΜL (ref 0.17–1.22)
MONOCYTES NFR BLD AUTO: 10 % (ref 4–12)
NEUTROPHILS # BLD AUTO: 7.48 THOUSANDS/ÂΜL (ref 1.85–7.62)
NEUTS SEG NFR BLD AUTO: 76 % (ref 43–75)
NRBC BLD AUTO-RTO: 0 /100 WBCS
PHOSPHATE SERPL-MCNC: 2.6 MG/DL (ref 2.7–4.5)
PLATELET # BLD AUTO: 233 THOUSANDS/UL (ref 149–390)
PMV BLD AUTO: 9.5 FL (ref 8.9–12.7)
POTASSIUM SERPL-SCNC: 3.5 MMOL/L (ref 3.5–5.3)
RBC # BLD AUTO: 5.46 MILLION/UL (ref 3.88–5.62)
SODIUM SERPL-SCNC: 137 MMOL/L (ref 135–147)
WBC # BLD AUTO: 9.81 THOUSAND/UL (ref 4.31–10.16)

## 2024-04-11 PROCEDURE — 80048 BASIC METABOLIC PNL TOTAL CA: CPT | Performed by: PHYSICIAN ASSISTANT

## 2024-04-11 PROCEDURE — 85025 COMPLETE CBC W/AUTO DIFF WBC: CPT | Performed by: PHYSICIAN ASSISTANT

## 2024-04-11 PROCEDURE — 97167 OT EVAL HIGH COMPLEX 60 MIN: CPT

## 2024-04-11 PROCEDURE — 84100 ASSAY OF PHOSPHORUS: CPT | Performed by: PHYSICIAN ASSISTANT

## 2024-04-11 PROCEDURE — 99232 SBSQ HOSP IP/OBS MODERATE 35: CPT | Performed by: NURSE PRACTITIONER

## 2024-04-11 PROCEDURE — 74022 RADEX COMPL AQT ABD SERIES: CPT

## 2024-04-11 PROCEDURE — 83735 ASSAY OF MAGNESIUM: CPT | Performed by: PHYSICIAN ASSISTANT

## 2024-04-11 PROCEDURE — 99024 POSTOP FOLLOW-UP VISIT: CPT | Performed by: PHYSICIAN ASSISTANT

## 2024-04-11 RX ADMIN — LAMOTRIGINE 150 MG: 100 TABLET ORAL at 09:02

## 2024-04-11 RX ADMIN — HEPARIN SODIUM 5000 UNITS: 5000 INJECTION, SOLUTION INTRAVENOUS; SUBCUTANEOUS at 14:04

## 2024-04-11 RX ADMIN — LAMOTRIGINE 50 MG: 25 TABLET ORAL at 21:22

## 2024-04-11 RX ADMIN — LAMOTRIGINE 150 MG: 100 TABLET ORAL at 17:24

## 2024-04-11 RX ADMIN — POTASSIUM PHOSPHATE, MONOBASIC POTASSIUM PHOSPHATE, DIBASIC 12 MMOL: 224; 236 INJECTION, SOLUTION, CONCENTRATE INTRAVENOUS at 13:46

## 2024-04-11 RX ADMIN — HEPARIN SODIUM 5000 UNITS: 5000 INJECTION, SOLUTION INTRAVENOUS; SUBCUTANEOUS at 21:23

## 2024-04-11 RX ADMIN — QUETIAPINE FUMARATE 50 MG: 25 TABLET ORAL at 21:22

## 2024-04-11 RX ADMIN — DEXTROSE, SODIUM CHLORIDE, AND POTASSIUM CHLORIDE 100 ML/HR: 5; .45; .15 INJECTION INTRAVENOUS at 08:56

## 2024-04-11 RX ADMIN — HEPARIN SODIUM 5000 UNITS: 5000 INJECTION, SOLUTION INTRAVENOUS; SUBCUTANEOUS at 05:33

## 2024-04-11 NOTE — PLAN OF CARE
Problem: GASTROINTESTINAL - ADULT  Goal: Minimal or absence of nausea and/or vomiting  Description: INTERVENTIONS:  - Administer IV fluids if ordered to ensure adequate hydration  - Maintain NPO status until nausea and vomiting are resolved  - Nasogastric tube if ordered  - Administer ordered antiemetic medications as needed  - Provide nonpharmacologic comfort measures as appropriate  - Advance diet as tolerated, if ordered  - Consider nutrition services referral to assist patient with adequate nutrition and appropriate food choices  4/11/2024 1836 by Cyndee Avalos  Outcome: Progressing  4/11/2024 1836 by Cyndee Avalos  Outcome: Progressing  Goal: Maintains or returns to baseline bowel function  Description: INTERVENTIONS:  - Assess bowel function  - Encourage oral fluids to ensure adequate hydration  - Administer IV fluids if ordered to ensure adequate hydration  - Administer ordered medications as needed  - Encourage mobilization and activity  - Consider nutritional services referral to assist patient with adequate nutrition and appropriate food choices  4/11/2024 1836 by Cyndee Avalos  Outcome: Progressing  4/11/2024 1836 by Cyndee Avalos  Outcome: Progressing  Goal: Maintains adequate nutritional intake  Description: INTERVENTIONS:  - Monitor percentage of each meal consumed  - Identify factors contributing to decreased intake, treat as appropriate  - Assist with meals as needed  - Monitor I&O, weight, and lab values if indicated  - Obtain nutrition services referral as needed  4/11/2024 1836 by Cyndee Avalos  Outcome: Progressing  4/11/2024 1836 by Cyndee Avalos  Outcome: Progressing  Goal: Establish and maintain optimal ostomy function  Description: INTERVENTIONS:  - Assess bowel function  - Encourage oral fluids to ensure adequate hydration  - Administer IV fluids if ordered to ensure adequate hydration   - Administer ordered medications as needed  - Encourage mobilization and activity  -  Nutrition services referral to assist patient with appropriate food choices  - Assess stoma site  - Consider wound care consult   4/11/2024 1836 by Cyndee Avalos  Outcome: Progressing  4/11/2024 1836 by Cyndee Avalos  Outcome: Progressing  Goal: Oral mucous membranes remain intact  Description: INTERVENTIONS  - Assess oral mucosa and hygiene practices  - Implement preventative oral hygiene regimen  - Implement oral medicated treatments as ordered  - Initiate Nutrition services referral as needed  4/11/2024 1836 by Cyndee Avalos  Outcome: Progressing  4/11/2024 1836 by Cyndee Avalos  Outcome: Progressing     Problem: GENITOURINARY - ADULT  Goal: Maintains or returns to baseline urinary function  Description: INTERVENTIONS:  - Assess urinary function  - Encourage oral fluids to ensure adequate hydration if ordered  - Administer IV fluids as ordered to ensure adequate hydration  - Administer ordered medications as needed  - Offer frequent toileting  - Follow urinary retention protocol if ordered  4/11/2024 1836 by Cyndee Avalos  Outcome: Progressing  4/11/2024 1836 by Cyndee Avalos  Outcome: Progressing  Goal: Absence of urinary retention  Description: INTERVENTIONS:  - Assess patient’s ability to void and empty bladder  - Monitor I/O  - Bladder scan as needed  - Discuss with physician/AP medications to alleviate retention as needed  - Discuss catheterization for long term situations as appropriate  4/11/2024 1836 by Cyndee Avalos  Outcome: Progressing  4/11/2024 1836 by Cyndee Avalos  Outcome: Progressing  Goal: Urinary catheter remains patent  Description: INTERVENTIONS:  - Assess patency of urinary catheter  - If patient has a chronic dyer, consider changing catheter if non-functioning  - Follow guidelines for intermittent irrigation of non-functioning urinary catheter  4/11/2024 1836 by Cyndee Avalos  Outcome: Progressing  4/11/2024 1836 by Cyndee Avalos  Outcome: Progressing     Problem:  METABOLIC, FLUID AND ELECTROLYTES - ADULT  Goal: Electrolytes maintained within normal limits  Description: INTERVENTIONS:  - Monitor labs and assess patient for signs and symptoms of electrolyte imbalances  - Administer electrolyte replacement as ordered  - Monitor response to electrolyte replacements, including repeat lab results as appropriate  - Instruct patient on fluid and nutrition as appropriate  4/11/2024 1836 by Cyndee Avalos  Outcome: Progressing  4/11/2024 1836 by Cyndee Avalos  Outcome: Progressing  Goal: Fluid balance maintained  Description: INTERVENTIONS:  - Monitor labs   - Monitor I/O and WT  - Instruct patient on fluid and nutrition as appropriate  - Assess for signs & symptoms of volume excess or deficit  4/11/2024 1836 by Cyndee Avalos  Outcome: Progressing  4/11/2024 1836 by Cyndee Avalos  Outcome: Progressing  Goal: Glucose maintained within target range  Description: INTERVENTIONS:  - Monitor Blood Glucose as ordered  - Assess for signs and symptoms of hyperglycemia and hypoglycemia  - Administer ordered medications to maintain glucose within target range  - Assess nutritional intake and initiate nutrition service referral as needed  4/11/2024 1836 by Cyndee Avalos  Outcome: Progressing  4/11/2024 1836 by Cyndee Avalos  Outcome: Progressing     Problem: MUSCULOSKELETAL - ADULT  Goal: Maintain or return mobility to safest level of function  Description: INTERVENTIONS:  - Assess patient's ability to carry out ADLs; assess patient's baseline for ADL function and identify physical deficits which impact ability to perform ADLs (bathing, care of mouth/teeth, toileting, grooming, dressing, etc.)  - Assess/evaluate cause of self-care deficits   - Assess range of motion  - Assess patient's mobility  - Assess patient's need for assistive devices and provide as appropriate  - Encourage maximum independence but intervene and supervise when necessary  - Involve family in performance of ADLs  -  Assess for home care needs following discharge   - Consider OT consult to assist with ADL evaluation and planning for discharge  - Provide patient education as appropriate  4/11/2024 1836 by Cyndee Avalos  Outcome: Progressing  4/11/2024 1836 by Cyndee Avalos  Outcome: Progressing  Goal: Maintain proper alignment of affected body part  Description: INTERVENTIONS:  - Support, maintain and protect limb and body alignment  - Provide patient/ family with appropriate education  4/11/2024 1836 by Cyndee Avalos  Outcome: Progressing  4/11/2024 1836 by Cyndee Avalos  Outcome: Progressing     Problem: Prexisting or High Potential for Compromised Skin Integrity  Goal: Skin integrity is maintained or improved  Description: INTERVENTIONS:  - Identify patients at risk for skin breakdown  - Assess and monitor skin integrity  - Assess and monitor nutrition and hydration status  - Monitor labs   - Assess for incontinence   - Turn and reposition patient  - Assist with mobility/ambulation  - Relieve pressure over bony prominences  - Avoid friction and shearing  - Provide appropriate hygiene as needed including keeping skin clean and dry  - Evaluate need for skin moisturizer/barrier cream  - Collaborate with interdisciplinary team   - Patient/family teaching  - Consider wound care consult   4/11/2024 1836 by Cyndee Avalos  Outcome: Progressing  4/11/2024 1836 by Cyndee Avalos  Outcome: Progressing     Problem: Nutrition/Hydration-ADULT  Goal: Nutrient/Hydration intake appropriate for improving, restoring or maintaining nutritional needs  Description: Monitor and assess patient's nutrition/hydration status for malnutrition. Collaborate with interdisciplinary team and initiate plan and interventions as ordered.  Monitor patient's weight and dietary intake as ordered or per policy. Utilize nutrition screening tool and intervene as necessary. Determine patient's food preferences and provide high-protein, high-caloric foods as  appropriate.     INTERVENTIONS:  - Monitor oral intake, urinary output, labs, and treatment plans  - Assess nutrition and hydration status and recommend course of action  - Evaluate amount of meals eaten  - Assist patient with eating if necessary   - Allow adequate time for meals  - Recommend/ encourage appropriate diets, oral nutritional supplements, and vitamin/mineral supplements  - Order, calculate, and assess calorie counts as needed  - Recommend, monitor, and adjust tube feedings and TPN/PPN based on assessed needs  - Assess need for intravenous fluids  - Provide specific nutrition/hydration education as appropriate  - Include patient/family/caregiver in decisions related to nutrition  4/11/2024 1836 by Cyndee Avalos  Outcome: Progressing  4/11/2024 1836 by Cyndee Avalos  Outcome: Progressing

## 2024-04-11 NOTE — OCCUPATIONAL THERAPY NOTE
Occupational Therapy Evaluation     Patient Name: Mario Rollins  Today's Date: 4/11/2024  Problem List  Principal Problem:    Small bowel obstruction (HCC)  Active Problems:    Cerebral palsy (HCC)    Hyperlipidemia    Right spastic hemiparesis (HCC)    Localization-related symptomatic epilepsy and epileptic syndromes with complex partial seizures, intractable, without status epilepticus (HCC)    Elevated serum creatinine    Electrolyte abnormality    Past Medical History  Past Medical History:   Diagnosis Date    Ambulatory dysfunction     Anxiety     Bilateral impacted cerumen     last assessed 05/30/2013    Capsulitis     last assessed 04/26/2016    Cellulitis of finger 01/13/2012    Chronic kidney disease     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)     Closed fracture of one or more phalanges of foot 01/06/2009    Constipation     Deformity     left and right foot and ankle ,right hand    Depression     Epilepsy (HCC)     Erythrocytosis     Hemiplegia affecting dominant side (HCC)     Hypertension     Hypoglycemia 11/08/2011    Hypokalemia     last assessed 05/30/2013    Mental retardation     Mood disorder (HCC)     Nephrocalcinosis      Past Surgical History  Past Surgical History:   Procedure Laterality Date    BRAIN SURGERY      EXPLORATORY LAPAROTOMY W/ BOWEL RESECTION N/A 4/6/2024    Procedure: LAPAROTOMY EXPLORATORY WITH LYSIS OF ADHESIONS AND DECOMPRESSION;  Surgeon: Saúl Woods MD;  Location: [facility] MAIN OR;  Service: General    UT ARTHROCENTESIS ASPIR&/INJ MAJOR JT/BURSA W/O US Bilateral 5/27/2021    Procedure: Hip intra-articular corticosteroid injection ( 55047 60438-99);  Surgeon: Lianet Astorga MD;  Location: [facility] MAIN OR;  Service: Pain Management     UT COLONOSCOPY FLX DX W/COLLJ SPEC WHEN PFRMD N/A 2/12/2016    Procedure: COLONOSCOPY;  Surgeon: Abhilash Ace MD;  Location: [facility] GI LAB;  Service: Gastroenterology    UT INJECT SI JOINT ARTHRGRPHY&/ANES/STEROID W/RIMA Left 3/22/2023     Procedure: SACROILIAC joint injection ([id number] );  Surgeon: Larry Smith DO;  Location: [facility] MAIN OR;  Service: Pain Management         04/11/24 1301   OT Last Visit   OT Visit Date 04/11/24  (Thursday)   Note Type   Note type Evaluation   Additional Comments Identified pt by full name and birthdate   Pain Assessment   Pain Assessment Tool 0-10   Pain Score No Pain   Restrictions/Precautions   Weight Bearing Precautions Per Order No   Other Precautions Cognitive;Chair Alarm;Bed Alarm;Multiple lines;Fall Risk  (NG tube; RN present post eval to connect to wall suction; NPO; may have ice chips; Hermes; room air)   Home Living   Type of Home Group Home   Home Layout One level   Home Equipment Wheelchair-manual;Hemiwalker  (wilian-walker)   Additional Comments Pt reports living in a group home   Prior Function   Level of [place] Needs assistance with IADLS;Needs assistance with ADLs;Needs assistance with functional mobility   Lives With Facility staff   Receives Help From Personal care attendant  (caregiver / staff at Group Home)   IADLs Family/Friend/Other provides transportation;Family/Friend/Other provides meals;Family/Friend/Other provides medication management   Vocational On disability   Comments Pt reports staff assist w/ ADLs as needed and he walks laps at home w/ assistance using wilian-walker   Lifestyle   Autonomy Pt reports staff assist w/ ADL/ IADL at group home   Reciprocal Relationships Supportive staff   Intrinsic Gratification Pt reports enjoying being outside and added that they have a deck w/ a ramp. Pt reports that he used to enjoy bowling   General   Additional Pertinent History Pt admitted w/ nausea, vomiting, diarrhea and abdominal pain. Pt is s/p ex-lap w/ lysis of adhesions / decompression on 4/6/24. NG tube to wall suction   Family/Caregiver Present No   Additional General Comments Personal and environmental factors supporting performance includes supportive staff at [facility], first  "floor environment; barriers include increased pain, inability to complete IADLs, difficulty completing ADLs, insight into deficits, assist required at baseline   Subjective   Subjective \"I want to get out of this bed\" \"My birthday was [birth year]\"   ADL   Where Assessed Edge of bed  (vs OOB In chair post eval)   Eating Assistance 6  Modified independent   Eating Deficit Setup;NPO  (able to manage ice chips; one sip of beverage on tray table)   Grooming Assistance 4  Minimal Assistance   Grooming Deficit Setup;Verbal cueing;Supervision/safety;Increased time to complete  (due to multiple lines, NG tube while seated due to decreased activity / standing tolerance)   UB Bathing Assistance Unable to assess  (anticipate mod A based on fxal obs skills, clinical judgement)   LB Bathing Assistance Unable to assess  (anticipate max A based on fxal obs skills, clinical judgement)   UB Dressing Assistance 3  Moderate Assistance   LB Dressing Assistance 2  Maximal Assistance   Toileting Assistance  2  Maximal Assistance   Toileting Deficit Clothing management up;Clothing management down;Perineal hygiene   Bed Mobility   Supine to Sit 4  Minimal assistance   Additional items Assist x 1;HOB elevated;Bedrails;Increased time required;Verbal cues  (to pt's R)   Sit to Supine Unable to assess   Additional Comments Pt seated OOB In chair post eval w/ needs met, call bell in reach and chair alarm activated   Transfers   Sit to Stand 3  Moderate assistance   Additional items Assist x 1;Increased time required;Verbal cues;Bedrails;Armrests   Stand to Sit 3  Moderate assistance   Additional items Assist x 1;Increased time required;Verbal cues   Stand pivot 3  Moderate assistance   Additional items Assist x 2;Increased time required;Verbal cues  (using wilian- walker on L)   Additional Comments Pt performed sit <> stand 2X from EOB. Limited activity / standing tolerance due too \"Dizziness\"   Functional Mobility   Additional Comments " "NT. Will continue to assess as appropriate pend improved activity  / standing tolerance   Balance   Static Sitting Fair   Static Standing Poor   Ambulatory   (NT)   Activity Tolerance   Activity Tolerance Patient limited by fatigue  (\"dizziness\")   Medical Staff Made Aware spoke w/ DUSTIN   Nurse Made Aware spoke w/ Sheree WANG and Otto BALL Assessment   RUE Assessment   (limited functional use of RUE; hx OPOT /splinting)   RUE Strength   RUE Overall Strength Deficits   LUE Assessment   LUE Assessment WFL   LUE Strength   LUE Overall Strength Within Functional Limits - able to perform ADL tasks with strength   Hand Function   Gross Motor Coordination Impaired   Fine Motor Coordination Impaired   Psychosocial   Patient Behaviors/Mood Cooperative;Flat affect   Cognition   Overall Cognitive Status Impaired   Arousal/Participation Alert   Attention Attends with cues to redirect   Orientation Level Oriented to person;Oriented to place   Memory Decreased short term memory;Decreased recall of recent events;Decreased recall of precautions   Following Commands Follows one step commands with increased time or repetition   Comments Identified pt by full name and birthdate. Alert, agreeable to participate and able to communicate wants / need w/ + time. Soft spoken w/ premorbid intellectual disability. Pt appeared frustrated due to dizziness   Assessment   Limitation Decreased ADL status;Decreased UE ROM;Decreased UE strength;Decreased cognition;Decreased endurance;Decreased fine motor control;Decreased self-care trans;Decreased high-level ADLs  (impaired activity tolerance, sitting tolerance, sitting balance, generalized weakness /deconditiong, attention, forward functional reach, pain, LE strength / coordination)   Assessment Pt is a 58yo male admitted to [facility] on 4/6/24 w/ nausea, vomiting, diarrhea, and worsening abdominal pain. Diagnosed w/ small bowel obstruction and is s/p ex lap w/ lysis of adhesions and decompression on " 4/6/24. Pt resides in a group home and needs assistance w/ ADL/ IADL at baseline. Pt reports use of L sided wilian-walker for functional mobility. Significant PMH impacting his occupational performance includes anxiety/depression, HTN, epilepsy, hemiplegia affecting dominant (R) side, hx foot fracture(s) (2009), intellectual disability, cerebral palsy. Upon eval, pt alert and oriented to person, place. Able to follow directions during ADLs and communicate wants / needs. Pt required min A to complete bed mobility, mod A sit <> stand, mod A x2 SPT using wilian-walker on L, mod A UBD, max A LBD. Pt demonstrated L UE AROM WFL to participate in ADLs. Pt is completing ADLs below baseline level of I and would benefit from OT in acute care to maximize functional independence / improve engagement. Recommend level II rehab resource intensity when medically stable. Will continue to follow 3-5X week   Goals   Patient Goals Pt stated that he wants the dizziness to stop   Plan   Treatment Interventions ADL retraining;Functional transfer training;UE strengthening/ROM;Endurance training;Patient/family training;Equipment evaluation/education;Compensatory technique education;Continued evaluation;Energy conservation;Activityengagement   Goal Expiration Date 04/25/24   OT Treatment Day 0  (Thursday 4/11/24)   OT Frequency 3-5x/wk   Discharge Recommendation   Rehab Resource Intensity Level, OT II (Moderate Resource Intensity)   Equipment Recommended Bedside commode;Shower/Tub chair with back ($)  (Will continue to assess)   AM-PAC Daily Activity Inpatient   Lower Body Dressing 2   Bathing 2   Toileting 2   Upper Body Dressing 2   Grooming 3   Eating 2   Daily Activity Raw Score 13   Daily Activity Standardized Score (Calc for Raw Score >=11) 32.03   AM-PAC Applied Cognition Inpatient   Following a Speech/Presentation 2   Understanding Ordinary Conversation 3   Taking Medications 2   Remembering Where Things Are Placed or Put Away 3    Remembering List of 4-5 Errands 2   Taking Care of Complicated Tasks 1   Applied Cognition Raw Score 13   Applied Cognition Standardized Score 30.46   Barthel Index   Feeding 5   Bathing 0   Grooming Score 0   Dressing Score 5   Bladder Score 5   Bowels Score 5   Toilet Use Score 5   Transfers (Bed/Chair) Score 5   Mobility (Level Surface) Score 0   Stairs Score 0   Barthel Index Score 30   End of Consult   Education Provided Yes   Patient Position at End of Consult Bedside chair;Bed/Chair alarm activated;All needs within reach   Nurse Communication Nurse aware of consult   Licensure   [place] License Number  Sandy Ramires, OTR/L [id number]         The patient's raw score on the AM-PAC Daily Activity Inpatient Short Form is 13. A raw score of less than 19 suggests the patient may benefit from discharge to post-acute rehabilitation services. Please refer to the recommendation of the Occupational Therapist for safe discharge planning.    Pt goals to be met by 4/25/24 to max I w/ ADLs and improve engagement to return to PLOF w/ staff assistance includes:    -Pt will complete bed mobility supine <> sit w/ S in preparation for ADLs    -Pt will complete functional transfers to bed, chair, and toilet w/ min A using LRAD, DME as needed    -Pt will complete UBD w/ S excluding fasteners    -Pt will complete LBD w/ min A    -Pt will consistently engage in functional mobility using LRAD / DME as needed to / from bathroom w/ min A    -Pt will demonstrate improved functional standing tolerance for at least 10 minutes w/ at least fair - balance using LRAD while engaged in grooming w/ S to max I w/ functional mobility to return to PLOF, sit outside on the deck    STG to be met in 5 days by 4/16/24 to max I and improve engagement includes:    -Pt will complete bed mobility supine <> sit w/ S in preparation for ADLs    -Pt will complete functional transfers to bed, chair, and toilet w/ mod A using LRAD, DME as needed    -Pt will  complete UBD w/ min A    -Pt will complete LBD w/ mod A    -Pt will demonstrate good attention and participation in ongoing eval of functional mobility using LRAD / DME as needed to assist in DC Planning    -Pt will demonstrate improved functional standing tolerance for at least 5 minutes w/ at least fair-- balance using LRAD while engaged in grooming w/ min A to max I w/ functional mobility to return to PLOF, sit outside on the deck    -Pt will consistently follow 2 step directions during ADLs w/ good recall to improve engagement    -Pt will demonstrate improved activity and sitting tolerance OOB in chair for all meals    Sandy Ramires, OTR/L  [id number]  [id number]

## 2024-04-11 NOTE — NURSING NOTE
Patient vomited 1x, large amount of clear/green emesis. Suction re-connected. Immediate return of over 1,000 ml of dark green output. Patient reports relief of nausea. Patient also incontinent of bm.

## 2024-04-11 NOTE — PROGRESS NOTES
[facility]  Progress Note  Name: Mario Rollins I  MRN: [id number]  Unit/Bed#: [address] [place] [address] I Date of Admission: 4/6/2024   Date of Service: 4/11/2024 I Hospital Day: 5    Assessment/Plan   * Small bowel obstruction (HCC)  Assessment & Plan  Patient presented to the ED from his group home for LLQ abdominal pain with associated nausea and vomiting. He was admitted to the general surgery service due to high grade bowel obstruction.  CT A/P (4/6/24): High-grade bowel obstruction. Potential mid abdominal transition point. Thickened segments of small bowel in the right lower quadrant with potential pneumatosis which may indicate bowel ischemia.  S/p exploratory laparotomy with lysis of adhesions on 4/6/24  Currently with NG tube  Rare bowel sounds noted left lower quadrant only  Obstructive series shows high-grade obstruction with opacified persistent markedly dilated stomach  Nursing reported that patient did have stool on afternoon of 4/10, mixed form and loose brown  Care per General Surgery service    Elevated serum creatinine  Assessment & Plan  Creatinine mildly elevated on admission 1.44  Baseline 1.0-1.2  Improved with IVF, currently back at baseline  Monitor I&O  Daily BMP    Electrolyte abnormality  Assessment & Plan  Mag, phos, and potassium low  Receiving K-Phos 12 mmol again today  Monitor and replete as needed    Localization-related symptomatic epilepsy and epileptic syndromes with complex partial seizures, intractable, without status epilepticus (HCC)  Assessment & Plan  History of focal epilepsy  Home regimen includes lamotrigine 150mg BID and 50mg HS  Previous provider spoke with pharmacy and neurology. Okay to continue oral Lamictal via NGT if can tolerate as there is no reasonable IV conversion alternative  Seizure precautions  Follow up with Neurology outpatient    Cerebral palsy (HCC)  Assessment & Plan  Currently resides at [facility]  Supportive care    Right spastic  hemiparesis (HCC)  Assessment & Plan  Secondary to left MCA stroke and is s/p brain surgery  Follows outpatient with Neurology    Hyperlipidemia  Assessment & Plan  Takes Lipitor at home, resume once no longer NPO               VTE Pharmacologic Prophylaxis:   Moderate Risk (Score 3-4) - Pharmacological DVT Prophylaxis Ordered: heparin.    Mobility:   Basic Mobility Inpatient Raw Score: 9  -[facility] Goal: 3: Sit at edge of bed  JH-HLM Achieved: 4: Move to chair/commode  JH-HLM Goal achieved. Continue to encourage appropriate mobility.    Patient Centered Rounds: I performed bedside rounds with nursing staff today.   Discussions with Specialists or Other Care Team Provider: multidisciplinary team     Education and Discussions with Family / Patient:  defer to primary team .     Total Time Spent on Date of Encounter in care of patient: greater than 45 mins. This time was spent on one or more of the following: performing physical exam; counseling and coordination of care; obtaining or reviewing history; documenting in the medical record; reviewing/ordering tests, medications or procedures; communicating with other healthcare professionals and discussing with patient's family/caregivers.    Current Length of Stay: 5 day(s)  Current Patient Status: Inpatient   Certification Statement: The patient will continue to require additional inpatient hospital stay due to NGT, serial abd exams, repeat labs   Discharge Plan: [facility] is following this patient on consult. They are not yet medically stable for discharge secondary to ngt, ileus, repeat labs and replete electrolytes as necessary .    Code Status: Level 1 - Full Code    Subjective:   Patient seen sitting up in bed watching TV.  Still has NG tube.  Reports that is bothering his throat, states that ice does seem to help.  Is hopeful that it would be coming out soon.  Slept okay, did have some nausea and a little bit of vomiting.  Stated that he did have 2 bowel movements, denies  abdominal discomfort    Objective:     Vitals:   Temp (24hrs), Av.4 °F (36.9 °C), Min:97.9 °F (36.6 °C), Max:99.6 °F (37.6 °C)    Temp:  [97.9 °F (36.6 °C)-99.6 °F (37.6 °C)] 98 °F (36.7 °C)  HR:  [77-99] 85  Resp:  [18] 18  BP: (125-132)/(81-90) 125/90  SpO2:  [91 %-94 %] 94 %  Body mass index is 26 kg/m².     Input and Output Summary (last 24 hours):     Intake/Output Summary (Last 24 hours) at 2024 1620  Last data filed at 2024 0527  Gross per 24 hour   Intake --   Output 2650 ml   Net -2650 ml       Physical Exam:   Physical Exam  Vitals and nursing note reviewed.   Constitutional:       General: He is not in acute distress.  HENT:      Head: Normocephalic.      Nose:      Comments: NGT present; green bile noted in collection container      Mouth/Throat:      Mouth: Mucous membranes are dry.   Eyes:      Extraocular Movements: Extraocular movements intact.      Conjunctiva/sclera: Conjunctivae normal.   Cardiovascular:      Rate and Rhythm: Normal rate and regular rhythm.      Pulses: Normal pulses.      Heart sounds: Normal heart sounds.   Pulmonary:      Effort: Pulmonary effort is normal.      Breath sounds: Normal breath sounds.   Abdominal:      General: There is no distension.      Palpations: Abdomen is soft.      Tenderness: There is no abdominal tenderness.      Comments: Rare bowel sounds in left lower quadrant   Genitourinary:     Comments: Voiding spontaneously   Musculoskeletal:      Cervical back: Normal range of motion.      Comments: Right UE weak; chronic.    Skin:     General: Skin is warm and dry.      Capillary Refill: Capillary refill takes less than 2 seconds.      Comments: Midline abdominal surgical dressing present; small amount dried serous drainage noted on right side of dressing.    Neurological:      Mental Status: He is alert.      Comments: Patient knew he is in the hospital, the date, yesterday was his birthday (correct). Has chronic right UE weakness.     Psychiatric:      Comments: Pleasant and cooperative           Additional Data:     Labs:  Results from last 7 days   Lab Units 04/11/24  0612   WBC Thousand/uL 9.81   HEMOGLOBIN g/dL 15.6   HEMATOCRIT % 47.1   PLATELETS Thousands/uL 233   SEGS PCT % 76*   LYMPHO PCT % 12*   MONO PCT % 10   EOS PCT % 1     Results from last 7 days   Lab Units 04/11/24  0612 04/08/24  0442 04/07/24  0544   SODIUM mmol/L 137   < > 139   POTASSIUM mmol/L 3.5   < > 3.6   CHLORIDE mmol/L 106   < > 109*   CO2 mmol/L 29   < > 24   BUN mg/dL 15   < > 18   CREATININE mg/dL 0.98   < > 1.24   ANION GAP mmol/L 2*   < > 6   CALCIUM mg/dL 8.7   < > 7.6*   ALBUMIN g/dL  --   --  2.7*   TOTAL BILIRUBIN mg/dL  --   --  0.56   ALK PHOS U/L  --   --  53   ALT U/L  --   --  9   AST U/L  --   --  16   GLUCOSE RANDOM mg/dL 113   < > 115    < > = values in this interval not displayed.     Results from last 7 days   Lab Units 04/06/24  1323   INR  1.08     Results from last 7 days   Lab Units 04/07/24  1725   POC GLUCOSE mg/dl 94         Results from last 7 days   Lab Units 04/06/24  1305   LACTIC ACID mmol/L 1.1       Lines/Drains:  Invasive Devices       Peripheral Intravenous Line  Duration             Peripheral IV 04/06/24 Distal;Left;Upper;Ventral (anterior) Arm 5 days              Drain  Duration             NG/OG/Enteral Tube Nasogastric 16 Fr 5 days                          Imaging: Reviewed radiology reports from this admission including: xray(s) obstructive series    Recent Cultures (last 7 days):   Results from last 7 days   Lab Units 04/06/24  1402 04/06/24  1323   BLOOD CULTURE  No Growth After 4 Days. No Growth After 4 Days.       Last 24 Hours Medication List:   Current Facility-Administered Medications   Medication Dose Route Frequency Provider Last Rate    acetaminophen  650 mg Rectal Q4H PRN Morena Broderick PA-C      dextrose 5 % and sodium chloride 0.45 % with KCl 20 mEq/L  100 mL/hr Intravenous Continuous Trevor Fletcher PA-C  100 mL/hr (04/11/24 0856)    heparin (porcine)  5,000 Units Subcutaneous Q8H [facility] Morena Broderick PA-C      HYDROmorphone  0.5 mg Intravenous Q4H PRN Morena Brdoerick PA-C      lamoTRIgine  150 mg Oral BID Rosy Womack PA-C      lamoTRIgine  50 mg Oral HS Rosy Womack PA-C      magnesium sulfate  2 g Intravenous Once David Shore PA-C      metoprolol  2.5 mg Intravenous Q6H PRN David Shore PA-C      morphine injection  2 mg Intravenous Q4H PRN Morena Broderick PA-C      ondansetron  4 mg Intravenous Q6H PRN Morena Broderick PA-C      potassium phosphate  12 mmol Intravenous Once David Shore PA-C      potassium phosphate  12 mmol Intravenous Once David Shore PA-C 12 mmol (04/11/24 1346)    QUEtiapine  50 mg Oral HS David Shore PA-C          Today, Patient Was Seen By: ZHANNA Bañuelos    **Please Note: This note may have been constructed using a voice recognition system.**

## 2024-04-11 NOTE — PLAN OF CARE
Problem: OCCUPATIONAL THERAPY ADULT  Goal: Performs self-care activities at highest level of function for planned discharge setting.  See evaluation for individualized goals.  Description: Treatment Interventions: ADL retraining, Functional transfer training, UE strengthening/ROM, Endurance training, Patient/family training, Equipment evaluation/education, Compensatory technique education, Continued evaluation, Energy conservation, Activityengagement  Equipment Recommended: Bedside commode, Shower/Tub chair with back ($) (Will continue to assess)       See flowsheet documentation for full assessment, interventions and recommendations.   Note: Limitation: Decreased ADL status, Decreased UE ROM, Decreased UE strength, Decreased cognition, Decreased endurance, Decreased fine motor control, Decreased self-care trans, Decreased high-level ADLs (impaired activity tolerance, sitting tolerance, sitting balance, generalized weakness /deconditiong, attention, forward functional reach, pain, LE strength / coordination)     Assessment: Pt is a 60yo male admitted [facility] on 4/6/24 w/ nausea, vomiting, diarrhea, and worsening abdominal pain. Diagnosed w/ small bowel obstruction and is s/p ex lap w/ lysis of adhesions and decompression on 4/6/24. Pt resides in a group home and needs assistance w/ ADL/ IADL at baseline. Pt reports use of L sided wilian-walker for functional mobility. Significant PMH impacting his occupational performance includes anxiety/depression, HTN, epilepsy, hemiplegia affecting dominant (R) side, hx foot fracture(s) (2009), intellectual disability, cerebral palsy. Upon eval, pt alert and oriented to person, place. Able to follow directions during ADLs and communicate wants / needs. Pt required min A to complete bed mobility, mod A sit <> stand, mod A x2 SPT using wilian-walker on L, mod A UBD, max A LBD. Pt demonstrated L UE AROM WFL to participate in ADLs. Pt is completing ADLs below baseline level of I and  would benefit from OT in acute care to maximize functional independence / improve engagement. Recommend level II rehab resource intensity when medically stable. Will continue to follow 3-5X week     Rehab Resource Intensity Level, OT: II (Moderate Resource Intensity)

## 2024-04-11 NOTE — PROGRESS NOTES
"Progress Note - General Surgery   Mario Rollins 59 y.o. male MRN: [id number]  Unit/Bed#: 2 [place] [address] Encounter: [id number]    Assessment:  1) POD #5 s/p exploratory laparotomy with lysis of adhesions -AVSS, per imaging yesterday does not appear to have completely resolved obstructive qualities but subsequently on clinical exam does appear improved, patient has passed gas and had 2 robust bowel movements early in the morning/late in the evening, potassium and phosphorus slightly low, did also have nausea and vomiting episode last night as well as 3 L of output from NG tube with significant gastric megaly on imaging    Plan:  1)   -Take off dressing and leave open to  air  - n.p.o.   -Continue IV fluids  -Replete with potassium phosphate 12 mmol  -Continue NG tube on low continuous suction  -Abdominal series ordered  -Repeat CBC, BMP, mag, Phos  -As needed analgesia continue home medications    Subjective/Objective   Chief Complaint: I messed up the bed pretty good    Subjective: Patient was seen examined at bedside.  Patient reports that overnight he felt distended and did have nausea and vomiting episode but then subsequently after that had to liquid loose bowel movements that soiled bed quite profoundly.  Patient does still feel somewhat distended but denies any abdominal pain.  Patient feels his belly is soft.  Patient not been up and ambulating very much.  Patient denies any abdominal pain at rest.    Objective:     Blood pressure 129/81, pulse 77, temperature 97.9 °F (36.6 °C), temperature source Axillary, resp. rate 18, height 5' 8\" (1.727 m), weight 77.6 kg (171 lb), SpO2 93%.,Body mass index is 26 kg/m².      Intake/Output Summary (Last 24 hours) at 4/11/2024 1136  Last data filed at 4/11/2024 0527  Gross per 24 hour   Intake --   Output 2650 ml   Net -2650 ml       Invasive Devices       Peripheral Intravenous Line  Duration             Peripheral IV 04/06/24 Distal;Left;Upper;Ventral (anterior) Arm 5 " "days              Drain  Duration             NG/OG/Enteral Tube Nasogastric 16 Fr 4 days                    Physical Exam: /81 (BP Location: Left arm)   Pulse 77   Temp 97.9 °F (36.6 °C) (Axillary)   Resp 18   Ht 5' 8\" (1.727 m)   Wt 77.6 kg (171 lb)   SpO2 93%   BMI 26.00 kg/m²   General appearance: alert and oriented, in no acute distress  Head: Normocephalic, without obvious abnormality, atraumatic  Lungs: clear to auscultation bilaterally  Heart: regular rate and rhythm, S1, S2 normal, no murmur, click, rub or gallop  Abdomen:  Soft, tender to percussion, mild distention, active bowel sounds, no tenderness or guarding  Skin: Skin color, texture, turgor normal. No rashes or lesions or incision is c/d/i    Lab, Imaging and other studies:I have personally reviewed pertinent lab results.  , CBC:   Lab Results   Component Value Date    WBC 9.81 04/11/2024    HGB 15.6 04/11/2024    HCT 47.1 04/11/2024    MCV 86 04/11/2024     04/11/2024    RBC 5.46 04/11/2024    MCH 28.6 04/11/2024    MCHC 33.1 04/11/2024    RDW 13.2 04/11/2024    MPV 9.5 04/11/2024    NRBC 0 04/11/2024   , CMP:   Lab Results   Component Value Date    SODIUM 137 04/11/2024    K 3.5 04/11/2024     04/11/2024    CO2 29 04/11/2024    BUN 15 04/11/2024    CREATININE 0.98 04/11/2024    CALCIUM 8.7 04/11/2024    EGFR 84 04/11/2024     VTE Pharmacologic Prophylaxis: Heparin  VTE Mechanical Prophylaxis: sequential compression device  "

## 2024-04-11 NOTE — OCCUPATIONAL THERAPY NOTE
Occupational Therapy Cancellation     Patient Name: Mario Rollins  Today's Date: 4/11/2024  Problem List  Principal Problem:    Small bowel obstruction (HCC)  Active Problems:    Cerebral palsy (HCC)    Hyperlipidemia    Right spastic hemiparesis (HCC)    Localization-related symptomatic epilepsy and epileptic syndromes with complex partial seizures, intractable, without status epilepticus (HCC)    Elevated serum creatinine    Electrolyte abnormality    Past Medical History  Past Medical History:   Diagnosis Date    Ambulatory dysfunction     Anxiety     Bilateral impacted cerumen     last assessed 05/30/2013    Capsulitis     last assessed 04/26/2016    Cellulitis of finger 01/13/2012    Chronic kidney disease     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)     Closed fracture of one or more phalanges of foot 01/06/2009    Constipation     Deformity     left and right foot and ankle ,right hand    Depression     Epilepsy (HCC)     Erythrocytosis     Hemiplegia affecting dominant side (HCC)     Hypertension     Hypoglycemia 11/08/2011    Hypokalemia     last assessed 05/30/2013    Mental retardation     Mood disorder (HCC)     Nephrocalcinosis      Past Surgical History  Past Surgical History:   Procedure Laterality Date    BRAIN SURGERY      EXPLORATORY LAPAROTOMY W/ BOWEL RESECTION N/A 4/6/2024    Procedure: LAPAROTOMY EXPLORATORY WITH LYSIS OF ADHESIONS AND DECOMPRESSION;  Surgeon: Saúl Woods MD;  Location: [facility] MAIN OR;  Service: General    NM ARTHROCENTESIS ASPIR&/INJ MAJOR JT/BURSA W/O US Bilateral 5/27/2021    Procedure: Hip intra-articular corticosteroid injection ( 22094 46352-99);  Surgeon: Lianet Astorga MD;  Location: [facility] MAIN OR;  Service: Pain Management     NM COLONOSCOPY FLX DX W/COLLJ SPEC WHEN PFRMD N/A 2/12/2016    Procedure: COLONOSCOPY;  Surgeon: Abhilash Ace MD;  Location: [facility] GI LAB;  Service: Gastroenterology    NM INJECT SI JOINT ARTHRGRPHY&/ANES/STEROID W/RIMA Left 3/22/2023     Procedure: SACROILIAC joint injection ([id number] );  Surgeon: Larry Smith DO;  Location: [facility] MAIN OR;  Service: Pain Management         04/11/24 1220   Note Type   Note type Cancelled Session  (Thursday 4/11/24)   Cancel Reasons Patient off floor/test   Additional Comments OT orders received and chart review completed. Spoke w/ RN and attempted to see pt for OT eval. Presently off floor. Will cancel and continue to follow as appropriate / schedule allows   Licensure   [place] License Number  Sandy Ramires, OTR/L [id number]     Sandy Ramires, OTR/L  [id number]  [id number]

## 2024-04-11 NOTE — ASSESSMENT & PLAN NOTE
Patient presented to the ED from his group home for LLQ abdominal pain with associated nausea and vomiting. He was admitted to the general surgery service due to high grade bowel obstruction.  CT A/P (4/6/24): High-grade bowel obstruction. Potential mid abdominal transition point. Thickened segments of small bowel in the right lower quadrant with potential pneumatosis which may indicate bowel ischemia.  S/p exploratory laparotomy with lysis of adhesions on 4/6/24  Currently with NG tube  Rare bowel sounds noted left lower quadrant only  Obstructive series shows high-grade obstruction with opacified persistent markedly dilated stomach  Nursing reported that patient did have stool on afternoon of 4/10, mixed form and loose brown  Care per General Surgery service

## 2024-04-12 ENCOUNTER — APPOINTMENT (INPATIENT)
Dept: RADIOLOGY | Facility: HOSPITAL | Age: 59
DRG: 330 | End: 2024-04-12
Payer: MEDICARE

## 2024-04-12 PROBLEM — R79.89 ELEVATED SERUM CREATININE: Status: RESOLVED | Noted: 2024-04-07 | Resolved: 2024-04-12

## 2024-04-12 LAB
ANION GAP SERPL CALCULATED.3IONS-SCNC: 6 MMOL/L (ref 4–13)
BACTERIA BLD CULT: NORMAL
BACTERIA BLD CULT: NORMAL
BASOPHILS # BLD AUTO: 0.03 THOUSANDS/ÂΜL (ref 0–0.1)
BASOPHILS NFR BLD AUTO: 0 % (ref 0–1)
BUN SERPL-MCNC: 15 MG/DL (ref 5–25)
CALCIUM SERPL-MCNC: 8.3 MG/DL (ref 8.4–10.2)
CHLORIDE SERPL-SCNC: 103 MMOL/L (ref 96–108)
CO2 SERPL-SCNC: 28 MMOL/L (ref 21–32)
CREAT SERPL-MCNC: 1.09 MG/DL (ref 0.6–1.3)
EOSINOPHIL # BLD AUTO: 0.18 THOUSAND/ÂΜL (ref 0–0.61)
EOSINOPHIL NFR BLD AUTO: 2 % (ref 0–6)
ERYTHROCYTE [DISTWIDTH] IN BLOOD BY AUTOMATED COUNT: 13.2 % (ref 11.6–15.1)
GFR SERPL CREATININE-BSD FRML MDRD: 73 ML/MIN/1.73SQ M
GLUCOSE SERPL-MCNC: 97 MG/DL (ref 65–140)
HCT VFR BLD AUTO: 47.4 % (ref 36.5–49.3)
HGB BLD-MCNC: 15.1 G/DL (ref 12–17)
IMM GRANULOCYTES # BLD AUTO: 0.07 THOUSAND/UL (ref 0–0.2)
IMM GRANULOCYTES NFR BLD AUTO: 1 % (ref 0–2)
LYMPHOCYTES # BLD AUTO: 1.38 THOUSANDS/ÂΜL (ref 0.6–4.47)
LYMPHOCYTES NFR BLD AUTO: 14 % (ref 14–44)
MAGNESIUM SERPL-MCNC: 2 MG/DL (ref 1.9–2.7)
MCH RBC QN AUTO: 28.2 PG (ref 26.8–34.3)
MCHC RBC AUTO-ENTMCNC: 31.9 G/DL (ref 31.4–37.4)
MCV RBC AUTO: 89 FL (ref 82–98)
MONOCYTES # BLD AUTO: 1.11 THOUSAND/ÂΜL (ref 0.17–1.22)
MONOCYTES NFR BLD AUTO: 11 % (ref 4–12)
NEUTROPHILS # BLD AUTO: 7.07 THOUSANDS/ÂΜL (ref 1.85–7.62)
NEUTS SEG NFR BLD AUTO: 72 % (ref 43–75)
NRBC BLD AUTO-RTO: 0 /100 WBCS
PHOSPHATE SERPL-MCNC: 2.5 MG/DL (ref 2.7–4.5)
PLATELET # BLD AUTO: 247 THOUSANDS/UL (ref 149–390)
PMV BLD AUTO: 9.7 FL (ref 8.9–12.7)
POTASSIUM SERPL-SCNC: 3.8 MMOL/L (ref 3.5–5.3)
RBC # BLD AUTO: 5.35 MILLION/UL (ref 3.88–5.62)
SODIUM SERPL-SCNC: 137 MMOL/L (ref 135–147)
WBC # BLD AUTO: 9.84 THOUSAND/UL (ref 4.31–10.16)

## 2024-04-12 PROCEDURE — 83735 ASSAY OF MAGNESIUM: CPT | Performed by: PHYSICIAN ASSISTANT

## 2024-04-12 PROCEDURE — 85025 COMPLETE CBC W/AUTO DIFF WBC: CPT | Performed by: PHYSICIAN ASSISTANT

## 2024-04-12 PROCEDURE — 99232 SBSQ HOSP IP/OBS MODERATE 35: CPT

## 2024-04-12 PROCEDURE — 84100 ASSAY OF PHOSPHORUS: CPT | Performed by: PHYSICIAN ASSISTANT

## 2024-04-12 PROCEDURE — 99024 POSTOP FOLLOW-UP VISIT: CPT | Performed by: PHYSICIAN ASSISTANT

## 2024-04-12 PROCEDURE — 74022 RADEX COMPL AQT ABD SERIES: CPT

## 2024-04-12 PROCEDURE — 80048 BASIC METABOLIC PNL TOTAL CA: CPT | Performed by: PHYSICIAN ASSISTANT

## 2024-04-12 RX ORDER — NEBIVOLOL 2.5 MG/1
2.5 TABLET ORAL DAILY
Status: DISCONTINUED | OUTPATIENT
Start: 2024-04-13 | End: 2024-04-15 | Stop reason: HOSPADM

## 2024-04-12 RX ORDER — ATORVASTATIN CALCIUM 10 MG/1
10 TABLET, FILM COATED ORAL DAILY
Status: DISCONTINUED | OUTPATIENT
Start: 2024-04-12 | End: 2024-04-15 | Stop reason: HOSPADM

## 2024-04-12 RX ADMIN — DEXTROSE, SODIUM CHLORIDE, AND POTASSIUM CHLORIDE 100 ML/HR: 5; .45; .15 INJECTION INTRAVENOUS at 00:09

## 2024-04-12 RX ADMIN — ATORVASTATIN CALCIUM 10 MG: 10 TABLET, FILM COATED ORAL at 13:14

## 2024-04-12 RX ADMIN — HEPARIN SODIUM 5000 UNITS: 5000 INJECTION, SOLUTION INTRAVENOUS; SUBCUTANEOUS at 21:46

## 2024-04-12 RX ADMIN — LAMOTRIGINE 150 MG: 100 TABLET ORAL at 18:06

## 2024-04-12 RX ADMIN — HEPARIN SODIUM 5000 UNITS: 5000 INJECTION, SOLUTION INTRAVENOUS; SUBCUTANEOUS at 05:03

## 2024-04-12 RX ADMIN — QUETIAPINE FUMARATE 50 MG: 25 TABLET ORAL at 21:46

## 2024-04-12 RX ADMIN — LAMOTRIGINE 150 MG: 100 TABLET ORAL at 09:09

## 2024-04-12 RX ADMIN — HEPARIN SODIUM 5000 UNITS: 5000 INJECTION, SOLUTION INTRAVENOUS; SUBCUTANEOUS at 13:14

## 2024-04-12 RX ADMIN — SERTRALINE HYDROCHLORIDE 50 MG: 50 TABLET ORAL at 13:14

## 2024-04-12 RX ADMIN — SODIUM PHOSPHATE, MONOBASIC, MONOHYDRATE AND SODIUM PHOSPHATE, DIBASIC, ANHYDROUS 12 MMOL: 142; 276 INJECTION, SOLUTION INTRAVENOUS at 10:48

## 2024-04-12 RX ADMIN — LAMOTRIGINE 50 MG: 25 TABLET ORAL at 21:45

## 2024-04-12 NOTE — ASSESSMENT & PLAN NOTE
Patient presented to the ED from his group home for LLQ abdominal pain with associated nausea and vomiting. He was admitted to the general surgery service due to high grade bowel obstruction.  CT A/P (4/6/24): High-grade bowel obstruction. Potential mid abdominal transition point. Thickened segments of small bowel in the right lower quadrant with potential pneumatosis which may indicate bowel ischemia.  S/p exploratory laparotomy with lysis of adhesions on 4/6/24  NG tube removed today 4/12  Clear liquid diet  IVF  Care per surgery team

## 2024-04-12 NOTE — PROGRESS NOTES
[facility]  Progress Note  Name: Mario Rollins I  MRN: [id number]  Unit/Bed#: 2 [address] Date of Admission: 4/6/2024   Date of Service: 4/12/2024 I Hospital Day: 6    Assessment/Plan   * Small bowel obstruction (HCC)  Assessment & Plan  Patient presented to the ED from his group home for LLQ abdominal pain with associated nausea and vomiting. He was admitted to the general surgery service due to high grade bowel obstruction.  CT A/P (4/6/24): High-grade bowel obstruction. Potential mid abdominal transition point. Thickened segments of small bowel in the right lower quadrant with potential pneumatosis which may indicate bowel ischemia.  S/p exploratory laparotomy with lysis of adhesions on 4/6/24  NG tube removed today 4/12  Clear liquid diet  IVF  Care per surgery team    Electrolyte abnormality  Assessment & Plan  Mag, phos, and potassium low  Receiving K-Phos 12 mmol again today  Monitor and replete as needed    Localization-related symptomatic epilepsy and epileptic syndromes with complex partial seizures, intractable, without status epilepticus (HCC)  Assessment & Plan  History of focal epilepsy  Continue home regimen lamotrigine 150mg BID and 50mg HS  Seizure precautions  Follow up with Neurology outpatient    Right spastic hemiparesis (HCC)  Assessment & Plan  Secondary to left MCA stroke and is s/p brain surgery  Follows outpatient with Neurology    Hyperlipidemia  Assessment & Plan  Resume lipitor    Cerebral palsy (HCC)  Assessment & Plan  Currently resides at group facility  Supportive care    Elevated serum creatinine-resolved as of 4/12/2024  Assessment & Plan  Creatinine mildly elevated on admission 1.44  Baseline 1.0-1.2  Improved with IVF, currently back at baseline  Monitor I&O  Daily BMP           VTE Pharmacologic Prophylaxis:   Moderate Risk (Score 3-4) - Pharmacological DVT Prophylaxis Ordered: heparin.    Mobility:   Basic Mobility Inpatient Raw Score: 9  JH-M  Goal: 3: Sit at edge of bed  JH-HLM Achieved: 2: Bed activities/Dependent transfer  JH-HLM Goal NOT achieved. Continue with multidisciplinary rounding and encourage appropriate mobility to improve upon JH-HLM goals.    Patient Centered Rounds: I performed bedside rounds with nursing staff today.   Discussions with Specialists or Other Care Team Provider: general surgery    Education and Discussions with Family / Patient:  per primary team.     Total Time Spent on Date of Encounter in care of patient: 30 mins. This time was spent on one or more of the following: performing physical exam; counseling and coordination of care; obtaining or reviewing history; documenting in the medical record; reviewing/ordering tests, medications or procedures; communicating with other healthcare professionals and discussing with patient's family/caregivers.    Current Length of Stay: 6 day(s)  Current Patient Status: Inpatient   Certification Statement: The patient will continue to require additional inpatient hospital stay due to advance diet, repeat labs  Discharge Plan: Anticipate discharge in 48-72 hrs to rehab facility. Patient will require less than 30 days in rehab.    Code Status: Level 1 - Full Code    Subjective:   Patient seen and examined this morning resting comfortably in bed. Reports he has been having bowel movements. Denies current nausea, vomiting, abdominal pain, or other complaints. He is eager to have NG tube removed soon.    Objective:     Vitals:   Temp (24hrs), Av.1 °F (36.7 °C), Min:97.9 °F (36.6 °C), Max:98.2 °F (36.8 °C)    Temp:  [97.9 °F (36.6 °C)-98.2 °F (36.8 °C)] 98.1 °F (36.7 °C)  HR:  [85-92] 90  Resp:  [18] 18  BP: (106-126)/(64-90) 116/74  SpO2:  [93 %-96 %] 93 %  Body mass index is 26 kg/m².     Input and Output Summary (last 24 hours):     Intake/Output Summary (Last 24 hours) at 2024 1314  Last data filed at 2024 1300  Gross per 24 hour   Intake 5013.33 ml   Output 6850 ml   Net  -1836.67 ml       Physical Exam:   Physical Exam  Vitals and nursing note reviewed.   Constitutional:       General: He is not in acute distress.     Appearance: He is well-developed.   HENT:      Nose:      Comments: NG tube in place  Cardiovascular:      Rate and Rhythm: Normal rate and regular rhythm.   Pulmonary:      Effort: Pulmonary effort is normal. No respiratory distress.      Breath sounds: Normal breath sounds.   Abdominal:      Palpations: Abdomen is soft.      Tenderness: There is no abdominal tenderness.   Musculoskeletal:         General: No swelling.   Skin:     General: Skin is warm and dry.      Capillary Refill: Capillary refill takes less than 2 seconds.   Neurological:      Mental Status: He is alert.   Psychiatric:         Mood and Affect: Mood normal.          Additional Data:     Labs:  Results from last 7 days   Lab Units 04/12/24  0517   WBC Thousand/uL 9.84   HEMOGLOBIN g/dL 15.1   HEMATOCRIT % 47.4   PLATELETS Thousands/uL 247   SEGS PCT % 72   LYMPHO PCT % 14   MONO PCT % 11   EOS PCT % 2     Results from last 7 days   Lab Units 04/12/24  0517 04/08/24  0442 04/07/24  0544   SODIUM mmol/L 137   < > 139   POTASSIUM mmol/L 3.8   < > 3.6   CHLORIDE mmol/L 103   < > 109*   CO2 mmol/L 28   < > 24   BUN mg/dL 15   < > 18   CREATININE mg/dL 1.09   < > 1.24   ANION GAP mmol/L 6   < > 6   CALCIUM mg/dL 8.3*   < > 7.6*   ALBUMIN g/dL  --   --  2.7*   TOTAL BILIRUBIN mg/dL  --   --  0.56   ALK PHOS U/L  --   --  53   ALT U/L  --   --  9   AST U/L  --   --  16   GLUCOSE RANDOM mg/dL 97   < > 115    < > = values in this interval not displayed.     Results from last 7 days   Lab Units 04/06/24  1323   INR  1.08     Results from last 7 days   Lab Units 04/07/24  1725   POC GLUCOSE mg/dl 94         Results from last 7 days   Lab Units 04/06/24  1305   LACTIC ACID mmol/L 1.1       Lines/Drains:  Invasive Devices       Peripheral Intravenous Line  Duration             Peripheral IV 04/11/24  Distal;Left;Upper;Ventral (anterior) Arm 1 day              Drain  Duration             NG/OG/Enteral Tube Nasogastric 16 Fr 5 days                          Imaging: Reviewed radiology reports from this admission including: xray(s)    Recent Cultures (last 7 days):   Results from last 7 days   Lab Units 04/06/24  1402 04/06/24  1323   BLOOD CULTURE  No Growth After 5 Days. No Growth After 5 Days.       Last 24 Hours Medication List:   Current Facility-Administered Medications   Medication Dose Route Frequency Provider Last Rate    acetaminophen  650 mg Rectal Q4H PRN Morena Broderick PA-C      atorvastatin  10 mg Oral Daily Trevor Fletcher PA-C      dextrose 5 % and sodium chloride 0.45 % with KCl 20 mEq/L  100 mL/hr Intravenous Continuous Trevor Fletcher PA-C 100 mL/hr (04/12/24 0009)    heparin (porcine)  5,000 Units Subcutaneous Q8H [facility] Morena Broderick PA-C      HYDROmorphone  0.5 mg Intravenous Q4H PRN Morena Broderick PA-C      lamoTRIgine  150 mg Oral BID Rosy Womack PA-C      lamoTRIgine  50 mg Oral HS Rosy Womack PA-C      magnesium sulfate  2 g Intravenous Once David Shore PA-C      metoprolol  2.5 mg Intravenous Q6H PRN David Shore PA-C      morphine injection  2 mg Intravenous Q4H PRN Morena Broderick PA-C      [START ON 4/13/2024] nebivolol  2.5 mg Oral Daily Trevor Fletcher PA-C      ondansetron  4 mg Intravenous Q6H PRN Morena Broderick PA-C      QUEtiapine  50 mg Oral HS David Shore PA-C      sertraline  50 mg Oral Daily Trevor Fletcher PA-C          Today, Patient Was Seen By: Prabha Rawls PA-C    **Please Note: This note may have been constructed using a voice recognition system.**

## 2024-04-12 NOTE — CASE MANAGEMENT
Case Management Discharge Planning Note    Patient name Mario Rollins  Location [address] [place] [address]/[address] [place] [address] MRN [id number]  : 1965 Date 2024       Current Admission Date: 2024  Current Admission Diagnosis:Small bowel obstruction (HCC)   Patient Active Problem List    Diagnosis Date Noted    Electrolyte abnormality 2024    Elevated serum creatinine 2024    Small bowel obstruction (HCC) 2024    Pneumatosis of intestines 2024    Sacroiliitis (HCC)     Localization-related symptomatic epilepsy and epileptic syndromes with complex partial seizures, intractable, without status epilepticus (HCC)     Encounter for hearing examination 2021    Right spastic hemiparesis (HCC) 2020    Acquired deformity of right hand 2020    Paronychia of toenail 2019    Lumbar radiculopathy 10/01/2018    Spinal stenosis of lumbar region 10/01/2018    Chronic left-sided low back pain with left-sided sciatica 10/01/2018    Chronic pain syndrome 10/01/2018    Cerebral palsy (HCC) 2018    Class 1 obesity with body mass index (BMI) of 30.0 to 30.9 in adult 2018    Hyperlipidemia 2018    Constipation 2018    Acquired valgus deformity of right ankle 2017    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 10/20/2015    Acquired deformity of right ankle and foot 10/20/2015    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    Primary Insurance: MEDICARE  Secondary Insurance: [employer] [place] [employer] [facility]    DISCHARGE DETAILS:    Discharge planning discussed with:: Evette (group home staff)  Freedom of Choice: Yes  Comments - Freedom of Choice: STR preference. Rufina voiced preference for [email or web address] or [email or web address]  CM contacted family/caregiver?: Yes  Were Treatment Team discharge recommendations reviewed with patient/caregiver?: Yes  Did patient/caregiver verbalize understanding of patient care needs?: Yes  Were patient/caregiver advised of the risks associated with not following Treatment Team discharge recommendations?: Yes    Contacts  Patient Contacts: Rufina  Phone Number: [phone]  Reason/Outcome: Discharge Planning    Requested Home Health Care         Is the patient interested in HHC at discharge?: No    DME Referral Provided  Referral made for DME?: No    Other Referral/Resources/Interventions Provided:  Interventions: Short Term Rehab  Referral Comments: CM made aware of therapy recommendations for STR. CM called Rufina at [facility] to update and discuss facility preferences. Rufina voiced preference for [email or web address] or [facility]. Referrals sent in aidin, pending acceptance.     Treatment Team Recommendation: Short Term Rehab  Discharge Destination Plan:: Short Term Rehab  Transport at Discharge : Wheelchair van

## 2024-04-12 NOTE — PLAN OF CARE
Problem: GASTROINTESTINAL - ADULT  Goal: Minimal or absence of nausea and/or vomiting  Description: INTERVENTIONS:  - Administer IV fluids if ordered to ensure adequate hydration  - Maintain NPO status until nausea and vomiting are resolved  - Nasogastric tube if ordered  - Administer ordered antiemetic medications as needed  - Provide nonpharmacologic comfort measures as appropriate  - Advance diet as tolerated, if ordered  - Consider nutrition services referral to assist patient with adequate nutrition and appropriate food choices  Outcome: Progressing  Goal: Maintains or returns to baseline bowel function  Description: INTERVENTIONS:  - Assess bowel function  - Encourage oral fluids to ensure adequate hydration  - Administer IV fluids if ordered to ensure adequate hydration  - Administer ordered medications as needed  - Encourage mobilization and activity  - Consider nutritional services referral to assist patient with adequate nutrition and appropriate food choices  Outcome: Progressing  Goal: Oral mucous membranes remain intact  Description: INTERVENTIONS  - Assess oral mucosa and hygiene practices  - Implement preventative oral hygiene regimen  - Implement oral medicated treatments as ordered  - Initiate Nutrition services referral as needed  Outcome: Progressing     Problem: GENITOURINARY - ADULT  Goal: Maintains or returns to baseline urinary function  Description: INTERVENTIONS:  - Assess urinary function  - Encourage oral fluids to ensure adequate hydration if ordered  - Administer IV fluids as ordered to ensure adequate hydration  - Administer ordered medications as needed  - Offer frequent toileting  - Follow urinary retention protocol if ordered  Outcome: Progressing  Goal: Absence of urinary retention  Description: INTERVENTIONS:  - Assess patient’s ability to void and empty bladder  - Monitor I/O  - Bladder scan as needed  - Discuss with physician/AP medications to alleviate retention as needed  -  Discuss catheterization for long term situations as appropriate  Outcome: Progressing     Problem: METABOLIC, FLUID AND ELECTROLYTES - ADULT  Goal: Electrolytes maintained within normal limits  Description: INTERVENTIONS:  - Monitor labs and assess patient for signs and symptoms of electrolyte imbalances  - Administer electrolyte replacement as ordered  - Monitor response to electrolyte replacements, including repeat lab results as appropriate  - Instruct patient on fluid and nutrition as appropriate  Outcome: Progressing  Goal: Fluid balance maintained  Description: INTERVENTIONS:  - Monitor labs   - Monitor I/O and WT  - Instruct patient on fluid and nutrition as appropriate  - Assess for signs & symptoms of volume excess or deficit  Outcome: Progressing  Goal: Glucose maintained within target range  Description: INTERVENTIONS:  - Monitor Blood Glucose as ordered  - Assess for signs and symptoms of hyperglycemia and hypoglycemia  - Administer ordered medications to maintain glucose within target range  - Assess nutritional intake and initiate nutrition service referral as needed  Outcome: Progressing     Problem: MUSCULOSKELETAL - ADULT  Goal: Maintain or return mobility to safest level of function  Description: INTERVENTIONS:  - Assess patient's ability to carry out ADLs; assess patient's baseline for ADL function and identify physical deficits which impact ability to perform ADLs (bathing, care of mouth/teeth, toileting, grooming, dressing, etc.)  - Assess/evaluate cause of self-care deficits   - Assess range of motion  - Assess patient's mobility  - Assess patient's need for assistive devices and provide as appropriate  - Encourage maximum independence but intervene and supervise when necessary  - Involve family in performance of ADLs  - Assess for home care needs following discharge   - Consider OT consult to assist with ADL evaluation and planning for discharge  - Provide patient education as  appropriate  Outcome: Progressing  Goal: Maintain proper alignment of affected body part  Description: INTERVENTIONS:  - Support, maintain and protect limb and body alignment  - Provide patient/ family with appropriate education  Outcome: Progressing     Problem: Prexisting or High Potential for Compromised Skin Integrity  Goal: Skin integrity is maintained or improved  Description: INTERVENTIONS:  - Identify patients at risk for skin breakdown  - Assess and monitor skin integrity  - Assess and monitor nutrition and hydration status  - Monitor labs   - Assess for incontinence   - Turn and reposition patient  - Assist with mobility/ambulation  - Relieve pressure over bony prominences  - Avoid friction and shearing  - Provide appropriate hygiene as needed including keeping skin clean and dry  - Evaluate need for skin moisturizer/barrier cream  - Collaborate with interdisciplinary team   - Patient/family teaching  - Consider wound care consult   Outcome: Progressing     Problem: Nutrition/Hydration-ADULT  Goal: Nutrient/Hydration intake appropriate for improving, restoring or maintaining nutritional needs  Description: Monitor and assess patient's nutrition/hydration status for malnutrition. Collaborate with interdisciplinary team and initiate plan and interventions as ordered.  Monitor patient's weight and dietary intake as ordered or per policy. Utilize nutrition screening tool and intervene as necessary. Determine patient's food preferences and provide high-protein, high-caloric foods as appropriate.     INTERVENTIONS:  - Monitor oral intake, urinary output, labs, and treatment plans  - Assess nutrition and hydration status and recommend course of action  - Evaluate amount of meals eaten  - Assist patient with eating if necessary   - Allow adequate time for meals  - Recommend/ encourage appropriate diets, oral nutritional supplements, and vitamin/mineral supplements  - Order, calculate, and assess calorie counts as  needed  - Recommend, monitor, and adjust tube feedings and TPN/PPN based on assessed needs  - Assess need for intravenous fluids  - Provide specific nutrition/hydration education as appropriate  - Include patient/family/caregiver in decisions related to nutrition  Outcome: Progressing

## 2024-04-12 NOTE — PLAN OF CARE
Problem: GASTROINTESTINAL - ADULT  Goal: Minimal or absence of nausea and/or vomiting  Description: INTERVENTIONS:  - Administer IV fluids if ordered to ensure adequate hydration  - Maintain NPO status until nausea and vomiting are resolved  - Nasogastric tube if ordered  - Administer ordered antiemetic medications as needed  - Provide nonpharmacologic comfort measures as appropriate  - Advance diet as tolerated, if ordered  - Consider nutrition services referral to assist patient with adequate nutrition and appropriate food choices  Outcome: Progressing  Goal: Maintains or returns to baseline bowel function  Description: INTERVENTIONS:  - Assess bowel function  - Encourage oral fluids to ensure adequate hydration  - Administer IV fluids if ordered to ensure adequate hydration  - Administer ordered medications as needed  - Encourage mobilization and activity  - Consider nutritional services referral to assist patient with adequate nutrition and appropriate food choices  Outcome: Progressing  Goal: Maintains adequate nutritional intake  Description: INTERVENTIONS:  - Monitor percentage of each meal consumed  - Identify factors contributing to decreased intake, treat as appropriate  - Assist with meals as needed  - Monitor I&O, weight, and lab values if indicated  - Obtain nutrition services referral as needed  Outcome: Progressing  Goal: Oral mucous membranes remain intact  Description: INTERVENTIONS  - Assess oral mucosa and hygiene practices  - Implement preventative oral hygiene regimen  - Implement oral medicated treatments as ordered  - Initiate Nutrition services referral as needed  Outcome: Progressing     Problem: GENITOURINARY - ADULT  Goal: Maintains or returns to baseline urinary function  Description: INTERVENTIONS:  - Assess urinary function  - Encourage oral fluids to ensure adequate hydration if ordered  - Administer IV fluids as ordered to ensure adequate hydration  - Administer ordered medications  as needed  - Offer frequent toileting  - Follow urinary retention protocol if ordered  Outcome: Completed  Goal: Absence of urinary retention  Description: INTERVENTIONS:  - Assess patient’s ability to void and empty bladder  - Monitor I/O  - Bladder scan as needed  - Discuss with physician/AP medications to alleviate retention as needed  - Discuss catheterization for long term situations as appropriate  Outcome: Completed  Goal: Urinary catheter remains patent  Description: INTERVENTIONS:  - Assess patency of urinary catheter  - If patient has a chronic dyer, consider changing catheter if non-functioning  - Follow guidelines for intermittent irrigation of non-functioning urinary catheter  Outcome: Completed     Problem: METABOLIC, FLUID AND ELECTROLYTES - ADULT  Goal: Electrolytes maintained within normal limits  Description: INTERVENTIONS:  - Monitor labs and assess patient for signs and symptoms of electrolyte imbalances  - Administer electrolyte replacement as ordered  - Monitor response to electrolyte replacements, including repeat lab results as appropriate  - Instruct patient on fluid and nutrition as appropriate  Outcome: Progressing  Goal: Fluid balance maintained  Description: INTERVENTIONS:  - Monitor labs   - Monitor I/O and WT  - Instruct patient on fluid and nutrition as appropriate  - Assess for signs & symptoms of volume excess or deficit  Outcome: Progressing  Goal: Glucose maintained within target range  Description: INTERVENTIONS:  - Monitor Blood Glucose as ordered  - Assess for signs and symptoms of hyperglycemia and hypoglycemia  - Administer ordered medications to maintain glucose within target range  - Assess nutritional intake and initiate nutrition service referral as needed  Outcome: Progressing     Problem: MUSCULOSKELETAL - ADULT  Goal: Maintain or return mobility to safest level of function  Description: INTERVENTIONS:  - Assess patient's ability to carry out ADLs; assess patient's  baseline for ADL function and identify physical deficits which impact ability to perform ADLs (bathing, care of mouth/teeth, toileting, grooming, dressing, etc.)  - Assess/evaluate cause of self-care deficits   - Assess range of motion  - Assess patient's mobility  - Assess patient's need for assistive devices and provide as appropriate  - Encourage maximum independence but intervene and supervise when necessary  - Involve family in performance of ADLs  - Assess for home care needs following discharge   - Consider OT consult to assist with ADL evaluation and planning for discharge  - Provide patient education as appropriate  Outcome: Progressing  Goal: Maintain proper alignment of affected body part  Description: INTERVENTIONS:  - Support, maintain and protect limb and body alignment  - Provide patient/ family with appropriate education  Outcome: Progressing     Problem: Prexisting or High Potential for Compromised Skin Integrity  Goal: Skin integrity is maintained or improved  Description: INTERVENTIONS:  - Identify patients at risk for skin breakdown  - Assess and monitor skin integrity  - Assess and monitor nutrition and hydration status  - Monitor labs   - Assess for incontinence   - Turn and reposition patient  - Assist with mobility/ambulation  - Relieve pressure over bony prominences  - Avoid friction and shearing  - Provide appropriate hygiene as needed including keeping skin clean and dry  - Evaluate need for skin moisturizer/barrier cream  - Collaborate with interdisciplinary team   - Patient/family teaching  - Consider wound care consult   Outcome: Progressing     Problem: Nutrition/Hydration-ADULT  Goal: Nutrient/Hydration intake appropriate for improving, restoring or maintaining nutritional needs  Description: Monitor and assess patient's nutrition/hydration status for malnutrition. Collaborate with interdisciplinary team and initiate plan and interventions as ordered.  Monitor patient's weight and  dietary intake as ordered or per policy. Utilize nutrition screening tool and intervene as necessary. Determine patient's food preferences and provide high-protein, high-caloric foods as appropriate.     INTERVENTIONS:  - Monitor oral intake, urinary output, labs, and treatment plans  - Assess nutrition and hydration status and recommend course of action  - Evaluate amount of meals eaten  - Assist patient with eating if necessary   - Allow adequate time for meals  - Recommend/ encourage appropriate diets, oral nutritional supplements, and vitamin/mineral supplements  - Order, calculate, and assess calorie counts as needed  - Recommend, monitor, and adjust tube feedings and TPN/PPN based on assessed needs  - Assess need for intravenous fluids  - Provide specific nutrition/hydration education as appropriate  - Include patient/family/caregiver in decisions related to nutrition  Outcome: Progressing

## 2024-04-12 NOTE — ASSESSMENT & PLAN NOTE
History of focal epilepsy  Continue home regimen lamotrigine 150mg BID and 50mg HS  Seizure precautions  Follow up with Neurology outpatient

## 2024-04-12 NOTE — CASE MANAGEMENT
Case Management Discharge Planning Note    Patient name Mario Rollins  Location [address] [place] [address]/[address] [place] [address] MRN [id number]  : 1965 Date 2024       Current Admission Date: 2024  Current Admission Diagnosis:Small bowel obstruction (HCC)   Patient Active Problem List    Diagnosis Date Noted    Electrolyte abnormality 2024    Small bowel obstruction (HCC) 2024    Pneumatosis of intestines 2024    Sacroiliitis (HCC)     Localization-related symptomatic epilepsy and epileptic syndromes with complex partial seizures, intractable, without status epilepticus (HCC)     Encounter for hearing examination 2021    Right spastic hemiparesis (HCC) 2020    Acquired deformity of right hand 2020    Paronychia of toenail 2019    Lumbar radiculopathy 10/01/2018    Spinal stenosis of lumbar region 10/01/2018    Chronic left-sided low back pain with left-sided sciatica 10/01/2018    Chronic pain syndrome 10/01/2018    Cerebral palsy (HCC) 2018    Class 1 obesity with body mass index (BMI) of 30.0 to 30.9 in adult 2018    Hyperlipidemia 2018    Constipation 2018    Acquired valgus deformity of right ankle 2017    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 10/20/2015    Acquired deformity of right ankle and foot 10/20/2015    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    Primary Insurance: MEDICARE  Secondary Insurance: [employer]    DISCHARGE DETAILS:    Other Referral/Resources/Interventions Provided:  Interventions: Short Term Rehab  Referral Comments: DUSTIN reserved [facility]"" in aidin per Rufina at group home's preference. Joseph at @[facility] made aware of likely weekend discharge.     Treatment Team Recommendation: Short Term Rehab  Discharge Destination Plan:: Short Term Rehab ([facility]")  Transport at Discharge : Wheelchair van     Accepting Facility Name, City & State : [facility] \[facility]\""  Receiving Facility/Agency Phone Number: [phone]  Facility/Agency Fax Number: [phone]

## 2024-04-12 NOTE — PROGRESS NOTES
"Progress Note - General Surgery   Mario Rollins 59 y.o. male MRN: [id number]  Unit/Bed#: [address] [place] [address] Encounter: [id number]    Assessment:   POD #6 s/p exploratory laparotomy with lysis of adhesions and enterotomy with repair -  Proximal small bowel distention on abdominal imaging yesterday however patient has tolerated clamp trial overnight, he had a couple bowel movements yesterday and denies any nausea  AVSS      Plan:  - NG tube removed   - continue IV fluid hydration  - advance to clear liquid diet   - reorder home oral meds   - mepilex dressing removed, small amount of iodoform ribbon packign placed in midilne incision around umbilicus after removing a couple staples and opening wound due to surrounding erythema. Appears to be fat necrosis  - IV phosphorus repletion  - AM labs:  BMP, Phos  - considered reglan, high risk of EPS with coadministration of antipsychotics      Subjective/Objective     Subjective:  He is having BMs and denies any nausea during clamp trial.    Objective:     Blood pressure 116/74, pulse 90, temperature 98.1 °F (36.7 °C), temperature source Oral, resp. rate 18, height 5' 8\" (1.727 m), weight 77.6 kg (171 lb), SpO2 93%.,Body mass index is 26 kg/m².      Intake/Output Summary (Last 24 hours) at 4/12/2024 1038  Last data filed at 4/12/2024 0009  Gross per 24 hour   Intake 4473.33 ml   Output 6350 ml   Net -1876.67 ml       Invasive Devices       Peripheral Intravenous Line  Duration             Peripheral IV 04/11/24 Distal;Left;Upper;Ventral (anterior) Arm 1 day              Drain  Duration             NG/OG/Enteral Tube Nasogastric 16 Fr 5 days                    Physical Exam: /74   Pulse 90   Temp 98.1 °F (36.7 °C) (Oral)   Resp 18   Ht 5' 8\" (1.727 m)   Wt 77.6 kg (171 lb)   SpO2 93%   BMI 26.00 kg/m²   General appearance: alert and oriented, in no acute distress  Head: Normocephalic, without obvious abnormality, atraumatic  Lungs: clear to auscultation " bilaterally  Heart: regular rate and rhythm, S1, S2 normal, no murmur, click, rub or gallop  Abdomen:  Soft, active bowel sounds, some tenderness around incision, no guarding , some erythema at umbilicus  Skin: Skin color, texture, turgor normal. No rashes or lesions       Lab, Imaging and other studies:I have personally reviewed pertinent lab results.  , CBC:   Lab Results   Component Value Date    WBC 9.84 04/12/2024    HGB 15.1 04/12/2024    HCT 47.4 04/12/2024    MCV 89 04/12/2024     04/12/2024    RBC 5.35 04/12/2024    MCH 28.2 04/12/2024    MCHC 31.9 04/12/2024    RDW 13.2 04/12/2024    MPV 9.7 04/12/2024    NRBC 0 04/12/2024   , CMP:   Lab Results   Component Value Date    SODIUM 137 04/12/2024    K 3.8 04/12/2024     04/12/2024    CO2 28 04/12/2024    BUN 15 04/12/2024    CREATININE 1.09 04/12/2024    CALCIUM 8.3 (L) 04/12/2024    EGFR 73 04/12/2024     VTE Pharmacologic Prophylaxis: Heparin SQ  VTE Mechanical Prophylaxis: sequential compression device

## 2024-04-13 LAB
ANION GAP SERPL CALCULATED.3IONS-SCNC: 8 MMOL/L (ref 4–13)
BUN SERPL-MCNC: 15 MG/DL (ref 5–25)
CALCIUM SERPL-MCNC: 8.2 MG/DL (ref 8.4–10.2)
CHLORIDE SERPL-SCNC: 104 MMOL/L (ref 96–108)
CO2 SERPL-SCNC: 25 MMOL/L (ref 21–32)
CREAT SERPL-MCNC: 1 MG/DL (ref 0.6–1.3)
GFR SERPL CREATININE-BSD FRML MDRD: 82 ML/MIN/1.73SQ M
GLUCOSE SERPL-MCNC: 89 MG/DL (ref 65–140)
PHOSPHATE SERPL-MCNC: 2.5 MG/DL (ref 2.7–4.5)
POTASSIUM SERPL-SCNC: 3.7 MMOL/L (ref 3.5–5.3)
SODIUM SERPL-SCNC: 137 MMOL/L (ref 135–147)

## 2024-04-13 PROCEDURE — 80048 BASIC METABOLIC PNL TOTAL CA: CPT | Performed by: PHYSICIAN ASSISTANT

## 2024-04-13 PROCEDURE — 84100 ASSAY OF PHOSPHORUS: CPT | Performed by: PHYSICIAN ASSISTANT

## 2024-04-13 PROCEDURE — 99024 POSTOP FOLLOW-UP VISIT: CPT | Performed by: PHYSICIAN ASSISTANT

## 2024-04-13 PROCEDURE — 99232 SBSQ HOSP IP/OBS MODERATE 35: CPT

## 2024-04-13 RX ORDER — LORATADINE 10 MG/1
10 TABLET ORAL DAILY PRN
Status: DISCONTINUED | OUTPATIENT
Start: 2024-04-13 | End: 2024-04-15 | Stop reason: HOSPADM

## 2024-04-13 RX ORDER — ACETAMINOPHEN 325 MG/1
650 TABLET ORAL EVERY 6 HOURS PRN
Status: DISCONTINUED | OUTPATIENT
Start: 2024-04-13 | End: 2024-04-15 | Stop reason: HOSPADM

## 2024-04-13 RX ORDER — ACETAMINOPHEN 325 MG/1
650 TABLET ORAL EVERY 6 HOURS PRN
Status: DISCONTINUED | OUTPATIENT
Start: 2024-04-13 | End: 2024-04-13

## 2024-04-13 RX ORDER — ACETAMINOPHEN 325 MG/1
325 TABLET ORAL EVERY 6 HOURS PRN
Status: DISCONTINUED | OUTPATIENT
Start: 2024-04-13 | End: 2024-04-13

## 2024-04-13 RX ORDER — OXYCODONE HYDROCHLORIDE 5 MG/1
5 TABLET ORAL EVERY 4 HOURS PRN
Status: DISCONTINUED | OUTPATIENT
Start: 2024-04-13 | End: 2024-04-15 | Stop reason: HOSPADM

## 2024-04-13 RX ORDER — IBUPROFEN 600 MG/1
600 TABLET ORAL EVERY 6 HOURS PRN
Status: DISCONTINUED | OUTPATIENT
Start: 2024-04-13 | End: 2024-04-15 | Stop reason: HOSPADM

## 2024-04-13 RX ADMIN — HEPARIN SODIUM 5000 UNITS: 5000 INJECTION, SOLUTION INTRAVENOUS; SUBCUTANEOUS at 22:38

## 2024-04-13 RX ADMIN — LAMOTRIGINE 50 MG: 25 TABLET ORAL at 22:38

## 2024-04-13 RX ADMIN — LAMOTRIGINE 150 MG: 100 TABLET ORAL at 08:36

## 2024-04-13 RX ADMIN — LAMOTRIGINE 150 MG: 100 TABLET ORAL at 17:11

## 2024-04-13 RX ADMIN — POTASSIUM & SODIUM PHOSPHATES POWDER PACK 280-160-250 MG 1 PACKET: 280-160-250 PACK at 10:06

## 2024-04-13 RX ADMIN — SERTRALINE HYDROCHLORIDE 50 MG: 50 TABLET ORAL at 08:38

## 2024-04-13 RX ADMIN — QUETIAPINE FUMARATE 50 MG: 25 TABLET ORAL at 22:38

## 2024-04-13 RX ADMIN — ATORVASTATIN CALCIUM 10 MG: 10 TABLET, FILM COATED ORAL at 08:37

## 2024-04-13 RX ADMIN — HEPARIN SODIUM 5000 UNITS: 5000 INJECTION, SOLUTION INTRAVENOUS; SUBCUTANEOUS at 15:18

## 2024-04-13 RX ADMIN — HEPARIN SODIUM 5000 UNITS: 5000 INJECTION, SOLUTION INTRAVENOUS; SUBCUTANEOUS at 06:32

## 2024-04-13 NOTE — PLAN OF CARE
Problem: GASTROINTESTINAL - ADULT  Goal: Minimal or absence of nausea and/or vomiting  Description: INTERVENTIONS:  - Administer IV fluids if ordered to ensure adequate hydration  - Maintain NPO status until nausea and vomiting are resolved  - Nasogastric tube if ordered  - Administer ordered antiemetic medications as needed  - Provide nonpharmacologic comfort measures as appropriate  - Advance diet as tolerated, if ordered  - Consider nutrition services referral to assist patient with adequate nutrition and appropriate food choices  Outcome: Progressing  Goal: Maintains or returns to baseline bowel function  Description: INTERVENTIONS:  - Assess bowel function  - Encourage oral fluids to ensure adequate hydration  - Administer IV fluids if ordered to ensure adequate hydration  - Administer ordered medications as needed  - Encourage mobilization and activity  - Consider nutritional services referral to assist patient with adequate nutrition and appropriate food choices  Outcome: Progressing  Goal: Maintains adequate nutritional intake  Description: INTERVENTIONS:  - Monitor percentage of each meal consumed  - Identify factors contributing to decreased intake, treat as appropriate  - Assist with meals as needed  - Monitor I&O, weight, and lab values if indicated  - Obtain nutrition services referral as needed  Outcome: Progressing  Goal: Oral mucous membranes remain intact  Description: INTERVENTIONS  - Assess oral mucosa and hygiene practices  - Implement preventative oral hygiene regimen  - Implement oral medicated treatments as ordered  - Initiate Nutrition services referral as needed  Outcome: Progressing     Problem: GASTROINTESTINAL - ADULT  Goal: Maintains or returns to baseline bowel function  Description: INTERVENTIONS:  - Assess bowel function  - Encourage oral fluids to ensure adequate hydration  - Administer IV fluids if ordered to ensure adequate hydration  - Administer ordered medications as needed  -  Encourage mobilization and activity  - Consider nutritional services referral to assist patient with adequate nutrition and appropriate food choices  Outcome: Progressing     Problem: GASTROINTESTINAL - ADULT  Goal: Maintains adequate nutritional intake  Description: INTERVENTIONS:  - Monitor percentage of each meal consumed  - Identify factors contributing to decreased intake, treat as appropriate  - Assist with meals as needed  - Monitor I&O, weight, and lab values if indicated  - Obtain nutrition services referral as needed  Outcome: Progressing     Problem: GASTROINTESTINAL - ADULT  Goal: Oral mucous membranes remain intact  Description: INTERVENTIONS  - Assess oral mucosa and hygiene practices  - Implement preventative oral hygiene regimen  - Implement oral medicated treatments as ordered  - Initiate Nutrition services referral as needed  Outcome: Progressing     Problem: METABOLIC, FLUID AND ELECTROLYTES - ADULT  Goal: Electrolytes maintained within normal limits  Description: INTERVENTIONS:  - Monitor labs and assess patient for signs and symptoms of electrolyte imbalances  - Administer electrolyte replacement as ordered  - Monitor response to electrolyte replacements, including repeat lab results as appropriate  - Instruct patient on fluid and nutrition as appropriate  Outcome: Progressing  Goal: Fluid balance maintained  Description: INTERVENTIONS:  - Monitor labs   - Monitor I/O and WT  - Instruct patient on fluid and nutrition as appropriate  - Assess for signs & symptoms of volume excess or deficit  Outcome: Progressing  Goal: Glucose maintained within target range  Description: INTERVENTIONS:  - Monitor Blood Glucose as ordered  - Assess for signs and symptoms of hyperglycemia and hypoglycemia  - Administer ordered medications to maintain glucose within target range  - Assess nutritional intake and initiate nutrition service referral as needed  Outcome: Progressing     Problem: MUSCULOSKELETAL -  ADULT  Goal: Maintain or return mobility to safest level of function  Description: INTERVENTIONS:  - Assess patient's ability to carry out ADLs; assess patient's baseline for ADL function and identify physical deficits which impact ability to perform ADLs (bathing, care of mouth/teeth, toileting, grooming, dressing, etc.)  - Assess/evaluate cause of self-care deficits   - Assess range of motion  - Assess patient's mobility  - Assess patient's need for assistive devices and provide as appropriate  - Encourage maximum independence but intervene and supervise when necessary  - Involve family in performance of ADLs  - Assess for home care needs following discharge   - Consider OT consult to assist with ADL evaluation and planning for discharge  - Provide patient education as appropriate  Outcome: Progressing  Goal: Maintain proper alignment of affected body part  Description: INTERVENTIONS:  - Support, maintain and protect limb and body alignment  - Provide patient/ family with appropriate education  Outcome: Progressing     Problem: Prexisting or High Potential for Compromised Skin Integrity  Goal: Skin integrity is maintained or improved  Description: INTERVENTIONS:  - Identify patients at risk for skin breakdown  - Assess and monitor skin integrity  - Assess and monitor nutrition and hydration status  - Monitor labs   - Assess for incontinence   - Turn and reposition patient  - Assist with mobility/ambulation  - Relieve pressure over bony prominences  - Avoid friction and shearing  - Provide appropriate hygiene as needed including keeping skin clean and dry  - Evaluate need for skin moisturizer/barrier cream  - Collaborate with interdisciplinary team   - Patient/family teaching  - Consider wound care consult   Outcome: Progressing     Problem: Nutrition/Hydration-ADULT  Goal: Nutrient/Hydration intake appropriate for improving, restoring or maintaining nutritional needs  Description: Monitor and assess patient's  nutrition/hydration status for malnutrition. Collaborate with interdisciplinary team and initiate plan and interventions as ordered.  Monitor patient's weight and dietary intake as ordered or per policy. Utilize nutrition screening tool and intervene as necessary. Determine patient's food preferences and provide high-protein, high-caloric foods as appropriate.     INTERVENTIONS:  - Monitor oral intake, urinary output, labs, and treatment plans  - Assess nutrition and hydration status and recommend course of action  - Evaluate amount of meals eaten  - Assist patient with eating if necessary   - Allow adequate time for meals  - Recommend/ encourage appropriate diets, oral nutritional supplements, and vitamin/mineral supplements  - Order, calculate, and assess calorie counts as needed  - Recommend, monitor, and adjust tube feedings and TPN/PPN based on assessed needs  - Assess need for intravenous fluids  - Provide specific nutrition/hydration education as appropriate  - Include patient/family/caregiver in decisions related to nutrition  Outcome: Progressing

## 2024-04-13 NOTE — PROGRESS NOTES
[facility]  Progress Note  Name: Mario Rollins I  MRN: [id number]  Unit/Bed#: 2 [address] Date of Admission: 4/6/2024   Date of Service: 4/13/2024 I Hospital Day: 7    Assessment/Plan   * Small bowel obstruction (HCC)  Assessment & Plan  Patient presented to the ED from his group home for LLQ abdominal pain with associated nausea and vomiting. He was admitted to the general surgery service due to high grade bowel obstruction.  CT A/P (4/6/24): High-grade bowel obstruction. Potential mid abdominal transition point. Thickened segments of small bowel in the right lower quadrant with potential pneumatosis which may indicate bowel ischemia.  S/p exploratory laparotomy with lysis of adhesions on 4/6/24  NG tube removed today 4/12  Advancing diet  Care per surgery team    Electrolyte abnormality  Assessment & Plan  Mag, phos, and potassium intermittently low  Monitor and replete as needed    Localization-related symptomatic epilepsy and epileptic syndromes with complex partial seizures, intractable, without status epilepticus (HCC)  Assessment & Plan  History of focal epilepsy  Continue home regimen lamotrigine 150mg BID and 50mg HS  Seizure precautions  Follow up with Neurology outpatient    Right spastic hemiparesis (HCC)  Assessment & Plan  Secondary to left MCA stroke and is s/p brain surgery  Follows outpatient with Neurology    Hyperlipidemia  Assessment & Plan  Resume lipitor    Cerebral palsy (HCC)  Assessment & Plan  Currently resides at [facility]  Supportive care           VTE Pharmacologic Prophylaxis:   Moderate Risk (Score 3-4) - Pharmacological DVT Prophylaxis Ordered: heparin.    Mobility:   Basic Mobility Inpatient Raw Score: 13  JH-HLM Goal: 4: Move to chair/commode  JH-HLM Achieved: 4: Move to chair/commode  JH-HLM Goal achieved. Continue to encourage appropriate mobility.    Patient Centered Rounds: I performed bedside rounds with nursing staff today.   Discussions  with Specialists or Other Care Team Provider: rn, cm    Education and Discussions with Family / Patient:  per primary team.     Total Time Spent on Date of Encounter in care of patient: 30 mins. This time was spent on one or more of the following: performing physical exam; counseling and coordination of care; obtaining or reviewing history; documenting in the medical record; reviewing/ordering tests, medications or procedures; communicating with other healthcare professionals and discussing with patient's family/caregivers.    Current Length of Stay: 7 day(s)  Current Patient Status: Inpatient   Certification Statement: The patient will continue to require additional inpatient hospital stay due to advance diet, post op monitoring  Discharge Plan: Anticipate discharge in 24-48 hrs to rehab facility.    Code Status: Level 1 - Full Code    Subjective:   Patient seen sitting up in bed this morning laughing at cartoons on TV.  Denies any current complaints including abdominal pain, nausea, or vomiting.  Reports he ate his breakfast without issues.  He is happy to have the NG tube out.    Objective:     Vitals:   Temp (24hrs), Av.9 °F (36.6 °C), Min:97 °F (36.1 °C), Max:98.3 °F (36.8 °C)    Temp:  [97 °F (36.1 °C)-98.3 °F (36.8 °C)] 97 °F (36.1 °C)  HR:  [79-88] 79  Resp:  [17-18] 17  BP: ()/(53-74) 109/74  SpO2:  [92 %-95 %] 94 %  Body mass index is 26 kg/m².     Input and Output Summary (last 24 hours):     Intake/Output Summary (Last 24 hours) at 2024 1038  Last data filed at 2024 0800  Gross per 24 hour   Intake 1620 ml   Output 800 ml   Net 820 ml       Physical Exam:   Physical Exam  Vitals and nursing note reviewed.   Constitutional:       General: He is not in acute distress.     Appearance: He is well-developed.   Cardiovascular:      Rate and Rhythm: Normal rate and regular rhythm.   Pulmonary:      Effort: Pulmonary effort is normal. No respiratory distress.      Breath sounds: Normal breath  sounds.   Abdominal:      Palpations: Abdomen is soft.      Tenderness: There is no abdominal tenderness.   Musculoskeletal:         General: No swelling.   Skin:     General: Skin is warm and dry.      Capillary Refill: Capillary refill takes less than 2 seconds.   Neurological:      Mental Status: He is alert.   Psychiatric:         Mood and Affect: Mood normal.          Additional Data:     Labs:  Results from last 7 days   Lab Units 04/12/24  0517   WBC Thousand/uL 9.84   HEMOGLOBIN g/dL 15.1   HEMATOCRIT % 47.4   PLATELETS Thousands/uL 247   SEGS PCT % 72   LYMPHO PCT % 14   MONO PCT % 11   EOS PCT % 2     Results from last 7 days   Lab Units 04/13/24  0625 04/08/24  0442 04/07/24  0544   SODIUM mmol/L 137   < > 139   POTASSIUM mmol/L 3.7   < > 3.6   CHLORIDE mmol/L 104   < > 109*   CO2 mmol/L 25   < > 24   BUN mg/dL 15   < > 18   CREATININE mg/dL 1.00   < > 1.24   ANION GAP mmol/L 8   < > 6   CALCIUM mg/dL 8.2*   < > 7.6*   ALBUMIN g/dL  --   --  2.7*   TOTAL BILIRUBIN mg/dL  --   --  0.56   ALK PHOS U/L  --   --  53   ALT U/L  --   --  9   AST U/L  --   --  16   GLUCOSE RANDOM mg/dL 89   < > 115    < > = values in this interval not displayed.     Results from last 7 days   Lab Units 04/06/24  1323   INR  1.08     Results from last 7 days   Lab Units 04/07/24  1725   POC GLUCOSE mg/dl 94         Results from last 7 days   Lab Units 04/06/24  1305   LACTIC ACID mmol/L 1.1       Lines/Drains:  Invasive Devices       Peripheral Intravenous Line  Duration             Peripheral IV 04/11/24 Distal;Left;Upper;Ventral (anterior) Arm 2 days              Drain  Duration             NG/OG/Enteral Tube Nasogastric 16 Fr 6 days                          Imaging: Reviewed radiology reports from this admission including: xray(s)    Recent Cultures (last 7 days):   Results from last 7 days   Lab Units 04/06/24  1402 04/06/24  1323   BLOOD CULTURE  No Growth After 5 Days. No Growth After 5 Days.       Last 24 Hours Medication  List:   Current Facility-Administered Medications   Medication Dose Route Frequency Provider Last Rate    acetaminophen  650 mg Oral Q6H PRN David Shore PA-C      atorvastatin  10 mg Oral Daily Trevor Fletcher PA-C      heparin (porcine)  5,000 Units Subcutaneous Q8H [facility] Morena Broderick PA-C      ibuprofen  600 mg Oral Q6H PRN David Shore PA-C      lamoTRIgine  150 mg Oral BID Rosy Womack PA-C      lamoTRIgine  50 mg Oral HS Rosy Womack PA-C      loratadine  10 mg Oral Daily PRN David Shore PA-C      magnesium hydroxide  5 mL Oral Daily PRN David Shore PA-C      magnesium sulfate  2 g Intravenous Once David Shore PA-C      metoprolol  2.5 mg Intravenous Q6H PRN David Shore PA-C      morphine injection  2 mg Intravenous Q4H PRN David Shore PA-C      nebivolol  2.5 mg Oral Daily Trevor Fletcher PA-C      ondansetron  4 mg Intravenous Q6H PRN Morena Broderick PA-C      oxyCODONE  5 mg Oral Q4H PRN David Shore PA-C      QUEtiapine  50 mg Oral HS David Shore PA-C      sertraline  50 mg Oral Daily Trevor Fletcher PA-C          Today, Patient Was Seen By: Prabha Rawls PA-C    **Please Note: This note may have been constructed using a voice recognition system.**

## 2024-04-13 NOTE — PLAN OF CARE
Problem: GASTROINTESTINAL - ADULT  Goal: Minimal or absence of nausea and/or vomiting  Description: INTERVENTIONS:  - Administer IV fluids if ordered to ensure adequate hydration  - Maintain NPO status until nausea and vomiting are resolved  - Nasogastric tube if ordered  - Administer ordered antiemetic medications as needed  - Provide nonpharmacologic comfort measures as appropriate  - Advance diet as tolerated, if ordered  - Consider nutrition services referral to assist patient with adequate nutrition and appropriate food choices  Outcome: Progressing  Goal: Maintains or returns to baseline bowel function  Description: INTERVENTIONS:  - Assess bowel function  - Encourage oral fluids to ensure adequate hydration  - Administer IV fluids if ordered to ensure adequate hydration  - Administer ordered medications as needed  - Encourage mobilization and activity  - Consider nutritional services referral to assist patient with adequate nutrition and appropriate food choices  Outcome: Progressing  Goal: Maintains adequate nutritional intake  Description: INTERVENTIONS:  - Monitor percentage of each meal consumed  - Identify factors contributing to decreased intake, treat as appropriate  - Assist with meals as needed  - Monitor I&O, weight, and lab values if indicated  - Obtain nutrition services referral as needed  Outcome: Progressing  Goal: Oral mucous membranes remain intact  Description: INTERVENTIONS  - Assess oral mucosa and hygiene practices  - Implement preventative oral hygiene regimen  - Implement oral medicated treatments as ordered  - Initiate Nutrition services referral as needed  Outcome: Progressing     Problem: METABOLIC, FLUID AND ELECTROLYTES - ADULT  Goal: Electrolytes maintained within normal limits  Description: INTERVENTIONS:  - Monitor labs and assess patient for signs and symptoms of electrolyte imbalances  - Administer electrolyte replacement as ordered  - Monitor response to electrolyte  replacements, including repeat lab results as appropriate  - Instruct patient on fluid and nutrition as appropriate  Outcome: Progressing  Goal: Fluid balance maintained  Description: INTERVENTIONS:  - Monitor labs   - Monitor I/O and WT  - Instruct patient on fluid and nutrition as appropriate  - Assess for signs & symptoms of volume excess or deficit  Outcome: Progressing  Goal: Glucose maintained within target range  Description: INTERVENTIONS:  - Monitor Blood Glucose as ordered  - Assess for signs and symptoms of hyperglycemia and hypoglycemia  - Administer ordered medications to maintain glucose within target range  - Assess nutritional intake and initiate nutrition service referral as needed  Outcome: Progressing     Problem: MUSCULOSKELETAL - ADULT  Goal: Maintain or return mobility to safest level of function  Description: INTERVENTIONS:  - Assess patient's ability to carry out ADLs; assess patient's baseline for ADL function and identify physical deficits which impact ability to perform ADLs (bathing, care of mouth/teeth, toileting, grooming, dressing, etc.)  - Assess/evaluate cause of self-care deficits   - Assess range of motion  - Assess patient's mobility  - Assess patient's need for assistive devices and provide as appropriate  - Encourage maximum independence but intervene and supervise when necessary  - Involve family in performance of ADLs  - Assess for home care needs following discharge   - Consider OT consult to assist with ADL evaluation and planning for discharge  - Provide patient education as appropriate  Outcome: Progressing  Goal: Maintain proper alignment of affected body part  Description: INTERVENTIONS:  - Support, maintain and protect limb and body alignment  - Provide patient/ family with appropriate education  Outcome: Progressing     Problem: Prexisting or High Potential for Compromised Skin Integrity  Goal: Skin integrity is maintained or improved  Description: INTERVENTIONS:  -  Identify patients at risk for skin breakdown  - Assess and monitor skin integrity  - Assess and monitor nutrition and hydration status  - Monitor labs   - Assess for incontinence   - Turn and reposition patient  - Assist with mobility/ambulation  - Relieve pressure over bony prominences  - Avoid friction and shearing  - Provide appropriate hygiene as needed including keeping skin clean and dry  - Evaluate need for skin moisturizer/barrier cream  - Collaborate with interdisciplinary team   - Patient/family teaching  - Consider wound care consult   Outcome: Progressing     Problem: Nutrition/Hydration-ADULT  Goal: Nutrient/Hydration intake appropriate for improving, restoring or maintaining nutritional needs  Description: Monitor and assess patient's nutrition/hydration status for malnutrition. Collaborate with interdisciplinary team and initiate plan and interventions as ordered.  Monitor patient's weight and dietary intake as ordered or per policy. Utilize nutrition screening tool and intervene as necessary. Determine patient's food preferences and provide high-protein, high-caloric foods as appropriate.     INTERVENTIONS:  - Monitor oral intake, urinary output, labs, and treatment plans  - Assess nutrition and hydration status and recommend course of action  - Evaluate amount of meals eaten  - Assist patient with eating if necessary   - Allow adequate time for meals  - Recommend/ encourage appropriate diets, oral nutritional supplements, and vitamin/mineral supplements  - Order, calculate, and assess calorie counts as needed  - Recommend, monitor, and adjust tube feedings and TPN/PPN based on assessed needs  - Assess need for intravenous fluids  - Provide specific nutrition/hydration education as appropriate  - Include patient/family/caregiver in decisions related to nutrition  Outcome: Progressing

## 2024-04-13 NOTE — ASSESSMENT & PLAN NOTE
Patient presented to the ED from his group home for LLQ abdominal pain with associated nausea and vomiting. He was admitted to the general surgery service due to high grade bowel obstruction.  CT A/P (4/6/24): High-grade bowel obstruction. Potential mid abdominal transition point. Thickened segments of small bowel in the right lower quadrant with potential pneumatosis which may indicate bowel ischemia.  S/p exploratory laparotomy with lysis of adhesions on 4/6/24  NG tube removed today 4/12  Advancing diet  Care per surgery team

## 2024-04-13 NOTE — PROGRESS NOTES
"Progress Note - General Surgery   Mario Rollins 59 y.o. male MRN: [id number]  Unit/Bed#: 2 [place] [address] Encounter: [id number]    Assessment:  1) POD #7 s/p exploratory laparotomy with lysis of adhesions -AVSS, 0.5 mL/kg/h urinary output, labs within normal limits, seemingly tolerating diet and having bowel movements, on last x-ray still has some gastric megaly and some proximal small bowel distention    Plan:  1)   -DVT prophylaxis with heparin  -Advance diet  -P.o. analgesic regiment  -Continue home medications  -P.o. potassium, sodium, phosphorus repletion  -Discontinue IV fluids  -Possible thoughts on starting prokinetic including Reglan into the future  -Trend abdominal exam  -Observe until tomorrow  -Reviewed x-ray    Subjective/Objective   Chief Complaint: I am doing all right    Subjective: Patient was seen and examined at bedside.  Patient denies any acute events overnight.  Patient denies any fevers or chills.  Patient still passing gas and having bowel movements.  Patient denies any distention or abdominal pain.  Patient is without having any nausea or vomiting but is sometimes having hiccups and belching.  Patient is any distention.  Patient is tolerating advancement of diet.  Patient has a slight appetite would be willing to try increased diet.  Patient denies any history of having progressive pain chronic nausea and vomiting.    Objective:     Blood pressure 109/74, pulse 79, temperature (!) 97 °F (36.1 °C), resp. rate 17, height 5' 8\" (1.727 m), weight 77.6 kg (171 lb), SpO2 94%.,Body mass index is 26 kg/m².      Intake/Output Summary (Last 24 hours) at 4/13/2024 0912  Last data filed at 4/12/2024 1652  Gross per 24 hour   Intake 1200 ml   Output 800 ml   Net 400 ml       Invasive Devices       Peripheral Intravenous Line  Duration             Peripheral IV 04/11/24 Distal;Left;Upper;Ventral (anterior) Arm 1 day              Drain  Duration             NG/OG/Enteral Tube Nasogastric 16 Fr 6 days      " "              Physical Exam: /74   Pulse 79   Temp (!) 97 °F (36.1 °C)   Resp 17   Ht 5' 8\" (1.727 m)   Wt 77.6 kg (171 lb)   SpO2 94%   BMI 26.00 kg/m²   General appearance: alert and oriented, in no acute distress  Head: Normocephalic, without obvious abnormality, atraumatic  Lungs: clear to auscultation bilaterally  Heart: regular rate and rhythm, S1, S2 normal, no murmur, click, rub or gallop  Abdomen: soft, non-tender; bowel sounds normal; no masses,  no organomegaly  Skin: Skin color, texture, turgor normal. No rashes or lesions or incision is c/d/I, midline packed with a little ribbon gauze    Lab, Imaging and other studies:I have personally reviewed pertinent lab results.  , CBC: No results found for: \"WBC\", \"HGB\", \"HCT\", \"MCV\", \"PLT\", \"ADJUSTEDWBC\", \"RBC\", \"MCH\", \"MCHC\", \"RDW\", \"MPV\", \"NRBC\", CMP:   Lab Results   Component Value Date    SODIUM 137 04/13/2024    K 3.7 04/13/2024     04/13/2024    CO2 25 04/13/2024    BUN 15 04/13/2024    CREATININE 1.00 04/13/2024    CALCIUM 8.2 (L) 04/13/2024    EGFR 82 04/13/2024     VTE Pharmacologic Prophylaxis: Heparin  VTE Mechanical Prophylaxis: sequential compression device  "

## 2024-04-14 PROCEDURE — 99232 SBSQ HOSP IP/OBS MODERATE 35: CPT

## 2024-04-14 PROCEDURE — 99024 POSTOP FOLLOW-UP VISIT: CPT | Performed by: PHYSICIAN ASSISTANT

## 2024-04-14 RX ADMIN — LAMOTRIGINE 150 MG: 100 TABLET ORAL at 08:25

## 2024-04-14 RX ADMIN — HEPARIN SODIUM 5000 UNITS: 5000 INJECTION, SOLUTION INTRAVENOUS; SUBCUTANEOUS at 14:56

## 2024-04-14 RX ADMIN — LAMOTRIGINE 150 MG: 100 TABLET ORAL at 17:09

## 2024-04-14 RX ADMIN — QUETIAPINE FUMARATE 50 MG: 25 TABLET ORAL at 22:44

## 2024-04-14 RX ADMIN — HEPARIN SODIUM 5000 UNITS: 5000 INJECTION, SOLUTION INTRAVENOUS; SUBCUTANEOUS at 05:29

## 2024-04-14 RX ADMIN — LAMOTRIGINE 50 MG: 25 TABLET ORAL at 22:44

## 2024-04-14 RX ADMIN — HEPARIN SODIUM 5000 UNITS: 5000 INJECTION, SOLUTION INTRAVENOUS; SUBCUTANEOUS at 22:44

## 2024-04-14 RX ADMIN — SERTRALINE HYDROCHLORIDE 50 MG: 50 TABLET ORAL at 08:25

## 2024-04-14 RX ADMIN — ATORVASTATIN CALCIUM 10 MG: 10 TABLET, FILM COATED ORAL at 08:26

## 2024-04-14 NOTE — PROGRESS NOTES
[facility]  Progress Note  Name: Mario Rollins I  MRN: [id number]  Unit/Bed#: 2 [address] Date of Admission: 4/6/2024   Date of Service: 4/14/2024 I Hospital Day: 8    Assessment/Plan   * Small bowel obstruction (HCC)  Assessment & Plan  Patient presented to the ED from his group home for LLQ abdominal pain with associated nausea and vomiting. He was admitted to the general surgery service due to high grade bowel obstruction.  CT A/P (4/6/24): High-grade bowel obstruction. Potential mid abdominal transition point. Thickened segments of small bowel in the right lower quadrant with potential pneumatosis which may indicate bowel ischemia.  S/p exploratory laparotomy with lysis of adhesions on 4/6/24  NG tube removed 4/12  Diet advanced to surgical soft diet  Passing gas and having bowel movements  Care per surgery team  Tentative plan for discharge tomorrow    Localization-related symptomatic epilepsy and epileptic syndromes with complex partial seizures, intractable, without status epilepticus (HCC)  Assessment & Plan  History of focal epilepsy  Continue home regimen lamotrigine 150mg BID and 50mg HS  Seizure precautions  Follow up with Neurology outpatient    Right spastic hemiparesis (HCC)  Assessment & Plan  Secondary to left MCA stroke and is s/p brain surgery  Follows outpatient with Neurology    Hyperlipidemia  Assessment & Plan  Resume lipitor    Cerebral palsy (HCC)  Assessment & Plan  Currently resides at [facility]  Supportive care           VTE Pharmacologic Prophylaxis:   Moderate Risk (Score 3-4) - Pharmacological DVT Prophylaxis Ordered: heparin.    Mobility:   Basic Mobility Inpatient Raw Score: 12  JH-HLM Goal: 4: Move to chair/commode  JH-HLM Achieved: 3: Sit at edge of bed  JH-HLM Goal NOT achieved. Continue with multidisciplinary rounding and encourage appropriate mobility to improve upon JH-HLM goals.    Patient Centered Rounds: I performed bedside rounds with  "nursing staff today.   Discussions with Specialists or Other Care Team Provider: rn    Education and Discussions with Family / Patient:  per primary team.     Total Time Spent on Date of Encounter in care of patient: 30 mins. This time was spent on one or more of the following: performing physical exam; counseling and coordination of care; obtaining or reviewing history; documenting in the medical record; reviewing/ordering tests, medications or procedures; communicating with other healthcare professionals and discussing with patient's family/caregivers.    Current Length of Stay: 8 day(s)  Current Patient Status: Inpatient   Certification Statement: The patient will continue to require additional inpatient hospital stay due to advance diet, post op monitoring  Discharge Plan: Anticipate discharge tomorrow to rehab facility.    Code Status: Level 1 - Full Code    Subjective:   Patient reports he is feeling \"a lot better.\" Denies nausea, vomiting, abdominal pain, or other complaints. Reports he continues to pass gas and have bowel movements. He is happy to be eating \"real food.\"    Objective:     Vitals:   Temp (24hrs), Av.9 °F (36.6 °C), Min:97.7 °F (36.5 °C), Max:98.4 °F (36.9 °C)    Temp:  [97.7 °F (36.5 °C)-98.4 °F (36.9 °C)] 97.7 °F (36.5 °C)  HR:  [74-79] 79  Resp:  [16] 16  BP: (110-113)/(71-72) 110/71  SpO2:  [93 %-94 %] 93 %  Body mass index is 26 kg/m².     Input and Output Summary (last 24 hours):     Intake/Output Summary (Last 24 hours) at 2024 0908  Last data filed at 2024 1900  Gross per 24 hour   Intake 860 ml   Output 1350 ml   Net -490 ml       Physical Exam:   Physical Exam  Vitals and nursing note reviewed.   Constitutional:       General: He is not in acute distress.     Appearance: He is well-developed.   Cardiovascular:      Rate and Rhythm: Normal rate and regular rhythm.   Pulmonary:      Effort: Pulmonary effort is normal. No respiratory distress.      Breath sounds: Normal " breath sounds.   Abdominal:      Palpations: Abdomen is soft.      Tenderness: There is no abdominal tenderness.   Musculoskeletal:         General: No swelling.   Skin:     General: Skin is warm and dry.      Capillary Refill: Capillary refill takes less than 2 seconds.   Neurological:      Mental Status: He is alert.   Psychiatric:         Mood and Affect: Mood normal.         Additional Data:     Labs:  Results from last 7 days   Lab Units 04/12/24  0517   WBC Thousand/uL 9.84   HEMOGLOBIN g/dL 15.1   HEMATOCRIT % 47.4   PLATELETS Thousands/uL 247   SEGS PCT % 72   LYMPHO PCT % 14   MONO PCT % 11   EOS PCT % 2     Results from last 7 days   Lab Units 04/13/24  0625   SODIUM mmol/L 137   POTASSIUM mmol/L 3.7   CHLORIDE mmol/L 104   CO2 mmol/L 25   BUN mg/dL 15   CREATININE mg/dL 1.00   ANION GAP mmol/L 8   CALCIUM mg/dL 8.2*   GLUCOSE RANDOM mg/dL 89         Results from last 7 days   Lab Units 04/07/24  1725   POC GLUCOSE mg/dl 94               Lines/Drains:  Invasive Devices       Peripheral Intravenous Line  Duration             Peripheral IV 04/13/24 Left;Ventral (anterior) Forearm <1 day                          Imaging: Reviewed radiology reports from this admission including: xray(s)    Recent Cultures (last 7 days):         Last 24 Hours Medication List:   Current Facility-Administered Medications   Medication Dose Route Frequency Provider Last Rate    acetaminophen  650 mg Oral Q6H PRN David Shore PA-C      atorvastatin  10 mg Oral Daily Trevor Fletcher PA-C      heparin (porcine)  5,000 Units Subcutaneous Q8H [facility] Morena Broderick PA-C      ibuprofen  600 mg Oral Q6H PRN David Shore PA-C      lamoTRIgine  150 mg Oral BID Rosy Womack PA-C      lamoTRIgine  50 mg Oral HS Rosy Womack PA-C      loratadine  10 mg Oral Daily PRN David Shore PA-C      magnesium hydroxide  5 mL Oral Daily PRN David Shore PA-C      magnesium sulfate  2 g Intravenous Once David Shore PA-C       metoprolol  2.5 mg Intravenous Q6H PRN David Shore PA-C      nebivolol  2.5 mg Oral Daily Trevor Fletcher PA-C      ondansetron  4 mg Intravenous Q6H PRN Morena Broderikc PA-C      oxyCODONE  5 mg Oral Q4H PRN David Shore PA-C      QUEtiapine  50 mg Oral HS David Shore PA-C      sertraline  50 mg Oral Daily Trevor Fletcher PA-C          Today, Patient Was Seen By: Prabha Rawls PA-C    **Please Note: This note may have been constructed using a voice recognition system.**

## 2024-04-14 NOTE — PROGRESS NOTES
"Progress Note - General Surgery   Mario Rollins 59 y.o. male MRN: [id number]  Unit/Bed#: 2 [place] [address] Encounter: [id number]    Assessment:  1) POD #8 s/p exploratory laparotomy with lysis of adhesions -AVSS, urinating, passing gas and having bowel movements, no abdominal pain, no increased distention on exam    Plan:  1)   -Advance to surgical soft diet  -Revised as needed analgesic regiment  -Continue home medications   - plan for discharge tomorrow  -Will hold on Reglan for now    Subjective/Objective   Chief Complaint: I am okay    Subjective: Patient was seen examined at bedside.  Patient denies any acute events overnight.  Patient denies any nausea or vomiting.  Patient is still passing gas and having bowel movements.  Patient denies any increasing abdominal pain.  Patient denies any increasing distention.  Patient tolerated full liquids    Objective:     Blood pressure 110/71, pulse 79, temperature 97.7 °F (36.5 °C), resp. rate 16, height 5' 8\" (1.727 m), weight 77.6 kg (171 lb), SpO2 93%.,Body mass index is 26 kg/m².      Intake/Output Summary (Last 24 hours) at 4/14/2024 0803  Last data filed at 4/13/2024 1900  Gross per 24 hour   Intake 1310 ml   Output 1550 ml   Net -240 ml       Invasive Devices       Peripheral Intravenous Line  Duration             Peripheral IV 04/13/24 Left;Ventral (anterior) Forearm <1 day                    Physical Exam: /71   Pulse 79   Temp 97.7 °F (36.5 °C)   Resp 16   Ht 5' 8\" (1.727 m)   Wt 77.6 kg (171 lb)   SpO2 93%   BMI 26.00 kg/m²   General appearance: alert  Head: Normocephalic, without obvious abnormality, atraumatic  Lungs:  no acute respiratory distress, no w/r/r  Heart:  regular rate, no r/c/m  Abdomen:  soft, non tender, non distended  Skin:  skin is c/d/i    Lab, Imaging and other studies:I have personally reviewed pertinent lab results.      VTE Pharmacologic Prophylaxis: Heparin  VTE Mechanical Prophylaxis: sequential compression device  "

## 2024-04-14 NOTE — ASSESSMENT & PLAN NOTE
Patient presented to the ED from his group home for LLQ abdominal pain with associated nausea and vomiting. He was admitted to the general surgery service due to high grade bowel obstruction.  CT A/P (4/6/24): High-grade bowel obstruction. Potential mid abdominal transition point. Thickened segments of small bowel in the right lower quadrant with potential pneumatosis which may indicate bowel ischemia.  S/p exploratory laparotomy with lysis of adhesions on 4/6/24  NG tube removed 4/12  Diet advanced to surgical soft diet  Passing gas and having bowel movements  Care per surgery team  Tentative plan for discharge tomorrow

## 2024-04-15 VITALS
WEIGHT: 171 LBS | HEIGHT: 68 IN | BODY MASS INDEX: 25.91 KG/M2 | TEMPERATURE: 98 F | RESPIRATION RATE: 17 BRPM | OXYGEN SATURATION: 94 % | SYSTOLIC BLOOD PRESSURE: 101 MMHG | DIASTOLIC BLOOD PRESSURE: 65 MMHG | HEART RATE: 82 BPM

## 2024-04-15 PROCEDURE — 99024 POSTOP FOLLOW-UP VISIT: CPT | Performed by: PHYSICIAN ASSISTANT

## 2024-04-15 RX ORDER — POLYETHYLENE GLYCOL 3350 17 G/17G
17 POWDER, FOR SOLUTION ORAL 2 TIMES WEEKLY
Qty: 136 G | Refills: 0
Start: 2024-04-15 | End: 2024-05-15

## 2024-04-15 RX ADMIN — ATORVASTATIN CALCIUM 10 MG: 10 TABLET, FILM COATED ORAL at 09:34

## 2024-04-15 RX ADMIN — SERTRALINE HYDROCHLORIDE 50 MG: 50 TABLET ORAL at 09:34

## 2024-04-15 RX ADMIN — NEBIVOLOL 2.5 MG: 2.5 TABLET ORAL at 09:35

## 2024-04-15 RX ADMIN — LAMOTRIGINE 150 MG: 100 TABLET ORAL at 09:34

## 2024-04-15 RX ADMIN — HEPARIN SODIUM 5000 UNITS: 5000 INJECTION, SOLUTION INTRAVENOUS; SUBCUTANEOUS at 05:16

## 2024-04-15 NOTE — PROGRESS NOTES
"Progress Note - General Surgery   Mario Rollins 59 y.o. male MRN: [id number]  Unit/Bed#: 2 [place] [address] Encounter: [id number]    Assessment:  POD#9 s/p exploratory laparotomy with CARMEN.     Passing flatus, having bowel movements and tolerating diet.\"     AVSS.     Plan:  Plan for discharge today.   Continue miralax twice a week.  Continue home medication.      Subjective/Objective     Subjective: Patient was seen and examined bedside. Patient reports no acute changes through the night. Denies any nausea, vomiting, chest pain or shortness of breath.     Objective:     Blood pressure 101/65, pulse 82, temperature 98 °F (36.7 °C), resp. rate 17, height 5' 8\" (1.727 m), weight 77.6 kg (171 lb), SpO2 94%.,Body mass index is 26 kg/m².      Intake/Output Summary (Last 24 hours) at 4/15/2024 0914  Last data filed at 4/15/2024 0501  Gross per 24 hour   Intake 670 ml   Output 1100 ml   Net -430 ml       Invasive Devices       Peripheral Intravenous Line  Duration             Peripheral IV 04/13/24 Left;Ventral (anterior) Forearm 1 day                    Physical Exam: /65   Pulse 82   Temp 98 °F (36.7 °C)   Resp 17   Ht 5' 8\" (1.727 m)   Wt 77.6 kg (171 lb)   SpO2 94%   BMI 26.00 kg/m²   General appearance: alert  Head: Normocephalic, without obvious abnormality, atraumatic  Lungs: clear to auscultation bilaterally  Heart: regular rate and rhythm, S1, S2 normal, no murmur, click, rub or gallop  Abdomen: soft, non-tender; bowel sounds normal; no masses,  no organomegaly and midline incision clean dry intact, steri-strips at inferior aspect.     Lab, Imaging and other studies:I have personally reviewed pertinent lab results.  , CBC: No results found for: \"WBC\", \"HGB\", \"HCT\", \"MCV\", \"PLT\", \"ADJUSTEDWBC\", \"RBC\", \"MCH\", \"MCHC\", \"RDW\", \"MPV\", \"NRBC\", CMP: No results found for: \"SODIUM\", \"K\", \"CL\", \"CO2\", \"ANIONGAP\", \"BUN\", \"CREATININE\", \"GLUCOSE\", \"CALCIUM\", \"AST\", \"ALT\", \"ALKPHOS\", \"PROT\", \"BILITOT\", \"EGFR\"  VTE " Pharmacologic Prophylaxis: Heparin  VTE Mechanical Prophylaxis: sequential compression device

## 2024-04-15 NOTE — DISCHARGE SUMMARY
Discharge Summary - Mario Rollins 59 y.o. male MRN: [id number]    Unit/Bed#: 2 [place] [address] Encounter: [id number]    Admission Date: 4/6/2024   Discharge Date: 4/15/2024    Admitting Diagnosis:   Small bowel obstruction (HCC) [K56.609]  Abdominal pain [R10.9]  Pneumatosis of intestines [K63.89]    Discharge Diagnoses: Principal Problem:    Small bowel obstruction (HCC)  Active Problems:    Cerebral palsy (HCC)    Hyperlipidemia    Right spastic hemiparesis (HCC)    Localization-related symptomatic epilepsy and epileptic syndromes with complex partial seizures, intractable, without status epilepticus (HCC)    Electrolyte abnormality      Consultations:  hospitalists    Procedures Performed: Exploratory laparotomy with lysis of adhesions and small bowel decompression    HPI per admission H&P:  Mario Rollins is a 58 y.o. male who presents with 3-day history of nausea vomiting diarrhea and now worsening abdominal pain overnight.  Workup in ER revealed a high-grade small bowel obstruction  Surgical consult was obtained for possible exploratory laparotomy bowel resection  Patient is living at the assisted living at [facility] and signed his own consent.      Hospital Course: Mario Rollins is a 59 y.o. male admitted for progressively worsening abdominal pain with vomiting and diarrhea.  CT imaging on admission revealed high-grade small bowel obstruction with concern for small bowel pneumatosis.  Patient was taken urgently to the operating room for exploratory laparotomy. Intraoperative findings included volvulus of the small bowel secondary to adhesions along with markedly thickened hyperemic small bowel wall. Barker catheter and NG tube were placed intraoperatively as well. Overall patient had uneventful postoperative course however had slow return of bowel function. Follow up imaging revealed distended section of small bowel however oral contrast making its way into the colon. Patient's psychiatric  medications were resumed as able.  Electrolytes were repleted as indicated. NG tube was removed POD#6 and diet was slowly advanced as tolerated. Midline incision was opened at the umbilicus secondary to findings of erythema and drainage. After a couple days of iodoform packing erythema completely resolved. Patient evaluated by PT/OT and being discharged to short term rehab.            Condition at Discharge: fair     Discharge instructions/Information to patient and family:   See after visit summary for information provided to patient and family.      Provisions for Follow-Up Care:  See after visit summary for information related to follow-up care and any pertinent home health orders.      Disposition: Short-term rehab at [facility]    Planned Readmission: No    Discharge Statement   I spent 20 minutes discharging the patient. This time was spent on the day of discharge. I had direct contact with the patient on the day of discharge. Additional documentation is required if more than 30 minutes were spent on discharge.     Discharge Medications:  See after visit summary for reconciled discharge medications provided to patient and family.

## 2024-04-15 NOTE — PLAN OF CARE
Problem: GASTROINTESTINAL - ADULT  Goal: Minimal or absence of nausea and/or vomiting  Description: INTERVENTIONS:  - Administer IV fluids if ordered to ensure adequate hydration  - Maintain NPO status until nausea and vomiting are resolved  - Nasogastric tube if ordered  - Administer ordered antiemetic medications as needed  - Provide nonpharmacologic comfort measures as appropriate  - Advance diet as tolerated, if ordered  - Consider nutrition services referral to assist patient with adequate nutrition and appropriate food choices  4/15/2024 1228 by Megan Lujan RN  Outcome: Completed  4/15/2024 0855 by Megan Lujan RN  Outcome: Progressing  Goal: Maintains or returns to baseline bowel function  Description: INTERVENTIONS:  - Assess bowel function  - Encourage oral fluids to ensure adequate hydration  - Administer IV fluids if ordered to ensure adequate hydration  - Administer ordered medications as needed  - Encourage mobilization and activity  - Consider nutritional services referral to assist patient with adequate nutrition and appropriate food choices  4/15/2024 1228 by Megan Lujan RN  Outcome: Completed  4/15/2024 0855 by Megan Lujan RN  Outcome: Progressing  Goal: Maintains adequate nutritional intake  Description: INTERVENTIONS:  - Monitor percentage of each meal consumed  - Identify factors contributing to decreased intake, treat as appropriate  - Assist with meals as needed  - Monitor I&O, weight, and lab values if indicated  - Obtain nutrition services referral as needed  4/15/2024 1228 by Megan Lujan RN  Outcome: Completed  4/15/2024 0855 by Megan Lujan RN  Outcome: Progressing  Goal: Oral mucous membranes remain intact  Description: INTERVENTIONS  - Assess oral mucosa and hygiene practices  - Implement preventative oral hygiene regimen  - Implement oral medicated treatments as ordered  - Initiate Nutrition services referral as needed  4/15/2024 1228  by Megan Lujan RN  Outcome: Completed  4/15/2024 0855 by Megan Lujan RN  Outcome: Progressing     Problem: METABOLIC, FLUID AND ELECTROLYTES - ADULT  Goal: Electrolytes maintained within normal limits  Description: INTERVENTIONS:  - Monitor labs and assess patient for signs and symptoms of electrolyte imbalances  - Administer electrolyte replacement as ordered  - Monitor response to electrolyte replacements, including repeat lab results as appropriate  - Instruct patient on fluid and nutrition as appropriate  4/15/2024 1228 by Megan Lujan RN  Outcome: Completed  4/15/2024 0855 by Megan Lujan RN  Outcome: Progressing  Goal: Fluid balance maintained  Description: INTERVENTIONS:  - Monitor labs   - Monitor I/O and WT  - Instruct patient on fluid and nutrition as appropriate  - Assess for signs & symptoms of volume excess or deficit  4/15/2024 1228 by Megan Lujan RN  Outcome: Completed  4/15/2024 0855 by Megan Lujan RN  Outcome: Progressing  Goal: Glucose maintained within target range  Description: INTERVENTIONS:  - Monitor Blood Glucose as ordered  - Assess for signs and symptoms of hyperglycemia and hypoglycemia  - Administer ordered medications to maintain glucose within target range  - Assess nutritional intake and initiate nutrition service referral as needed  4/15/2024 1228 by Megan Lujan RN  Outcome: Completed  4/15/2024 0855 by Megan Lujan RN  Outcome: Progressing     Problem: MUSCULOSKELETAL - ADULT  Goal: Maintain or return mobility to safest level of function  Description: INTERVENTIONS:  - Assess patient's ability to carry out ADLs; assess patient's baseline for ADL function and identify physical deficits which impact ability to perform ADLs (bathing, care of mouth/teeth, toileting, grooming, dressing, etc.)  - Assess/evaluate cause of self-care deficits   - Assess range of motion  - Assess patient's mobility  - Assess patient's need  for assistive devices and provide as appropriate  - Encourage maximum independence but intervene and supervise when necessary  - Involve family in performance of ADLs  - Assess for home care needs following discharge   - Consider OT consult to assist with ADL evaluation and planning for discharge  - Provide patient education as appropriate  4/15/2024 1228 by Megan Lujan RN  Outcome: Completed  4/15/2024 0855 by Megan Lujan RN  Outcome: Progressing  Goal: Maintain proper alignment of affected body part  Description: INTERVENTIONS:  - Support, maintain and protect limb and body alignment  - Provide patient/ family with appropriate education  4/15/2024 1228 by Megan Lujan RN  Outcome: Completed  4/15/2024 0855 by Megan Lujan RN  Outcome: Progressing     Problem: Prexisting or High Potential for Compromised Skin Integrity  Goal: Skin integrity is maintained or improved  Description: INTERVENTIONS:  - Identify patients at risk for skin breakdown  - Assess and monitor skin integrity  - Assess and monitor nutrition and hydration status  - Monitor labs   - Assess for incontinence   - Turn and reposition patient  - Assist with mobility/ambulation  - Relieve pressure over bony prominences  - Avoid friction and shearing  - Provide appropriate hygiene as needed including keeping skin clean and dry  - Evaluate need for skin moisturizer/barrier cream  - Collaborate with interdisciplinary team   - Patient/family teaching  - Consider wound care consult   4/15/2024 1228 by Megan Lujan RN  Outcome: Completed  4/15/2024 0855 by Megan Lujan RN  Outcome: Progressing     Problem: Nutrition/Hydration-ADULT  Goal: Nutrient/Hydration intake appropriate for improving, restoring or maintaining nutritional needs  Description: Monitor and assess patient's nutrition/hydration status for malnutrition. Collaborate with interdisciplinary team and initiate plan and interventions as ordered.   Monitor patient's weight and dietary intake as ordered or per policy. Utilize nutrition screening tool and intervene as necessary. Determine patient's food preferences and provide high-protein, high-caloric foods as appropriate.     INTERVENTIONS:  - Monitor oral intake, urinary output, labs, and treatment plans  - Assess nutrition and hydration status and recommend course of action  - Evaluate amount of meals eaten  - Assist patient with eating if necessary   - Allow adequate time for meals  - Recommend/ encourage appropriate diets, oral nutritional supplements, and vitamin/mineral supplements  - Order, calculate, and assess calorie counts as needed  - Recommend, monitor, and adjust tube feedings and TPN/PPN based on assessed needs  - Assess need for intravenous fluids  - Provide specific nutrition/hydration education as appropriate  - Include patient/family/caregiver in decisions related to nutrition  4/15/2024 1228 by Megan Lujan RN  Outcome: Completed  4/15/2024 0855 by Megan Lujan RN  Outcome: Progressing

## 2024-04-15 NOTE — [place] UNIVERSAL TRANSFER FORM
"[place] UNIVERSAL TRANSFER FORM  (ALL ITEMS MUST BE COMPLETED)    1. TRANSFER FROM: [facility]      TRANSFER TO: [facility]    2. DATE OF TRANSFER: 4/15/2024                        TIME OF TRANSFER: 11:45    3. PATIENT NAME: Mario Rollins T      YOB: 1965                             GENDER: male    4. LANGUAGE:   English    5. PHYSICIAN NAME:  Saúl Woods MD                   PHONE: [phone]    6. CODE STATUS: Level 1 - Full Code        Out of Hospital DNR Attached: No    7. :                                      :  Extended Emergency Contact Information  Primary Emergency Contact: Rufina  Mobile Phone: [phone]  Relation:            Health Care Representative/Proxy:  Yes           Legal Guardian:  No             NAME OF:           HEALTH CARE REPRESENTATIVE/PROXY:                                         OR           LEGAL GUARDIAN, IF NOT :                                               PHONE:  (Day)           (Night)                        (Cell)    8. REASON FOR TRANSFER: (Must include brief medical history and recent changes in physical function or cognition.) Small bowel obstruction, status exploratory laparotomy, slow return of bowel function, PT/OT evaluation recommend STR            V/S: /65   Pulse 82   Temp 98 °F (36.7 °C)   Resp 17   Ht 5' 8\" (1.727 m)   Wt 77.6 kg (171 lb)   SpO2 94%   BMI 26.00 kg/m²           PAIN: None    9. PRIMARY DIAGNOSIS: Small bowel obstruction (HCC)      Secondary Diagnosis:         Pacemaker: No      Internal Defib: No          Mental Health Diagnosis (if Applicable):    10. RESTRAINTS: No     11. RESPIRATORY NEEDS: None    12. ISOLATION/PRECAUTION: None    13. ALLERGY: Asa [aspirin]    14. SENSORY:       Vision Good, Hearing Good , and Speech Clear    15. SKIN CONDITION: Yes:  Surgical (abdomen)    16. DIET: " Special (describe)Surgical soft/lite meal    17. IV ACCESS: None    18. PERSONAL ITEMS SENT WITH PATIENT: Cane and OtherClothing (Pants, shirt, sweater, footwear, socks)    19. ATTACHED DOCUMENTS: MUST ATTACH CURRENT MEDICATION INFORMATION Face Sheet, MAR, Labs, and HX/PE    20. AT RISK ALERTS:Falls and Seizures        HARM TO: N/A    21. WEIGHT BEARING STATUS:         Left Leg: Full        Right Leg: Full    22. MENTAL STATUS:Alert and Oriented    23. FUNCTION:        Walk: With Help        Transfer: With Help        Toilet: With Help        Feed: Self    24. IMMUNIZATIONS/SCREENING:     Immunization History   Administered Date(s) Administered    Hep B, adult 06/13/2011, 07/13/2011    Influenza Quadrivalent Preservative Free 3 years and older IM 10/21/2014    Influenza Quadrivalent, 6-35 Months IM 10/29/2015, 11/02/2016, 10/20/2017    Influenza, recombinant, quadrivalent,injectable, preservative free 09/25/2018, 12/12/2019, 10/29/2020    Influenza, seasonal, injectable 11/11/2008, 09/23/2009, 10/11/2011, 12/13/2011, 10/09/2012, 10/15/2013    Tdap 09/23/2009    Tuberculin Skin Test-PPD Intradermal 05/05/2009, 10/08/2010, 10/11/2011, 10/09/2012, 10/15/2013, 10/21/2014, 10/29/2015       25. BOWEL: Incontinent     26. BLADDER: Continent    27. SENDING FACILITY CONTACT: [facility]                  [facility]        Phone: [phone]           REC'G FACILITY CONTACT (if known):        Title:        Unit:         Phone:         FORM PREFILLED BY (if applicable)       Title:       Unit:        Phone:         FORM COMPLETED BY Claudia Edwards RN      Title: Registered Nurse      Phone: [phone]

## 2024-04-15 NOTE — CASE MANAGEMENT
Case Management Discharge Planning Note    Patient name Mario Rollins  Location [address] [place] [address] [place] [address] MRN [id number]  : 1965 Date 4/15/2024       Current Admission Date: 2024  Current Admission Diagnosis:Small bowel obstruction (HCC)   Patient Active Problem List    Diagnosis Date Noted    Electrolyte abnormality 2024    Small bowel obstruction (HCC) 2024    Pneumatosis of intestines 2024    Sacroiliitis (HCC)     Localization-related symptomatic epilepsy and epileptic syndromes with complex partial seizures, intractable, without status epilepticus (HCC)     Encounter for hearing examination 2021    Right spastic hemiparesis (HCC) 2020    Acquired deformity of right hand 2020    Paronychia of toenail 2019    Lumbar radiculopathy 10/01/2018    Spinal stenosis of lumbar region 10/01/2018    Chronic left-sided low back pain with left-sided sciatica 10/01/2018    Chronic pain syndrome 10/01/2018    Cerebral palsy (HCC) 2018    Class 1 obesity with body mass index (BMI) of 30.0 to 30.9 in adult 2018    Hyperlipidemia 2018    Constipation 2018    Acquired valgus deformity of right ankle 2017    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 10/20/2015    Acquired deformity of right ankle and foot 10/20/2015    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    Primary Insurance: MEDICARE  Secondary Insurance: [place] [employer] [place] [employer]    DISCHARGE DETAILS:     Other Referral/Resources/Interventions Provided:  Interventions: Transportation  Referral Comments: CM made aware patient stable for d/c today. CM requested [facility] transport via [employer] for 1130 and  confirmed by [employer] for 1145. RN Joseph Guzman at @[facility] and [place] at [facility] made aware of the same.     Treatment Team Recommendation: Short Term Rehab  Discharge Destination Plan:: Short Term Rehab  Transport at Discharge : Wheelchair van  Dispatcher Contacted: Yes  Number/Name of Dispatcher: [employer]  Transported by (Company and Unit #): [employer] [phone]  ETA of Transport (Date): 04/15/24  ETA of Transport (Time): 6633

## 2024-04-19 ENCOUNTER — HOSPITAL ENCOUNTER (OUTPATIENT)
Dept: RADIOLOGY | Facility: HOSPITAL | Age: 59
Discharge: HOME | End: 2024-04-19
Payer: MEDICARE

## 2024-04-19 DIAGNOSIS — N20.9 URINARY CALCULUS, UNSPECIFIED: ICD-10-CM

## 2024-04-19 DIAGNOSIS — R10.84 GENERALIZED ABDOMINAL PAIN: ICD-10-CM

## 2024-04-19 PROCEDURE — 74176 CT ABD & PELVIS W/O CONTRAST: CPT

## 2024-04-26 ENCOUNTER — OFFICE VISIT (OUTPATIENT)
Dept: SURGERY | Facility: CLINIC | Age: 59
End: 2024-04-26

## 2024-04-26 VITALS — BODY MASS INDEX: 26 KG/M2 | HEIGHT: 68 IN | TEMPERATURE: 96.6 F

## 2024-04-26 DIAGNOSIS — G81.11 RIGHT SPASTIC HEMIPARESIS (HCC): ICD-10-CM

## 2024-04-26 DIAGNOSIS — Z09 SURGICAL FOLLOW-UP CARE: Primary | ICD-10-CM

## 2024-04-26 DIAGNOSIS — G40.219 LOCALIZATION-RELATED SYMPTOMATIC EPILEPSY AND EPILEPTIC SYNDROMES WITH COMPLEX PARTIAL SEIZURES, INTRACTABLE, WITHOUT STATUS EPILEPTICUS (HCC): ICD-10-CM

## 2024-04-26 DIAGNOSIS — K56.609 SMALL BOWEL OBSTRUCTION (HCC): ICD-10-CM

## 2024-04-26 DIAGNOSIS — E83.119: ICD-10-CM

## 2024-04-26 DIAGNOSIS — K74.60: ICD-10-CM

## 2024-04-26 PROCEDURE — 99024 POSTOP FOLLOW-UP VISIT: CPT | Performed by: SPECIALIST

## 2024-04-26 NOTE — PROGRESS NOTES
" General Surgery Office Visit Follow up   [facility]  Patient: Mario Rollins   : 1965 Sex: male MRN: [id number]   CSN: [id number] PCP: No primary care provider on file.    Assessment/ Plan:  Mario Rollins is a 59 y.o. male  day(s) POD # 3 weeks s/p exploratory laparotomy lysis of adhesions and decompression ofmarkedly dilated small bowel secondary to small bowel obstruction  Surgical follow-up care [Z09]    Plan  Stable postop   Removal of staples  Dry dressing to umbilical wound for minimal serosanguineous discharge  Follow-up in 4 weeks    SUBJECTIVE:   I am doing very well and passing flatus had a bowel movement yesterday no pain    OBJECTIVE:  No complaints  Minimal incisional wound drainage  No fever no chills no rigors  Tolerating p.o. Diet well  Normal bowel movement no constipation or diarrhea  Patient is wheelchair-bound secondary to spastic right side hemiplegia    Vitals:   Temp (!) 96.6 °F (35.9 °C) (Core)   Ht 5' 8\" (1.727 m)   BMI 26.00 kg/m²     Active medications:    Current Outpatient Medications:     acetaminophen (TYLENOL) 325 mg tablet, Take 1 tablet (325 mg total) by mouth every 6 (six) hours as needed for mild pain, moderate pain or headaches, Disp: 30 tablet, Rfl: 0    lamoTRIgine (LaMICtal) 150 MG tablet, Take 1 tab by mouth twice a day at 8AM and 8PM, Disp: 60 tablet, Rfl: 11    lamoTRIgine (LaMICtal) 25 mg tablet, TAKE TWO TABLETS BY MOUTH DAILY AT 8AM WITH 150MG TO TOTAL 200MG, Disp: 60 tablet, Rfl: 11    nebivolol (BYSTOLIC) 2.5 mg tablet, , Disp: , Rfl:     QUEtiapine (SEROquel) 50 mg tablet, Take 50 mg by mouth daily at bedtime , Disp: , Rfl:     sertraline (ZOLOFT) 50 mg tablet, Take by mouth daily, Disp: , Rfl:     atorvastatin (LIPITOR) 10 mg tablet, Take 1 tablet (10 mg total) by mouth daily Take at 8 PM, Disp: 30 tablet, Rfl: 3    ibuprofen (MOTRIN) 200 mg tablet, Take 400 mg by mouth every 6 (six) hours as needed for mild pain, Disp: , " Rfl:     polyethylene glycol (MIRALAX) 17 g packet, Take 17 g by mouth 2 (two) times a week, Disp: 136 g, Rfl: 0    Physical Exam:   General Alert awake   Normocephalic atraumatic PERRLA   Lungs clear bilaterally  Cardiac normal S1 normal S2  Abdomen soft,non tender Bowel sounds present  Skin: surgical dressing is C/D/I  Ext: No clubbing, cyanosis, edema  Surgical wound well healing    Visit Diagnosis: . 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          Pertinent images and available reads personally reviewed  Procedure: CT abdomen pelvis wo contrast    Result Date: 4/19/2024  Narrative: CT ABDOMEN AND PELVIS WITHOUT IV CONTRAST INDICATION: R10.84: Generalized abdominal pain N20.9: Urinary calculus, unspecified. COMPARISON: 4/6/2024 TECHNIQUE: CT examination of the abdomen and pelvis was performed without intravenous contrast. Multiplanar 2D reformatted images were created from the source data. This examination, like all CT scans performed in the [facility], was performed utilizing techniques to minimize radiation dose exposure, including the use of iterative reconstruction and automated exposure control. Radiation dose length product (DLP) for this visit: 553.28 mGy-cm Enteric Contrast: Not administered. FINDINGS: The study is moderately degraded by respiratory motion. ABDOMEN LOWER CHEST: No clinically significant abnormality in the visualized lower chest. LIVER/BILIARY TREE: Unremarkable. GALLBLADDER: No calcified gallstones. No pericholecystic inflammatory  change. SPLEEN: Unremarkable. PANCREAS: Unremarkable. ADRENAL GLANDS: Unremarkable. KIDNEYS/URETERS: There is extensive nephrolithiasis in both kidneys. No hydronephrosis. STOMACH AND BOWEL: Unremarkable. APPENDIX: No findings to suggest appendicitis. ABDOMINOPELVIC CAVITY: There is mild pelvic free fluid. No pneumoperitoneum. No lymphadenopathy. VESSELS: Unremarkable for patient's age. PELVIS REPRODUCTIVE ORGANS: Unremarkable for patient's age. URINARY BLADDER: Unremarkable. ABDOMINAL WALL/INGUINAL REGIONS: Status post midline incision. There is a small amount of fluid and gas in the region of the umbilicus though organized fluid collection is not identified. BONES: No acute fracture or suspicious osseous lesion. There is surgical hardware traversing a right hip fracture. There are are severe degenerative changes of both hips. There is a stable chronic appearing compression deformity of T12.     Impression: Moderately limited by respiratory motion. 1.  No obstructive uropathy. Extensive bilateral nephrolithiasis. 2.  Status post midline incision with small amount of fluid and gas in the umbilicus though no organized collection. This is likely postoperative though correlation for infectious symptoms in this region recommended. 3.  Mild pelvic free fluid, possibly related to recent surgery. The study was marked in EPIC for immediate notification. Workstation performed: [id number]     Procedure: XR abdomen obstruction series    Result Date: 4/13/2024  Narrative: XR ABDOMEN OBSTRUCTION SERIES INDICATION: abdominal distention, hiccups s/p NGT removal. COMPARISON: 4/11/2024 FINDINGS: Moderate gaseous distention of the stomach. Multiple dilated small bowel loops are present within the abdomen primarily within the mid and left aspect of the abdomen measuring up to 6 cm in diameter. There is some residual contrast present within the transverse colon, left colon and sigmoid colon without colonic dilatation. Unfortunately  erect views of the abdomen were not obtained. No pneumoperitoneum. No pathologic calcification or soft tissue mass. Stable mild degenerative change of the visualized thoracolumbar spine. Examination of the chest reveals clear lungs and a normal cardiomediastinal silhouette.     Impression: Persistent dilatation of small bowel loops up to 6 cm within the left abdomen unchanged from yesterday's examination. Previously seen contrast within the right colon has migrated to the left colon and sigmoid colon. Moderate gaseous distention of the stomach. NG tube has been removed. Workstation performed: [id number]     Procedure: XR abdomen obstruction series    Result Date: 4/12/2024  Narrative: XR ABDOMEN OBSTRUCTION SERIES INDICATION: check progression of existing contrast and eval for bowel distension. COMPARISON: X-ray from the prior day FINDINGS: There has been passage of contrast into the colon though there remains multiple dilated loops of small bowel. The stomach has been decompressed with a nasogastric tube. No pneumoperitoneum. No pathologic calcification or soft tissue mass. There are severe degenerative changes of the hips, particularly on the left. Examination of the chest reveals clear lungs and a normal cardiomediastinal silhouette.     Impression: Contrast in the colon though small bowel loops are persistently dilated. The study was marked in EPIC for immediate notification. Workstation performed: [id number]     Procedure: XR abdomen complete inc upright and/or decubitus    Result Date: 4/11/2024  Narrative: SMALL BOWEL FOLLOW THROUGH (Gastrografin challenge) INDICATION:   check transit of contrast. COMPARISON: IMAGES: FLUOROSCOPY TIME: TECHNIQUE:    view of the abdomen was obtained. Gastrografin was then administered to the patient and followed through the small bowel to the colon utilizing overhead radiographs at periodic intervals. FINDINGS: In this patient undergoing Gastrografin challenge to  evaluate small bowel obstruction, at 6 hours after contrast administration enteric contrast remains in a markedly distended stomach and proximal persistent markedly dilated small bowel loops. No significant change between 3 and 6-hour images..     Impression: High-grade obstruction with opacified persistent markedly dilated stomach and proximal small bowel without significant change between 3 and 6-hour images. The study was marked in EPIC for immediate notification. Workstation performed: [id number]     Procedure: CT abdomen pelvis with contrast    Result Date: 4/6/2024  Narrative: CT ABDOMEN AND PELVIS WITH IV CONTRAST INDICATION: LLQ pain with N/V. COMPARISON: CT abdomen 9/2/2011. CT lumbar spine 9/18/2018. TECHNIQUE: CT examination of the abdomen and pelvis was performed. Multiplanar 2D reformatted images were created from the source data. This examination, like all CT scans performed in the [facility], was performed utilizing techniques to minimize radiation dose exposure, including the use of iterative reconstruction and automated exposure control. Radiation dose length product (DLP) for this visit: 792.88 mGy-cm IV Contrast: 100 mL of iohexol (OMNIPAQUE) Enteric Contrast: Not administered. FINDINGS: ABDOMEN LOWER CHEST: Right basilar scarring or atelectasis. Circumferential distal esophageal thickening in keeping with a nonspecific esophagitis. LIVER/BILIARY TREE: Unremarkable. GALLBLADDER: No calcified gallstones. No pericholecystic inflammatory change. SPLEEN: Unremarkable. PANCREAS: Unremarkable. ADRENAL GLANDS: Unremarkable. KIDNEYS/URETERS: Bilateral renal cysts and extensive medullary nephrocalcinosis. No hydronephrosis. STOMACH AND BOWEL: Distended stomach and proximal/mid small bowel with probable transition point in the midabdomen #2/123-135. Two of the segments of dilated small bowel in the right lower quadrant show wall thickening with potential pneumatosis #2/111. Decompressed  "colon and terminal ileum. APPENDIX: No findings to suggest appendicitis. ABDOMINOPELVIC CAVITY: Small amount of abdominal ascites. No pneumoperitoneum. No lymphadenopathy. Mesenteric edema and vasa recta engorgement most prominent through the right anterior abdomen #2/113. VESSELS: Unremarkable for patient's age. PELVIS REPRODUCTIVE ORGANS: Unremarkable for patient's age. URINARY BLADDER: Unremarkable. ABDOMINAL WALL/INGUINAL REGIONS: Unremarkable. BONES: No acute fracture or suspicious osseous lesion. Spinal degenerative changes. Chronic appearing mild L1 compression deformity. Partially imaged right hip prosthesis. Severe left hip degenerative changes. However     Impression: High-grade bowel obstruction. Potential mid abdominal transition point. Thickened segments of small bowel in the right lower quadrant with potential pneumatosis which may indicate bowel ischemia. I personally discussed this study with MW ZHANG on 4/6/2024 12:55 PM. Workstation performed: [id number]        Pertinent notes reviewed    Counseling / Coordination of Care  Total Office time /unit time spent today 15minutes. Greater than 50% of total time was spent with the patient and / or family counseling and / or coordination of care. A description of the counseling / coordination of care:  I performed an interim history, pertinent images and labs, performed a physical examination to arrive at the plan delineated above with associated thought processes.       Saúl Woods MD MS FRCS FACS  [facility]  04/26/24 10:32 AM        Portions of the record may have been created with voice recognition software. Occasional wrong word or \"sound a like\" substitutions may have occurred due to the inherent limitations of voice recognition software. Read the chart carefully and recognize, using context, where substitutions have occurred.        "

## 2024-05-07 ENCOUNTER — TELEPHONE (OUTPATIENT)
Dept: AUDIOLOGY | Facility: CLINIC | Age: 59
End: 2024-05-07

## 2024-05-07 NOTE — TELEPHONE ENCOUNTER
Called pt & spoke to - he is in [facility] and will defer the hearing test until he can be seen at his physical. They will call back if his situation changes.     Kim Woods.  Clinical Audiologist    [facility]  [address] [address]

## 2024-05-28 ENCOUNTER — TELEPHONE (OUTPATIENT)
Dept: NEUROLOGY | Facility: CLINIC | Age: 59
End: 2024-05-28

## 2024-05-28 NOTE — TELEPHONE ENCOUNTER
Received  5/28/24 317 pm    Hello, my name is Rufina. I am calling regarding shaggy Rollins Date of birth. [birth year]. I am calling regarding his lamotrigine. He was just discharged from we have on the 23rd. And we need new prescriptions for Lamotrigine again, 25 milligram oral tablet. 2 tablets by mouth daily at 8 AM to total , 200 milligram. And that has to be taken with the Lamotrigine 150  milligram tablet. Take one tablet by oral route 2 times every day at 8 AM and 8 PM. Now the one is scheduled by scheduled one is prescribed by his Psych and the other by Dr Salazar.  So if you could please just give me a call back [phone]. You can either ask for audelia or rufina. Thank you, rick.    -----------------------    LOV 2/21/24 with Dr Salazar  Next OV: Needs to zainab 1 year f/u appt with Dr Salazar    I called and spoke to Rufina, she realized that they do not need  a new script at this time. She will call when they do. She is aware he had new scripts sent at LOV 2/21/24 should last him until his yearly appt. Rufina aware to call office with any concerns. And appreciated a quick call back.

## 2024-05-30 NOTE — TELEPHONE ENCOUNTER
Telephone call to the facility where the patient resides and spoke with the nurse who confirmed the patients appt with Dr Monik Bhatt on 11/2/22 @ 11 am  Admission

## 2024-07-01 DIAGNOSIS — G40.219 LOCALIZATION-RELATED SYMPTOMATIC EPILEPSY AND EPILEPTIC SYNDROMES WITH COMPLEX PARTIAL SEIZURES, INTRACTABLE, WITHOUT STATUS EPILEPTICUS (HCC): ICD-10-CM

## 2024-07-01 RX ORDER — LAMOTRIGINE 25 MG/1
TABLET ORAL
Qty: 60 TABLET | Refills: 5 | Status: SHIPPED | OUTPATIENT
Start: 2024-07-01

## 2024-07-01 RX ORDER — LAMOTRIGINE 150 MG/1
TABLET ORAL
Qty: 60 TABLET | Refills: 5 | Status: SHIPPED | OUTPATIENT
Start: 2024-07-01

## 2024-07-01 NOTE — TELEPHONE ENCOUNTER
Lamotrigine 25 mg sent to patient's pharmacy on 2/21 with 11 refills     Called pharmacy, it seems there was another prescriber (unsure if it was from a hospitalization) who sent in a 1 month supply in May. The pharmacy must use the most recent prescription they they have on file.     Dr. Salazar - Please review and sign script below if agreeable     Thank you!

## 2024-07-28 NOTE — TELEPHONE ENCOUNTER
"  Chief Complaint:   Chief Complaint   Patient presents with    Diabetes    Chest Pain       Natalia Feliz 59 y.o. female who presents today for Medical Management of the below listed issues. She  has a problem list of   Patient Active Problem List   Diagnosis    Hypertension    Vitamin D deficiency disease    Other constipation    Hemorrhoids    Central serous chorioretinopathy of left eye    Intercostal pain    Hyperlipidemia, mixed    History of parathyroidectomy    Change in bowel habits    Personal history of colonic polyps    Chondrocalcinosis   .  Since the last visit, She has developed some left upper, and sometimes right upper, chest discomfort over the past few weeks, without any association with exercise.  As a matter fact, the patient is an extremely fit lady who goes to the gym 7 days a week for an hour and 1/2+ each time she works out.  In discussion, it looks like one of the machines that she uses is a stairstepper, and she often times bears a lot of her weight on her extended arms to take pressure off her legs.   she has been compliant with   Current Outpatient Medications:     diclofenac (VOLTAREN) 75 MG EC tablet, Take 1 tablet by mouth 2 (Two) Times a Day., Disp: 30 tablet, Rfl: 2    losartan (Cozaar) 25 MG tablet, Take 1 tablet by mouth Daily. For BP, Disp: 30 tablet, Rfl: 2    nystatin (MYCOSTATIN) 397080 UNIT/GM cream, Apply 1 Application topically to the appropriate area as directed 2 (Two) Times a Day., Disp: 30 g, Rfl: 0    PreviDent 5000 Sensitive 1.1-5 % gel, , Disp: , Rfl:     Scopolamine 1 MG/3DAYS patch, Place 1 patch on the skin as directed by provider Every 72 (Seventy-Two) Hours., Disp: 3 patch, Rfl: 0.  She denies medication side effects.    All of the other chronic condition(s) listed above are stable w/o issues.    /76   Pulse 72   Temp 97.5 °F (36.4 °C) (Oral)   Resp 16   Ht 152.4 cm (60\")   Wt 51.7 kg (114 lb)   LMP  (LMP Unknown)   SpO2 99%   BMI 22.26 kg/m² " Reason for call:   [x] Refill   [] Prior Auth  [] Other:     Office:   [] PCP/Provider -   [x] Specialty/Provider - Neuro  Nataly Salazar MD     Medication: lamoTRIgine (LaMICtal) 25 mg tablet     Dose/Frequency: : TAKE TWO TABLETS BY MOUTH DAILY AT 8AM WITH 150MG TO TOTAL 200MG     Quantity: 60    Pharmacy: [address]     Does the patient have enough for 3 days?   [] Yes   [x] No - Send as HP to POD         Results for orders placed or performed in visit on 03/07/24   Comprehensive metabolic panel    Specimen: Blood   Result Value Ref Range    Glucose 99 65 - 99 mg/dL    BUN 13 6 - 20 mg/dL    Creatinine 0.86 0.57 - 1.00 mg/dL    EGFR Result 77.9 >60.0 mL/min/1.73    BUN/Creatinine Ratio 15.1 7.0 - 25.0    Sodium 139 136 - 145 mmol/L    Potassium 4.6 3.5 - 5.2 mmol/L    Chloride 104 98 - 107 mmol/L    Total CO2 23.5 22.0 - 29.0 mmol/L    Calcium 9.8 8.6 - 10.5 mg/dL    Total Protein 7.5 6.0 - 8.5 g/dL    Albumin 4.6 3.5 - 5.2 g/dL    Globulin 2.9 gm/dL    A/G Ratio 1.6 g/dL    Total Bilirubin <0.2 0.0 - 1.2 mg/dL    Alkaline Phosphatase 101 39 - 117 U/L    AST (SGOT) 30 1 - 32 U/L    ALT (SGPT) 19 1 - 33 U/L   Lipid panel    Specimen: Blood   Result Value Ref Range    Total Cholesterol 155 0 - 200 mg/dL    Triglycerides 40 0 - 150 mg/dL    HDL Cholesterol 52 40 - 60 mg/dL    VLDL Cholesterol Darrin 9 5 - 40 mg/dL    LDL Chol Calc (NIH) 94 0 - 100 mg/dL   Hemoglobin A1c    Specimen: Blood   Result Value Ref Range    Hemoglobin A1C 6.10 (H) 4.80 - 5.60 %   Vitamin D,25-Hydroxy    Specimen: Blood   Result Value Ref Range    25 Hydroxy, Vitamin D 42.4 30.0 - 100.0 ng/ml             The following portions of the patient's history were reviewed and updated as appropriate: allergies, current medications, past family history, past medical history, past social history, past surgical history, and problem list.    Review of Systems   Constitutional:  Negative for activity change, chills and fever.   Respiratory:  Negative for cough.    Cardiovascular:  Negative for chest pain.   Psychiatric/Behavioral:  Negative for dysphoric mood.        Objective     BMI is within normal parameters. No other follow-up for BMI required.        Physical Exam  Vitals and nursing note reviewed. Exam conducted with a chaperone present.   Constitutional:       General: She is not in acute distress.     Appearance: She is well-developed.    Cardiovascular:      Rate and Rhythm: Normal rate and regular rhythm.   Pulmonary:      Effort: Pulmonary effort is normal.      Breath sounds: Normal breath sounds.   Chest:          Comments: Tenderness to palpation.  Neurological:      Mental Status: She is alert and oriented to person, place, and time.   Psychiatric:         Behavior: Behavior normal.         Thought Content: Thought content normal.     Laura present for exam.        Diagnoses and all orders for this visit:    1. Costochondritis (Primary)  Comments:  New; medication begun  Orders:  -     diclofenac (VOLTAREN) 75 MG EC tablet; Take 1 tablet by mouth 2 (Two) Times a Day.  Dispense: 30 tablet; Refill: 2    2. Type 2 diabetes mellitus without complication, without long-term current use of insulin  -     Comprehensive metabolic panel; Future  -     Lipid panel; Future  -     Hemoglobin A1c; Future  -     MicroAlbumin, Urine, Random - Urine, Clean Catch; Future    3. Primary hypertension  -     Comprehensive metabolic panel; Future  -     Lipid panel; Future  -     CBC and Differential; Future  -     TSH; Future    Diet/exercise/weight management to treat the glucose discussed.  Discussed the need to take a short break from her current exercise regimen, but then slowly reintroduce this with a emphasis on crosstraining and not bearing her weight on her arms when doing her stairstepper.

## 2024-08-01 ENCOUNTER — OFFICE VISIT (OUTPATIENT)
Age: 59
End: 2024-08-01
Payer: MEDICARE

## 2024-08-01 VITALS — BODY MASS INDEX: 25.76 KG/M2 | HEIGHT: 68 IN | RESPIRATION RATE: 17 BRPM | WEIGHT: 170 LBS

## 2024-08-01 DIAGNOSIS — M79.672 PAIN IN BOTH FEET: ICD-10-CM

## 2024-08-01 DIAGNOSIS — M79.671 PAIN IN BOTH FEET: ICD-10-CM

## 2024-08-01 DIAGNOSIS — B35.9 DERMATOPHYTOSIS: Primary | ICD-10-CM

## 2024-08-01 PROCEDURE — RECHECK: Performed by: PODIATRIST

## 2024-08-01 PROCEDURE — 11000 DBRDMT ECZ/INFECTED SKIN<10%: CPT | Performed by: PODIATRIST

## 2024-08-01 RX ORDER — SACCHAROMYCES BOULARDII 250 MG
250 CAPSULE ORAL
COMMUNITY

## 2024-08-01 NOTE — PROGRESS NOTES
Assessment/Plan:  Pain upon ambulation.  Ingrown toenail.  Paronychia.  Mycosis of nail and skin.     Plan.  Chart reviewed.  Patient examined.  Abnormal skin debrided.  Staff advised on condition.         Diagnoses and all orders for this visit:     Acquired deformity of right ankle and foot     Paronychia of toenail, unspecified laterality     Onychomycosis     Dermatophytosis           Subjective:  Patient presents with staff.  He has complaint of pain in his toes and feet with ambulation.  No history of trauma.               Allergies   Allergen Reactions    Asa [Aspirin] Other (See Comments)       Reaction Date: 25Aug2008;   unknown            Current Outpatient Medications:     acetaminophen (TYLENOL) 325 mg tablet, Take 1 tablet (325 mg total) by mouth every 6 (six) hours as needed for mild pain, moderate pain or headaches, Disp: 30 tablet, Rfl: 0    ammonium lactate (LAC-HYDRIN) 12 % lotion, Apply topically 2 (two) times a day Applied to feet twice a day 8:00 a.m./8:00 p.m., Disp: 500 g, Rfl: 2    atorvastatin (LIPITOR) 10 mg tablet, Take 1 tablet (10 mg total) by mouth daily Take at 8 PM, Disp: 30 tablet, Rfl: 4    BYSTOLIC 2.5 MG tablet, TAKE ONE TABLET BY MOUTH DAILY AT 8AM, Disp: 30 tablet, Rfl: 4    hydrocortisone 2.5 % cream, Apply topically 2 (two) times a day, Disp: 30 g, Rfl: 2    lamoTRIgine (LaMICtal) 150 MG tablet, TAKE 1 TABLET BY MOUTH TWICE A DAY AT 8AM & AT 8 PM, Disp: 60 tablet, Rfl: 1    lamoTRIgine (LaMICtal) 25 mg tablet, TAKE TWO TABLETS BY MOUTH DAILY AT 8AM WITH 150MG TO TOTAL 200MG, Disp: 60 tablet, Rfl: 1    psyllium (REGULOID) 58.6 % powder, Take 1 teaspoonful mixed with 8oz of water orally every day at 8am, Disp: 1040 g, Rfl: 5    QUEtiapine (SEROquel) 50 mg tablet, , Disp: , Rfl:     senna (SENOKOT) 8.6 mg, TAKE ONE TABLET BY MOUTH DAILY AT 8AM FOR CONSTIPATION, Disp: 30 tablet, Rfl: 4    sertraline (ZOLOFT) 50 mg tablet, Take by mouth daily, Disp: , Rfl:            Patient Active  Problem List   Diagnosis    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    Acquired deformity of right ankle and foot    Ingrown nail    Pain in both feet    Constipation    Cerebral palsy (HCC)    Renal cyst    Class 1 obesity with body mass index (BMI) of 30.0 to 30.9 in adult    Hyperlipidemia    Lumbar radiculopathy    Spinal stenosis of lumbar region    Chronic left-sided low back pain with left-sided sciatica    Chronic pain syndrome    Impacted cerumen of left ear    Paronychia of toenail             Patient ID: Mario Rollins is a 59 y.o. male.     HPI     The following portions of the patient's history were reviewed and updated as appropriate:      family history includes Emphysema in his mother; Heart attack in his father; Hypertension in his father; Nephrolithiasis in his brother, father, and mother.       reports that he has never smoked. He has never used smokeless tobacco. He reports that he does not drink alcohol or use drugs.          Objective:  Patient's shoes and socks removed.   Foot ExamPhysical Exam          Foot Exam     General  General Appearance: appears stated age and healthy   Orientation: alert and oriented to person, place, and time   Gait: steppage         Right Foot/Ankle      Inspection and Palpation  Arch: pes cavus  Hammertoes: second toe, fifth toe, fourth toe and third toe  Hallux limitus: yes  Skin Exam: callus;         Left Foot/Ankle       Inspection and Palpation  Arch: pes cavus  Hammertoes: second toe, fifth toe, fourth toe and third toe  Hallux limitus: yes  Skin Exam: callus;               Right Foot/Ankle   Right Foot Inspection  Skin Exam: callus and callus                        Right Toe  - Comprehensive Exam  Arch: pes cavus  Hammertoes: second toe, fifth toe, fourth toe and third toe  Hallux limitus: yes     Left Foot/Ankle  Left Foot Inspection  Skin Exam: callus                       Left Toe  - Comprehensive Exam  Arch: pes cavus  Hammertoes: second toe, fifth  toe, fourth toe and third toe  Hallux limitus: yes          Physical Exam   Feet:   Right Foot:   Skin Integrity: Positive for callus.   Left Foot:   Skin Integrity: Positive for callus.         No swelling, mild pain on palpation right worse than left.  Nails are thickened dystrophic.  No signs of infection  Improving tinea pedis  No erythema no open wounds or signs of infection  Moderate xerosis plantar heels bilateral  Right hallux medial border positive ingrown mild paronychia negative erythema no exudate no signs of infection positive pain on palpation.     Periungual eczema secondary to modify ptosis noted of all 10 nails.

## 2024-08-02 ENCOUNTER — OFFICE VISIT (OUTPATIENT)
Dept: GASTROENTEROLOGY | Facility: CLINIC | Age: 59
End: 2024-08-02
Payer: MEDICARE

## 2024-08-02 VITALS
WEIGHT: 162 LBS | OXYGEN SATURATION: 98 % | DIASTOLIC BLOOD PRESSURE: 62 MMHG | BODY MASS INDEX: 24.55 KG/M2 | HEIGHT: 68 IN | SYSTOLIC BLOOD PRESSURE: 125 MMHG | HEART RATE: 65 BPM

## 2024-08-02 DIAGNOSIS — R93.5 ABNORMAL CT OF THE ABDOMEN: ICD-10-CM

## 2024-08-02 DIAGNOSIS — K59.09 OTHER CONSTIPATION: ICD-10-CM

## 2024-08-02 DIAGNOSIS — K56.609 SMALL BOWEL OBSTRUCTION (HCC): Primary | ICD-10-CM

## 2024-08-02 PROCEDURE — 99203 OFFICE O/P NEW LOW 30 MIN: CPT | Performed by: PHYSICIAN ASSISTANT

## 2024-08-02 NOTE — PROGRESS NOTES
[facility] - New Patient Office Visit  Mario Rollins 59 y.o. male MRN: [id number]  Encounter: [id number]          ASSESSMENT AND PLAN:      1. Small bowel obstruction (HCC)  -Recently in the hospital April 2024 with abdominal pain nausea vomiting found to have small bowel obstruction and underwent an open exploratory laparotomy with lysis of adhesions and decompression of markedly dilated small bowel secondary to small bowel obstruction     2. Abnormal CT of the abdomen  -CT from April 2024 when patient was in for small bowel obstruction also showed wall thickening in the distal esophagus    3. Other constipation  -Currently on MiraLAX three times per week    ______________________________________________________________________    Chief Complaint: Hospital follow-up for small bowel obstruction    HPI: This is a 59-year-old male with past medical history of cerebral palsy, constipation, kidney stone, hyperlipidemia, and recent small bowel obstruction who presents today for follow-up to small bowel obstruction.  He was in the hospital in April 2024 for abdominal pain nausea vomiting found to have a high-grade small bowel obstruction.  He underwent an exploratory laparotomy and had lysis of adhesions performed and and decompression of markedly dilated small bowel secondary to small bowel obstruction.  There is a question of a diagnosis of cirrhosis in the past from hemochromatosis however his multiple CAT scans recently showed normal-appearing liver.  His liver functions are normal and platelets normal.  In November 2023 ferritin and iron panel were normal.    Patient denies any abdominal pain, nausea/vomiting, pain or difficulty swallowing, change in bowels, black or bloody stools      Endoscopy History:  EGD - none previuosly  Colonoscopy -2016 with adequate bowel prep was normal    REVIEW OF SYSTEMS:    CONSTITUTIONAL: Denies any fever, chills, rigors, and weight loss.  HEENT:  Denies hearing loss or visual disturbances.  CARDIOVASCULAR: No chest pain or palpitations.   RESPIRATORY: Denies any cough, hemoptysis, shortness of breath or dyspnea on exertion.  GASTROINTESTINAL: As noted in the History of Present Illness.   GENITOURINARY: No problems with urination. Denies any hematuria or dysuria.  NEUROLOGIC: No dizziness or vertigo, denies headaches.   MUSCULOSKELETAL: Denies any muscle or joint pain.   SKIN: Denies skin rashes or itching.   ENDOCRINE: Denies excessive thirst. Denies intolerance to heat or cold.  PSYCHOSOCIAL: Denies depression or anxiety. Denies any recent memory loss.       Historical Information   Past Medical History:   Diagnosis Date   • Ambulatory dysfunction    • Anxiety    • Bilateral impacted cerumen     last assessed 05/30/2013   • Capsulitis     last assessed 04/26/2016   • Cellulitis of finger 01/13/2012   • Chronic kidney disease    • 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)    • Closed fracture of one or more phalanges of foot 01/06/2009   • Constipation    • Deformity     left and right foot and ankle ,right hand   • Depression    • Epilepsy (HCC)    • Erythrocytosis    • Hemiplegia affecting dominant side (HCC)    • Hypertension    • Hypoglycemia 11/08/2011   • Hypokalemia     last assessed 05/30/2013   • Mental retardation    • Mood disorder (HCC)    • Nephrocalcinosis    • Small bowel obstruction (HCC)      Past Surgical History:   Procedure Laterality Date   • BRAIN SURGERY     • EXPLORATORY LAPAROTOMY W/ BOWEL RESECTION N/A 4/6/2024    Procedure: LAPAROTOMY EXPLORATORY WITH LYSIS OF ADHESIONS AND DECOMPRESSION;  Surgeon: Saúl Woods MD;  Location: [facility] MAIN OR;  Service: General   • TN ARTHROCENTESIS ASPIR&/INJ MAJOR JT/BURSA W/O US Bilateral 5/27/2021    Procedure: Hip intra-articular corticosteroid injection ( 94331 20201-83);  Surgeon: Lianet Astorga MD;  Location: [facility] MAIN OR;  Service: Pain Management    • TN COLONOSCOPY FLX DX W/COLLJ SPEC WHEN  "PFRMD N/A 2/12/2016    Procedure: COLONOSCOPY;  Surgeon: Abhilash Ace MD;  Location: [facility] GI LAB;  Service: Gastroenterology   • RI INJECT SI JOINT ARTHRGRPHY&/ANES/STEROID W/RIMA Left 3/22/2023    Procedure: SACROILIAC joint injection (07223 );  Surgeon: Larry Smith DO;  Location: [facility] MAIN OR;  Service: Pain Management      Social History   Social History     Substance and Sexual Activity   Alcohol Use Not Currently     Social History     Substance and Sexual Activity   Drug Use No     Social History     Tobacco Use   Smoking Status Never   Smokeless Tobacco Never     Family History   Problem Relation Age of Onset   • Emphysema Mother    • Nephrolithiasis Mother    • Heart attack Father         acute MI   • Hypertension Father    • Nephrolithiasis Father    • Nephrolithiasis Brother        Meds/Allergies       Current Outpatient Medications:   •  atorvastatin (LIPITOR) 10 mg tablet  •  lamoTRIgine (LaMICtal) 150 MG tablet  •  lamoTRIgine (LaMICtal) 25 mg tablet  •  nebivolol (BYSTOLIC) 2.5 mg tablet  •  polyethylene glycol (MIRALAX) 17 g packet  •  QUEtiapine (SEROquel) 50 mg tablet  •  saccharomyces boulardii (Florastor) 250 mg capsule  •  sertraline (ZOLOFT) 50 mg tablet  •  acetaminophen (TYLENOL) 325 mg tablet  •  ibuprofen (MOTRIN) 200 mg tablet    Allergies   Allergen Reactions   • Aspirin Other (See Comments) and Anaphylaxis     Reaction Date: 25Aug2008;     unknown           Objective     Blood pressure 125/62, pulse 65, height 5' 8\" (1.727 m), weight 73.5 kg (162 lb), SpO2 98%. Body mass index is 24.63 kg/m².        PHYSICAL EXAM:      General Appearance:   Alert, cooperative, no distress, appears stated age   HEENT:   Normocephalic, atraumatic, anicteric.     Neck:  Supple, symmetrical   Lungs:   Clear to auscultation bilaterally; no rales, rhonchi or wheezing; respirations unlabored    Heart::   Regular rate and rhythm; no murmur, rub, or gallop.   Abdomen:   Soft, non-tender, " non-distended; normal bowel sounds; no masses, no organomegaly    Genitalia:   Deferred    Rectal:   Deferred    Extremities:  No cyanosis, clubbing or edema    Pulses:  Not assessed   Skin:  No jaundice, rashes, or lesions    Lymph nodes:  Not assessed       Lab Results:   No visits with results within 1 Day(s) from this visit.   Latest known visit with results is:   No results displayed because visit has over 200 results.            Radiology Results:   No results found.    Mayito Esqueda PA-C

## 2024-08-09 ENCOUNTER — APPOINTMENT (EMERGENCY)
Dept: RADIOLOGY | Facility: HOSPITAL | Age: 59
DRG: 388 | End: 2024-08-09
Payer: MEDICARE

## 2024-08-09 ENCOUNTER — HOSPITAL ENCOUNTER (INPATIENT)
Facility: HOSPITAL | Age: 59
LOS: 9 days | Discharge: HOME | DRG: 388 | End: 2024-08-18
Attending: EMERGENCY MEDICINE | Admitting: SURGERY
Payer: MEDICARE

## 2024-08-09 DIAGNOSIS — K56.699: ICD-10-CM

## 2024-08-09 DIAGNOSIS — G40.219 LOCALIZATION-RELATED SYMPTOMATIC EPILEPSY AND EPILEPTIC SYNDROMES WITH COMPLEX PARTIAL SEIZURES, INTRACTABLE, WITHOUT STATUS EPILEPTICUS (HCC): ICD-10-CM

## 2024-08-09 DIAGNOSIS — N17.9 AKI (ACUTE KIDNEY INJURY) (HCC): ICD-10-CM

## 2024-08-09 DIAGNOSIS — R50.9 FEVER, UNSPECIFIED FEVER CAUSE: ICD-10-CM

## 2024-08-09 DIAGNOSIS — K59.09 OTHER CONSTIPATION: ICD-10-CM

## 2024-08-09 DIAGNOSIS — G80.2 SPASTIC HEMIPLEGIC CEREBRAL PALSY (HCC): ICD-10-CM

## 2024-08-09 DIAGNOSIS — E78.2 MIXED HYPERLIPIDEMIA: ICD-10-CM

## 2024-08-09 DIAGNOSIS — I10 PRIMARY HYPERTENSION: ICD-10-CM

## 2024-08-09 DIAGNOSIS — K56.609 SMALL BOWEL OBSTRUCTION (HCC): Primary | ICD-10-CM

## 2024-08-09 DIAGNOSIS — M79.606 PAIN OF LOWER EXTREMITY, UNSPECIFIED LATERALITY: ICD-10-CM

## 2024-08-09 LAB
ALBUMIN SERPL BCG-MCNC: 4.6 G/DL (ref 3.5–5)
ALP SERPL-CCNC: 90 U/L (ref 34–104)
ALT SERPL W P-5'-P-CCNC: 14 U/L (ref 7–52)
ANION GAP SERPL CALCULATED.3IONS-SCNC: 11 MMOL/L (ref 4–13)
AST SERPL W P-5'-P-CCNC: 28 U/L (ref 13–39)
BASOPHILS # BLD MANUAL: 0 THOUSAND/UL (ref 0–0.1)
BASOPHILS NFR MAR MANUAL: 0 % (ref 0–1)
BILIRUB SERPL-MCNC: 0.63 MG/DL (ref 0.2–1)
BUN SERPL-MCNC: 26 MG/DL (ref 5–25)
CALCIUM SERPL-MCNC: 9.9 MG/DL (ref 8.4–10.2)
CHLORIDE SERPL-SCNC: 102 MMOL/L (ref 96–108)
CO2 SERPL-SCNC: 23 MMOL/L (ref 21–32)
CREAT SERPL-MCNC: 1.54 MG/DL (ref 0.6–1.3)
EOSINOPHIL # BLD MANUAL: 0 THOUSAND/UL (ref 0–0.4)
EOSINOPHIL NFR BLD MANUAL: 0 % (ref 0–6)
ERYTHROCYTE [DISTWIDTH] IN BLOOD BY AUTOMATED COUNT: 14.6 % (ref 11.6–15.1)
GFR SERPL CREATININE-BSD FRML MDRD: 48 ML/MIN/1.73SQ M
GLUCOSE SERPL-MCNC: 155 MG/DL (ref 65–140)
HCT VFR BLD AUTO: 58 % (ref 36.5–49.3)
HGB BLD-MCNC: 19.2 G/DL (ref 12–17)
LACTATE SERPL-SCNC: 2.3 MMOL/L (ref 0.5–2)
LIPASE SERPL-CCNC: <6 U/L (ref 11–82)
LYMPHOCYTES # BLD AUTO: 0.47 THOUSAND/UL (ref 0.6–4.47)
LYMPHOCYTES # BLD AUTO: 3 % (ref 14–44)
MCH RBC QN AUTO: 28.4 PG (ref 26.8–34.3)
MCHC RBC AUTO-ENTMCNC: 32.9 G/DL (ref 31.4–37.4)
MCV RBC AUTO: 86 FL (ref 82–98)
MONOCYTES # BLD AUTO: 0.47 THOUSAND/UL (ref 0–1.22)
MONOCYTES NFR BLD: 3 % (ref 4–12)
NEUTROPHILS # BLD MANUAL: 14.75 THOUSAND/UL (ref 1.85–7.62)
NEUTS BAND NFR BLD MANUAL: 13 % (ref 0–8)
NEUTS SEG NFR BLD AUTO: 81 % (ref 43–75)
PLATELET # BLD AUTO: 250 THOUSANDS/UL (ref 149–390)
PLATELET BLD QL SMEAR: ADEQUATE
PMV BLD AUTO: 10.9 FL (ref 8.9–12.7)
POTASSIUM SERPL-SCNC: 5.4 MMOL/L (ref 3.5–5.3)
PROT SERPL-MCNC: 8.4 G/DL (ref 6.4–8.4)
RBC # BLD AUTO: 6.75 MILLION/UL (ref 3.88–5.62)
RBC MORPH BLD: NORMAL
SODIUM SERPL-SCNC: 136 MMOL/L (ref 135–147)
TOXIC GRANULES BLD QL SMEAR: PRESENT
WBC # BLD AUTO: 15.69 THOUSAND/UL (ref 4.31–10.16)

## 2024-08-09 PROCEDURE — 80053 COMPREHEN METABOLIC PANEL: CPT | Performed by: EMERGENCY MEDICINE

## 2024-08-09 PROCEDURE — 74177 CT ABD & PELVIS W/CONTRAST: CPT

## 2024-08-09 PROCEDURE — 85027 COMPLETE CBC AUTOMATED: CPT | Performed by: EMERGENCY MEDICINE

## 2024-08-09 PROCEDURE — 85007 BL SMEAR W/DIFF WBC COUNT: CPT | Performed by: EMERGENCY MEDICINE

## 2024-08-09 PROCEDURE — 96375 TX/PRO/DX INJ NEW DRUG ADDON: CPT

## 2024-08-09 PROCEDURE — 96376 TX/PRO/DX INJ SAME DRUG ADON: CPT

## 2024-08-09 PROCEDURE — 83690 ASSAY OF LIPASE: CPT | Performed by: EMERGENCY MEDICINE

## 2024-08-09 PROCEDURE — 99285 EMERGENCY DEPT VISIT HI MDM: CPT | Performed by: EMERGENCY MEDICINE

## 2024-08-09 PROCEDURE — 36415 COLL VENOUS BLD VENIPUNCTURE: CPT | Performed by: EMERGENCY MEDICINE

## 2024-08-09 PROCEDURE — 96374 THER/PROPH/DIAG INJ IV PUSH: CPT

## 2024-08-09 PROCEDURE — 96361 HYDRATE IV INFUSION ADD-ON: CPT

## 2024-08-09 PROCEDURE — 99284 EMERGENCY DEPT VISIT MOD MDM: CPT

## 2024-08-09 PROCEDURE — 83605 ASSAY OF LACTIC ACID: CPT | Performed by: EMERGENCY MEDICINE

## 2024-08-09 RX ORDER — ONDANSETRON 2 MG/ML
4 INJECTION INTRAMUSCULAR; INTRAVENOUS EVERY 6 HOURS PRN
Status: DISCONTINUED | OUTPATIENT
Start: 2024-08-09 | End: 2024-08-18 | Stop reason: HOSPADM

## 2024-08-09 RX ORDER — HYDROMORPHONE HCL/PF 1 MG/ML
0.5 SYRINGE (ML) INJECTION ONCE
Status: COMPLETED | OUTPATIENT
Start: 2024-08-09 | End: 2024-08-09

## 2024-08-09 RX ORDER — PANTOPRAZOLE SODIUM 40 MG/10ML
40 INJECTION, POWDER, LYOPHILIZED, FOR SOLUTION INTRAVENOUS
Status: DISCONTINUED | OUTPATIENT
Start: 2024-08-10 | End: 2024-08-18 | Stop reason: HOSPADM

## 2024-08-09 RX ORDER — HEPARIN SODIUM 5000 [USP'U]/ML
5000 INJECTION, SOLUTION INTRAVENOUS; SUBCUTANEOUS EVERY 8 HOURS SCHEDULED
Status: COMPLETED | OUTPATIENT
Start: 2024-08-09 | End: 2024-08-15

## 2024-08-09 RX ORDER — SODIUM CHLORIDE, SODIUM LACTATE, POTASSIUM CHLORIDE, CALCIUM CHLORIDE 600; 310; 30; 20 MG/100ML; MG/100ML; MG/100ML; MG/100ML
125 INJECTION, SOLUTION INTRAVENOUS CONTINUOUS
Status: DISCONTINUED | OUTPATIENT
Start: 2024-08-09 | End: 2024-08-12

## 2024-08-09 RX ORDER — ONDANSETRON 2 MG/ML
4 INJECTION INTRAMUSCULAR; INTRAVENOUS ONCE
Status: COMPLETED | OUTPATIENT
Start: 2024-08-09 | End: 2024-08-09

## 2024-08-09 RX ORDER — HYDROMORPHONE HCL/PF 1 MG/ML
0.5 SYRINGE (ML) INJECTION
Status: DISCONTINUED | OUTPATIENT
Start: 2024-08-09 | End: 2024-08-18 | Stop reason: HOSPADM

## 2024-08-09 RX ADMIN — HYDROMORPHONE HYDROCHLORIDE 0.5 MG: 1 INJECTION, SOLUTION INTRAMUSCULAR; INTRAVENOUS; SUBCUTANEOUS at 19:36

## 2024-08-09 RX ADMIN — HYDROMORPHONE HYDROCHLORIDE 0.5 MG: 1 INJECTION, SOLUTION INTRAMUSCULAR; INTRAVENOUS; SUBCUTANEOUS at 18:50

## 2024-08-09 RX ADMIN — HEPARIN SODIUM 5000 UNITS: 5000 INJECTION INTRAVENOUS; SUBCUTANEOUS at 22:37

## 2024-08-09 RX ADMIN — SODIUM CHLORIDE 500 ML: 0.9 INJECTION, SOLUTION INTRAVENOUS at 18:50

## 2024-08-09 RX ADMIN — IOHEXOL 100 ML: 350 INJECTION, SOLUTION INTRAVENOUS at 19:15

## 2024-08-09 RX ADMIN — ONDANSETRON 4 MG: 2 INJECTION INTRAMUSCULAR; INTRAVENOUS at 18:50

## 2024-08-09 RX ADMIN — HYDROMORPHONE HYDROCHLORIDE 0.5 MG: 1 INJECTION, SOLUTION INTRAMUSCULAR; INTRAVENOUS; SUBCUTANEOUS at 20:30

## 2024-08-09 RX ADMIN — SODIUM CHLORIDE, SODIUM LACTATE, POTASSIUM CHLORIDE, AND CALCIUM CHLORIDE 125 ML/HR: .6; .31; .03; .02 INJECTION, SOLUTION INTRAVENOUS at 22:36

## 2024-08-09 RX ADMIN — SODIUM CHLORIDE 1000 ML: 0.9 INJECTION, SOLUTION INTRAVENOUS at 20:31

## 2024-08-09 NOTE — ED PROVIDER NOTES
History  No chief complaint on file.    60 yo male from group home with history of bowel obstruction 4 months ago requiring exploratory laparotomy c/o left lower abdominal pain x 3 days.  Pain is intermittent.  He didn't say anything to staff at group home but then they noticed him sweating and grimacing which is not normal for him.  No fever.  + nausea, no vomiting.  Last BM 3 days ago.      History provided by:  Patient   used: No        Prior to Admission Medications   Prescriptions Last Dose Informant Patient Reported? Taking?   QUEtiapine (SEROquel) 50 mg tablet  EMS/Transport Team Yes No   Sig: Take 50 mg by mouth daily at bedtime    acetaminophen (TYLENOL) 325 mg tablet  EMS/Transport Team No No   Sig: Take 1 tablet (325 mg total) by mouth every 6 (six) hours as needed for mild pain, moderate pain or headaches   Patient not taking: Reported on 8/2/2024   atorvastatin (LIPITOR) 10 mg tablet  EMS/Transport Team No No   Sig: Take 1 tablet (10 mg total) by mouth daily Take at 8 PM   ibuprofen (MOTRIN) 200 mg tablet  EMS/Transport Team Yes No   Sig: Take 400 mg by mouth every 6 (six) hours as needed for mild pain   Patient not taking: Reported on 8/2/2024   lamoTRIgine (LaMICtal) 150 MG tablet  EMS/Transport Team No No   Sig: Take 1 tab by mouth twice a day at 8AM and 8PM   lamoTRIgine (LaMICtal) 25 mg tablet  EMS/Transport Team No No   Sig: TAKE TWO TABLETS BY MOUTH DAILY AT 8AM WITH 150MG TO TOTAL 200MG   nebivolol (BYSTOLIC) 2.5 mg tablet  EMS/Transport Team Yes No   polyethylene glycol (MIRALAX) 17 g packet  EMS/Transport Team No No   Sig: Take 17 g by mouth 2 (two) times a week   saccharomyces boulardii (Florastor) 250 mg capsule  EMS/Transport Team Yes No   Sig: Take 250 mg by mouth   sertraline (ZOLOFT) 50 mg tablet  EMS/Transport Team Yes No   Sig: Take by mouth daily      Facility-Administered Medications: None       Past Medical History:   Diagnosis Date    Ambulatory dysfunction      Anxiety     Bilateral impacted cerumen     last assessed 05/30/2013    Capsulitis     last assessed 04/26/2016    Cellulitis of finger 01/13/2012    Chronic kidney disease     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)     Closed fracture of one or more phalanges of foot 01/06/2009    Constipation     Deformity     left and right foot and ankle ,right hand    Depression     Epilepsy (HCC)     Erythrocytosis     Hemiplegia affecting dominant side (HCC)     Hypertension     Hypoglycemia 11/08/2011    Hypokalemia     last assessed 05/30/2013    Mental retardation     Mood disorder (HCC)     Nephrocalcinosis     Small bowel obstruction (HCC)        Past Surgical History:   Procedure Laterality Date    BRAIN SURGERY      EXPLORATORY LAPAROTOMY W/ BOWEL RESECTION N/A 4/6/2024    Procedure: LAPAROTOMY EXPLORATORY WITH LYSIS OF ADHESIONS AND DECOMPRESSION;  Surgeon: Saúl Woods MD;  Location: [facility] MAIN OR;  Service: General    IA ARTHROCENTESIS ASPIR&/INJ MAJOR JT/BURSA W/O US Bilateral 5/27/2021    Procedure: Hip intra-articular corticosteroid injection ( 89768 43806-46);  Surgeon: Liante Astorga MD;  Location: [facility] MAIN OR;  Service: Pain Management     IA COLONOSCOPY FLX DX W/COLLJ SPEC WHEN PFRMD N/A 2/12/2016    Procedure: COLONOSCOPY;  Surgeon: Abhilash Ace MD;  Location: [facility] GI LAB;  Service: Gastroenterology    IA INJECT SI JOINT ARTHRGRPHY&/ANES/STEROID W/RIMA Left 3/22/2023    Procedure: SACROILIAC joint injection ([id number] );  Surgeon: Larry Smith DO;  Location: [facility] MAIN OR;  Service: Pain Management        Family History   Problem Relation Age of Onset    Emphysema Mother     Nephrolithiasis Mother     Heart attack Father         acute MI    Hypertension Father     Nephrolithiasis Father     Nephrolithiasis Brother      I have reviewed and agree with the history as documented.    E-Cigarette/Vaping    E-Cigarette Use Never User      E-Cigarette/Vaping Substances    Nicotine No     THC No     CBD  No     Flavoring No     Other No     Unknown No      Social History     Tobacco Use    Smoking status: Never    Smokeless tobacco: Never   Vaping Use    Vaping status: Never Used   Substance Use Topics    Alcohol use: Not Currently    Drug use: No       Review of Systems   Constitutional:  Negative for fever.   Respiratory:  Negative for shortness of breath and stridor.    Cardiovascular:  Negative for chest pain.   Gastrointestinal:  Positive for abdominal pain, constipation and nausea. Negative for diarrhea and vomiting.   Musculoskeletal:  Negative for back pain.   Skin:  Negative for rash.   Neurological:  Positive for weakness.       Physical Exam  Physical Exam  Vitals and nursing note reviewed.   Constitutional:       General: He is in acute distress.      Appearance: He is well-developed. He is not ill-appearing or diaphoretic.      Comments: Intermittently grimacing in pain   HENT:      Head: Normocephalic and atraumatic.   Eyes:      General: No scleral icterus.     Conjunctiva/sclera: Conjunctivae normal.   Cardiovascular:      Rate and Rhythm: Normal rate and regular rhythm.      Heart sounds: Normal heart sounds. No murmur heard.  Pulmonary:      Effort: Pulmonary effort is normal. No respiratory distress.      Breath sounds: Normal breath sounds.   Abdominal:      General: There is distension.      Tenderness: There is abdominal tenderness. There is guarding.      Comments: Bowel sounds rushing, high pitched   Musculoskeletal:      Cervical back: Normal range of motion and neck supple.      Right lower leg: No edema.      Left lower leg: No edema.   Skin:     General: Skin is warm and dry.      Coloration: Skin is not pale.      Findings: No erythema or rash.   Neurological:      Mental Status: He is alert and oriented to person, place, and time.      Comments: Pt. Appropriately responsive to questions   Psychiatric:         Mood and Affect: Mood normal.         Behavior: Behavior normal.         Vital  Signs  ED Triage Vitals   Temperature Pulse Respirations Blood Pressure SpO2   08/09/24 1803 08/09/24 1803 08/09/24 1803 08/09/24 1803 08/09/24 1803   99.1 °F (37.3 °C) 95 20 164/94 97 %      Temp Source Heart Rate Source Patient Position - Orthostatic VS BP Location FiO2 (%)   08/09/24 1803 -- 08/09/24 1803 08/09/24 1803 --   Oral  Sitting Left arm       Pain Score       08/09/24 1850       9           Vitals:    08/09/24 2032 08/09/24 2034 08/09/24 2100 08/09/24 2231   BP: 135/97  141/84 141/96   Pulse:  (!) 106 100 98   Patient Position - Orthostatic VS:             Visual Acuity      ED Medications  Medications   lactated ringers infusion (125 mL/hr Intravenous New Bag 8/9/24 2236)   heparin (porcine) subcutaneous injection 5,000 Units (5,000 Units Subcutaneous Given 8/9/24 2237)   HYDROmorphone (DILAUDID) injection 0.5 mg (has no administration in time range)   pantoprazole (PROTONIX) injection 40 mg (has no administration in time range)   ondansetron (ZOFRAN) injection 4 mg (has no administration in time range)   ondansetron (ZOFRAN) injection 4 mg (4 mg Intravenous Given 8/9/24 1850)   HYDROmorphone (DILAUDID) injection 0.5 mg (0.5 mg Intravenous Given 8/9/24 1850)   sodium chloride 0.9 % bolus 500 mL (0 mL Intravenous Stopped 8/9/24 1950)   iohexol (OMNIPAQUE) 350 MG/ML injection (MULTI-DOSE) 100 mL (100 mL Intravenous Given 8/9/24 1915)   HYDROmorphone (DILAUDID) injection 0.5 mg (0.5 mg Intravenous Given 8/9/24 1936)   HYDROmorphone (DILAUDID) injection 0.5 mg (0.5 mg Intravenous Given 8/9/24 2030)   sodium chloride 0.9 % bolus 1,000 mL (1,000 mL Intravenous New Bag 8/9/24 2031)       Diagnostic Studies  Results Reviewed       Procedure Component Value Units Date/Time    RBC Morphology Reflex Test [[id number]] Collected: 08/09/24 1845    Lab Status: Final result Specimen: Blood from Arm, Left Updated: 08/09/24 2001    CBC and differential [[id number]]  (Abnormal) Collected: 08/09/24 1845    Lab Status: Final  result Specimen: Blood from Arm, Left Updated: 08/09/24 1948     WBC 15.69 Thousand/uL      RBC 6.75 Million/uL      Hemoglobin 19.2 g/dL      Hematocrit 58.0 %      MCV 86 fL      MCH 28.4 pg      MCHC 32.9 g/dL      RDW 14.6 %      MPV 10.9 fL      Platelets 250 Thousands/uL     Narrative:      This is an appended report.  These results have been appended to a previously verified report.    Manual Differential(PHLEBS Do Not Order) [[id number]]  (Abnormal) Collected: 08/09/24 1845    Lab Status: Final result Specimen: Blood from Arm, Left Updated: 08/09/24 1948     Segmented % 81 %      Bands % 13 %      Lymphocytes % 3 %      Monocytes % 3 %      Eosinophils % 0 %      Basophils % 0 %      Absolute Neutrophils 14.75 Thousand/uL      Absolute Lymphocytes 0.47 Thousand/uL      Absolute Monocytes 0.47 Thousand/uL      Absolute Eosinophils 0.00 Thousand/uL      Absolute Basophils 0.00 Thousand/uL      Total Counted --     Toxic Granulation Present     RBC Morphology Normal     Platelet Estimate Adequate    Comprehensive metabolic panel [[id number]]  (Abnormal) Collected: 08/09/24 1845    Lab Status: Final result Specimen: Blood from Arm, Left Updated: 08/09/24 1926     Sodium 136 mmol/L      Potassium 5.4 mmol/L      Chloride 102 mmol/L      CO2 23 mmol/L      ANION GAP 11 mmol/L      BUN 26 mg/dL      Creatinine 1.54 mg/dL      Glucose 155 mg/dL      Calcium 9.9 mg/dL      AST 28 U/L      ALT 14 U/L      Alkaline Phosphatase 90 U/L      Total Protein 8.4 g/dL      Albumin 4.6 g/dL      Total Bilirubin 0.63 mg/dL      eGFR 48 ml/min/1.73sq m     Narrative:      National Kid[employer] guidelines for Chronic Kidney Disease (CKD):     Stage 1 with normal or high GFR (GFR > 90 mL/min/1.73 square meters)    Stage 2 Mild CKD (GFR = 60-89 mL/min/1.73 square meters)    Stage 3A Moderate CKD (GFR = 45-59 mL/min/1.73 square meters)    Stage 3B Moderate CKD (GFR = 30-44 mL/min/1.73 square meters)    Stage 4 Severe CKD  (GFR = 15-29 mL/min/1.73 square meters)    Stage 5 End Stage CKD (GFR <15 mL/min/1.73 square meters)  Note: GFR calculation is accurate only with a steady state creatinine    Lipase [[id number]]  (Abnormal) Collected: 08/09/24 1845    Lab Status: Final result Specimen: Blood from Arm, Left Updated: 08/09/24 1926     Lipase <6 u/L     Lactic acid, plasma (w/reflex if result > 2.0) [[id number]]  (Abnormal) Collected: 08/09/24 1845    Lab Status: Final result Specimen: Blood from Arm, Left Updated: 08/09/24 1926     LACTIC ACID 2.3 mmol/L     Narrative:      Result may be elevated if tourniquet was used during collection.    Lactic acid 2 Hours [[id number]]     Lab Status: No result Specimen: Blood     UA (URINE) with reflex to Scope [[id number]]     Lab Status: No result Specimen: Urine                    CT abdomen pelvis with contrast   Final Result by E. Alec Schoenberger, MD (08/09 1948)      Small bowel obstruction with transition at the terminal ileum that may be due to adhesions   Small volume of ascites      The study was marked in EPIC for immediate notification.            Workstation performed: [id number]                    Procedures  Procedures         ED Course                                 SBIRT 20yo+      Flowsheet Row Most Recent Value   Initial Alcohol Screen: US AUDIT-C     1. How often do you have a drink containing alcohol? 0 Filed at: 08/09/2024 1805   2. How many drinks containing alcohol do you have on a typical day you are drinking?  0 Filed at: 08/09/2024 1805   3a. Male UNDER 65: How often do you have five or more drinks on one occasion? 0 Filed at: 08/09/2024 1805   3b. FEMALE Any Age, or MALE 65+: How often do you have 4 or more drinks on one occassion? 0 Filed at: 08/09/2024 1805   Audit-C Score 0 Filed at: 08/09/2024 1805   ONEAL: How many times in the past year have you...    Used an illegal drug or used a prescription medication for non-medical reasons? Never Filed at: 08/09/2024 1805                       Medical Decision Making  Pt. Again has SBO.  WBC 16, lactic 2.3, Creatinine up c/w SALLY.  Discussed case with Dr. Handley on call for surgery. Will place NGT.  He will admit on his service.  Pt. Getting IVF and medicated for pain.    Amount and/or Complexity of Data Reviewed  Labs: ordered.  Radiology: ordered.    Risk  Prescription drug management.  Decision regarding hospitalization.                 Disposition  Final diagnoses:   Small bowel obstruction (HCC)   SALLY (acute kidney injury) (HCC)     Time reflects when diagnosis was documented in both MDM as applicable and the Disposition within this note       Time User Action Codes Description Comment    8/9/2024  7:53 PM Emely Ferrara Add [K56.609] Small bowel obstruction (HCC)     8/9/2024 10:53 PM Emely Ferrara Add [N17.9] SALLY (acute kidney injury) (HCC)           ED Disposition       ED Disposition   Admit    Condition   Stable    Date/Time   Fri Aug 9, 2024 2016    Comment   Case was discussed with surgery and the patient's admission status was agreed to be Admission Status: inpatient status to the service of Dr. Handley .               Follow-up Information    None         Current Discharge Medication List        CONTINUE these medications which have NOT CHANGED    Details   acetaminophen (TYLENOL) 325 mg tablet Take 1 tablet (325 mg total) by mouth every 6 (six) hours as needed for mild pain, moderate pain or headaches  Qty: 30 tablet, Refills: 0    Associated Diagnoses: Pain of lower extremity, unspecified laterality      atorvastatin (LIPITOR) 10 mg tablet Take 1 tablet (10 mg total) by mouth daily Take at 8 PM  Qty: 30 tablet, Refills: 3    Associated Diagnoses: Mixed hyperlipidemia      ibuprofen (MOTRIN) 200 mg tablet Take 400 mg by mouth every 6 (six) hours as needed for mild pain      !! lamoTRIgine (LaMICtal) 150 MG tablet Take 1 tab by mouth twice a day at 8AM and 8PM  Qty: 60 tablet, Refills: 5    Associated Diagnoses:  Localization-related symptomatic epilepsy and epileptic syndromes with complex partial seizures, intractable, without status epilepticus (HCC)      !! lamoTRIgine (LaMICtal) 25 mg tablet TAKE TWO TABLETS BY MOUTH DAILY AT 8AM WITH 150MG TO TOTAL 200MG  Qty: 60 tablet, Refills: 5    Comments: Please notify Dr. Salazar if there are any changes to his lamotrigine prescriptions from other hospital systems.  Associated Diagnoses: Localization-related symptomatic epilepsy and epileptic syndromes with complex partial seizures, intractable, without status epilepticus (HCC)      nebivolol (BYSTOLIC) 2.5 mg tablet       polyethylene glycol (MIRALAX) 17 g packet Take 17 g by mouth 2 (two) times a week  Qty: 136 g, Refills: 0    Associated Diagnoses: Small bowel obstruction (HCC)      QUEtiapine (SEROquel) 50 mg tablet Take 50 mg by mouth daily at bedtime       saccharomyces boulardii (Florastor) 250 mg capsule Take 250 mg by mouth      sertraline (ZOLOFT) 50 mg tablet Take by mouth daily       !! - Potential duplicate medications found. Please discuss with provider.          No discharge procedures on file.    PDMP Review       None            ED Provider  Electronically Signed by             Emely Ferrara MD  08/09/24 4644

## 2024-08-10 ENCOUNTER — APPOINTMENT (INPATIENT)
Dept: RADIOLOGY | Facility: HOSPITAL | Age: 59
DRG: 388 | End: 2024-08-10
Payer: MEDICARE

## 2024-08-10 LAB
ALBUMIN SERPL BCG-MCNC: 3.7 G/DL (ref 3.5–5)
ALP SERPL-CCNC: 75 U/L (ref 34–104)
ALT SERPL W P-5'-P-CCNC: 11 U/L (ref 7–52)
AMORPH URATE CRY URNS QL MICRO: ABNORMAL
ANION GAP SERPL CALCULATED.3IONS-SCNC: 9 MMOL/L (ref 4–13)
AST SERPL W P-5'-P-CCNC: 12 U/L (ref 13–39)
BACTERIA UR QL AUTO: ABNORMAL /HPF
BASOPHILS # BLD AUTO: 0.02 THOUSANDS/ÂΜL (ref 0–0.1)
BASOPHILS NFR BLD AUTO: 0 % (ref 0–1)
BILIRUB SERPL-MCNC: 0.64 MG/DL (ref 0.2–1)
BILIRUB UR QL STRIP: NEGATIVE
BUN SERPL-MCNC: 22 MG/DL (ref 5–25)
CALCIUM SERPL-MCNC: 9 MG/DL (ref 8.4–10.2)
CHLORIDE SERPL-SCNC: 108 MMOL/L (ref 96–108)
CLARITY UR: ABNORMAL
CO2 SERPL-SCNC: 23 MMOL/L (ref 21–32)
COLOR UR: YELLOW
CREAT SERPL-MCNC: 1.27 MG/DL (ref 0.6–1.3)
EOSINOPHIL # BLD AUTO: 0.01 THOUSAND/ÂΜL (ref 0–0.61)
EOSINOPHIL NFR BLD AUTO: 0 % (ref 0–6)
ERYTHROCYTE [DISTWIDTH] IN BLOOD BY AUTOMATED COUNT: 13.8 % (ref 11.6–15.1)
GFR SERPL CREATININE-BSD FRML MDRD: 61 ML/MIN/1.73SQ M
GLUCOSE SERPL-MCNC: 125 MG/DL (ref 65–140)
GLUCOSE UR STRIP-MCNC: NEGATIVE MG/DL
HCT VFR BLD AUTO: 55.1 % (ref 36.5–49.3)
HGB BLD-MCNC: 18 G/DL (ref 12–17)
HGB UR QL STRIP.AUTO: ABNORMAL
IMM GRANULOCYTES # BLD AUTO: 0.02 THOUSAND/UL (ref 0–0.2)
IMM GRANULOCYTES NFR BLD AUTO: 0 % (ref 0–2)
KETONES UR STRIP-MCNC: NEGATIVE MG/DL
LACTATE SERPL-SCNC: 1 MMOL/L (ref 0.5–2)
LEUKOCYTE ESTERASE UR QL STRIP: ABNORMAL
LYMPHOCYTES # BLD AUTO: 0.59 THOUSANDS/ÂΜL (ref 0.6–4.47)
LYMPHOCYTES NFR BLD AUTO: 8 % (ref 14–44)
MCH RBC QN AUTO: 28.3 PG (ref 26.8–34.3)
MCHC RBC AUTO-ENTMCNC: 32.7 G/DL (ref 31.4–37.4)
MCV RBC AUTO: 87 FL (ref 82–98)
MONOCYTES # BLD AUTO: 1.08 THOUSAND/ÂΜL (ref 0.17–1.22)
MONOCYTES NFR BLD AUTO: 15 % (ref 4–12)
NEUTROPHILS # BLD AUTO: 5.62 THOUSANDS/ÂΜL (ref 1.85–7.62)
NEUTS SEG NFR BLD AUTO: 77 % (ref 43–75)
NITRITE UR QL STRIP: NEGATIVE
NON-SQ EPI CELLS URNS QL MICRO: ABNORMAL /HPF
NRBC BLD AUTO-RTO: 0 /100 WBCS
PH UR STRIP.AUTO: 8.5 [PH]
PLATELET # BLD AUTO: 222 THOUSANDS/UL (ref 149–390)
PMV BLD AUTO: 10.5 FL (ref 8.9–12.7)
POTASSIUM SERPL-SCNC: 4.1 MMOL/L (ref 3.5–5.3)
PROT SERPL-MCNC: 6.8 G/DL (ref 6.4–8.4)
PROT UR STRIP-MCNC: ABNORMAL MG/DL
RBC # BLD AUTO: 6.35 MILLION/UL (ref 3.88–5.62)
RBC #/AREA URNS AUTO: ABNORMAL /HPF
SODIUM SERPL-SCNC: 140 MMOL/L (ref 135–147)
SP GR UR STRIP.AUTO: 1.01 (ref 1–1.03)
TRI-PHOS CRY URNS QL MICRO: ABNORMAL /HPF
UROBILINOGEN UR STRIP-ACNC: <2 MG/DL
WBC # BLD AUTO: 7.34 THOUSAND/UL (ref 4.31–10.16)
WBC #/AREA URNS AUTO: ABNORMAL /HPF

## 2024-08-10 PROCEDURE — 85025 COMPLETE CBC W/AUTO DIFF WBC: CPT | Performed by: SURGERY

## 2024-08-10 PROCEDURE — 74022 RADEX COMPL AQT ABD SERIES: CPT

## 2024-08-10 PROCEDURE — 80053 COMPREHEN METABOLIC PANEL: CPT | Performed by: SURGERY

## 2024-08-10 PROCEDURE — 99223 1ST HOSP IP/OBS HIGH 75: CPT | Performed by: SURGERY

## 2024-08-10 PROCEDURE — 81001 URINALYSIS AUTO W/SCOPE: CPT | Performed by: EMERGENCY MEDICINE

## 2024-08-10 PROCEDURE — 83605 ASSAY OF LACTIC ACID: CPT | Performed by: EMERGENCY MEDICINE

## 2024-08-10 RX ORDER — QUETIAPINE FUMARATE 25 MG/1
50 TABLET, FILM COATED ORAL
Status: DISCONTINUED | OUTPATIENT
Start: 2024-08-10 | End: 2024-08-15

## 2024-08-10 RX ORDER — LAMOTRIGINE 100 MG/1
200 TABLET ORAL EVERY MORNING
Status: DISCONTINUED | OUTPATIENT
Start: 2024-08-10 | End: 2024-08-15

## 2024-08-10 RX ADMIN — HEPARIN SODIUM 5000 UNITS: 5000 INJECTION INTRAVENOUS; SUBCUTANEOUS at 21:16

## 2024-08-10 RX ADMIN — HYDROMORPHONE HYDROCHLORIDE 0.5 MG: 1 INJECTION, SOLUTION INTRAMUSCULAR; INTRAVENOUS; SUBCUTANEOUS at 16:52

## 2024-08-10 RX ADMIN — HEPARIN SODIUM 5000 UNITS: 5000 INJECTION INTRAVENOUS; SUBCUTANEOUS at 14:08

## 2024-08-10 RX ADMIN — SODIUM CHLORIDE, SODIUM LACTATE, POTASSIUM CHLORIDE, AND CALCIUM CHLORIDE 125 ML/HR: .6; .31; .03; .02 INJECTION, SOLUTION INTRAVENOUS at 09:31

## 2024-08-10 RX ADMIN — ACETAMINOPHEN 325 MG: 325 SUPPOSITORY RECTAL at 22:42

## 2024-08-10 RX ADMIN — LAMOTRIGINE 150 MG: 100 TABLET ORAL at 21:16

## 2024-08-10 RX ADMIN — HYDROMORPHONE HYDROCHLORIDE 0.5 MG: 1 INJECTION, SOLUTION INTRAMUSCULAR; INTRAVENOUS; SUBCUTANEOUS at 13:21

## 2024-08-10 RX ADMIN — SODIUM CHLORIDE, SODIUM LACTATE, POTASSIUM CHLORIDE, AND CALCIUM CHLORIDE 125 ML/HR: .6; .31; .03; .02 INJECTION, SOLUTION INTRAVENOUS at 23:49

## 2024-08-10 RX ADMIN — PANTOPRAZOLE SODIUM 40 MG: 40 INJECTION, POWDER, FOR SOLUTION INTRAVENOUS at 06:00

## 2024-08-10 RX ADMIN — HEPARIN SODIUM 5000 UNITS: 5000 INJECTION INTRAVENOUS; SUBCUTANEOUS at 06:00

## 2024-08-10 RX ADMIN — QUETIAPINE FUMARATE 50 MG: 25 TABLET ORAL at 21:16

## 2024-08-10 RX ADMIN — HYDROMORPHONE HYDROCHLORIDE 0.5 MG: 1 INJECTION, SOLUTION INTRAMUSCULAR; INTRAVENOUS; SUBCUTANEOUS at 09:30

## 2024-08-10 RX ADMIN — LAMOTRIGINE 200 MG: 100 TABLET ORAL at 09:31

## 2024-08-10 NOTE — H&P
H&P Exam - General Surgery   Mario Rollins 59 y.o. male MRN: [id number]  Unit/Bed#: [address] [place] [address] Encounter: [id number]    Assessment & Plan     Assessment:  59-year-old male with history of cerebral palsy, epilepsy, and CKD presenting with small bowel obstruction.  Leukocytosis, elevated lactic, and SALLY resolved  NGT output at least 2300cc since last evening      Plan:  Continue n.p.o. except for psychiatric oral medications  NG tube to medium intermittent wall suction  IV fluid hydration  Monitor electrolytes  Serial abdominal exams  Obstruction series xray pending      History of Present Illness     HPI:  Mario Rollins is a 59 y.o. male with history of CKD, cerebral palsy, hemochromatosis, right hemiparesis secondary to left MCA stroke, focal epilepsy, and SBO secondary to small bowel volvulus status post exploratory laparotomy with lysis of adhesions earlier this year who presents with abdominal pain, distention, and diaphoresis.  Patient is coming from a group home and staff there report patient did not have any fever or vomiting and his last bowel movement was 3 days ago.          Review of Systems   Unable to perform ROS: Other       Historical Information   Past Medical History:   Diagnosis Date    Ambulatory dysfunction     Anxiety     Bilateral impacted cerumen     last assessed 05/30/2013    Capsulitis     last assessed 04/26/2016    Cellulitis of finger 01/13/2012    Chronic kidney disease     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)     Closed fracture of one or more phalanges of foot 01/06/2009    Constipation     Deformity     left and right foot and ankle ,right hand    Depression     Epilepsy (HCC)     Erythrocytosis     Hemiplegia affecting dominant side (HCC)     Hypertension     Hypoglycemia 11/08/2011    Hypokalemia     last assessed 05/30/2013    Mental retardation     Mood disorder (HCC)     Nephrocalcinosis     Small bowel obstruction (HCC)      Past Surgical History:   Procedure  "Laterality Date    BRAIN SURGERY      EXPLORATORY LAPAROTOMY W/ BOWEL RESECTION N/A 4/6/2024    Procedure: LAPAROTOMY EXPLORATORY WITH LYSIS OF ADHESIONS AND DECOMPRESSION;  Surgeon: Saúl Woods MD;  Location: [facility] MAIN OR;  Service: General    AR ARTHROCENTESIS ASPIR&/INJ MAJOR JT/BURSA W/O US Bilateral 5/27/2021    Procedure: Hip intra-articular corticosteroid injection ( 89666 80280-18);  Surgeon: Lianet Astorga MD;  Location: [facility] MAIN OR;  Service: Pain Management     AR COLONOSCOPY FLX DX W/COLLJ SPEC WHEN PFRMD N/A 2/12/2016    Procedure: COLONOSCOPY;  Surgeon: Abhilash Ace MD;  Location: [facility] GI LAB;  Service: Gastroenterology    AR INJECT SI JOINT ARTHRGRPHY&/ANES/STEROID W/RIMA Left 3/22/2023    Procedure: SACROILIAC joint injection (23181 );  Surgeon: Larry Smith DO;  Location: [facility] MAIN OR;  Service: Pain Management      Social History   Social History     Substance and Sexual Activity   Alcohol Use Not Currently     Social History     Substance and Sexual Activity   Drug Use No     Social History     Tobacco Use   Smoking Status Never   Smokeless Tobacco Never     E-Cigarette/Vaping    E-Cigarette Use Never User      E-Cigarette/Vaping Substances    Nicotine No     THC No     CBD No     Flavoring No     Other No     Unknown No      Family History: non-contributory    Meds/Allergies   all medications and allergies reviewed  Allergies   Allergen Reactions    Aspirin Other (See Comments) and Anaphylaxis     Reaction Date: 25Aug2008;     unknown       Objective   First Vitals:   Blood Pressure: 164/94 (08/09/24 1803)  Pulse: 95 (08/09/24 1803)  Temperature: 99.1 °F (37.3 °C) (08/09/24 1803)  Temp Source: Oral (08/09/24 1803)  Respirations: 20 (08/09/24 1803)  Height: 5' 8\" (172.7 cm) (08/09/24 2231)  Weight - Scale: 77.5 kg (170 lb 14.4 oz) (08/09/24 2231)  SpO2: 97 % (08/09/24 1803)    Current Vitals:   Blood Pressure: 129/92 (08/10/24 0746)  Pulse: (!) 108 (08/10/24 " "0759)  Temperature: 99.4 °F (37.4 °C) (08/10/24 0759)  Temp Source: Oral (08/09/24 1803)  Respirations: 18 (08/10/24 0746)  Height: 5' 8\" (172.7 cm) (08/09/24 2231)  Weight - Scale: 77.5 kg (170 lb 14.4 oz) (08/09/24 2231)  SpO2: 93 % (08/10/24 0759)      Intake/Output Summary (Last 24 hours) at 8/10/2024 0800  Last data filed at 8/10/2024 0601  Gross per 24 hour   Intake 1200 ml   Output 1600 ml   Net -400 ml       Invasive Devices       Peripheral Intravenous Line  Duration             Peripheral IV 08/09/24 Left;Ventral (anterior) Forearm <1 day              Drain  Duration             NG/OG/Enteral Tube 16 Fr Left nare <1 day                    Physical Exam  Vitals reviewed.   Constitutional:       General: He is sleeping. He is not in acute distress.     Appearance: Normal appearance. He is well-developed. He is not toxic-appearing or diaphoretic.      Interventions: He is not intubated.  HENT:      Head: Normocephalic and atraumatic. Not macrocephalic and not microcephalic. No raccoon eyes, Downey's sign, right periorbital erythema or left periorbital erythema.      Right Ear: Hearing and external ear normal.      Left Ear: Hearing and external ear normal.      Nose: Nose normal.   Eyes:      General: No scleral icterus.        Right eye: No discharge.         Left eye: No discharge.      Conjunctiva/sclera: Conjunctivae normal.      Right eye: Right conjunctiva is not injected. No hemorrhage.     Left eye: Left conjunctiva is not injected. No hemorrhage.     Pupils: Pupils are equal, round, and reactive to light.   Cardiovascular:      Rate and Rhythm: Normal rate.   Pulmonary:      Effort: Pulmonary effort is normal. No tachypnea, bradypnea, respiratory distress or apnea. He is not intubated.      Breath sounds: No stridor.   Abdominal:      General: There is distension.      Palpations: Abdomen is soft. Abdomen is not rigid.      Tenderness: There is generalized abdominal tenderness. There is no CVA " "tenderness, guarding or rebound.      Hernia: No hernia is present.   Musculoskeletal:      Right lower leg: No edema.      Left lower leg: No edema.   Skin:     General: Skin is warm, dry and intact.      Capillary Refill: Capillary refill takes less than 2 seconds.      Coloration: Skin is not cyanotic, jaundiced or mottled.      Findings: No rash.   Neurological:      Mental Status: He is not disoriented.      GCS: GCS eye subscore is 4. GCS verbal subscore is 5. GCS motor subscore is 6.   Psychiatric:         Mood and Affect: Mood and affect normal.         Behavior: Behavior is cooperative.         Lab Results: I have personally reviewed pertinent lab results.  , CBC:   Lab Results   Component Value Date    WBC 7.34 08/10/2024    HGB 18.0 (H) 08/10/2024    HCT 55.1 (H) 08/10/2024    MCV 87 08/10/2024     08/10/2024    RBC 6.35 (H) 08/10/2024    MCH 28.3 08/10/2024    MCHC 32.7 08/10/2024    RDW 13.8 08/10/2024    MPV 10.5 08/10/2024    NRBC 0 08/10/2024   , CMP:   Lab Results   Component Value Date    SODIUM 140 08/10/2024    K 4.1 08/10/2024     08/10/2024    CO2 23 08/10/2024    BUN 22 08/10/2024    CREATININE 1.27 08/10/2024    CALCIUM 9.0 08/10/2024    AST 12 (L) 08/10/2024    ALT 11 08/10/2024    ALKPHOS 75 08/10/2024    EGFR 61 08/10/2024   , Coagulation: No results found for: \"PT\", \"INR\", \"APTT\", Urinalysis:   Lab Results   Component Value Date    COLORU Yellow 08/10/2024    CLARITYU Slightly Cloudy 08/10/2024    SPECGRAV 1.010 08/10/2024    PHUR 8.5 (A) 08/10/2024    LEUKOCYTESUR Large (A) 08/10/2024    NITRITE Negative 08/10/2024    GLUCOSEU Negative 08/10/2024    KETONESU Negative 08/10/2024    BILIRUBINUR Negative 08/10/2024    BLOODU Trace (A) 08/10/2024   , Lipase:   Lab Results   Component Value Date    LIPASE <6 (L) 08/09/2024     Imaging: I have personally reviewed pertinent reports.   and I have personally reviewed pertinent films in PACS  EKG, Pathology, and Other Studies: I have " personally reviewed pertinent reports.      Code Status: Level 1 - Full Code  Advance Directive and Living Will:      Power of :    POLST:      Counseling / Coordination of Care  Total floor / unit time spent today 40 minutes.  Greater than 50% of total time was spent with the patient and / or family counseling and / or coordination of care.  A description of the counseling / coordination of care: Obtaining patient history, performing physical exam, reviewing pertinent labs and imaging, discussed management with attending physician..

## 2024-08-10 NOTE — PLAN OF CARE
Problem: Prexisting or High Potential for Compromised Skin Integrity  Goal: Skin integrity is maintained or improved  Description: INTERVENTIONS:  - Identify patients at risk for skin breakdown  - Assess and monitor skin integrity  - Assess and monitor nutrition and hydration status  - Monitor labs   - Assess for incontinence   - Turn and reposition patient  - Assist with mobility/ambulation  - Relieve pressure over bony prominences  - Avoid friction and shearing  - Provide appropriate hygiene as needed including keeping skin clean and dry  - Evaluate need for skin moisturizer/barrier cream  - Collaborate with interdisciplinary team   - Patient/family teaching  - Consider wound care consult   Outcome: Progressing     Problem: PAIN - ADULT  Goal: Verbalizes/displays adequate comfort level or baseline comfort level  Description: Interventions:  - Encourage patient to monitor pain and request assistance  - Assess pain using appropriate pain scale  - Administer analgesics based on type and severity of pain and evaluate response  - Implement non-pharmacological measures as appropriate and evaluate response  - Consider cultural and social influences on pain and pain management  - Notify physician/advanced practitioner if interventions unsuccessful or patient reports new pain  Outcome: Progressing     Problem: INFECTION - ADULT  Goal: Absence or prevention of progression during hospitalization  Description: INTERVENTIONS:  - Assess and monitor for signs and symptoms of infection  - Monitor lab/diagnostic results  - Monitor all insertion sites, i.e. indwelling lines, tubes, and drains  - Monitor endotracheal if appropriate and nasal secretions for changes in amount and color  - [place] appropriate cooling/warming therapies per order  - Administer medications as ordered  - Instruct and encourage patient and family to use good hand hygiene technique  - Identify and instruct in appropriate isolation precautions for  identified infection/condition  Outcome: Progressing  Goal: Absence of fever/infection during neutropenic period  Description: INTERVENTIONS:  - Monitor WBC    Outcome: Progressing     Problem: SAFETY ADULT  Goal: Patient will remain free of falls  Description: INTERVENTIONS:  - Educate patient/family on patient safety including physical limitations  - Instruct patient to call for assistance with activity   - Consult OT/PT to assist with strengthening/mobility   - Keep Call bell within reach  - Keep bed low and locked with side rails adjusted as appropriate  - Keep care items and personal belongings within reach  - Initiate and maintain comfort rounds  - Make Fall Risk Sign visible to staff  - Offer Toileting every 2 Hours, in advance of need  - Initiate/Maintain bed alarm  - Obtain necessary fall risk management equipment: slipper socks  - Apply yellow socks and bracelet for high fall risk patients  - Consider moving patient to room near nurses station  Outcome: Progressing  Goal: Maintain or return to baseline ADL function  Description: INTERVENTIONS:  -  Assess patient's ability to carry out ADLs; assess patient's baseline for ADL function and identify physical deficits which impact ability to perform ADLs (bathing, care of mouth/teeth, toileting, grooming, dressing, etc.)  - Assess/evaluate cause of self-care deficits   - Assess range of motion  - Assess patient's mobility; develop plan if impaired  - Assess patient's need for assistive devices and provide as appropriate  - Encourage maximum independence but intervene and supervise when necessary  - Involve family in performance of ADLs  - Assess for home care needs following discharge   - Consider OT consult to assist with ADL evaluation and planning for discharge  - Provide patient education as appropriate  Outcome: Progressing  Goal: Maintains/Returns to pre admission functional level  Description: INTERVENTIONS:  - Perform AM-PAC 6 Click Basic Mobility/  Daily Activity assessment daily.  - Set and communicate daily mobility goal to care team and patient/family/caregiver.   - Collaborate with rehabilitation services on mobility goals if consulted  - Perform Range of Motion 3 times a day.  - Reposition patient every 2 hours.  - Dangle patient 3 times a day  - Stand patient 3 times a day  - Ambulate patient 3 times a day  - Out of bed to chair 3 times a day   - Out of bed for meals 3 times a day  - Out of bed for toileting  - Record patient progress and toleration of activity level   Outcome: Progressing     Problem: DISCHARGE PLANNING  Goal: Discharge to home or other facility with appropriate resources  Description: INTERVENTIONS:  - Identify barriers to discharge w/patient and caregiver  - Arrange for needed discharge resources and transportation as appropriate  - Identify discharge learning needs (meds, wound care, etc.)  - Arrange for interpretive services to assist at discharge as needed  - Refer to Case Management Department for coordinating discharge planning if the patient needs post-hospital services based on physician/advanced practitioner order or complex needs related to functional status, cognitive ability, or social support system  Outcome: Progressing     Problem: GASTROINTESTINAL - ADULT  Goal: Minimal or absence of nausea and/or vomiting  Description: INTERVENTIONS:  - Administer IV fluids if ordered to ensure adequate hydration  - Maintain NPO status until nausea and vomiting are resolved  - Nasogastric tube if ordered  - Administer ordered antiemetic medications as needed  - Provide nonpharmacologic comfort measures as appropriate  - Advance diet as tolerated, if ordered  - Consider nutrition services referral to assist patient with adequate nutrition and appropriate food choices  Outcome: Progressing  Goal: Maintains or returns to baseline bowel function  Description: INTERVENTIONS:  - Assess bowel function  - Encourage oral fluids to ensure  adequate hydration  - Administer IV fluids if ordered to ensure adequate hydration  - Administer ordered medications as needed  - Encourage mobilization and activity  - Consider nutritional services referral to assist patient with adequate nutrition and appropriate food choices  Outcome: Progressing  Goal: Maintains adequate nutritional intake  Description: INTERVENTIONS:  - Monitor percentage of each meal consumed  - Identify factors contributing to decreased intake, treat as appropriate  - Assist with meals as needed  - Monitor I&O, weight, and lab values if indicated  - Obtain nutrition services referral as needed  Outcome: Progressing  Goal: Oral mucous membranes remain intact  Description: INTERVENTIONS  - Assess oral mucosa and hygiene practices  - Implement preventative oral hygiene regimen  - Implement oral medicated treatments as ordered  - Initiate Nutrition services referral as needed  Outcome: Progressing     Problem: METABOLIC, FLUID AND ELECTROLYTES - ADULT  Goal: Electrolytes maintained within normal limits  Description: INTERVENTIONS:  - Monitor labs and assess patient for signs and symptoms of electrolyte imbalances  - Administer electrolyte replacement as ordered  - Monitor response to electrolyte replacements, including repeat lab results as appropriate  - Instruct patient on fluid and nutrition as appropriate  Outcome: Progressing  Goal: Fluid balance maintained  Description: INTERVENTIONS:  - Monitor labs   - Monitor I/O and WT  - Instruct patient on fluid and nutrition as appropriate  - Assess for signs & symptoms of volume excess or deficit  Outcome: Progressing  Goal: Glucose maintained within target range  Description: INTERVENTIONS:  - Monitor Blood Glucose as ordered  - Assess for signs and symptoms of hyperglycemia and hypoglycemia  - Administer ordered medications to maintain glucose within target range  - Assess nutritional intake and initiate nutrition service referral as  needed  Outcome: Progressing     Problem: MUSCULOSKELETAL - ADULT  Goal: Maintain or return mobility to safest level of function  Description: INTERVENTIONS:  - Assess patient's ability to carry out ADLs; assess patient's baseline for ADL function and identify physical deficits which impact ability to perform ADLs (bathing, care of mouth/teeth, toileting, grooming, dressing, etc.)  - Assess/evaluate cause of self-care deficits   - Assess range of motion  - Assess patient's mobility  - Assess patient's need for assistive devices and provide as appropriate  - Encourage maximum independence but intervene and supervise when necessary  - Involve family in performance of ADLs  - Assess for home care needs following discharge   - Consider OT consult to assist with ADL evaluation and planning for discharge  - Provide patient education as appropriate  Outcome: Progressing

## 2024-08-10 NOTE — PLAN OF CARE
Problem: Prexisting or High Potential for Compromised Skin Integrity  Goal: Skin integrity is maintained or improved  Description: INTERVENTIONS:  - Identify patients at risk for skin breakdown  - Assess and monitor skin integrity  - Assess and monitor nutrition and hydration status  - Monitor labs   - Assess for incontinence   - Turn and reposition patient  - Assist with mobility/ambulation  - Relieve pressure over bony prominences  - Avoid friction and shearing  - Provide appropriate hygiene as needed including keeping skin clean and dry  - Evaluate need for skin moisturizer/barrier cream  - Collaborate with interdisciplinary team   - Patient/family teaching  - Consider wound care consult   Outcome: Progressing     Problem: PAIN - ADULT  Goal: Verbalizes/displays adequate comfort level or baseline comfort level  Description: Interventions:  - Encourage patient to monitor pain and request assistance  - Assess pain using appropriate pain scale  - Administer analgesics based on type and severity of pain and evaluate response  - Implement non-pharmacological measures as appropriate and evaluate response  - Consider cultural and social influences on pain and pain management  - Notify physician/advanced practitioner if interventions unsuccessful or patient reports new pain  Outcome: Progressing     Problem: INFECTION - ADULT  Goal: Absence or prevention of progression during hospitalization  Description: INTERVENTIONS:  - Assess and monitor for signs and symptoms of infection  - Monitor lab/diagnostic results  - Monitor all insertion sites, i.e. indwelling lines, tubes, and drains  - Monitor endotracheal if appropriate and nasal secretions for changes in amount and color  - [place] appropriate cooling/warming therapies per order  - Administer medications as ordered  - Instruct and encourage patient and family to use good hand hygiene technique  - Identify and instruct in appropriate isolation precautions for  identified infection/condition  Outcome: Progressing     Problem: INFECTION - ADULT  Goal: Absence of fever/infection during neutropenic period  Description: INTERVENTIONS:  - Monitor WBC    Outcome: Progressing     Problem: SAFETY ADULT  Goal: Patient will remain free of falls  Description: INTERVENTIONS:  - Educate patient/family on patient safety including physical limitations  - Instruct patient to call for assistance with activity   - Consult OT/PT to assist with strengthening/mobility   - Keep Call bell within reach  - Keep bed low and locked with side rails adjusted as appropriate  - Keep care items and personal belongings within reach  - Initiate and maintain comfort rounds  - Make Fall Risk Sign visible to staff  - Offer Toileting every 2 Hours, in advance of need  - Initiate/Maintain  bed alarm  -   - Apply yellow socks and bracelet for high fall risk patients  - Consider moving patient to room near nurses station  Outcome: Progressing     Problem: SAFETY ADULT  Goal: Maintain or return to baseline ADL function  Description: INTERVENTIONS:  -  Assess patient's ability to carry out ADLs; assess patient's baseline for ADL function and identify physical deficits which impact ability to perform ADLs (bathing, care of mouth/teeth, toileting, grooming, dressing, etc.)  - Assess/evaluate cause of self-care deficits   - Assess range of motion  - Assess patient's mobility; develop plan if impaired  - Assess patient's need for assistive devices and provide as appropriate  - Encourage maximum independence but intervene and supervise when necessary  - Involve family in performance of ADLs  - Assess for home care needs following discharge   - Consider OT consult to assist with ADL evaluation and planning for discharge  - Provide patient education as appropriate  Outcome: Progressing     Problem: SAFETY ADULT  Goal: Maintains/Returns to pre admission functional level  Description: INTERVENTIONS:  - Perform AM-PAC 6  Click Basic Mobility/ Daily Activity assessment daily.  - Set and communicate daily mobility goal to care team and patient/family/caregiver.   - Collaborate with rehabilitation services on mobility goals if consulted    - Reposition patient every 2 hours.  -  - Record patient progress and toleration of activity level   Outcome: Progressing     Problem: INFECTION - ADULT  Goal: Absence or prevention of progression during hospitalization  Description: INTERVENTIONS:  - Assess and monitor for signs and symptoms of infection  - Monitor lab/diagnostic results  - Monitor all insertion sites, i.e. indwelling lines, tubes, and drains  - Monitor endotracheal if appropriate and nasal secretions for changes in amount and color  - [place] appropriate cooling/warming therapies per order  - Administer medications as ordered  - Instruct and encourage patient and family to use good hand hygiene technique  - Identify and instruct in appropriate isolation precautions for identified infection/condition  Outcome: Progressing     Problem: INFECTION - ADULT  Goal: Absence of fever/infection during neutropenic period  Description: INTERVENTIONS:  - Monitor WBC    Outcome: Progressing     Problem: SAFETY ADULT  Goal: Patient will remain free of falls  Description: INTERVENTIONS:  - Educate patient/family on patient safety including physical limitations  - Instruct patient to call for assistance with activity   - Consult OT/PT to assist with strengthening/mobility   - Keep Call bell within reach  - Keep bed low and locked with side rails adjusted as appropriate  - Keep care items and personal belongings within reach  - Initiate and maintain comfort rounds    - Obtain necessary fall risk management  - Apply yellow socks and bracelet for high fall risk patients  - Consider moving patient to room near nurses station  Outcome: Progressing

## 2024-08-11 ENCOUNTER — APPOINTMENT (INPATIENT)
Dept: RADIOLOGY | Facility: HOSPITAL | Age: 59
DRG: 388 | End: 2024-08-11
Payer: MEDICARE

## 2024-08-11 LAB
ANION GAP SERPL CALCULATED.3IONS-SCNC: 8 MMOL/L (ref 4–13)
BUN SERPL-MCNC: 30 MG/DL (ref 5–25)
CALCIUM SERPL-MCNC: 8.6 MG/DL (ref 8.4–10.2)
CHLORIDE SERPL-SCNC: 106 MMOL/L (ref 96–108)
CO2 SERPL-SCNC: 29 MMOL/L (ref 21–32)
CREAT SERPL-MCNC: 1.35 MG/DL (ref 0.6–1.3)
ERYTHROCYTE [DISTWIDTH] IN BLOOD BY AUTOMATED COUNT: 13.6 % (ref 11.6–15.1)
GFR SERPL CREATININE-BSD FRML MDRD: 57 ML/MIN/1.73SQ M
GLUCOSE SERPL-MCNC: 113 MG/DL (ref 65–140)
GLUCOSE SERPL-MCNC: 121 MG/DL (ref 65–140)
HCT VFR BLD AUTO: 50.2 % (ref 36.5–49.3)
HGB BLD-MCNC: 16.3 G/DL (ref 12–17)
MAGNESIUM SERPL-MCNC: 1.9 MG/DL (ref 1.9–2.7)
MCH RBC QN AUTO: 28.5 PG (ref 26.8–34.3)
MCHC RBC AUTO-ENTMCNC: 32.5 G/DL (ref 31.4–37.4)
MCV RBC AUTO: 88 FL (ref 82–98)
NRBC BLD AUTO-RTO: 0 /100 WBCS
PHOSPHATE SERPL-MCNC: 2.4 MG/DL (ref 2.7–4.5)
PLATELET # BLD AUTO: 196 THOUSANDS/UL (ref 149–390)
PMV BLD AUTO: 10.4 FL (ref 8.9–12.7)
POTASSIUM SERPL-SCNC: 3.9 MMOL/L (ref 3.5–5.3)
PROCALCITONIN SERPL-MCNC: 0.49 NG/ML
RBC # BLD AUTO: 5.72 MILLION/UL (ref 3.88–5.62)
SODIUM SERPL-SCNC: 143 MMOL/L (ref 135–147)
WBC # BLD AUTO: 6.31 THOUSAND/UL (ref 4.31–10.16)

## 2024-08-11 PROCEDURE — 99232 SBSQ HOSP IP/OBS MODERATE 35: CPT | Performed by: SURGERY

## 2024-08-11 PROCEDURE — 83735 ASSAY OF MAGNESIUM: CPT | Performed by: PHYSICIAN ASSISTANT

## 2024-08-11 PROCEDURE — 84145 PROCALCITONIN (PCT): CPT | Performed by: PHYSICIAN ASSISTANT

## 2024-08-11 PROCEDURE — 85027 COMPLETE CBC AUTOMATED: CPT | Performed by: PHYSICIAN ASSISTANT

## 2024-08-11 PROCEDURE — 82948 REAGENT STRIP/BLOOD GLUCOSE: CPT

## 2024-08-11 PROCEDURE — 71250 CT THORAX DX C-: CPT

## 2024-08-11 PROCEDURE — 87081 CULTURE SCREEN ONLY: CPT | Performed by: SURGERY

## 2024-08-11 PROCEDURE — 80048 BASIC METABOLIC PNL TOTAL CA: CPT | Performed by: PHYSICIAN ASSISTANT

## 2024-08-11 PROCEDURE — 84100 ASSAY OF PHOSPHORUS: CPT | Performed by: PHYSICIAN ASSISTANT

## 2024-08-11 PROCEDURE — 74176 CT ABD & PELVIS W/O CONTRAST: CPT

## 2024-08-11 PROCEDURE — 71045 X-RAY EXAM CHEST 1 VIEW: CPT

## 2024-08-11 RX ORDER — METRONIDAZOLE 500 MG/100ML
500 INJECTION, SOLUTION INTRAVENOUS EVERY 8 HOURS
Status: DISCONTINUED | OUTPATIENT
Start: 2024-08-11 | End: 2024-08-15

## 2024-08-11 RX ORDER — MAGNESIUM SULFATE 1 G/100ML
1 INJECTION INTRAVENOUS ONCE
Status: COMPLETED | OUTPATIENT
Start: 2024-08-11 | End: 2024-08-11

## 2024-08-11 RX ORDER — CEFTRIAXONE 1 G/50ML
1000 INJECTION, SOLUTION INTRAVENOUS EVERY 24 HOURS
Status: DISCONTINUED | OUTPATIENT
Start: 2024-08-11 | End: 2024-08-15

## 2024-08-11 RX ADMIN — METRONIDAZOLE 500 MG: 500 INJECTION, SOLUTION INTRAVENOUS at 12:43

## 2024-08-11 RX ADMIN — SODIUM CHLORIDE, SODIUM LACTATE, POTASSIUM CHLORIDE, AND CALCIUM CHLORIDE 125 ML/HR: .6; .31; .03; .02 INJECTION, SOLUTION INTRAVENOUS at 06:15

## 2024-08-11 RX ADMIN — POTASSIUM PHOSPHATE, MONOBASIC AND POTASSIUM PHOSPHATE, DIBASIC 12 MMOL: 224; 236 INJECTION, SOLUTION, CONCENTRATE INTRAVENOUS at 13:02

## 2024-08-11 RX ADMIN — SERTRALINE HYDROCHLORIDE 50 MG: 50 TABLET ORAL at 09:32

## 2024-08-11 RX ADMIN — HEPARIN SODIUM 5000 UNITS: 5000 INJECTION INTRAVENOUS; SUBCUTANEOUS at 21:03

## 2024-08-11 RX ADMIN — SODIUM CHLORIDE 1000 ML: 0.9 INJECTION, SOLUTION INTRAVENOUS at 07:06

## 2024-08-11 RX ADMIN — QUETIAPINE FUMARATE 50 MG: 25 TABLET ORAL at 21:04

## 2024-08-11 RX ADMIN — CEFTRIAXONE 1000 MG: 1 INJECTION, SOLUTION INTRAVENOUS at 11:15

## 2024-08-11 RX ADMIN — METRONIDAZOLE 500 MG: 500 INJECTION, SOLUTION INTRAVENOUS at 19:21

## 2024-08-11 RX ADMIN — LAMOTRIGINE 150 MG: 100 TABLET ORAL at 19:21

## 2024-08-11 RX ADMIN — PANTOPRAZOLE SODIUM 40 MG: 40 INJECTION, POWDER, FOR SOLUTION INTRAVENOUS at 06:15

## 2024-08-11 RX ADMIN — MAGNESIUM SULFATE HEPTAHYDRATE 1 G: 1 INJECTION, SOLUTION INTRAVENOUS at 12:00

## 2024-08-11 RX ADMIN — HEPARIN SODIUM 5000 UNITS: 5000 INJECTION INTRAVENOUS; SUBCUTANEOUS at 06:16

## 2024-08-11 RX ADMIN — SODIUM CHLORIDE, SODIUM LACTATE, POTASSIUM CHLORIDE, AND CALCIUM CHLORIDE 125 ML/HR: .6; .31; .03; .02 INJECTION, SOLUTION INTRAVENOUS at 18:26

## 2024-08-11 RX ADMIN — ACETAMINOPHEN 325 MG: 325 SUPPOSITORY RECTAL at 18:25

## 2024-08-11 RX ADMIN — LAMOTRIGINE 200 MG: 100 TABLET ORAL at 09:31

## 2024-08-11 RX ADMIN — HEPARIN SODIUM 5000 UNITS: 5000 INJECTION INTRAVENOUS; SUBCUTANEOUS at 14:14

## 2024-08-11 NOTE — PLAN OF CARE
Problem: Prexisting or High Potential for Compromised Skin Integrity  Goal: Skin integrity is maintained or improved  Description: INTERVENTIONS:  - Identify patients at risk for skin breakdown  - Assess and monitor skin integrity  - Assess and monitor nutrition and hydration status  - Monitor labs   - Assess for incontinence   - Turn and reposition patient  - Assist with mobility/ambulation  - Relieve pressure over bony prominences  - Avoid friction and shearing  - Provide appropriate hygiene as needed including keeping skin clean and dry  - Evaluate need for skin moisturizer/barrier cream  - Collaborate with interdisciplinary team   - Patient/family teaching  - Consider wound care consult   Outcome: Progressing     Problem: PAIN - ADULT  Goal: Verbalizes/displays adequate comfort level or baseline comfort level  Description: Interventions:  - Encourage patient to monitor pain and request assistance  - Assess pain using appropriate pain scale  - Administer analgesics based on type and severity of pain and evaluate response  - Implement non-pharmacological measures as appropriate and evaluate response  - Consider cultural and social influences on pain and pain management  - Notify physician/advanced practitioner if interventions unsuccessful or patient reports new pain  Outcome: Progressing     Problem: INFECTION - ADULT  Goal: Absence or prevention of progression during hospitalization  Description: INTERVENTIONS:  - Assess and monitor for signs and symptoms of infection  - Monitor lab/diagnostic results  - Monitor all insertion sites, i.e. indwelling lines, tubes, and drains  - Monitor endotracheal if appropriate and nasal secretions for changes in amount and color  - [place] appropriate cooling/warming therapies per order  - Administer medications as ordered  - Instruct and encourage patient and family to use good hand hygiene technique  - Identify and instruct in appropriate isolation precautions for  identified infection/condition  Outcome: Progressing  Goal: Absence of fever/infection during neutropenic period  Description: INTERVENTIONS:  - Monitor WBC    Outcome: Progressing     Problem: SAFETY ADULT  Goal: Patient will remain free of falls  Description: INTERVENTIONS:  - Educate patient/family on patient safety including physical limitations  - Instruct patient to call for assistance with activity   - Consult OT/PT to assist with strengthening/mobility   - Keep Call bell within reach  - Keep bed low and locked with side rails adjusted as appropriate  - Keep care items and personal belongings within reach  - Initiate and maintain comfort rounds  - Make Fall Risk Sign visible to staff  - Offer Toileting every 2 Hours, in advance of need  - Initiate/Maintain bed and chair alarm  - Obtain necessary fall risk management equipment: bed and chair alarm/yellow socks and bracelet   - Apply yellow socks and bracelet for high fall risk patients  - Consider moving patient to room near nurses station  Outcome: Progressing  Goal: Maintain or return to baseline ADL function  Description: INTERVENTIONS:  -  Assess patient's ability to carry out ADLs; assess patient's baseline for ADL function and identify physical deficits which impact ability to perform ADLs (bathing, care of mouth/teeth, toileting, grooming, dressing, etc.)  - Assess/evaluate cause of self-care deficits   - Assess range of motion  - Assess patient's mobility; develop plan if impaired  - Assess patient's need for assistive devices and provide as appropriate  - Encourage maximum independence but intervene and supervise when necessary  - Involve family in performance of ADLs  - Assess for home care needs following discharge   - Consider OT consult to assist with ADL evaluation and planning for discharge  - Provide patient education as appropriate  Outcome: Progressing  Goal: Maintains/Returns to pre admission functional level  Description: INTERVENTIONS:  -  Perform AM-PAC 6 Click Basic Mobility/ Daily Activity assessment daily.  - Set and communicate daily mobility goal to care team and patient/family/caregiver.   - Collaborate with rehabilitation services on mobility goals if consulted  - Perform Range of Motion 3 times a day.  - Reposition patient every 2 hours.  - Dangle patient 3 times a day  - Stand patient 3 times a day  - Ambulate patient 3 times a day  - Out of bed to chair 3 times a day   - Out of bed for meals 3 times a day  - Out of bed for toileting  - Record patient progress and toleration of activity level   Outcome: Progressing     Problem: DISCHARGE PLANNING  Goal: Discharge to home or other facility with appropriate resources  Description: INTERVENTIONS:  - Identify barriers to discharge w/patient and caregiver  - Arrange for needed discharge resources and transportation as appropriate  - Identify discharge learning needs (meds, wound care, etc.)  - Arrange for interpretive services to assist at discharge as needed  - Refer to Case Management Department for coordinating discharge planning if the patient needs post-hospital services based on physician/advanced practitioner order or complex needs related to functional status, cognitive ability, or social support system  Outcome: Progressing     Problem: GASTROINTESTINAL - ADULT  Goal: Minimal or absence of nausea and/or vomiting  Description: INTERVENTIONS:  - Administer IV fluids if ordered to ensure adequate hydration  - Maintain NPO status until nausea and vomiting are resolved  - Nasogastric tube if ordered  - Administer ordered antiemetic medications as needed  - Provide nonpharmacologic comfort measures as appropriate  - Advance diet as tolerated, if ordered  - Consider nutrition services referral to assist patient with adequate nutrition and appropriate food choices  Outcome: Progressing  Goal: Maintains or returns to baseline bowel function  Description: INTERVENTIONS:  - Assess bowel function  -  Encourage oral fluids to ensure adequate hydration  - Administer IV fluids if ordered to ensure adequate hydration  - Administer ordered medications as needed  - Encourage mobilization and activity  - Consider nutritional services referral to assist patient with adequate nutrition and appropriate food choices  Outcome: Progressing  Goal: Maintains adequate nutritional intake  Description: INTERVENTIONS:  - Monitor percentage of each meal consumed  - Identify factors contributing to decreased intake, treat as appropriate  - Assist with meals as needed  - Monitor I&O, weight, and lab values if indicated  - Obtain nutrition services referral as needed  Outcome: Progressing  Goal: Oral mucous membranes remain intact  Description: INTERVENTIONS  - Assess oral mucosa and hygiene practices  - Implement preventative oral hygiene regimen  - Implement oral medicated treatments as ordered  - Initiate Nutrition services referral as needed  Outcome: Progressing     Problem: METABOLIC, FLUID AND ELECTROLYTES - ADULT  Goal: Electrolytes maintained within normal limits  Description: INTERVENTIONS:  - Monitor labs and assess patient for signs and symptoms of electrolyte imbalances  - Administer electrolyte replacement as ordered  - Monitor response to electrolyte replacements, including repeat lab results as appropriate  - Instruct patient on fluid and nutrition as appropriate  Outcome: Progressing  Goal: Fluid balance maintained  Description: INTERVENTIONS:  - Monitor labs   - Monitor I/O and WT  - Instruct patient on fluid and nutrition as appropriate  - Assess for signs & symptoms of volume excess or deficit  Outcome: Progressing  Goal: Glucose maintained within target range  Description: INTERVENTIONS:  - Monitor Blood Glucose as ordered  - Assess for signs and symptoms of hyperglycemia and hypoglycemia  - Administer ordered medications to maintain glucose within target range  - Assess nutritional intake and initiate nutrition  service referral as needed  Outcome: Progressing     Problem: MUSCULOSKELETAL - ADULT  Goal: Maintain or return mobility to safest level of function  Description: INTERVENTIONS:  - Assess patient's ability to carry out ADLs; assess patient's baseline for ADL function and identify physical deficits which impact ability to perform ADLs (bathing, care of mouth/teeth, toileting, grooming, dressing, etc.)  - Assess/evaluate cause of self-care deficits   - Assess range of motion  - Assess patient's mobility  - Assess patient's need for assistive devices and provide as appropriate  - Encourage maximum independence but intervene and supervise when necessary  - Involve family in performance of ADLs  - Assess for home care needs following discharge   - Consider OT consult to assist with ADL evaluation and planning for discharge  - Provide patient education as appropriate  Outcome: Progressing

## 2024-08-11 NOTE — PLAN OF CARE
Problem: Prexisting or High Potential for Compromised Skin Integrity  Goal: Skin integrity is maintained or improved  Description: INTERVENTIONS:  - Identify patients at risk for skin breakdown  - Assess and monitor skin integrity  - Assess and monitor nutrition and hydration status  - Monitor labs   - Assess for incontinence   - Turn and reposition patient  - Assist with mobility/ambulation  - Relieve pressure over bony prominences  - Avoid friction and shearing  - Provide appropriate hygiene as needed including keeping skin clean and dry  - Evaluate need for skin moisturizer/barrier cream  - Collaborate with interdisciplinary team   - Patient/family teaching  - Consider wound care consult   Outcome: Progressing     Problem: PAIN - ADULT  Goal: Verbalizes/displays adequate comfort level or baseline comfort level  Description: Interventions:  - Encourage patient to monitor pain and request assistance  - Assess pain using appropriate pain scale  - Administer analgesics based on type and severity of pain and evaluate response  - Implement non-pharmacological measures as appropriate and evaluate response  - Consider cultural and social influences on pain and pain management  - Notify physician/advanced practitioner if interventions unsuccessful or patient reports new pain  Outcome: Progressing     Problem: INFECTION - ADULT  Goal: Absence or prevention of progression during hospitalization  Description: INTERVENTIONS:  - Assess and monitor for signs and symptoms of infection  - Monitor lab/diagnostic results  - Monitor all insertion sites, i.e. indwelling lines, tubes, and drains  - Monitor endotracheal if appropriate and nasal secretions for changes in amount and color  - [place] appropriate cooling/warming therapies per order  - Administer medications as ordered  - Instruct and encourage patient and family to use good hand hygiene technique  - Identify and instruct in appropriate isolation precautions for  identified infection/condition  Outcome: Progressing  Goal: Absence of fever/infection during neutropenic period  Description: INTERVENTIONS:  - Monitor WBC    Outcome: Progressing     Problem: SAFETY ADULT  Goal: Patient will remain free of falls  Description: INTERVENTIONS:  - Educate patient/family on patient safety including physical limitations  - Instruct patient to call for assistance with activity   - Consult OT/PT to assist with strengthening/mobility   - Keep Call bell within reach  - Keep bed low and locked with side rails adjusted as appropriate  - Keep care items and personal belongings within reach  - Initiate and maintain comfort rounds  - Make Fall Risk Sign visible to staff  - Offer Toileting every 2  Problem: DISCHARGE PLANNING  Goal: Discharge to home or other facility with appropriate resources  Description: INTERVENTIONS:  - Identify barriers to discharge w/patient and caregiver  - Arrange for needed discharge resources and transportation as appropriate  - Identify discharge learning needs (meds, wound care, etc.)  - Arrange for interpretive services to assist at discharge as needed  - Refer to Case Management Department for coordinating discharge planning if the patient needs post-hospital services based on physician/advanced practitioner order or complex needs related to functional status, cognitive ability, or social support system  Outcome: Progressing     Problem: GASTROINTESTINAL - ADULT  Goal: Minimal or absence of nausea and/or vomiting  Description: INTERVENTIONS:  - Administer IV fluids if ordered to ensure adequate hydration  - Maintain NPO status until nausea and vomiting are resolved  - Nasogastric tube if ordered  - Administer ordered antiemetic medications as needed  - Provide nonpharmacologic comfort measures as appropriate  - Advance diet as tolerated, if ordered  - Consider nutrition services referral to assist patient with adequate nutrition and appropriate food  choices  Outcome: Progressing  Goal: Maintains or returns to baseline bowel function  Description: INTERVENTIONS:  - Assess bowel function  - Encourage oral fluids to ensure adequate hydration  - Administer IV fluids if ordered to ensure adequate hydration  - Administer ordered medications as needed  - Encourage mobilization and activity  - Consider nutritional services referral to assist patient with adequate nutrition and appropriate food choices  Outcome: Progressing  Goal: Maintains adequate nutritional intake  Description: INTERVENTIONS:  - Monitor percentage of each meal consumed  - Identify factors contributing to decreased intake, treat as appropriate  - Assist with meals as needed  - Monitor I&O, weight, and lab values if indicated  - Obtain nutrition services referral as needed  Outcome: Progressing  Goal: Oral mucous membranes remain intact  Description: INTERVENTIONS  - Assess oral mucosa and hygiene practices  - Implement preventative oral hygiene regimen  - Implement oral medicated treatments as ordered  - Initiate Nutrition services referral as needed  Outcome: Progressing     Problem: METABOLIC, FLUID AND ELECTROLYTES - ADULT  Goal: Electrolytes maintained within normal limits  Description: INTERVENTIONS:  - Monitor labs and assess patient for signs and symptoms of electrolyte imbalances  - Administer electrolyte replacement as ordered  - Monitor response to electrolyte replacements, including repeat lab results as appropriate  - Instruct patient on fluid and nutrition as appropriate  Outcome: Progressing  Goal: Fluid balance maintained  Description: INTERVENTIONS:  - Monitor labs   - Monitor I/O and WT  - Instruct patient on fluid and nutrition as appropriate  - Assess for signs & symptoms of volume excess or deficit  Outcome: Progressing  Goal: Glucose maintained within target range  Description: INTERVENTIONS:  - Monitor Blood Glucose as ordered  - Assess for signs and symptoms of hyperglycemia  and hypoglycemia  - Administer ordered medications to maintain glucose within target range  - Assess nutritional intake and initiate nutrition service referral as needed  Outcome: Progressing     Problem: MUSCULOSKELETAL - ADULT  Goal: Maintain or return mobility to safest level of function  Description: INTERVENTIONS:  - Assess patient's ability to carry out ADLs; assess patient's baseline for ADL function and identify physical deficits which impact ability to perform ADLs (bathing, care of mouth/teeth, toileting, grooming, dressing, etc.)  - Assess/evaluate cause of self-care deficits   - Assess range of motion  - Assess patient's mobility  - Assess patient's need for assistive devices and provide as appropriate  - Encourage maximum independence but intervene and supervise when necessary  - Involve family in performance of ADLs  - Assess for home care needs following discharge   - Consider OT consult to assist with ADL evaluation and planning for discharge  - Provide patient education as appropriate  Outcome: Progressing     Problem: Nutrition/Hydration-ADULT  Goal: Nutrient/Hydration intake appropriate for improving, restoring or maintaining nutritional needs  Description: Monitor and assess patient's nutrition/hydration status for malnutrition. Collaborate with interdisciplinary team and initiate plan and interventions as ordered.  Monitor patient's weight and dietary intake as ordered or per policy. Utilize nutrition screening tool and intervene as necessary. Determine patient's food preferences and provide high-protein, high-caloric foods as appropriate.     INTERVENTIONS:  - Monitor oral intake, urinary output, labs, and treatment plans  - Assess nutrition and hydration status and recommend course of action  - Evaluate amount of meals eaten  - Assist patient with eating if necessary   - Allow adequate time for meals  - Recommend/ encourage appropriate diets, oral nutritional supplements, and vitamin/mineral  supplements  - Order, calculate, and assess calorie counts as needed  - Recommend, monitor, and adjust tube feedings and TPN/PPN based on assessed needs  - Assess need for intravenous fluids  - Provide specific nutrition/hydration education as appropriate  - Include patient/family/caregiver in decisions related to nutrition  Outcome: Progressing    Hours, in advance of need  - Initiate/Maintain bed alarm  - Apply yellow socks and bracelet for high fall risk patients  - Consider moving patient to room near nurses station  Outcome: Progressing  Goal: Maintain or return to baseline ADL function  Description: INTERVENTIONS:  -  Assess patient's ability to carry out ADLs; assess patient's baseline for ADL function and identify physical deficits which impact ability to perform ADLs (bathing, care of mouth/teeth, toileting, grooming, dressing, etc.)  - Assess/evaluate cause of self-care deficits   - Assess range of motion  - Assess patient's mobility; develop plan if impaired  - Assess patient's need for assistive devices and provide as appropriate  - Encourage maximum independence but intervene and supervise when necessary  - Involve family in performance of ADLs  - Assess for home care needs following discharge   - Consider OT consult to assist with ADL evaluation and planning for discharge  - Provide patient education as appropriate  Outcome: Progressing  Goal: Maintains/Returns to pre admission functional level  Description: INTERVENTIONS:  - Perform AM-PAC 6 Click Basic Mobility/ Daily Activity assessment daily.  - Set and communicate daily mobility goal to care team and patient/family/caregiver.   - Collaborate with rehabilitation services on mobility goals if consulted  - Perform Range of Motion 6 times a day.  - Out of bed for toileting  - Record patient progress and toleration of activity level   Outcome: Progressing

## 2024-08-11 NOTE — PROGRESS NOTES
"Progress Note - General Surgery   Mario Rollins 59 y.o. male MRN: [id number]  Unit/Bed#: [address] [place] [address] Encounter: [id number]    Assessment:  Small bowel obstruction  -- s/p ex lap with CARMEN earlier this year, persistently large amount of NG tube output  SALLY on CKD  -- baseline creatinine appears to be 0.8 to 1.0  Right lower lobe effusion vs early pneumonia  Fever -- Tmax 101.2 overnight, heart rate varies 80s to 110s       Plan:  NG tube advanced this morning to correct position.   Continue NPO with IV fluid hydration  We will start Rocephin and Flagyl for possible right lower lobe aspiration pneumonia  IV phosphorus repletion  Will check procalcitonin       Subjective/Objective     Subjective:  Patient reports feeling better than yesterday    Objective:   NG tube output 3 liters     Blood pressure 128/90, pulse (!) 111, temperature 100.5 °F (38.1 °C), resp. rate 18, height 5' 8\" (1.727 m), weight 77.5 kg (170 lb 14.4 oz), SpO2 90%.,Body mass index is 25.99 kg/m².      Intake/Output Summary (Last 24 hours) at 8/11/2024 0908  Last data filed at 8/11/2024 0626  Gross per 24 hour   Intake 30 ml   Output 3860 ml   Net -3830 ml       Invasive Devices       Peripheral Intravenous Line  Duration             Peripheral IV 08/09/24 Left;Ventral (anterior) Forearm 1 day              Drain  Duration             NG/OG/Enteral Tube 16 Fr Left nare 1 day                    Physical Exam: /90   Pulse (!) 111   Temp 100.5 °F (38.1 °C)   Resp 18   Ht 5' 8\" (1.727 m)   Wt 77.5 kg (170 lb 14.4 oz)   SpO2 90%   BMI 25.99 kg/m²   General appearance: alert and oriented, in no acute distress  Head: Normocephalic, without obvious abnormality, atraumatic  Lungs:  No respiratory distress, hiccupping  Heart:  Occasional mild tachycardia  Abdomen:  still some distention but softer, non tender, NGT in place   Extremities: extremities normal, warm and well-perfused; no cyanosis, clubbing, or edema  Skin: Skin color, texture, " "turgor normal. No rashes or lesions    Lab, Imaging and other studies:I have personally reviewed pertinent lab results.  , CBC:   Lab Results   Component Value Date    WBC 6.31 08/11/2024    HGB 16.3 08/11/2024    HCT 50.2 (H) 08/11/2024    MCV 88 08/11/2024     08/11/2024    RBC 5.72 (H) 08/11/2024    MCH 28.5 08/11/2024    MCHC 32.5 08/11/2024    RDW 13.6 08/11/2024    MPV 10.4 08/11/2024    NRBC 0 08/11/2024   , CMP:   Lab Results   Component Value Date    SODIUM 143 08/11/2024    K 3.9 08/11/2024     08/11/2024    CO2 29 08/11/2024    BUN 30 (H) 08/11/2024    CREATININE 1.35 (H) 08/11/2024    CALCIUM 8.6 08/11/2024    EGFR 57 08/11/2024   , Coagulation: No results found for: \"PT\", \"INR\", \"APTT\", Urinalysis: No results found for: \"COLORU\", \"CLARITYU\", \"SPECGRAV\", \"PHUR\", \"LEUKOCYTESUR\", \"NITRITE\", \"PROTEINUA\", \"GLUCOSEU\", \"KETONESU\", \"BILIRUBINUR\", \"BLOODU\", Lipase: No results found for: \"LIPASE\"  VTE Pharmacologic Prophylaxis: Heparin SQ  VTE Mechanical Prophylaxis: sequential compression device  "

## 2024-08-12 PROBLEM — J69.0 ASPIRATION PNEUMONIA (HCC): Status: ACTIVE | Noted: 2024-08-12

## 2024-08-12 LAB
ANION GAP SERPL CALCULATED.3IONS-SCNC: 5 MMOL/L (ref 4–13)
BASOPHILS # BLD AUTO: 0.02 THOUSANDS/ÂΜL (ref 0–0.1)
BASOPHILS NFR BLD AUTO: 0 % (ref 0–1)
BUN SERPL-MCNC: 24 MG/DL (ref 5–25)
CALCIUM SERPL-MCNC: 8.2 MG/DL (ref 8.4–10.2)
CHLORIDE SERPL-SCNC: 110 MMOL/L (ref 96–108)
CO2 SERPL-SCNC: 29 MMOL/L (ref 21–32)
CREAT SERPL-MCNC: 1.09 MG/DL (ref 0.6–1.3)
EOSINOPHIL # BLD AUTO: 0.04 THOUSAND/ÂΜL (ref 0–0.61)
EOSINOPHIL NFR BLD AUTO: 1 % (ref 0–6)
ERYTHROCYTE [DISTWIDTH] IN BLOOD BY AUTOMATED COUNT: 13.6 % (ref 11.6–15.1)
GFR SERPL CREATININE-BSD FRML MDRD: 73 ML/MIN/1.73SQ M
GLUCOSE SERPL-MCNC: 98 MG/DL (ref 65–140)
HCT VFR BLD AUTO: 45.2 % (ref 36.5–49.3)
HGB BLD-MCNC: 14.3 G/DL (ref 12–17)
IMM GRANULOCYTES # BLD AUTO: 0.03 THOUSAND/UL (ref 0–0.2)
IMM GRANULOCYTES NFR BLD AUTO: 1 % (ref 0–2)
LYMPHOCYTES # BLD AUTO: 0.75 THOUSANDS/ÂΜL (ref 0.6–4.47)
LYMPHOCYTES NFR BLD AUTO: 13 % (ref 14–44)
MCH RBC QN AUTO: 28.5 PG (ref 26.8–34.3)
MCHC RBC AUTO-ENTMCNC: 31.6 G/DL (ref 31.4–37.4)
MCV RBC AUTO: 90 FL (ref 82–98)
MONOCYTES # BLD AUTO: 0.88 THOUSAND/ÂΜL (ref 0.17–1.22)
MONOCYTES NFR BLD AUTO: 15 % (ref 4–12)
MRSA NOSE QL CULT: NORMAL
NEUTROPHILS # BLD AUTO: 3.98 THOUSANDS/ÂΜL (ref 1.85–7.62)
NEUTS SEG NFR BLD AUTO: 70 % (ref 43–75)
NRBC BLD AUTO-RTO: 0 /100 WBCS
PHOSPHATE SERPL-MCNC: 1.8 MG/DL (ref 2.7–4.5)
PLATELET # BLD AUTO: 158 THOUSANDS/UL (ref 149–390)
PMV BLD AUTO: 10.2 FL (ref 8.9–12.7)
POTASSIUM SERPL-SCNC: 3.6 MMOL/L (ref 3.5–5.3)
RBC # BLD AUTO: 5.02 MILLION/UL (ref 3.88–5.62)
SODIUM SERPL-SCNC: 144 MMOL/L (ref 135–147)
WBC # BLD AUTO: 5.7 THOUSAND/UL (ref 4.31–10.16)

## 2024-08-12 PROCEDURE — 99221 1ST HOSP IP/OBS SF/LOW 40: CPT | Performed by: INTERNAL MEDICINE

## 2024-08-12 PROCEDURE — 84100 ASSAY OF PHOSPHORUS: CPT | Performed by: PHYSICIAN ASSISTANT

## 2024-08-12 PROCEDURE — 85025 COMPLETE CBC W/AUTO DIFF WBC: CPT | Performed by: PHYSICIAN ASSISTANT

## 2024-08-12 PROCEDURE — 80048 BASIC METABOLIC PNL TOTAL CA: CPT | Performed by: PHYSICIAN ASSISTANT

## 2024-08-12 PROCEDURE — 99232 SBSQ HOSP IP/OBS MODERATE 35: CPT | Performed by: SURGERY

## 2024-08-12 RX ORDER — DEXTROSE MONOHYDRATE, SODIUM CHLORIDE, AND POTASSIUM CHLORIDE 50; 1.49; 4.5 G/1000ML; G/1000ML; G/1000ML
100 INJECTION, SOLUTION INTRAVENOUS CONTINUOUS
Status: DISCONTINUED | OUTPATIENT
Start: 2024-08-12 | End: 2024-08-17

## 2024-08-12 RX ORDER — NEBIVOLOL 2.5 MG/1
2.5 TABLET ORAL DAILY
Status: DISCONTINUED | OUTPATIENT
Start: 2024-08-13 | End: 2024-08-18 | Stop reason: HOSPADM

## 2024-08-12 RX ORDER — ATORVASTATIN CALCIUM 10 MG/1
10 TABLET, FILM COATED ORAL EVERY EVENING
Status: DISCONTINUED | OUTPATIENT
Start: 2024-08-12 | End: 2024-08-18 | Stop reason: HOSPADM

## 2024-08-12 RX ADMIN — SERTRALINE HYDROCHLORIDE 50 MG: 50 TABLET ORAL at 08:22

## 2024-08-12 RX ADMIN — METRONIDAZOLE 500 MG: 500 INJECTION, SOLUTION INTRAVENOUS at 12:07

## 2024-08-12 RX ADMIN — LAMOTRIGINE 150 MG: 100 TABLET ORAL at 20:23

## 2024-08-12 RX ADMIN — SODIUM CHLORIDE, SODIUM LACTATE, POTASSIUM CHLORIDE, AND CALCIUM CHLORIDE 125 ML/HR: .6; .31; .03; .02 INJECTION, SOLUTION INTRAVENOUS at 10:52

## 2024-08-12 RX ADMIN — SODIUM PHOSPHATE, MONOBASIC, MONOHYDRATE AND SODIUM PHOSPHATE, DIBASIC, ANHYDROUS 21 MMOL: 142; 276 INJECTION, SOLUTION INTRAVENOUS at 11:58

## 2024-08-12 RX ADMIN — SODIUM CHLORIDE, SODIUM LACTATE, POTASSIUM CHLORIDE, AND CALCIUM CHLORIDE 125 ML/HR: .6; .31; .03; .02 INJECTION, SOLUTION INTRAVENOUS at 02:34

## 2024-08-12 RX ADMIN — HEPARIN SODIUM 5000 UNITS: 5000 INJECTION INTRAVENOUS; SUBCUTANEOUS at 22:08

## 2024-08-12 RX ADMIN — CEFTRIAXONE 1000 MG: 1 INJECTION, SOLUTION INTRAVENOUS at 10:51

## 2024-08-12 RX ADMIN — QUETIAPINE FUMARATE 50 MG: 25 TABLET ORAL at 22:08

## 2024-08-12 RX ADMIN — HEPARIN SODIUM 5000 UNITS: 5000 INJECTION INTRAVENOUS; SUBCUTANEOUS at 14:34

## 2024-08-12 RX ADMIN — DEXTROSE, SODIUM CHLORIDE, AND POTASSIUM CHLORIDE 100 ML/HR: 5; .45; .15 INJECTION INTRAVENOUS at 23:09

## 2024-08-12 RX ADMIN — PANTOPRAZOLE SODIUM 40 MG: 40 INJECTION, POWDER, FOR SOLUTION INTRAVENOUS at 06:43

## 2024-08-12 RX ADMIN — METRONIDAZOLE 500 MG: 500 INJECTION, SOLUTION INTRAVENOUS at 04:58

## 2024-08-12 RX ADMIN — HEPARIN SODIUM 5000 UNITS: 5000 INJECTION INTRAVENOUS; SUBCUTANEOUS at 05:00

## 2024-08-12 RX ADMIN — LAMOTRIGINE 200 MG: 100 TABLET ORAL at 08:22

## 2024-08-12 RX ADMIN — DEXTROSE, SODIUM CHLORIDE, AND POTASSIUM CHLORIDE 100 ML/HR: 5; .45; .15 INJECTION INTRAVENOUS at 11:58

## 2024-08-12 RX ADMIN — ATORVASTATIN CALCIUM 10 MG: 10 TABLET, FILM COATED ORAL at 20:23

## 2024-08-12 RX ADMIN — METRONIDAZOLE 500 MG: 500 INJECTION, SOLUTION INTRAVENOUS at 20:23

## 2024-08-12 NOTE — PLAN OF CARE
Problem: Prexisting or High Potential for Compromised Skin Integrity  Goal: Skin integrity is maintained or improved  Description: INTERVENTIONS:  - Identify patients at risk for skin breakdown  - Assess and monitor skin integrity  - Assess and monitor nutrition and hydration status  - Monitor labs   - Assess for incontinence   - Turn and reposition patient  - Assist with mobility/ambulation  - Relieve pressure over bony prominences  - Avoid friction and shearing  - Provide appropriate hygiene as needed including keeping skin clean and dry  - Evaluate need for skin moisturizer/barrier cream  - Collaborate with interdisciplinary team   - Patient/family teaching  - Consider wound care consult   Outcome: Progressing     Problem: PAIN - ADULT  Goal: Verbalizes/displays adequate comfort level or baseline comfort level  Description: Interventions:  - Encourage patient to monitor pain and request assistance  - Assess pain using appropriate pain scale  - Administer analgesics based on type and severity of pain and evaluate response  - Implement non-pharmacological measures as appropriate and evaluate response  - Consider cultural and social influences on pain and pain management  - Notify physician/advanced practitioner if interventions unsuccessful or patient reports new pain  Outcome: Progressing     Problem: INFECTION - ADULT  Goal: Absence or prevention of progression during hospitalization  Description: INTERVENTIONS:  - Assess and monitor for signs and symptoms of infection  - Monitor lab/diagnostic results  - Monitor all insertion sites, i.e. indwelling lines, tubes, and drains  - Monitor endotracheal if appropriate and nasal secretions for changes in amount and color  - [place] appropriate cooling/warming therapies per order  - Administer medications as ordered  - Instruct and encourage patient and family to use good hand hygiene technique  - Identify and instruct in appropriate isolation precautions for  identified infection/condition  Outcome: Progressing  Goal: Absence of fever/infection during neutropenic period  Description: INTERVENTIONS:  - Monitor WBC    Outcome: Progressing     Problem: SAFETY ADULT  Goal: Patient will remain free of falls  Description: INTERVENTIONS:  - Educate patient/family on patient safety including physical limitations  - Instruct patient to call for assistance with activity   - Consult OT/PT to assist with strengthening/mobility   - Keep Call bell within reach  - Keep bed low and locked with side rails adjusted as appropriate  - Keep care items and personal belongings within reach  - Initiate and maintain comfort rounds  - Make Fall Risk Sign visible to staff  - Offer Toileting every 2 Hours, in advance of need  - Initiate/Maintain bed alarm  - Obtain necessary fall risk management equipment: fall risk sign on door  - Apply yellow socks and bracelet for high fall risk patients  - Consider moving patient to room near nurses station  Outcome: Progressing  Goal: Maintain or return to baseline ADL function  Description: INTERVENTIONS:  -  Assess patient's ability to carry out ADLs; assess patient's baseline for ADL function and identify physical deficits which impact ability to perform ADLs (bathing, care of mouth/teeth, toileting, grooming, dressing, etc.)  - Assess/evaluate cause of self-care deficits   - Assess range of motion  - Assess patient's mobility; develop plan if impaired  - Assess patient's need for assistive devices and provide as appropriate  - Encourage maximum independence but intervene and supervise when necessary  - Involve family in performance of ADLs  - Assess for home care needs following discharge   - Consider OT consult to assist with ADL evaluation and planning for discharge  - Provide patient education as appropriate  Outcome: Progressing  Goal: Maintains/Returns to pre admission functional level  Description: INTERVENTIONS:  - Perform AM-PAC 6 Click Basic  Mobility/ Daily Activity assessment daily.  - Set and communicate daily mobility goal to care team and patient/family/caregiver.   - Collaborate with rehabilitation services on mobility goals if consulted  - Perform Range of Motion 2 times a day.  - Reposition patient every 2 hours.  - Dangle patient 2 times a day  - Stand patient 2 times a day  - Ambulate patient 3 times a day  - Out of bed to chair 3 times a day   - Out of bed for meals 3 times a day  - Out of bed for toileting  - Record patient progress and toleration of activity level   Outcome: Progressing     Problem: DISCHARGE PLANNING  Goal: Discharge to home or other facility with appropriate resources  Description: INTERVENTIONS:  - Identify barriers to discharge w/patient and caregiver  - Arrange for needed discharge resources and transportation as appropriate  - Identify discharge learning needs (meds, wound care, etc.)  - Arrange for interpretive services to assist at discharge as needed  - Refer to Case Management Department for coordinating discharge planning if the patient needs post-hospital services based on physician/advanced practitioner order or complex needs related to functional status, cognitive ability, or social support system  Outcome: Progressing     Problem: GASTROINTESTINAL - ADULT  Goal: Minimal or absence of nausea and/or vomiting  Description: INTERVENTIONS:  - Administer IV fluids if ordered to ensure adequate hydration  - Maintain NPO status until nausea and vomiting are resolved  - Nasogastric tube if ordered  - Administer ordered antiemetic medications as needed  - Provide nonpharmacologic comfort measures as appropriate  - Advance diet as tolerated, if ordered  - Consider nutrition services referral to assist patient with adequate nutrition and appropriate food choices  Outcome: Progressing  Goal: Maintains or returns to baseline bowel function  Description: INTERVENTIONS:  - Assess bowel function  - Encourage oral fluids to  ensure adequate hydration  - Administer IV fluids if ordered to ensure adequate hydration  - Administer ordered medications as needed  - Encourage mobilization and activity  - Consider nutritional services referral to assist patient with adequate nutrition and appropriate food choices  Outcome: Progressing  Goal: Maintains adequate nutritional intake  Description: INTERVENTIONS:  - Monitor percentage of each meal consumed  - Identify factors contributing to decreased intake, treat as appropriate  - Assist with meals as needed  - Monitor I&O, weight, and lab values if indicated  - Obtain nutrition services referral as needed  Outcome: Progressing  Goal: Oral mucous membranes remain intact  Description: INTERVENTIONS  - Assess oral mucosa and hygiene practices  - Implement preventative oral hygiene regimen  - Implement oral medicated treatments as ordered  - Initiate Nutrition services referral as needed  Outcome: Progressing     Problem: METABOLIC, FLUID AND ELECTROLYTES - ADULT  Goal: Electrolytes maintained within normal limits  Description: INTERVENTIONS:  - Monitor labs and assess patient for signs and symptoms of electrolyte imbalances  - Administer electrolyte replacement as ordered  - Monitor response to electrolyte replacements, including repeat lab results as appropriate  - Instruct patient on fluid and nutrition as appropriate  Outcome: Progressing  Goal: Fluid balance maintained  Description: INTERVENTIONS:  - Monitor labs   - Monitor I/O and WT  - Instruct patient on fluid and nutrition as appropriate  - Assess for signs & symptoms of volume excess or deficit  Outcome: Progressing  Goal: Glucose maintained within target range  Description: INTERVENTIONS:  - Monitor Blood Glucose as ordered  - Assess for signs and symptoms of hyperglycemia and hypoglycemia  - Administer ordered medications to maintain glucose within target range  - Assess nutritional intake and initiate nutrition service referral as  needed  Outcome: Progressing     Problem: MUSCULOSKELETAL - ADULT  Goal: Maintain or return mobility to safest level of function  Description: INTERVENTIONS:  - Assess patient's ability to carry out ADLs; assess patient's baseline for ADL function and identify physical deficits which impact ability to perform ADLs (bathing, care of mouth/teeth, toileting, grooming, dressing, etc.)  - Assess/evaluate cause of self-care deficits   - Assess range of motion  - Assess patient's mobility  - Assess patient's need for assistive devices and provide as appropriate  - Encourage maximum independence but intervene and supervise when necessary  - Involve family in performance of ADLs  - Assess for home care needs following discharge   - Consider OT consult to assist with ADL evaluation and planning for discharge  - Provide patient education as appropriate  Outcome: Progressing     Problem: Nutrition/Hydration-ADULT  Goal: Nutrient/Hydration intake appropriate for improving, restoring or maintaining nutritional needs  Description: Monitor and assess patient's nutrition/hydration status for malnutrition. Collaborate with interdisciplinary team and initiate plan and interventions as ordered.  Monitor patient's weight and dietary intake as ordered or per policy. Utilize nutrition screening tool and intervene as necessary. Determine patient's food preferences and provide high-protein, high-caloric foods as appropriate.     INTERVENTIONS:  - Monitor oral intake, urinary output, labs, and treatment plans  - Assess nutrition and hydration status and recommend course of action  - Evaluate amount of meals eaten  - Assist patient with eating if necessary   - Allow adequate time for meals  - Recommend/ encourage appropriate diets, oral nutritional supplements, and vitamin/mineral supplements  - Order, calculate, and assess calorie counts as needed  - Recommend, monitor, and adjust tube feedings and TPN/PPN based on assessed needs  - Assess  need for intravenous fluids  - Provide specific nutrition/hydration education as appropriate  - Include patient/family/caregiver in decisions related to nutrition  Outcome: Progressing

## 2024-08-12 NOTE — CONSULTS
Inpatient Medical Consultation - [facility]    Patient Information: Mario Rollins 59 y.o. male MRN: [id number]  Unit/Bed#: [address] [place] [address] Encounter: [id number]    PCP: No primary care provider on file.  Date of Admission:  8/9/2024  Date of Consultation: 08/12/24  Requesting Physician: Milton Handley MD    Reason For Consultation:     Fever    History of Present Illness:    Mario Rollins is a 59 y.o. male group home resident with history of cerebral palsy, CKD, hemochromatosis, left MCA CVA with right hemiparesis, focal epilepsy, history of small bowel obstruction secondary to volvulus status post exploratory laparotomy 2024 who is originally admitted to the general surgical service on 8/9/2024 due to abdominal distention, pain and diaphoresis. In ED CT scan revealed small bowel obstruction with transition at terminal ileum likely related to adhesions.  Small amount of ascites was noted.  Labs on admission revealed leukocytosis, hemoconcentration, acute kidney injury.  Patient was admitted, continued on conservative management for small bowel obstruction.  Patient will continue to remain her volume of NG tube aspirate.  On 8/11 patient was noted to be febrile with 101.2 °F, CT chest abdomen pelvis revealed atelectasis versus right lower lobe pneumonia.  Patient was started on IV antibiotics with concerns of aspiration pneumonia/pneumonitis.  Medical management consultation was requested for further evaluation.  Today patient was noted to be sitting up in recliner, NG tube in place with greenish output.  Patient remains with low-grade fever though Tmax is improving.  Saturating adequately on room air.  Patient denies any chest pain or shortness of breath but reports having nonproductive cough.  Denies abdominal pain, reports hiccups.    Review of Systems:    Review of Systems   Constitutional:  Positive for fever.   Respiratory:  Positive for cough. Negative for shortness of breath.     Cardiovascular:  Negative for chest pain.   Gastrointestinal:  Positive for abdominal distention. Negative for abdominal pain.   Psychiatric/Behavioral:  Negative for behavioral problems.        Past Medical and Surgical History:     Past Medical History:   Diagnosis Date    Ambulatory dysfunction     Anxiety     Bilateral impacted cerumen     last assessed 05/30/2013    Capsulitis     last assessed 04/26/2016    Cellulitis of finger 01/13/2012    Chronic kidney disease     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)     Closed fracture of one or more phalanges of foot 01/06/2009    Constipation     Deformity     left and right foot and ankle ,right hand    Depression     Epilepsy (HCC)     Erythrocytosis     Hemiplegia affecting dominant side (HCC)     Hypertension     Hypoglycemia 11/08/2011    Hypokalemia     last assessed 05/30/2013    Mental retardation     Mood disorder (HCC)     Nephrocalcinosis     chronic    Polycystic kidney     Bilateral    Small bowel obstruction (HCC)        Past Surgical History:   Procedure Laterality Date    BRAIN SURGERY      EXPLORATORY LAPAROTOMY W/ BOWEL RESECTION N/A 4/6/2024    Procedure: LAPAROTOMY EXPLORATORY WITH LYSIS OF ADHESIONS AND DECOMPRESSION;  Surgeon: Saúl Woods MD;  Location: [facility] MAIN OR;  Service: General    ME ARTHROCENTESIS ASPIR&/INJ MAJOR JT/BURSA W/O US Bilateral 5/27/2021    Procedure: Hip intra-articular corticosteroid injection ( 06642 27410-20);  Surgeon: Lianet Astorga MD;  Location: [facility] MAIN OR;  Service: Pain Management     ME COLONOSCOPY FLX DX W/COLLJ SPEC WHEN PFRMD N/A 2/12/2016    Procedure: COLONOSCOPY;  Surgeon: Abhilash Ace MD;  Location: [facility] GI LAB;  Service: Gastroenterology    ME INJECT SI JOINT ARTHRGRPHY&/ANES/STEROID W/RIMA Left 3/22/2023    Procedure: SACROILIAC joint injection (52650 );  Surgeon: Larry Smith DO;  Location: [facility] MAIN OR;  Service: Pain Management        Meds/Allergies:    PTA meds:   Prior to  Admission Medications   Prescriptions Last Dose Informant Patient Reported? Taking?   QUEtiapine (SEROquel) 50 mg tablet Past Week EMS/Transport Team Yes Yes   Sig: Take 50 mg by mouth daily at bedtime    acetaminophen (TYLENOL) 325 mg tablet  EMS/Transport Team No No   Sig: Take 1 tablet (325 mg total) by mouth every 6 (six) hours as needed for mild pain, moderate pain or headaches   Patient not taking: Reported on 8/2/2024   atorvastatin (LIPITOR) 10 mg tablet Past Week EMS/Transport Team No Yes   Sig: Take 1 tablet (10 mg total) by mouth daily Take at 8 PM   ibuprofen (MOTRIN) 200 mg tablet  EMS/Transport Team Yes No   Sig: Take 400 mg by mouth every 6 (six) hours as needed for mild pain   Patient not taking: Reported on 8/2/2024   lamoTRIgine (LaMICtal) 150 MG tablet Past Week EMS/Transport Team No Yes   Sig: Take 1 tab by mouth twice a day at 8AM and 8PM   lamoTRIgine (LaMICtal) 25 mg tablet Past Week EMS/Transport Team No Yes   Sig: TAKE TWO TABLETS BY MOUTH DAILY AT 8AM WITH 150MG TO TOTAL 200MG   nebivolol (BYSTOLIC) 2.5 mg tablet Past Week EMS/Transport Team Yes Yes   polyethylene glycol (MIRALAX) 17 g packet  EMS/Transport Team No No   Sig: Take 17 g by mouth 2 (two) times a week   Patient taking differently: Take 17 g by mouth 2 (two) times a week Monday, wed, friday   saccharomyces boulardii (Florastor) 250 mg capsule Past Week EMS/Transport Team Yes Yes   Sig: Take 250 mg by mouth   sertraline (ZOLOFT) 50 mg tablet Past Week EMS/Transport Team Yes Yes   Sig: Take by mouth daily      Facility-Administered Medications: None       Allergies:   Allergies   Allergen Reactions    Aspirin Other (See Comments) and Anaphylaxis     Reaction Date: 25Aug2008;     unknown     History:     Marital Status: Single    Substance Use History:   Social History     Substance and Sexual Activity   Alcohol Use Not Currently     Social History     Tobacco Use   Smoking Status Never   Smokeless Tobacco Never     Social History  "    Substance and Sexual Activity   Drug Use No       Family History:    Family History   Problem Relation Age of Onset    Emphysema Mother     Nephrolithiasis Mother     Heart attack Father         acute MI    Hypertension Father     Nephrolithiasis Father     Nephrolithiasis Brother        Physical Exam:     Vitals:   Blood Pressure: 134/79 (08/12/24 0720)  Pulse: 72 (08/12/24 0720)  Temperature: 99.4 °F (37.4 °C) (08/12/24 0720)  Temp Source: Axillary (08/11/24 0921)  Respirations: 18 (08/12/24 0720)  Height: 5' 8\" (172.7 cm) (08/09/24 2231)  Weight - Scale: 77.5 kg (170 lb 14.4 oz) (08/09/24 2231)  SpO2: 90 % (08/12/24 0720)    Physical Exam  Constitutional:       General: He is not in acute distress.  HENT:      Head: Normocephalic and atraumatic.      Nose:      Comments: NG tube with decreased output  Cardiovascular:      Rate and Rhythm: Normal rate.   Pulmonary:      Effort: Pulmonary effort is normal. No respiratory distress.      Breath sounds: Examination of the right-middle field reveals decreased breath sounds. Examination of the right-lower field reveals decreased breath sounds. Examination of the left-lower field reveals decreased breath sounds. Decreased breath sounds present. No rhonchi.   Abdominal:      General: There is distension.      Palpations: Abdomen is soft.      Tenderness: There is no abdominal tenderness. There is no guarding or rebound.   Musculoskeletal:      Right lower leg: No edema.      Left lower leg: No edema.      Comments: Right-sided paresis   Skin:     General: Skin is warm and dry.      Findings: No rash.   Neurological:      Mental Status: He is alert.      Cranial Nerves: No cranial nerve deficit.           Lab Results: I Have Reviewed All Lab Data Below:    Results from last 7 days   Lab Units 08/12/24  0515   WBC Thousand/uL 5.70   HEMOGLOBIN g/dL 14.3   HEMATOCRIT % 45.2   PLATELETS Thousands/uL 158   SEGS PCT % 70   LYMPHO PCT % 13*   MONO PCT % 15*   EOS PCT % 1 "     Results from last 7 days   Lab Units 08/12/24  0515 08/11/24  0437 08/10/24  0458   POTASSIUM mmol/L 3.6   < > 4.1   CHLORIDE mmol/L 110*   < > 108   CO2 mmol/L 29   < > 23   BUN mg/dL 24   < > 22   CREATININE mg/dL 1.09   < > 1.27   CALCIUM mg/dL 8.2*   < > 9.0   ALK PHOS U/L  --   --  75   ALT U/L  --   --  11   AST U/L  --   --  12*    < > = values in this interval not displayed.           * Additional Pertinent Lab Tests Reviewed: All Labs For Current Hospital Admission Reviewed    Imaging: I have personally reviewed pertinent reports.      CT chest abdomen pelvis wo contrast    Result Date: 8/11/2024  Narrative: CT CHEST, ABDOMEN AND PELVIS WITHOUT IV CONTRAST INDICATION: possible aspiration, persistent large NGT output. 59-year-old male with history of bowel obstruction 4 months ago requiring exploratory laparotomy with lysis of adhesions. COMPARISON: CTs of the abdomen and pelvis from April 19, 2024 and August 9, 2024. Portable chest from today. TECHNIQUE: CT examination of the chest, abdomen and pelvis was performed without intravenous contrast. Multiplanar 2D reformatted images were created from the source data. This examination, like all CT scans performed in the [facility], was performed utilizing techniques to minimize radiation dose exposure, including the use of iterative reconstruction and automated exposure control. Radiation dose length product (DLP) for this visit: 640.66 mGy-cm Enteric Contrast: Not administered. Absence of intravenous and gastrointestinal contrast limits evaluation of the abdominal and pelvic viscera. FINDINGS: CHEST LUNGS: Subsegmental atelectasis in the right upper and lower lobes. Lungs otherwise clear. Elevation of the right hemidiaphragm PLEURA: Small right pleural effusion. HEART/GREAT VESSELS: Coronary artery calcifications. Otherwise unremarkable. No thoracic aortic aneurysm. MEDIASTINUM AND AMI: No lymphadenopathy or mass. NG tube present. Esophagus  otherwise unremarkable. Trachea and main stem bronchi normal. CHEST WALL AND LOWER NECK: Mild bilateral gynecomastia. No lymphadenopathy or mass. ABDOMEN LIVER/BILIARY TREE: Limited evaluation without IV contrast. Grossly unremarkable. GALLBLADDER: No calcified gallstones. No pericholecystic inflammatory change. SPLEEN: Unremarkable. PANCREAS: Limited evaluation without IV contrast. Grossly unremarkable. ADRENAL GLANDS: Unremarkable. KIDNEYS/URETERS: Limited evaluation without IV contrast. Extensive bilateral medullary nephrocalcinosis. No hydronephrosis. STOMACH AND BOWEL: NG tube present with tip at the gastric body. Multiple dilated loops of small intestine with normal caliber terminal ileum and collapsed colon, consistent with distal small bowel obstruction. No evidence of pneumatosis intestinalis. APPENDIX: Normal. ABDOMINOPELVIC CAVITY: No lymphadenopathy. Small amount of ascites along the liver and in the pelvis and right paracolic gutter. No extraluminal gas. VESSELS: Limited evaluation without IV contrast. No aortic aneurysm. PELVIS REPRODUCTIVE ORGANS: Prostate and seminal vesicles unremarkable for patient's age. URINARY BLADDER: Unremarkable. ABDOMINAL WALL/INGUINAL REGIONS: Unremarkable. BONES: Severe osteoarthritis of both hips. Healed fracture of the proximal right femur, status post ORIF. No evidence of acute fracture or destructive lesion.     Impression: 1.  Distal small bowel obstruction at or near the terminal ileum. 2.  Small amount of ascites. 3.  Subsegmental atelectasis in the right upper and lower lobes, small right pleural effusion and elevation of the right hemidiaphragm. 4.  Extensive bilateral medullary nephrocalcinosis, unchanged. Workstation performed: [id number]     XR chest portable    Result Date: 8/11/2024  Narrative: XR CHEST PORTABLE INDICATION: Possible aspiration. COMPARISON: CXR 8/28/2011. Abdomen CT 8/9/2024. FINDINGS: NG tube in stomach with sidehole in GE junction. Mild  opacity opacity in the right base. No pneumothorax or pleural effusion. Normal cardiomediastinal silhouette. Bones are unremarkable for age. Normal upper abdomen. Mild elevation of the right diaphragm.     Impression: Mild opacity opacity in the right base, likely atelectasis or aspiration. NG tube in stomach with sidehole at GE junction. Recommend advancing. Workstation performed: [id number]     XR abdomen obstruction series    Result Date: 8/10/2024  Narrative: XR ABDOMEN OBSTRUCTION SERIES INDICATION: SBO, bowel gas pattern. COMPARISON: CT 8/9/2024 FINDINGS: Distal aspect of NG tube is coursing below the left hemidiaphragm. Persistent diffuse small bowel distention, relative paucity of large bowel gas, and moderate gastric distention. No pneumoperitoneum. No pathologic calcification or soft tissue mass. Bones are unremarkable for patient's age. Examination of the chest reveals clear lungs and a normal cardiomediastinal silhouette.     Impression: Persistent obstructive bowel gas pattern with moderate gastric distention. Workstation performed: [id number]     CT abdomen pelvis with contrast    Result Date: 8/9/2024  Narrative: CT ABDOMEN AND PELVIS WITH IV CONTRAST INDICATION: left lower quad pain, history of bowel obstruction, pt. does not need to wait for labs today. COMPARISON: CT dated 4/19/2024. TECHNIQUE: CT examination of the abdomen and pelvis was performed. Multiplanar 2D reformatted images were created from the source data. This examination, like all CT scans performed in the [facility], was performed utilizing techniques to minimize radiation dose exposure, including the use of iterative reconstruction and automated exposure control. Radiation dose length product (DLP) for this visit: 517.37 mGy-cm IV Contrast: 100 mL of iohexol (OMNIPAQUE) Enteric Contrast: Not administered. FINDINGS: ABDOMEN LOWER CHEST: Distended fluid-filled esophagus. Bibasilar subsegmental atelectasis..  LIVER/BILIARY TREE: Unremarkable. GALLBLADDER: No calcified gallstones. No pericholecystic inflammatory change. SPLEEN: Unremarkable. PANCREAS: Unremarkable. ADRENAL GLANDS: Unremarkable. KIDNEYS/URETERS: Stable medullary nephrocalcinosis with numerous bilateral renal calcifications. Multiple stable bilateral renal cysts. No obstructing stone or hydronephrosis. STOMACH AND BOWEL: Marked distention of the stomach and small bowel with transition at the terminal ileum consistent with small bowel obstruction. Small amount of free fluid in the pelvis. No bowel wall thickening. APPENDIX: Normal. ABDOMINOPELVIC CAVITY: Small volume of ascites. No pneumoperitoneum or abscess. No adenopathy.. VESSELS: Atherosclerosis without abdominal aortic aneurysm. PELVIS REPRODUCTIVE ORGANS: Unremarkable for patient's age. URINARY BLADDER: Unremarkable. ABDOMINAL WALL/INGUINAL REGIONS: Unremarkable. BONES: No acute osseous abnormality. Spinal degenerative changes. Lumbar levoscoliosis. Degenerative grade 1 anterolisthesis L4-5. Mild chronic T12 compression is stable. Few stable tiny sclerotic foci at T11, L1 and L2 are favored represent bone islands. Status post ORIF of the right hip. Advanced bilateral hip osteoarthritis     Impression: Small bowel obstruction with transition at the terminal ileum that may be due to adhesions Small volume of ascites The study was marked in EPIC for immediate notification. Workstation performed: [id number]           Hospital Problem List:     Principal Problem:    Aspiration pneumonia (HCC)  Active Problems:    Small bowel obstruction (HCC)    Right spastic hemiparesis (HCC)    Chronic kidney disease    Hypertension    Epilepsy (HCC)    Cerebral palsy (HCC)      Assessment/Plan    Small bowel obstruction (HCC)  Assessment & Plan  Presented with abdominal distention, pain, diaphoresis  CT abdomen revealed small bowel obstruction with transition at terminal ileum likely related to adhesions  History of  "small bowel obstruction status post ex lap, 4/24  Continues with bilious output through NG tube  Continue NG tube  Monitor intake output  IV fluid  Monitor abdominal exam  Follow-up labs  Aspiration precaution  Further management as per primary team      * Aspiration pneumonia ([facility])  Assessment & Plan  Noted to be febrile with Tmax of 101.2 °F  CT chest on pelvis revealed atelectasis versus right lower lobe pneumonia  UA without evidence of infection  Low-grade fever, fever Trend is improving.  Hemodynamically stable  Normal white count, procalcitonin elevated.  Suspect aspiration pneumonia versus pneumonitis versus atelectasis  Continue ceftriaxone, metronidazole  Monitor fever, CBC  Trend procalcitonin  Aspiration precaution  Incentive spirometry  Blood culture if recurrent fever  Monitor abdominal exam      Epilepsy ([facility])  Assessment & Plan  Continue home lamotrigine    Hypertension  Assessment & Plan  Blood pressure stable  Resume home nebivolol    Chronic kidney disease  Assessment & Plan  Presented with creatinine of 1.54, now improved to baseline  Continue to monitor    Right spastic hemiparesis ([facility])  Assessment & Plan  History of left MCA CVA, history of brain surgery  On atorvastatin      Cerebral palsy ([facility])  Assessment & Plan  Group home resident, currently appears to be at baseline  Continue chronic meds         VTE Prophylaxis: Heparin  / sequential compression device       Counseling / Coordination of Care Time: 45 minutes.  Greater than 50% of total time spent on patient counseling and coordination of care.    Collaboration of Care: Were Recommendations Directly Discussed with Primary Treatment Team? - Yes     ** Please Note: \"This note has been constructed using a voice recognition system.Therefore there may be syntax, spelling, and/or grammatical errors. Please call if you have any questions. \"**  "

## 2024-08-12 NOTE — PLAN OF CARE
Problem: Prexisting or High Potential for Compromised Skin Integrity  Goal: Skin integrity is maintained or improved  Description: INTERVENTIONS:  - Identify patients at risk for skin breakdown  - Assess and monitor skin integrity  - Assess and monitor nutrition and hydration status  - Monitor labs   - Assess for incontinence   - Turn and reposition patient  - Assist with mobility/ambulation  - Relieve pressure over bony prominences  - Avoid friction and shearing  - Provide appropriate hygiene as needed including keeping skin clean and dry  - Evaluate need for skin moisturizer/barrier cream  - Collaborate with interdisciplinary team   - Patient/family teaching  - Consider wound care consult   Outcome: Progressing     Problem: PAIN - ADULT  Goal: Verbalizes/displays adequate comfort level or baseline comfort level  Description: Interventions:  - Encourage patient to monitor pain and request assistance  - Assess pain using appropriate pain scale  - Administer analgesics based on type and severity of pain and evaluate response  - Implement non-pharmacological measures as appropriate and evaluate response  - Consider cultural and social influences on pain and pain management  - Notify physician/advanced practitioner if interventions unsuccessful or patient reports new pain  Outcome: Progressing     Problem: INFECTION - ADULT  Goal: Absence or prevention of progression during hospitalization  Description: INTERVENTIONS:  - Assess and monitor for signs and symptoms of infection  - Monitor lab/diagnostic results  - Monitor all insertion sites, i.e. indwelling lines, tubes, and drains  - Monitor endotracheal if appropriate and nasal secretions for changes in amount and color  - [place] appropriate cooling/warming therapies per order  - Administer medications as ordered  - Instruct and encourage patient and family to use good hand hygiene technique  - Identify and instruct in appropriate isolation precautions for  identified infection/condition  Outcome: Progressing  Goal: Absence of fever/infection during neutropenic period  Description: INTERVENTIONS:  - Monitor WBC    Outcome: Progressing

## 2024-08-12 NOTE — PROGRESS NOTES
"Progress Note - General Surgery   Mario Rollins 59 y.o. male MRN: [id number]  Unit/Bed#: [address] [place] [address] Encounter: [id number]    Assessment:  Small bowel obstruction  -- s/p ex lap with CARMEN earlier this year, NG tube output decreasing in amount but still bilious  SALLY on CKD  -- baseline creatinine appears to be 0.8 to 1.0  Right lower lobe effusion vs early pneumonia -- Tmax 101.2 on 8/10  SIRS -- tachycardia and leukocytosis present on 8/10        Plan:  Continue NGT  Rocephin and Flagyl day 2   Internal medicine consult for right lower lobe aspiration pneumonitis vs pleural effusion vs early pneumonia  IV phosphorus repletion  Switch to maintenance IV fluids  Will trend procalcitonin       Subjective/Objective     Subjective:  Patient reports feeling better and would like to get out of bed. He isnt passing flatus yet    Objective:   NG tube output 1500cc bilious    Blood pressure 134/79, pulse 72, temperature 99.4 °F (37.4 °C), resp. rate 18, height 5' 8\" (1.727 m), weight 77.5 kg (170 lb 14.4 oz), SpO2 90%.,Body mass index is 25.99 kg/m².      Intake/Output Summary (Last 24 hours) at 8/12/2024 1058  Last data filed at 8/12/2024 0500  Gross per 24 hour   Intake 60 ml   Output 2500 ml   Net -2440 ml       Invasive Devices       Peripheral Intravenous Line  Duration             Peripheral IV 08/11/24 Dorsal (posterior);Left Forearm <1 day              Drain  Duration             NG/OG/Enteral Tube 16 Fr Left nare 2 days                    Physical Exam: /79   Pulse 72   Temp 99.4 °F (37.4 °C)   Resp 18   Ht 5' 8\" (1.727 m)   Wt 77.5 kg (170 lb 14.4 oz)   SpO2 90%   BMI 25.99 kg/m²   General appearance: alert and oriented, in no acute distress  Head: Normocephalic, without obvious abnormality, atraumatic  Lungs:  No respiratory distress, hiccupping  Heart:  regular rate and rhythm  Abdomen:  soft, hypoactive bowel sounds, non tender, NGT in place   Extremities: extremities normal, warm and " "well-perfused; no cyanosis, clubbing, or edema  Skin: Skin color, texture, turgor normal. No rashes or lesions    Lab, Imaging and other studies:I have personally reviewed pertinent lab results.  , CBC:   Lab Results   Component Value Date    WBC 5.70 08/12/2024    HGB 14.3 08/12/2024    HCT 45.2 08/12/2024    MCV 90 08/12/2024     08/12/2024    RBC 5.02 08/12/2024    MCH 28.5 08/12/2024    MCHC 31.6 08/12/2024    RDW 13.6 08/12/2024    MPV 10.2 08/12/2024    NRBC 0 08/12/2024   , CMP:   Lab Results   Component Value Date    SODIUM 144 08/12/2024    K 3.6 08/12/2024     (H) 08/12/2024    CO2 29 08/12/2024    BUN 24 08/12/2024    CREATININE 1.09 08/12/2024    CALCIUM 8.2 (L) 08/12/2024    EGFR 73 08/12/2024   , Coagulation: No results found for: \"PT\", \"INR\", \"APTT\", Urinalysis: No results found for: \"COLORU\", \"CLARITYU\", \"SPECGRAV\", \"PHUR\", \"LEUKOCYTESUR\", \"NITRITE\", \"PROTEINUA\", \"GLUCOSEU\", \"KETONESU\", \"BILIRUBINUR\", \"BLOODU\", Lipase: No results found for: \"LIPASE\"  VTE Pharmacologic Prophylaxis: Heparin SQ  VTE Mechanical Prophylaxis: sequential compression device  "

## 2024-08-12 NOTE — ASSESSMENT & PLAN NOTE
Presented with abdominal distention, pain, diaphoresis  CT abdomen revealed small bowel obstruction with transition at terminal ileum likely related to adhesions  History of small bowel obstruction status post ex lap, 4/24  GEN surgery D/W recs:  Paced

## 2024-08-12 NOTE — ASSESSMENT & PLAN NOTE
Noted to be febrile with Tmax of 101.2 °F  CT chest on pelvis revealed atelectasis versus right lower lobe pneumonia  UA without evidence of infection  Low-grade fever, fever Trend is improving.  Hemodynamically stable  Normal white count, procalcitonin elevated.  Suspect aspiration pneumonia versus pneumonitis versus atelectasis  Continue ceftriaxone, metronidazole  Monitor fever, CBC  Trend procalcitonin  Aspiration precaution  Incentive spirometry  Blood culture if recurrent fever  Monitor abdominal exam

## 2024-08-13 ENCOUNTER — APPOINTMENT (INPATIENT)
Dept: RADIOLOGY | Facility: HOSPITAL | Age: 59
DRG: 388 | End: 2024-08-13
Payer: MEDICARE

## 2024-08-13 LAB
ANION GAP SERPL CALCULATED.3IONS-SCNC: 6 MMOL/L (ref 4–13)
BASOPHILS # BLD AUTO: 0.02 THOUSANDS/ÂΜL (ref 0–0.1)
BASOPHILS NFR BLD AUTO: 0 % (ref 0–1)
BUN SERPL-MCNC: 13 MG/DL (ref 5–25)
CALCIUM SERPL-MCNC: 7.8 MG/DL (ref 8.4–10.2)
CHLORIDE SERPL-SCNC: 112 MMOL/L (ref 96–108)
CO2 SERPL-SCNC: 25 MMOL/L (ref 21–32)
CREAT SERPL-MCNC: 0.84 MG/DL (ref 0.6–1.3)
EOSINOPHIL # BLD AUTO: 0.09 THOUSAND/ÂΜL (ref 0–0.61)
EOSINOPHIL NFR BLD AUTO: 1 % (ref 0–6)
ERYTHROCYTE [DISTWIDTH] IN BLOOD BY AUTOMATED COUNT: 13.2 % (ref 11.6–15.1)
GFR SERPL CREATININE-BSD FRML MDRD: 95 ML/MIN/1.73SQ M
GLUCOSE SERPL-MCNC: 95 MG/DL (ref 65–140)
HCT VFR BLD AUTO: 42.3 % (ref 36.5–49.3)
HGB BLD-MCNC: 13.5 G/DL (ref 12–17)
IMM GRANULOCYTES # BLD AUTO: 0.04 THOUSAND/UL (ref 0–0.2)
IMM GRANULOCYTES NFR BLD AUTO: 1 % (ref 0–2)
LYMPHOCYTES # BLD AUTO: 1.05 THOUSANDS/ÂΜL (ref 0.6–4.47)
LYMPHOCYTES NFR BLD AUTO: 17 % (ref 14–44)
MAGNESIUM SERPL-MCNC: 2 MG/DL (ref 1.9–2.7)
MCH RBC QN AUTO: 28.6 PG (ref 26.8–34.3)
MCHC RBC AUTO-ENTMCNC: 31.9 G/DL (ref 31.4–37.4)
MCV RBC AUTO: 90 FL (ref 82–98)
MONOCYTES # BLD AUTO: 0.73 THOUSAND/ÂΜL (ref 0.17–1.22)
MONOCYTES NFR BLD AUTO: 12 % (ref 4–12)
NEUTROPHILS # BLD AUTO: 4.33 THOUSANDS/ÂΜL (ref 1.85–7.62)
NEUTS SEG NFR BLD AUTO: 69 % (ref 43–75)
NRBC BLD AUTO-RTO: 0 /100 WBCS
PHOSPHATE SERPL-MCNC: 1.9 MG/DL (ref 2.7–4.5)
PLATELET # BLD AUTO: 144 THOUSANDS/UL (ref 149–390)
PMV BLD AUTO: 10.9 FL (ref 8.9–12.7)
POTASSIUM SERPL-SCNC: 3.4 MMOL/L (ref 3.5–5.3)
PROCALCITONIN SERPL-MCNC: 0.19 NG/ML
RBC # BLD AUTO: 4.72 MILLION/UL (ref 3.88–5.62)
SODIUM SERPL-SCNC: 143 MMOL/L (ref 135–147)
WBC # BLD AUTO: 6.26 THOUSAND/UL (ref 4.31–10.16)

## 2024-08-13 PROCEDURE — 85025 COMPLETE CBC W/AUTO DIFF WBC: CPT | Performed by: PHYSICIAN ASSISTANT

## 2024-08-13 PROCEDURE — 83735 ASSAY OF MAGNESIUM: CPT | Performed by: PHYSICIAN ASSISTANT

## 2024-08-13 PROCEDURE — 84145 PROCALCITONIN (PCT): CPT | Performed by: PHYSICIAN ASSISTANT

## 2024-08-13 PROCEDURE — 84100 ASSAY OF PHOSPHORUS: CPT | Performed by: PHYSICIAN ASSISTANT

## 2024-08-13 PROCEDURE — 74022 RADEX COMPL AQT ABD SERIES: CPT

## 2024-08-13 PROCEDURE — 80048 BASIC METABOLIC PNL TOTAL CA: CPT | Performed by: PHYSICIAN ASSISTANT

## 2024-08-13 PROCEDURE — 99232 SBSQ HOSP IP/OBS MODERATE 35: CPT | Performed by: SURGERY

## 2024-08-13 PROCEDURE — 99232 SBSQ HOSP IP/OBS MODERATE 35: CPT | Performed by: STUDENT IN AN ORGANIZED HEALTH CARE EDUCATION/TRAINING PROGRAM

## 2024-08-13 RX ORDER — POTASSIUM CHLORIDE 14.9 MG/ML
20 INJECTION INTRAVENOUS ONCE
Status: DISCONTINUED | OUTPATIENT
Start: 2024-08-13 | End: 2024-08-13

## 2024-08-13 RX ORDER — POTASSIUM CHLORIDE 14.9 MG/ML
20 INJECTION INTRAVENOUS ONCE
Status: COMPLETED | OUTPATIENT
Start: 2024-08-13 | End: 2024-08-13

## 2024-08-13 RX ORDER — LIDOCAINE HYDROCHLORIDE 20 MG/ML
1 JELLY TOPICAL ONCE
Status: COMPLETED | OUTPATIENT
Start: 2024-08-13 | End: 2024-08-13

## 2024-08-13 RX ADMIN — SERTRALINE HYDROCHLORIDE 50 MG: 50 TABLET ORAL at 09:58

## 2024-08-13 RX ADMIN — HEPARIN SODIUM 5000 UNITS: 5000 INJECTION INTRAVENOUS; SUBCUTANEOUS at 05:40

## 2024-08-13 RX ADMIN — LIDOCAINE HYDROCHLORIDE 1 APPLICATION: 20 JELLY TOPICAL at 15:17

## 2024-08-13 RX ADMIN — DEXTROSE, SODIUM CHLORIDE, AND POTASSIUM CHLORIDE 100 ML/HR: 5; .45; .15 INJECTION INTRAVENOUS at 21:03

## 2024-08-13 RX ADMIN — SODIUM PHOSPHATE, MONOBASIC, MONOHYDRATE AND SODIUM PHOSPHATE, DIBASIC, ANHYDROUS 21 MMOL: 142; 276 INJECTION, SOLUTION INTRAVENOUS at 09:59

## 2024-08-13 RX ADMIN — HEPARIN SODIUM 5000 UNITS: 5000 INJECTION INTRAVENOUS; SUBCUTANEOUS at 21:03

## 2024-08-13 RX ADMIN — METRONIDAZOLE 500 MG: 500 INJECTION, SOLUTION INTRAVENOUS at 19:14

## 2024-08-13 RX ADMIN — DEXTROSE, SODIUM CHLORIDE, AND POTASSIUM CHLORIDE 100 ML/HR: 5; .45; .15 INJECTION INTRAVENOUS at 10:28

## 2024-08-13 RX ADMIN — HEPARIN SODIUM 5000 UNITS: 5000 INJECTION INTRAVENOUS; SUBCUTANEOUS at 14:46

## 2024-08-13 RX ADMIN — POTASSIUM CHLORIDE 20 MEQ: 14.9 INJECTION, SOLUTION INTRAVENOUS at 09:58

## 2024-08-13 RX ADMIN — QUETIAPINE FUMARATE 50 MG: 25 TABLET ORAL at 21:02

## 2024-08-13 RX ADMIN — CEFTRIAXONE 1000 MG: 1 INJECTION, SOLUTION INTRAVENOUS at 10:58

## 2024-08-13 RX ADMIN — LAMOTRIGINE 150 MG: 100 TABLET ORAL at 19:14

## 2024-08-13 RX ADMIN — LAMOTRIGINE 200 MG: 100 TABLET ORAL at 09:58

## 2024-08-13 RX ADMIN — ATORVASTATIN CALCIUM 10 MG: 10 TABLET, FILM COATED ORAL at 19:14

## 2024-08-13 RX ADMIN — METRONIDAZOLE 500 MG: 500 INJECTION, SOLUTION INTRAVENOUS at 04:15

## 2024-08-13 RX ADMIN — PANTOPRAZOLE SODIUM 40 MG: 40 INJECTION, POWDER, FOR SOLUTION INTRAVENOUS at 06:02

## 2024-08-13 RX ADMIN — POTASSIUM CHLORIDE 20 MEQ: 14.9 INJECTION, SOLUTION INTRAVENOUS at 12:41

## 2024-08-13 RX ADMIN — METRONIDAZOLE 500 MG: 500 INJECTION, SOLUTION INTRAVENOUS at 12:41

## 2024-08-13 NOTE — CASE MANAGEMENT
Case Management Assessment & Discharge Planning Note    Patient name Mario Rollins  Location [address] [address]/[address] [place] [address]-* MRN [id number]  : 1965 Date 2024       Current Admission Date: 2024  Current Admission Diagnosis:Aspiration pneumonia (HCC)   Patient Active Problem List    Diagnosis Date Noted Date Diagnosed    Aspiration pneumonia (HCC) 2024     Chronic kidney disease      Hypertension      Epilepsy (HCC)      Electrolyte abnormality 2024     Small bowel obstruction (HCC) 2024     Pneumatosis of intestines 2024     Sacroiliitis (HCC)      Localization-related symptomatic epilepsy and epileptic syndromes with complex partial seizures, intractable, without status epilepticus (HCC)      Encounter for hearing examination 2021     Right spastic hemiparesis (HCC) 2020     Acquired deformity of right hand 2020     Paronychia of toenail 2019     Lumbar radiculopathy 10/01/2018     Spinal stenosis of lumbar region 10/01/2018     Chronic left-sided low back pain with left-sided sciatica 10/01/2018     Chronic pain syndrome 10/01/2018     Cerebral palsy (HCC) 2018     Class 1 obesity with body mass index (BMI) of 30.0 to 30.9 in adult 2018     Hyperlipidemia 2018     Other constipation 2018     Acquired valgus deformity of right ankle 2017     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 10/20/2015     Acquired deformity of right ankle and foot 10/20/2015     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) 2015     Nephrocalcinosis 2015     Acquired hallux rigidus of left foot 2015     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    Primary Insurance: MEDICARE  Secondary Insurance: [employer]    ASSESSMENT:  Active Health Care Proxies    There are no active Health Care Proxies on file.        Readmission Root Cause  30 Day Readmission: No    Patient Information  Admitted from:: Home  Mental Status: Alert  During Assessment patient was accompanied by: Not accompanied during assessment  Assessment information provided by:: Other - please comment (Monica Bourne ())  Support Systems: Other (Comment) ([facility] staff)  County of Residence: [place]  What city do you live in?: [place]  Home entry access options. Select all that apply.: Ramp  Type of Current Residence: Group home  Upon entering residence, is there a bedroom on the main floor (no further steps)?: Yes  Upon entering residence, is there a bathroom on the main floor (no further steps)?: Yes  Is patient a ?: No    Activities of Daily Living Prior to Admission  Functional Status: Assistance  Completes ADLs independently?: No  Level of ADL dependence: Assistance  Ambulates independently?: Yes  Does patient use assisted devices?: Yes  Assisted Devices (DME) used: Wheelchair, Other (Comment) (wilian-walker, manual wheelchair for long distances)  Does patient currently own DME?: Yes  What DME does the patient currently own?: Wheelchair, Other (Comment) (wilian-walker, manual wheelchair)  Does patient have a history of Outpatient Therapy (PT/OT)?: Yes  Does the patient have a history of Short-Term Rehab?: Yes ([facility])  Does patient currently have HHC?: No    Patient Information Continued  Income Source: SSI/SSD  Does patient have prescription coverage?: Yes  Does  patient receive dialysis treatments?: No  Does patient have a history of substance abuse?: No  Does patient have a history of Mental Health Diagnosis?: No    Means of Transportation  [facility] Other (Comment[facility] ([facility] staff)      Social Determinants of Health (SDOH)      Flowsheet Row Most Recent Value   Housing Stability    In the last 12 months, was there a time when you were not able to pay the mortgage or rent on time? N   In the past 12 months, how many times have you moved where you were living? 1  [moved to current group home in February 2024]   At any time in the past 12 months, were you homeless or living in a shelter (including now)? N   Transportation Needs    In the past 12 months, has lack of transportation kept you from medical appointments or from getting medications? no   In the past 12 months, has lack of transportation kept you from meetings, work, or from getting things needed for daily living? No   Food Insecurity    Within the past 12 months, you worried that your food would run out before you got the money to buy more. Never true   Within the past 12 months, the food you bought just didn't last and you didn't have money to get more. Never true   Utilities    In the past 12 months has the electric, gas, oil, or water company threatened to shut off services in your home? No            DISCHARGE DETAILS:    Discharge planning discussed with::  Monica  Freedom of Choice: Yes    Comments - Freedom of Choice: ZACHARY spoke by phone with [facility] to introduce role of CM, conduct assessment and discuss discharge planning.  Patient uses a wilian-walker to ambulate and has a manual wheelchair for longer distances and outings.  Group home staff provide all transportation.      Preference is for patient to return to his group home when medically cleared.  PT/OT orders have been requested to determine any rehabilitation needs.  ZACHARY  will continue to follow.      CM contacted family/caregiver?: Yes  Were Treatment Team discharge recommendations reviewed with patient/caregiver?: Yes  Did patient/caregiver verbalize understanding of patient care needs?: Yes  Were patient/caregiver advised of the risks associated with not following Treatment Team discharge recommendations?: Yes    Contacts  Patient Contacts: Monica Bourne ()  Relationship to Patient:: Other (Comment)  Contact Method: Phone  Phone Number: [phone]  Reason/Outcome: Emergency Contact, Continuity of Care, Discharge Planning    Requested Home Health Care         Is the patient interested in HHC at discharge?: No    DME Referral Provided  Referral made for DME?: No    Other Referral/Resources/Interventions Provided:  Interventions: None Indicated    Would you like to participate in our [facility] service program?  : No - Declined    Treatment Team Recommendation: Group Home  Discharge Destination Plan:: Group Home  Transport at Discharge : Other (Comment) (Group home staff)

## 2024-08-13 NOTE — PROGRESS NOTES
"Progress Note - General Surgery   Mario Rollins 59 y.o. male MRN: [id number]  Unit/Bed#: [address] [place] [address] Encounter: [id number]    Assessment:  60 y/o m pmh of CKD, Hemochromatosis, CVA with right hemiparesis, focal epilepsy, past surgical hx ex lap sigmoidectomy secondary to volvulus 2024 Dr. Pulido now with SBO, aspiration PNA. Hospital day # 4.     Abdominal pain improving. Passing flatus.     NGT: 650 Gastric contents.   K:3.4  Phos:1.9    Plan:  Electrolyte repletion.   Check obstruction series.   Possible removal of NGT tube pending obstruction series.   Oob and ambulate.   Appreciated medicine input.   Am labs cbc, cmp, mag, phos.   Check ECG. Bradycardic.     Subjective/Objective       Subjective:  Patient was seen and examined bedside. Patient reports no acute changes through the night. Patient reports episodes of hiccups. Abdominal pain improving. Passing flatus.     Objective:     Blood pressure 123/64, pulse 58, temperature 97.6 °F (36.4 °C), temperature source Temporal, resp. rate 20, height 5' 8\" (1.727 m), weight 77.5 kg (170 lb 14.4 oz), SpO2 95%.,Body mass index is 25.99 kg/m².      Intake/Output Summary (Last 24 hours) at 8/13/2024 0831  Last data filed at 8/13/2024 0735  Gross per 24 hour   Intake --   Output 2850 ml   Net -2850 ml       Invasive Devices       Peripheral Intravenous Line  Duration             Peripheral IV 08/11/24 Dorsal (posterior);Left Forearm 1 day    Peripheral IV 08/12/24 Right Antecubital <1 day              Drain  Duration             NG/OG/Enteral Tube 16 Fr Left nare 3 days                    Physical Exam: /64 (BP Location: Right arm)   Pulse 58   Temp 97.6 °F (36.4 °C) (Temporal)   Resp 20   Ht 5' 8\" (1.727 m)   Wt 77.5 kg (170 lb 14.4 oz)   SpO2 95%   BMI 25.99 kg/m²   General appearance: alert and oriented, in no acute distress  Head: Normocephalic, without obvious abnormality, atraumatic  Lungs:  normal effort.  Heart: bradycardic   Abdomen: Soft, " mild distension upper quadrants, Mild TTP upper quadrants, + bowel sounds.   Lab, Imaging and other studies:I have personally reviewed pertinent lab results.  , CBC:   Lab Results   Component Value Date    WBC 6.26 08/13/2024    HGB 13.5 08/13/2024    HCT 42.3 08/13/2024    MCV 90 08/13/2024     (L) 08/13/2024    RBC 4.72 08/13/2024    MCH 28.6 08/13/2024    MCHC 31.9 08/13/2024    RDW 13.2 08/13/2024    MPV 10.9 08/13/2024    NRBC 0 08/13/2024   , CMP:   Lab Results   Component Value Date    SODIUM 143 08/13/2024    K 3.4 (L) 08/13/2024     (H) 08/13/2024    CO2 25 08/13/2024    BUN 13 08/13/2024    CREATININE 0.84 08/13/2024    CALCIUM 7.8 (L) 08/13/2024    EGFR 95 08/13/2024     VTE Pharmacologic Prophylaxis: Heparin  VTE Mechanical Prophylaxis: sequential compression device

## 2024-08-13 NOTE — PLAN OF CARE
Problem: Prexisting or High Potential for Compromised Skin Integrity  Goal: Skin integrity is maintained or improved  Description: INTERVENTIONS:  - Identify patients at risk for skin breakdown  - Assess and monitor skin integrity  - Assess and monitor nutrition and hydration status  - Monitor labs   - Assess for incontinence   - Turn and reposition patient  - Assist with mobility/ambulation  - Relieve pressure over bony prominences  - Avoid friction and shearing  - Provide appropriate hygiene as needed including keeping skin clean and dry  - Evaluate need for skin moisturizer/barrier cream  - Collaborate with interdisciplinary team   - Patient/family teaching  - Consider wound care consult   Outcome: Progressing     Problem: PAIN - ADULT  Goal: Verbalizes/displays adequate comfort level or baseline comfort level  Description: Interventions:  - Encourage patient to monitor pain and request assistance  - Assess pain using appropriate pain scale  - Administer analgesics based on type and severity of pain and evaluate response  - Implement non-pharmacological measures as appropriate and evaluate response  - Consider cultural and social influences on pain and pain management  - Notify physician/advanced practitioner if interventions unsuccessful or patient reports new pain  Outcome: Progressing     Problem: INFECTION - ADULT  Goal: Absence or prevention of progression during hospitalization  Description: INTERVENTIONS:  - Assess and monitor for signs and symptoms of infection  - Monitor lab/diagnostic results  - Monitor all insertion sites, i.e. indwelling lines, tubes, and drains  - Monitor endotracheal if appropriate and nasal secretions for changes in amount and color  - [place] appropriate cooling/warming therapies per order  - Administer medications as ordered  - Instruct and encourage patient and family to use good hand hygiene technique  - Identify and instruct in appropriate isolation precautions for  identified infection/condition  Outcome: Progressing  Goal: Absence of fever/infection during neutropenic period  Description: INTERVENTIONS:  - Monitor WBC    Outcome: Progressing     Problem: SAFETY ADULT  Goal: Patient will remain free of falls  Description: INTERVENTIONS:  - Educate patient/family on patient safety including physical limitations  - Instruct patient to call for assistance with activity   - Consult OT/PT to assist with strengthening/mobility   - Keep Call bell within reach  - Keep bed low and locked with side rails adjusted as appropriate  - Keep care items and personal belongings within reach  - Initiate and maintain comfort rounds  - Make Fall Risk Sign visible to staff  - Offer Toileting every 2 Hours, in advance of need  - Initiate/Maintain bed alarm  - Obtain necessary fall risk management equipment: yellow bracelet  - Apply yellow socks and bracelet for high fall risk patients  - Consider moving patient to room near nurses station  Outcome: Progressing  Goal: Maintain or return to baseline ADL function  Description: INTERVENTIONS:  -  Assess patient's ability to carry out ADLs; assess patient's baseline for ADL function and identify physical deficits which impact ability to perform ADLs (bathing, care of mouth/teeth, toileting, grooming, dressing, etc.)  - Assess/evaluate cause of self-care deficits   - Assess range of motion  - Assess patient's mobility; develop plan if impaired  - Assess patient's need for assistive devices and provide as appropriate  - Encourage maximum independence but intervene and supervise when necessary  - Involve family in performance of ADLs  - Assess for home care needs following discharge   - Consider OT consult to assist with ADL evaluation and planning for discharge  - Provide patient education as appropriate  Outcome: Progressing  Goal: Maintains/Returns to pre admission functional level  Description: INTERVENTIONS:  - Perform AM-PAC 6 Click Basic Mobility/  Daily Activity assessment daily.  - Set and communicate daily mobility goal to care team and patient/family/caregiver.   - Collaborate with rehabilitation services on mobility goals if consulted  - Perform Range of Motion 4 times a day.  - Reposition patient every 2 hours.  - Dangle patient 3 times a day  - Stand patient 3 times a day  - Ambulate patient 3 times a day  - Out of bed to chair 3 times a day   - Out of bed for meals 3 times a day  - Out of bed for toileting  - Record patient progress and toleration of activity level   Outcome: Progressing

## 2024-08-13 NOTE — CONSULTS
Inpatient Medical Consultation - [facility] Internal Medicine    Patient Information: Mario Rollins 59 y.o. male MRN: [id number]  Unit/Bed#: [address] [place] [address] Encounter: [id number]    PCP: No primary care provider on file.  Date of Admission:  8/9/2024  Date of Consultation: 08/13/24  Requesting Physician: Milton Handley MD    Reason For Consultation:     Fever      Assessment/Plan    * Aspiration pneumonia (HCC)  Assessment & Plan  Noted to be febrile with Tmax of 101.2 °F  CT chest on pelvis revealed atelectasis versus right lower lobe pneumonia  UA without evidence of infection  Normal white count, procalcitonin elevated.  Likely suspect aspiration PNA versus pneumonitis versus atelectasis  Continue ceftriaxone, metronidazole  Monitor fever, CBC  Trend procalcitonin  Aspiration precaution  Incentive spirometry  BCx if recurrent fever    Small bowel obstruction (HCC)  Assessment & Plan  Presented with abdominal distention, pain, diaphoresis    CT abdomen revealed small bowel obstruction with transition at terminal ileum likely related to adhesions  History of small bowel obstruction status post ex lap, 4/24  D/W surgery recs  cute abdominal series today to evaluate bowel gas pattern. Possible NG tube removal depending upon results.     Epilepsy (HCC)  Assessment & Plan  Continue home lamotrigine    Hypertension  Assessment & Plan  Blood pressure stable  Resume home nebivolol    Chronic kidney disease  Assessment & Plan  Presented with creatinine of 1.54, now improved to baseline  Continue to monitor      Right spastic hemiparesis (HCC)  Assessment & Plan  History of left MCA CVA, history of brain surgery  On atorvastatin      Cerebral palsy (HCC)  Assessment & Plan  Group home resident, currently appears to be at baseline  Continue chronic meds        Review of Systems   Respiratory:  Negative for shortness of breath.    Cardiovascular:  Negative for chest pain.   Gastrointestinal:  Negative for abdominal pain and  "nausea.   All other systems reviewed and are negative.        Physical Exam:     Vitals:   Blood Pressure: 135/80 (08/13/24 1515)  Pulse: 60 (08/13/24 1515)  Temperature: 98.4 °F (36.9 °C) (08/13/24 1515)  Temp Source: Temporal (08/13/24 0734)  Respirations: 18 (08/13/24 1515)  Height: 5' 8\" (172.7 cm) (08/09/24 2231)  Weight - Scale: 77.5 kg (170 lb 14.4 oz) (08/09/24 2231)  SpO2: 96 % (08/13/24 1515)    Physical Exam  Vitals reviewed.   Constitutional:       General: He is not in acute distress.     Appearance: Normal appearance.   HENT:      Head: Atraumatic.      Nose: No congestion.   Eyes:      General: No scleral icterus.     Pupils: Pupils are equal, round, and reactive to light.   Cardiovascular:      Rate and Rhythm: Normal rate.      Heart sounds: No murmur heard.  Pulmonary:      Effort: No respiratory distress.      Breath sounds: No wheezing, rhonchi or rales.   Abdominal:      General: Bowel sounds are decreased.      Palpations: Abdomen is soft.      Tenderness: There is no abdominal tenderness. There is no guarding.      Comments: NG   Musculoskeletal:         General: No swelling or deformity. Normal range of motion.      Cervical back: No tenderness.      Right lower leg: No edema.      Left lower leg: No edema.   Feet:      Right foot:      Skin integrity: No callus.   Skin:     General: Skin is warm.      Capillary Refill: Capillary refill takes less than 2 seconds.      Findings: No lesion or rash.   Neurological:      Mental Status: He is oriented to person, place, and time.      Cranial Nerves: No cranial nerve deficit.      Coordination: Coordination normal.   Psychiatric:         Mood and Affect: Mood normal.         Thought Content: Thought content normal.           Lab Results: I Have Reviewed All Lab Data Below:    Results from last 7 days   Lab Units 08/13/24  0539   WBC Thousand/uL 6.26   HEMOGLOBIN g/dL 13.5   HEMATOCRIT % 42.3   PLATELETS Thousands/uL 144*   SEGS PCT % 69   LYMPHO " PCT % 17   MONO PCT % 12   EOS PCT % 1     Results from last 7 days   Lab Units 08/13/24  0539 08/11/24  0437 08/10/24  0458   POTASSIUM mmol/L 3.4*   < > 4.1   CHLORIDE mmol/L 112*   < > 108   CO2 mmol/L 25   < > 23   BUN mg/dL 13   < > 22   CREATININE mg/dL 0.84   < > 1.27   CALCIUM mg/dL 7.8*   < > 9.0   ALK PHOS U/L  --   --  75   ALT U/L  --   --  11   AST U/L  --   --  12*    < > = values in this interval not displayed.           * Additional Pertinent Lab Tests Reviewed: All Labs For Current Hospital Admission Reviewed    Imaging: I have personally reviewed pertinent reports.      CT chest abdomen pelvis wo contrast    Result Date: 8/11/2024  Narrative: CT CHEST, ABDOMEN AND PELVIS WITHOUT IV CONTRAST INDICATION: possible aspiration, persistent large NGT output. 59-year-old male with history of bowel obstruction 4 months ago requiring exploratory laparotomy with lysis of adhesions. COMPARISON: CTs of the abdomen and pelvis from April 19, 2024 and August 9, 2024. Portable chest from today. TECHNIQUE: CT examination of the chest, abdomen and pelvis was performed without intravenous contrast. Multiplanar 2D reformatted images were created from the source data. This examination, like all CT scans performed in the [facility], was performed utilizing techniques to minimize radiation dose exposure, including the use of iterative reconstruction and automated exposure control. Radiation dose length product (DLP) for this visit: 640.66 mGy-cm Enteric Contrast: Not administered. Absence of intravenous and gastrointestinal contrast limits evaluation of the abdominal and pelvic viscera. FINDINGS: CHEST LUNGS: Subsegmental atelectasis in the right upper and lower lobes. Lungs otherwise clear. Elevation of the right hemidiaphragm PLEURA: Small right pleural effusion. HEART/GREAT VESSELS: Coronary artery calcifications. Otherwise unremarkable. No thoracic aortic aneurysm. MEDIASTINUM AND AMI: No  lymphadenopathy or mass. NG tube present. Esophagus otherwise unremarkable. Trachea and main stem bronchi normal. CHEST WALL AND LOWER NECK: Mild bilateral gynecomastia. No lymphadenopathy or mass. ABDOMEN LIVER/BILIARY TREE: Limited evaluation without IV contrast. Grossly unremarkable. GALLBLADDER: No calcified gallstones. No pericholecystic inflammatory change. SPLEEN: Unremarkable. PANCREAS: Limited evaluation without IV contrast. Grossly unremarkable. ADRENAL GLANDS: Unremarkable. KIDNEYS/URETERS: Limited evaluation without IV contrast. Extensive bilateral medullary nephrocalcinosis. No hydronephrosis. STOMACH AND BOWEL: NG tube present with tip at the gastric body. Multiple dilated loops of small intestine with normal caliber terminal ileum and collapsed colon, consistent with distal small bowel obstruction. No evidence of pneumatosis intestinalis. APPENDIX: Normal. ABDOMINOPELVIC CAVITY: No lymphadenopathy. Small amount of ascites along the liver and in the pelvis and right paracolic gutter. No extraluminal gas. VESSELS: Limited evaluation without IV contrast. No aortic aneurysm. PELVIS REPRODUCTIVE ORGANS: Prostate and seminal vesicles unremarkable for patient's age. URINARY BLADDER: Unremarkable. ABDOMINAL WALL/INGUINAL REGIONS: Unremarkable. BONES: Severe osteoarthritis of both hips. Healed fracture of the proximal right femur, status post ORIF. No evidence of acute fracture or destructive lesion.     Impression: 1.  Distal small bowel obstruction at or near the terminal ileum. 2.  Small amount of ascites. 3.  Subsegmental atelectasis in the right upper and lower lobes, small right pleural effusion and elevation of the right hemidiaphragm. 4.  Extensive bilateral medullary nephrocalcinosis, unchanged. Workstation performed: [id number]     XR chest portable    Result Date: 8/11/2024  Narrative: XR CHEST PORTABLE INDICATION: Possible aspiration. COMPARISON: CXR 8/28/2011. Abdomen CT 8/9/2024. FINDINGS: NG  tube in stomach with sidehole in GE junction. Mild opacity opacity in the right base. No pneumothorax or pleural effusion. Normal cardiomediastinal silhouette. Bones are unremarkable for age. Normal upper abdomen. Mild elevation of the right diaphragm.     Impression: Mild opacity opacity in the right base, likely atelectasis or aspiration. NG tube in stomach with sidehole at GE junction. Recommend advancing. Workstation performed: [id number]     XR abdomen obstruction series    Result Date: 8/10/2024  Narrative: XR ABDOMEN OBSTRUCTION SERIES INDICATION: SBO, bowel gas pattern. COMPARISON: CT 8/9/2024 FINDINGS: Distal aspect of NG tube is coursing below the left hemidiaphragm. Persistent diffuse small bowel distention, relative paucity of large bowel gas, and moderate gastric distention. No pneumoperitoneum. No pathologic calcification or soft tissue mass. Bones are unremarkable for patient's age. Examination of the chest reveals clear lungs and a normal cardiomediastinal silhouette.     Impression: Persistent obstructive bowel gas pattern with moderate gastric distention. Workstation performed: [id number]     CT abdomen pelvis with contrast    Result Date: 8/9/2024  Narrative: CT ABDOMEN AND PELVIS WITH IV CONTRAST INDICATION: left lower quad pain, history of bowel obstruction, pt. does not need to wait for labs today. COMPARISON: CT dated 4/19/2024. TECHNIQUE: CT examination of the abdomen and pelvis was performed. Multiplanar 2D reformatted images were created from the source data. This examination, like all CT scans performed in the [facility], was performed utilizing techniques to minimize radiation dose exposure, including the use of iterative reconstruction and automated exposure control. Radiation dose length product (DLP) for this visit: 517.37 mGy-cm IV Contrast: 100 mL of iohexol (OMNIPAQUE) Enteric Contrast: Not administered. FINDINGS: ABDOMEN LOWER CHEST: Distended fluid-filled  esophagus. Bibasilar subsegmental atelectasis.. LIVER/BILIARY TREE: Unremarkable. GALLBLADDER: No calcified gallstones. No pericholecystic inflammatory change. SPLEEN: Unremarkable. PANCREAS: Unremarkable. ADRENAL GLANDS: Unremarkable. KIDNEYS/URETERS: Stable medullary nephrocalcinosis with numerous bilateral renal calcifications. Multiple stable bilateral renal cysts. No obstructing stone or hydronephrosis. STOMACH AND BOWEL: Marked distention of the stomach and small bowel with transition at the terminal ileum consistent with small bowel obstruction. Small amount of free fluid in the pelvis. No bowel wall thickening. APPENDIX: Normal. ABDOMINOPELVIC CAVITY: Small volume of ascites. No pneumoperitoneum or abscess. No adenopathy.. VESSELS: Atherosclerosis without abdominal aortic aneurysm. PELVIS REPRODUCTIVE ORGANS: Unremarkable for patient's age. URINARY BLADDER: Unremarkable. ABDOMINAL WALL/INGUINAL REGIONS: Unremarkable. BONES: No acute osseous abnormality. Spinal degenerative changes. Lumbar levoscoliosis. Degenerative grade 1 anterolisthesis L4-5. Mild chronic T12 compression is stable. Few stable tiny sclerotic foci at T11, L1 and L2 are favored represent bone islands. Status post ORIF of the right hip. Advanced bilateral hip osteoarthritis     Impression: Small bowel obstruction with transition at the terminal ileum that may be due to adhesions Small volume of ascites The study was marked in EPIC for immediate notification. Workstation performed: [id number]           Assessment/Plan    Epilepsy (HCC)  Assessment & Plan  Continue home lamotrigine    Hypertension  Assessment & Plan  Blood pressure stable  Resume home nebivolol    Chronic kidney disease  Assessment & Plan  Presented with creatinine of 1.54, now improved to baseline  Continue to monitor    Small bowel obstruction (HCC)  Assessment & Plan  Presented with abdominal distention, pain, diaphoresis  CT abdomen revealed small bowel obstruction with  "transition at terminal ileum likely related to adhesions  History of small bowel obstruction status post ex lap, 4/24  Continues with bilious output through NG tube  Continue NG tube  Monitor intake output  IV fluid  Monitor abdominal exam  Follow-up labs  Aspiration precaution  Further management as per primary team      Right spastic hemiparesis (HCC)  Assessment & Plan  History of left MCA CVA, history of brain surgery  On atorvastatin      Cerebral palsy (HCC)  Assessment & Plan  Group home resident, currently appears to be at baseline  Continue chronic meds    * Aspiration pneumonia (HCC)  Assessment & Plan  Noted to be febrile with Tmax of 101.2 °F  CT chest on pelvis revealed atelectasis versus right lower lobe pneumonia  UA without evidence of infection  Low-grade fever, fever Trend is improving.  Hemodynamically stable  Normal white count, procalcitonin elevated.  Suspect aspiration pneumonia versus pneumonitis versus atelectasis  Continue ceftriaxone, metronidazole  Monitor fever, CBC  Trend procalcitonin  Aspiration precaution  Incentive spirometry  Blood culture if recurrent fever  Monitor abdominal exam           VTE Prophylaxis: Heparin  / sequential compression device       Counseling / Coordination of Care Time: 45 minutes.  Greater than 50% of total time spent on patient counseling and coordination of care.    Collaboration of Care: Were Recommendations Directly Discussed with Primary Treatment Team? - Yes     ** Please Note: \"This note has been constructed using a voice recognition system.Therefore there may be syntax, spelling, and/or grammatical errors. Please call if you have any questions. \"**  "

## 2024-08-13 NOTE — PLAN OF CARE
Problem: Prexisting or High Potential for Compromised Skin Integrity  Goal: Skin integrity is maintained or improved  Description: INTERVENTIONS:  - Identify patients at risk for skin breakdown  - Assess and monitor skin integrity  - Assess and monitor nutrition and hydration status  - Monitor labs   - Assess for incontinence   - Turn and reposition patient  - Assist with mobility/ambulation  - Relieve pressure over bony prominences  - Avoid friction and shearing  - Provide appropriate hygiene as needed including keeping skin clean and dry  - Evaluate need for skin moisturizer/barrier cream  - Collaborate with interdisciplinary team   - Patient/family teaching  - Consider wound care consult   Outcome: Progressing     Problem: PAIN - ADULT  Goal: Verbalizes/displays adequate comfort level or baseline comfort level  Description: Interventions:  - Encourage patient to monitor pain and request assistance  - Assess pain using appropriate pain scale  - Administer analgesics based on type and severity of pain and evaluate response  - Implement non-pharmacological measures as appropriate and evaluate response  - Consider cultural and social influences on pain and pain management  - Notify physician/advanced practitioner if interventions unsuccessful or patient reports new pain  Outcome: Progressing     Problem: INFECTION - ADULT  Goal: Absence or prevention of progression during hospitalization  Description: INTERVENTIONS:  - Assess and monitor for signs and symptoms of infection  - Monitor lab/diagnostic results  - Monitor all insertion sites, i.e. indwelling lines, tubes, and drains  - Monitor endotracheal if appropriate and nasal secretions for changes in amount and color  - [place] appropriate cooling/warming therapies per order  - Administer medications as ordered  - Instruct and encourage patient and family to use good hand hygiene technique  - Identify and instruct in appropriate isolation precautions for  identified infection/condition  Outcome: Progressing     Problem: INFECTION - ADULT  Goal: Absence of fever/infection during neutropenic period  Description: INTERVENTIONS:  - Monitor WBC    Outcome: Progressing     Problem: SAFETY ADULT  Goal: Patient will remain free of falls  Description: INTERVENTIONS:  - Educate patient/family on patient safety including physical limitations  - Instruct patient to call for assistance with activity   - Consult OT/PT to assist with strengthening/mobility   - Keep Call bell within reach  - Keep bed low and locked with side rails adjusted as appropriate  - Keep care items and personal belongings within reach  - Initiate and maintain comfort rounds  - Make Fall Risk Sign visible to staff  - Offer Toileting every 2 Hours, in advance of need  - Initiate/Maintain bed alarm  - Obtain necessary fall risk management equipment: bed alarm  Problem: GASTROINTESTINAL - ADULT  Goal: Minimal or absence of nausea and/or vomiting  Description: INTERVENTIONS:  - Administer IV fluids if ordered to ensure adequate hydration  - Maintain NPO status until nausea and vomiting are resolved  - Nasogastric tube if ordered  - Administer ordered antiemetic medications as needed  - Provide nonpharmacologic comfort measures as appropriate  - Advance diet as tolerated, if ordered  - Consider nutrition services referral to assist patient with adequate nutrition and appropriate food choices  Outcome: Progressing     Problem: GASTROINTESTINAL - ADULT  Goal: Maintains or returns to baseline bowel function  Description: INTERVENTIONS:  - Assess bowel function  - Encourage oral fluids to ensure adequate hydration  - Administer IV fluids if ordered to ensure adequate hydration  - Administer ordered medications as needed  - Encourage mobilization and activity  - Consider nutritional services referral to assist patient with adequate nutrition and appropriate food choices  Outcome: Progressing     Problem: METABOLIC,  FLUID AND ELECTROLYTES - ADULT  Goal: Electrolytes maintained within normal limits  Description: INTERVENTIONS:  - Monitor labs and assess patient for signs and symptoms of electrolyte imbalances  - Administer electrolyte replacement as ordered  - Monitor response to electrolyte replacements, including repeat lab results as appropriate  - Instruct patient on fluid and nutrition as appropriate  Outcome: Progressing     Problem: METABOLIC, FLUID AND ELECTROLYTES - ADULT  Goal: Fluid balance maintained  Description: INTERVENTIONS:  - Monitor labs   - Monitor I/O and WT  - Instruct patient on fluid and nutrition as appropriate  - Assess for signs & symptoms of volume excess or deficit  Outcome: Progressing     Problem: Nutrition/Hydration-ADULT  Goal: Nutrient/Hydration intake appropriate for improving, restoring or maintaining nutritional needs  Description: Monitor and assess patient's nutrition/hydration status for malnutrition. Collaborate with interdisciplinary team and initiate plan and interventions as ordered.  Monitor patient's weight and dietary intake as ordered or per policy. Utilize nutrition screening tool and intervene as necessary. Determine patient's food preferences and provide high-protein, high-caloric foods as appropriate.     INTERVENTIONS:  - Monitor oral intake, urinary output, labs, and treatment plans  - Assess nutrition and hydration status and recommend course of action  - Evaluate amount of meals eaten  - Assist patient with eating if necessary   - Allow adequate time for meals  - Recommend/ encourage appropriate diets, oral nutritional supplements, and vitamin/mineral supplements  - Order, calculate, and assess calorie counts as needed  - Recommend, monitor, and adjust tube feedings and TPN/PPN based on assessed needs  - Assess need for intravenous fluids  - Provide specific nutrition/hydration education as appropriate  - Include patient/family/caregiver in decisions related to  nutrition  Outcome: Progressing     - Apply yellow socks and bracelet for high fall risk patients  - Consider moving patient to room near nurses station  Outcome: Progressing

## 2024-08-14 ENCOUNTER — APPOINTMENT (INPATIENT)
Dept: RADIOLOGY | Facility: HOSPITAL | Age: 59
DRG: 388 | End: 2024-08-14
Payer: MEDICARE

## 2024-08-14 PROCEDURE — 74019 RADEX ABDOMEN 2 VIEWS: CPT

## 2024-08-14 PROCEDURE — 99232 SBSQ HOSP IP/OBS MODERATE 35: CPT | Performed by: SURGERY

## 2024-08-14 PROCEDURE — 99232 SBSQ HOSP IP/OBS MODERATE 35: CPT | Performed by: STUDENT IN AN ORGANIZED HEALTH CARE EDUCATION/TRAINING PROGRAM

## 2024-08-14 RX ADMIN — DEXTROSE, SODIUM CHLORIDE, AND POTASSIUM CHLORIDE 100 ML/HR: 5; .45; .15 INJECTION INTRAVENOUS at 17:06

## 2024-08-14 RX ADMIN — HEPARIN SODIUM 5000 UNITS: 5000 INJECTION INTRAVENOUS; SUBCUTANEOUS at 05:59

## 2024-08-14 RX ADMIN — LAMOTRIGINE 150 MG: 100 TABLET ORAL at 20:24

## 2024-08-14 RX ADMIN — PANTOPRAZOLE SODIUM 40 MG: 40 INJECTION, POWDER, FOR SOLUTION INTRAVENOUS at 06:00

## 2024-08-14 RX ADMIN — SERTRALINE HYDROCHLORIDE 50 MG: 50 TABLET ORAL at 09:22

## 2024-08-14 RX ADMIN — HEPARIN SODIUM 5000 UNITS: 5000 INJECTION INTRAVENOUS; SUBCUTANEOUS at 21:27

## 2024-08-14 RX ADMIN — QUETIAPINE FUMARATE 50 MG: 25 TABLET ORAL at 21:27

## 2024-08-14 RX ADMIN — METRONIDAZOLE 500 MG: 500 INJECTION, SOLUTION INTRAVENOUS at 12:04

## 2024-08-14 RX ADMIN — LAMOTRIGINE 200 MG: 100 TABLET ORAL at 09:21

## 2024-08-14 RX ADMIN — DIATRIZOATE MEGLUMINE AND DIATRIZOATE SODIUM 120 ML: 660; 100 LIQUID ORAL; RECTAL at 12:12

## 2024-08-14 RX ADMIN — DEXTROSE, SODIUM CHLORIDE, AND POTASSIUM CHLORIDE 100 ML/HR: 5; .45; .15 INJECTION INTRAVENOUS at 07:37

## 2024-08-14 RX ADMIN — CEFTRIAXONE 1000 MG: 1 INJECTION, SOLUTION INTRAVENOUS at 10:40

## 2024-08-14 RX ADMIN — NEBIVOLOL 2.5 MG: 2.5 TABLET ORAL at 09:22

## 2024-08-14 RX ADMIN — METRONIDAZOLE 500 MG: 500 INJECTION, SOLUTION INTRAVENOUS at 20:24

## 2024-08-14 RX ADMIN — HEPARIN SODIUM 5000 UNITS: 5000 INJECTION INTRAVENOUS; SUBCUTANEOUS at 15:26

## 2024-08-14 RX ADMIN — METRONIDAZOLE 500 MG: 500 INJECTION, SOLUTION INTRAVENOUS at 03:52

## 2024-08-14 RX ADMIN — ATORVASTATIN CALCIUM 10 MG: 10 TABLET, FILM COATED ORAL at 20:24

## 2024-08-14 NOTE — PLAN OF CARE
Problem: Prexisting or High Potential for Compromised Skin Integrity  Goal: Skin integrity is maintained or improved  Description: INTERVENTIONS:  - Identify patients at risk for skin breakdown  - Assess and monitor skin integrity  - Assess and monitor nutrition and hydration status  - Monitor labs   - Assess for incontinence   - Turn and reposition patient  - Assist with mobility/ambulation  - Relieve pressure over bony prominences  - Avoid friction and shearing  - Provide appropriate hygiene as needed including keeping skin clean and dry  - Evaluate need for skin moisturizer/barrier cream  - Collaborate with interdisciplinary team   - Patient/family teaching  - Consider wound care consult   Outcome: Progressing     Problem: PAIN - ADULT  Goal: Verbalizes/displays adequate comfort level or baseline comfort level  Description: Interventions:  - Encourage patient to monitor pain and request assistance  - Assess pain using appropriate pain scale  - Administer analgesics based on type and severity of pain and evaluate response  - Implement non-pharmacological measures as appropriate and evaluate response  - Consider cultural and social influences on pain and pain management  - Notify physician/advanced practitioner if interventions unsuccessful or patient reports new pain  Outcome: Progressing     Problem: INFECTION - ADULT  Goal: Absence or prevention of progression during hospitalization  Description: INTERVENTIONS:  - Assess and monitor for signs and symptoms of infection  - Monitor lab/diagnostic results  - Monitor all insertion sites, i.e. indwelling lines, tubes, and drains  - Monitor endotracheal if appropriate and nasal secretions for changes in amount and color  - Jermyn appropriate cooling/warming therapies per order  - Administer medications as ordered  - Instruct and encourage patient and family to use good hand hygiene technique  - Identify and instruct in appropriate isolation precautions for  identified infection/condition  Outcome: Progressing  Goal: Absence of fever/infection during neutropenic period  Description: INTERVENTIONS:  - Monitor WBC    Outcome: Progressing     Problem: SAFETY ADULT  Goal: Patient will remain free of falls  Description: INTERVENTIONS:  - Educate patient/family on patient safety including physical limitations  - Instruct patient to call for assistance with activity   - Consult OT/PT to assist with strengthening/mobility   - Keep Call bell within reach  - Keep bed low and locked with side rails adjusted as appropriate  - Keep care items and personal belongings within reach  - Initiate and maintain comfort rounds  - Make Fall Risk Sign visible to staff  - Offer Toileting every 2 Hours, in advance of need  - Initiate/Maintain bedalarm  - Obtain necessary fall risk management equipment:   - Apply yellow socks and bracelet for high fall risk patients  - Consider moving patient to room near nurses station  Outcome: Progressing  Goal: Maintain or return to baseline ADL function  Description: INTERVENTIONS:  -  Assess patient's ability to carry out ADLs; assess patient's baseline for ADL function and identify physical deficits which impact ability to perform ADLs (bathing, care of mouth/teeth, toileting, grooming, dressing, etc.)  - Assess/evaluate cause of self-care deficits   - Assess range of motion  - Assess patient's mobility; develop plan if impaired  - Assess patient's need for assistive devices and provide as appropriate  - Encourage maximum independence but intervene and supervise when necessary  - Involve family in performance of ADLs  - Assess for home care needs following discharge   - Consider OT consult to assist with ADL evaluation and planning for discharge  - Provide patient education as appropriate  Outcome: Progressing  Goal: Maintains/Returns to pre admission functional level  Description: INTERVENTIONS:  - Perform AM-PAC 6 Click Basic Mobility/ Daily Activity  assessment daily.  - Set and communicate daily mobility goal to care team and patient/family/caregiver.   - Collaborate with rehabilitation services on mobility goals if consulted  - Perform Range of Motion 2 times a day.  - Reposition patient every 2 hours.  - Dangle patient 3 times a day  - Stand patient 3 times a day  - Ambulate patient 3 times a day  - Out of bed to chair 3 times a day   - Out of bed for meals 3 times a day  - Out of bed for toileting  - Record patient progress and toleration of activity level   Outcome: Progressing     Problem: DISCHARGE PLANNING  Goal: Discharge to home or other facility with appropriate resources  Description: INTERVENTIONS:  - Identify barriers to discharge w/patient and caregiver  - Arrange for needed discharge resources and transportation as appropriate  - Identify discharge learning needs (meds, wound care, etc.)  - Arrange for interpretive services to assist at discharge as needed  - Refer to Case Management Department for coordinating discharge planning if the patient needs post-hospital services based on physician/advanced practitioner order or complex needs related to functional status, cognitive ability, or social support system  Outcome: Progressing     Problem: GASTROINTESTINAL - ADULT  Goal: Minimal or absence of nausea and/or vomiting  Description: INTERVENTIONS:  - Administer IV fluids if ordered to ensure adequate hydration  - Maintain NPO status until nausea and vomiting are resolved  - Nasogastric tube if ordered  - Administer ordered antiemetic medications as needed  - Provide nonpharmacologic comfort measures as appropriate  - Advance diet as tolerated, if ordered  - Consider nutrition services referral to assist patient with adequate nutrition and appropriate food choices  Outcome: Progressing  Goal: Maintains or returns to baseline bowel function  Description: INTERVENTIONS:  - Assess bowel function  - Encourage oral fluids to ensure adequate hydration  -  Administer IV fluids if ordered to ensure adequate hydration  - Administer ordered medications as needed  - Encourage mobilization and activity  - Consider nutritional services referral to assist patient with adequate nutrition and appropriate food choices  Outcome: Progressing  Goal: Maintains adequate nutritional intake  Description: INTERVENTIONS:  - Monitor percentage of each meal consumed  - Identify factors contributing to decreased intake, treat as appropriate  - Assist with meals as needed  - Monitor I&O, weight, and lab values if indicated  - Obtain nutrition services referral as needed  Outcome: Progressing  Goal: Oral mucous membranes remain intact  Description: INTERVENTIONS  - Assess oral mucosa and hygiene practices  - Implement preventative oral hygiene regimen  - Implement oral medicated treatments as ordered  - Initiate Nutrition services referral as needed  Outcome: Progressing     Problem: METABOLIC, FLUID AND ELECTROLYTES - ADULT  Goal: Electrolytes maintained within normal limits  Description: INTERVENTIONS:  - Monitor labs and assess patient for signs and symptoms of electrolyte imbalances  - Administer electrolyte replacement as ordered  - Monitor response to electrolyte replacements, including repeat lab results as appropriate  - Instruct patient on fluid and nutrition as appropriate  Outcome: Progressing  Goal: Fluid balance maintained  Description: INTERVENTIONS:  - Monitor labs   - Monitor I/O and WT  - Instruct patient on fluid and nutrition as appropriate  - Assess for signs & symptoms of volume excess or deficit  Outcome: Progressing  Goal: Glucose maintained within target range  Description: INTERVENTIONS:  - Monitor Blood Glucose as ordered  - Assess for signs and symptoms of hyperglycemia and hypoglycemia  - Administer ordered medications to maintain glucose within target range  - Assess nutritional intake and initiate nutrition service referral as needed  Outcome: Progressing      Problem: MUSCULOSKELETAL - ADULT  Goal: Maintain or return mobility to safest level of function  Description: INTERVENTIONS:  - Assess patient's ability to carry out ADLs; assess patient's baseline for ADL function and identify physical deficits which impact ability to perform ADLs (bathing, care of mouth/teeth, toileting, grooming, dressing, etc.)  - Assess/evaluate cause of self-care deficits   - Assess range of motion  - Assess patient's mobility  - Assess patient's need for assistive devices and provide as appropriate  - Encourage maximum independence but intervene and supervise when necessary  - Involve family in performance of ADLs  - Assess for home care needs following discharge   - Consider OT consult to assist with ADL evaluation and planning for discharge  - Provide patient education as appropriate  Outcome: Progressing     Problem: Nutrition/Hydration-ADULT  Goal: Nutrient/Hydration intake appropriate for improving, restoring or maintaining nutritional needs  Description: Monitor and assess patient's nutrition/hydration status for malnutrition. Collaborate with interdisciplinary team and initiate plan and interventions as ordered.  Monitor patient's weight and dietary intake as ordered or per policy. Utilize nutrition screening tool and intervene as necessary. Determine patient's food preferences and provide high-protein, high-caloric foods as appropriate.     INTERVENTIONS:  - Monitor oral intake, urinary output, labs, and treatment plans  - Assess nutrition and hydration status and recommend course of action  - Evaluate amount of meals eaten  - Assist patient with eating if necessary   - Allow adequate time for meals  - Recommend/ encourage appropriate diets, oral nutritional supplements, and vitamin/mineral supplements  - Order, calculate, and assess calorie counts as needed  - Recommend, monitor, and adjust tube feedings and TPN/PPN based on assessed needs  - Assess need for intravenous fluids  -  Provide specific nutrition/hydration education as appropriate  - Include patient/family/caregiver in decisions related to nutrition  Outcome: Progressing

## 2024-08-14 NOTE — PROGRESS NOTES
"Progress Note - General Surgery   Mario Rollins 59 y.o. male MRN: [id number]  Unit/Bed#: [address] [place] [address] Encounter: [id number]    Assessment:  Small bowel obstruction  -- s/p ex lap with CARMEN earlier April 2024, NG tube output 1100  SALLY on CKD  -- resolved, baseline creatinine appears to be 0.8 to 1.0  Aspiration pneumonia with small RLL effusion -- Tmax 101.2 on 8/10, procalcitonin has normalized  SIRS -- tachycardia and leukocytosis present on 8/10        Plan:  KUB reviewed, NGT was reportedly advanced by nursing staff   Will discuss removing NGT today with attending  Continue IV fluid hydration   Rocephin and Flagyl day 4  He's now back on home oral meds   OOB as least once daily  Pulmonary toilet    Recheck AM labs tomorrow         Subjective/Objective     Subjective:  Patient reports feeling better and would like to get out of bed. He reports passing some flatus yesterday into today    Objective:     Blood pressure 135/77, pulse 71, temperature 97.8 °F (36.6 °C), resp. rate 18, height 5' 8\" (1.727 m), weight 77.5 kg (170 lb 14.4 oz), SpO2 97%.,Body mass index is 25.99 kg/m².      Intake/Output Summary (Last 24 hours) at 8/14/2024 1014  Last data filed at 8/14/2024 0354  Gross per 24 hour   Intake 60 ml   Output 1625 ml   Net -1565 ml       Invasive Devices       Peripheral Intravenous Line  Duration             Peripheral IV 08/11/24 Dorsal (posterior);Left Forearm 2 days    Peripheral IV 08/12/24 Right Antecubital 1 day              Drain  Duration             NG/OG/Enteral Tube 16 Fr Left nare 4 days                    Physical Exam: /77   Pulse 71   Temp 97.8 °F (36.6 °C)   Resp 18   Ht 5' 8\" (1.727 m)   Wt 77.5 kg (170 lb 14.4 oz)   SpO2 97%   BMI 25.99 kg/m²   General appearance: alert and oriented, in no acute distress  Head: Normocephalic, without obvious abnormality, atraumatic  Lungs:  Decreased breath sounds right lower lobe, otherwise clear to auscultation, no respiratory " "distress  Heart:  regular rate and rhythm  Abdomen:  soft, nondistended, minimal tympany to percussion, hypoactive bowel sounds, non tender, NGT in place   Extremities: extremities normal, warm and well-perfused; no cyanosis, clubbing, or edema  Skin: Skin color, texture, turgor normal. No rashes or lesions    Lab, Imaging and other studies:I have personally reviewed pertinent lab results.  , CBC:   No results found for: \"WBC\", \"HGB\", \"HCT\", \"MCV\", \"PLT\", \"ADJUSTEDWBC\", \"RBC\", \"MCH\", \"MCHC\", \"RDW\", \"MPV\", \"NRBC\"  , CMP:   No results found for: \"SODIUM\", \"K\", \"CL\", \"CO2\", \"ANIONGAP\", \"BUN\", \"CREATININE\", \"GLUCOSE\", \"CALCIUM\", \"AST\", \"ALT\", \"ALKPHOS\", \"PROT\", \"BILITOT\", \"EGFR\"    VTE Pharmacologic Prophylaxis: Heparin SQ  VTE Mechanical Prophylaxis: sequential compression device  "

## 2024-08-14 NOTE — CONSULTS
Inpatient Medical Consultation - [facility] Internal Medicine    Patient Information: Mario Rollins 59 y.o. male MRN: [id number]  Unit/Bed#: [address] [place] [address] Encounter: [id number]    PCP: No primary care provider on file.  Date of Admission:  8/9/2024  Date of Consultation: 08/14/24  Requesting Physician: Dimitrios Pulido MD    Reason For Consultation:     Fever      Assessment/Plan    * Aspiration pneumonia (HCC)  Assessment & Plan  Noted to be febrile with Tmax of 101.2 °F  CT chest on pelvis revealed atelectasis versus right lower lobe pneumonia  UA without evidence of infection  Normal white count, procalcitonin elevated.  Likely suspect aspiration PNA versus pneumonitis versus atelectasis  Continue ceftriaxone, metronidazole  Monitor fever, CBC  Aspiration precaution  Incentive spirometry  BCx if recurrent fever    Small bowel obstruction (HCC)  Assessment & Plan  Presented with abdominal distention, pain, diaphoresis    CT abdomen revealed small bowel obstruction with transition at terminal ileum likely related to adhesions  History of small bowel obstruction status post ex lap, 4/24  D/W surgery recs  NGT readvanced, reevaluate  Home orals  Gentle IVF, continue antibiotics    Epilepsy (HCC)  Assessment & Plan  Continue home lamotrigine    Hypertension  Assessment & Plan  Blood pressure stable  Resume home nebivolol    Chronic kidney disease  Assessment & Plan  Presented with creatinine of 1.54, now improved to baseline  Continue to monitor      Right spastic hemiparesis (HCC)  Assessment & Plan  History of left MCA CVA, history of brain surgery  On atorvastatin      Cerebral palsy (HCC)  Assessment & Plan  Group home resident, currently appears to be at baseline  Continue chronic meds        Review of Systems   Respiratory:  Negative for shortness of breath and wheezing.    Cardiovascular:  Negative for chest pain.   Gastrointestinal:  Negative for constipation.        NG discomfort  "        Subjective:  Patient seen and examined at bedside, reported discomfort with NG tube, denied pulling on it or how it got displaced.  Patient denied chest pain, shortness of breath or palpitations.    Physical Exam:     Vitals:   Blood Pressure: 135/77 (08/14/24 0727)  Pulse: 71 (08/14/24 0727)  Temperature: 97.8 °F (36.6 °C) (08/14/24 0727)  Temp Source: Temporal (08/13/24 0734)  Respirations: 18 (08/14/24 0727)  Height: 5' 8\" (172.7 cm) (08/09/24 2231)  Weight - Scale: 77.5 kg (170 lb 14.4 oz) (08/09/24 2231)  SpO2: 97 % (08/14/24 0727)    Physical Exam  Vitals reviewed.   Constitutional:       General: He is not in acute distress.     Appearance: Normal appearance.   HENT:      Head: Atraumatic.      Nose: No congestion.   Eyes:      General: No scleral icterus.     Pupils: Pupils are equal, round, and reactive to light.   Cardiovascular:      Rate and Rhythm: Normal rate.      Heart sounds: No murmur heard.  Pulmonary:      Effort: No respiratory distress.      Breath sounds: No wheezing, rhonchi or rales.   Abdominal:      Palpations: Abdomen is soft.      Tenderness: There is no abdominal tenderness. There is no guarding.      Comments: NG dislodged   Musculoskeletal:         General: No swelling or deformity. Normal range of motion.      Cervical back: No tenderness.      Right lower leg: No edema.      Left lower leg: No edema.   Feet:      Right foot:      Skin integrity: No callus.   Skin:     General: Skin is warm.      Capillary Refill: Capillary refill takes less than 2 seconds.      Findings: No lesion or rash.   Neurological:      Mental Status: He is oriented to person, place, and time.      Cranial Nerves: No cranial nerve deficit.      Coordination: Coordination normal.   Psychiatric:         Mood and Affect: Mood normal.         Thought Content: Thought content normal.           Lab Results: I Have Reviewed All Lab Data Below:    Results from last 7 days   Lab Units 08/13/24  0539   WBC " Thousand/uL 6.26   HEMOGLOBIN g/dL 13.5   HEMATOCRIT % 42.3   PLATELETS Thousands/uL 144*   SEGS PCT % 69   LYMPHO PCT % 17   MONO PCT % 12   EOS PCT % 1     Results from last 7 days   Lab Units 08/13/24  0539 08/11/24  0437 08/10/24  0458   POTASSIUM mmol/L 3.4*   < > 4.1   CHLORIDE mmol/L 112*   < > 108   CO2 mmol/L 25   < > 23   BUN mg/dL 13   < > 22   CREATININE mg/dL 0.84   < > 1.27   CALCIUM mg/dL 7.8*   < > 9.0   ALK PHOS U/L  --   --  75   ALT U/L  --   --  11   AST U/L  --   --  12*    < > = values in this interval not displayed.           * Additional Pertinent Lab Tests Reviewed: All Labs For Current Hospital Admission Reviewed    Imaging: I have personally reviewed pertinent reports.      CT chest abdomen pelvis wo contrast    Result Date: 8/11/2024  Narrative: CT CHEST, ABDOMEN AND PELVIS WITHOUT IV CONTRAST INDICATION: possible aspiration, persistent large NGT output. 59-year-old male with history of bowel obstruction 4 months ago requiring exploratory laparotomy with lysis of adhesions. COMPARISON: CTs of the abdomen and pelvis from April 19, 2024 and August 9, 2024. Portable chest from today. TECHNIQUE: CT examination of the chest, abdomen and pelvis was performed without intravenous contrast. Multiplanar 2D reformatted images were created from the source data. This examination, like all CT scans performed in the [facility], was performed utilizing techniques to minimize radiation dose exposure, including the use of iterative reconstruction and automated exposure control. Radiation dose length product (DLP) for this visit: 640.66 mGy-cm Enteric Contrast: Not administered. Absence of intravenous and gastrointestinal contrast limits evaluation of the abdominal and pelvic viscera. FINDINGS: CHEST LUNGS: Subsegmental atelectasis in the right upper and lower lobes. Lungs otherwise clear. Elevation of the right hemidiaphragm PLEURA: Small right pleural effusion. HEART/GREAT VESSELS:  Coronary artery calcifications. Otherwise unremarkable. No thoracic aortic aneurysm. MEDIASTINUM AND AMI: No lymphadenopathy or mass. NG tube present. Esophagus otherwise unremarkable. Trachea and main stem bronchi normal. CHEST WALL AND LOWER NECK: Mild bilateral gynecomastia. No lymphadenopathy or mass. ABDOMEN LIVER/BILIARY TREE: Limited evaluation without IV contrast. Grossly unremarkable. GALLBLADDER: No calcified gallstones. No pericholecystic inflammatory change. SPLEEN: Unremarkable. PANCREAS: Limited evaluation without IV contrast. Grossly unremarkable. ADRENAL GLANDS: Unremarkable. KIDNEYS/URETERS: Limited evaluation without IV contrast. Extensive bilateral medullary nephrocalcinosis. No hydronephrosis. STOMACH AND BOWEL: NG tube present with tip at the gastric body. Multiple dilated loops of small intestine with normal caliber terminal ileum and collapsed colon, consistent with distal small bowel obstruction. No evidence of pneumatosis intestinalis. APPENDIX: Normal. ABDOMINOPELVIC CAVITY: No lymphadenopathy. Small amount of ascites along the liver and in the pelvis and right paracolic gutter. No extraluminal gas. VESSELS: Limited evaluation without IV contrast. No aortic aneurysm. PELVIS REPRODUCTIVE ORGANS: Prostate and seminal vesicles unremarkable for patient's age. URINARY BLADDER: Unremarkable. ABDOMINAL WALL/INGUINAL REGIONS: Unremarkable. BONES: Severe osteoarthritis of both hips. Healed fracture of the proximal right femur, status post ORIF. No evidence of acute fracture or destructive lesion.     Impression: 1.  Distal small bowel obstruction at or near the terminal ileum. 2.  Small amount of ascites. 3.  Subsegmental atelectasis in the right upper and lower lobes, small right pleural effusion and elevation of the right hemidiaphragm. 4.  Extensive bilateral medullary nephrocalcinosis, unchanged. Workstation performed: [id number]     XR chest portable    Result Date: 8/11/2024  Narrative: XR  CHEST PORTABLE INDICATION: Possible aspiration. COMPARISON: CXR 8/28/2011. Abdomen CT 8/9/2024. FINDINGS: NG tube in stomach with sidehole in GE junction. Mild opacity opacity in the right base. No pneumothorax or pleural effusion. Normal cardiomediastinal silhouette. Bones are unremarkable for age. Normal upper abdomen. Mild elevation of the right diaphragm.     Impression: Mild opacity opacity in the right base, likely atelectasis or aspiration. NG tube in stomach with sidehole at GE junction. Recommend advancing. Workstation performed: [id number]     XR abdomen obstruction series    Result Date: 8/10/2024  Narrative: XR ABDOMEN OBSTRUCTION SERIES INDICATION: SBO, bowel gas pattern. COMPARISON: CT 8/9/2024 FINDINGS: Distal aspect of NG tube is coursing below the left hemidiaphragm. Persistent diffuse small bowel distention, relative paucity of large bowel gas, and moderate gastric distention. No pneumoperitoneum. No pathologic calcification or soft tissue mass. Bones are unremarkable for patient's age. Examination of the chest reveals clear lungs and a normal cardiomediastinal silhouette.     Impression: Persistent obstructive bowel gas pattern with moderate gastric distention. Workstation performed: [id number]     CT abdomen pelvis with contrast    Result Date: 8/9/2024  Narrative: CT ABDOMEN AND PELVIS WITH IV CONTRAST INDICATION: left lower quad pain, history of bowel obstruction, pt. does not need to wait for labs today. COMPARISON: CT dated 4/19/2024. TECHNIQUE: CT examination of the abdomen and pelvis was performed. Multiplanar 2D reformatted images were created from the source data. This examination, like all CT scans performed in the [facility], was performed utilizing techniques to minimize radiation dose exposure, including the use of iterative reconstruction and automated exposure control. Radiation dose length product (DLP) for this visit: 517.37 mGy-cm IV Contrast: 100 mL of iohexol  (OMNIPAQUE) Enteric Contrast: Not administered. FINDINGS: ABDOMEN LOWER CHEST: Distended fluid-filled esophagus. Bibasilar subsegmental atelectasis.. LIVER/BILIARY TREE: Unremarkable. GALLBLADDER: No calcified gallstones. No pericholecystic inflammatory change. SPLEEN: Unremarkable. PANCREAS: Unremarkable. ADRENAL GLANDS: Unremarkable. KIDNEYS/URETERS: Stable medullary nephrocalcinosis with numerous bilateral renal calcifications. Multiple stable bilateral renal cysts. No obstructing stone or hydronephrosis. STOMACH AND BOWEL: Marked distention of the stomach and small bowel with transition at the terminal ileum consistent with small bowel obstruction. Small amount of free fluid in the pelvis. No bowel wall thickening. APPENDIX: Normal. ABDOMINOPELVIC CAVITY: Small volume of ascites. No pneumoperitoneum or abscess. No adenopathy.. VESSELS: Atherosclerosis without abdominal aortic aneurysm. PELVIS REPRODUCTIVE ORGANS: Unremarkable for patient's age. URINARY BLADDER: Unremarkable. ABDOMINAL WALL/INGUINAL REGIONS: Unremarkable. BONES: No acute osseous abnormality. Spinal degenerative changes. Lumbar levoscoliosis. Degenerative grade 1 anterolisthesis L4-5. Mild chronic T12 compression is stable. Few stable tiny sclerotic foci at T11, L1 and L2 are favored represent bone islands. Status post ORIF of the right hip. Advanced bilateral hip osteoarthritis     Impression: Small bowel obstruction with transition at the terminal ileum that may be due to adhesions Small volume of ascites The study was marked in EPIC for immediate notification. Workstation performed: [id number]           Assessment/Plan    Epilepsy (HCC)  Assessment & Plan  Continue home lamotrigine    Hypertension  Assessment & Plan  Blood pressure stable  Resume home nebivolol    Chronic kidney disease  Assessment & Plan  Presented with creatinine of 1.54, now improved to baseline  Continue to monitor    Small bowel obstruction (HCC)  Assessment &  "Plan  Presented with abdominal distention, pain, diaphoresis  CT abdomen revealed small bowel obstruction with transition at terminal ileum likely related to adhesions  History of small bowel obstruction status post ex lap, 4/24  Continues with bilious output through NG tube  Continue NG tube  Monitor intake output  IV fluid  Monitor abdominal exam  Follow-up labs  Aspiration precaution  Further management as per primary team      Right spastic hemiparesis (HCC)  Assessment & Plan  History of left MCA CVA, history of brain surgery  On atorvastatin      Cerebral palsy (HCC)  Assessment & Plan  Group home resident, currently appears to be at baseline  Continue chronic meds    * Aspiration pneumonia (HCC)  Assessment & Plan  Noted to be febrile with Tmax of 101.2 °F  CT chest on pelvis revealed atelectasis versus right lower lobe pneumonia  UA without evidence of infection  Low-grade fever, fever Trend is improving.  Hemodynamically stable  Normal white count, procalcitonin elevated.  Suspect aspiration pneumonia versus pneumonitis versus atelectasis  Continue ceftriaxone, metronidazole  Monitor fever, CBC  Trend procalcitonin  Aspiration precaution  Incentive spirometry  Blood culture if recurrent fever  Monitor abdominal exam           VTE Prophylaxis: Heparin  / sequential compression device       Counseling / Coordination of Care Time: 45 minutes.  Greater than 50% of total time spent on patient counseling and coordination of care.    Collaboration of Care: Were Recommendations Directly Discussed with Primary Treatment Team? - Yes     ** Please Note: \"This note has been constructed using a voice recognition system.Therefore there may be syntax, spelling, and/or grammatical errors. Please call if you have any questions. \"**  "

## 2024-08-15 LAB
ANION GAP SERPL CALCULATED.3IONS-SCNC: 5 MMOL/L (ref 4–13)
BASOPHILS # BLD AUTO: 0.03 THOUSANDS/ÂΜL (ref 0–0.1)
BASOPHILS NFR BLD AUTO: 0 % (ref 0–1)
BUN SERPL-MCNC: 11 MG/DL (ref 5–25)
CALCIUM SERPL-MCNC: 7.8 MG/DL (ref 8.4–10.2)
CHLORIDE SERPL-SCNC: 112 MMOL/L (ref 96–108)
CO2 SERPL-SCNC: 25 MMOL/L (ref 21–32)
CREAT SERPL-MCNC: 0.93 MG/DL (ref 0.6–1.3)
EOSINOPHIL # BLD AUTO: 0.2 THOUSAND/ÂΜL (ref 0–0.61)
EOSINOPHIL NFR BLD AUTO: 3 % (ref 0–6)
ERYTHROCYTE [DISTWIDTH] IN BLOOD BY AUTOMATED COUNT: 13.1 % (ref 11.6–15.1)
GFR SERPL CREATININE-BSD FRML MDRD: 89 ML/MIN/1.73SQ M
GLUCOSE SERPL-MCNC: 93 MG/DL (ref 65–140)
HCT VFR BLD AUTO: 45.9 % (ref 36.5–49.3)
HGB BLD-MCNC: 14.9 G/DL (ref 12–17)
IMM GRANULOCYTES # BLD AUTO: 0.11 THOUSAND/UL (ref 0–0.2)
IMM GRANULOCYTES NFR BLD AUTO: 2 % (ref 0–2)
LYMPHOCYTES # BLD AUTO: 1.76 THOUSANDS/ÂΜL (ref 0.6–4.47)
LYMPHOCYTES NFR BLD AUTO: 23 % (ref 14–44)
MAGNESIUM SERPL-MCNC: 2 MG/DL (ref 1.9–2.7)
MCH RBC QN AUTO: 28.5 PG (ref 26.8–34.3)
MCHC RBC AUTO-ENTMCNC: 32.5 G/DL (ref 31.4–37.4)
MCV RBC AUTO: 88 FL (ref 82–98)
MONOCYTES # BLD AUTO: 0.94 THOUSAND/ÂΜL (ref 0.17–1.22)
MONOCYTES NFR BLD AUTO: 13 % (ref 4–12)
NEUTROPHILS # BLD AUTO: 4.49 THOUSANDS/ÂΜL (ref 1.85–7.62)
NEUTS SEG NFR BLD AUTO: 59 % (ref 43–75)
NRBC BLD AUTO-RTO: 0 /100 WBCS
PHOSPHATE SERPL-MCNC: 2.9 MG/DL (ref 2.7–4.5)
PLATELET # BLD AUTO: 186 THOUSANDS/UL (ref 149–390)
PMV BLD AUTO: 10.7 FL (ref 8.9–12.7)
POTASSIUM SERPL-SCNC: 4 MMOL/L (ref 3.5–5.3)
RBC # BLD AUTO: 5.22 MILLION/UL (ref 3.88–5.62)
SODIUM SERPL-SCNC: 142 MMOL/L (ref 135–147)
WBC # BLD AUTO: 7.53 THOUSAND/UL (ref 4.31–10.16)

## 2024-08-15 PROCEDURE — 80048 BASIC METABOLIC PNL TOTAL CA: CPT | Performed by: PHYSICIAN ASSISTANT

## 2024-08-15 PROCEDURE — 97163 PT EVAL HIGH COMPLEX 45 MIN: CPT

## 2024-08-15 PROCEDURE — 85025 COMPLETE CBC W/AUTO DIFF WBC: CPT | Performed by: PHYSICIAN ASSISTANT

## 2024-08-15 PROCEDURE — 83735 ASSAY OF MAGNESIUM: CPT | Performed by: PHYSICIAN ASSISTANT

## 2024-08-15 PROCEDURE — 84100 ASSAY OF PHOSPHORUS: CPT | Performed by: PHYSICIAN ASSISTANT

## 2024-08-15 PROCEDURE — 97116 GAIT TRAINING THERAPY: CPT

## 2024-08-15 PROCEDURE — 99222 1ST HOSP IP/OBS MODERATE 55: CPT | Performed by: INTERNAL MEDICINE

## 2024-08-15 PROCEDURE — 99232 SBSQ HOSP IP/OBS MODERATE 35: CPT | Performed by: STUDENT IN AN ORGANIZED HEALTH CARE EDUCATION/TRAINING PROGRAM

## 2024-08-15 PROCEDURE — 99231 SBSQ HOSP IP/OBS SF/LOW 25: CPT | Performed by: SURGERY

## 2024-08-15 RX ORDER — LAMOTRIGINE 100 MG/1
200 TABLET ORAL EVERY MORNING
Status: DISCONTINUED | OUTPATIENT
Start: 2024-08-15 | End: 2024-08-18 | Stop reason: HOSPADM

## 2024-08-15 RX ORDER — ECHINACEA PURPUREA EXTRACT 125 MG
1 TABLET ORAL
Status: DISCONTINUED | OUTPATIENT
Start: 2024-08-15 | End: 2024-08-18 | Stop reason: HOSPADM

## 2024-08-15 RX ORDER — QUETIAPINE FUMARATE 25 MG/1
50 TABLET, FILM COATED ORAL
Status: DISCONTINUED | OUTPATIENT
Start: 2024-08-15 | End: 2024-08-18 | Stop reason: HOSPADM

## 2024-08-15 RX ORDER — METRONIDAZOLE 500 MG/100ML
500 INJECTION, SOLUTION INTRAVENOUS EVERY 8 HOURS
Status: COMPLETED | OUTPATIENT
Start: 2024-08-15 | End: 2024-08-16

## 2024-08-15 RX ORDER — CEFTRIAXONE 1 G/50ML
1000 INJECTION, SOLUTION INTRAVENOUS EVERY 24 HOURS
Status: COMPLETED | OUTPATIENT
Start: 2024-08-16 | End: 2024-08-16

## 2024-08-15 RX ORDER — BISACODYL 5 MG/1
10 TABLET, DELAYED RELEASE ORAL ONCE
Status: COMPLETED | OUTPATIENT
Start: 2024-08-15 | End: 2024-08-15

## 2024-08-15 RX ORDER — HEPARIN SODIUM 5000 [USP'U]/ML
5000 INJECTION, SOLUTION INTRAVENOUS; SUBCUTANEOUS EVERY 8 HOURS SCHEDULED
Status: DISCONTINUED | OUTPATIENT
Start: 2024-08-16 | End: 2024-08-18 | Stop reason: HOSPADM

## 2024-08-15 RX ADMIN — PANTOPRAZOLE SODIUM 40 MG: 40 INJECTION, POWDER, FOR SOLUTION INTRAVENOUS at 08:41

## 2024-08-15 RX ADMIN — QUETIAPINE FUMARATE 50 MG: 25 TABLET ORAL at 21:14

## 2024-08-15 RX ADMIN — ATORVASTATIN CALCIUM 10 MG: 10 TABLET, FILM COATED ORAL at 19:48

## 2024-08-15 RX ADMIN — METRONIDAZOLE 500 MG: 500 INJECTION, SOLUTION INTRAVENOUS at 11:50

## 2024-08-15 RX ADMIN — DEXTROSE, SODIUM CHLORIDE, AND POTASSIUM CHLORIDE 100 ML/HR: 5; .45; .15 INJECTION INTRAVENOUS at 23:40

## 2024-08-15 RX ADMIN — METRONIDAZOLE 500 MG: 500 INJECTION, SOLUTION INTRAVENOUS at 04:12

## 2024-08-15 RX ADMIN — LAMOTRIGINE 200 MG: 100 TABLET ORAL at 08:41

## 2024-08-15 RX ADMIN — HEPARIN SODIUM 5000 UNITS: 5000 INJECTION INTRAVENOUS; SUBCUTANEOUS at 06:45

## 2024-08-15 RX ADMIN — DEXTROSE, SODIUM CHLORIDE, AND POTASSIUM CHLORIDE 100 ML/HR: 5; .45; .15 INJECTION INTRAVENOUS at 13:50

## 2024-08-15 RX ADMIN — HEPARIN SODIUM 5000 UNITS: 5000 INJECTION INTRAVENOUS; SUBCUTANEOUS at 21:14

## 2024-08-15 RX ADMIN — SERTRALINE HYDROCHLORIDE 50 MG: 50 TABLET ORAL at 08:41

## 2024-08-15 RX ADMIN — CEFTRIAXONE 1000 MG: 1 INJECTION, SOLUTION INTRAVENOUS at 09:59

## 2024-08-15 RX ADMIN — DEXTROSE, SODIUM CHLORIDE, AND POTASSIUM CHLORIDE 100 ML/HR: 5; .45; .15 INJECTION INTRAVENOUS at 04:13

## 2024-08-15 RX ADMIN — BISACODYL 10 MG: 5 TABLET, COATED ORAL at 21:14

## 2024-08-15 RX ADMIN — HEPARIN SODIUM 5000 UNITS: 5000 INJECTION INTRAVENOUS; SUBCUTANEOUS at 13:10

## 2024-08-15 RX ADMIN — POLYETHYLENE GLYCOL 3350, SODIUM SULFATE ANHYDROUS, SODIUM BICARBONATE, SODIUM CHLORIDE, POTASSIUM CHLORIDE 4000 ML: 236; 22.74; 6.74; 5.86; 2.97 POWDER, FOR SOLUTION ORAL at 16:42

## 2024-08-15 RX ADMIN — LAMOTRIGINE 150 MG: 100 TABLET ORAL at 19:47

## 2024-08-15 RX ADMIN — METRONIDAZOLE 500 MG: 500 INJECTION, SOLUTION INTRAVENOUS at 19:47

## 2024-08-15 NOTE — PLAN OF CARE
Problem: PHYSICAL THERAPY ADULT  Goal: Performs mobility at highest level of function for planned discharge setting.  See evaluation for individualized goals.  Description: Treatment/Interventions: LE strengthening/ROM, Functional transfer training, Therapeutic exercise, Gait training, Equipment eval/education, Patient/family training, Bed mobility, Endurance training  Equipment Recommended:  (Pt has a hemiwalker and a WC)       See flowsheet documentation for full assessment, interventions and recommendations.  Note: Prognosis: Good  Problem List: Decreased strength, Impaired balance, Decreased mobility, Impaired tone  Assessment: Pt is 59 y.o. male seen for PT evaluation s/p admit to [facility] on 8/9/2024 w/ Aspiration pneumonia (HCC). PT consulted to assess pt's functional mobility and d/c needs. Order placed for PT eval and tx.  Co-morbidities affecting patient's physical performance include: epilepsy, chronic pain, CP with R UE>LE weakness.  Personal factors affecting patient at time of initial evaluation include: ambulating with assistive device and inability to navigate level surfaces without external assistance. Prior to admission, patient was independent with functional mobility with hemiwalker .  Upon evaluation: Pt was able to ambulate with a hemiwalker x 20 feet then 60 feet progressing from min assist to close supervision.   Please find objective findings from Physical Therapy assessment regarding body systems outlined above with impairments and limitations including weakness, impaired balance, pain, decreased functional mobility tolerance, and fall risk.The Barthel Index was used as a functional outcome tool presenting with a score of Barthel Index Score: 50 today indicating marked limitations of functional mobility and ADLS.  Patient's clinical presentation is currently unstable/unpredictable as seen in patient's presentation of increased fall risk, new onset of impairment of functional  mobility, and new onset of weakness. Pt would benefit from continued Physical Therapy treatment to address deficits as defined above and maximize level of functional mobility.     As demonstrated by objective findings, the assigned level of complexity for this evaluation is high.The patient's [facility] Basic Mobility Inpatient Short Form Raw Score is 17. A Raw score of greater than 16 suggests the patient may benefit from discharge to home. Please also refer to the recommendation of the Physical Therapist for safe discharge planning.        Rehab Resource Intensity Level, PT: III (Minimum Resource Intensity)    See flowsheet documentation for full assessment.

## 2024-08-15 NOTE — PLAN OF CARE
Problem: Prexisting or High Potential for Compromised Skin Integrity  Goal: Skin integrity is maintained or improved  Description: INTERVENTIONS:  - Identify patients at risk for skin breakdown  - Assess and monitor skin integrity  - Assess and monitor nutrition and hydration status  - Monitor labs   - Assess for incontinence   - Turn and reposition patient  - Assist with mobility/ambulation  - Relieve pressure over bony prominences  - Avoid friction and shearing  - Provide appropriate hygiene as needed including keeping skin clean and dry  - Evaluate need for skin moisturizer/barrier cream  - Collaborate with interdisciplinary team   - Patient/family teaching  - Consider wound care consult   Outcome: Progressing     Problem: PAIN - ADULT  Goal: Verbalizes/displays adequate comfort level or baseline comfort level  Description: Interventions:  - Encourage patient to monitor pain and request assistance  - Assess pain using appropriate pain scale  - Administer analgesics based on type and severity of pain and evaluate response  - Implement non-pharmacological measures as appropriate and evaluate response  - Consider cultural and social influences on pain and pain management  - Notify physician/advanced practitioner if interventions unsuccessful or patient reports new pain  Outcome: Progressing     Problem: INFECTION - ADULT  Goal: Absence or prevention of progression during hospitalization  Description: INTERVENTIONS:  - Assess and monitor for signs and symptoms of infection  - Monitor lab/diagnostic results  - Monitor all insertion sites, i.e. indwelling lines, tubes, and drains  - Monitor endotracheal if appropriate and nasal secretions for changes in amount and color  - Danese appropriate cooling/warming therapies per order  - Administer medications as ordered  - Instruct and encourage patient and family to use good hand hygiene technique  - Identify and instruct in appropriate isolation precautions for  identified infection/condition  Outcome: Progressing  Goal: Absence of fever/infection during neutropenic period  Description: INTERVENTIONS:  - Monitor WBC    Outcome: Progressing     Problem: SAFETY ADULT  Goal: Patient will remain free of falls  Description: INTERVENTIONS:  - Educate patient/family on patient safety including physical limitations  - Instruct patient to call for assistance with activity   - Consult OT/PT to assist with strengthening/mobility   - Keep Call bell within reach  - Keep bed low and locked with side rails adjusted as appropriate  - Keep care items and personal belongings within reach  - Initiate and maintain comfort rounds  - Make Fall Risk Sign visible to staff  - Offer Toileting every 4 Hours, in advance of need  - Initiate/Maintain bed/chair alarm  - Apply yellow socks and bracelet for high fall risk patients  - Consider moving patient to room near nurses station  Outcome: Progressing  Goal: Maintain or return to baseline ADL function  Description: INTERVENTIONS:  -  Assess patient's ability to carry out ADLs; assess patient's baseline for ADL function and identify physical deficits which impact ability to perform ADLs (bathing, care of mouth/teeth, toileting, grooming, dressing, etc.)  - Assess/evaluate cause of self-care deficits   - Assess range of motion  - Assess patient's mobility; develop plan if impaired  - Assess patient's need for assistive devices and provide as appropriate  - Encourage maximum independence but intervene and supervise when necessary  - Involve family in performance of ADLs  - Assess for home care needs following discharge   - Consider OT consult to assist with ADL evaluation and planning for discharge  - Provide patient education as appropriate  Outcome: Progressing  Goal: Maintains/Returns to pre admission functional level  Description: INTERVENTIONS:  - Perform AM-PAC 6 Click Basic Mobility/ Daily Activity assessment daily.  - Set and communicate daily  mobility goal to care team and patient/family/caregiver.   - Collaborate with rehabilitation services on mobility goals if consulted  - Perform Range of Motion 3 times a day.  - Reposition patient every 2 hours.  - Dangle patient 3 times a day  - Stand patient 3 times a day  - Ambulate patient 1 times a day  - Out of bed to chair 1 times a day   - Out of bed for meals 2 times a day  - Out of bed for toileting  - Record patient progress and toleration of activity level   Outcome: Progressing     Problem: SAFETY ADULT  Goal: Patient will remain free of falls  Description: INTERVENTIONS:  - Educate patient/family on patient safety including physical limitations  - Instruct patient to call for assistance with activity   - Consult OT/PT to assist with strengthening/mobility   - Keep Call bell within reach  - Keep bed low and locked with side rails adjusted as appropriate  - Keep care items and personal belongings within reach  - Initiate and maintain comfort rounds  - Make Fall Risk Sign visible to staff  - Offer Toileting every 4 Hours, in advance of need  - Initiate/Maintain bed/chair alarm  - Apply yellow socks and bracelet for high fall risk patients  - Consider moving patient to room near nurses station  Outcome: Progressing  Goal: Maintain or return to baseline ADL function  Description: INTERVENTIONS:  -  Assess patient's ability to carry out ADLs; assess patient's baseline for ADL function and identify physical deficits which impact ability to perform ADLs (bathing, care of mouth/teeth, toileting, grooming, dressing, etc.)  - Assess/evaluate cause of self-care deficits   - Assess range of motion  - Assess patient's mobility; develop plan if impaired  - Assess patient's need for assistive devices and provide as appropriate  - Encourage maximum independence but intervene and supervise when necessary  - Involve family in performance of ADLs  - Assess for home care needs following discharge   - Consider OT consult to  assist with ADL evaluation and planning for discharge  - Provide patient education as appropriate  Outcome: Progressing  Goal: Maintains/Returns to pre admission functional level  Description: INTERVENTIONS:  - Perform AM-PAC 6 Click Basic Mobility/ Daily Activity assessment daily.  - Set and communicate daily mobility goal to care team and patient/family/caregiver.   - Collaborate with rehabilitation services on mobility goals if consulted  - Perform Range of Motion 3 times a day.  - Reposition patient every 3 hours.  - Dangle patient 3 times a day  - Stand patient 3 times a day  - Ambulate patient 3 times a day  - Out of bed to chair 3 times a day   - Out of bed for meals 3 times a day  - Out of bed for toileting  - Record patient progress and toleration of activity level   Outcome: Progressing     Problem: INFECTION - ADULT  Goal: Absence or prevention of progression during hospitalization  Description: INTERVENTIONS:  - Assess and monitor for signs and symptoms of infection  - Monitor lab/diagnostic results  - Monitor all insertion sites, i.e. indwelling lines, tubes, and drains  - Monitor endotracheal if appropriate and nasal secretions for changes in amount and color  - Lefor appropriate cooling/warming therapies per order  - Administer medications as ordered  - Instruct and encourage patient and family to use good hand hygiene technique  - Identify and instruct in appropriate isolation precautions for identified infection/condition  Outcome: Progressing  Goal: Absence of fever/infection during neutropenic period  Description: INTERVENTIONS:  - Monitor WBC    Outcome: Progressing     Problem: SAFETY ADULT  Goal: Patient will remain free of falls  Description: INTERVENTIONS:  - Educate patient/family on patient safety including physical limitations  - Instruct patient to call for assistance with activity   - Consult OT/PT to assist with strengthening/mobility   - Keep Call bell within reach  - Keep bed low  and locked with side rails adjusted as appropriate  - Keep care items and personal belongings within reach  - Initiate and maintain comfort rounds  - Make Fall Risk Sign visible to staff  - Apply yellow socks and bracelet for high fall risk patients  - Consider moving patient to room near nurses station  Outcome: Progressing  Goal: Maintain or return to baseline ADL function  Description: INTERVENTIONS:  -  Assess patient's ability to carry out ADLs; assess patient's baseline for ADL function and identify physical deficits which impact ability to perform ADLs (bathing, care of mouth/teeth, toileting, grooming, dressing, etc.)  - Assess/evaluate cause of self-care deficits   - Assess range of motion  - Assess patient's mobility; develop plan if impaired  - Assess patient's need for assistive devices and provide as appropriate  - Encourage maximum independence but intervene and supervise when necessary  - Involve family in performance of ADLs  - Assess for home care needs following discharge   - Consider OT consult to assist with ADL evaluation and planning for discharge  - Provide patient education as appropriate  Outcome: Progressing  Goal: Maintains/Returns to pre admission functional level  Description: INTERVENTIONS:  - Perform AM-PAC 6 Click Basic Mobility/ Daily Activity assessment daily.  - Set and communicate daily mobility goal to care team and patient/family/caregiver.   - Collaborate with rehabilitation services on mobility goals if consulted  - Perform Range of Motion 3 times a day.  - Reposition patient every 3 hours.  - Dangle patient 2 times a day  - Stand patient 3 times a day  - Ambulate patient 3 times a day  - Out of bed to chair 4 times a day   - Out of bed for meals 4 times a day  - Out of bed for toileting  - Record patient progress and toleration of activity level   Outcome: Progressing

## 2024-08-15 NOTE — PROGRESS NOTES
"Progress Note - General Surgery   Mario Rollins 59 y.o. 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  SALLY on CKD  -- resolved, baseline creatinine appears to be 0.8 to 1.0  Aspiration pneumonia with small RLL effusion -- Tmax 101.2 on 8/10, procalcitonin has normalized, WBC count has been normal for over 4 days  SIRS -- resolved, tachycardia and leukocytosis present on 8/10        Plan:  GI consult ordered for concern for stenosis at ileocecal junction on xray yesterday,   ?possible colonoscopy  NGT removed   Clear liquid diet   Decrease IV fluid hydration   Rocephin and Flagyl day 5  Restarted home oral meds if not already ordered  OOB as least once daily  Pulmonary toilet          Subjective/Objective     Subjective:  Patient reports his last colonoscopy was 2015, he was unsure whether it was normal or whether they suggested a 2-year follow-up.  Patient reports a large bowel movement yesterday    Objective:     Blood pressure 117/75, pulse 65, temperature 97.6 °F (36.4 °C), resp. rate 14, height 5' 8\" (1.727 m), weight 77.5 kg (170 lb 14.4 oz), SpO2 94%.,Body mass index is 25.99 kg/m².      Intake/Output Summary (Last 24 hours) at 8/15/2024 0750  Last data filed at 8/15/2024 0417  Gross per 24 hour   Intake --   Output 2900 ml   Net -2900 ml       Invasive Devices       Peripheral Intravenous Line  Duration             Peripheral IV 08/11/24 Dorsal (posterior);Left Forearm 3 days    Peripheral IV 08/12/24 Right Antecubital 2 days              Drain  Duration             NG/OG/Enteral Tube 16 Fr Left nare 5 days                    Physical Exam: /75   Pulse 65   Temp 97.6 °F (36.4 °C)   Resp 14   Ht 5' 8\" (1.727 m)   Wt 77.5 kg (170 lb " 14.4 oz)   SpO2 94%   BMI 25.99 kg/m²   General appearance: alert and oriented, in no acute distress  Head: Normocephalic, without obvious abnormality, atraumatic  Lungs:  Decreased breath sounds right lower lobe, otherwise clear to auscultation, no respiratory distress  Heart:  regular rate and rhythm  Abdomen:  soft, nondistended, active bowel sounds, non tender   Extremities: extremities normal, warm and well-perfused; no cyanosis, clubbing, or edema  Skin: Skin color, texture, turgor normal. No rashes or lesions    Lab, Imaging and other studies:I have personally reviewed pertinent lab results.  , CBC:   Lab Results   Component Value Date    WBC 7.53 08/15/2024    HGB 14.9 08/15/2024    HCT 45.9 08/15/2024    MCV 88 08/15/2024     08/15/2024    RBC 5.22 08/15/2024    MCH 28.5 08/15/2024    MCHC 32.5 08/15/2024    RDW 13.1 08/15/2024    MPV 10.7 08/15/2024    NRBC 0 08/15/2024     , CMP:   Lab Results   Component Value Date    SODIUM 142 08/15/2024    K 4.0 08/15/2024     (H) 08/15/2024    CO2 25 08/15/2024    BUN 11 08/15/2024    CREATININE 0.93 08/15/2024    CALCIUM 7.8 (L) 08/15/2024    EGFR 89 08/15/2024       VTE Pharmacologic Prophylaxis: Heparin SQ  VTE Mechanical Prophylaxis: sequential compression device

## 2024-08-15 NOTE — H&P (VIEW-ONLY)
Physician Requesting Consult: Dimitrios Pulido MD    Reason for Consult / Principal Problem: Concern for stenosis/stricture at ileocecal junction, small bowel obstruction    Assessment/Plan:  59-year-old male who presents from group home with history of cerebral palsy, CKD, hemochromatosis, left MCA CVA with right hemiparesis, focal epilepsy, history of small bowel obstruction secondary to volvulus status post exploratory laparotomy 2024 who was originally admitted to the general surgical service on 8/9 due to abdominal distention, pain and diaphoresis.    1.  Concern for stenosis/partial obstruction of ileum on Xray  Patient presented on 8/9 with abdominal pain and distention and admitted to surgical service.  Patient initially had an NG tube inserted which has since been removed.  CT scan revealed distal small bowel obstruction at or near the terminal ileum.  X-ray done 8/14 showed concern for stenosis/partial obstruction at terminal ileum/ ileocecal junction.  GI was consulted for further evaluation with colonoscopy.  Patient reports having 2 bowel movements.  Per documentation last bowel movement 8/14 Medium soft brown stool. Tolerating clear liquid diet.  Denies nausea, vomiting, or abdominal pain.    -Clear liquid diet  -GoLytely bowel prep for colonoscopy tomorrow  -Schedule colonoscopy for tomorrow.  Prep and procedure explained to patient in detail.  Further recommendations pending results of colonoscopy.  -Hold heparin subcutaneous tomorrow for colonoscopy    Thank you for consult. Case discussed with Dr Gonzales.    HPI: Mario Rollins is a 59 y.o. male  Aspiration pneumonia (HCC).  This is a 59-year-old male who presents from group home with history of cerebral palsy, CKD, hemochromatosis, left MCA CVA with right hemiparesis, focal epilepsy, history of small bowel obstruction secondary to volvulus status post exploratory laparotomy 2024 who was originally admitted to the general surgical service on 8/9  due to abdominal distention, pain and diaphoresis.  CT scan revealed distal small bowel obstruction at or near the terminal ileum.  Small amount of ascites.  Oral medullary nephrocalcinosis, unchanged subsegmental atelectasis in right upper and lower lobes, small right pleural effusion and elevation of right hemidiaphragm. BUN and creatinine elevated on admission which has since resolved labs on admission were positive for leukocytosis with white blood count 15.96.  White blood count 7.53 and within normal limits today.    Patient initially had an NG tube inserted which has since been removed. Patient tolerating clear liquid diet.  Patient denies nausea, vomiting, or abdominal pain.  Patient reports having 2 bowel movements. X-ray done 8/14 showed concern for stenosis/partial obstruction at terminal ileum/ ileocecal junction.  GI was consulted for further evaluation with colonoscopy.      Patient reports colonoscopy done in 2015.  Report is not available.  Patient does not smoke.  Patient does not drink alcohol.  Patient denies illicit drug use or marijuana use.  No family history of gastric or colon cancer.    Allergies:   Allergies   Allergen Reactions    Aspirin Other (See Comments) and Anaphylaxis     Reaction Date: 25Aug2008;     unknown       Medications:  Current Facility-Administered Medications:     acetaminophen (TYLENOL) rectal suppository 325 mg, 325 mg, Rectal, Q4H PRN, Milton Handley MD, 325 mg at 08/11/24 1825    atorvastatin (LIPITOR) tablet 10 mg, 10 mg, Oral, QPM, Warren Vasquez MD, 10 mg at 08/14/24 2024    cefTRIAXone (ROCEPHIN) IVPB (premix in dextrose) 1,000 mg 50 mL, 1,000 mg, Intravenous, Q24H, Trevor Fletcher PA-C, Last Rate: 100 mL/hr at 08/15/24 0959, 1,000 mg at 08/15/24 0959    dextrose 5 % and sodium chloride 0.45 % with KCl 20 mEq/L infusion, 100 mL/hr, Intravenous, Continuous, Trevor Fletcher PA-C, Last Rate: 100 mL/hr at 08/15/24 0413, 100 mL/hr at 08/15/24 0413    heparin (porcine)  subcutaneous injection 5,000 Units, 5,000 Units, Subcutaneous, Q8H SEBASTIAN, Milton Handley MD, 5,000 Units at 08/15/24 0645    HYDROmorphone (DILAUDID) injection 0.5 mg, 0.5 mg, Intravenous, Q3H PRN, Milton Handley MD, 0.5 mg at 08/10/24 1652    lamoTRIgine (LaMICtal) tablet 150 mg, 150 mg, Oral, QPM, Trevor Fletcher PA-C    lamoTRIgine (LaMICtal) tablet 200 mg, 200 mg, Oral, QAM, Trevor Fletcher PA-C, 200 mg at 08/15/24 0841    metroNIDAZOLE (FLAGYL) IVPB (premix) 500 mg 100 mL, 500 mg, Intravenous, Q8H, Trevor Fletcher PA-C, Last Rate: 200 mL/hr at 08/15/24 0412, 500 mg at 08/15/24 0412    nebivolol (BYSTOLIC) tablet 2.5 mg, 2.5 mg, Oral, Daily, Warren Vasquez MD, 2.5 mg at 08/14/24 0922    ondansetron (ZOFRAN) injection 4 mg, 4 mg, Intravenous, Q6H PRN, Milton Handley MD    pantoprazole (PROTONIX) injection 40 mg, 40 mg, Intravenous, Q24H SEBASTIAN, Warren Vasquez MD, 40 mg at 08/15/24 0841    QUEtiapine (SEROquel) tablet 50 mg, 50 mg, Oral, HS, Trevor Fletcher PA-C    sertraline (ZOLOFT) tablet 50 mg, 50 mg, Oral, Daily, Trevor Fletcher PA-C, 50 mg at 08/15/24 0841    Past Medical history:  Past Medical History:   Diagnosis Date    Ambulatory dysfunction     Anxiety     Bilateral impacted cerumen     last assessed 05/30/2013    Capsulitis     last assessed 04/26/2016    Cellulitis of finger 01/13/2012    Chronic kidney disease     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)     Closed fracture of one or more phalanges of foot 01/06/2009    Constipation     Deformity     left and right foot and ankle ,right hand    Depression     Epilepsy (HCC)     Erythrocytosis     Hemiplegia affecting dominant side (HCC)     Hypertension     Hypoglycemia 11/08/2011    Hypokalemia     last assessed 05/30/2013    Mental retardation     Mood disorder (HCC)     Nephrocalcinosis     chronic    Polycystic kidney     Bilateral    Small bowel obstruction (HCC)        Past Surgical History:   Past Surgical History:   Procedure Laterality Date    BRAIN  SURGERY      EXPLORATORY LAPAROTOMY W/ BOWEL RESECTION N/A 4/6/2024    Procedure: LAPAROTOMY EXPLORATORY WITH LYSIS OF ADHESIONS AND DECOMPRESSION;  Surgeon: Saúl Woods MD;  Location: [facility] MAIN OR;  Service: General    ID ARTHROCENTESIS ASPIR&/INJ MAJOR JT/BURSA W/O US Bilateral 5/27/2021    Procedure: Hip intra-articular corticosteroid injection ( 03210 [id number]);  Surgeon: Lianet Astorga MD;  Location: [facility] MAIN OR;  Service: Pain Management     ID COLONOSCOPY FLX DX W/COLLJ SPEC WHEN PFRMD N/A 2/12/2016    Procedure: COLONOSCOPY;  Surgeon: Abhilash Ace MD;  Location: [facility] GI LAB;  Service: Gastroenterology    ID INJECT SI JOINT ARTHRGRPHY&/ANES/STEROID W/RIMA Left 3/22/2023    Procedure: SACROILIAC joint injection (41666 );  Surgeon: Larry Smith DO;  Location: [facility] MAIN OR;  Service: Pain Management        Social history:   Social History     Tobacco Use    Smoking status: Never    Smokeless tobacco: Never   Vaping Use    Vaping status: Never Used   Substance Use Topics    Alcohol use: Not Currently    Drug use: No       Family history:   Family History   Problem Relation Age of Onset    Emphysema Mother     Nephrolithiasis Mother     Heart attack Father         acute MI    Hypertension Father     Nephrolithiasis Father     Nephrolithiasis Brother         Review of Systems: Review of Systems   All other systems reviewed and are negative.      Physical Exam: Physical Exam  Vitals and nursing note reviewed.   Constitutional:       General: He is not in acute distress.  HENT:      Head: Normocephalic and atraumatic.   Cardiovascular:      Rate and Rhythm: Normal rate and regular rhythm.      Pulses: Normal pulses.      Heart sounds: Normal heart sounds.   Pulmonary:      Effort: Pulmonary effort is normal. No respiratory distress.      Breath sounds: Normal breath sounds. No stridor. No wheezing, rhonchi or rales.   Chest:      Chest wall: No tenderness.   Abdominal:      General: Bowel sounds  are normal. There is no distension.      Palpations: Abdomen is soft. There is no mass.      Tenderness: There is no abdominal tenderness. There is no guarding or rebound.      Hernia: No hernia is present.   Musculoskeletal:      Cervical back: Normal range of motion and neck supple.      Right lower leg: No edema.      Left lower leg: No edema.   Skin:     General: Skin is warm and dry.      Capillary Refill: Capillary refill takes less than 2 seconds.      Coloration: Skin is not jaundiced or pale.   Neurological:      Mental Status: He is alert and oriented to person, place, and time.   Psychiatric:         Mood and Affect: Mood normal.           Lab Results:   Recent Results (from the past 24 hour(s))   CBC and differential    Collection Time: 08/15/24  4:17 AM   Result Value Ref Range    WBC 7.53 4.31 - 10.16 Thousand/uL    RBC 5.22 3.88 - 5.62 Million/uL    Hemoglobin 14.9 12.0 - 17.0 g/dL    Hematocrit 45.9 36.5 - 49.3 %    MCV 88 82 - 98 fL    MCH 28.5 26.8 - 34.3 pg    MCHC 32.5 31.4 - 37.4 g/dL    RDW 13.1 11.6 - 15.1 %    MPV 10.7 8.9 - 12.7 fL    Platelets 186 149 - 390 Thousands/uL    nRBC 0 /100 WBCs    Segmented % 59 43 - 75 %    Immature Grans % 2 0 - 2 %    Lymphocytes % 23 14 - 44 %    Monocytes % 13 (H) 4 - 12 %    Eosinophils Relative 3 0 - 6 %    Basophils Relative 0 0 - 1 %    Absolute Neutrophils 4.49 1.85 - 7.62 Thousands/µL    Absolute Immature Grans 0.11 0.00 - 0.20 Thousand/uL    Absolute Lymphocytes 1.76 0.60 - 4.47 Thousands/µL    Absolute Monocytes 0.94 0.17 - 1.22 Thousand/µL    Eosinophils Absolute 0.20 0.00 - 0.61 Thousand/µL    Basophils Absolute 0.03 0.00 - 0.10 Thousands/µL   Basic metabolic panel    Collection Time: 08/15/24  4:17 AM   Result Value Ref Range    Sodium 142 135 - 147 mmol/L    Potassium 4.0 3.5 - 5.3 mmol/L    Chloride 112 (H) 96 - 108 mmol/L    CO2 25 21 - 32 mmol/L    ANION GAP 5 4 - 13 mmol/L    BUN 11 5 - 25 mg/dL    Creatinine 0.93 0.60 - 1.30 mg/dL     Glucose 93 65 - 140 mg/dL    Calcium 7.8 (L) 8.4 - 10.2 mg/dL    eGFR 89 ml/min/1.73sq m   Phosphorus    Collection Time: 08/15/24  4:17 AM   Result Value Ref Range    Phosphorus 2.9 2.7 - 4.5 mg/dL   Magnesium    Collection Time: 08/15/24  4:17 AM   Result Value Ref Range    Magnesium 2.0 1.9 - 2.7 mg/dL           Imaging Studies: XR abdomen complete inc upright and/or decubitus    Result Date: 8/14/2024  Narrative: SMALL BOWEL FOLLOW THROUGH (Gastrografin challenge) INDICATION:   Bowel gas pattern. COMPARISON: August 13, 2024 IMAGES: 4 FLUOROSCOPY TIME:   None TECHNIQUE:    view of the abdomen was obtained. Gastrografin was then administered to the patient and followed through the small bowel to the colon utilizing overhead radiographs at periodic intervals. FINDINGS: Enteric tube tip in the stomach. Right lower lung opacity reidentified. Nephrocalcinosis reidentified. In this patient undergoing Gastrografin challenge to evaluate small bowel obstruction, currently the enteric contrast has reached the colon. Advanced degenerative changes both hips with right hip femoral medullary giancarlo and compression nail.     Impression: Oral contrast seen opacifying the terminal ileum and entire large bowel. The terminal ileum measures up to 5 cm diameter. There is gaseous distention of multiple small bowel loops throughout the mid abdomen averaging 5 cm. This pattern suggests there is likely a stenosis/partial obstruction at the terminal ileum/ileocecal valve. The large bowel appears opacified and normally distended. Because oral contrast has reached the colon, no more followup films are needed. Workstation performed: [id number]     XR abdomen obstruction series    Result Date: 8/13/2024  Narrative: XR ABDOMEN OBSTRUCTION SERIES INDICATION: Bowel gas pattern. COMPARISON: 8/10/2024 FINDINGS: NG tube proximal port is just above the GE junction. Nephrocalcinosis is noted, better seen on recent CT. Dilated small bowel loops have  decreased in size in the interim now measuring up to 4.8 cm in diameter compared with 5.6 cm previously. There is some gas distally now within the rectosigmoid colon. No pneumoperitoneum. There are severe degenerative changes in both hips Examination of the chest reveals right basilar opacity with adjacent effusion. There is a partially imaged but grossly normal cardiomediastinal silhouette.     Impression: 1. Improving small bowel distention with some gas seen distally. 2. NG tube proximal port above GE junction. Recommend slight advancement. 3. Worsening right basilar pulmonary opacity with adjacent effusion Workstation performed: [id number]     CT chest abdomen pelvis wo contrast    Result Date: 8/11/2024  Narrative: CT CHEST, ABDOMEN AND PELVIS WITHOUT IV CONTRAST INDICATION: possible aspiration, persistent large NGT output. 59-year-old male with history of bowel obstruction 4 months ago requiring exploratory laparotomy with lysis of adhesions. COMPARISON: CTs of the abdomen and pelvis from April 19, 2024 and August 9, 2024. Portable chest from today. TECHNIQUE: CT examination of the chest, abdomen and pelvis was performed without intravenous contrast. Multiplanar 2D reformatted images were created from the source data. This examination, like all CT scans performed in the [facility], was performed utilizing techniques to minimize radiation dose exposure, including the use of iterative reconstruction and automated exposure control. Radiation dose length product (DLP) for this visit: 640.66 mGy-cm Enteric Contrast: Not administered. Absence of intravenous and gastrointestinal contrast limits evaluation of the abdominal and pelvic viscera. FINDINGS: CHEST LUNGS: Subsegmental atelectasis in the right upper and lower lobes. Lungs otherwise clear. Elevation of the right hemidiaphragm PLEURA: Small right pleural effusion. HEART/GREAT VESSELS: Coronary artery calcifications. Otherwise unremarkable. No  thoracic aortic aneurysm. MEDIASTINUM AND AMI: No lymphadenopathy or mass. NG tube present. Esophagus otherwise unremarkable. Trachea and main stem bronchi normal. CHEST WALL AND LOWER NECK: Mild bilateral gynecomastia. No lymphadenopathy or mass. ABDOMEN LIVER/BILIARY TREE: Limited evaluation without IV contrast. Grossly unremarkable. GALLBLADDER: No calcified gallstones. No pericholecystic inflammatory change. SPLEEN: Unremarkable. PANCREAS: Limited evaluation without IV contrast. Grossly unremarkable. ADRENAL GLANDS: Unremarkable. KIDNEYS/URETERS: Limited evaluation without IV contrast. Extensive bilateral medullary nephrocalcinosis. No hydronephrosis. STOMACH AND BOWEL: NG tube present with tip at the gastric body. Multiple dilated loops of small intestine with normal caliber terminal ileum and collapsed colon, consistent with distal small bowel obstruction. No evidence of pneumatosis intestinalis. APPENDIX: Normal. ABDOMINOPELVIC CAVITY: No lymphadenopathy. Small amount of ascites along the liver and in the pelvis and right paracolic gutter. No extraluminal gas. VESSELS: Limited evaluation without IV contrast. No aortic aneurysm. PELVIS REPRODUCTIVE ORGANS: Prostate and seminal vesicles unremarkable for patient's age. URINARY BLADDER: Unremarkable. ABDOMINAL WALL/INGUINAL REGIONS: Unremarkable. BONES: Severe osteoarthritis of both hips. Healed fracture of the proximal right femur, status post ORIF. No evidence of acute fracture or destructive lesion.     Impression: 1.  Distal small bowel obstruction at or near the terminal ileum. 2.  Small amount of ascites. 3.  Subsegmental atelectasis in the right upper and lower lobes, small right pleural effusion and elevation of the right hemidiaphragm. 4.  Extensive bilateral medullary nephrocalcinosis, unchanged. Workstation performed: [id number]     XR chest portable    Result Date: 8/11/2024  Narrative: XR CHEST PORTABLE INDICATION: Possible aspiration. COMPARISON:  CXR 8/28/2011. Abdomen CT 8/9/2024. FINDINGS: NG tube in stomach with sidehole in GE junction. Mild opacity opacity in the right base. No pneumothorax or pleural effusion. Normal cardiomediastinal silhouette. Bones are unremarkable for age. Normal upper abdomen. Mild elevation of the right diaphragm.     Impression: Mild opacity opacity in the right base, likely atelectasis or aspiration. NG tube in stomach with sidehole at GE junction. Recommend advancing. Workstation performed: [id number]     XR abdomen obstruction series    Result Date: 8/10/2024  Narrative: XR ABDOMEN OBSTRUCTION SERIES INDICATION: SBO, bowel gas pattern. COMPARISON: CT 8/9/2024 FINDINGS: Distal aspect of NG tube is coursing below the left hemidiaphragm. Persistent diffuse small bowel distention, relative paucity of large bowel gas, and moderate gastric distention. No pneumoperitoneum. No pathologic calcification or soft tissue mass. Bones are unremarkable for patient's age. Examination of the chest reveals clear lungs and a normal cardiomediastinal silhouette.     Impression: Persistent obstructive bowel gas pattern with moderate gastric distention. Workstation performed: [id number]     CT abdomen pelvis with contrast    Result Date: 8/9/2024  Narrative: CT ABDOMEN AND PELVIS WITH IV CONTRAST INDICATION: left lower quad pain, history of bowel obstruction, pt. does not need to wait for labs today. COMPARISON: CT dated 4/19/2024. TECHNIQUE: CT examination of the abdomen and pelvis was performed. Multiplanar 2D reformatted images were created from the source data. This examination, like all CT scans performed in the [facility], was performed utilizing techniques to minimize radiation dose exposure, including the use of iterative reconstruction and automated exposure control. Radiation dose length product (DLP) for this visit: 517.37 mGy-cm IV Contrast: 100 mL of iohexol (OMNIPAQUE) Enteric Contrast: Not administered. FINDINGS:  ABDOMEN LOWER CHEST: Distended fluid-filled esophagus. Bibasilar subsegmental atelectasis.. LIVER/BILIARY TREE: Unremarkable. GALLBLADDER: No calcified gallstones. No pericholecystic inflammatory change. SPLEEN: Unremarkable. PANCREAS: Unremarkable. ADRENAL GLANDS: Unremarkable. KIDNEYS/URETERS: Stable medullary nephrocalcinosis with numerous bilateral renal calcifications. Multiple stable bilateral renal cysts. No obstructing stone or hydronephrosis. STOMACH AND BOWEL: Marked distention of the stomach and small bowel with transition at the terminal ileum consistent with small bowel obstruction. Small amount of free fluid in the pelvis. No bowel wall thickening. APPENDIX: Normal. ABDOMINOPELVIC CAVITY: Small volume of ascites. No pneumoperitoneum or abscess. No adenopathy.. VESSELS: Atherosclerosis without abdominal aortic aneurysm. PELVIS REPRODUCTIVE ORGANS: Unremarkable for patient's age. URINARY BLADDER: Unremarkable. ABDOMINAL WALL/INGUINAL REGIONS: Unremarkable. BONES: No acute osseous abnormality. Spinal degenerative changes. Lumbar levoscoliosis. Degenerative grade 1 anterolisthesis L4-5. Mild chronic T12 compression is stable. Few stable tiny sclerotic foci at T11, L1 and L2 are favored represent bone islands. Status post ORIF of the right hip. Advanced bilateral hip osteoarthritis     Impression: Small bowel obstruction with transition at the terminal ileum that may be due to adhesions Small volume of ascites The study was marked in EPIC for immediate notification. Workstation performed: [id number]       Problem List:   Patient Active Problem List   Diagnosis    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    Acquired deformity of right ankle and foot    Other constipation    Cerebral palsy (HCC)    Nephrocalcinosis    Class 1 obesity with body mass index (BMI) of 30.0 to 30.9 in adult    Hyperlipidemia    Lumbar radiculopathy    Spinal stenosis of lumbar region    Chronic left-sided low back pain with  "left-sided sciatica    Chronic pain syndrome    Paronychia of toenail    Acquired deformity of right hand    Acquired hallux rigidus of left foot    Acquired valgus deformity of right ankle    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 stage 4 chronic kidney disease    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 Please call if you have any questions. \"**   "

## 2024-08-15 NOTE — PHYSICAL THERAPY NOTE
PHYSICAL THERAPY EVALUATION/TREATMENT     08/15/24 1350   PT Last Visit   PT Visit Date 08/15/24   Note Type   Note type Evaluation   Pain Assessment   Pain Assessment Tool 0-10   Pain Score No Pain   Restrictions/Precautions   Other Precautions Fall Risk;Chair Alarm;Bed Alarm;Cognitive   Home Living   Type of Home Group Home   Home Layout One level;Ramped entrance   Home Equipment Hemiwalker;Wheelchair-manual   Prior Function   Level of [place]   (ambulatory with a hemiwalker short distances, WC for longer distances)   Lives With Facility staff   Receives Help From Personal care attendant   General   Additional Pertinent History Pt admitted with aspiration PNA and SBO now improved.  Pt has CP with L hemibody weakness UE>LE.   Cognition   Arousal/Participation Cooperative   Following Commands Follows one step commands without difficulty   Comments Pt speaks very softy and quickly, pt is difficult to understand at times   Subjective   Subjective Pt reports walking with the hemiwalker at home, that he takes several laps around the house each day.   RUE Assessment   RUE Assessment   (MMT 0/5)UE positioned in shoulder adduction/ext, elbow flex, wrist/finger flexion   LUE Assessment   LUE Assessment   (ROM WFL, MMT 4/5)   RLE Assessment   RLE Assessment   (ROM WFL, MMT 3+/5)   LLE Assessment   LLE Assessment   (ROM WFL, MMT 4+/5)   Transfers   Sit to Stand 4  Minimal assistance   Stand to Sit 4  Minimal assistance   Stand pivot 4  Minimal assistance   Additional items   (with hemiwalker)   Ambulation/Elevation   Gait pattern   (pt leans forward onto hemiwalker to help advance L LE, slow carla)   Gait Assistance 4  Minimal assist   Assistive Device Tam-walker   Distance 20 feet   Balance   Static Sitting Fair +   Static Standing Fair   Activity Tolerance   Activity Tolerance Patient tolerated treatment well   Assessment   Prognosis Good   Problem List Decreased strength;Impaired balance;Decreased  "mobility;Impaired tone   Assessment Pt is 59 y.o. male seen for PT evaluation s/p admit to [facility] on 8/9/2024 w/ Aspiration pneumonia (HCC). PT consulted to assess pt's functional mobility and d/c needs. Order placed for PT eval and tx.  Co-morbidities affecting patient's physical performance include: epilepsy, chronic pain, CP with R UE>LE weakness.  Personal factors affecting patient at time of initial evaluation include: ambulating with assistive device and inability to navigate level surfaces without external assistance. Prior to admission, patient was independent with functional mobility with hemiwalker .  Upon evaluation: Pt was able to ambulate with a hemiwalker x 20 feet then 60 feet progressing from min assist to close supervision.   Please find objective findings from Physical Therapy assessment regarding body systems outlined above with impairments and limitations including weakness, impaired balance, pain, decreased functional mobility tolerance, and fall risk.The Barthel Index was used as a functional outcome tool presenting with a score of Barthel Index Score: 50 today indicating marked limitations of functional mobility and ADLS.  Patient's clinical presentation is currently unstable/unpredictable as seen in patient's presentation of increased fall risk, new onset of impairment of functional mobility, and new onset of weakness. Pt would benefit from continued Physical Therapy treatment to address deficits as defined above and maximize level of functional mobility.     As demonstrated by objective findings, the assigned level of complexity for this evaluation is high.The patient's AM-[facility] Basic Mobility Inpatient Short Form Raw Score is 17. A Raw score of greater than 16 suggests the patient may benefit from discharge to home. Please also refer to the recommendation of the Physical Therapist for safe discharge planning.   Goals   Patient Goals \"go home on Saturday\"   STG Expiration Date " "08/22/24   Short Term Goal #1 1. independent bed mobility,   2. independent transfers with a hemiwalker,   3. supervision ambulation with a hemiwalker 50 feet   LTG Expiration Date 08/29/24   Long Term Goal #1 1. modified independent ambulation with a hemiwalker 100 feet   Plan   Treatment/Interventions LE strengthening/ROM;Functional transfer training;Therapeutic exercise;Gait training;Equipment eval/education;Patient/family training;Bed mobility;Endurance training   PT Frequency Other (Comment)  (5x/week)   Discharge Recommendation   Rehab Resource Intensity Level, PT III (Minimum Resource Intensity)   Equipment Recommended   (Pt has a hemiwalker and a WC)   AM-PAC Basic Mobility Inpatient   Turning in Flat Bed Without Bedrails 3   Lying on Back to Sitting on Edge of Flat Bed Without Bedrails 3   Moving Bed to Chair 3   Standing Up From Chair Using Arms 3   Walk in Room 3   Climb 3-5 Stairs With Railing 2   Basic Mobility Inpatient Raw Score 17   Basic Mobility Standardized Score 39.67   [facility] Highest Level Of Mobility   -[facility] Goal 5: Stand one or more mins   -[facility] Achieved 7: Walk 25 feet or more   Barthel Index   Feeding 5   Bathing 0   Grooming Score 0   Dressing Score 5   Bladder Score 10   Bowels Score 10   Toilet Use Score 5   Transfers (Bed/Chair) Score 10   Mobility (Level Surface) Score 0   Stairs Score 5   Barthel Index Score 50   Additional Treatment Session   Start Time 1340   End Time 1350   Treatment Assessment S: \"I want to walk more\"   O:Gait training with a hemiwalker in the hallway x 60 feet with min assist progressing to supervision.    A:  Gait is abnormal but possibly near pt's baseline as pt has CP   P: Continue to encourage ambulation with hemiwalker   End of Consult   Patient Position at End of Consult All needs within reach;Bed/Chair alarm activated;Bedside chair   Licensure   [place] License Number  Cyndee [employer] PT [id number]     "

## 2024-08-15 NOTE — CONSULTS
Inpatient Medical Consultation - [facility] Internal Medicine    Patient Information: Mario Rollins 59 y.o. male MRN: [id number]  Unit/Bed#: [address] [place] [address] Encounter: [id number]    PCP: No primary care provider on file.  Date of Admission:  8/9/2024  Date of Consultation: 08/15/24  Requesting Physician: Dimitrios Pulido MD    Reason For Consultation:     Fever      Assessment/Plan    * Aspiration pneumonia (HCC)-resolved  Assessment & Plan  Noted to be febrile with Tmax of 101.2 °F  CT chest on pelvis revealed atelectasis versus right lower lobe pneumonia  UA without evidence of infection  Normal white count, procalcitonin elevated.  Likely suspect aspiration PNA versus pneumonitis versus atelectasis  Continue ceftriaxone, metronidazole 5-day course  Monitor fever, CBC  Aspiration precaution  Incentive spirometry  BCx if recurrent fever    Small bowel obstruction (HCC)  Assessment & Plan  Presented with abdominal distention, pain, diaphoresis    CT abdomen revealed small bowel obstruction with transition at terminal ileum likely related to adhesions  8/14 AXR-likely a stenosis/partial obstruction at the terminal ileum/ileocecal valve   History of small bowel obstruction status post ex lap, 4/24  D/W GI recs:  concern for partial obstruction at IC  plans for colonoscopy tomorrow   Bowel prep  D/W surgery recs  NGT removed  Home orals      Epilepsy (HCC)  Assessment & Plan  Continue home lamotrigine    Hypertension  Assessment & Plan  Blood pressure stable  Resume home nebivolol    Chronic kidney disease  Assessment & Plan  Presented with creatinine of 1.54, now improved to baseline  Continue to monitor      Right spastic hemiparesis (HCC)  Assessment & Plan  History of left MCA CVA, history of brain surgery  On atorvastatin      Cerebral palsy (HCC)  Assessment & Plan  Group home resident, currently appears to be at baseline  Continue chronic meds        Review of Systems   Respiratory:  Negative for shortness of  "breath and wheezing.    Cardiovascular:  Negative for chest pain.   Gastrointestinal:  Negative for constipation.        Gas         Subjective:  Patient seen and examined at bedside, reported discomfort with NG tube, denied pulling on it or how it got displaced.  Patient denied chest pain, shortness of breath or palpitations.    Physical Exam:     Vitals:   Blood Pressure: 103/66 (08/15/24 1529)  Pulse: 65 (08/15/24 1529)  Temperature: 97.5 °F (36.4 °C) (08/15/24 1529)  Temp Source: Temporal (08/13/24 0734)  Respirations: 18 (08/15/24 1529)  Height: 5' 8\" (172.7 cm) (08/09/24 2231)  Weight - Scale: 77.5 kg (170 lb 14.4 oz) (08/09/24 2231)  SpO2: 97 % (08/15/24 1529)    Physical Exam  Vitals reviewed.   Constitutional:       General: He is not in acute distress.     Appearance: Normal appearance.   HENT:      Head: Atraumatic.      Nose: No congestion.   Eyes:      General: No scleral icterus.     Pupils: Pupils are equal, round, and reactive to light.   Cardiovascular:      Rate and Rhythm: Normal rate.      Heart sounds: No murmur heard.  Pulmonary:      Effort: No respiratory distress.      Breath sounds: No wheezing, rhonchi or rales.   Abdominal:      Palpations: Abdomen is soft.      Tenderness: There is no abdominal tenderness. There is no guarding.      Comments: Taunt     Musculoskeletal:         General: No swelling or deformity. Normal range of motion.      Cervical back: No tenderness.      Right lower leg: No edema.      Left lower leg: No edema.   Feet:      Right foot:      Skin integrity: No callus.   Skin:     General: Skin is warm.      Capillary Refill: Capillary refill takes less than 2 seconds.      Findings: No lesion or rash.   Neurological:      Mental Status: He is oriented to person, place, and time.      Cranial Nerves: No cranial nerve deficit.      Coordination: Coordination normal.   Psychiatric:         Mood and Affect: Mood normal.         Thought Content: Thought content normal. "           Lab Results: I Have Reviewed All Lab Data Below:    Results from last 7 days   Lab Units 08/15/24  0417   WBC Thousand/uL 7.53   HEMOGLOBIN g/dL 14.9   HEMATOCRIT % 45.9   PLATELETS Thousands/uL 186   SEGS PCT % 59   LYMPHO PCT % 23   MONO PCT % 13*   EOS PCT % 3     Results from last 7 days   Lab Units 08/15/24  0417 08/11/24  0437 08/10/24  0458   POTASSIUM mmol/L 4.0   < > 4.1   CHLORIDE mmol/L 112*   < > 108   CO2 mmol/L 25   < > 23   BUN mg/dL 11   < > 22   CREATININE mg/dL 0.93   < > 1.27   CALCIUM mg/dL 7.8*   < > 9.0   ALK PHOS U/L  --   --  75   ALT U/L  --   --  11   AST U/L  --   --  12*    < > = values in this interval not displayed.           * Additional Pertinent Lab Tests Reviewed: All Labs For Current Hospital Admission Reviewed    Imaging: I have personally reviewed pertinent reports.      CT chest abdomen pelvis wo contrast    Result Date: 8/11/2024  Narrative: CT CHEST, ABDOMEN AND PELVIS WITHOUT IV CONTRAST INDICATION: possible aspiration, persistent large NGT output. 59-year-old male with history of bowel obstruction 4 months ago requiring exploratory laparotomy with lysis of adhesions. COMPARISON: CTs of the abdomen and pelvis from April 19, 2024 and August 9, 2024. Portable chest from today. TECHNIQUE: CT examination of the chest, abdomen and pelvis was performed without intravenous contrast. Multiplanar 2D reformatted images were created from the source data. This examination, like all CT scans performed in the [facility], was performed utilizing techniques to minimize radiation dose exposure, including the use of iterative reconstruction and automated exposure control. Radiation dose length product (DLP) for this visit: 640.66 mGy-cm Enteric Contrast: Not administered. Absence of intravenous and gastrointestinal contrast limits evaluation of the abdominal and pelvic viscera. FINDINGS: CHEST LUNGS: Subsegmental atelectasis in the right upper and lower lobes. Lungs  otherwise clear. Elevation of the right hemidiaphragm PLEURA: Small right pleural effusion. HEART/GREAT VESSELS: Coronary artery calcifications. Otherwise unremarkable. No thoracic aortic aneurysm. MEDIASTINUM AND AMI: No lymphadenopathy or mass. NG tube present. Esophagus otherwise unremarkable. Trachea and main stem bronchi normal. CHEST WALL AND LOWER NECK: Mild bilateral gynecomastia. No lymphadenopathy or mass. ABDOMEN LIVER/BILIARY TREE: Limited evaluation without IV contrast. Grossly unremarkable. GALLBLADDER: No calcified gallstones. No pericholecystic inflammatory change. SPLEEN: Unremarkable. PANCREAS: Limited evaluation without IV contrast. Grossly unremarkable. ADRENAL GLANDS: Unremarkable. KIDNEYS/URETERS: Limited evaluation without IV contrast. Extensive bilateral medullary nephrocalcinosis. No hydronephrosis. STOMACH AND BOWEL: NG tube present with tip at the gastric body. Multiple dilated loops of small intestine with normal caliber terminal ileum and collapsed colon, consistent with distal small bowel obstruction. No evidence of pneumatosis intestinalis. APPENDIX: Normal. ABDOMINOPELVIC CAVITY: No lymphadenopathy. Small amount of ascites along the liver and in the pelvis and right paracolic gutter. No extraluminal gas. VESSELS: Limited evaluation without IV contrast. No aortic aneurysm. PELVIS REPRODUCTIVE ORGANS: Prostate and seminal vesicles unremarkable for patient's age. URINARY BLADDER: Unremarkable. ABDOMINAL WALL/INGUINAL REGIONS: Unremarkable. BONES: Severe osteoarthritis of both hips. Healed fracture of the proximal right femur, status post ORIF. No evidence of acute fracture or destructive lesion.     Impression: 1.  Distal small bowel obstruction at or near the terminal ileum. 2.  Small amount of ascites. 3.  Subsegmental atelectasis in the right upper and lower lobes, small right pleural effusion and elevation of the right hemidiaphragm. 4.  Extensive bilateral medullary  nephrocalcinosis, unchanged. Workstation performed: [id number]     XR chest portable    Result Date: 8/11/2024  Narrative: XR CHEST PORTABLE INDICATION: Possible aspiration. COMPARISON: CXR 8/28/2011. Abdomen CT 8/9/2024. FINDINGS: NG tube in stomach with sidehole in GE junction. Mild opacity opacity in the right base. No pneumothorax or pleural effusion. Normal cardiomediastinal silhouette. Bones are unremarkable for age. Normal upper abdomen. Mild elevation of the right diaphragm.     Impression: Mild opacity opacity in the right base, likely atelectasis or aspiration. NG tube in stomach with sidehole at GE junction. Recommend advancing. Workstation performed: [id number]     XR abdomen obstruction series    Result Date: 8/10/2024  Narrative: XR ABDOMEN OBSTRUCTION SERIES INDICATION: SBO, bowel gas pattern. COMPARISON: CT 8/9/2024 FINDINGS: Distal aspect of NG tube is coursing below the left hemidiaphragm. Persistent diffuse small bowel distention, relative paucity of large bowel gas, and moderate gastric distention. No pneumoperitoneum. No pathologic calcification or soft tissue mass. Bones are unremarkable for patient's age. Examination of the chest reveals clear lungs and a normal cardiomediastinal silhouette.     Impression: Persistent obstructive bowel gas pattern with moderate gastric distention. Workstation performed: [id number]     CT abdomen pelvis with contrast    Result Date: 8/9/2024  Narrative: CT ABDOMEN AND PELVIS WITH IV CONTRAST INDICATION: left lower quad pain, history of bowel obstruction, pt. does not need to wait for labs today. COMPARISON: CT dated 4/19/2024. TECHNIQUE: CT examination of the abdomen and pelvis was performed. Multiplanar 2D reformatted images were created from the source data. This examination, like all CT scans performed in the [facility], was performed utilizing techniques to minimize radiation dose exposure, including the use of iterative reconstruction and  automated exposure control. Radiation dose length product (DLP) for this visit: 517.37 mGy-cm IV Contrast: 100 mL of iohexol (OMNIPAQUE) Enteric Contrast: Not administered. FINDINGS: ABDOMEN LOWER CHEST: Distended fluid-filled esophagus. Bibasilar subsegmental atelectasis.. LIVER/BILIARY TREE: Unremarkable. GALLBLADDER: No calcified gallstones. No pericholecystic inflammatory change. SPLEEN: Unremarkable. PANCREAS: Unremarkable. ADRENAL GLANDS: Unremarkable. KIDNEYS/URETERS: Stable medullary nephrocalcinosis with numerous bilateral renal calcifications. Multiple stable bilateral renal cysts. No obstructing stone or hydronephrosis. STOMACH AND BOWEL: Marked distention of the stomach and small bowel with transition at the terminal ileum consistent with small bowel obstruction. Small amount of free fluid in the pelvis. No bowel wall thickening. APPENDIX: Normal. ABDOMINOPELVIC CAVITY: Small volume of ascites. No pneumoperitoneum or abscess. No adenopathy.. VESSELS: Atherosclerosis without abdominal aortic aneurysm. PELVIS REPRODUCTIVE ORGANS: Unremarkable for patient's age. URINARY BLADDER: Unremarkable. ABDOMINAL WALL/INGUINAL REGIONS: Unremarkable. BONES: No acute osseous abnormality. Spinal degenerative changes. Lumbar levoscoliosis. Degenerative grade 1 anterolisthesis L4-5. Mild chronic T12 compression is stable. Few stable tiny sclerotic foci at T11, L1 and L2 are favored represent bone islands. Status post ORIF of the right hip. Advanced bilateral hip osteoarthritis     Impression: Small bowel obstruction with transition at the terminal ileum that may be due to adhesions Small volume of ascites The study was marked in EPIC for immediate notification. Workstation performed: [id number]           Assessment/Plan    Epilepsy (HCC)  Assessment & Plan  Continue home lamotrigine    Hypertension  Assessment & Plan  Blood pressure stable  Resume home nebivolol    Chronic kidney disease  Assessment & Plan  Presented with  "creatinine of 1.54, now improved to baseline  Continue to monitor    Small bowel obstruction (HCC)  Assessment & Plan  Presented with abdominal distention, pain, diaphoresis  CT abdomen revealed small bowel obstruction with transition at terminal ileum likely related to adhesions  History of small bowel obstruction status post ex lap, 4/24  GEN surgery D/W recs:  Paced    Right spastic hemiparesis (HCC)  Assessment & Plan  History of left MCA CVA, history of brain surgery  On atorvastatin      Cerebral palsy (HCC)  Assessment & Plan  Group home resident, currently appears to be at baseline  Continue chronic meds    * Aspiration pneumonia (HCC)  Assessment & Plan  Noted to be febrile with Tmax of 101.2 °F  CT chest on pelvis revealed atelectasis versus right lower lobe pneumonia  UA without evidence of infection  Low-grade fever, fever Trend is improving.  Hemodynamically stable  Normal white count, procalcitonin elevated.  Suspect aspiration pneumonia versus pneumonitis versus atelectasis  Continue ceftriaxone, metronidazole  Monitor fever, CBC  Trend procalcitonin  Aspiration precaution  Incentive spirometry  Blood culture if recurrent fever  Monitor abdominal exam           VTE Prophylaxis: Heparin  / sequential compression device       Counseling / Coordination of Care Time: 45 minutes.  Greater than 50% of total time spent on patient counseling and coordination of care.    Collaboration of Care: Were Recommendations Directly Discussed with Primary Treatment Team? - Yes     ** Please Note: \"This note has been constructed using a voice recognition system.Therefore there may be syntax, spelling, and/or grammatical errors. Please call if you have any questions. \"**  "

## 2024-08-15 NOTE — PROGRESS NOTES
08/15/24 1300   Clinical Encounter Type   Visited With Patient   Routine Visit Follow-up     [facility] visit 8/14.

## 2024-08-15 NOTE — CONSULTS
Physician Requesting Consult: Dimitrios Pulido MD    Reason for Consult / Principal Problem: Concern for stenosis/stricture at ileocecal junction, small bowel obstruction    Assessment/Plan:  59-year-old male who presents from group home with history of cerebral palsy, CKD, hemochromatosis, left MCA CVA with right hemiparesis, focal epilepsy, history of small bowel obstruction secondary to volvulus status post exploratory laparotomy 2024 who was originally admitted to the general surgical service on 8/9 due to abdominal distention, pain and diaphoresis.    1.  Concern for stenosis/partial obstruction of ileum on Xray  Patient presented on 8/9 with abdominal pain and distention and admitted to surgical service.  Patient initially had an NG tube inserted which has since been removed.  CT scan revealed distal small bowel obstruction at or near the terminal ileum.  X-ray done 8/14 showed concern for stenosis/partial obstruction at terminal ileum/ ileocecal junction.  GI was consulted for further evaluation with colonoscopy.  Patient reports having 2 bowel movements.  Per documentation last bowel movement 8/14 Medium soft brown stool. Tolerating clear liquid diet.  Denies nausea, vomiting, or abdominal pain.    -Clear liquid diet  -GoLytely bowel prep for colonoscopy tomorrow  -Schedule colonoscopy for tomorrow.  Prep and procedure explained to patient in detail.  Further recommendations pending results of colonoscopy.  -Hold heparin subcutaneous tomorrow for colonoscopy    Thank you for consult. Case discussed with Dr Gonzales.    HPI: Mario Rollins is a 59 y.o. male  Aspiration pneumonia (HCC).  This is a 59-year-old male who presents from group home with history of cerebral palsy, CKD, hemochromatosis, left MCA CVA with right hemiparesis, focal epilepsy, history of small bowel obstruction secondary to volvulus status post exploratory laparotomy 2024 who was originally admitted to the general surgical service on 8/9  due to abdominal distention, pain and diaphoresis.  CT scan revealed distal small bowel obstruction at or near the terminal ileum.  Small amount of ascites.  Oral medullary nephrocalcinosis, unchanged subsegmental atelectasis in right upper and lower lobes, small right pleural effusion and elevation of right hemidiaphragm. BUN and creatinine elevated on admission which has since resolved labs on admission were positive for leukocytosis with white blood count 15.96.  White blood count 7.53 and within normal limits today.    Patient initially had an NG tube inserted which has since been removed. Patient tolerating clear liquid diet.  Patient denies nausea, vomiting, or abdominal pain.  Patient reports having 2 bowel movements. X-ray done 8/14 showed concern for stenosis/partial obstruction at terminal ileum/ ileocecal junction.  GI was consulted for further evaluation with colonoscopy.      Patient reports colonoscopy done in 2015.  Report is not available.  Patient does not smoke.  Patient does not drink alcohol.  Patient denies illicit drug use or marijuana use.  No family history of gastric or colon cancer.    Allergies:   Allergies   Allergen Reactions    Aspirin Other (See Comments) and Anaphylaxis     Reaction Date: 25Aug2008;     unknown       Medications:  Current Facility-Administered Medications:     acetaminophen (TYLENOL) rectal suppository 325 mg, 325 mg, Rectal, Q4H PRN, Milton Handley MD, 325 mg at 08/11/24 1825    atorvastatin (LIPITOR) tablet 10 mg, 10 mg, Oral, QPM, Warren Vasquez MD, 10 mg at 08/14/24 2024    cefTRIAXone (ROCEPHIN) IVPB (premix in dextrose) 1,000 mg 50 mL, 1,000 mg, Intravenous, Q24H, Trevor Fletcher PA-C, Last Rate: 100 mL/hr at 08/15/24 0959, 1,000 mg at 08/15/24 0959    dextrose 5 % and sodium chloride 0.45 % with KCl 20 mEq/L infusion, 100 mL/hr, Intravenous, Continuous, Trevor Fletcher PA-C, Last Rate: 100 mL/hr at 08/15/24 0413, 100 mL/hr at 08/15/24 0413    heparin (porcine)  subcutaneous injection 5,000 Units, 5,000 Units, Subcutaneous, Q8H SEBASTIAN, Milton Handley MD, 5,000 Units at 08/15/24 0645    HYDROmorphone (DILAUDID) injection 0.5 mg, 0.5 mg, Intravenous, Q3H PRN, Milton Handley MD, 0.5 mg at 08/10/24 1652    lamoTRIgine (LaMICtal) tablet 150 mg, 150 mg, Oral, QPM, Trevor Fletcher PA-C    lamoTRIgine (LaMICtal) tablet 200 mg, 200 mg, Oral, QAM, Trevor Fletcher PA-C, 200 mg at 08/15/24 0841    metroNIDAZOLE (FLAGYL) IVPB (premix) 500 mg 100 mL, 500 mg, Intravenous, Q8H, Trevor Fletcher PA-C, Last Rate: 200 mL/hr at 08/15/24 0412, 500 mg at 08/15/24 0412    nebivolol (BYSTOLIC) tablet 2.5 mg, 2.5 mg, Oral, Daily, Warren Vasquez MD, 2.5 mg at 08/14/24 0922    ondansetron (ZOFRAN) injection 4 mg, 4 mg, Intravenous, Q6H PRN, Milton Handley MD    pantoprazole (PROTONIX) injection 40 mg, 40 mg, Intravenous, Q24H SEBASTIAN, Warren Vasquez MD, 40 mg at 08/15/24 0841    QUEtiapine (SEROquel) tablet 50 mg, 50 mg, Oral, HS, Trevor Fletcher PA-C    sertraline (ZOLOFT) tablet 50 mg, 50 mg, Oral, Daily, Trevor Fletcher PA-C, 50 mg at 08/15/24 0841    Past Medical history:  Past Medical History:   Diagnosis Date    Ambulatory dysfunction     Anxiety     Bilateral impacted cerumen     last assessed 05/30/2013    Capsulitis     last assessed 04/26/2016    Cellulitis of finger 01/13/2012    Chronic kidney disease     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)     Closed fracture of one or more phalanges of foot 01/06/2009    Constipation     Deformity     left and right foot and ankle ,right hand    Depression     Epilepsy (HCC)     Erythrocytosis     Hemiplegia affecting dominant side (HCC)     Hypertension     Hypoglycemia 11/08/2011    Hypokalemia     last assessed 05/30/2013    Mental retardation     Mood disorder (HCC)     Nephrocalcinosis     chronic    Polycystic kidney     Bilateral    Small bowel obstruction (HCC)        Past Surgical History:   Past Surgical History:   Procedure Laterality Date    BRAIN  SURGERY      EXPLORATORY LAPAROTOMY W/ BOWEL RESECTION N/A 4/6/2024    Procedure: LAPAROTOMY EXPLORATORY WITH LYSIS OF ADHESIONS AND DECOMPRESSION;  Surgeon: Saúl Woods MD;  Location: [facility] MAIN OR;  Service: General    OR ARTHROCENTESIS ASPIR&/INJ MAJOR JT/BURSA W/O US Bilateral 5/27/2021    Procedure: Hip intra-articular corticosteroid injection ( 41333 99395-07);  Surgeon: Lianet Astorga MD;  Location: [facility] MAIN OR;  Service: Pain Management     OR COLONOSCOPY FLX DX W/COLLJ SPEC WHEN PFRMD N/A 2/12/2016    Procedure: COLONOSCOPY;  Surgeon: Abhilash Ace MD;  Location: [facility] GI LAB;  Service: Gastroenterology    OR INJECT SI JOINT ARTHRGRPHY&/ANES/STEROID W/RIMA Left 3/22/2023    Procedure: SACROILIAC joint injection ([id number] );  Surgeon: Larry Smith DO;  Location: [facility] MAIN OR;  Service: Pain Management        Social history:   Social History     Tobacco Use    Smoking status: Never    Smokeless tobacco: Never   Vaping Use    Vaping status: Never Used   Substance Use Topics    Alcohol use: Not Currently    Drug use: No       Family history:   Family History   Problem Relation Age of Onset    Emphysema Mother     Nephrolithiasis Mother     Heart attack Father         acute MI    Hypertension Father     Nephrolithiasis Father     Nephrolithiasis Brother         Review of Systems: Review of Systems   All other systems reviewed and are negative.      Physical Exam: Physical Exam  Vitals and nursing note reviewed.   Constitutional:       General: He is not in acute distress.  HENT:      Head: Normocephalic and atraumatic.   Cardiovascular:      Rate and Rhythm: Normal rate and regular rhythm.      Pulses: Normal pulses.      Heart sounds: Normal heart sounds.   Pulmonary:      Effort: Pulmonary effort is normal. No respiratory distress.      Breath sounds: Normal breath sounds. No stridor. No wheezing, rhonchi or rales.   Chest:      Chest wall: No tenderness.   Abdominal:      General: Bowel sounds  are normal. There is no distension.      Palpations: Abdomen is soft. There is no mass.      Tenderness: There is no abdominal tenderness. There is no guarding or rebound.      Hernia: No hernia is present.   Musculoskeletal:      Cervical back: Normal range of motion and neck supple.      Right lower leg: No edema.      Left lower leg: No edema.   Skin:     General: Skin is warm and dry.      Capillary Refill: Capillary refill takes less than 2 seconds.      Coloration: Skin is not jaundiced or pale.   Neurological:      Mental Status: He is alert and oriented to person, place, and time.   Psychiatric:         Mood and Affect: Mood normal.           Lab Results:   Recent Results (from the past 24 hour(s))   CBC and differential    Collection Time: 08/15/24  4:17 AM   Result Value Ref Range    WBC 7.53 4.31 - 10.16 Thousand/uL    RBC 5.22 3.88 - 5.62 Million/uL    Hemoglobin 14.9 12.0 - 17.0 g/dL    Hematocrit 45.9 36.5 - 49.3 %    MCV 88 82 - 98 fL    MCH 28.5 26.8 - 34.3 pg    MCHC 32.5 31.4 - 37.4 g/dL    RDW 13.1 11.6 - 15.1 %    MPV 10.7 8.9 - 12.7 fL    Platelets 186 149 - 390 Thousands/uL    nRBC 0 /100 WBCs    Segmented % 59 43 - 75 %    Immature Grans % 2 0 - 2 %    Lymphocytes % 23 14 - 44 %    Monocytes % 13 (H) 4 - 12 %    Eosinophils Relative 3 0 - 6 %    Basophils Relative 0 0 - 1 %    Absolute Neutrophils 4.49 1.85 - 7.62 Thousands/µL    Absolute Immature Grans 0.11 0.00 - 0.20 Thousand/uL    Absolute Lymphocytes 1.76 0.60 - 4.47 Thousands/µL    Absolute Monocytes 0.94 0.17 - 1.22 Thousand/µL    Eosinophils Absolute 0.20 0.00 - 0.61 Thousand/µL    Basophils Absolute 0.03 0.00 - 0.10 Thousands/µL   Basic metabolic panel    Collection Time: 08/15/24  4:17 AM   Result Value Ref Range    Sodium 142 135 - 147 mmol/L    Potassium 4.0 3.5 - 5.3 mmol/L    Chloride 112 (H) 96 - 108 mmol/L    CO2 25 21 - 32 mmol/L    ANION GAP 5 4 - 13 mmol/L    BUN 11 5 - 25 mg/dL    Creatinine 0.93 0.60 - 1.30 mg/dL     Glucose 93 65 - 140 mg/dL    Calcium 7.8 (L) 8.4 - 10.2 mg/dL    eGFR 89 ml/min/1.73sq m   Phosphorus    Collection Time: 08/15/24  4:17 AM   Result Value Ref Range    Phosphorus 2.9 2.7 - 4.5 mg/dL   Magnesium    Collection Time: 08/15/24  4:17 AM   Result Value Ref Range    Magnesium 2.0 1.9 - 2.7 mg/dL           Imaging Studies: XR abdomen complete inc upright and/or decubitus    Result Date: 8/14/2024  Narrative: SMALL BOWEL FOLLOW THROUGH (Gastrografin challenge) INDICATION:   Bowel gas pattern. COMPARISON: August 13, 2024 IMAGES: 4 FLUOROSCOPY TIME:   None TECHNIQUE:    view of the abdomen was obtained. Gastrografin was then administered to the patient and followed through the small bowel to the colon utilizing overhead radiographs at periodic intervals. FINDINGS: Enteric tube tip in the stomach. Right lower lung opacity reidentified. Nephrocalcinosis reidentified. In this patient undergoing Gastrografin challenge to evaluate small bowel obstruction, currently the enteric contrast has reached the colon. Advanced degenerative changes both hips with right hip femoral medullary giancarlo and compression nail.     Impression: Oral contrast seen opacifying the terminal ileum and entire large bowel. The terminal ileum measures up to 5 cm diameter. There is gaseous distention of multiple small bowel loops throughout the mid abdomen averaging 5 cm. This pattern suggests there is likely a stenosis/partial obstruction at the terminal ileum/ileocecal valve. The large bowel appears opacified and normally distended. Because oral contrast has reached the colon, no more followup films are needed. Workstation performed: [id number]     XR abdomen obstruction series    Result Date: 8/13/2024  Narrative: XR ABDOMEN OBSTRUCTION SERIES INDICATION: Bowel gas pattern. COMPARISON: 8/10/2024 FINDINGS: NG tube proximal port is just above the GE junction. Nephrocalcinosis is noted, better seen on recent CT. Dilated small bowel loops have  decreased in size in the interim now measuring up to 4.8 cm in diameter compared with 5.6 cm previously. There is some gas distally now within the rectosigmoid colon. No pneumoperitoneum. There are severe degenerative changes in both hips Examination of the chest reveals right basilar opacity with adjacent effusion. There is a partially imaged but grossly normal cardiomediastinal silhouette.     Impression: 1. Improving small bowel distention with some gas seen distally. 2. NG tube proximal port above GE junction. Recommend slight advancement. 3. Worsening right basilar pulmonary opacity with adjacent effusion Workstation performed: [id number]     CT chest abdomen pelvis wo contrast    Result Date: 8/11/2024  Narrative: CT CHEST, ABDOMEN AND PELVIS WITHOUT IV CONTRAST INDICATION: possible aspiration, persistent large NGT output. 59-year-old male with history of bowel obstruction 4 months ago requiring exploratory laparotomy with lysis of adhesions. COMPARISON: CTs of the abdomen and pelvis from April 19, 2024 and August 9, 2024. Portable chest from today. TECHNIQUE: CT examination of the chest, abdomen and pelvis was performed without intravenous contrast. Multiplanar 2D reformatted images were created from the source data. This examination, like all CT scans performed in the [facility], was performed utilizing techniques to minimize radiation dose exposure, including the use of iterative reconstruction and automated exposure control. Radiation dose length product (DLP) for this visit: 640.66 mGy-cm Enteric Contrast: Not administered. Absence of intravenous and gastrointestinal contrast limits evaluation of the abdominal and pelvic viscera. FINDINGS: CHEST LUNGS: Subsegmental atelectasis in the right upper and lower lobes. Lungs otherwise clear. Elevation of the right hemidiaphragm PLEURA: Small right pleural effusion. HEART/GREAT VESSELS: Coronary artery calcifications. Otherwise unremarkable. No  thoracic aortic aneurysm. MEDIASTINUM AND AMI: No lymphadenopathy or mass. NG tube present. Esophagus otherwise unremarkable. Trachea and main stem bronchi normal. CHEST WALL AND LOWER NECK: Mild bilateral gynecomastia. No lymphadenopathy or mass. ABDOMEN LIVER/BILIARY TREE: Limited evaluation without IV contrast. Grossly unremarkable. GALLBLADDER: No calcified gallstones. No pericholecystic inflammatory change. SPLEEN: Unremarkable. PANCREAS: Limited evaluation without IV contrast. Grossly unremarkable. ADRENAL GLANDS: Unremarkable. KIDNEYS/URETERS: Limited evaluation without IV contrast. Extensive bilateral medullary nephrocalcinosis. No hydronephrosis. STOMACH AND BOWEL: NG tube present with tip at the gastric body. Multiple dilated loops of small intestine with normal caliber terminal ileum and collapsed colon, consistent with distal small bowel obstruction. No evidence of pneumatosis intestinalis. APPENDIX: Normal. ABDOMINOPELVIC CAVITY: No lymphadenopathy. Small amount of ascites along the liver and in the pelvis and right paracolic gutter. No extraluminal gas. VESSELS: Limited evaluation without IV contrast. No aortic aneurysm. PELVIS REPRODUCTIVE ORGANS: Prostate and seminal vesicles unremarkable for patient's age. URINARY BLADDER: Unremarkable. ABDOMINAL WALL/INGUINAL REGIONS: Unremarkable. BONES: Severe osteoarthritis of both hips. Healed fracture of the proximal right femur, status post ORIF. No evidence of acute fracture or destructive lesion.     Impression: 1.  Distal small bowel obstruction at or near the terminal ileum. 2.  Small amount of ascites. 3.  Subsegmental atelectasis in the right upper and lower lobes, small right pleural effusion and elevation of the right hemidiaphragm. 4.  Extensive bilateral medullary nephrocalcinosis, unchanged. Workstation performed: [id number]     XR chest portable    Result Date: 8/11/2024  Narrative: XR CHEST PORTABLE INDICATION: Possible aspiration. COMPARISON:  CXR 8/28/2011. Abdomen CT 8/9/2024. FINDINGS: NG tube in stomach with sidehole in GE junction. Mild opacity opacity in the right base. No pneumothorax or pleural effusion. Normal cardiomediastinal silhouette. Bones are unremarkable for age. Normal upper abdomen. Mild elevation of the right diaphragm.     Impression: Mild opacity opacity in the right base, likely atelectasis or aspiration. NG tube in stomach with sidehole at GE junction. Recommend advancing. Workstation performed: [id number]     XR abdomen obstruction series    Result Date: 8/10/2024  Narrative: XR ABDOMEN OBSTRUCTION SERIES INDICATION: SBO, bowel gas pattern. COMPARISON: CT 8/9/2024 FINDINGS: Distal aspect of NG tube is coursing below the left hemidiaphragm. Persistent diffuse small bowel distention, relative paucity of large bowel gas, and moderate gastric distention. No pneumoperitoneum. No pathologic calcification or soft tissue mass. Bones are unremarkable for patient's age. Examination of the chest reveals clear lungs and a normal cardiomediastinal silhouette.     Impression: Persistent obstructive bowel gas pattern with moderate gastric distention. Workstation performed: [id number]     CT abdomen pelvis with contrast    Result Date: 8/9/2024  Narrative: CT ABDOMEN AND PELVIS WITH IV CONTRAST INDICATION: left lower quad pain, history of bowel obstruction, pt. does not need to wait for labs today. COMPARISON: CT dated 4/19/2024. TECHNIQUE: CT examination of the abdomen and pelvis was performed. Multiplanar 2D reformatted images were created from the source data. This examination, like all CT scans performed in the [facility], was performed utilizing techniques to minimize radiation dose exposure, including the use of iterative reconstruction and automated exposure control. Radiation dose length product (DLP) for this visit: 517.37 mGy-cm IV Contrast: 100 mL of iohexol (OMNIPAQUE) Enteric Contrast: Not administered. FINDINGS:  ABDOMEN LOWER CHEST: Distended fluid-filled esophagus. Bibasilar subsegmental atelectasis.. LIVER/BILIARY TREE: Unremarkable. GALLBLADDER: No calcified gallstones. No pericholecystic inflammatory change. SPLEEN: Unremarkable. PANCREAS: Unremarkable. ADRENAL GLANDS: Unremarkable. KIDNEYS/URETERS: Stable medullary nephrocalcinosis with numerous bilateral renal calcifications. Multiple stable bilateral renal cysts. No obstructing stone or hydronephrosis. STOMACH AND BOWEL: Marked distention of the stomach and small bowel with transition at the terminal ileum consistent with small bowel obstruction. Small amount of free fluid in the pelvis. No bowel wall thickening. APPENDIX: Normal. ABDOMINOPELVIC CAVITY: Small volume of ascites. No pneumoperitoneum or abscess. No adenopathy.. VESSELS: Atherosclerosis without abdominal aortic aneurysm. PELVIS REPRODUCTIVE ORGANS: Unremarkable for patient's age. URINARY BLADDER: Unremarkable. ABDOMINAL WALL/INGUINAL REGIONS: Unremarkable. BONES: No acute osseous abnormality. Spinal degenerative changes. Lumbar levoscoliosis. Degenerative grade 1 anterolisthesis L4-5. Mild chronic T12 compression is stable. Few stable tiny sclerotic foci at T11, L1 and L2 are favored represent bone islands. Status post ORIF of the right hip. Advanced bilateral hip osteoarthritis     Impression: Small bowel obstruction with transition at the terminal ileum that may be due to adhesions Small volume of ascites The study was marked in EPIC for immediate notification. Workstation performed: [id number]       Problem List:   Patient Active Problem List   Diagnosis    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    Acquired deformity of right ankle and foot    Other constipation    Cerebral palsy (HCC)    Nephrocalcinosis    Class 1 obesity with body mass index (BMI) of 30.0 to 30.9 in adult    Hyperlipidemia    Lumbar radiculopathy    Spinal stenosis of lumbar region    Chronic left-sided low back pain with  "left-sided sciatica    Chronic pain syndrome    Paronychia of toenail    Acquired deformity of right hand    Acquired hallux rigidus of left foot    Acquired valgus deformity of right ankle    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 stage 4 chronic kidney disease    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 Please call if you have any questions. \"**   "

## 2024-08-15 NOTE — PLAN OF CARE
Problem: Prexisting or High Potential for Compromised Skin Integrity  Goal: Skin integrity is maintained or improved  Description: INTERVENTIONS:  - Identify patients at risk for skin breakdown  - Assess and monitor skin integrity  - Assess and monitor nutrition and hydration status  - Monitor labs   - Assess for incontinence   - Turn and reposition patient  - Assist with mobility/ambulation  - Relieve pressure over bony prominences  - Avoid friction and shearing  - Provide appropriate hygiene as needed including keeping skin clean and dry  - Evaluate need for skin moisturizer/barrier cream  - Collaborate with interdisciplinary team   - Patient/family teaching  - Consider wound care consult   Outcome: Progressing     Problem: PAIN - ADULT  Goal: Verbalizes/displays adequate comfort level or baseline comfort level  Description: Interventions:  - Encourage patient to monitor pain and request assistance  - Assess pain using appropriate pain scale  - Administer analgesics based on type and severity of pain and evaluate response  - Implement non-pharmacological measures as appropriate and evaluate response  - Consider cultural and social influences on pain and pain management  - Notify physician/advanced practitioner if interventions unsuccessful or patient reports new pain  Outcome: Progressing     Problem: INFECTION - ADULT  Goal: Absence or prevention of progression during hospitalization  Description: INTERVENTIONS:  - Assess and monitor for signs and symptoms of infection  - Monitor lab/diagnostic results  - Monitor all insertion sites, i.e. indwelling lines, tubes, and drains  - Monitor endotracheal if appropriate and nasal secretions for changes in amount and color  - [place] appropriate cooling/warming therapies per order  - Administer medications as ordered  - Instruct and encourage patient and family to use good hand hygiene technique  - Identify and instruct in appropriate isolation precautions for  identified infection/condition  Outcome: Progressing  Goal: Absence of fever/infection during neutropenic period  Description: INTERVENTIONS:  - Monitor WBC    Outcome: Progressing     Problem: SAFETY ADULT  Goal: Patient will remain free of falls  Description: INTERVENTIONS:  - Educate patient/family on patient safety including physical limitations  - Instruct patient to call for assistance with activity   - Consult OT/PT to assist with strengthening/mobility   - Keep Call bell within reach  - Keep bed low and locked with side rails adjusted as appropriate  - Keep care items and personal belongings within reach  - Initiate and maintain comfort rounds  - Make Fall Risk Sign visible to staff  - Offer Toileting every 2 Hours, in advance of need  - Initiate/Maintain bed alarm  - Obtain necessary fall risk management equipment: walker  - Apply yellow socks and bracelet for high fall risk patients  - Consider moving patient to room near nurses station  Outcome: Progressing  Goal: Maintain or return to baseline ADL function  Description: INTERVENTIONS:  -  Assess patient's ability to carry out ADLs; assess patient's baseline for ADL function and identify physical deficits which impact ability to perform ADLs (bathing, care of mouth/teeth, toileting, grooming, dressing, etc.)  - Assess/evaluate cause of self-care deficits   - Assess range of motion  - Assess patient's mobility; develop plan if impaired  - Assess patient's need for assistive devices and provide as appropriate  - Encourage maximum independence but intervene and supervise when necessary  - Involve family in performance of ADLs  - Assess for home care needs following discharge   - Consider OT consult to assist with ADL evaluation and planning for discharge  - Provide patient education as appropriate  Outcome: Progressing  Goal: Maintains/Returns to pre admission functional level  Description: INTERVENTIONS:  - Perform AM-PAC 6 Click Basic Mobility/ Daily  Activity assessment daily.  - Set and communicate daily mobility goal to care team and patient/family/caregiver.   - Collaborate with rehabilitation services on mobility goals if consulted  - Out of bed for toileting  - Record patient progress and toleration of activity level   Outcome: Progressing     Problem: DISCHARGE PLANNING  Goal: Discharge to home or other facility with appropriate resources  Description: INTERVENTIONS:  - Identify barriers to discharge w/patient and caregiver  - Arrange for needed discharge resources and transportation as appropriate  - Identify discharge learning needs (meds, wound care, etc.)  - Arrange for interpretive services to assist at discharge as needed  - Refer to Case Management Department for coordinating discharge planning if the patient needs post-hospital services based on physician/advanced practitioner order or complex needs related to functional status, cognitive ability, or social support system  Outcome: Progressing     Problem: GASTROINTESTINAL - ADULT  Goal: Minimal or absence of nausea and/or vomiting  Description: INTERVENTIONS:  - Administer IV fluids if ordered to ensure adequate hydration  - Maintain NPO status until nausea and vomiting are resolved  - Nasogastric tube if ordered  - Administer ordered antiemetic medications as needed  - Provide nonpharmacologic comfort measures as appropriate  - Advance diet as tolerated, if ordered  - Consider nutrition services referral to assist patient with adequate nutrition and appropriate food choices  Outcome: Progressing  Goal: Maintains or returns to baseline bowel function  Description: INTERVENTIONS:  - Assess bowel function  - Encourage oral fluids to ensure adequate hydration  - Administer IV fluids if ordered to ensure adequate hydration  - Administer ordered medications as needed  - Encourage mobilization and activity  - Consider nutritional services referral to assist patient with adequate nutrition and  appropriate food choices  Outcome: Progressing  Goal: Maintains adequate nutritional intake  Description: INTERVENTIONS:  - Monitor percentage of each meal consumed  - Identify factors contributing to decreased intake, treat as appropriate  - Assist with meals as needed  - Monitor I&O, weight, and lab values if indicated  - Obtain nutrition services referral as needed  Outcome: Progressing  Goal: Oral mucous membranes remain intact  Description: INTERVENTIONS  - Assess oral mucosa and hygiene practices  - Implement preventative oral hygiene regimen  - Implement oral medicated treatments as ordered  - Initiate Nutrition services referral as needed  Outcome: Progressing     Problem: METABOLIC, FLUID AND ELECTROLYTES - ADULT  Goal: Electrolytes maintained within normal limits  Description: INTERVENTIONS:  - Monitor labs and assess patient for signs and symptoms of electrolyte imbalances  - Administer electrolyte replacement as ordered  - Monitor response to electrolyte replacements, including repeat lab results as appropriate  - Instruct patient on fluid and nutrition as appropriate  Outcome: Progressing  Goal: Fluid balance maintained  Description: INTERVENTIONS:  - Monitor labs   - Monitor I/O and WT  - Instruct patient on fluid and nutrition as appropriate  - Assess for signs & symptoms of volume excess or deficit  Outcome: Progressing  Goal: Glucose maintained within target range  Description: INTERVENTIONS:  - Monitor Blood Glucose as ordered  - Assess for signs and symptoms of hyperglycemia and hypoglycemia  - Administer ordered medications to maintain glucose within target range  - Assess nutritional intake and initiate nutrition service referral as needed  Outcome: Progressing     Problem: MUSCULOSKELETAL - ADULT  Goal: Maintain or return mobility to safest level of function  Description: INTERVENTIONS:  - Assess patient's ability to carry out ADLs; assess patient's baseline for ADL function and identify  physical deficits which impact ability to perform ADLs (bathing, care of mouth/teeth, toileting, grooming, dressing, etc.)  - Assess/evaluate cause of self-care deficits   - Assess range of motion  - Assess patient's mobility  - Assess patient's need for assistive devices and provide as appropriate  - Encourage maximum independence but intervene and supervise when necessary  - Involve family in performance of ADLs  - Assess for home care needs following discharge   - Consider OT consult to assist with ADL evaluation and planning for discharge  - Provide patient education as appropriate  Outcome: Progressing     Problem: Nutrition/Hydration-ADULT  Goal: Nutrient/Hydration intake appropriate for improving, restoring or maintaining nutritional needs  Description: Monitor and assess patient's nutrition/hydration status for malnutrition. Collaborate with interdisciplinary team and initiate plan and interventions as ordered.  Monitor patient's weight and dietary intake as ordered or per policy. Utilize nutrition screening tool and intervene as necessary. Determine patient's food preferences and provide high-protein, high-caloric foods as appropriate.     INTERVENTIONS:  - Monitor oral intake, urinary output, labs, and treatment plans  - Assess nutrition and hydration status and recommend course of action  - Evaluate amount of meals eaten  - Assist patient with eating if necessary   - Allow adequate time for meals  - Recommend/ encourage appropriate diets, oral nutritional supplements, and vitamin/mineral supplements  - Order, calculate, and assess calorie counts as needed  - Recommend, monitor, and adjust tube feedings and TPN/PPN based on assessed needs  - Assess need for intravenous fluids  - Provide specific nutrition/hydration education as appropriate  - Include patient/family/caregiver in decisions related to nutrition  Outcome: Progressing

## 2024-08-16 ENCOUNTER — ANESTHESIA (INPATIENT)
Dept: PERIOP | Facility: HOSPITAL | Age: 59
DRG: 388 | End: 2024-08-16
Payer: MEDICARE

## 2024-08-16 ENCOUNTER — ANESTHESIA EVENT (INPATIENT)
Dept: PERIOP | Facility: HOSPITAL | Age: 59
DRG: 388 | End: 2024-08-16
Payer: MEDICARE

## 2024-08-16 ENCOUNTER — APPOINTMENT (OUTPATIENT)
Dept: PERIOP | Facility: HOSPITAL | Age: 59
DRG: 388 | End: 2024-08-16
Payer: MEDICARE

## 2024-08-16 PROCEDURE — 88305 TISSUE EXAM BY PATHOLOGIST: CPT | Performed by: PATHOLOGY

## 2024-08-16 PROCEDURE — 99232 SBSQ HOSP IP/OBS MODERATE 35: CPT | Performed by: STUDENT IN AN ORGANIZED HEALTH CARE EDUCATION/TRAINING PROGRAM

## 2024-08-16 PROCEDURE — 45380 COLONOSCOPY AND BIOPSY: CPT | Performed by: INTERNAL MEDICINE

## 2024-08-16 PROCEDURE — 99232 SBSQ HOSP IP/OBS MODERATE 35: CPT | Performed by: PHYSICIAN ASSISTANT

## 2024-08-16 PROCEDURE — 0DBB8ZX EXCISION OF ILEUM, VIA NATURAL OR ARTIFICIAL OPENING ENDOSCOPIC, DIAGNOSTIC: ICD-10-PCS | Performed by: INTERNAL MEDICINE

## 2024-08-16 RX ORDER — PROPOFOL 10 MG/ML
INJECTION, EMULSION INTRAVENOUS AS NEEDED
Status: DISCONTINUED | OUTPATIENT
Start: 2024-08-16 | End: 2024-08-16

## 2024-08-16 RX ORDER — SODIUM CHLORIDE, SODIUM LACTATE, POTASSIUM CHLORIDE, CALCIUM CHLORIDE 600; 310; 30; 20 MG/100ML; MG/100ML; MG/100ML; MG/100ML
INJECTION, SOLUTION INTRAVENOUS CONTINUOUS PRN
Status: DISCONTINUED | OUTPATIENT
Start: 2024-08-16 | End: 2024-08-16

## 2024-08-16 RX ORDER — LIDOCAINE HYDROCHLORIDE 10 MG/ML
INJECTION, SOLUTION EPIDURAL; INFILTRATION; INTRACAUDAL; PERINEURAL AS NEEDED
Status: DISCONTINUED | OUTPATIENT
Start: 2024-08-16 | End: 2024-08-16

## 2024-08-16 RX ADMIN — CEFTRIAXONE 1000 MG: 1 INJECTION, SOLUTION INTRAVENOUS at 11:45

## 2024-08-16 RX ADMIN — PROPOFOL 50 MG: 10 INJECTION, EMULSION INTRAVENOUS at 13:48

## 2024-08-16 RX ADMIN — PROPOFOL 100 MG: 10 INJECTION, EMULSION INTRAVENOUS at 13:36

## 2024-08-16 RX ADMIN — DEXTROSE, SODIUM CHLORIDE, AND POTASSIUM CHLORIDE 100 ML/HR: 5; .45; .15 INJECTION INTRAVENOUS at 22:16

## 2024-08-16 RX ADMIN — LIDOCAINE HYDROCHLORIDE 50 MG: 10 INJECTION, SOLUTION EPIDURAL; INFILTRATION; INTRACAUDAL; PERINEURAL at 13:36

## 2024-08-16 RX ADMIN — METRONIDAZOLE 500 MG: 500 INJECTION, SOLUTION INTRAVENOUS at 03:27

## 2024-08-16 RX ADMIN — ATORVASTATIN CALCIUM 10 MG: 10 TABLET, FILM COATED ORAL at 20:40

## 2024-08-16 RX ADMIN — LAMOTRIGINE 200 MG: 100 TABLET ORAL at 08:16

## 2024-08-16 RX ADMIN — PROPOFOL 50 MG: 10 INJECTION, EMULSION INTRAVENOUS at 13:39

## 2024-08-16 RX ADMIN — METRONIDAZOLE 500 MG: 500 INJECTION, SOLUTION INTRAVENOUS at 12:32

## 2024-08-16 RX ADMIN — SODIUM CHLORIDE, SODIUM LACTATE, POTASSIUM CHLORIDE, AND CALCIUM CHLORIDE: .6; .31; .03; .02 INJECTION, SOLUTION INTRAVENOUS at 13:31

## 2024-08-16 RX ADMIN — HEPARIN SODIUM 5000 UNITS: 5000 INJECTION INTRAVENOUS; SUBCUTANEOUS at 22:15

## 2024-08-16 RX ADMIN — SERTRALINE HYDROCHLORIDE 50 MG: 50 TABLET ORAL at 08:16

## 2024-08-16 RX ADMIN — PROPOFOL 50 MG: 10 INJECTION, EMULSION INTRAVENOUS at 13:42

## 2024-08-16 RX ADMIN — QUETIAPINE FUMARATE 50 MG: 25 TABLET ORAL at 22:15

## 2024-08-16 RX ADMIN — DEXTROSE, SODIUM CHLORIDE, AND POTASSIUM CHLORIDE 100 ML/HR: 5; .45; .15 INJECTION INTRAVENOUS at 09:18

## 2024-08-16 RX ADMIN — PROPOFOL 50 MG: 10 INJECTION, EMULSION INTRAVENOUS at 13:45

## 2024-08-16 RX ADMIN — LAMOTRIGINE 150 MG: 100 TABLET ORAL at 20:40

## 2024-08-16 RX ADMIN — PANTOPRAZOLE SODIUM 40 MG: 40 INJECTION, POWDER, FOR SOLUTION INTRAVENOUS at 06:20

## 2024-08-16 NOTE — INTERVAL H&P NOTE
H&P reviewed. After examining the patient I find no changes in the patients condition since the H&P had been written.    Vitals:    08/16/24 1313   BP: 127/75   Pulse: 79   Resp: 18   Temp:    SpO2: 94%

## 2024-08-16 NOTE — CASE MANAGEMENT
Case Management Discharge Planning Note    Patient name Mario Rollins  Location [address] [address]/[address] [address]-* MRN [id number]  : 1965 Date 2024       Current Admission Date: 2024  Current Admission Diagnosis:Aspiration pneumonia (HCC)   Patient Active Problem List    Diagnosis Date Noted Date Diagnosed    Aspiration pneumonia (HCC) 2024     Chronic kidney disease      Hypertension      Epilepsy (HCC)      Electrolyte abnormality 2024     Small bowel obstruction (HCC) 2024     Pneumatosis of intestines 2024     Sacroiliitis (HCC)      Localization-related symptomatic epilepsy and epileptic syndromes with complex partial seizures, intractable, without status epilepticus (HCC)      Encounter for hearing examination 2021     Right spastic hemiparesis (HCC) 2020     Acquired deformity of right hand 2020     Paronychia of toenail 2019     Lumbar radiculopathy 10/01/2018     Spinal stenosis of lumbar region 10/01/2018     Chronic left-sided low back pain with left-sided sciatica 10/01/2018     Chronic pain syndrome 10/01/2018     Cerebral palsy (HCC) 2018     Class 1 obesity with body mass index (BMI) of 30.0 to 30.9 in adult 2018     Hyperlipidemia 2018     Other constipation 2018     Acquired valgus deformity of right ankle 2017     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 10/20/2015     Acquired deformity of right ankle and foot 10/20/2015     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    Primary Insurance: MEDICARE  Secondary Insurance: [employer]    DISCHARGE DETAILS:    Discharge planning discussed with::  Lelo Guillory [phone]  [place] of Choice: Yes    Comments - [place] of Choice: SW spoke with [facility]  Lelo by phone to discuss plans for discharge.  Patient is assessed at Level [facility] for rehabilitation needs and per Lelo he is active with [facility] at the group home.  SW placed a referral for resumption with [facility] and requested outpatient PT/OT orders from attending.      Patient is anticipated to be medically cleared over the weekend but Lelo stated that accepting him back will be difficult due to staffing at the group home.  Lelo stated that staff was cancelled due to patient being in the hospital and there is no  there on the weekend.  Lelo provided her cell number [phone] and SW will call her when patient is medically cleared to see if staffing has been secured.  Patient may need transportation home if leaving over the weekend as group home staff may not available to transport him.      Per Lelo the group home will also need all of patients medications discontinued and new scripts sent to the pharmacy.  Attending notified.  SW will continue to follow.      CM contacted family/caregiver?: Yes  Were Treatment Team discharge recommendations reviewed with patient/caregiver?: Yes  Did patient/caregiver verbalize understanding of patient care needs?: Yes  Were patient/caregiver advised of the risks associated with not following Treatment Team discharge recommendations?: Yes    Contacts  Patient Contacts: Lelo Guillory ()  Relationship to Patient:: Other  (Comment)  Contact Method: Phone  Phone Number: [phone]  Reason/Outcome: Emergency Contact    Requested Home Health Care         Is the patient interested in HHC at discharge?: No    DME Referral Provided  Referral made for DME?: No    Other Referral/Resources/Interventions Provided:  Interventions: Outpatient OT, Outpatient PT  Referral Comments: Outpatient PT/OT orders requested for [facility]    Would you like to participate in our [facility] service program?  : No - Declined    Treatment Team Recommendation: Group Home  Discharge Destination Plan:: Group Home  Transport at Discharge : Wheelchair van         IMM Given (Date):: 08/16/24  IMM Given to:: Patient (IMM reviewed with and signed by patient.  Copy given to patient and copy placed in scan bin for chart.)

## 2024-08-16 NOTE — ANESTHESIA POSTPROCEDURE EVALUATION
Post-Op Assessment Note    CV Status:  Stable    Pain management: adequate       Mental Status:  Sleepy   Hydration Status:  Stable   PONV Controlled:  Controlled   Airway Patency:  Patent  Two or more mitigation strategies used for obstructive sleep apnea   Post Op Vitals Reviewed: Yes    No anethesia notable event occurred.    Staff: CRNA               BP   89/55   Temp      Pulse 80   Resp 16   SpO2 96

## 2024-08-16 NOTE — PLAN OF CARE
Problem: Prexisting or High Potential for Compromised Skin Integrity  Goal: Skin integrity is maintained or improved  Description: INTERVENTIONS:  - Identify patients at risk for skin breakdown  - Assess and monitor skin integrity  - Assess and monitor nutrition and hydration status  - Monitor labs   - Assess for incontinence   - Turn and reposition patient  - Assist with mobility/ambulation  - Relieve pressure over bony prominences  - Avoid friction and shearing  - Provide appropriate hygiene as needed including keeping skin clean and dry  - Evaluate need for skin moisturizer/barrier cream  - Collaborate with interdisciplinary team   - Patient/family teaching  - Consider wound care consult   Outcome: Progressing     Problem: PAIN - ADULT  Goal: Verbalizes/displays adequate comfort level or baseline comfort level  Description: Interventions:  - Encourage patient to monitor pain and request assistance  - Assess pain using appropriate pain scale  - Administer analgesics based on type and severity of pain and evaluate response  - Implement non-pharmacological measures as appropriate and evaluate response  - Consider cultural and social influences on pain and pain management  - Notify physician/advanced practitioner if interventions unsuccessful or patient reports new pain  Outcome: Progressing     Problem: INFECTION - ADULT  Goal: Absence or prevention of progression during hospitalization  Description: INTERVENTIONS:  - Assess and monitor for signs and symptoms of infection  - Monitor lab/diagnostic results  - Monitor all insertion sites, i.e. indwelling lines, tubes, and drains  - Monitor endotracheal if appropriate and nasal secretions for changes in amount and color  - [place] appropriate cooling/warming therapies per order  - Administer medications as ordered  - Instruct and encourage patient and family to use good hand hygiene technique  - Identify and instruct in appropriate isolation precautions for  identified infection/condition  Outcome: Progressing     Problem: INFECTION - ADULT  Goal: Absence of fever/infection during neutropenic period  Description: INTERVENTIONS:  - Monitor WBC    Outcome: Progressing     Problem: SAFETY ADULT  Goal: Patient will remain free of falls  Description: INTERVENTIONS:  - Educate patient/family on patient safety including physical limitations  - Instruct patient to call for assistance with activity   - Consult OT/PT to assist with strengthening/mobility   - Keep Call bell within reach  - Keep bed low and locked with side rails adjusted as appropriate  - Keep care items and personal belongings within reach  - Initiate and maintain comfort rounds  - Make Fall Risk Sign visible to staff  - Offer Toileting every 2 Hours, in advance of need  - Initiate/Maintain bed alarm  - Obtain necessary fall risk management equipment:   Problem: GASTROINTESTINAL - ADULT  Goal: Minimal or absence of nausea and/or vomiting  Description: INTERVENTIONS:  - Administer IV fluids if ordered to ensure adequate hydration  - Maintain NPO status until nausea and vomiting are resolved  - Nasogastric tube if ordered  - Administer ordered antiemetic medications as needed  - Provide nonpharmacologic comfort measures as appropriate  - Advance diet as tolerated, if ordered  - Consider nutrition services referral to assist patient with adequate nutrition and appropriate food choices  Outcome: Progressing     Problem: GASTROINTESTINAL - ADULT  Goal: Maintains or returns to baseline bowel function  Description: INTERVENTIONS:  - Assess bowel function  - Encourage oral fluids to ensure adequate hydration  - Administer IV fluids if ordered to ensure adequate hydration  - Administer ordered medications as needed  - Encourage mobilization and activity  - Consider nutritional services referral to assist patient with adequate nutrition and appropriate food choices  Outcome: Progressing     Problem: GASTROINTESTINAL -  ADULT  Goal: Maintains adequate nutritional intake  Description: INTERVENTIONS:  - Monitor percentage of each meal consumed  - Identify factors contributing to decreased intake, treat as appropriate  - Assist with meals as needed  - Monitor I&O, weight, and lab values if indicated  - Obtain nutrition services referral as needed  Outcome: Progressing     Problem: METABOLIC, FLUID AND ELECTROLYTES - ADULT  Goal: Electrolytes maintained within normal limits  Description: INTERVENTIONS:  - Monitor labs and assess patient for signs and symptoms of electrolyte imbalances  - Administer electrolyte replacement as ordered  - Monitor response to electrolyte replacements, including repeat lab results as appropriate  - Instruct patient on fluid and nutrition as appropriate  Outcome: Progressing     Problem: MUSCULOSKELETAL - ADULT  Goal: Maintain or return mobility to safest level of function  Description: INTERVENTIONS:  - Assess patient's ability to carry out ADLs; assess patient's baseline for ADL function and identify physical deficits which impact ability to perform ADLs (bathing, care of mouth/teeth, toileting, grooming, dressing, etc.)  - Assess/evaluate cause of self-care deficits   - Assess range of motion  - Assess patient's mobility  - Assess patient's need for assistive devices and provide as appropriate  - Encourage maximum independence but intervene and supervise when necessary  - Involve family in performance of ADLs  - Assess for home care needs following discharge   - Consider OT consult to assist with ADL evaluation and planning for discharge  - Provide patient education as appropriate  Outcome: Progressing     Problem: Nutrition/Hydration-ADULT  Goal: Nutrient/Hydration intake appropriate for improving, restoring or maintaining nutritional needs  Description: Monitor and assess patient's nutrition/hydration status for malnutrition. Collaborate with interdisciplinary team and initiate plan and interventions as  ordered.  Monitor patient's weight and dietary intake as ordered or per policy. Utilize nutrition screening tool and intervene as necessary. Determine patient's food preferences and provide high-protein, high-caloric foods as appropriate.     INTERVENTIONS:  - Monitor oral intake, urinary output, labs, and treatment plans  - Assess nutrition and hydration status and recommend course of action  - Evaluate amount of meals eaten  - Assist patient with eating if necessary   - Allow adequate time for meals  - Recommend/ encourage appropriate diets, oral nutritional supplements, and vitamin/mineral supplements  - Order, calculate, and assess calorie counts as needed  - Recommend, monitor, and adjust tube feedings and TPN/PPN based on assessed needs  - Assess need for intravenous fluids  - Provide specific nutrition/hydration education as appropriate  - Include patient/family/caregiver in decisions related to nutrition  Outcome: Progressing     - Apply yellow socks and bracelet for high fall risk patients  - Consider moving patient to room near nurses station  Outcome: Progressing

## 2024-08-16 NOTE — CONSULTS
Inpatient Medical Consultation - [facility] Internal Medicine    Patient Information: Mario Rollins 59 y.o. male MRN: [id number]  Unit/Bed#: [address] [place] [address] Encounter: [id number]    PCP: No primary care provider on file.  Date of Admission:  8/9/2024  Date of Consultation: 08/16/24  Requesting Physician: Dimitrios Pulido MD    Reason For Consultation:     Fever      Assessment/Plan    * Aspiration pneumonia (HCC)-resolved  Assessment & Plan  Noted to be febrile with Tmax of 101.2 °F  CT chest on pelvis revealed atelectasis versus right lower lobe pneumonia  UA without evidence of infection  Normal white count, procalcitonin elevated.  Likely suspect aspiration PNA versus pneumonitis versus atelectasis  Continue ceftriaxone, metronidazole 5-day course  Monitor fever, CBC  Aspiration precaution  Incentive spirometry  BCx if recurrent fever    Small bowel obstruction (HCC)  Assessment & Plan  Presented with abdominal distention, pain, diaphoresis    CT abdomen revealed small bowel obstruction with transition at terminal ileum likely related to adhesions  8/14 AXR-likely a stenosis/partial obstruction at the terminal ileum/ileocecal valve   History of small bowel obstruction status post ex lap, 4/24  D/W GI recs:  concern for partial obstruction at IC  8/16 colonoscopy   D/W surgery recs  NGT removed  Home orals  GI for colonoscopy      Epilepsy (HCC)  Assessment & Plan  Continue home lamotrigine    Hypertension  Assessment & Plan  Blood pressure stable  Resume home nebivolol    Chronic kidney disease  Assessment & Plan  Presented with creatinine of 1.54, now improved to baseline  Continue to monitor      Right spastic hemiparesis (HCC)  Assessment & Plan  History of left MCA CVA, history of brain surgery  On atorvastatin      Cerebral palsy (HCC)  Assessment & Plan  Group home resident, currently appears to be at baseline  Continue chronic meds        Review of Systems   Respiratory:  Negative for shortness of breath  "and wheezing.    Cardiovascular:  Negative for chest pain.   Gastrointestinal:  Negative for abdominal pain and constipation.        Gas   Psychiatric/Behavioral:  Negative for agitation.          Subjective:  Patient seen and examined at bedside, stated no abdominal pain, no shortness of breath no chest pain, no more nausea or vomiting, no gas.    Physical Exam:     Vitals:   Blood Pressure: 116/61 (08/16/24 0745)  Pulse: 67 (08/16/24 0745)  Temperature: 97.8 °F (36.6 °C) (08/16/24 0745)  Temp Source: Temporal (08/13/24 0734)  Respirations: 18 (08/16/24 0745)  Height: 5' 8\" (172.7 cm) (08/09/24 2231)  Weight - Scale: 77.5 kg (170 lb 14.4 oz) (08/09/24 2231)  SpO2: 94 % (08/16/24 0745)    Physical Exam  Vitals reviewed.   Constitutional:       General: He is not in acute distress.     Appearance: Normal appearance.   HENT:      Head: Atraumatic.      Nose: No congestion.   Eyes:      General: No scleral icterus.     Pupils: Pupils are equal, round, and reactive to light.   Cardiovascular:      Rate and Rhythm: Normal rate.      Heart sounds: No murmur heard.  Pulmonary:      Effort: No respiratory distress.      Breath sounds: No wheezing, rhonchi or rales.   Abdominal:      Palpations: Abdomen is soft.      Tenderness: There is no abdominal tenderness. There is no guarding or rebound.   Musculoskeletal:         General: No swelling or deformity. Normal range of motion.      Cervical back: No tenderness.      Right lower leg: No edema.      Left lower leg: No edema.   Feet:      Right foot:      Skin integrity: No callus.   Skin:     General: Skin is warm.      Capillary Refill: Capillary refill takes less than 2 seconds.      Findings: No lesion or rash.   Neurological:      Mental Status: He is oriented to person, place, and time.      Cranial Nerves: No cranial nerve deficit.      Coordination: Coordination normal.   Psychiatric:         Mood and Affect: Mood normal.         Behavior: Behavior normal.         " Thought Content: Thought content normal.           Lab Results: I Have Reviewed All Lab Data Below:    Results from last 7 days   Lab Units 08/15/24  0417   WBC Thousand/uL 7.53   HEMOGLOBIN g/dL 14.9   HEMATOCRIT % 45.9   PLATELETS Thousands/uL 186   SEGS PCT % 59   LYMPHO PCT % 23   MONO PCT % 13*   EOS PCT % 3     Results from last 7 days   Lab Units 08/15/24  0417 08/11/24  0437 08/10/24  0458   POTASSIUM mmol/L 4.0   < > 4.1   CHLORIDE mmol/L 112*   < > 108   CO2 mmol/L 25   < > 23   BUN mg/dL 11   < > 22   CREATININE mg/dL 0.93   < > 1.27   CALCIUM mg/dL 7.8*   < > 9.0   ALK PHOS U/L  --   --  75   ALT U/L  --   --  11   AST U/L  --   --  12*    < > = values in this interval not displayed.           * Additional Pertinent Lab Tests Reviewed: All Labs For Current Hospital Admission Reviewed    Imaging: I have personally reviewed pertinent reports.      CT chest abdomen pelvis wo contrast    Result Date: 8/11/2024  Narrative: CT CHEST, ABDOMEN AND PELVIS WITHOUT IV CONTRAST INDICATION: possible aspiration, persistent large NGT output. 59-year-old male with history of bowel obstruction 4 months ago requiring exploratory laparotomy with lysis of adhesions. COMPARISON: CTs of the abdomen and pelvis from April 19, 2024 and August 9, 2024. Portable chest from today. TECHNIQUE: CT examination of the chest, abdomen and pelvis was performed without intravenous contrast. Multiplanar 2D reformatted images were created from the source data. This examination, like all CT scans performed in the [facility], was performed utilizing techniques to minimize radiation dose exposure, including the use of iterative reconstruction and automated exposure control. Radiation dose length product (DLP) for this visit: 640.66 mGy-cm Enteric Contrast: Not administered. Absence of intravenous and gastrointestinal contrast limits evaluation of the abdominal and pelvic viscera. FINDINGS: CHEST LUNGS: Subsegmental atelectasis  in the right upper and lower lobes. Lungs otherwise clear. Elevation of the right hemidiaphragm PLEURA: Small right pleural effusion. HEART/GREAT VESSELS: Coronary artery calcifications. Otherwise unremarkable. No thoracic aortic aneurysm. MEDIASTINUM AND AMI: No lymphadenopathy or mass. NG tube present. Esophagus otherwise unremarkable. Trachea and main stem bronchi normal. CHEST WALL AND LOWER NECK: Mild bilateral gynecomastia. No lymphadenopathy or mass. ABDOMEN LIVER/BILIARY TREE: Limited evaluation without IV contrast. Grossly unremarkable. GALLBLADDER: No calcified gallstones. No pericholecystic inflammatory change. SPLEEN: Unremarkable. PANCREAS: Limited evaluation without IV contrast. Grossly unremarkable. ADRENAL GLANDS: Unremarkable. KIDNEYS/URETERS: Limited evaluation without IV contrast. Extensive bilateral medullary nephrocalcinosis. No hydronephrosis. STOMACH AND BOWEL: NG tube present with tip at the gastric body. Multiple dilated loops of small intestine with normal caliber terminal ileum and collapsed colon, consistent with distal small bowel obstruction. No evidence of pneumatosis intestinalis. APPENDIX: Normal. ABDOMINOPELVIC CAVITY: No lymphadenopathy. Small amount of ascites along the liver and in the pelvis and right paracolic gutter. No extraluminal gas. VESSELS: Limited evaluation without IV contrast. No aortic aneurysm. PELVIS REPRODUCTIVE ORGANS: Prostate and seminal vesicles unremarkable for patient's age. URINARY BLADDER: Unremarkable. ABDOMINAL WALL/INGUINAL REGIONS: Unremarkable. BONES: Severe osteoarthritis of both hips. Healed fracture of the proximal right femur, status post ORIF. No evidence of acute fracture or destructive lesion.     Impression: 1.  Distal small bowel obstruction at or near the terminal ileum. 2.  Small amount of ascites. 3.  Subsegmental atelectasis in the right upper and lower lobes, small right pleural effusion and elevation of the right hemidiaphragm. 4.   Extensive bilateral medullary nephrocalcinosis, unchanged. Workstation performed: [id number]     XR chest portable    Result Date: 8/11/2024  Narrative: XR CHEST PORTABLE INDICATION: Possible aspiration. COMPARISON: CXR 8/28/2011. Abdomen CT 8/9/2024. FINDINGS: NG tube in stomach with sidehole in GE junction. Mild opacity opacity in the right base. No pneumothorax or pleural effusion. Normal cardiomediastinal silhouette. Bones are unremarkable for age. Normal upper abdomen. Mild elevation of the right diaphragm.     Impression: Mild opacity opacity in the right base, likely atelectasis or aspiration. NG tube in stomach with sidehole at GE junction. Recommend advancing. Workstation performed: [id number]     XR abdomen obstruction series    Result Date: 8/10/2024  Narrative: XR ABDOMEN OBSTRUCTION SERIES INDICATION: SBO, bowel gas pattern. COMPARISON: CT 8/9/2024 FINDINGS: Distal aspect of NG tube is coursing below the left hemidiaphragm. Persistent diffuse small bowel distention, relative paucity of large bowel gas, and moderate gastric distention. No pneumoperitoneum. No pathologic calcification or soft tissue mass. Bones are unremarkable for patient's age. Examination of the chest reveals clear lungs and a normal cardiomediastinal silhouette.     Impression: Persistent obstructive bowel gas pattern with moderate gastric distention. Workstation performed: [id number]     CT abdomen pelvis with contrast    Result Date: 8/9/2024  Narrative: CT ABDOMEN AND PELVIS WITH IV CONTRAST INDICATION: left lower quad pain, history of bowel obstruction, pt. does not need to wait for labs today. COMPARISON: CT dated 4/19/2024. TECHNIQUE: CT examination of the abdomen and pelvis was performed. Multiplanar 2D reformatted images were created from the source data. This examination, like all CT scans performed in the [facility], was performed utilizing techniques to minimize radiation dose exposure, including the use of  iterative reconstruction and automated exposure control. Radiation dose length product (DLP) for this visit: 517.37 mGy-cm IV Contrast: 100 mL of iohexol (OMNIPAQUE) Enteric Contrast: Not administered. FINDINGS: ABDOMEN LOWER CHEST: Distended fluid-filled esophagus. Bibasilar subsegmental atelectasis.. LIVER/BILIARY TREE: Unremarkable. GALLBLADDER: No calcified gallstones. No pericholecystic inflammatory change. SPLEEN: Unremarkable. PANCREAS: Unremarkable. ADRENAL GLANDS: Unremarkable. KIDNEYS/URETERS: Stable medullary nephrocalcinosis with numerous bilateral renal calcifications. Multiple stable bilateral renal cysts. No obstructing stone or hydronephrosis. STOMACH AND BOWEL: Marked distention of the stomach and small bowel with transition at the terminal ileum consistent with small bowel obstruction. Small amount of free fluid in the pelvis. No bowel wall thickening. APPENDIX: Normal. ABDOMINOPELVIC CAVITY: Small volume of ascites. No pneumoperitoneum or abscess. No adenopathy.. VESSELS: Atherosclerosis without abdominal aortic aneurysm. PELVIS REPRODUCTIVE ORGANS: Unremarkable for patient's age. URINARY BLADDER: Unremarkable. ABDOMINAL WALL/INGUINAL REGIONS: Unremarkable. BONES: No acute osseous abnormality. Spinal degenerative changes. Lumbar levoscoliosis. Degenerative grade 1 anterolisthesis L4-5. Mild chronic T12 compression is stable. Few stable tiny sclerotic foci at T11, L1 and L2 are favored represent bone islands. Status post ORIF of the right hip. Advanced bilateral hip osteoarthritis     Impression: Small bowel obstruction with transition at the terminal ileum that may be due to adhesions Small volume of ascites The study was marked in EPIC for immediate notification. Workstation performed: [id number]           Assessment/Plan    Epilepsy (HCC)  Assessment & Plan  Continue home lamotrigine    Hypertension  Assessment & Plan  Blood pressure stable  Resume home nebivolol    Chronic kidney  "disease  Assessment & Plan  Presented with creatinine of 1.54, now improved to baseline  Continue to monitor    Small bowel obstruction (HCC)  Assessment & Plan  Presented with abdominal distention, pain, diaphoresis  CT abdomen revealed small bowel obstruction with transition at terminal ileum likely related to adhesions  History of small bowel obstruction status post ex lap, 4/24  GEN surgery D/W recs:  Paced    Right spastic hemiparesis (HCC)  Assessment & Plan  History of left MCA CVA, history of brain surgery  On atorvastatin      Cerebral palsy (HCC)  Assessment & Plan  Group home resident, currently appears to be at baseline  Continue chronic meds    * Aspiration pneumonia (HCC)  Assessment & Plan  Noted to be febrile with Tmax of 101.2 °F  CT chest on pelvis revealed atelectasis versus right lower lobe pneumonia  UA without evidence of infection  Low-grade fever, fever Trend is improving.  Hemodynamically stable  Normal white count, procalcitonin elevated.  Suspect aspiration pneumonia versus pneumonitis versus atelectasis  Continue ceftriaxone, metronidazole  Monitor fever, CBC  Trend procalcitonin  Aspiration precaution  Incentive spirometry  Blood culture if recurrent fever  Monitor abdominal exam           VTE Prophylaxis: Heparin  / sequential compression device       Counseling / Coordination of Care Time: 45 minutes.  Greater than 50% of total time spent on patient counseling and coordination of care.    Collaboration of Care: Were Recommendations Directly Discussed with Primary Treatment Team? - Yes     ** Please Note: \"This note has been constructed using a voice recognition system.Therefore there may be syntax, spelling, and/or grammatical errors. Please call if you have any questions. \"**  "

## 2024-08-16 NOTE — PROGRESS NOTES
"Progress Note - General Surgery   Mario Rollins 59 y.o. male MRN: [id number]  Unit/Bed#: [address] [place] [address] Encounter: [id number]    Assessment:  Small bowel obstruction  -- resolved, gastrografin challenge imaging revealed contrast in the colon with distended terminal ileum suggesting stenosis at the ileocecal junction  SALLY on CKD  -- resolved, baseline creatinine appears to be 0.8 to 1.0  Aspiration pneumonia with small RLL effusion -- resolved, Tmax 101.2 on 8/10, procalcitonin has normalized, WBC count has been normal for over 4 days  SIRS -- resolved, tachycardia and leukocytosis present on 8/10        Plan:  GI consult appreciated, bowel prepped yesterday    Colonoscopy today  Advance diet after procedure if ok with GI  Complete rocephin today   OOB as tolerated  Pulmonary toilet    Discharge possibly tomorrow        Subjective/Objective     Subjective:  Patient reports completing bowel prep. He is looking forward to eating after colonoscopy. He is ready for his procedure today. Denies any severe abdominal pain or nausea    Objective:     Blood pressure 116/61, pulse 67, temperature 97.8 °F (36.6 °C), resp. rate 18, height 5' 8\" (1.727 m), weight 77.5 kg (170 lb 14.4 oz), SpO2 94%.,Body mass index is 25.99 kg/m².      Intake/Output Summary (Last 24 hours) at 8/16/2024 0922  Last data filed at 8/16/2024 0700  Gross per 24 hour   Intake --   Output 2250 ml   Net -2250 ml       Invasive Devices       Peripheral Intravenous Line  Duration             Peripheral IV 08/12/24 Right Antecubital 3 days    Peripheral IV 08/16/24 Left;Proximal;Ventral (anterior) Forearm <1 day                    Physical Exam: /61   Pulse 67   Temp 97.8 °F (36.6 °C)   Resp 18   Ht 5' 8\" (1.727 m)   Wt 77.5 kg (170 lb 14.4 oz)   SpO2 94%   BMI 25.99 kg/m²   General appearance: alert and oriented, in no acute distress  Head: Normocephalic, without obvious abnormality, atraumatic  Heart:  regular rate and rhythm  Abdomen:  " "soft, nondistended, non tender   Extremities: extremities normal, warm and well-perfused; no cyanosis, clubbing, or edema  Skin: Skin color, texture, turgor normal. No rashes or lesions    Lab, Imaging and other studies:I have personally reviewed pertinent lab results.  , CBC:   No results found for: \"WBC\", \"HGB\", \"HCT\", \"MCV\", \"PLT\", \"ADJUSTEDWBC\", \"RBC\", \"MCH\", \"MCHC\", \"RDW\", \"MPV\", \"NRBC\"    , CMP:   No results found for: \"SODIUM\", \"K\", \"CL\", \"CO2\", \"ANIONGAP\", \"BUN\", \"CREATININE\", \"GLUCOSE\", \"CALCIUM\", \"AST\", \"ALT\", \"ALKPHOS\", \"PROT\", \"BILITOT\", \"EGFR\"      VTE Pharmacologic Prophylaxis: Heparin SQ  VTE Mechanical Prophylaxis: sequential compression device  "

## 2024-08-16 NOTE — ANESTHESIA PREPROCEDURE EVALUATION
Procedure:  COLONOSCOPY    Relevant Problems   CARDIO   (+) Hyperlipidemia   (+) Hypertension      GI/HEPATIC   (+) 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)   (+) Small bowel obstruction (HCC)      /RENAL   (+) 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 stage 4 chronic kidney disease   (+) Chronic kidney disease   (+) Nephrocalcinosis      MUSCULOSKELETAL   (+) Chronic left-sided low back pain with left-sided sciatica   (+) Sacroiliitis (HCC)      NEURO/PSYCH   (+) Chronic left-sided low back pain with left-sided sciatica   (+) Chronic pain syndrome   (+) Epilepsy (HCC)   (+) Localization-related symptomatic epilepsy and epileptic syndromes with complex partial seizures, intractable, without status epilepticus (HCC)      PULMONARY   (+) Aspiration pneumonia (HCC)        Physical Exam    Airway    Mallampati score: II  TM Distance: >3 FB  Neck ROM: full     Dental   No notable dental hx     Cardiovascular      Pulmonary      Other Findings        Anesthesia Plan  ASA Score- 3     Anesthesia Type- IV sedation with anesthesia with ASA Monitors.         Additional Monitors:     Airway Plan:            Plan Factors-Exercise tolerance (METS): >4 METS.    Chart reviewed.   Existing labs reviewed. Patient summary reviewed.    Patient is not a current smoker.      There is medical exclusion for perioperative obstructive sleep apnea risk education.        Induction- intravenous.    Postoperative Plan-     Perioperative Resuscitation Plan - Level 1 - Full Code.       Informed Consent- Anesthetic plan and risks discussed with patient.  I personally reviewed this patient with the CRNA. Discussed and agreed on the Anesthesia Plan with the CRNA..

## 2024-08-16 NOTE — OCCUPATIONAL THERAPY NOTE
Occupational Therapy Cancellation     Patient Name: Mario Rollins  Today's Date: 8/16/2024  Problem List  Principal Problem:    Aspiration pneumonia (HCC)  Active Problems:    Cerebral palsy (HCC)    Right spastic hemiparesis (HCC)    Small bowel obstruction (HCC)    Chronic kidney disease    Hypertension    Epilepsy (HCC)    Past Medical History  Past Medical History:   Diagnosis Date    Ambulatory dysfunction     Anxiety     Bilateral impacted cerumen     last assessed 05/30/2013    Capsulitis     last assessed 04/26/2016    Cellulitis of finger 01/13/2012    Chronic kidney disease     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)     Closed fracture of one or more phalanges of foot 01/06/2009    Constipation     Deformity     left and right foot and ankle ,right hand    Depression     Epilepsy (HCC)     Erythrocytosis     Hemiplegia affecting dominant side (HCC)     Hypertension     Hypoglycemia 11/08/2011    Hypokalemia     last assessed 05/30/2013    Mental retardation     Mood disorder (HCC)     Nephrocalcinosis     chronic    Polycystic kidney     Bilateral    Small bowel obstruction (HCC)      Past Surgical History  Past Surgical History:   Procedure Laterality Date    BRAIN SURGERY      EXPLORATORY LAPAROTOMY W/ BOWEL RESECTION N/A 4/6/2024    Procedure: LAPAROTOMY EXPLORATORY WITH LYSIS OF ADHESIONS AND DECOMPRESSION;  Surgeon: Saúl Woods MD;  Location: [facility] MAIN OR;  Service: General    SC ARTHROCENTESIS ASPIR&/INJ MAJOR JT/BURSA W/O US Bilateral 5/27/2021    Procedure: Hip intra-articular corticosteroid injection ( 27208 51506-99);  Surgeon: Lianet Astorga MD;  Location: [facility] MAIN OR;  Service: Pain Management     SC COLONOSCOPY FLX DX W/COLLJ SPEC WHEN PFRMD N/A 2/12/2016    Procedure: COLONOSCOPY;  Surgeon: Abhilash Ace MD;  Location: [facility] GI LAB;  Service: Gastroenterology    SC INJECT SI JOINT ARTHRGRPHY&/ANES/STEROID W/RIMA Left 3/22/2023    Procedure: SACROILIAC joint injection (20367  );  Surgeon: Larry Smith DO;  Location: [facility] MAIN OR;  Service: Pain Management          08/16/24 0823   Note Type   Note type Cancelled Session  (Friday 8/16/24)   Cancel Reasons Other  (pt going for colonscopy today; bowel prep)   Additional Comments OT orders received and chart review completed. Will cancel OT eval and continue to follow as appropriate following colonscopy. Spoke w/ RN   Licensure   [place] License Number  Sandy Ramires OTR/L [id number]     Sandy Ramires OTR/L  [id number]  [id number]

## 2024-08-16 NOTE — PLAN OF CARE
Problem: Prexisting or High Potential for Compromised Skin Integrity  Goal: Skin integrity is maintained or improved  Description: INTERVENTIONS:  - Identify patients at risk for skin breakdown  - Assess and monitor skin integrity  - Assess and monitor nutrition and hydration status  - Monitor labs   - Assess for incontinence   - Turn and reposition patient  - Assist with mobility/ambulation  - Relieve pressure over bony prominences  - Avoid friction and shearing  - Provide appropriate hygiene as needed including keeping skin clean and dry  - Evaluate need for skin moisturizer/barrier cream  - Collaborate with interdisciplinary team   - Patient/family teaching  - Consider wound care consult   Outcome: Progressing     Problem: PAIN - ADULT  Goal: Verbalizes/displays adequate comfort level or baseline comfort level  Description: Interventions:  - Encourage patient to monitor pain and request assistance  - Assess pain using appropriate pain scale  - Administer analgesics based on type and severity of pain and evaluate response  - Implement non-pharmacological measures as appropriate and evaluate response  - Consider cultural and social influences on pain and pain management  - Notify physician/advanced practitioner if interventions unsuccessful or patient reports new pain  Outcome: Progressing     Problem: INFECTION - ADULT  Goal: Absence or prevention of progression during hospitalization  Description: INTERVENTIONS:  - Assess and monitor for signs and symptoms of infection  - Monitor lab/diagnostic results  - Monitor all insertion sites, i.e. indwelling lines, tubes, and drains  - Monitor endotracheal if appropriate and nasal secretions for changes in amount and color  - [place] appropriate cooling/warming therapies per order  - Administer medications as ordered  - Instruct and encourage patient and family to use good hand hygiene technique  - Identify and instruct in appropriate isolation precautions for  identified infection/condition  Outcome: Progressing  Goal: Absence of fever/infection during neutropenic period  Description: INTERVENTIONS:  - Monitor WBC    Outcome: Progressing     Problem: SAFETY ADULT  Goal: Patient will remain free of falls  Description: INTERVENTIONS:  - Educate patient/family on patient safety including physical limitations  - Instruct patient to call for assistance with activity   - Consult OT/PT to assist with strengthening/mobility   - Keep Call bell within reach  - Keep bed low and locked with side rails adjusted as appropriate  - Keep care items and personal belongings within reach  - Initiate and maintain comfort rounds  - Make Fall Risk Sign visible to staff  - Offer Toileting every 2 Hours, in advance of need  - Initiate/Maintain bed and chair alarm  - Obtain necessary fall risk management equipment: walker, slipper socks  - Apply yellow socks and bracelet for high fall risk patients  - Consider moving patient to room near nurses station  Outcome: Progressing  Goal: Maintain or return to baseline ADL function  Description: INTERVENTIONS:  -  Assess patient's ability to carry out ADLs; assess patient's baseline for ADL function and identify physical deficits which impact ability to perform ADLs (bathing, care of mouth/teeth, toileting, grooming, dressing, etc.)  - Assess/evaluate cause of self-care deficits   - Assess range of motion  - Assess patient's mobility; develop plan if impaired  - Assess patient's need for assistive devices and provide as appropriate  - Encourage maximum independence but intervene and supervise when necessary  - Involve family in performance of ADLs  - Assess for home care needs following discharge   - Consider OT consult to assist with ADL evaluation and planning for discharge  - Provide patient education as appropriate  Outcome: Progressing  Goal: Maintains/Returns to pre admission functional level  Description: INTERVENTIONS:  - Perform AM-PAC 6 Click  Basic Mobility/ Daily Activity assessment daily.  - Set and communicate daily mobility goal to care team and patient/family/caregiver.   - Collaborate with rehabilitation services on mobility goals if consulted  - Perform Range of Motion 3 times a day.  - Reposition patient every 2 hours.  - Dangle patient 3 times a day  - Stand patient 3 times a day  - Ambulate patient 3 times a day  - Out of bed to chair 3 times a day   - Out of bed for meals 3 times a day  - Out of bed for toileting  - Record patient progress and toleration of activity level   Outcome: Progressing     Problem: DISCHARGE PLANNING  Goal: Discharge to home or other facility with appropriate resources  Description: INTERVENTIONS:  - Identify barriers to discharge w/patient and caregiver  - Arrange for needed discharge resources and transportation as appropriate  - Identify discharge learning needs (meds, wound care, etc.)  - Arrange for interpretive services to assist at discharge as needed  - Refer to Case Management Department for coordinating discharge planning if the patient needs post-hospital services based on physician/advanced practitioner order or complex needs related to functional status, cognitive ability, or social support system  Outcome: Progressing     Problem: GASTROINTESTINAL - ADULT  Goal: Minimal or absence of nausea and/or vomiting  Description: INTERVENTIONS:  - Administer IV fluids if ordered to ensure adequate hydration  - Maintain NPO status until nausea and vomiting are resolved  - Nasogastric tube if ordered  - Administer ordered antiemetic medications as needed  - Provide nonpharmacologic comfort measures as appropriate  - Advance diet as tolerated, if ordered  - Consider nutrition services referral to assist patient with adequate nutrition and appropriate food choices  Outcome: Progressing  Goal: Maintains or returns to baseline bowel function  Description: INTERVENTIONS:  - Assess bowel function  - Encourage oral fluids  to ensure adequate hydration  - Administer IV fluids if ordered to ensure adequate hydration  - Administer ordered medications as needed  - Encourage mobilization and activity  - Consider nutritional services referral to assist patient with adequate nutrition and appropriate food choices  Outcome: Progressing  Goal: Maintains adequate nutritional intake  Description: INTERVENTIONS:  - Monitor percentage of each meal consumed  - Identify factors contributing to decreased intake, treat as appropriate  - Assist with meals as needed  - Monitor I&O, weight, and lab values if indicated  - Obtain nutrition services referral as needed  Outcome: Progressing  Goal: Oral mucous membranes remain intact  Description: INTERVENTIONS  - Assess oral mucosa and hygiene practices  - Implement preventative oral hygiene regimen  - Implement oral medicated treatments as ordered  - Initiate Nutrition services referral as needed  Outcome: Progressing     Problem: METABOLIC, FLUID AND ELECTROLYTES - ADULT  Goal: Electrolytes maintained within normal limits  Description: INTERVENTIONS:  - Monitor labs and assess patient for signs and symptoms of electrolyte imbalances  - Administer electrolyte replacement as ordered  - Monitor response to electrolyte replacements, including repeat lab results as appropriate  - Instruct patient on fluid and nutrition as appropriate  Outcome: Progressing  Goal: Fluid balance maintained  Description: INTERVENTIONS:  - Monitor labs   - Monitor I/O and WT  - Instruct patient on fluid and nutrition as appropriate  - Assess for signs & symptoms of volume excess or deficit  Outcome: Progressing  Goal: Glucose maintained within target range  Description: INTERVENTIONS:  - Monitor Blood Glucose as ordered  - Assess for signs and symptoms of hyperglycemia and hypoglycemia  - Administer ordered medications to maintain glucose within target range  - Assess nutritional intake and initiate nutrition service referral as  needed  Outcome: Progressing     Problem: MUSCULOSKELETAL - ADULT  Goal: Maintain or return mobility to safest level of function  Description: INTERVENTIONS:  - Assess patient's ability to carry out ADLs; assess patient's baseline for ADL function and identify physical deficits which impact ability to perform ADLs (bathing, care of mouth/teeth, toileting, grooming, dressing, etc.)  - Assess/evaluate cause of self-care deficits   - Assess range of motion  - Assess patient's mobility  - Assess patient's need for assistive devices and provide as appropriate  - Encourage maximum independence but intervene and supervise when necessary  - Involve family in performance of ADLs  - Assess for home care needs following discharge   - Consider OT consult to assist with ADL evaluation and planning for discharge  - Provide patient education as appropriate  Outcome: Progressing     Problem: Nutrition/Hydration-ADULT  Goal: Nutrient/Hydration intake appropriate for improving, restoring or maintaining nutritional needs  Description: Monitor and assess patient's nutrition/hydration status for malnutrition. Collaborate with interdisciplinary team and initiate plan and interventions as ordered.  Monitor patient's weight and dietary intake as ordered or per policy. Utilize nutrition screening tool and intervene as necessary. Determine patient's food preferences and provide high-protein, high-caloric foods as appropriate.     INTERVENTIONS:  - Monitor oral intake, urinary output, labs, and treatment plans  - Assess nutrition and hydration status and recommend course of action  - Evaluate amount of meals eaten  - Assist patient with eating if necessary   - Allow adequate time for meals  - Recommend/ encourage appropriate diets, oral nutritional supplements, and vitamin/mineral supplements  - Order, calculate, and assess calorie counts as needed  - Recommend, monitor, and adjust tube feedings and TPN/PPN based on assessed needs  - Assess  need for intravenous fluids  - Provide specific nutrition/hydration education as appropriate  - Include patient/family/caregiver in decisions related to nutrition  Outcome: Progressing

## 2024-08-17 PROBLEM — K56.609 SMALL BOWEL OBSTRUCTION (HCC): Status: RESOLVED | Noted: 2024-04-06 | Resolved: 2024-08-17

## 2024-08-17 PROCEDURE — 97535 SELF CARE MNGMENT TRAINING: CPT

## 2024-08-17 PROCEDURE — 99232 SBSQ HOSP IP/OBS MODERATE 35: CPT | Performed by: SURGERY

## 2024-08-17 PROCEDURE — 97167 OT EVAL HIGH COMPLEX 60 MIN: CPT

## 2024-08-17 PROCEDURE — 99232 SBSQ HOSP IP/OBS MODERATE 35: CPT | Performed by: STUDENT IN AN ORGANIZED HEALTH CARE EDUCATION/TRAINING PROGRAM

## 2024-08-17 RX ORDER — NEBIVOLOL 2.5 MG/1
2.5 TABLET ORAL DAILY
Qty: 30 TABLET | Refills: 0 | Status: SHIPPED | OUTPATIENT
Start: 2024-08-17

## 2024-08-17 RX ORDER — POLYETHYLENE GLYCOL 3350 17 G/17G
17 POWDER, FOR SOLUTION ORAL 3 TIMES WEEKLY
Qty: 20 EACH | Refills: 4 | Status: SHIPPED | OUTPATIENT
Start: 2024-08-19

## 2024-08-17 RX ORDER — SACCHAROMYCES BOULARDII 250 MG
250 CAPSULE ORAL DAILY
Qty: 30 CAPSULE | Refills: 0 | Status: SHIPPED | OUTPATIENT
Start: 2024-08-17

## 2024-08-17 RX ORDER — QUETIAPINE FUMARATE 50 MG/1
50 TABLET, FILM COATED ORAL
Qty: 30 TABLET | Refills: 0 | Status: SHIPPED | OUTPATIENT
Start: 2024-08-17

## 2024-08-17 RX ORDER — ATORVASTATIN CALCIUM 10 MG/1
10 TABLET, FILM COATED ORAL DAILY
Qty: 30 TABLET | Refills: 0 | Status: SHIPPED | OUTPATIENT
Start: 2024-08-17

## 2024-08-17 RX ORDER — LAMOTRIGINE 150 MG/1
TABLET ORAL
Qty: 60 TABLET | Refills: 0 | Status: SHIPPED | OUTPATIENT
Start: 2024-08-17

## 2024-08-17 RX ORDER — ACETAMINOPHEN 325 MG/1
650 TABLET ORAL EVERY 6 HOURS PRN
Qty: 30 TABLET | Refills: 0 | Status: SHIPPED | OUTPATIENT
Start: 2024-08-17

## 2024-08-17 RX ORDER — LAMOTRIGINE 25 MG/1
TABLET ORAL
Qty: 60 TABLET | Refills: 0 | Status: SHIPPED | OUTPATIENT
Start: 2024-08-17

## 2024-08-17 RX ADMIN — DEXTROSE, SODIUM CHLORIDE, AND POTASSIUM CHLORIDE 100 ML/HR: 5; .45; .15 INJECTION INTRAVENOUS at 08:26

## 2024-08-17 RX ADMIN — LAMOTRIGINE 200 MG: 100 TABLET ORAL at 08:28

## 2024-08-17 RX ADMIN — SERTRALINE HYDROCHLORIDE 50 MG: 50 TABLET ORAL at 08:28

## 2024-08-17 RX ADMIN — PANTOPRAZOLE SODIUM 40 MG: 40 INJECTION, POWDER, FOR SOLUTION INTRAVENOUS at 06:07

## 2024-08-17 RX ADMIN — HEPARIN SODIUM 5000 UNITS: 5000 INJECTION INTRAVENOUS; SUBCUTANEOUS at 13:58

## 2024-08-17 RX ADMIN — QUETIAPINE FUMARATE 50 MG: 25 TABLET ORAL at 21:16

## 2024-08-17 RX ADMIN — HEPARIN SODIUM 5000 UNITS: 5000 INJECTION INTRAVENOUS; SUBCUTANEOUS at 21:17

## 2024-08-17 RX ADMIN — LAMOTRIGINE 150 MG: 100 TABLET ORAL at 21:00

## 2024-08-17 RX ADMIN — HEPARIN SODIUM 5000 UNITS: 5000 INJECTION INTRAVENOUS; SUBCUTANEOUS at 06:07

## 2024-08-17 RX ADMIN — ATORVASTATIN CALCIUM 10 MG: 10 TABLET, FILM COATED ORAL at 21:00

## 2024-08-17 NOTE — DISCHARGE SUMMARY
Discharge Summary - General Surgery   Mario Rollins 59 y.o. male MRN: [id number]  Unit/Bed#: [address] [place] [address] Encounter: [id number]    Admission Date: 8/9/2024     Discharge Date: 8/18/2024    Admitting Diagnosis: Small bowel obstruction (HCC) [K56.609]  Abdominal pain [R10.9]    Discharge Diagnosis: Same    Attending and Service: Milton Handley MD    Consulting Physician(s): internal medicine, GI    Procedures Performed: none    Imaging:  Procedure: Colonoscopy    Result Date: 8/16/2024  Narrative: Table formatting from the original result was not included. Critical access hospital Operating Room [address] [place] [id number] [phone] DATE OF SERVICE: 8/16/24 PHYSICIAN(S): Attending: Deonte Gonzales MD Fellow: No Staff Documented INDICATION: Stenosis of ileum (HCC) POST-OP DIAGNOSIS: See the impression below. HISTORY: Prior colonoscopy: 5 years ago. BOWEL PREPARATION: Golytely/Colyte/Trilyte PREPROCEDURE: Informed consent was obtained for the procedure, including sedation. Risks including but not limited to bleeding, infection, perforation, adverse drug reaction and aspiration were explained in detail. Also explained about less than 100% sensitivity with the exam and other alternatives. The patient was placed in the left lateral decubitus position. Procedure: Colonoscopy DETAILS OF PROCEDURE: Patient was taken to the procedure room where a time out was performed to confirm correct patient and correct procedure. The patient underwent monitored anesthesia care, which was administered by an anesthesia professional. The patient's blood pressure, ECG, ETCO2, heart rate, level of consciousness, oxygen and respirations were monitored throughout the procedure. A digital rectal exam was performed. The scope was introduced through the anus and advanced to the cecum. Retroflexion was performed in the rectum. The quality of bowel preparation was evaluated using the Sumner Bowel Preparation Scale with scores of:  right colon = 2, transverse colon = 2, left colon = 2. The total BBPS score was 6. Bowel prep was adequate. The patient experienced no blood loss. The procedure was not difficult. The patient tolerated the procedure well. There were no apparent adverse events. ANESTHESIA INFORMATION: ASA: III Anesthesia Type: IV Sedation with Anesthesia MEDICATIONS: No administrations occurring from 1307 to 1354 on 08/16/24 FINDINGS: The terminal ileum appeared normal. Normal distal terminal ileum.  At least 12 inches / 30 cm of distal terminal ileum was examined, no pathology seen.  Biopsies taken.  Lumen was normal, The cecum, ascending colon, hepatic flexure, transverse colon, splenic flexure, descending colon and rectum appeared normal. Few small diverticula of mild severity in the sigmoid colon One anterior internal small (grade 1) hemorrhoid observed during digital rectal exam, perianal exam and retroflexion; no bleeding was identified EVENTS: Procedure Events Event Event Time ENDO CECUM REACHED 8/16/2024  1:41 PM ENDO SCOPE OUT TIME 8/16/2024  1:51 PM SPECIMENS: ID Type Source Tests Collected by Time Destination 1 : terminal ileum bx Tissue Terminal Ileum TISSUE EXAM Deonte Gonzales MD 8/16/2024  1:43 PM  EQUIPMENT: Colonoscope -[id number]     Impression: The terminal ileum appeared normal. The cecum, ascending colon, hepatic flexure, transverse colon, splenic flexure, descending colon and rectum appeared normal. Diverticulosis of mild severity in the sigmoid colon Small (grade 1) hemorrhoid RECOMMENDATION: Repeat screening colonoscopy in 10 years, due: 8/14/2034 Resume diet as usual.    Deonte Gonzales MD     Procedure: XR abdomen complete inc upright and/or decubitus    Result Date: 8/14/2024  Narrative: SMALL BOWEL FOLLOW THROUGH (Gastrografin challenge) INDICATION:   Bowel gas pattern. COMPARISON: August 13, 2024 IMAGES: 4 FLUOROSCOPY TIME:   None TECHNIQUE:    view of the abdomen was obtained. Gastrografin was then  administered to the patient and followed through the small bowel to the colon utilizing overhead radiographs at periodic intervals. FINDINGS: Enteric tube tip in the stomach. Right lower lung opacity reidentified. Nephrocalcinosis reidentified. In this patient undergoing Gastrografin challenge to evaluate small bowel obstruction, currently the enteric contrast has reached the colon. Advanced degenerative changes both hips with right hip femoral medullary giancarlo and compression nail.     Impression: Oral contrast seen opacifying the terminal ileum and entire large bowel. The terminal ileum measures up to 5 cm diameter. There is gaseous distention of multiple small bowel loops throughout the mid abdomen averaging 5 cm. This pattern suggests there is likely a stenosis/partial obstruction at the terminal ileum/ileocecal valve. The large bowel appears opacified and normally distended. Because oral contrast has reached the colon, no more followup films are needed. Workstation performed: [id number]         Hospital Course:   Mario Rollins is a 59 y.o. male who presented 8/9/2024 with SBO. The patient was treated conservatively and the SBO resolved. Pt was restarted on diet and tolerated this well. Colonoscopy was also done by the gastroenterology team which revealed a grossly normal colon and terminal ileum. Biopsies of the ileum were taken.     Patient was discharged on HD#10. On the day of discharge, the patient was voiding spontaneously, ambulating at baseline, tolerating a soft diet, and pain was well controlled. The patient was sent back to his group home with no changes to his medications. He understood all instructions for discharge.        Condition at Discharge: fair     Discharge instructions/Information to patient and family:   See after visit summary for information provided to patient and family.      Provisions for Follow-Up Care:  See after visit summary for information related to follow-up care and any  pertinent home health orders.      Disposition:  Group home    Planned Readmission: No    Discharge Statement   I spent 30 minutes discharging the patient. This time was spent on the day of discharge. I had direct contact with the patient on the day of discharge. Additional documentation is required if more than 30 minutes were spent on discharge.     Discharge Medications:  See after visit summary for reconciled discharge medications provided to patient and family.    Morena Broderick PA-C  8/17/2024

## 2024-08-17 NOTE — [place] UNIVERSAL TRANSFER FORM
"[place] UNIVERSAL TRANSFER FORM  (ALL ITEMS MUST BE COMPLETED)    1. TRANSFER FROM: [facility]      TRANSFER TO: [facility] home    2. DATE OF TRANSFER: 8/17/2024                        TIME OF TRANSFER: 1100    3. PATIENT NAME: Mario Rollins T      YOB: 1965                             GENDER: male    4. LANGUAGE:   English    5. PHYSICIAN NAME:  Dimitrios Pulido MD                   PHONE: [phone]    6. CODE STATUS: Level 1 - Full Code        Out of Hospital DNR Attached: No    7. :                                      :  Extended Emergency Contact Information  Primary Emergency Contact: Binu Bournevadim  Mobile Phone: [phone]  Relation:   Secondary Emergency Contact: Lelo Guillory  Mobile Phone: [phone]  Relation: Care Giver           Health Care Representative/Proxy:  No           Legal Guardian:  No             NAME OF:           HEALTH CARE REPRESENTATIVE/PROXY:                                         OR           LEGAL GUARDIAN, IF NOT :                                               PHONE:  (Day)           (Night)                        (Cell)    8. REASON FOR TRANSFER: (Must include brief medical history and recent changes in physical function or cognition.) Pt admitted for asp pneumonia found to have SBO.This has resolved appetite good for surg soft diet.vding well.            V/S: /73   Pulse 80   Temp 98.1 °F (36.7 °C)   Resp 16   Ht 5' 8\" (1.727 m)   Wt 77.5 kg (170 lb 14.4 oz)   SpO2 (!) 88%   BMI 25.99 kg/m²           PAIN: None    9. PRIMARY DIAGNOSIS: Aspiration pneumonia (HCC)      Secondary Diagnosis:         Pacemaker: No      Internal Defib: No          Mental Health Diagnosis (if Applicable):    10. RESTRAINTS: No     11. RESPIRATORY NEEDS: None    12. ISOLATION/PRECAUTION: None    13. ALLERGY: Aspirin    14. SENSORY:       Vision Good, Hearing Good , and " Speech Clear    15. SKIN CONDITION: No Wounds    16. DIET: Special (describe)surgical soft diet    17. IV ACCESS: None    18. PERSONAL ITEMS SENT WITH PATIENT: None    19. ATTACHED DOCUMENTS: MUST ATTACH CURRENT MEDICATION INFORMATION OtherAVS to home    20. AT RISK ALERTS:Falls and Aspiration        HARM TO: N/A    21. WEIGHT BEARING STATUS:         Left Leg: Limited        Right Leg: Limited    22. MENTAL STATUS:Alert, Oriented, and Forgetful    23. FUNCTION:        Walk: With Help        Transfer: With Help        Toilet: With Help        Feed: With Help    24. IMMUNIZATIONS/SCREENING:     Immunization History   Administered Date(s) Administered    Hep B, adult 06/13/2011, 07/13/2011    Influenza Quadrivalent Preservative Free 3 years and older IM 10/21/2014    Influenza Quadrivalent, 6-35 Months IM 10/29/2015, 11/02/2016, 10/20/2017    Influenza, recombinant, quadrivalent,injectable, preservative free 09/25/2018, 12/12/2019, 10/29/2020    Influenza, seasonal, injectable 11/11/2008, 09/23/2009, 10/11/2011, 12/13/2011, 10/09/2012, 10/15/2013    Tdap 09/23/2009    Tuberculin Skin Test-PPD Intradermal 05/05/2009, 10/08/2010, 10/11/2011, 10/09/2012, 10/15/2013, 10/21/2014, 10/29/2015       25. BOWEL: Date Last BMenema x2 on 8/16    26. BLADDER: Continent    27. SENDING FACILITY CONTACT: Otto Kirk                   Title: RN        Unit: [address] [place] [address]        Phone: [phone]          REC'G FACILITY CONTACT (if known):        Title:        Unit:         Phone:         FORM PREFILLED BY (if applicable)       Title:       Unit:        Phone:         FORM COMPLETED BY Otto Kirk RN      Title: [facility]      Phone: [phone]

## 2024-08-17 NOTE — PLAN OF CARE
Problem: OCCUPATIONAL THERAPY ADULT  Goal: Performs self-care activities at highest level of function for planned discharge setting.  See evaluation for individualized goals.  Description: Treatment Interventions: ADL retraining, Functional transfer training, UE strengthening/ROM, Endurance training, Patient/family training, Equipment evaluation/education, Compensatory technique education, Continued evaluation, Energy conservation, Activityengagement          See flowsheet documentation for full assessment, interventions and recommendations.   Note: Limitation: Decreased ADL status, Decreased UE strength, Decreased endurance, Decreased cognition, Decreased self-care trans, Decreased high-level ADLs, Decreased fine motor control (impaired activity tolerance, standing tolerance, balance, forward functional reach, insight into deficits, problem solving)     Assessment: Pt is a 58yo male admitted to [facility] on 8/9/24 w/ . Diagnosed w/ aspiration pneumonia. Pt resides in a group home w/ first floor set- up and ramped entrance. Pt needs assistance w/ IADLs and use of wilian-walker on L vs w/c for functional mobility. Supportive staff assist w/ ADLs as needed. Significant PMH impacting his occupational performance includes Cerebral palsy, R spastic hemiplegia,anxiety/depression, HTN, epilepsy, hx foot fractures (2009), intellectual disability. Upon eval, pt alert and agreeable to participate. Pt is motivated to get out of bed and washed up. Pt required mod I feeding seated after set- up, min A grooming seated, min A UB bathing, mod A LB bathing, mod A UBD, min A bed mobility, min A sit <> stand and min A using wilian-walker for functional mobility. Pt is completing ADLs at / near baseline level of I w/ decreased activity tolerance, standing tolerance, balance, and activity tolerance. Recommend level II rehab resource intensity when medically stable. Pt would benefit from OT in acute care. Will continue to follow 2-3X week      Rehab Resource Intensity Level, OT: III (Minimum Resource Intensity)

## 2024-08-17 NOTE — CONSULTS
Inpatient Medical Consultation - [facility] Internal Medicine    Patient Information: Mario Rollins 59 y.o. male MRN: [id number]  Unit/Bed#: [address] [place] [address] Encounter: [id number]    PCP: No primary care provider on file.  Date of Admission:  8/9/2024  Date of Consultation: 08/17/24  Requesting Physician: Dimitrios Pulido MD    Reason For Consultation:     Fever      Assessment/Plan    * Aspiration pneumonia (HCC)-resolved  Assessment & Plan  Noted to be febrile with Tmax of 101.2 °F  CT chest on pelvis revealed atelectasis versus right lower lobe pneumonia  UA without evidence of infection  Normal white count, procalcitonin elevated.  Likely suspect aspiration PNA versus pneumonitis versus atelectasis  Continue ceftriaxone, metronidazole 5-day course  Monitor fever, CBC  Aspiration precaution  Incentive spirometry  BCx if recurrent fever    Small bowel obstruction (HCC)  Assessment & Plan  Presented with abdominal distention, pain, diaphoresis    CT abdomen revealed small bowel obstruction with transition at terminal ileum likely related to adhesions  8/14 AXR-likely a stenosis/partial obstruction at the terminal ileum/ileocecal valve   History of small bowel obstruction status post ex lap, 4/24  D/W GI recs:  concern for partial obstruction at IC  8/16 colonoscopy   D/W surgery recs  NGT removed  Home orals  GI for colonoscopy      Epilepsy (HCC)  Assessment & Plan  Continue home lamotrigine    Hypertension  Assessment & Plan  Blood pressure stable  Resume home nebivolol    Chronic kidney disease  Assessment & Plan  Presented with creatinine of 1.54, now improved to baseline  Continue to monitor      Right spastic hemiparesis (HCC)  Assessment & Plan  History of left MCA CVA, history of brain surgery  On atorvastatin      Cerebral palsy (HCC)  Assessment & Plan  Group home resident, currently appears to be at baseline  Continue chronic meds        Review of Systems   Respiratory:  Negative for shortness of breath  "and wheezing.    Cardiovascular:  Negative for chest pain.   Gastrointestinal:  Negative for abdominal pain and constipation.   Psychiatric/Behavioral:  Negative for agitation.          Subjective:  Patient seen and examined at bedside, reported no additional abdominal pain, no shortness of breath no chest pain, no more nausea or vomiting, no gas. RN reported no concerns, patient discharagble from primary surgical standpoint, consult team will sign off. F/U with PCP and specialist as directed.    Physical Exam:     Vitals:   Blood Pressure: 102/62 (08/17/24 0747)  Pulse: 66 (08/17/24 0747)  Temperature: 97.5 °F (36.4 °C) (08/17/24 0747)  Temp Source: Temporal (08/13/24 0734)  Respirations: 17 (08/17/24 0747)  Height: 5' 8\" (172.7 cm) (08/09/24 2231)  Weight - Scale: 77.5 kg (170 lb 14.4 oz) (08/09/24 2231)  SpO2: 93 % (08/17/24 0747)    Physical Exam  Vitals reviewed.   Constitutional:       General: He is not in acute distress.     Appearance: Normal appearance.   HENT:      Head: Atraumatic.      Nose: No congestion.   Eyes:      General: No scleral icterus.     Pupils: Pupils are equal, round, and reactive to light.   Cardiovascular:      Rate and Rhythm: Normal rate.      Heart sounds: No murmur heard.  Pulmonary:      Effort: No respiratory distress.      Breath sounds: No wheezing, rhonchi or rales.   Abdominal:      Palpations: Abdomen is soft.      Tenderness: There is no abdominal tenderness. There is no guarding or rebound.   Musculoskeletal:         General: No swelling or deformity. Normal range of motion.      Cervical back: No tenderness.      Right lower leg: No edema.      Left lower leg: No edema.   Feet:      Right foot:      Skin integrity: No callus.   Skin:     General: Skin is warm.      Capillary Refill: Capillary refill takes less than 2 seconds.      Findings: No lesion or rash.   Neurological:      Mental Status: He is oriented to person, place, and time.      Cranial Nerves: No cranial " nerve deficit.      Coordination: Coordination normal.   Psychiatric:         Mood and Affect: Mood normal.         Behavior: Behavior normal.         Thought Content: Thought content normal.           Lab Results: I Have Reviewed All Lab Data Below:    Results from last 7 days   Lab Units 08/15/24  0417   WBC Thousand/uL 7.53   HEMOGLOBIN g/dL 14.9   HEMATOCRIT % 45.9   PLATELETS Thousands/uL 186   SEGS PCT % 59   LYMPHO PCT % 23   MONO PCT % 13*   EOS PCT % 3     Results from last 7 days   Lab Units 08/15/24  0417   POTASSIUM mmol/L 4.0   CHLORIDE mmol/L 112*   CO2 mmol/L 25   BUN mg/dL 11   CREATININE mg/dL 0.93   CALCIUM mg/dL 7.8*           * Additional Pertinent Lab Tests Reviewed: All Labs For Current Hospital Admission Reviewed    Imaging: I have personally reviewed pertinent reports.      CT chest abdomen pelvis wo contrast    Result Date: 8/11/2024  Narrative: CT CHEST, ABDOMEN AND PELVIS WITHOUT IV CONTRAST INDICATION: possible aspiration, persistent large NGT output. 59-year-old male with history of bowel obstruction 4 months ago requiring exploratory laparotomy with lysis of adhesions. COMPARISON: CTs of the abdomen and pelvis from April 19, 2024 and August 9, 2024. Portable chest from today. TECHNIQUE: CT examination of the chest, abdomen and pelvis was performed without intravenous contrast. Multiplanar 2D reformatted images were created from the source data. This examination, like all CT scans performed in the [facility], was performed utilizing techniques to minimize radiation dose exposure, including the use of iterative reconstruction and automated exposure control. Radiation dose length product (DLP) for this visit: 640.66 mGy-cm Enteric Contrast: Not administered. Absence of intravenous and gastrointestinal contrast limits evaluation of the abdominal and pelvic viscera. FINDINGS: CHEST LUNGS: Subsegmental atelectasis in the right upper and lower lobes. Lungs otherwise clear.  Elevation of the right hemidiaphragm PLEURA: Small right pleural effusion. HEART/GREAT VESSELS: Coronary artery calcifications. Otherwise unremarkable. No thoracic aortic aneurysm. MEDIASTINUM AND AMI: No lymphadenopathy or mass. NG tube present. Esophagus otherwise unremarkable. Trachea and main stem bronchi normal. CHEST WALL AND LOWER NECK: Mild bilateral gynecomastia. No lymphadenopathy or mass. ABDOMEN LIVER/BILIARY TREE: Limited evaluation without IV contrast. Grossly unremarkable. GALLBLADDER: No calcified gallstones. No pericholecystic inflammatory change. SPLEEN: Unremarkable. PANCREAS: Limited evaluation without IV contrast. Grossly unremarkable. ADRENAL GLANDS: Unremarkable. KIDNEYS/URETERS: Limited evaluation without IV contrast. Extensive bilateral medullary nephrocalcinosis. No hydronephrosis. STOMACH AND BOWEL: NG tube present with tip at the gastric body. Multiple dilated loops of small intestine with normal caliber terminal ileum and collapsed colon, consistent with distal small bowel obstruction. No evidence of pneumatosis intestinalis. APPENDIX: Normal. ABDOMINOPELVIC CAVITY: No lymphadenopathy. Small amount of ascites along the liver and in the pelvis and right paracolic gutter. No extraluminal gas. VESSELS: Limited evaluation without IV contrast. No aortic aneurysm. PELVIS REPRODUCTIVE ORGANS: Prostate and seminal vesicles unremarkable for patient's age. URINARY BLADDER: Unremarkable. ABDOMINAL WALL/INGUINAL REGIONS: Unremarkable. BONES: Severe osteoarthritis of both hips. Healed fracture of the proximal right femur, status post ORIF. No evidence of acute fracture or destructive lesion.     Impression: 1.  Distal small bowel obstruction at or near the terminal ileum. 2.  Small amount of ascites. 3.  Subsegmental atelectasis in the right upper and lower lobes, small right pleural effusion and elevation of the right hemidiaphragm. 4.  Extensive bilateral medullary nephrocalcinosis, unchanged.  Workstation performed: [id number]     XR chest portable    Result Date: 8/11/2024  Narrative: XR CHEST PORTABLE INDICATION: Possible aspiration. COMPARISON: CXR 8/28/2011. Abdomen CT 8/9/2024. FINDINGS: NG tube in stomach with sidehole in GE junction. Mild opacity opacity in the right base. No pneumothorax or pleural effusion. Normal cardiomediastinal silhouette. Bones are unremarkable for age. Normal upper abdomen. Mild elevation of the right diaphragm.     Impression: Mild opacity opacity in the right base, likely atelectasis or aspiration. NG tube in stomach with sidehole at GE junction. Recommend advancing. Workstation performed: [id number]     XR abdomen obstruction series    Result Date: 8/10/2024  Narrative: XR ABDOMEN OBSTRUCTION SERIES INDICATION: SBO, bowel gas pattern. COMPARISON: CT 8/9/2024 FINDINGS: Distal aspect of NG tube is coursing below the left hemidiaphragm. Persistent diffuse small bowel distention, relative paucity of large bowel gas, and moderate gastric distention. No pneumoperitoneum. No pathologic calcification or soft tissue mass. Bones are unremarkable for patient's age. Examination of the chest reveals clear lungs and a normal cardiomediastinal silhouette.     Impression: Persistent obstructive bowel gas pattern with moderate gastric distention. Workstation performed: [id number]     CT abdomen pelvis with contrast    Result Date: 8/9/2024  Narrative: CT ABDOMEN AND PELVIS WITH IV CONTRAST INDICATION: left lower quad pain, history of bowel obstruction, pt. does not need to wait for labs today. COMPARISON: CT dated 4/19/2024. TECHNIQUE: CT examination of the abdomen and pelvis was performed. Multiplanar 2D reformatted images were created from the source data. This examination, like all CT scans performed in the [facility], was performed utilizing techniques to minimize radiation dose exposure, including the use of iterative reconstruction and automated exposure control.  Radiation dose length product (DLP) for this visit: 517.37 mGy-cm IV Contrast: 100 mL of iohexol (OMNIPAQUE) Enteric Contrast: Not administered. FINDINGS: ABDOMEN LOWER CHEST: Distended fluid-filled esophagus. Bibasilar subsegmental atelectasis.. LIVER/BILIARY TREE: Unremarkable. GALLBLADDER: No calcified gallstones. No pericholecystic inflammatory change. SPLEEN: Unremarkable. PANCREAS: Unremarkable. ADRENAL GLANDS: Unremarkable. KIDNEYS/URETERS: Stable medullary nephrocalcinosis with numerous bilateral renal calcifications. Multiple stable bilateral renal cysts. No obstructing stone or hydronephrosis. STOMACH AND BOWEL: Marked distention of the stomach and small bowel with transition at the terminal ileum consistent with small bowel obstruction. Small amount of free fluid in the pelvis. No bowel wall thickening. APPENDIX: Normal. ABDOMINOPELVIC CAVITY: Small volume of ascites. No pneumoperitoneum or abscess. No adenopathy.. VESSELS: Atherosclerosis without abdominal aortic aneurysm. PELVIS REPRODUCTIVE ORGANS: Unremarkable for patient's age. URINARY BLADDER: Unremarkable. ABDOMINAL WALL/INGUINAL REGIONS: Unremarkable. BONES: No acute osseous abnormality. Spinal degenerative changes. Lumbar levoscoliosis. Degenerative grade 1 anterolisthesis L4-5. Mild chronic T12 compression is stable. Few stable tiny sclerotic foci at T11, L1 and L2 are favored represent bone islands. Status post ORIF of the right hip. Advanced bilateral hip osteoarthritis     Impression: Small bowel obstruction with transition at the terminal ileum that may be due to adhesions Small volume of ascites The study was marked in EPIC for immediate notification. Workstation performed: [id number]           VTE Prophylaxis: Heparin  / sequential compression device       Counseling / Coordination of Care Time: 30 minutes.  Greater than 50% of total time spent on patient counseling and coordination of care.    Collaboration of Care: Were Recommendations  "Directly Discussed with Primary Treatment Team? - Yes     ** Please Note: \"This note has been constructed using a voice recognition system.Therefore there may be syntax, spelling, and/or grammatical errors. Please call if you have any questions. \"**  "

## 2024-08-17 NOTE — CASE MANAGEMENT
Case Management Discharge Planning Note    Patient name Mario Rollins  Location [address] [address]/[address] [address] MRN [id number]  : 1965 Date 2024       Current Admission Date: 2024  Current Admission Diagnosis:Aspiration pneumonia (HCC)   Patient Active Problem List    Diagnosis Date Noted Date Diagnosed    Aspiration pneumonia (HCC) 2024     Chronic kidney disease      Hypertension      Epilepsy (HCC)      Electrolyte abnormality 2024     Pneumatosis of intestines 2024     Sacroiliitis (HCC)      Localization-related symptomatic epilepsy and epileptic syndromes with complex partial seizures, intractable, without status epilepticus (HCC)      Encounter for hearing examination 2021     Right spastic hemiparesis (HCC) 2020     Acquired deformity of right hand 2020     Paronychia of toenail 2019     Lumbar radiculopathy 10/01/2018     Spinal stenosis of lumbar region 10/01/2018     Chronic left-sided low back pain with left-sided sciatica 10/01/2018     Chronic pain syndrome 10/01/2018     Cerebral palsy (HCC) 2018     Class 1 obesity with body mass index (BMI) of 30.0 to 30.9 in adult 2018     Hyperlipidemia 2018     Other constipation 2018     Acquired valgus deformity of right ankle 2017     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 10/20/2015     Acquired deformity of right ankle and foot 10/20/2015     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) 2015     Nephrocalcinosis 2015     Acquired hallux rigidus of left foot 2015     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    Primary Insurance: MEDICARE  Secondary Insurance: [employer] [place] [employer]    DISCHARGE DETAILS:    Discharge planning discussed with::  Lelo Guillory [phone]        CM contacted family/caregiver?: Yes  Were Treatment Team discharge recommendations reviewed with patient/caregiver?: Yes  Did patient/caregiver verbalize understanding of patient care needs?: Yes  Were patient/caregiver advised of the risks associated with not following Treatment Team discharge recommendations?: Yes    Contacts  Patient Contacts: Lelo Guillory ()  Relationship to Patient:: Other (Comment)  Contact Method: Phone  Phone Number: [phone]  Reason/Outcome: Discharge Planning    Requested Home Health Care         Is the patient interested in HHC at discharge?: No (Referral placed to [facility] for [facility])    DME Referral Provided  Referral made for DME?: No    Other Referral/Resources/Interventions Provided:  Interventions: Outpatient OT, Outpatient PT    Would you like to participate in our [facility] service program?  : No - Declined    Treatment Team Recommendation: Group Home  Discharge Destination Plan:: Group Home  Transport at Discharge : Other (Comment) (Group home staff)       Scripts received from surgery [place] [facility] and faxed to [facility] [phone] per group home request, confirmation page received.  Original scripts placed in label book for patient to take home at discharge.    Per  Lelo, staff will pick patient up tomorrow morning at 1100.  Nursing aware of plan.  PT/OT orders attached to [facility] referral in [facility].

## 2024-08-17 NOTE — OCCUPATIONAL THERAPY NOTE
Occupational Therapy Evaluation     Patient Name: Mario Rollins  Today's Date: 8/17/2024  Problem List  Principal Problem:    Aspiration pneumonia (HCC)  Active Problems:    Cerebral palsy (HCC)    Right spastic hemiparesis (HCC)    Small bowel obstruction (HCC)    Chronic kidney disease    Hypertension    Epilepsy (HCC)    Past Medical History  Past Medical History:   Diagnosis Date    Ambulatory dysfunction     Anxiety     Bilateral impacted cerumen     last assessed 05/30/2013    Capsulitis     last assessed 04/26/2016    Cellulitis of finger 01/13/2012    Chronic kidney disease     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)     Closed fracture of one or more phalanges of foot 01/06/2009    Constipation     Deformity     left and right foot and ankle ,right hand    Depression     Epilepsy (HCC)     Erythrocytosis     Hemiplegia affecting dominant side (HCC)     Hypertension     Hypoglycemia 11/08/2011    Hypokalemia     last assessed 05/30/2013    Mental retardation     Mood disorder (HCC)     Nephrocalcinosis     chronic    Polycystic kidney     Bilateral    Small bowel obstruction (HCC)      Past Surgical History  Past Surgical History:   Procedure Laterality Date    BRAIN SURGERY      EXPLORATORY LAPAROTOMY W/ BOWEL RESECTION N/A 4/6/2024    Procedure: LAPAROTOMY EXPLORATORY WITH LYSIS OF ADHESIONS AND DECOMPRESSION;  Surgeon: Saúl Woods MD;  Location: [facility] MAIN OR;  Service: General    [place] ARTHROCENTESIS ASPIR&/INJ MAJOR JT/BURSA W/O US Bilateral 5/27/2021    Procedure: Hip intra-articular corticosteroid injection ( 77582 81218-86);  Surgeon: Lianet Astorga MD;  Location: [facility] MAIN OR;  Service: Pain Management     [place] COLONOSCOPY FLX DX W/COLLJ SPEC WHEN PFRMD N/A 2/12/2016    Procedure: COLONOSCOPY;  Surgeon: Abhilash Ace MD;  Location: [facility] GI LAB;  Service: Gastroenterology    [place] INJECT SI JOINT ARTHRGRPHY&/ANES/STEROID W/RIMA Left 3/22/2023    Procedure: SACROILIAC joint injection (82102  );  Surgeon: Larry Smith DO;  Location: [facility] MAIN OR;  Service: Pain Management         08/17/24 0850   OT Last Visit   OT Visit Date 08/17/24  (Saturday)   Note Type   Note type Evaluation  (Eval 3487-1033; tx 1739-5238)   Additional Comments Identified pt by full name and birthdate   Pain Assessment   Pain Assessment Tool 0-10   Pain Score No Pain   Restrictions/Precautions   Weight Bearing Precautions Per Order No   Other Precautions Cognitive;Chair Alarm;Bed Alarm;Fall Risk;Multiple lines  (surgical clear liquid diet; R UE/LE deficits at baseline)   Home Living   Type of Home Group Home   Home Layout One level;Ramped entrance;Performs ADLs on one level;Able to live on main level with bedroom/bathroom   Bathroom Shower/Tub Walk-in shower   Bathroom Equipment Grab bars in shower;Shower chair   Bathroom Accessibility Accessible   Home Equipment Wheelchair-manual;Hemiwalker   Additional Comments Pt resides in a group home w/ first floor set- up, ramped entrance   Prior Function   Level of [place] Needs assistance with IADLS;Needs assistance with ADLs   Lives With Other (Comment)  (group home staff)   Receives Help From Family;Other (Comment)  (group home staff; receiving HHOT/PT)   IADLs Family/Friend/Other provides transportation;Family/Friend/Other provides meals;Family/Friend/Other provides medication management   Comments Pt reports staff assist as needed w/ ADLs. Pt reports use of hemiwalker vs w/c for longer distances   Lifestyle   Autonomy Pt reports staff assist w/ ADL/ IADL as needed. Use of AD for functional mobility   Reciprocal Relationships Supportive staff at group home. Pt reports he has a sister that turned 65 also w/ name, Earnestine   Intrinsic Gratification Pt reports enjoying sitting outside, watching tv upon therapist arrival   General   Additional Pertinent History Pt admitted to [facility] on 8/9/24 w/ .. Diagnosed w/ aspiration pneumonia and is s/p colonscopy on 8/16/24.  "  Family/Caregiver Present No   Additional General Comments Personal and environmental factors supporting performance includes first floor set- up, access to AD/ DME, supportive staff / family; barriers include inability to complete IADLs, difficulty completing IADLs, limited insight into deficits   Subjective   Subjective \"I need to shave\"   ADL   Where Assessed Edge of bed  (vs OOB in chair post eval)   Eating Assistance 6  Modified independent   Eating Deficit Setup;Other (Comment)  (surgical clear liquid diet)   Grooming Assistance 4  Minimal Assistance   Grooming Deficit Setup;Supervision/safety;Increased time to complete  (seated after set- up to wash face)   UB Bathing Assistance 4  Minimal Assistance   UB Bathing Deficit Setup;Supervision/safety;Verbal cueing;Increased time to complete  (seated OOB In chair w/ min A and + time after set- up)   LB Bathing Assistance 3  Moderate Assistance   LB Bathing Deficit Steadying;Setup;Supervision/safety;Verbal cueing;Increased time to complete;Buttocks;Right lower leg including foot;Left lower leg including foot  (in stance required mod A to participate in LB bathing w/ min A (CG/ steadying) to mainain balance)   UB Dressing Assistance 3  Moderate Assistance   UB Dressing Deficit Setup;Thread LUE;Pull around back;Fasteners;Thread RUE   LB Dressing Assistance Unable to assess   Toileting Assistance  Unable to assess  (denied need to void; anticipate mod A based on fxal obs skills, clinical judgement)   Bed Mobility   Supine to Sit 4  Minimal assistance   Additional items Assist x 1;Increased time required;Bedrails;Verbal cues  (to pt's L)   Sit to Supine Unable to assess   Additional Comments Pt seated OOB in chair post eval w/ needs met, call bell in reach and eating breakfast   Transfers   Sit to Stand 4  Minimal assistance   Additional items Assist x 1;Increased time required;Verbal cues;Bedrails;Armrests   Stand to Sit 4  Minimal assistance   Additional items Assist " x 1;Increased time required;Verbal cues;Bedrails;Armrests   Additional Comments Forward flexed posture in stance and premorbid R UE/LE deficits   Functional Mobility   Functional Mobility 4  Minimal assistance   Additional items Hemiwalker  (on L)   Balance   Static Sitting Fair +   Static Standing Fair -   Ambulatory Poor +   Activity Tolerance   Activity Tolerance Patient limited by fatigue   Medical Staff Made Aware spoke w/ Nolvia OLIVARES (08/16/24)   Nurse Made Aware spoke w/ RNOtto and PCA   RUE Assessment   RUE Assessment X   RUE Strength   RUE Overall Strength Deficits;Other (Comment)  (0/5 strength)   LUE Assessment   LUE Assessment   (able to participate in ADL w/ AROM)   LUE Strength   LUE Overall Strength Within Functional Limits - able to perform ADL tasks with strength   Hand Function   Gross Motor Coordination Functional   Fine Motor Coordination   (functional use of L UE to participate in feeding)   Cognition   Overall Cognitive Status Impaired   Arousal/Participation Alert;Cooperative   Attention Attends with cues to redirect   Orientation Level Oriented to person;Oriented to place;Oriented to situation   Memory   (Will continue to assess; appears appropriate)   Following Commands Follows multistep commands with increased time or repetition   Comments Alert, cooperative and motivated to participate   Assessment   Limitation Decreased ADL status;Decreased UE strength;Decreased endurance;Decreased cognition;Decreased self-care trans;Decreased high-level ADLs;Decreased fine motor control  (impaired activity tolerance, standing tolerance, balance, forward functional reach, insight into deficits, problem solving)   Assessment Pt is a 60yo male [facility] w/ . Diagnosed w/ aspiration pneumonia. Pt resides in a group home w/ first floor set- up and ramped entrance. Pt needs assistance w/ IADLs and use of wilian-walker on L vs w/c for functional mobility. Supportive staff assist w/ ADLs as needed.  Significant PMH impacting his occupational performance includes Cerebral palsy, R spastic hemiplegia,anxiety/depression, HTN, epilepsy, hx foot fractures (2009), intellectual disability. Upon eval, pt alert and agreeable to participate. Pt is motivated to get out of bed and washed up. Pt required mod I feeding seated after set- up, min A grooming seated, min A UB bathing, mod A LB bathing, mod A UBD, min A bed mobility, min A sit <> stand and min A using wilian-walker for functional mobility. Pt is completing ADLs at / near baseline level of I w/ decreased activity tolerance, standing tolerance, balance, and activity tolerance. Recommend level II rehab resource intensity when medically stable. Pt would benefit from OT in acute care. Will continue to follow 2-3X week   Goals   Patient Goals Pt stated that he would like to return to PLOF, take a shower   Plan   Treatment Interventions ADL retraining;Functional transfer training;UE strengthening/ROM;Endurance training;Patient/family training;Equipment evaluation/education;Compensatory technique education;Continued evaluation;Energy conservation;Activityengagement   Goal Expiration Date 08/27/24   OT Treatment Day 0  (Sat 8/17/24)   OT Frequency 2-3x/wk   Discharge Recommendation   Rehab Resource Intensity Level, OT III (Minimum Resource Intensity)   AM-PAC Daily Activity Inpatient   Lower Body Dressing 2   Bathing 2   Toileting 2   Upper Body Dressing 3   Grooming 3   Eating 4   Daily Activity Raw Score 16   Daily Activity Standardized Score (Calc for Raw Score >=11) 35.96   AM-PAC Applied Cognition Inpatient   Following a Speech/Presentation 2   Understanding Ordinary Conversation 3   Taking Medications 2   Remembering Where Things Are Placed or Put Away 3   Remembering List of 4-5 Errands 2   Taking Care of Complicated Tasks 2   Applied Cognition Raw Score 14   Applied Cognition Standardized Score 32.02   Barthel Index   Feeding 5   Bathing 0   Grooming Score 0  "  Dressing Score 5   Bladder Score 10   Bowels Score 5   Toilet Use Score 5   Transfers (Bed/Chair) Score 10   Mobility (Level Surface) Score 0   Stairs Score 0   Barthel Index Score 40   Additional Treatment Session   Start Time 0917   End Time 0927   Treatment Assessment Pt seen for skilled OT tx session day 1 following eval focusing on activity engagement, grooming. Pt agreeable and motivated to participate after finishing breakfast reproting he would like to shave. Pt required max A using electric razor to actively participate. Pt able to direct therapist while seated in front of mirror. Pt added that \"this would be good for the car\". Pt seated OOB in chair at end of session w/ needs met, call bell in reach and chair alarm activated. Continue to recommend level III rehab resource intensity when medically stable for discharge from acute care. Will continue to follow   Additional Treatment Day 1  (Sat 8/17/24)   End of Consult   Patient Position at End of Consult Bedside chair;Bed/Chair alarm activated;All needs within reach   Nurse Communication Nurse aware of consult   Licensure   [place] License Number  Sandy E> Ike OTR/L [id number]        The patient's raw score on the AM-PAC Daily Activity Inpatient Short Form is 16. A raw score of less than 19 suggests the patient may benefit from discharge to post-acute rehabilitation services. Please refer to the recommendation of the Occupational Therapist for safe discharge planning.     Pt goals to be met by 8/27/24 to max I w/ ADLs and improve engagement to return home to PLOF, take a shower includes:    -Pt will complete bed mobility supine <> sit w/ mod I for + time    -Pt will complete functional transfers to bed, chair, and toilet using LRAD, DME as needed w/ mod I    -Pt will complete functional shower transfer using LRAD, DME as needed w/ S to max I w/ bathing    -Pt will consistently engage in functional mobility using LRAD household distances w/ S    -Pt " will complete UBD/LBD w/ min A      STG to be met in 3-5 days to max I w/ ADLs and assist w/ DC planning includes:    -Pt will complete bed mobility supine <> sit w/ S in preparation for ADLs    -Pt will complete functional transfers to bed, chair, and toilet using LRAD, DME as needed w/ S    -Pt will complete UBD w/ min A    -Pt will demonstrate improved activity and sitting tolerance OOB In chair for all meals    -Pt will demonstrate improved functional standing tolerance for at least 15 minutes using LRAD w/ at least fair balance to max I w/ bathing and toileting to return to PLOF    -Pt will demonstrate improved AMPAC score to at least 19 to max I w/ ADLs, assist w/ DC planning    -Pt will consistently engage in functional mobility to / from bathroom using LRAD w/ S    Sandy Ramires, OTR/L  [id number]  [id number]

## 2024-08-17 NOTE — PROGRESS NOTES
"Progress Note - General Surgery   Mario Rollins 59 y.o. male MRN: [id number]  Unit/Bed#: [address] [place] [address] Encounter: [id number]    Assessment & Plan    58 y/o male with resolved SBO  C-scope yesterday grossly normal; mild sigmoid diverticulosis and one hemorrhoid noted; biopsies taken from terminal ileum  Pt feeling well today, denies abdominal pain or nausea  Tolerating CLD  Abdomen soft, non-distended, non-tender, normal bowel sounds  Pt is passing flatus and having loose Bms  AFVSS  Labs WNL    Discharge to group home today  F/U with general surgery as needed        /62   Pulse 66   Temp 97.5 °F (36.4 °C)   Resp 17   Ht 5' 8\" (1.727 m)   Wt 77.5 kg (170 lb 14.4 oz)   SpO2 93%   BMI 25.99 kg/m²     Labs in chart were reviewed.  Results from last 7 days   Lab Units 08/15/24  0417   WBC Thousand/uL 7.53   HEMOGLOBIN g/dL 14.9   HEMATOCRIT % 45.9   PLATELETS Thousands/uL 186     Results from last 7 days   Lab Units 08/15/24  0417   POTASSIUM mmol/L 4.0   CHLORIDE mmol/L 112*   CO2 mmol/L 25   BUN mg/dL 11   CREATININE mg/dL 0.93           Intake/Output Summary (Last 24 hours) at 8/17/2024 1052  Last data filed at 8/17/2024 0617  Gross per 24 hour   Intake 300 ml   Output 1400 ml   Net -1100 ml           Subjective/Objective     Subjective: Pt seen and examined. No acute overnight events. Pt has no acute complaints this morning, feeling well.     Review of Systems   Constitutional:  Negative for chills and fever.   Respiratory:  Negative for shortness of breath.    Cardiovascular:  Negative for chest pain.   Gastrointestinal:  Negative for abdominal distention, abdominal pain, nausea and vomiting.   Genitourinary:  Negative for difficulty urinating.          Objective:     Physical Exam  Vitals and nursing note reviewed.   Constitutional:       General: He is not in acute distress.     Appearance: He is not toxic-appearing.   HENT:      Head: Normocephalic and atraumatic.   Eyes:      Extraocular " Movements: Extraocular movements intact.   Pulmonary:      Effort: Pulmonary effort is normal. No respiratory distress.   Abdominal:      General: Bowel sounds are normal. There is no distension.      Palpations: Abdomen is soft.      Tenderness: There is no abdominal tenderness. There is no guarding.   Musculoskeletal:         General: Normal range of motion.      Cervical back: Normal range of motion.   Skin:     General: Skin is warm and dry.   Neurological:      Mental Status: He is alert and oriented to person, place, and time.   Psychiatric:         Mood and Affect: Mood normal.         Behavior: Behavior normal.         Thought Content: Thought content normal.            Morena Broderick PA-C  8/17/2024

## 2024-08-17 NOTE — PLAN OF CARE
Problem: Prexisting or High Potential for Compromised Skin Integrity  Goal: Skin integrity is maintained or improved  Description: INTERVENTIONS:  - Identify patients at risk for skin breakdown  - Assess and monitor skin integrity  - Assess and monitor nutrition and hydration status  - Monitor labs   - Assess for incontinence   - Turn and reposition patient  - Assist with mobility/ambulation  - Relieve pressure over bony prominences  - Avoid friction and shearing  - Provide appropriate hygiene as needed including keeping skin clean and dry  - Evaluate need for skin moisturizer/barrier cream  - Collaborate with interdisciplinary team   - Patient/family teaching  - Consider wound care consult   Outcome: Progressing     Problem: PAIN - ADULT  Goal: Verbalizes/displays adequate comfort level or baseline comfort level  Description: Interventions:  - Encourage patient to monitor pain and request assistance  - Assess pain using appropriate pain scale  - Administer analgesics based on type and severity of pain and evaluate response  - Implement non-pharmacological measures as appropriate and evaluate response  - Consider cultural and social influences on pain and pain management  - Notify physician/advanced practitioner if interventions unsuccessful or patient reports new pain  Outcome: Progressing     Problem: INFECTION - ADULT  Goal: Absence or prevention of progression during hospitalization  Description: INTERVENTIONS:  - Assess and monitor for signs and symptoms of infection  - Monitor lab/diagnostic results  - Monitor all insertion sites, i.e. indwelling lines, tubes, and drains  - Monitor endotracheal if appropriate and nasal secretions for changes in amount and color  - [place] appropriate cooling/warming therapies per order  - Administer medications as ordered  - Instruct and encourage patient and family to use good hand hygiene technique  - Identify and instruct in appropriate isolation precautions for  identified infection/condition  Outcome: Progressing  Goal: Absence of fever/infection during neutropenic period  Description: INTERVENTIONS:  - Monitor WBC    Outcome: Progressing     Problem: SAFETY ADULT  Goal: Patient will remain free of falls  Description: INTERVENTIONS:  - Educate patient/family on patient safety including physical limitations  - Instruct patient to call for assistance with activity   - Consult OT/PT to assist with strengthening/mobility   - Keep Call bell within reach  - Keep bed low and locked with side rails adjusted as appropriate  - Keep care items and personal belongings within reach  - Initiate and maintain comfort rounds  - Make Fall Risk Sign visible to staff  - Offer Toileting every 2 Hours, in advance of need  - Initiate/Maintain bed and chair alarm  - Obtain necessary fall risk management equipment: bed and chair alarm/yellow bracelet and socks   - Apply yellow socks and bracelet for high fall risk patients  - Consider moving patient to room near nurses station  Outcome: Progressing  Goal: Maintain or return to baseline ADL function  Description: INTERVENTIONS:  -  Assess patient's ability to carry out ADLs; assess patient's baseline for ADL function and identify physical deficits which impact ability to perform ADLs (bathing, care of mouth/teeth, toileting, grooming, dressing, etc.)  - Assess/evaluate cause of self-care deficits   - Assess range of motion  - Assess patient's mobility; develop plan if impaired  - Assess patient's need for assistive devices and provide as appropriate  - Encourage maximum independence but intervene and supervise when necessary  - Involve family in performance of ADLs  - Assess for home care needs following discharge   - Consider OT consult to assist with ADL evaluation and planning for discharge  - Provide patient education as appropriate  Outcome: Progressing  Goal: Maintains/Returns to pre admission functional level  Description: INTERVENTIONS:  -  Perform AM-PAC 6 Click Basic Mobility/ Daily Activity assessment daily.  - Set and communicate daily mobility goal to care team and patient/family/caregiver.   - Collaborate with rehabilitation services on mobility goals if consulted  - Perform Range of Motion 3 times a day.  - Reposition patient every 2 hours.  - Dangle patient 3 times a day  - Stand patient 3 times a day  - Ambulate patient 3 times a day  - Out of bed to chair 3 times a day   - Out of bed for meals 3 times a day  - Out of bed for toileting  - Record patient progress and toleration of activity level   Outcome: Progressing     Problem: DISCHARGE PLANNING  Goal: Discharge to home or other facility with appropriate resources  Description: INTERVENTIONS:  - Identify barriers to discharge w/patient and caregiver  - Arrange for needed discharge resources and transportation as appropriate  - Identify discharge learning needs (meds, wound care, etc.)  - Arrange for interpretive services to assist at discharge as needed  - Refer to Case Management Department for coordinating discharge planning if the patient needs post-hospital services based on physician/advanced practitioner order or complex needs related to functional status, cognitive ability, or social support system  Outcome: Progressing     Problem: GASTROINTESTINAL - ADULT  Goal: Minimal or absence of nausea and/or vomiting  Description: INTERVENTIONS:  - Administer IV fluids if ordered to ensure adequate hydration  - Maintain NPO status until nausea and vomiting are resolved  - Nasogastric tube if ordered  - Administer ordered antiemetic medications as needed  - Provide nonpharmacologic comfort measures as appropriate  - Advance diet as tolerated, if ordered  - Consider nutrition services referral to assist patient with adequate nutrition and appropriate food choices  Outcome: Progressing  Goal: Maintains or returns to baseline bowel function  Description: INTERVENTIONS:  - Assess bowel function  -  Encourage oral fluids to ensure adequate hydration  - Administer IV fluids if ordered to ensure adequate hydration  - Administer ordered medications as needed  - Encourage mobilization and activity  - Consider nutritional services referral to assist patient with adequate nutrition and appropriate food choices  Outcome: Progressing  Goal: Maintains adequate nutritional intake  Description: INTERVENTIONS:  - Monitor percentage of each meal consumed  - Identify factors contributing to decreased intake, treat as appropriate  - Assist with meals as needed  - Monitor I&O, weight, and lab values if indicated  - Obtain nutrition services referral as needed  Outcome: Progressing  Goal: Oral mucous membranes remain intact  Description: INTERVENTIONS  - Assess oral mucosa and hygiene practices  - Implement preventative oral hygiene regimen  - Implement oral medicated treatments as ordered  - Initiate Nutrition services referral as needed  Outcome: Progressing     Problem: METABOLIC, FLUID AND ELECTROLYTES - ADULT  Goal: Electrolytes maintained within normal limits  Description: INTERVENTIONS:  - Monitor labs and assess patient for signs and symptoms of electrolyte imbalances  - Administer electrolyte replacement as ordered  - Monitor response to electrolyte replacements, including repeat lab results as appropriate  - Instruct patient on fluid and nutrition as appropriate  Outcome: Progressing  Goal: Fluid balance maintained  Description: INTERVENTIONS:  - Monitor labs   - Monitor I/O and WT  - Instruct patient on fluid and nutrition as appropriate  - Assess for signs & symptoms of volume excess or deficit  Outcome: Progressing  Goal: Glucose maintained within target range  Description: INTERVENTIONS:  - Monitor Blood Glucose as ordered  - Assess for signs and symptoms of hyperglycemia and hypoglycemia  - Administer ordered medications to maintain glucose within target range  - Assess nutritional intake and initiate nutrition  service referral as needed  Outcome: Progressing     Problem: MUSCULOSKELETAL - ADULT  Goal: Maintain or return mobility to safest level of function  Description: INTERVENTIONS:  - Assess patient's ability to carry out ADLs; assess patient's baseline for ADL function and identify physical deficits which impact ability to perform ADLs (bathing, care of mouth/teeth, toileting, grooming, dressing, etc.)  - Assess/evaluate cause of self-care deficits   - Assess range of motion  - Assess patient's mobility  - Assess patient's need for assistive devices and provide as appropriate  - Encourage maximum independence but intervene and supervise when necessary  - Involve family in performance of ADLs  - Assess for home care needs following discharge   - Consider OT consult to assist with ADL evaluation and planning for discharge  - Provide patient education as appropriate  Outcome: Progressing     Problem: Nutrition/Hydration-ADULT  Goal: Nutrient/Hydration intake appropriate for improving, restoring or maintaining nutritional needs  Description: Monitor and assess patient's nutrition/hydration status for malnutrition. Collaborate with interdisciplinary team and initiate plan and interventions as ordered.  Monitor patient's weight and dietary intake as ordered or per policy. Utilize nutrition screening tool and intervene as necessary. Determine patient's food preferences and provide high-protein, high-caloric foods as appropriate.     INTERVENTIONS:  - Monitor oral intake, urinary output, labs, and treatment plans  - Assess nutrition and hydration status and recommend course of action  - Evaluate amount of meals eaten  - Assist patient with eating if necessary   - Allow adequate time for meals  - Recommend/ encourage appropriate diets, oral nutritional supplements, and vitamin/mineral supplements  - Order, calculate, and assess calorie counts as needed  - Recommend, monitor, and adjust tube feedings and TPN/PPN based on  assessed needs  - Assess need for intravenous fluids  - Provide specific nutrition/hydration education as appropriate  - Include patient/family/caregiver in decisions related to nutrition  Outcome: Progressing

## 2024-08-18 ENCOUNTER — DOCUMENTATION (OUTPATIENT)
Dept: OTHER | Facility: HOSPITAL | Age: 59
End: 2024-08-18

## 2024-08-18 VITALS
RESPIRATION RATE: 20 BRPM | HEIGHT: 68 IN | WEIGHT: 170.9 LBS | TEMPERATURE: 97.6 F | BODY MASS INDEX: 25.9 KG/M2 | OXYGEN SATURATION: 98 % | SYSTOLIC BLOOD PRESSURE: 104 MMHG | HEART RATE: 77 BPM | DIASTOLIC BLOOD PRESSURE: 59 MMHG

## 2024-08-18 PROCEDURE — 99232 SBSQ HOSP IP/OBS MODERATE 35: CPT | Performed by: STUDENT IN AN ORGANIZED HEALTH CARE EDUCATION/TRAINING PROGRAM

## 2024-08-18 PROCEDURE — 99238 HOSP IP/OBS DSCHRG MGMT 30/<: CPT | Performed by: SURGERY

## 2024-08-18 PROCEDURE — NC001 PR NO CHARGE: Performed by: SURGERY

## 2024-08-18 RX ADMIN — SERTRALINE HYDROCHLORIDE 50 MG: 50 TABLET ORAL at 09:15

## 2024-08-18 RX ADMIN — LAMOTRIGINE 200 MG: 100 TABLET ORAL at 09:15

## 2024-08-18 RX ADMIN — HEPARIN SODIUM 5000 UNITS: 5000 INJECTION INTRAVENOUS; SUBCUTANEOUS at 06:23

## 2024-08-18 RX ADMIN — PANTOPRAZOLE SODIUM 40 MG: 40 INJECTION, POWDER, FOR SOLUTION INTRAVENOUS at 06:24

## 2024-08-18 NOTE — PROGRESS NOTES
Made aware by case management that discharge scripts written by primary team was not received by pharmacy via group home point contact.    Faxed pharmacy 13 scripts and group home as listed on email SCM request

## 2024-08-18 NOTE — PROGRESS NOTES
"Progress Note - General Surgery   Mario Rollins 59 y.o. male MRN: [id number]  Unit/Bed#: [address] [place] [address] Encounter: [id number]    Assessment & Plan    58 y/o male with resolved SBO   Pt with no complaints today  Pt passing flatus, having normal BMs  Abdominal exam benign    Discharge to group home today      /59   Pulse 77   Temp 97.6 °F (36.4 °C)   Resp 20   Ht 5' 8\" (1.727 m)   Wt 77.5 kg (170 lb 14.4 oz)   SpO2 98%   BMI 25.99 kg/m²     Labs in chart were reviewed.  Results from last 7 days   Lab Units 08/15/24  0417   WBC Thousand/uL 7.53   HEMOGLOBIN g/dL 14.9   HEMATOCRIT % 45.9   PLATELETS Thousands/uL 186     Results from last 7 days   Lab Units 08/15/24  0417   POTASSIUM mmol/L 4.0   CHLORIDE mmol/L 112*   CO2 mmol/L 25   BUN mg/dL 11   CREATININE mg/dL 0.93           Intake/Output Summary (Last 24 hours) at 8/18/2024 1041  Last data filed at 8/18/2024 0501  Gross per 24 hour   Intake 50 ml   Output 2150 ml   Net -2100 ml           Subjective/Objective     Subjective: Pt seen and examined. No acute overnight events; pt feels well.     Review of Systems   Constitutional:  Negative for chills and fever.   Respiratory:  Negative for shortness of breath.    Cardiovascular:  Negative for chest pain.   Gastrointestinal:  Negative for abdominal distention, abdominal pain, constipation, diarrhea, nausea and vomiting.   Genitourinary:  Negative for difficulty urinating and dysuria.          Objective:     Physical Exam  Vitals and nursing note reviewed.   Constitutional:       General: He is not in acute distress.     Appearance: He is not toxic-appearing.   HENT:      Head: Normocephalic and atraumatic.   Eyes:      Extraocular Movements: Extraocular movements intact.   Pulmonary:      Effort: Pulmonary effort is normal. No respiratory distress.   Abdominal:      General: Bowel sounds are normal. There is no distension.      Palpations: Abdomen is soft.      Tenderness: There is no abdominal " Pediatric BMT Daily Progress Note    Interval Events: Afebrile. 1 episode of watery stool yesterday, volume significantly decreased past 24 hours. Emesis x 1. NPO for BMB, LP and flex sig this morning. Remains anxious.  Review of Systems: Pertinent positives include those mentioned in interval events. A complete review of systems was performed and is otherwise negative.      Medications:  Please see MAR    Physical Exam:  Temp:  [98.1  F (36.7  C)-99.4  F (37.4  C)] 99.1  F (37.3  C)  Pulse:  [] 105  Resp:  [18-28] 24  BP: ()/(61-96) 110/78  SpO2:  [81 %-100 %] 99 %  I/O last 3 completed shifts:  In: 2835.56 [P.O.:120; I.V.:1304.16; NG/GT:57.5]  Out: 2225 [Urine:1725; Emesis/NG output:100; Stool:400]  General: Awake, sitting in bed, no acute distress. Father present.   HEENT: Normocephalic. Hair absent. Sclera are non icteric. Conjunctivae clear. Nares patent with NJ in place. No bleeding. Lips dry.     Cardiovascular: Tachycardic rate, normal, S1, S2, no murmur, gallop or rub.  Capillary refill is < 2 seconds.   Respiratory: CTAB, no increased work of breathing, crackles or wheezes.   Gastrointestinal:  Abdomen is soft, non-distended, and nontender. Liver stably palpable. No splenomegaly or masses palpated.   Skin: No rashes or bruises visible  Neuro: No focal deficits.   Access: CVC in place     Labs: All reviewed, pertinent findings: WBC 2.4, ANC 1.7, Hgb 7.0, plts 65k. BUN 11, Cr 0.28     Assessment and Plan: Lorie is an 8-year-old girl with a history of relapsed AML who is now day +21 from her 7/8 HLA matched UCB transplant.      Remains afebrile. Stool volume decreased, remains watery. NPO for procedures this afternoon.      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 Thiotepa (-7 thru -6), Fludarbine and Busulfan (-5 thru -3), ATG with tylenol, benadryl, and methylpred premeds (-4 thru -3), Rest (-1). Transplant with 7/8 allele match (6/6 antigen match) UCB by  our immunology lab based on NGS typing results. Cord is CMV +.   - Engraftment studies: Day +21, +100, + 180, 1 yr, 2 yrs  - Bone marrow biopsies: Between day +21-+28, +100, +180, 1 yr, 2 yrs  - 12/9: BMB, LP, PB engraftment results all pending.  - Engrafted (day+15)     # Risk for GVHD:    - Continue Tacrolimus, MWF levels. Goal 5-10 range. Level low at 4.8 on 12/7, recheck pending today.  - MMF day +30 or 7 days after engraftment whichever is later     Heme:   # Pancytopenia secondary to chemotherapy  - Transfuse for hemoglobin < 7,  platelets < 50,000 prior to LP and flex sig this afternoon.  - NOT using crossmatched platelets (history of possible usage at outside institution). Tolerates random donor platelets with Benadryl premed 12.5 mg (history of hives with platelets).  - Continue G-CSF until ANC is greater than 2.5 x 3 d     # Coagulopathy:  - 10 mg Vit K in TPN    ENT:  # Epistaxis:   - Continue Amicar q6H, plan to decrease to q8h dosing tomorrow.  - Afrin and Nasal saline spray as needed, topical thrombin and nasal packing as needed     Infectious Disease:  # Fever in setting of neutropenia: Now afebrile. Monitor blood cultures until final.  - Continue blood cultures Q24H PRN for temp > 100.4  - Cefepime discontinued    # Concern for pneumonia: CT 11/29 showed RUL pneumonia, repeat CT 12/2- improving pneumonia, improving left pleural effusion, new LLL patchy ground glass opacities.  - Azithromycin x1 11/29, Vancomycin IV 11/30- 12/5  - Aspergillus galactomannan, Beta D glucan both 11/30 negative    # C. Diff: positive stool 11/30  -PO vancomycin QID 11/30 x 10 days, transitioned to IV Flagyl 12/2 with continued colitis per CT, continue for now, pending flex sig results.     # Risk for infection given immunocompromised status with need for prophylaxis:  - Viral (CMV/HSV sero pos): Acyclovir . Weekly CMV neg 12/9.  - Fungal: Voriconazole  - Bacterial: None, engrafted  - PCP: Has received Pentamidine in the  tenderness. There is no guarding.   Musculoskeletal:         General: Normal range of motion.      Cervical back: Normal range of motion.   Skin:     General: Skin is warm and dry.   Neurological:      Mental Status: He is alert and oriented to person, place, and time.   Psychiatric:         Mood and Affect: Mood normal.         Behavior: Behavior normal.         Thought Content: Thought content normal.            Morena Broderick PA-C  8/18/2024     past due to intolerance of Bactrim. Consider retrialing Bactrim day +28.     FEN/Renal:  # Risk for malnutrition: appetite/intake minimal. NJ placed 11/20. NJ pulled back 10 cm 12/2 per IR based on CT showing asymptomatic intussusception.  - Juice and veggie caps per home regimen as tolerated- not currently taking.  - TPN/IL (now max concentrated 11/27), cycled to 16 hours 12/9.     # Risk for electrolyte abnormalities:  - Check daily electrolytes     # Risk for renal dysfunction and fluid overload: work up  ml/min  - Monitor I/O's and BID weights     # Risk for aHUS/TA-TMA:  - LDH qMonday/Thursday:  221 (12/3)  - Urine protein/creatinine qTuesday: 0.6 (11/27)     Pulmonary:  # Risk for pulmonary insufficiency: work up PFTs normal  - Monitor respiratory status     Cardiovascular:    # Risk for hypertension: Hydralazine PRN     GI:   # Risk for Gastritis: not tolerating protonix via NJ- continue pepcid BID IV.     # Asymptomatic intussusception, resolved: Inadvertent finding on Chest/Ab CT 12/2. Likely 2/2 to NJ tip location, pulled back 10 cm. Ab US after pulling NJ back showed resolution of intussusception.     # Nausea  - Continue scheduled medications- Zofran gtt,  Scopolamine patch to back (hx of pruritic eyes when near head), ativan q12h.  - Benadryl prn  - NJ placed 11/20 for enteral meds- has had issues with clogging but is now patent.    # Risk for VOD/elastography study participant: High risk for VOD secondary to gemtuzumab. Abd US 11/10 and 11/23 normal. Abd US 11/27 showed hepatomegaly with dampened hepatic/portal waveforms, 12/2 with patent vasculature and anterograde flow. LFTs WNL to date.   - VOD US 12/9, results pending  - Ursodiol TID  - Labs and imaging per study  - Continue to monitor closely    # Colitis: Colonic wall thickening noted in upper abdomen on Chest CT 11/30, continued on Chest/Abd/Pelvis CT 12/2. Ongoing large volume stool output.  -C. Diff stool positive 11/30 - vancomycin PO  then transitioned to IV Flagyl 12/3  - Scope 12/9 with GI during sedation for BMBx and LP    Neuro:  # Mucositis/pain: No reports of pain, Morphine gtt discontinued 12/4  - Morphine prn      # Risk for seizures with Busulfan: Keppra prophylaxis per protocol. Completed 11/16.     # Insomnia: melatonin PRN  # Anxiety: specifically relating to medications  - Child life to work with her daily  - Atarax qPM     Discharge Considerations: Expected lengths of hospitalization for patients undergoing stem cell transplantation vary by primary diagnosis, conditioning regimen, graft source, and development of complications. A typical stay is 6 weeks.     The above plan of care was developed by and communicated to me by the Pediatric BMT attending physician, Dr. Jason Tamayo.    DESMOND Jaramillo  [facility]  Pediatric Blood and Marrow Transplant    BMT Attending Note  The patient was seen and evaluated by me today.     The significant interval history includes: afebrile, vitals stable. Stool output improving. No further blood noticed. Undergoing LP, BM and flex sig today. TPN down to 16 hours.         I have formulated and discussed the plan with the BMT team. I discussed the course and plan with the patient's mother and answered all of her questions to the best of my ability. I counseled her regarding the following:  AML underwent 7/8 UCBT, management of mucositis (resolving), management of sedation (reducing narcotics and ativan, trial of zyprexa for anxiety), epistaxis prevention (amicar, saline spray, afrin prn, platelet checks), risk of opportunistic infection (abdominal CT shows colitis [C diff +] and distended gall bladder, on cefepime and IV Flagyl), management of generalized edema (improved, lasix prn), at risk for malnutrition (on TPN, colitis on CT, monitoring closely), and increased risk of VOD (on VOD surveillence study; clinical exam unremarkable, bilirubin stable). Monitoring  stool output closely.  Anticipatory guidance: reviewed current clinical status, overall management and plan of care.      My care coordination activities today include oversight of planned lab studies, oversight of medication changes and discussion with BMT team-members.  My total floor time today was greater than 55 minutes (>50% counseling and coordination of care).      Jason Tamayo MD    Pediatric Blood and Marrow Transplant   [facility]  Pager: [phone]

## 2024-08-18 NOTE — CONSULTS
Inpatient Medical Consultation - [facility] Internal Medicine    Patient Information: Mario Rollins 59 y.o. male MRN: [id number]  Unit/Bed#: [address] [place] [address] Encounter: [id number]    PCP: No primary care provider on file.  Date of Admission:  8/9/2024  Date of Consultation: 08/18/24  Requesting Physician: Dimitrios Pulido MD    Reason For Consultation:     Fever      Assessment/Plan    * Aspiration pneumonia (HCC)-resolved  Assessment & Plan  Noted to be febrile with Tmax of 101.2 °F  CT chest on pelvis revealed atelectasis versus right lower lobe pneumonia  UA without evidence of infection  Normal white count, procalcitonin elevated.  Likely suspect aspiration PNA versus pneumonitis versus atelectasis  Continue ceftriaxone, metronidazole 5-day course  Monitor fever, CBC  Aspiration precaution  Incentive spirometry  BCx if recurrent fever  Will sign off    Small bowel obstruction (HCC)-resolved  Assessment & Plan  Presented with abdominal distention, pain, diaphoresis    CT abdomen revealed small bowel obstruction with transition at terminal ileum likely related to adhesions  8/14 AXR-likely a stenosis/partial obstruction at the terminal ileum/ileocecal valve   History of small bowel obstruction status post ex lap, 4/24  D/W GI recs:  concern for partial obstruction at IC  8/16 colonoscopy   D/W surgery recs  NGT removed  Home orals  GI for colonoscopy      Epilepsy (HCC)  Assessment & Plan  Continue home lamotrigine    Hypertension  Assessment & Plan  Blood pressure stable  Resume home nebivolol    Chronic kidney disease  Assessment & Plan  Presented with creatinine of 1.54, now improved to baseline  Continue to monitor      Right spastic hemiparesis (HCC)  Assessment & Plan  History of left MCA CVA, history of brain surgery  On atorvastatin      Cerebral palsy (HCC)  Assessment & Plan  Group home resident, currently appears to be at baseline  Continue chronic meds        Review of Systems   Respiratory:  Negative  "for shortness of breath and wheezing.    Cardiovascular:  Negative for chest pain.   Gastrointestinal:  Negative for abdominal pain and constipation.   Psychiatric/Behavioral:  Negative for agitation.          Subjective:  Patient seen and examined at bedside, reported no additional abdominal pain, no shortness of breath no chest pain, no more nausea or vomiting, no gas. RN reported no concerns, patient discharagble from primary surgical standpoint, consult team will sign off. F/U with PCP and specialist as directed.    Physical Exam:     Vitals:   Blood Pressure: 104/59 (08/18/24 0740)  Pulse: 77 (08/18/24 0740)  Temperature: 97.6 °F (36.4 °C) (08/18/24 0740)  Temp Source: Temporal (08/13/24 0734)  Respirations: 20 (08/18/24 0740)  Height: 5' 8\" (172.7 cm) (08/09/24 2231)  Weight - Scale: 77.5 kg (170 lb 14.4 oz) (08/09/24 2231)  SpO2: 98 % (08/18/24 0740)    Physical Exam  Vitals reviewed.   Constitutional:       General: He is not in acute distress.     Appearance: Normal appearance.   HENT:      Head: Atraumatic.      Nose: No congestion.   Eyes:      General: No scleral icterus.     Pupils: Pupils are equal, round, and reactive to light.   Cardiovascular:      Rate and Rhythm: Normal rate.      Heart sounds: No murmur heard.  Pulmonary:      Effort: No respiratory distress.      Breath sounds: No wheezing, rhonchi or rales.   Abdominal:      Palpations: Abdomen is soft.      Tenderness: There is no abdominal tenderness. There is no guarding.   Musculoskeletal:         General: No swelling or deformity. Normal range of motion.      Cervical back: No tenderness.      Right lower leg: No edema.      Left lower leg: No edema.      Comments: Peripheral and dorsal atrophy   Feet:      Right foot:      Skin integrity: No callus.   Skin:     General: Skin is warm.      Capillary Refill: Capillary refill takes less than 2 seconds.      Findings: No lesion or rash.   Neurological:      Mental Status: He is oriented to " person, place, and time.      Cranial Nerves: No cranial nerve deficit.      Coordination: Coordination normal.   Psychiatric:         Mood and Affect: Mood normal.         Thought Content: Thought content normal.           Lab Results: I Have Reviewed All Lab Data Below:    Results from last 7 days   Lab Units 08/15/24  0417   WBC Thousand/uL 7.53   HEMOGLOBIN g/dL 14.9   HEMATOCRIT % 45.9   PLATELETS Thousands/uL 186   SEGS PCT % 59   LYMPHO PCT % 23   MONO PCT % 13*   EOS PCT % 3     Results from last 7 days   Lab Units 08/15/24  0417   POTASSIUM mmol/L 4.0   CHLORIDE mmol/L 112*   CO2 mmol/L 25   BUN mg/dL 11   CREATININE mg/dL 0.93   CALCIUM mg/dL 7.8*           * Additional Pertinent Lab Tests Reviewed: All Labs For Current Hospital Admission Reviewed    Imaging: I have personally reviewed pertinent reports.      CT chest abdomen pelvis wo contrast    Result Date: 8/11/2024  Narrative: CT CHEST, ABDOMEN AND PELVIS WITHOUT IV CONTRAST INDICATION: possible aspiration, persistent large NGT output. 59-year-old male with history of bowel obstruction 4 months ago requiring exploratory laparotomy with lysis of adhesions. COMPARISON: CTs of the abdomen and pelvis from April 19, 2024 and August 9, 2024. Portable chest from today. TECHNIQUE: CT examination of the chest, abdomen and pelvis was performed without intravenous contrast. Multiplanar 2D reformatted images were created from the source data. This examination, like all CT scans performed in the [facility], was performed utilizing techniques to minimize radiation dose exposure, including the use of iterative reconstruction and automated exposure control. Radiation dose length product (DLP) for this visit: 640.66 mGy-cm Enteric Contrast: Not administered. Absence of intravenous and gastrointestinal contrast limits evaluation of the abdominal and pelvic viscera. FINDINGS: CHEST LUNGS: Subsegmental atelectasis in the right upper and lower lobes.  Lungs otherwise clear. Elevation of the right hemidiaphragm PLEURA: Small right pleural effusion. HEART/GREAT VESSELS: Coronary artery calcifications. Otherwise unremarkable. No thoracic aortic aneurysm. MEDIASTINUM AND AMI: No lymphadenopathy or mass. NG tube present. Esophagus otherwise unremarkable. Trachea and main stem bronchi normal. CHEST WALL AND LOWER NECK: Mild bilateral gynecomastia. No lymphadenopathy or mass. ABDOMEN LIVER/BILIARY TREE: Limited evaluation without IV contrast. Grossly unremarkable. GALLBLADDER: No calcified gallstones. No pericholecystic inflammatory change. SPLEEN: Unremarkable. PANCREAS: Limited evaluation without IV contrast. Grossly unremarkable. ADRENAL GLANDS: Unremarkable. KIDNEYS/URETERS: Limited evaluation without IV contrast. Extensive bilateral medullary nephrocalcinosis. No hydronephrosis. STOMACH AND BOWEL: NG tube present with tip at the gastric body. Multiple dilated loops of small intestine with normal caliber terminal ileum and collapsed colon, consistent with distal small bowel obstruction. No evidence of pneumatosis intestinalis. APPENDIX: Normal. ABDOMINOPELVIC CAVITY: No lymphadenopathy. Small amount of ascites along the liver and in the pelvis and right paracolic gutter. No extraluminal gas. VESSELS: Limited evaluation without IV contrast. No aortic aneurysm. PELVIS REPRODUCTIVE ORGANS: Prostate and seminal vesicles unremarkable for patient's age. URINARY BLADDER: Unremarkable. ABDOMINAL WALL/INGUINAL REGIONS: Unremarkable. BONES: Severe osteoarthritis of both hips. Healed fracture of the proximal right femur, status post ORIF. No evidence of acute fracture or destructive lesion.     Impression: 1.  Distal small bowel obstruction at or near the terminal ileum. 2.  Small amount of ascites. 3.  Subsegmental atelectasis in the right upper and lower lobes, small right pleural effusion and elevation of the right hemidiaphragm. 4.  Extensive bilateral medullary  nephrocalcinosis, unchanged. Workstation performed: [id number]     XR chest portable    Result Date: 8/11/2024  Narrative: XR CHEST PORTABLE INDICATION: Possible aspiration. COMPARISON: CXR 8/28/2011. Abdomen CT 8/9/2024. FINDINGS: NG tube in stomach with sidehole in GE junction. Mild opacity opacity in the right base. No pneumothorax or pleural effusion. Normal cardiomediastinal silhouette. Bones are unremarkable for age. Normal upper abdomen. Mild elevation of the right diaphragm.     Impression: Mild opacity opacity in the right base, likely atelectasis or aspiration. NG tube in stomach with sidehole at GE junction. Recommend advancing. Workstation performed: [id number]     XR abdomen obstruction series    Result Date: 8/10/2024  Narrative: XR ABDOMEN OBSTRUCTION SERIES INDICATION: SBO, bowel gas pattern. COMPARISON: CT 8/9/2024 FINDINGS: Distal aspect of NG tube is coursing below the left hemidiaphragm. Persistent diffuse small bowel distention, relative paucity of large bowel gas, and moderate gastric distention. No pneumoperitoneum. No pathologic calcification or soft tissue mass. Bones are unremarkable for patient's age. Examination of the chest reveals clear lungs and a normal cardiomediastinal silhouette.     Impression: Persistent obstructive bowel gas pattern with moderate gastric distention. Workstation performed: [id number]     CT abdomen pelvis with contrast    Result Date: 8/9/2024  Narrative: CT ABDOMEN AND PELVIS WITH IV CONTRAST INDICATION: left lower quad pain, history of bowel obstruction, pt. does not need to wait for labs today. COMPARISON: CT dated 4/19/2024. TECHNIQUE: CT examination of the abdomen and pelvis was performed. Multiplanar 2D reformatted images were created from the source data. This examination, like all CT scans performed in the [facility], was performed utilizing techniques to minimize radiation dose exposure, including the use of iterative reconstruction and  automated exposure control. Radiation dose length product (DLP) for this visit: 517.37 mGy-cm IV Contrast: 100 mL of iohexol (OMNIPAQUE) Enteric Contrast: Not administered. FINDINGS: ABDOMEN LOWER CHEST: Distended fluid-filled esophagus. Bibasilar subsegmental atelectasis.. LIVER/BILIARY TREE: Unremarkable. GALLBLADDER: No calcified gallstones. No pericholecystic inflammatory change. SPLEEN: Unremarkable. PANCREAS: Unremarkable. ADRENAL GLANDS: Unremarkable. KIDNEYS/URETERS: Stable medullary nephrocalcinosis with numerous bilateral renal calcifications. Multiple stable bilateral renal cysts. No obstructing stone or hydronephrosis. STOMACH AND BOWEL: Marked distention of the stomach and small bowel with transition at the terminal ileum consistent with small bowel obstruction. Small amount of free fluid in the pelvis. No bowel wall thickening. APPENDIX: Normal. ABDOMINOPELVIC CAVITY: Small volume of ascites. No pneumoperitoneum or abscess. No adenopathy.. VESSELS: Atherosclerosis without abdominal aortic aneurysm. PELVIS REPRODUCTIVE ORGANS: Unremarkable for patient's age. URINARY BLADDER: Unremarkable. ABDOMINAL WALL/INGUINAL REGIONS: Unremarkable. BONES: No acute osseous abnormality. Spinal degenerative changes. Lumbar levoscoliosis. Degenerative grade 1 anterolisthesis L4-5. Mild chronic T12 compression is stable. Few stable tiny sclerotic foci at T11, L1 and L2 are favored represent bone islands. Status post ORIF of the right hip. Advanced bilateral hip osteoarthritis     Impression: Small bowel obstruction with transition at the terminal ileum that may be due to adhesions Small volume of ascites The study was marked in EPIC for immediate notification. Workstation performed: [id number]           VTE Prophylaxis: Heparin  / sequential compression device       Counseling / Coordination of Care Time: 30 minutes.  Greater than 50% of total time spent on patient counseling and coordination of care.    Collaboration of  "Care: Were Recommendations Directly Discussed with Primary Treatment Team? - Yes     ** Please Note: \"This note has been constructed using a voice recognition system.Therefore there may be syntax, spelling, and/or grammatical errors. Please call if you have any questions. \"**  "

## 2024-08-18 NOTE — PLAN OF CARE
Problem: INFECTION - ADULT  Goal: Absence or prevention of progression during hospitalization  Description: INTERVENTIONS:  - Assess and monitor for signs and symptoms of infection  - Monitor lab/diagnostic results  - Monitor all insertion sites, i.e. indwelling lines, tubes, and drains  - Monitor endotracheal if appropriate and nasal secretions for changes in amount and color  - [place] appropriate cooling/warming therapies per order  - Administer medications as ordered  - Instruct and encourage patient and family to use good hand hygiene technique  - Identify and instruct in appropriate isolation precautions for identified infection/condition  Outcome: Progressing     Problem: SAFETY ADULT  Goal: Patient will remain free of falls  Description: INTERVENTIONS:  - Educate patient/family on patient safety including physical limitations  - Instruct patient to call for assistance with activity   - Consult OT/PT to assist with strengthening/mobility   - Keep Call bell within reach  - Keep bed low and locked with side rails adjusted as appropriate  - Keep care items and personal belongings within reach  - Initiate and maintain comfort rounds  - Make Fall Risk Sign visible to staff  - Offer Toileting every 2 Hours, in advance of need  - Initiate/Maintain bedalarm  - Obtain necessary fall risk management equipment:   - Apply yellow socks and bracelet for high fall risk patients  - Consider moving patient to room near nurses station  Outcome: Progressing

## 2024-08-19 ENCOUNTER — TELEPHONE (OUTPATIENT)
Dept: OTHER | Facility: HOSPITAL | Age: 59
End: 2024-08-19

## 2024-08-19 NOTE — PROGRESS NOTES
Patient:  SUSAN ROSENBERG    MRN:  [id number]    Rup[place] Request ID:  [id number]    Level of care reserved:  Physical Therapy / Day Rehab    Partner Reserved:  [facility], [place] [address] [phone]    Clinical needs requested:    Geography searched:  10 miles around [id number]    Start of Service:    Request sent:  3:13pm EDT on 8/16/2024 by Ann Castellano    Partner reserved:  11:46am EDT on 8/19/2024 by Ann Castellano    Choice list shared:  11:46am EDT on 8/19/2024 by Ann Castellano

## 2024-08-19 NOTE — TELEPHONE ENCOUNTER
Patient called the RX Refill Line. Message is being forwarded to the office.     Patient is requesting - Pharmacy called in behalf of Pt requesting clarification on  (LaMICtal) 25 mg and 150 mg. Technician stated Pt still has refills at the pharmacy but scripts received on 8/17/2024 were discontinued by Morena Broderick PA-C. Pharmacy is requesting a call back to clarify if Pt should get his meds filled or not. Please review, Thank you.      Pharmacy - [phone]

## 2024-08-20 NOTE — TELEPHONE ENCOUNTER
Called [facility] pharmacy to inform that pt is still on theose medications and they were accidentally d/c'd by his discharging provider from his last IP hospital stay. She verbalized an understanding.

## 2024-08-21 PROCEDURE — 88305 TISSUE EXAM BY PATHOLOGIST: CPT | Performed by: PATHOLOGY

## 2024-08-22 ENCOUNTER — TELEPHONE (OUTPATIENT)
Dept: GASTROENTEROLOGY | Facility: CLINIC | Age: 59
End: 2024-08-22

## 2024-08-22 NOTE — TELEPHONE ENCOUNTER
Spoke with pt , Monica Bourne, relayed test results from Dr. Gonzales. She verbalized understanding, no questions 10 year recall placed.

## 2024-09-11 PROBLEM — J69.0 ASPIRATION PNEUMONIA (HCC): Status: RESOLVED | Noted: 2024-08-12 | Resolved: 2024-09-11

## 2024-09-12 ENCOUNTER — OFFICE VISIT (OUTPATIENT)
Dept: SURGERY | Facility: CLINIC | Age: 59
End: 2024-09-12
Payer: MEDICARE

## 2024-09-12 VITALS — WEIGHT: 162.4 LBS | HEIGHT: 68 IN | BODY MASS INDEX: 24.61 KG/M2

## 2024-09-12 DIAGNOSIS — Z87.19 HISTORY OF SMALL BOWEL OBSTRUCTION: Primary | ICD-10-CM

## 2024-09-12 PROCEDURE — 99213 OFFICE O/P EST LOW 20 MIN: CPT | Performed by: SURGERY

## 2024-09-12 NOTE — PROGRESS NOTES
"Ambulatory Visit  Name: Mario Rollins      : 1965      MRN: [id number]  Encounter Provider: Dimitrios Pulido MD  Encounter Date: 2024   Encounter department: [facility]    Assessment & Plan    Small bowel obstruction followup  Patient presents for followup after surgery for lysis of adhesions and small bowel decompression during his hospital stay in 2024. XR abdomen 24 impression showed \"stenosis/partial obstruction at terminal ileum/ileocecal valve\" though \"oral contrast...reached the colon.\"   Currently no signs of SBO anymore except abdominal pain, which has improved significantly. No nausea/vomiting. Passing gas. No pencil stools. Having regular bowel movements.  Advised patient to monitor for symptoms of SBO in the future and to seek medical care as needed      History of Present Illness     Mario Rollins is a 59 y.o. male who presents for followup after surgery for lysis of adhesions and small bowel decompression during his hospital stay in 2024. XR abdomen 24 impression showed \"stenosis/partial obstruction at terminal ileum/ileocecal valve\" though \"oral contrast...reached the colon.\" Today he states that his abdomen pain is much improved. He has had no nausea/vomiting. He has been passing gas and having both small and large bowel movements, and he denies pencil stools.        History obtained from : patient  Review of Systems   Constitutional:  Negative for chills and fever.   HENT:  Negative for ear pain and sore throat.    Eyes:  Negative for pain and visual disturbance.   Respiratory:  Negative for cough and shortness of breath.    Cardiovascular:  Negative for chest pain.   Gastrointestinal:  Positive for abdominal pain (mild, improved in the past month). Negative for nausea and vomiting.   Genitourinary:  Negative for testicular pain.   Musculoskeletal:  Positive for myalgias (leg pain after exercises). Negative for back pain.   Skin:  " Negative for color change and rash.   Neurological:  Negative for headaches.   Psychiatric/Behavioral:  Negative for confusion.    All other systems reviewed and are negative.      Objective     There were no vitals taken for this visit.    Physical Exam  Vitals and nursing note reviewed.   Constitutional:       General: He is not in acute distress.     Appearance: He is well-developed.   HENT:      Head: Normocephalic and atraumatic.      Nose: No congestion or rhinorrhea.   Eyes:      General:         Right eye: No discharge.         Left eye: No discharge.      Conjunctiva/sclera: Conjunctivae normal.   Cardiovascular:      Rate and Rhythm: Normal rate and regular rhythm.      Heart sounds: No murmur heard.     No friction rub. No gallop.   Pulmonary:      Effort: Pulmonary effort is normal. No respiratory distress.      Breath sounds: Normal breath sounds. No stridor. No wheezing, rhonchi or rales.   Abdominal:      Palpations: Abdomen is soft.      Tenderness: There is abdominal tenderness (in mid abdomen but has improved significantly, per patient).   Musculoskeletal:         General: No swelling.      Cervical back: Neck supple. No rigidity.      Right lower leg: No edema.      Left lower leg: No edema.   Skin:     General: Skin is warm and dry.      Coloration: Skin is not jaundiced.   Neurological:      Mental Status: He is alert and oriented to person, place, and time.   Psychiatric:         Mood and Affect: Mood normal.

## 2024-10-31 ENCOUNTER — OFFICE VISIT (OUTPATIENT)
Age: 59
End: 2024-10-31
Payer: MEDICARE

## 2024-10-31 VITALS — HEIGHT: 68 IN | RESPIRATION RATE: 17 BRPM | BODY MASS INDEX: 24.55 KG/M2 | WEIGHT: 162 LBS

## 2024-10-31 DIAGNOSIS — M79.672 PAIN IN BOTH FEET: ICD-10-CM

## 2024-10-31 DIAGNOSIS — B35.9 DERMATOPHYTOSIS: Primary | ICD-10-CM

## 2024-10-31 DIAGNOSIS — M79.671 PAIN IN BOTH FEET: ICD-10-CM

## 2024-10-31 PROCEDURE — 11000 DBRDMT ECZ/INFECTED SKIN<10%: CPT | Performed by: PODIATRIST

## 2024-10-31 PROCEDURE — RECHECK: Performed by: PODIATRIST

## 2024-10-31 NOTE — PROGRESS NOTES
Assessment/Plan:  Pain upon ambulation.  Ingrown toenail.  Paronychia.  Mycosis of nail and skin.     Plan.  Chart reviewed.  Patient examined.  Abnormal skin debrided.  Staff advised on condition.         Diagnoses and all orders for this visit:     Acquired deformity of right ankle and foot     Paronychia of toenail, unspecified laterality     Onychomycosis     Dermatophytosis           Subjective:  Patient presents with staff.  He has complaint of pain in his toes and feet with ambulation.  No history of trauma.               Allergies   Allergen Reactions    Asa [Aspirin] Other (See Comments)       Reaction Date: 25Aug2008;   unknown            Current Outpatient Medications:     acetaminophen (TYLENOL) 325 mg tablet, Take 1 tablet (325 mg total) by mouth every 6 (six) hours as needed for mild pain, moderate pain or headaches, Disp: 30 tablet, Rfl: 0    ammonium lactate (LAC-HYDRIN) 12 % lotion, Apply topically 2 (two) times a day Applied to feet twice a day 8:00 a.m./8:00 p.m., Disp: 500 g, Rfl: 2    atorvastatin (LIPITOR) 10 mg tablet, Take 1 tablet (10 mg total) by mouth daily Take at 8 PM, Disp: 30 tablet, Rfl: 4    BYSTOLIC 2.5 MG tablet, TAKE ONE TABLET BY MOUTH DAILY AT 8AM, Disp: 30 tablet, Rfl: 4    hydrocortisone 2.5 % cream, Apply topically 2 (two) times a day, Disp: 30 g, Rfl: 2    lamoTRIgine (LaMICtal) 150 MG tablet, TAKE 1 TABLET BY MOUTH TWICE A DAY AT 8AM & AT 8 PM, Disp: 60 tablet, Rfl: 1    lamoTRIgine (LaMICtal) 25 mg tablet, TAKE TWO TABLETS BY MOUTH DAILY AT 8AM WITH 150MG TO TOTAL 200MG, Disp: 60 tablet, Rfl: 1    psyllium (REGULOID) 58.6 % powder, Take 1 teaspoonful mixed with 8oz of water orally every day at 8am, Disp: 1040 g, Rfl: 5    QUEtiapine (SEROquel) 50 mg tablet, , Disp: , Rfl:     senna (SENOKOT) 8.6 mg, TAKE ONE TABLET BY MOUTH DAILY AT 8AM FOR CONSTIPATION, Disp: 30 tablet, Rfl: 4    sertraline (ZOLOFT) 50 mg tablet, Take by mouth daily, Disp: , Rfl:            Patient  Active Problem List   Diagnosis    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    Acquired deformity of right ankle and foot    Ingrown nail    Pain in both feet    Constipation    Cerebral palsy (HCC)    Renal cyst    Class 1 obesity with body mass index (BMI) of 30.0 to 30.9 in adult    Hyperlipidemia    Lumbar radiculopathy    Spinal stenosis of lumbar region    Chronic left-sided low back pain with left-sided sciatica    Chronic pain syndrome    Impacted cerumen of left ear    Paronychia of toenail             Patient ID: Mario Rollins is a 59 y.o. male.     HPI     The following portions of the patient's history were reviewed and updated as appropriate:      family history includes Emphysema in his mother; Heart attack in his father; Hypertension in his father; Nephrolithiasis in his brother, father, and mother.       reports that he has never smoked. He has never used smokeless tobacco. He reports that he does not drink alcohol or use drugs.          Objective:  Patient's shoes and socks removed.   Foot ExamPhysical Exam          Foot Exam     General  General Appearance: appears stated age and healthy   Orientation: alert and oriented to person, place, and time   Gait: steppage         Right Foot/Ankle      Inspection and Palpation  Arch: pes cavus  Hammertoes: second toe, fifth toe, fourth toe and third toe  Hallux limitus: yes  Skin Exam: callus;         Left Foot/Ankle       Inspection and Palpation  Arch: pes cavus  Hammertoes: second toe, fifth toe, fourth toe and third toe  Hallux limitus: yes  Skin Exam: callus;               Right Foot/Ankle   Right Foot Inspection  Skin Exam: callus and callus                        Right Toe  - Comprehensive Exam  Arch: pes cavus  Hammertoes: second toe, fifth toe, fourth toe and third toe  Hallux limitus: yes     Left Foot/Ankle  Left Foot Inspection  Skin Exam: callus                       Left Toe  - Comprehensive Exam  Arch: pes cavus  Hammertoes: second toe,  fifth toe, fourth toe and third toe  Hallux limitus: yes          Physical Exam   Feet:   Right Foot:   Skin Integrity: Positive for callus.   Left Foot:   Skin Integrity: Positive for callus.         No swelling, mild pain on palpation right worse than left.  Nails are thickened dystrophic.  No signs of infection  Improving tinea pedis  No erythema no open wounds or signs of infection  Moderate xerosis plantar heels bilateral  Right hallux medial border positive ingrown mild paronychia negative erythema no exudate no signs of infection positive pain on palpation.     Periungual eczema secondary to modify ptosis noted of all 10 nails.                    Patient with progressive Upper extremity weakness since admission to OSH; PE with findings noted above;  MRI was done of the spine that showed multilevel discitis at the C4- C7 regions and T5- T8. There was also concern for an anterior paraspinal loculated fluid collection in the C4-C6 region concerning for epidural abscess.  -- neurosurgery contacted; f/u reccs  -- frequent neuro checks  -- Daily straight caths, patient has been incontinent  -- pain control with oxycodone 10mg q6h PRN  -- blood cultures drawn on 8/25 NGTD  -- decadron taper completed 9/4 Pt. has rising white count to ~15 today, afebrile. On daptomycin.   - UA, UCx, chest xray ordered to identify source of infection   - If all negative, consider rule out c.dif per Dr. Landis   - Follow WBCs  - Monitor for fever

## 2024-12-27 ENCOUNTER — TELEPHONE (OUTPATIENT)
Age: 59
End: 2024-12-27

## 2024-12-27 NOTE — TELEPHONE ENCOUNTER
Rufina called in to Request a Closer appt as Pt is in group home in [place].     F/U appt is scheduled 2/17/25 at 11:30 am with Dr. Whitley in [facility] office.     JENA

## 2025-01-07 DIAGNOSIS — G40.219 LOCALIZATION-RELATED SYMPTOMATIC EPILEPSY AND EPILEPTIC SYNDROMES WITH COMPLEX PARTIAL SEIZURES, INTRACTABLE, WITHOUT STATUS EPILEPTICUS (HCC): ICD-10-CM

## 2025-01-09 RX ORDER — LAMOTRIGINE 25 MG/1
TABLET ORAL
Qty: 60 TABLET | Refills: 3 | Status: SHIPPED | OUTPATIENT
Start: 2025-01-09

## 2025-01-22 ENCOUNTER — TELEPHONE (OUTPATIENT)
Age: 60
End: 2025-01-22

## 2025-01-22 NOTE — TELEPHONE ENCOUNTER
New Patient    What is the reason for the patient’s appointment? NP- f/u on previous kidney stones     What office location does the patient prefer? [place]    Does patient have Imaging/Lab Results:  In epic    Have patient records been requested?  If No, are the records showing in Epic:   In Epic    INSURANCE:   Do we accept the patient's insurance or is the patient Self-Pay?   CHLOÉ/Elena BOLDEN MA      HISTORY:   Has the patient had any previous Urologist(s)? No    Was the patient seen in the ED? No     Has the patient had any outside testing done? No     Does the patient have a personal history of cancer? No

## 2025-01-30 ENCOUNTER — OFFICE VISIT (OUTPATIENT)
Age: 60
End: 2025-01-30
Payer: MEDICARE

## 2025-01-30 DIAGNOSIS — B35.9 DERMATOPHYTOSIS: Primary | ICD-10-CM

## 2025-01-30 DIAGNOSIS — M79.672 PAIN IN BOTH FEET: ICD-10-CM

## 2025-01-30 DIAGNOSIS — M79.671 PAIN IN BOTH FEET: ICD-10-CM

## 2025-01-30 PROCEDURE — RECHECK: Performed by: PODIATRIST

## 2025-01-30 PROCEDURE — 11000 DBRDMT ECZ/INFECTED SKIN<10%: CPT | Performed by: PODIATRIST

## 2025-02-06 NOTE — PROGRESS NOTES
"Mario Rollins is a(n) 59 y.o. male. , :  1965    Subjective     Assessment:  The primary encounter diagnosis was Renal stone. A diagnosis of Family history of kidney stone was also pertinent to this visit.  Renal stones  Extensive bilateral medullary nephrocalcinosis   FH of stones in mother/father/brother   Never passed stones or needed surgery   KUB images difficult to access for stones.     CKD  CKD stage IV and follows with nephrology Dr. Duque     Plan:  Continue to f/u with nephrology for his CKD and help with stone prevention   F/u 1 year with renal US prior.   Will get CT scan if having flank pain.        Radiology  Numerous studies    2024 CT chest/abdomen/pelvis without contrast  KIDNEYS/URETERS: Limited evaluation without IV contrast. Extensive bilateral medullary nephrocalcinosis. No hydronephrosis.           Past Medical History  Hypertension  Cerebral palsy  CVA with right sided weakness   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  Right spastic hemiparesis  Epilepsy  Chronic pain  Spinal stenosis  Anxiety/depression  Small bowel obstruction  Chronic kidney disease and follows with nephrology Dr. Julián Duque    Past  History  Medullary sponge kidney  FH of stones in mother/father/brother     Past  surgical history           2025 OV Chetan Samuel  59-year-old male with multiple medical core morbidities and chronic kidney disease following with nephrology.  Currently presents to establish care for his history of medullary sponge kidney.   Was having abd pain with hx of small bowel obstuction and had CT done in ED 14 extensive bilateral medullary nephrocalcinosis that was unchanged.  Pt never passed any stones or needed surgery for his renal stones.     Review of Systems    Lab Results   Component Value Date    PSA 1.0 11/10/2017    PSA 1.2 2016     No results found for: \"TESTOSTERONE\"  No components found for: \"CR\"  No results found for: \"HBA1C\"    Objective " "    /80 (BP Location: Left arm, Patient Position: Sitting, Cuff Size: Standard)   Pulse 78   Ht 5' 8\" (1.727 m)   SpO2 98%   BMI 24.63 kg/m²     Physical Exam  HENT:      Head: Normocephalic.   Pulmonary:      Effort: Pulmonary effort is normal.   Musculoskeletal:      Comments: No CvA tenderness b/l    Neurological:      Comments: Right sided weakness            Chetan Samuel [facility]  "

## 2025-02-12 ENCOUNTER — OFFICE VISIT (OUTPATIENT)
Dept: UROLOGY | Facility: CLINIC | Age: 60
End: 2025-02-12

## 2025-02-12 VITALS
HEIGHT: 68 IN | HEART RATE: 78 BPM | OXYGEN SATURATION: 98 % | BODY MASS INDEX: 24.63 KG/M2 | SYSTOLIC BLOOD PRESSURE: 120 MMHG | DIASTOLIC BLOOD PRESSURE: 80 MMHG

## 2025-02-12 DIAGNOSIS — N20.0 RENAL STONE: Primary | ICD-10-CM

## 2025-02-12 DIAGNOSIS — Z84.1 FAMILY HISTORY OF KIDNEY STONE: ICD-10-CM

## 2025-02-14 ENCOUNTER — TRANSCRIBE ORDERS (OUTPATIENT)
Dept: LAB | Facility: CLINIC | Age: 60
End: 2025-02-14

## 2025-02-14 ENCOUNTER — OFFICE VISIT (OUTPATIENT)
Dept: OBGYN CLINIC | Facility: CLINIC | Age: 60
End: 2025-02-14
Payer: MEDICARE

## 2025-02-14 ENCOUNTER — APPOINTMENT (OUTPATIENT)
Dept: LAB | Facility: CLINIC | Age: 60
End: 2025-02-14
Payer: MEDICARE

## 2025-02-14 DIAGNOSIS — E83.39 HYPOPHOSPHATEMIA: Primary | ICD-10-CM

## 2025-02-14 DIAGNOSIS — M67.432 GANGLION OF LEFT WRIST: Primary | ICD-10-CM

## 2025-02-14 DIAGNOSIS — I10 ESSENTIAL HYPERTENSION, BENIGN: ICD-10-CM

## 2025-02-14 DIAGNOSIS — N18.2 CRD (CHRONIC RENAL DISEASE), STAGE II: ICD-10-CM

## 2025-02-14 LAB
25(OH)D3 SERPL-MCNC: 23.2 NG/ML (ref 30–100)
ALBUMIN SERPL BCG-MCNC: 4.4 G/DL (ref 3.5–5)
ALP SERPL-CCNC: 101 U/L (ref 34–104)
ALT SERPL W P-5'-P-CCNC: 19 U/L (ref 7–52)
ANION GAP SERPL CALCULATED.3IONS-SCNC: 7 MMOL/L (ref 4–13)
AST SERPL W P-5'-P-CCNC: 23 U/L (ref 13–39)
BASOPHILS # BLD AUTO: 0.03 THOUSANDS/ΜL (ref 0–0.1)
BASOPHILS NFR BLD AUTO: 0 % (ref 0–1)
BILIRUB SERPL-MCNC: 0.5 MG/DL (ref 0.2–1)
BUN SERPL-MCNC: 18 MG/DL (ref 5–25)
CALCIUM SERPL-MCNC: 9.5 MG/DL (ref 8.4–10.2)
CHLORIDE SERPL-SCNC: 104 MMOL/L (ref 96–108)
CO2 SERPL-SCNC: 26 MMOL/L (ref 21–32)
CREAT SERPL-MCNC: 1.09 MG/DL (ref 0.6–1.3)
EOSINOPHIL # BLD AUTO: 0.09 THOUSAND/ΜL (ref 0–0.61)
EOSINOPHIL NFR BLD AUTO: 1 % (ref 0–6)
ERYTHROCYTE [DISTWIDTH] IN BLOOD BY AUTOMATED COUNT: 12.9 % (ref 11.6–15.1)
GFR SERPL CREATININE-BSD FRML MDRD: 73 ML/MIN/1.73SQ M
GLUCOSE SERPL-MCNC: 109 MG/DL (ref 65–140)
HCT VFR BLD AUTO: 50.9 % (ref 36.5–49.3)
HGB BLD-MCNC: 16.2 G/DL (ref 12–17)
IMM GRANULOCYTES # BLD AUTO: 0.05 THOUSAND/UL (ref 0–0.2)
IMM GRANULOCYTES NFR BLD AUTO: 1 % (ref 0–2)
LYMPHOCYTES # BLD AUTO: 1.68 THOUSANDS/ΜL (ref 0.6–4.47)
LYMPHOCYTES NFR BLD AUTO: 22 % (ref 14–44)
MAGNESIUM SERPL-MCNC: 2 MG/DL (ref 1.9–2.7)
MCH RBC QN AUTO: 28.4 PG (ref 26.8–34.3)
MCHC RBC AUTO-ENTMCNC: 31.8 G/DL (ref 31.4–37.4)
MCV RBC AUTO: 89 FL (ref 82–98)
MONOCYTES # BLD AUTO: 0.8 THOUSAND/ΜL (ref 0.17–1.22)
MONOCYTES NFR BLD AUTO: 11 % (ref 4–12)
NEUTROPHILS # BLD AUTO: 4.99 THOUSANDS/ΜL (ref 1.85–7.62)
NEUTS SEG NFR BLD AUTO: 65 % (ref 43–75)
NRBC BLD AUTO-RTO: 0 /100 WBCS
PHOSPHATE SERPL-MCNC: 2.7 MG/DL (ref 2.7–4.5)
PLATELET # BLD AUTO: 208 THOUSANDS/UL (ref 149–390)
PMV BLD AUTO: 11.2 FL (ref 8.9–12.7)
POTASSIUM SERPL-SCNC: 4.2 MMOL/L (ref 3.5–5.3)
PROT SERPL-MCNC: 7.2 G/DL (ref 6.4–8.4)
RBC # BLD AUTO: 5.71 MILLION/UL (ref 3.88–5.62)
SODIUM SERPL-SCNC: 137 MMOL/L (ref 135–147)
WBC # BLD AUTO: 7.64 THOUSAND/UL (ref 4.31–10.16)

## 2025-02-14 PROCEDURE — 80053 COMPREHEN METABOLIC PANEL: CPT

## 2025-02-14 PROCEDURE — 82306 VITAMIN D 25 HYDROXY: CPT

## 2025-02-14 PROCEDURE — 99214 OFFICE O/P EST MOD 30 MIN: CPT | Performed by: STUDENT IN AN ORGANIZED HEALTH CARE EDUCATION/TRAINING PROGRAM

## 2025-02-14 PROCEDURE — 36415 COLL VENOUS BLD VENIPUNCTURE: CPT

## 2025-02-14 PROCEDURE — 84100 ASSAY OF PHOSPHORUS: CPT

## 2025-02-14 PROCEDURE — 83735 ASSAY OF MAGNESIUM: CPT

## 2025-02-14 PROCEDURE — 99204 OFFICE O/P NEW MOD 45 MIN: CPT | Performed by: STUDENT IN AN ORGANIZED HEALTH CARE EDUCATION/TRAINING PROGRAM

## 2025-02-14 PROCEDURE — 85025 COMPLETE CBC W/AUTO DIFF WBC: CPT

## 2025-02-17 ENCOUNTER — TELEPHONE (OUTPATIENT)
Age: 60
End: 2025-02-17

## 2025-02-17 ENCOUNTER — OFFICE VISIT (OUTPATIENT)
Age: 60
End: 2025-02-17
Payer: MEDICARE

## 2025-02-17 VITALS
HEART RATE: 88 BPM | HEIGHT: 68 IN | DIASTOLIC BLOOD PRESSURE: 84 MMHG | BODY MASS INDEX: 24.63 KG/M2 | OXYGEN SATURATION: 99 % | SYSTOLIC BLOOD PRESSURE: 134 MMHG

## 2025-02-17 DIAGNOSIS — G40.219 LOCALIZATION-RELATED SYMPTOMATIC EPILEPSY AND EPILEPTIC SYNDROMES WITH COMPLEX PARTIAL SEIZURES, INTRACTABLE, WITHOUT STATUS EPILEPTICUS (HCC): ICD-10-CM

## 2025-02-17 DIAGNOSIS — G80.2 SPASTIC HEMIPLEGIC CEREBRAL PALSY (HCC): ICD-10-CM

## 2025-02-17 PROCEDURE — 99214 OFFICE O/P EST MOD 30 MIN: CPT

## 2025-02-17 RX ORDER — LAMOTRIGINE 25 MG/1
TABLET ORAL
Qty: 60 TABLET | Refills: 3 | Status: SHIPPED | OUTPATIENT
Start: 2025-02-17

## 2025-02-17 RX ORDER — LAMOTRIGINE 150 MG/1
TABLET ORAL
Qty: 60 TABLET | Refills: 0 | Status: SHIPPED | OUTPATIENT
Start: 2025-02-17

## 2025-02-17 NOTE — PROGRESS NOTES
Name: Mario Rollins      : 1965      MRN: [id number]  Encounter Provider: Dave Whitley MD  Encounter Date: 2025   Encounter department: [facility]  :  Assessment & Plan  Localization-related symptomatic epilepsy and epileptic syndromes with complex partial seizures, intractable, without status epilepticus (HCC)    Orders:    lamoTRIgine (LaMICtal) 150 MG tablet; Take 1 tab by mouth twice a day at 8AM and 8PM    lamoTRIgine (LaMICtal) 25 mg tablet; TAKE TWO TABLETS BY MOUTH DAILY AT 8AM WITH 150MG TO TOTAL 200MG    Lamotrigine level; Future      Patient is a 59 year old male with PMH of epilepsy, cerebral palsy from an early life left MCA stroke s/p surgery who presents for seizures. He is here with Rufina, an assistant at his group home.     He takes lamotrigine 200 mg in AM and 150mg in PM. He has not had any seizures since his last visit, his last seizure was many years ago. He is tolerating lamcital well. His usual seizures consist of right wilian-body shaking.    Plan:  - Continue lamictal 200mg AM and 150mg PM  - Ordered lamotrigine levels  - Follow up in 6 months        History of Present Illness   HPI     Mario Silva is a 59 year old male with PMH of epilepsy, cerebral palsy from an early life left MCA stroke s/p surgery who presents for seizures. He is here with Rufina, an assistant at his group home.     He takes lamotrigine 200 mg in AM and 150mg in PM. He has not had any seizures since his last visit, his last seizure was many years ago. He is tolerating lamcital well. Per patient, last seizures was in .     He last saw Dr. Salazar one year ago - told to continue Lamictal. At that time, he had been seizure free for many years.    EEG  showed left temporal spikes.      Seizure History Per Dr. Salazar's Note from 2024:  Event/Seizure semiology:  Unknown seizure type - but presumably right hemibody activity with head version to the right,  secondary generalization (none since 2001)     Prior Epilepsy History:  Intake History 9/23/2020  There is one note from Dr Minh Julian from [facility] from 10/28/2013. It referred to the patient having left hemispheric onset partial epilepsy.  He was seen by Dr. Sherman in the past.  He has not had a seizure.  He was previously on phenytoin, clonazepam, topiramate, phenobarbital for seizures.  He was on lamotrigine 150mg twice a day at the time.  There is a history of cerebrovascular accident, developmental disability, lives in a group home.  He saw Dr. Yepez for consultation on 9/5/2018.  He had a stroke at age of 4, necessitating brain surgery.  He had seizures at age 5 or 6.  He reported that his last seizure was in 2001.  There is a reference to a prior EEG from 12/6/2017 that found continuous slowing and sharp waves over the left temporal region.  There was a video/EEG monitoring study 10/24-11/1/2011, interpreted by Dr. Tona Pike that showed left temporal epileptogenic focus.     He is here with Prabha, his aide from [employer].  She has known Mr. Rollins for the past 4 years.  He had surgery on his brain when he was a child, but there was questionable seizures when he was in the womb.    There have been no seizures for since 2001.  He recalled being on Topamax, but it was discontinued due to kidney stones.   He is very independent; he does his own hygiene, showering, cleaning, and cooking.    The patient denies any history of myoclonus, staring spells, automatisms, unexplained hyperkinetic behaviors, unexplained nocturnal enuresis, or nocturnal tongue biting.     Interval History 9/29/2021  -150.  He denies episodes of convulsions or loss of consciousness.  Sometimes his left hand has shaking but more so when he was on Depakote (but he not been on it).  He may experience periods of confusion, but he is unable to fully explain, it may feel  like he does not know what is going on.  There has been no reports of him becoming unresponsive, incoherent, or appearing to be spacing out.  Mr. Rollins is generally self sufficient, cooks his own meals, bathing, and grooming.      Interval History 2022  -150.  There has been no seizure in the last year.  He is not aware of any recurrent uncontrollable jerking or twitching over the right side of his body.      Special Features  Status epilepticus: No  Self Injury Seizures: No  Precipitating Factors: None     Epilepsy Risk Factors:  Abnormal pregnancy: unknown  Abnormal birth/: unknown  Abnormal Development: unknown  Febrile seizures, simple: unknown  Febrile seizures, complex: unknown  CNS infection: No  Mental retardation: mild intellectual disability  Cerebral palsy: Yes right spastic hemiparesis  Head injury (moderate/severe): No  CNS neoplasm: No  CNS malformation: No  Neurosurgical procedure: yes left craniectomy with presence of clips  Stroke: possible based on the head CT study  Alcohol abuse: No  Drug abuse: No  Family history Sz/epilepsy: unknown     Prior AEDs:  medication Reason to stop   phenytoin Gait difficulty   divalproex tremors   topiramate Kidney stones   phenobarbital     lamotrigine        Prior workup:  x  Imagin2011  There are multiple metallic artifacts over the left cerebral hemisphere.  There is a remote craniotomy.  There is extensive encephalomalacia and atrophy over the left cerebral hemisphere over the left MCA territory.  Right cerebral hemisphere has normal development.  Absence of corpus callosum.     EEGs:  2021  Occasional left temporal spikes  Asymmetric background with left hemispheric theta-delta activity, breach rhythm over the left parietal and posterior region.     -2021 - 48 hours ambulatory   There are occasional broad based P3/Pz maximal sharp waves.  There are rare independent Fp1 maximal sharp waves (see 21:42:10).  There are  "frequent T3 maximal sharp waves, seen during sleep.  The left hemisphere has continuous moderate voltage 1-4 Hz delta activity and attenuation of beta-alpha activity in the anterior temporal region and frontal region.   No seizures were captured     Labs:  Component      Latest Ref Rng & Units 6/21/2021   LAMOTRIGINE LEVEL      2.0 - 20.0 ug/mL 7.3   10/10/2023  LFT 7.0/4.3/0.5/1/12/126   10/25/2023  CBC 7.2/15/47/225       Review of Systems   Constitutional:  Negative for appetite change, fatigue and fever.   HENT: Negative.  Negative for hearing loss, tinnitus, trouble swallowing and voice change.    Eyes: Negative.  Negative for photophobia, pain and visual disturbance.   Respiratory: Negative.  Negative for shortness of breath.    Cardiovascular: Negative.  Negative for palpitations.   Gastrointestinal: Negative.  Negative for nausea and vomiting.   Endocrine: Negative.  Negative for cold intolerance.   Genitourinary: Negative.  Negative for dysuria, frequency and urgency.   Musculoskeletal:  Positive for gait problem. Negative for back pain, myalgias, neck pain and neck stiffness.   Skin: Negative.  Negative for rash.   Allergic/Immunologic: Negative.    Neurological:  Negative for dizziness, tremors, seizures, syncope, facial asymmetry, speech difficulty, weakness, light-headedness, numbness and headaches.        Pain in legs from cold weather, patient denies seizure activity     Hematological: Negative.  Does not bruise/bleed easily.   Psychiatric/Behavioral: Negative.  Negative for confusion, hallucinations and sleep disturbance.     I have personally reviewed the MA's review of systems and made changes as necessary.         Objective   There were no vitals taken for this visit.    Physical Exam  Neurological Exam  /84 (Patient Position: Sitting, Cuff Size: Adult)   Pulse 88   Ht 5' 8\" (1.727 m)   SpO2 99%   BMI 24.63 kg/m²      General Exam  General: well developed, no acute distress.  HEENT: " mucous membranes moist, anicteric sclera.   Neck: supple, good ROM.      Neurological Exam  Mental Status: awake, alert, and fully oriented to person, place, time, and situation. Has delayed responses, dysarthric speech    Language: able to name and repeat.     Cranial Nerves: Pupils equal and reactive to light.  Visual fields full to confrontation. Extraocular motions intact with full versions, normal pursuits and saccades. Mild right facial droop. Facial sensation intact in V1-V3. Hearing intact to voice. Tongue protrudes to midline.     Motor: Normal bulk and tone in left arm. Normal strength in left arm and leg. Right arm and leg are contracted and spastic, has 4/5 strength.      Coordination: Normal finger-to-nose in left arm    Station and gait: patient is wheel chair bound    Reflexes: Reflexes 1+ throughout and symmetric.       Administrative Statements   I have spent a total time of 45 minutes in caring for this patient on the day of the visit/encounter including Diagnostic results, Prognosis, Risks and benefits of tx options, Instructions for management, Patient and family education, Importance of tx compliance, Risk factor reductions, Impressions, Counseling / Coordination of care, Reviewing/placing orders in the medical record (including tests, medications, and/or procedures), and Obtaining or reviewing history  .

## 2025-02-17 NOTE — TELEPHONE ENCOUNTER
Monica (JACINTA) calling from the group home to inform they need a Seizure action plan faxed to them, she apologized as she did not realize they need it, until after his appt today.    [facility]  Attn: Monica  Fax # [phone]    Any questions please call Monica at

## 2025-02-17 NOTE — ASSESSMENT & PLAN NOTE
Orders:    lamoTRIgine (LaMICtal) 150 MG tablet; Take 1 tab by mouth twice a day at 8AM and 8PM    lamoTRIgine (LaMICtal) 25 mg tablet; TAKE TWO TABLETS BY MOUTH DAILY AT 8AM WITH 150MG TO TOTAL 200MG    Lamotrigine level; Future      Patient is a 59 year old male with PMH of epilepsy, cerebral palsy from an early life left MCA stroke s/p surgery who presents for seizures. He is here with Rufina, an assistant at his group home.     He takes lamotrigine 200 mg in AM and 150mg in PM. He has not had any seizures since his last visit, his last seizure was many years ago. He is tolerating lamcital well. His usual seizures consist of right wilian-body shaking.    Plan:  - Continue lamictal 200mg AM and 150mg PM  - Ordered lamotrigine levels  - Follow up in 6 months

## 2025-02-18 ENCOUNTER — APPOINTMENT (EMERGENCY)
Dept: RADIOLOGY | Facility: HOSPITAL | Age: 60
DRG: 389 | End: 2025-02-18
Payer: MEDICARE

## 2025-02-18 ENCOUNTER — HOSPITAL ENCOUNTER (INPATIENT)
Facility: HOSPITAL | Age: 60
LOS: 5 days | Discharge: HOME | DRG: 389 | End: 2025-02-23
Attending: EMERGENCY MEDICINE | Admitting: SURGERY
Payer: MEDICARE

## 2025-02-18 DIAGNOSIS — R10.9 ABDOMINAL PAIN: ICD-10-CM

## 2025-02-18 DIAGNOSIS — R11.2 NAUSEA AND VOMITING: ICD-10-CM

## 2025-02-18 DIAGNOSIS — K56.609 SBO (SMALL BOWEL OBSTRUCTION) (HCC): Primary | ICD-10-CM

## 2025-02-18 DIAGNOSIS — N39.0 UTI (URINARY TRACT INFECTION): ICD-10-CM

## 2025-02-18 PROBLEM — I10 HTN (HYPERTENSION): Status: ACTIVE | Noted: 2025-02-18

## 2025-02-18 LAB
ALBUMIN SERPL BCG-MCNC: 4.7 G/DL (ref 3.5–5)
ALP SERPL-CCNC: 87 U/L (ref 34–104)
ALT SERPL W P-5'-P-CCNC: 19 U/L (ref 7–52)
ANION GAP SERPL CALCULATED.3IONS-SCNC: 11 MMOL/L (ref 4–13)
AST SERPL W P-5'-P-CCNC: 18 U/L (ref 13–39)
BACTERIA UR QL AUTO: ABNORMAL /HPF
BASOPHILS # BLD AUTO: 0.03 THOUSANDS/ΜL (ref 0–0.1)
BASOPHILS NFR BLD AUTO: 0 % (ref 0–1)
BILIRUB SERPL-MCNC: 0.43 MG/DL (ref 0.2–1)
BILIRUB UR QL STRIP: NEGATIVE
BUN SERPL-MCNC: 20 MG/DL (ref 5–25)
CALCIUM SERPL-MCNC: 9.7 MG/DL (ref 8.4–10.2)
CHLORIDE SERPL-SCNC: 102 MMOL/L (ref 96–108)
CLARITY UR: ABNORMAL
CO2 SERPL-SCNC: 23 MMOL/L (ref 21–32)
COLOR UR: YELLOW
CREAT SERPL-MCNC: 1.13 MG/DL (ref 0.6–1.3)
EOSINOPHIL # BLD AUTO: 0 THOUSAND/ΜL (ref 0–0.61)
EOSINOPHIL NFR BLD AUTO: 0 % (ref 0–6)
ERYTHROCYTE [DISTWIDTH] IN BLOOD BY AUTOMATED COUNT: 13.8 % (ref 11.6–15.1)
FLUAV AG UPPER RESP QL IA.RAPID: NEGATIVE
FLUBV AG UPPER RESP QL IA.RAPID: NEGATIVE
GFR SERPL CREATININE-BSD FRML MDRD: 70 ML/MIN/1.73SQ M
GLUCOSE SERPL-MCNC: 132 MG/DL (ref 65–140)
GLUCOSE UR STRIP-MCNC: NEGATIVE MG/DL
HCT VFR BLD AUTO: 56.8 % (ref 36.5–49.3)
HGB BLD-MCNC: 18.9 G/DL (ref 12–17)
HGB UR QL STRIP.AUTO: ABNORMAL
IMM GRANULOCYTES # BLD AUTO: 0.01 THOUSAND/UL (ref 0–0.2)
IMM GRANULOCYTES NFR BLD AUTO: 0 % (ref 0–2)
KETONES UR STRIP-MCNC: NEGATIVE MG/DL
LACTATE SERPL-SCNC: 1.7 MMOL/L (ref 0.5–2)
LEUKOCYTE ESTERASE UR QL STRIP: ABNORMAL
LIPASE SERPL-CCNC: 10 U/L (ref 11–82)
LYMPHOCYTES # BLD AUTO: 0.42 THOUSANDS/ΜL (ref 0.6–4.47)
LYMPHOCYTES NFR BLD AUTO: 5 % (ref 14–44)
MCH RBC QN AUTO: 29.1 PG (ref 26.8–34.3)
MCHC RBC AUTO-ENTMCNC: 33.1 G/DL (ref 31.4–37.4)
MCV RBC AUTO: 88 FL (ref 82–98)
MONOCYTES # BLD AUTO: 0.94 THOUSAND/ΜL (ref 0.17–1.22)
MONOCYTES NFR BLD AUTO: 11 % (ref 4–12)
MUCOUS THREADS UR QL AUTO: ABNORMAL
NEUTROPHILS # BLD AUTO: 7.49 THOUSANDS/ΜL (ref 1.85–7.62)
NEUTS SEG NFR BLD AUTO: 84 % (ref 43–75)
NITRITE UR QL STRIP: NEGATIVE
NON-SQ EPI CELLS URNS QL MICRO: ABNORMAL /HPF
NRBC BLD AUTO-RTO: 0 /100 WBCS
PH UR STRIP.AUTO: 6.5 [PH]
PLATELET # BLD AUTO: 192 THOUSANDS/UL (ref 149–390)
PMV BLD AUTO: 10.4 FL (ref 8.9–12.7)
POTASSIUM SERPL-SCNC: 3.8 MMOL/L (ref 3.5–5.3)
PROT SERPL-MCNC: 8 G/DL (ref 6.4–8.4)
PROT UR STRIP-MCNC: ABNORMAL MG/DL
RBC # BLD AUTO: 6.49 MILLION/UL (ref 3.88–5.62)
RBC #/AREA URNS AUTO: ABNORMAL /HPF
SARS-COV+SARS-COV-2 AG RESP QL IA.RAPID: NEGATIVE
SODIUM SERPL-SCNC: 136 MMOL/L (ref 135–147)
SP GR UR STRIP.AUTO: 1.02 (ref 1–1.03)
UROBILINOGEN UR STRIP-ACNC: <2 MG/DL
WBC # BLD AUTO: 8.89 THOUSAND/UL (ref 4.31–10.16)
WBC #/AREA URNS AUTO: ABNORMAL /HPF

## 2025-02-18 PROCEDURE — 99285 EMERGENCY DEPT VISIT HI MDM: CPT | Performed by: EMERGENCY MEDICINE

## 2025-02-18 PROCEDURE — 96361 HYDRATE IV INFUSION ADD-ON: CPT

## 2025-02-18 PROCEDURE — 87811 SARS-COV-2 COVID19 W/OPTIC: CPT | Performed by: EMERGENCY MEDICINE

## 2025-02-18 PROCEDURE — 96375 TX/PRO/DX INJ NEW DRUG ADDON: CPT

## 2025-02-18 PROCEDURE — 83605 ASSAY OF LACTIC ACID: CPT | Performed by: EMERGENCY MEDICINE

## 2025-02-18 PROCEDURE — 99284 EMERGENCY DEPT VISIT MOD MDM: CPT

## 2025-02-18 PROCEDURE — 87186 SC STD MICRODIL/AGAR DIL: CPT | Performed by: EMERGENCY MEDICINE

## 2025-02-18 PROCEDURE — 83690 ASSAY OF LIPASE: CPT | Performed by: EMERGENCY MEDICINE

## 2025-02-18 PROCEDURE — 74177 CT ABD & PELVIS W/CONTRAST: CPT

## 2025-02-18 PROCEDURE — 87077 CULTURE AEROBIC IDENTIFY: CPT | Performed by: EMERGENCY MEDICINE

## 2025-02-18 PROCEDURE — 96374 THER/PROPH/DIAG INJ IV PUSH: CPT

## 2025-02-18 PROCEDURE — 80053 COMPREHEN METABOLIC PANEL: CPT | Performed by: EMERGENCY MEDICINE

## 2025-02-18 PROCEDURE — 85025 COMPLETE CBC W/AUTO DIFF WBC: CPT | Performed by: EMERGENCY MEDICINE

## 2025-02-18 PROCEDURE — 99223 1ST HOSP IP/OBS HIGH 75: CPT | Performed by: PHYSICIAN ASSISTANT

## 2025-02-18 PROCEDURE — 81001 URINALYSIS AUTO W/SCOPE: CPT | Performed by: EMERGENCY MEDICINE

## 2025-02-18 PROCEDURE — 87804 INFLUENZA ASSAY W/OPTIC: CPT | Performed by: EMERGENCY MEDICINE

## 2025-02-18 PROCEDURE — 36415 COLL VENOUS BLD VENIPUNCTURE: CPT | Performed by: EMERGENCY MEDICINE

## 2025-02-18 PROCEDURE — 87086 URINE CULTURE/COLONY COUNT: CPT | Performed by: EMERGENCY MEDICINE

## 2025-02-18 RX ORDER — ONDANSETRON 2 MG/ML
4 INJECTION INTRAMUSCULAR; INTRAVENOUS ONCE
Status: DISCONTINUED | OUTPATIENT
Start: 2025-02-18 | End: 2025-02-18

## 2025-02-18 RX ORDER — MORPHINE SULFATE 4 MG/ML
4 INJECTION, SOLUTION INTRAMUSCULAR; INTRAVENOUS ONCE
Status: COMPLETED | OUTPATIENT
Start: 2025-02-18 | End: 2025-02-18

## 2025-02-18 RX ORDER — HEPARIN SODIUM 5000 [USP'U]/ML
5000 INJECTION, SOLUTION INTRAVENOUS; SUBCUTANEOUS EVERY 8 HOURS SCHEDULED
Status: DISCONTINUED | OUTPATIENT
Start: 2025-02-18 | End: 2025-02-23 | Stop reason: HOSPADM

## 2025-02-18 RX ORDER — SODIUM CHLORIDE, SODIUM LACTATE, POTASSIUM CHLORIDE, CALCIUM CHLORIDE 600; 310; 30; 20 MG/100ML; MG/100ML; MG/100ML; MG/100ML
125 INJECTION, SOLUTION INTRAVENOUS CONTINUOUS
Status: DISCONTINUED | OUTPATIENT
Start: 2025-02-18 | End: 2025-02-19

## 2025-02-18 RX ORDER — HYDROMORPHONE HCL IN WATER/PF 6 MG/30 ML
0.2 PATIENT CONTROLLED ANALGESIA SYRINGE INTRAVENOUS EVERY 6 HOURS PRN
Status: DISCONTINUED | OUTPATIENT
Start: 2025-02-18 | End: 2025-02-23 | Stop reason: HOSPADM

## 2025-02-18 RX ORDER — ACETAMINOPHEN 160 MG/5ML
650 SUSPENSION ORAL EVERY 6 HOURS PRN
Status: DISCONTINUED | OUTPATIENT
Start: 2025-02-18 | End: 2025-02-23 | Stop reason: HOSPADM

## 2025-02-18 RX ORDER — CEFTRIAXONE 1 G/50ML
1000 INJECTION, SOLUTION INTRAVENOUS ONCE
Status: COMPLETED | OUTPATIENT
Start: 2025-02-18 | End: 2025-02-18

## 2025-02-18 RX ORDER — LIDOCAINE HYDROCHLORIDE 20 MG/ML
1 JELLY TOPICAL ONCE
Status: COMPLETED | OUTPATIENT
Start: 2025-02-18 | End: 2025-02-18

## 2025-02-18 RX ORDER — ONDANSETRON 2 MG/ML
4 INJECTION INTRAMUSCULAR; INTRAVENOUS ONCE
Status: COMPLETED | OUTPATIENT
Start: 2025-02-18 | End: 2025-02-18

## 2025-02-18 RX ADMIN — HEPARIN SODIUM 5000 UNITS: 5000 INJECTION, SOLUTION INTRAVENOUS; SUBCUTANEOUS at 16:50

## 2025-02-18 RX ADMIN — SODIUM CHLORIDE 1000 ML: 0.9 INJECTION, SOLUTION INTRAVENOUS at 14:43

## 2025-02-18 RX ADMIN — SODIUM CHLORIDE 1000 ML: 0.9 INJECTION, SOLUTION INTRAVENOUS at 10:17

## 2025-02-18 RX ADMIN — MORPHINE SULFATE 4 MG: 4 INJECTION INTRAVENOUS at 10:19

## 2025-02-18 RX ADMIN — SODIUM CHLORIDE, SODIUM LACTATE, POTASSIUM CHLORIDE, AND CALCIUM CHLORIDE 125 ML/HR: .6; .31; .03; .02 INJECTION, SOLUTION INTRAVENOUS at 16:49

## 2025-02-18 RX ADMIN — CEFTRIAXONE 1000 MG: 1 INJECTION, SOLUTION INTRAVENOUS at 14:03

## 2025-02-18 RX ADMIN — IOHEXOL 50 ML: 240 INJECTION, SOLUTION INTRATHECAL; INTRAVASCULAR; INTRAVENOUS; ORAL at 10:29

## 2025-02-18 RX ADMIN — ONDANSETRON 4 MG: 2 INJECTION INTRAMUSCULAR; INTRAVENOUS at 10:19

## 2025-02-18 RX ADMIN — HEPARIN SODIUM 5000 UNITS: 5000 INJECTION, SOLUTION INTRAVENOUS; SUBCUTANEOUS at 23:55

## 2025-02-18 RX ADMIN — IOHEXOL 100 ML: 350 INJECTION, SOLUTION INTRAVENOUS at 12:32

## 2025-02-18 RX ADMIN — LIDOCAINE HYDROCHLORIDE 1 APPLICATION: 20 JELLY TOPICAL at 14:30

## 2025-02-18 NOTE — LETTER
[address] [address]  Dept: [phone]    February 23, 2025     Patient: Mario Rollins   YOB: 1965   Date of Visit: 2/18/2025       To Whom it May Concern:    Mario Rollins is under my professional care. He was seen in the hospital from 2/18/2025 to 02/23/25. He  may return to day program without limitations.  .    If you have any questions or concerns, please don't hesitate to call.         Sincerely,          Morena Broderick PA-C

## 2025-02-18 NOTE — ED NOTES
Pt assisted to the bathroom with a wheelchair and CHAVO Obando.     Olga Lakhani RN  02/18/25 [id number]

## 2025-02-18 NOTE — ED NOTES
Pt began drinking oral contrast at this time. Will be ready for CT at 12:30.     Olga Lakhani RN  02/18/25 [id number]

## 2025-02-18 NOTE — ED PROVIDER NOTES
Time reflects when diagnosis was documented in both MDM as applicable and the Disposition within this note       Time User Action Codes Description Comment    2/18/2025  1:50 PM Eri Soler Add [R10.9] Abdominal pain     2/18/2025  1:50 PM Eri Soler Add [R11.2] Nausea and vomiting     2/18/2025  1:50 PM Eri Soler Add [K56.609] SBO (small bowel obstruction) (HCC)     2/18/2025  1:50 PM Eri Soler Modify [R10.9] Abdominal pain     2/18/2025  1:50 PM Eri Soler Modify [K56.609] SBO (small bowel obstruction) (HCC)     2/18/2025  1:50 PM Eri Soler Add [N39.0] UTI (urinary tract infection)           ED Disposition       ED Disposition   Admit    Condition   Stable    Date/Time   Tue Feb 18, 2025  1:50 PM    Comment   Case was discussed with Surg and the patient's admission status was agreed to be Admission Status: inpatient status to the service of Dr. Pulido.               Assessment & Plan       Medical Decision Making  Pt is a 60yo M who presents with abdominal pain, nausea, and vomiting.     Differential diagnosis to include but not limited to obstruction, pancreatitis, gastritis, dehydration, electrolyte abnormality, bowel ischemia.  Will plan for labs and imaging.  Will treat symptomatically and reassess.  See ED course for results and details.    Plan to admit pt to Surg. Pt discussed with admitting team and admission orders placed. Pt admitted without incident.         Amount and/or Complexity of Data Reviewed  Labs: ordered. Decision-making details documented in ED Course.  Radiology: ordered. Decision-making details documented in ED Course.    Risk  Prescription drug management.  Decision regarding hospitalization.        ED Course as of 02/18/25 1650   Tue Feb 18, 2025   1034 WBC: 8.89  WNL   1034 CBC and differential(!)  Reviewed and without actionable derangement.    1038 Comprehensive metabolic panel  Reviewed and without actionable derangement.    1038 LIPASE(!):  10  Negative.    1047 FLU/COVID Rapid Antigen (30 min. TAT) - Preferred screening test in ED  Negative.    1047 LACTIC ACID: 1.7  Negative.    1234 Leukocytes, UA(!): Large   1234 Nitrite, UA: Negative   1234 Blood, UA(!): Moderate  Awaiting micro.    1310 WBC, UA(!): 4-10   1310 Bacteria, UA(!): Moderate  Concerning for UTI. Will treat and send for culture.    1343 CT abdomen pelvis with contrast  1.  Small bowel obstruction with a transition point in the right lower quadrant at the level of the ileum. Small volume ascites, which is nonspecific but can be seen with associated ischemia.  2.  Patchy groundglass opacities in the lung bases, likely infectious/inflammatory.  3.  Hiatal hernia with distal esophagitis.  4.  Diffuse urinary bladder wall thickening, which could reflect cystitis. Correlate with urinalysis.   1345 Surg made aware via EpicChat.        Medications   ondansetron (ZOFRAN) injection 4 mg (4 mg Intravenous Given 2/18/25 1019)   morphine injection 4 mg (4 mg Intravenous Given 2/18/25 1019)   sodium chloride 0.9 % bolus 1,000 mL (0 mL Intravenous Stopped 2/18/25 1117)   iohexol (OMNIPAQUE) 240 MG/ML solution 50 mL (50 mL Oral Given 2/18/25 1029)   iohexol (OMNIPAQUE) 350 MG/ML injection (MULTI-DOSE) 100 mL (100 mL Intravenous Given 2/18/25 1232)   cefTRIAXone (ROCEPHIN) IVPB (premix in dextrose) 1,000 mg 50 mL (0 mg Intravenous Stopped 2/18/25 1433)   lidocaine (URO-JET) 2 % urethral/mucosal gel 1 Application (1 Application Urethral Given 2/18/25 1430)   sodium chloride 0.9 % bolus 1,000 mL (1,000 mL Intravenous New Bag 2/18/25 1443)       ED Risk Strat Scores                                                History of Present Illness       Chief Complaint   Patient presents with    Abdominal Pain     Pt arrives via EMS from [facility]. Mid abd pain that began last night with vomiting. Per EMS, hx of blockages with surgical removal last year.        Past Medical History:   Diagnosis Date     Ambulatory dysfunction     Anxiety     Bilateral impacted cerumen     last assessed 05/30/2013    Capsulitis     last assessed 04/26/2016    Cellulitis of finger 01/13/2012    Chronic kidney disease     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)     Closed fracture of one or more phalanges of foot 01/06/2009    Constipation     Deformity     left and right foot and ankle ,right hand    Depression     Epilepsy (HCC)     Erythrocytosis     Hemiplegia affecting dominant side (HCC)     Hypertension     Hypoglycemia 11/08/2011    Hypokalemia     last assessed 05/30/2013    Mental retardation     Mood disorder (HCC)     Nephrocalcinosis     chronic    Polycystic kidney     Bilateral    Small bowel obstruction (HCC)       Past Surgical History:   Procedure Laterality Date    BRAIN SURGERY      EXPLORATORY LAPAROTOMY W/ BOWEL RESECTION N/A 4/6/2024    Procedure: LAPAROTOMY EXPLORATORY WITH LYSIS OF ADHESIONS AND DECOMPRESSION;  Surgeon: Saúl Woods MD;  Location: [facility] MAIN OR;  Service: General    NH ARTHROCENTESIS ASPIR&/INJ MAJOR JT/BURSA W/O US Bilateral 5/27/2021    Procedure: Hip intra-articular corticosteroid injection ( 58916 41388-77);  Surgeon: Lianet Astorga MD;  Location: [facility] MAIN OR;  Service: Pain Management     NH COLONOSCOPY FLX DX W/COLLJ SPEC WHEN PFRMD N/A 2/12/2016    Procedure: COLONOSCOPY;  Surgeon: Abhilash Ace MD;  Location: [facility] GI LAB;  Service: Gastroenterology    NH INJECT SI JOINT ARTHRGRPHY&/ANES/STEROID W/RIMA Left 3/22/2023    Procedure: SACROILIAC joint injection (30581 );  Surgeon: Larry Smith DO;  Location: [facility] MAIN OR;  Service: Pain Management       Family History   Problem Relation Age of Onset    Emphysema Mother     Nephrolithiasis Mother     Heart attack Father         acute MI    Hypertension Father     Nephrolithiasis Father     Nephrolithiasis Brother       Social History     Tobacco Use    Smoking status: Never     Passive exposure: Never    Smokeless tobacco:  Never   Vaping Use    Vaping status: Never Used   Substance Use Topics    Alcohol use: Not Currently    Drug use: No      E-Cigarette/Vaping    E-Cigarette Use Never User       E-Cigarette/Vaping Substances    Nicotine No     THC No     CBD No     Flavoring No     Other No     Unknown No       I have reviewed and agree with the history as documented.     Pt is a 60yo M who presents for abdominal pain, nausea, and vomiting.  Patient reports that his pain began last night.  Patient reports the abdominal pain is diffuse.  Patient reports nausea and multiple episodes of vomiting.  Patient reports that he has not had a normal bowel movement in approximately 2 weeks but did have a small bowel movement 2 days ago.  Patient reports history of bowel obstruction and states this feels similar.  Patient denies any fevers or chills.            Objective       ED Triage Vitals   Temperature Pulse Blood Pressure Respirations SpO2 Patient Position - Orthostatic VS   02/18/25 0902 02/18/25 0902 02/18/25 0902 02/18/25 0902 02/18/25 0902 02/18/25 0902   97.8 °F (36.6 °C) 89 160/91 20 98 % Lying      Temp Source Heart Rate Source BP Location FiO2 (%) Pain Score    02/18/25 0902 02/18/25 0902 02/18/25 0902 -- 02/18/25 1019    Oral Monitor Right arm  6      Vitals      Date and Time Temp Pulse SpO2 Resp BP Pain Score FACES Pain Rating User   02/18/25 1609 -- -- -- -- -- 5 -- CG   02/18/25 1609 98.1 °F (36.7 °C) 88 92 % 19 146/78 -- -- DII   02/18/25 1515 -- 85 95 % -- 154/88 -- -- JH   02/18/25 1445 -- 94 93 % 20 164/104 -- -- JH   02/18/25 1019 -- -- -- -- -- 6 -- KR   02/18/25 0902 97.8 °F (36.6 °C) 89 98 % 20 160/91 -- -- KR            Physical Exam  Vitals reviewed.   Constitutional:       General: He is not in acute distress.     Appearance: He is well-developed. He is not toxic-appearing or diaphoretic.   HENT:      Head: Normocephalic and atraumatic.      Right Ear: External ear normal.      Left Ear: External ear normal.       Nose: Nose normal.      Mouth/Throat:      Pharynx: Oropharynx is clear.   Eyes:      Extraocular Movements: Extraocular movements intact.      Pupils: Pupils are equal, round, and reactive to light.   Cardiovascular:      Rate and Rhythm: Normal rate and regular rhythm.      Heart sounds: Normal heart sounds.   Pulmonary:      Effort: Pulmonary effort is normal. No respiratory distress.      Breath sounds: Normal breath sounds.   Abdominal:      General: Bowel sounds are normal. There is distension.      Palpations: Abdomen is soft. There is no mass.      Tenderness: There is abdominal tenderness (Diffuse). There is no rebound.   Musculoskeletal:         General: Normal range of motion.      Cervical back: Normal range of motion and neck supple.      Right lower leg: No edema.      Left lower leg: No edema.   Skin:     General: Skin is warm and dry.      Capillary Refill: Capillary refill takes less than 2 seconds.      Coloration: Skin is not pale.      Findings: No erythema or rash.   Neurological:      Mental Status: He is alert. Mental status is at baseline.   Psychiatric:         Speech: Speech normal.         Behavior: Behavior is cooperative.         Results Reviewed       Procedure Component Value Units Date/Time    Urine culture [[id number]] Collected: 02/18/25 1222    Lab Status: In process Specimen: Urine, Clean Catch Updated: 02/18/25 1333    Urine Microscopic [[id number]]  (Abnormal) Collected: 02/18/25 1222    Lab Status: Final result Specimen: Urine, Clean Catch Updated: 02/18/25 1310     RBC, UA 2-4 /hpf      WBC, UA 4-10 /hpf      Epithelial Cells Occasional /hpf      Bacteria, UA Moderate /hpf      MUCUS THREADS Occasional    UA (URINE) with reflex to Scope [[id number]]  (Abnormal) Collected: 02/18/25 1222    Lab Status: Final result Specimen: Urine, Clean Catch Updated: 02/18/25 1231     Color, UA Yellow     Clarity, UA Cloudy     Specific Gravity, UA 1.020     pH, UA 6.5     Leukocytes, UA Large      Nitrite, UA Negative     Protein,  (2+) mg/dl      Glucose, UA Negative mg/dl      Ketones, UA Negative mg/dl      Urobilinogen, UA <2.0 mg/dl      Bilirubin, UA Negative     Occult Blood, UA Moderate    FLU/COVID Rapid Antigen (30 min. TAT) - Preferred screening test in ED [[id number]]  (Normal) Collected: 02/18/25 1016    Lab Status: Final result Specimen: Nares from Nose Updated: 02/18/25 1046     SARS COV Rapid Antigen Negative     Influenza A Rapid Antigen Negative     Influenza B Rapid Antigen Negative    Narrative:      This test has been performed using the [employer] Becki 2 FLU+SARS Antigen test under the Emergency Use Authorization (EUA). This test has been validated by the  and verified by the performing laboratory. The Becki uses lateral flow immunofluorescent sandwich assay to detect SARS-COV, Influenza A and Influenza B Antigen.     The [employer] Becki 2 SARS Antigen test does not differentiate between SARS-CoV and SARS-CoV-2.     Negative results are presumptive and may be confirmed with a molecular assay, if necessary, for patient management. Negative results do not rule out SARS-CoV-2 or influenza infection and should not be used as the sole basis for treatment or patient management decisions. A negative test result may occur if the level of antigen in a sample is below the limit of detection of this test.     Positive results are indicative of the presence of viral antigens, but do not rule out bacterial infection or co-infection with other viruses.     All test results should be used as an adjunct to clinical observations and other information available to the provider.    FOR PEDIATRIC PATIENTS - copy/paste COVID Guidelines URL to browser: [email or web address]    Lactic acid, plasma (w/reflex if result > 2.0) [[id number]]  (Normal) Collected: 02/18/25 1016    Lab Status: Final result Specimen: Blood from Arm, Left Updated: 02/18/25  1045     LACTIC ACID 1.7 mmol/L     Narrative:      Result may be elevated if tourniquet was used during collection.    Lipase [[id number]]  (Abnormal) Collected: 02/18/25 1016    Lab Status: Final result Specimen: Blood from Arm, Left Updated: 02/18/25 1038     Lipase 10 u/L     Comprehensive metabolic panel [[id number]] Collected: 02/18/25 1016    Lab Status: Final result Specimen: Blood from Arm, Left Updated: 02/18/25 1038     Sodium 136 mmol/L      Potassium 3.8 mmol/L      Chloride 102 mmol/L      CO2 23 mmol/L      ANION GAP 11 mmol/L      BUN 20 mg/dL      Creatinine 1.13 mg/dL      Glucose 132 mg/dL      Calcium 9.7 mg/dL      AST 18 U/L      ALT 19 U/L      Alkaline Phosphatase 87 U/L      Total Protein 8.0 g/dL      Albumin 4.7 g/dL      Total Bilirubin 0.43 mg/dL      eGFR 70 ml/min/1.73sq m     Narrative:      National Kid[employer] Disease Foundation guidelines for Chronic Kidney Disease (CKD):     Stage 1 with normal or high GFR (GFR > 90 mL/min/1.73 square meters)    Stage 2 Mild CKD (GFR = 60-89 mL/min/1.73 square meters)    Stage 3A Moderate CKD (GFR = 45-59 mL/min/1.73 square meters)    Stage 3B Moderate CKD (GFR = 30-44 mL/min/1.73 square meters)    Stage 4 Severe CKD (GFR = 15-29 mL/min/1.73 square meters)    Stage 5 End Stage CKD (GFR <15 mL/min/1.73 square meters)  Note: GFR calculation is accurate only with a steady state creatinine    CBC and differential [[id number]]  (Abnormal) Collected: 02/18/25 1016    Lab Status: Final result Specimen: Blood from Arm, Left Updated: 02/18/25 1031     WBC 8.89 Thousand/uL      RBC 6.49 Million/uL      Hemoglobin 18.9 g/dL      Hematocrit 56.8 %      MCV 88 fL      MCH 29.1 pg      MCHC 33.1 g/dL      RDW 13.8 %      MPV 10.4 fL      Platelets 192 Thousands/uL      nRBC 0 /100 WBCs      Segmented % 84 %      Immature Grans % 0 %      Lymphocytes % 5 %      Monocytes % 11 %      Eosinophils Relative 0 %      Basophils Relative 0 %      Absolute Neutrophils 7.49  Thousands/µL      Absolute Immature Grans 0.01 Thousand/uL      Absolute Lymphocytes 0.42 Thousands/µL      Absolute Monocytes 0.94 Thousand/µL      Eosinophils Absolute 0.00 Thousand/µL      Basophils Absolute 0.03 Thousands/µL             CT abdomen pelvis with contrast   Final Interpretation by Krystian Davis MD (02/18 3196)      1.  Small bowel obstruction with a transition point in the right lower quadrant at the level of the ileum. Small volume ascites, which is nonspecific but can be seen with associated ischemia.   2.  Patchy groundglass opacities in the lung bases, likely infectious/inflammatory.   3.  Hiatal hernia with distal esophagitis.   4.  Diffuse urinary bladder wall thickening, which could reflect cystitis. Correlate with urinalysis.      The study was marked in EPIC for immediate notification.      Workstation performed: [id number]             Procedures    ED Medication and Procedure Management   Prior to Admission Medications   Prescriptions Last Dose Informant Patient Reported? Taking?   QUEtiapine (SEROquel) 50 mg tablet  Outside Facility (Specify) No No   Sig: Take 1 tablet (50 mg total) by mouth daily at bedtime   acetaminophen (TYLENOL) 325 mg tablet  Outside Facility (Specify) No No   Sig: Take 2 tablets (650 mg total) by mouth every 6 (six) hours as needed for mild pain, moderate pain or headaches   Patient not taking: Reported on 2/17/2025   atorvastatin (LIPITOR) 10 mg tablet  Outside Facility (Specify) No No   Sig: Take 1 tablet (10 mg total) by mouth daily Take at 8 PM   lamoTRIgine (LaMICtal) 150 MG tablet   No No   Sig: Take 1 tab by mouth twice a day at 8AM and 8PM   lamoTRIgine (LaMICtal) 25 mg tablet   No No   Sig: TAKE TWO TABLETS BY MOUTH DAILY AT 8AM WITH 150MG TO TOTAL 200MG   nebivolol (BYSTOLIC) 2.5 mg tablet  Outside Facility (Specify) No No   Sig: Take 1 tablet (2.5 mg total) by mouth daily Hold for SBP <100 or HR <60   Patient not taking: Reported on 2/14/2025    polyethylene glycol (MIRALAX) 17 g packet  Outside Facility (Specify) No No   Sig: Take 17 g by mouth 3 (three) times a week Monday, wed, friday   saccharomyces boulardii (Florastor) 250 mg capsule  Outside Facility (Specify) No No   Sig: Take 1 capsule (250 mg total) by mouth in the morning   sertraline (ZOLOFT) 50 mg tablet  Outside Facility (Specify) No No   Sig: Take 1 tablet (50 mg total) by mouth daily      Facility-Administered Medications: None     Current Discharge Medication List        CONTINUE these medications which have NOT CHANGED    Details   acetaminophen (TYLENOL) 325 mg tablet Take 2 tablets (650 mg total) by mouth every 6 (six) hours as needed for mild pain, moderate pain or headaches  Qty: 30 tablet, Refills: 0    Associated Diagnoses: Pain of lower extremity, unspecified laterality      atorvastatin (LIPITOR) 10 mg tablet Take 1 tablet (10 mg total) by mouth daily Take at 8 PM  Qty: 30 tablet, Refills: 0    Associated Diagnoses: Mixed hyperlipidemia      !! lamoTRIgine (LaMICtal) 150 MG tablet Take 1 tab by mouth twice a day at 8AM and 8PM  Qty: 60 tablet, Refills: 0    Associated Diagnoses: Localization-related symptomatic epilepsy and epileptic syndromes with complex partial seizures, intractable, without status epilepticus (HCC)      !! lamoTRIgine (LaMICtal) 25 mg tablet TAKE TWO TABLETS BY MOUTH DAILY AT 8AM WITH 150MG TO TOTAL 200MG  Qty: 60 tablet, Refills: 3    Associated Diagnoses: Localization-related symptomatic epilepsy and epileptic syndromes with complex partial seizures, intractable, without status epilepticus (HCC)      nebivolol (BYSTOLIC) 2.5 mg tablet Take 1 tablet (2.5 mg total) by mouth daily Hold for SBP <100 or HR <60  Qty: 30 tablet, Refills: 0    Associated Diagnoses: Primary hypertension      polyethylene glycol (MIRALAX) 17 g packet Take 17 g by mouth 3 (three) times a week Monday, wed, friday  Qty: 20 each, Refills: 4    Associated Diagnoses: Small bowel  obstruction (HCC)      QUEtiapine (SEROquel) 50 mg tablet Take 1 tablet (50 mg total) by mouth daily at bedtime  Qty: 30 tablet, Refills: 0    Associated Diagnoses: Localization-related symptomatic epilepsy and epileptic syndromes with complex partial seizures, intractable, without status epilepticus (HCC)      saccharomyces boulardii (Florastor) 250 mg capsule Take 1 capsule (250 mg total) by mouth in the morning  Qty: 30 capsule, Refills: 0    Associated Diagnoses: Other constipation      sertraline (ZOLOFT) 50 mg tablet Take 1 tablet (50 mg total) by mouth daily  Qty: 30 tablet, Refills: 0    Associated Diagnoses: Localization-related symptomatic epilepsy and epileptic syndromes with complex partial seizures, intractable, without status epilepticus (HCC)       !! - Potential duplicate medications found. Please discuss with provider.        No discharge procedures on file.  ED SEPSIS DOCUMENTATION   Time reflects when diagnosis was documented in both MDM as applicable and the Disposition within this note       Time User Action Codes Description Comment    2/18/2025  1:50 PM Eri Soler Add [R10.9] Abdominal pain     2/18/2025  1:50 PM Eri Soler Add [R11.2] Nausea and vomiting     2/18/2025  1:50 PM Eri Soler Add [K56.609] SBO (small bowel obstruction) ([facility])     2/18/2025  1:50 PM Eri Soler Modify [R10.9] Abdominal pain     2/18/2025  1:50 PM Eri Soler Modify [K56.609] SBO (small bowel obstruction) ([facility])     2/18/2025  1:50 PM Eri Soler Add [N39.0] UTI (urinary tract infection)                  Eri Soler MD  02/18/25 1760

## 2025-02-18 NOTE — H&P
H&P - Surgery-General   Name: Mario Rollins 59 y.o. male I MRN: [id number]  Unit/Bed#: ED HW2 I Date of Admission: 2/18/2025   Date of Service: 2/18/2025 I Hospital Day: 0     Assessment & Plan  Small bowel obstruction (HCC)  Abdominal pain and nausea last evening with associated vomiting this morning.   Past surgical hx exploratory laparotomy, CARMEN, secondary to small bowel volvulus Chuck 04/2024  SBO 08/2024 treated medically.     CT a/p   1.  Small bowel obstruction with a transition point in the right lower quadrant at the level of the ileum. Small volume ascites, which is nonspecific but can be seen with associated ischemia.   2.  Patchy groundglass opacities in the lung bases, likely infectious/inflammatory.   3.  Hiatal hernia with distal esophagitis.   4.  Diffuse urinary bladder wall thickening, which could reflect cystitis. Correlate with urinalysis     Lactic acid 1.7  No leukocytosis.     NPO  NGT low continuous suction.   IVF.   Serial abdominal exams.  Incentive spirometry.   Am labs CBC, CMP, Mag, Phos.   UTI (urinary tract infection)  Urine culture pending.   IV antibiotics   HTN (hypertension)  PRN lopressor systolic blood pressure greater 160 mmHg  Epilepsy (HCC)  Continue home     History of Present Illness   Mario Rollins is a 59 y.o. male pmh of CKD, Hemochromatosis, CVA with right hemiparesis, focal epilepsy, HTN, polycystic kidney disease presenting with abdominal pain, nausea, vomiting since yesterday. Past surgical history of  exploratory laparotomy, CARMEN, secondary to small bowel volvulus Chuck 04/2024. Recent SBO in 08/024 treated with medical management. Patient comes from group home staff member at bedside. Patient started last evening with nausea. Patient at a hot dog for dinner last evening. This morning patient with nausea, vomiting and abdominal pain. Staff member reports he had not had a bowel movement in 2 days and was given milk of magnesia. Reports he normally has a  bowel movement every day. Staff member reports he was his normal self up until yesterday evening when nausea started.     Review of Systems   Constitutional:  Negative for chills, fatigue and fever.   HENT:  Negative for congestion.    Eyes:  Negative for pain and discharge.   Respiratory:  Negative for chest tightness and shortness of breath.    Cardiovascular:  Negative for chest pain.   Gastrointestinal:  Positive for abdominal pain, nausea and vomiting. Negative for constipation.   Genitourinary:  Negative for difficulty urinating.   Musculoskeletal:  Negative for arthralgias and myalgias.   Skin:  Negative for rash.   Neurological:  Negative for dizziness and headaches.   Psychiatric/Behavioral:  Negative for behavioral problems.      Historical Information   Past Medical History:   Diagnosis Date    Ambulatory dysfunction     Anxiety     Bilateral impacted cerumen     last assessed 05/30/2013    Capsulitis     last assessed 04/26/2016    Cellulitis of finger 01/13/2012    Chronic kidney disease     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)     Closed fracture of one or more phalanges of foot 01/06/2009    Constipation     Deformity     left and right foot and ankle ,right hand    Depression     Epilepsy (HCC)     Erythrocytosis     Hemiplegia affecting dominant side (HCC)     Hypertension     Hypoglycemia 11/08/2011    Hypokalemia     last assessed 05/30/2013    Mental retardation     Mood disorder (HCC)     Nephrocalcinosis     chronic    Polycystic kidney     Bilateral    Small bowel obstruction (HCC)      Past Surgical History:   Procedure Laterality Date    BRAIN SURGERY      EXPLORATORY LAPAROTOMY W/ BOWEL RESECTION N/A 4/6/2024    Procedure: LAPAROTOMY EXPLORATORY WITH LYSIS OF ADHESIONS AND DECOMPRESSION;  Surgeon: Saúl Woods MD;  Location: [facility] MAIN OR;  Service: General    MI ARTHROCENTESIS ASPIR&/INJ MAJOR JT/BURSA W/O US Bilateral 5/27/2021    Procedure: Hip intra-articular corticosteroid injection  ( 73536 46856-71);  Surgeon: Lianet Astorga MD;  Location: [facility] MAIN OR;  Service: Pain Management     [place] COLONOSCOPY FLX DX W/COLLJ SPEC WHEN PFRMD N/A 2/12/2016    Procedure: COLONOSCOPY;  Surgeon: Abhilash Ace MD;  Location: [facility] GI LAB;  Service: Gastroenterology    [place] INJECT SI JOINT ARTHRGRPHY&/ANES/STEROID W/RIMA Left 3/22/2023    Procedure: SACROILIAC joint injection (88034 );  Surgeon: Larry Smith DO;  Location: [facility] MAIN OR;  Service: Pain Management      Social History     Tobacco Use    Smoking status: Never     Passive exposure: Never    Smokeless tobacco: Never   Vaping Use    Vaping status: Never Used   Substance and Sexual Activity    Alcohol use: Not Currently    Drug use: No    Sexual activity: Not on file     E-Cigarette/Vaping    E-Cigarette Use Never User      E-Cigarette/Vaping Substances    Nicotine No     THC No     CBD No     Flavoring No     Other No     Unknown No      Family history non-contributory  Social History     Tobacco Use    Smoking status: Never     Passive exposure: Never    Smokeless tobacco: Never   Vaping Use    Vaping status: Never Used   Substance and Sexual Activity    Alcohol use: Not Currently    Drug use: No    Sexual activity: Not on file       Current Facility-Administered Medications:     cefTRIAXone (ROCEPHIN) IVPB (premix in dextrose) 1,000 mg 50 mL, Once, Last Rate: 1,000 mg (02/18/25 1403)    lidocaine (URO-JET) 2 % urethral/mucosal gel 1 Application, Once    sodium chloride 0.9 % bolus 1,000 mL, Once  Prior to Admission Medications   Prescriptions Last Dose Informant Patient Reported? Taking?   QUEtiapine (SEROquel) 50 mg tablet  Outside Facility (Specify) No No   Sig: Take 1 tablet (50 mg total) by mouth daily at bedtime   acetaminophen (TYLENOL) 325 mg tablet  Outside Facility (Specify) No No   Sig: Take 2 tablets (650 mg total) by mouth every 6 (six) hours as needed for mild pain, moderate pain or headaches   Patient not taking: Reported  on 2/17/2025   atorvastatin (LIPITOR) 10 mg tablet  Outside Facility (Specify) No No   Sig: Take 1 tablet (10 mg total) by mouth daily Take at 8 PM   lamoTRIgine (LaMICtal) 150 MG tablet   No No   Sig: Take 1 tab by mouth twice a day at 8AM and 8PM   lamoTRIgine (LaMICtal) 25 mg tablet   No No   Sig: TAKE TWO TABLETS BY MOUTH DAILY AT 8AM WITH 150MG TO TOTAL 200MG   nebivolol (BYSTOLIC) 2.5 mg tablet  Outside Facility (Specify) No No   Sig: Take 1 tablet (2.5 mg total) by mouth daily Hold for SBP <100 or HR <60   Patient not taking: Reported on 2/14/2025   polyethylene glycol (MIRALAX) 17 g packet  Outside Facility (Specify) No No   Sig: Take 17 g by mouth 3 (three) times a week Monday, wed, friday   saccharomyces boulardii (Florastor) 250 mg capsule  Outside Facility (Specify) No No   Sig: Take 1 capsule (250 mg total) by mouth in the morning   sertraline (ZOLOFT) 50 mg tablet  Outside Facility (Specify) No No   Sig: Take 1 tablet (50 mg total) by mouth daily      Facility-Administered Medications: None       Objective :  Temp:  [97.8 °F (36.6 °C)] 97.8 °F (36.6 °C)  HR:  [89] 89  BP: (160)/(91) 160/91  Resp:  [20] 20  SpO2:  [98 %] 98 %  O2 Device: None (Room air)      Physical Exam  Vitals and nursing note reviewed.   HENT:      Head: Normocephalic and atraumatic.      Nose: Nose normal.      Mouth/Throat:      Mouth: Mucous membranes are dry.   Cardiovascular:      Rate and Rhythm: Normal rate.      Heart sounds: Normal heart sounds.   Pulmonary:      Effort: Pulmonary effort is normal. No respiratory distress.   Abdominal:      General: A surgical scar is present. There is distension.      Tenderness: There is abdominal tenderness in the left lower quadrant. There is no guarding or rebound.      Hernia: No hernia is present.   Neurological:      Mental Status: He is alert.         Lab Results: I have reviewed the following results:  Recent Labs     02/18/25  1016   WBC 8.89   HGB 18.9*   HCT 56.8*       SODIUM 136   K 3.8      CO2 23   BUN 20   CREATININE 1.13   GLUC 132   AST 18   ALT 19   ALB 4.7   TBILI 0.43   ALKPHOS 87   LACTICACID 1.7       CT abdomen pelvis with contrast   Final Result by Krystian Davis MD (02/18 2357)      1.  Small bowel obstruction with a transition point in the right lower quadrant at the level of the ileum. Small volume ascites, which is nonspecific but can be seen with associated ischemia.   2.  Patchy groundglass opacities in the lung bases, likely infectious/inflammatory.   3.  Hiatal hernia with distal esophagitis.   4.  Diffuse urinary bladder wall thickening, which could reflect cystitis. Correlate with urinalysis.      The study was marked in EPIC for immediate notification.      Workstation performed: [id number]               VTE Pharmacologic Prophylaxis: Heparin  VTE Mechanical Prophylaxis: sequential compression device

## 2025-02-18 NOTE — ASSESSMENT & PLAN NOTE
Abdominal pain and nausea last evening with associated vomiting this morning.   Past surgical hx exploratory laparotomy, CARMEN, secondary to small bowel volvulus Chuck 04/2024  SBO 08/2024 treated medically.     CT a/p   1.  Small bowel obstruction with a transition point in the right lower quadrant at the level of the ileum. Small volume ascites, which is nonspecific but can be seen with associated ischemia.   2.  Patchy groundglass opacities in the lung bases, likely infectious/inflammatory.   3.  Hiatal hernia with distal esophagitis.   4.  Diffuse urinary bladder wall thickening, which could reflect cystitis. Correlate with urinalysis     Lactic acid 1.7  No leukocytosis.     NPO  NGT low continuous suction.   IVF.   Serial abdominal exams.  Incentive spirometry.   Am labs CBC, CMP, Mag, Phos.

## 2025-02-18 NOTE — ED NOTES
Called lab to ask if urine culture could be added to urine sample sent earlier. They confirmed they could.     Olga Lakhani RN  02/18/25 1661

## 2025-02-18 NOTE — TELEPHONE ENCOUNTER
Form has been filled out, faxed to [facility] and fax confirmation scanned in media.

## 2025-02-19 LAB
ALBUMIN SERPL BCG-MCNC: 3.3 G/DL (ref 3.5–5)
ALP SERPL-CCNC: 59 U/L (ref 34–104)
ALT SERPL W P-5'-P-CCNC: 11 U/L (ref 7–52)
ANION GAP SERPL CALCULATED.3IONS-SCNC: 11 MMOL/L (ref 4–13)
AST SERPL W P-5'-P-CCNC: 11 U/L (ref 13–39)
BASOPHILS # BLD AUTO: 0.03 THOUSANDS/ΜL (ref 0–0.1)
BASOPHILS NFR BLD AUTO: 1 % (ref 0–1)
BILIRUB SERPL-MCNC: 0.59 MG/DL (ref 0.2–1)
BUN SERPL-MCNC: 18 MG/DL (ref 5–25)
CALCIUM ALBUM COR SERPL-MCNC: 8.6 MG/DL (ref 8.3–10.1)
CALCIUM SERPL-MCNC: 8 MG/DL (ref 8.4–10.2)
CHLORIDE SERPL-SCNC: 109 MMOL/L (ref 96–108)
CO2 SERPL-SCNC: 23 MMOL/L (ref 21–32)
CREAT SERPL-MCNC: 1 MG/DL (ref 0.6–1.3)
EOSINOPHIL # BLD AUTO: 0.12 THOUSAND/ΜL (ref 0–0.61)
EOSINOPHIL NFR BLD AUTO: 2 % (ref 0–6)
ERYTHROCYTE [DISTWIDTH] IN BLOOD BY AUTOMATED COUNT: 13.2 % (ref 11.6–15.1)
GFR SERPL CREATININE-BSD FRML MDRD: 82 ML/MIN/1.73SQ M
GLUCOSE SERPL-MCNC: 89 MG/DL (ref 65–140)
HCT VFR BLD AUTO: 45.8 % (ref 36.5–49.3)
HGB BLD-MCNC: 15 G/DL (ref 12–17)
IMM GRANULOCYTES # BLD AUTO: 0.01 THOUSAND/UL (ref 0–0.2)
IMM GRANULOCYTES NFR BLD AUTO: 0 % (ref 0–2)
LYMPHOCYTES # BLD AUTO: 1.24 THOUSANDS/ΜL (ref 0.6–4.47)
LYMPHOCYTES NFR BLD AUTO: 22 % (ref 14–44)
MAGNESIUM SERPL-MCNC: 1.7 MG/DL (ref 1.9–2.7)
MCH RBC QN AUTO: 28.7 PG (ref 26.8–34.3)
MCHC RBC AUTO-ENTMCNC: 32.3 G/DL (ref 31.4–37.4)
MCV RBC AUTO: 89 FL (ref 82–98)
MONOCYTES # BLD AUTO: 0.74 THOUSAND/ΜL (ref 0.17–1.22)
MONOCYTES NFR BLD AUTO: 13 % (ref 4–12)
NEUTROPHILS # BLD AUTO: 3.55 THOUSANDS/ΜL (ref 1.85–7.62)
NEUTS SEG NFR BLD AUTO: 62 % (ref 43–75)
NRBC BLD AUTO-RTO: 0 /100 WBCS
PHOSPHATE SERPL-MCNC: 2 MG/DL (ref 2.7–4.5)
PLATELET # BLD AUTO: 157 THOUSANDS/UL (ref 149–390)
PMV BLD AUTO: 10.8 FL (ref 8.9–12.7)
POTASSIUM SERPL-SCNC: 3.7 MMOL/L (ref 3.5–5.3)
PROT SERPL-MCNC: 5.7 G/DL (ref 6.4–8.4)
RBC # BLD AUTO: 5.16 MILLION/UL (ref 3.88–5.62)
SODIUM SERPL-SCNC: 143 MMOL/L (ref 135–147)
WBC # BLD AUTO: 5.69 THOUSAND/UL (ref 4.31–10.16)

## 2025-02-19 PROCEDURE — 84100 ASSAY OF PHOSPHORUS: CPT | Performed by: PHYSICIAN ASSISTANT

## 2025-02-19 PROCEDURE — 85025 COMPLETE CBC W/AUTO DIFF WBC: CPT | Performed by: PHYSICIAN ASSISTANT

## 2025-02-19 PROCEDURE — 80053 COMPREHEN METABOLIC PANEL: CPT | Performed by: PHYSICIAN ASSISTANT

## 2025-02-19 PROCEDURE — 99232 SBSQ HOSP IP/OBS MODERATE 35: CPT | Performed by: SURGERY

## 2025-02-19 PROCEDURE — 83735 ASSAY OF MAGNESIUM: CPT | Performed by: PHYSICIAN ASSISTANT

## 2025-02-19 RX ORDER — MAGNESIUM SULFATE HEPTAHYDRATE 40 MG/ML
2 INJECTION, SOLUTION INTRAVENOUS ONCE
Status: COMPLETED | OUTPATIENT
Start: 2025-02-19 | End: 2025-02-19

## 2025-02-19 RX ORDER — DEXTROSE MONOHYDRATE, SODIUM CHLORIDE, AND POTASSIUM CHLORIDE 50; 1.49; 4.5 G/1000ML; G/1000ML; G/1000ML
75 INJECTION, SOLUTION INTRAVENOUS CONTINUOUS
Status: DISCONTINUED | OUTPATIENT
Start: 2025-02-19 | End: 2025-02-22

## 2025-02-19 RX ORDER — PANTOPRAZOLE SODIUM 40 MG/10ML
40 INJECTION, POWDER, LYOPHILIZED, FOR SOLUTION INTRAVENOUS
Status: DISCONTINUED | OUTPATIENT
Start: 2025-02-19 | End: 2025-02-23 | Stop reason: HOSPADM

## 2025-02-19 RX ADMIN — HEPARIN SODIUM 5000 UNITS: 5000 INJECTION, SOLUTION INTRAVENOUS; SUBCUTANEOUS at 21:57

## 2025-02-19 RX ADMIN — POTASSIUM PHOSPHATE, MONOBASIC AND POTASSIUM PHOSPHATE, DIBASIC 9 MMOL: 224; 236 INJECTION, SOLUTION, CONCENTRATE INTRAVENOUS at 11:29

## 2025-02-19 RX ADMIN — MAGNESIUM SULFATE HEPTAHYDRATE 2 G: 40 INJECTION, SOLUTION INTRAVENOUS at 09:08

## 2025-02-19 RX ADMIN — SODIUM CHLORIDE, SODIUM LACTATE, POTASSIUM CHLORIDE, AND CALCIUM CHLORIDE 125 ML/HR: .6; .31; .03; .02 INJECTION, SOLUTION INTRAVENOUS at 06:31

## 2025-02-19 RX ADMIN — HEPARIN SODIUM 5000 UNITS: 5000 INJECTION, SOLUTION INTRAVENOUS; SUBCUTANEOUS at 06:24

## 2025-02-19 RX ADMIN — HEPARIN SODIUM 5000 UNITS: 5000 INJECTION, SOLUTION INTRAVENOUS; SUBCUTANEOUS at 13:52

## 2025-02-19 RX ADMIN — PANTOPRAZOLE SODIUM 40 MG: 40 INJECTION, POWDER, FOR SOLUTION INTRAVENOUS at 09:07

## 2025-02-19 RX ADMIN — LAMOTRIGINE 150 MG: 25 TABLET ORAL at 09:10

## 2025-02-19 RX ADMIN — LAMOTRIGINE 150 MG: 25 TABLET ORAL at 17:11

## 2025-02-19 RX ADMIN — DEXTROSE, SODIUM CHLORIDE, AND POTASSIUM CHLORIDE 125 ML/HR: 5; .45; .15 INJECTION INTRAVENOUS at 09:07

## 2025-02-19 RX ADMIN — DEXTROSE, SODIUM CHLORIDE, AND POTASSIUM CHLORIDE 125 ML/HR: 5; .45; .15 INJECTION INTRAVENOUS at 17:11

## 2025-02-19 NOTE — PLAN OF CARE
Problem: PAIN - ADULT  Goal: Verbalizes/displays adequate comfort level or baseline comfort level  Description: Interventions:  - Encourage patient to monitor pain and request assistance  - Assess pain using appropriate pain scale  - Administer analgesics based on type and severity of pain and evaluate response  - Implement non-pharmacological measures as appropriate and evaluate response  - Consider cultural and social influences on pain and pain management  - Notify physician/advanced practitioner if interventions unsuccessful or patient reports new pain  Outcome: Progressing     Problem: INFECTION - ADULT  Goal: Absence or prevention of progression during hospitalization  Description: INTERVENTIONS:  - Assess and monitor for signs and symptoms of infection  - Monitor lab/diagnostic results  - Monitor all insertion sites, i.e. indwelling lines, tubes, and drains  - Monitor endotracheal if appropriate and nasal secretions for changes in amount and color  - [place] appropriate cooling/warming therapies per order  - Administer medications as ordered  - Instruct and encourage patient and family to use good hand hygiene technique  - Identify and instruct in appropriate isolation precautions for identified infection/condition  Outcome: Progressing     Problem: SAFETY ADULT  Goal: Maintain or return to baseline ADL function  Description: INTERVENTIONS:  -  Assess patient's ability to carry out ADLs; assess patient's baseline for ADL function and identify physical deficits which impact ability to perform ADLs (bathing, care of mouth/teeth, toileting, grooming, dressing, etc.)  - Assess/evaluate cause of self-care deficits   - Assess range of motion  - Assess patient's mobility; develop plan if impaired  - Assess patient's need for assistive devices and provide as appropriate  - Encourage maximum independence but intervene and supervise when necessary  - Involve family in performance of ADLs  - Assess for home care  needs following discharge   - Consider OT consult to assist with ADL evaluation and planning for discharge  - Provide patient education as appropriate  Outcome: Progressing     Problem: DISCHARGE PLANNING  Goal: Discharge to home or other facility with appropriate resources  Description: INTERVENTIONS:  - Identify barriers to discharge w/patient and caregiver  - Arrange for needed discharge resources and transportation as appropriate  - Identify discharge learning needs (meds, wound care, etc.)  - Arrange for interpretive services to assist at discharge as needed  - Refer to Case Management Department for coordinating discharge planning if the patient needs post-hospital services based on physician/advanced practitioner order or complex needs related to functional status, cognitive ability, or social support system  Outcome: Progressing     Problem: GASTROINTESTINAL - ADULT  Goal: Minimal or absence of nausea and/or vomiting  Description: INTERVENTIONS:  - Administer IV fluids if ordered to ensure adequate hydration  - Maintain NPO status until nausea and vomiting are resolved  - Nasogastric tube if ordered  - Administer ordered antiemetic medications as needed  - Provide nonpharmacologic comfort measures as appropriate  - Advance diet as tolerated, if ordered  - Consider nutrition services referral to assist patient with adequate nutrition and appropriate food choices  Outcome: Progressing  Goal: Maintains or returns to baseline bowel function  Description: INTERVENTIONS:  - Assess bowel function  - Encourage oral fluids to ensure adequate hydration  - Administer IV fluids if ordered to ensure adequate hydration  - Administer ordered medications as needed  - Encourage mobilization and activity  - Consider nutritional services referral to assist patient with adequate nutrition and appropriate food choices  Outcome: Progressing  Goal: Maintains adequate nutritional intake  Description: INTERVENTIONS:  - Monitor  percentage of each meal consumed  - Identify factors contributing to decreased intake, treat as appropriate  - Assist with meals as needed  - Monitor I&O, weight, and lab values if indicated  - Obtain nutrition services referral as needed  Outcome: Progressing     Problem: Prexisting or High Potential for Compromised Skin Integrity  Goal: Skin integrity is maintained or improved  Description: INTERVENTIONS:  - Identify patients at risk for skin breakdown  - Assess and monitor skin integrity  - Assess and monitor nutrition and hydration status  - Monitor labs   - Assess for incontinence   - Turn and reposition patient  - Assist with mobility/ambulation  - Relieve pressure over bony prominences  - Avoid friction and shearing  - Provide appropriate hygiene as needed including keeping skin clean and dry  - Evaluate need for skin moisturizer/barrier cream  - Collaborate with interdisciplinary team   - Patient/family teaching  - Consider wound care consult   Outcome: Progressing

## 2025-02-19 NOTE — PROGRESS NOTES
Progress Note - Vascular Surgery   Name: Mario Rollins 59 y.o. male I MRN: [id number]  Unit/Bed#: 2 [facility] 204 A I Date of Admission: 2025   Date of Service: 2025 I Hospital Day: 1     Assessment & Plan  Small bowel obstruction (HCC)     Past surgical hx exploratory laparotomy, CARMEN, secondary to small bowel volvulus Chuck 2024  SBO 2024 treated medically.     CT a/p   1.  Small bowel obstruction with a transition point in the right lower quadrant at the level of the ileum. Small volume ascites, which is nonspecific but can be seen with associated ischemia.   2.  Patchy groundglass opacities in the lung bases, likely infectious/inflammatory.   3.  Hiatal hernia with distal esophagitis.   4.  Diffuse urinary bladder wall thickening, which could reflect cystitis. Correlate with urinalysis     NGT output:850 ml   Urine output: 1.4L   Ma.7  Phos: 2.0    No leukocytosis, Afebrile.   Abdominal pain improving. 1 liquidly BM yesterday.     NPO  NGT low continuous suction.   IVF.   PRN analgesics.   Electrolyte repletion.  Serial abdominal exams.  Incentive spirometry.   Am labs CBC, CMP, Mag, Phos.   UTI (urinary tract infection)  Urine culture pending.   IV antibiotics Rocephin x1. (Given in ED.)  HTN (hypertension)    Epilepsy (HCC)  Continue home         Subjective   Patient was seen and examined bedside. Patient reports abdominal pain improving compared to yesterday. Patient reports 1 BM yesterday. Confirmed bowel movement with the nurse.     Objective :  Temp:  [97.8 °F (36.6 °C)-99 °F (37.2 °C)] 99 °F (37.2 °C)  HR:  [85-95] 88  BP: (136-164)/() 136/80  Resp:  [18-20] 18  SpO2:  [91 %-98 %] 93 %  O2 Device: None (Room air)    I/O          0701   0700  0701   0700  0701   0700    I.V.  1712.5     IV Piggyback  1050     Total Intake  2762.5     Urine  1400     Emesis/NG output  850     Total Output  2250     Net  +512.5                  Lines/Drains/Airways        Active Status       Name Placement date Placement time Site Days    NG/OG/Enteral Tube Nasogastric 16 Fr Left nare 02/18/25  1438  Left nare  less than 1                  Physical Exam  Vitals and nursing note reviewed.   Constitutional:       Appearance: Normal appearance.   HENT:      Head: Normocephalic and atraumatic.      Nose: Nose normal.      Mouth/Throat:      Mouth: Mucous membranes are moist.   Eyes:      Extraocular Movements: Extraocular movements intact.   Cardiovascular:      Rate and Rhythm: Normal rate.   Pulmonary:      Effort: Pulmonary effort is normal. No respiratory distress.   Abdominal:      General: There is distension.      Tenderness: There is abdominal tenderness. There is no guarding or rebound.   Skin:     General: Skin is warm.      Capillary Refill: Capillary refill takes less than 2 seconds.   Neurological:      Mental Status: He is alert.           Lab Results: I have reviewed the following results:  Recent Labs     02/18/25  1016 02/19/25  0540   WBC 8.89 5.69   HGB 18.9* 15.0   HCT 56.8* 45.8    157   SODIUM 136 143   K 3.8 3.7    109*   CO2 23 23   BUN 20 18   CREATININE 1.13 1.00   GLUC 132 89   MG  --  1.7*   PHOS  --  2.0*   AST 18 11*   ALT 19 11   ALB 4.7 3.3*   TBILI 0.43 0.59   ALKPHOS 87 59   LACTICACID 1.7  --            VTE Pharmacologic Prophylaxis: VTE covered by:  heparin (porcine), Subcutaneous, 5,000 Units at 02/19/25 0624     VTE Mechanical Prophylaxis: sequential compression device

## 2025-02-19 NOTE — ASSESSMENT & PLAN NOTE
Past surgical hx exploratory laparotomy, CARMEN, secondary to small bowel volvulus Chuck 2024  SBO 2024 treated medically.     CT a/p   1.  Small bowel obstruction with a transition point in the right lower quadrant at the level of the ileum. Small volume ascites, which is nonspecific but can be seen with associated ischemia.   2.  Patchy groundglass opacities in the lung bases, likely infectious/inflammatory.   3.  Hiatal hernia with distal esophagitis.   4.  Diffuse urinary bladder wall thickening, which could reflect cystitis. Correlate with urinalysis     NGT output:850 ml   Urine output: 1.4L   Ma.7  Phos: 2.0    No leukocytosis, Afebrile.   Abdominal pain improving. 1 liquidly BM yesterday.     NPO  NGT low continuous suction.   IVF.   PRN analgesics.   Electrolyte repletion.  Serial abdominal exams.  Incentive spirometry.   Am labs CBC, CMP, Mag, Phos.

## 2025-02-20 ENCOUNTER — APPOINTMENT (INPATIENT)
Dept: RADIOLOGY | Facility: HOSPITAL | Age: 60
DRG: 389 | End: 2025-02-20
Payer: MEDICARE

## 2025-02-20 LAB
ALBUMIN SERPL BCG-MCNC: 3.4 G/DL (ref 3.5–5)
ALP SERPL-CCNC: 58 U/L (ref 34–104)
ALT SERPL W P-5'-P-CCNC: 9 U/L (ref 7–52)
ANION GAP SERPL CALCULATED.3IONS-SCNC: 8 MMOL/L (ref 4–13)
AST SERPL W P-5'-P-CCNC: 12 U/L (ref 13–39)
BILIRUB SERPL-MCNC: 0.51 MG/DL (ref 0.2–1)
BUN SERPL-MCNC: 9 MG/DL (ref 5–25)
CALCIUM ALBUM COR SERPL-MCNC: 8.7 MG/DL (ref 8.3–10.1)
CALCIUM SERPL-MCNC: 8.2 MG/DL (ref 8.4–10.2)
CHLORIDE SERPL-SCNC: 107 MMOL/L (ref 96–108)
CO2 SERPL-SCNC: 23 MMOL/L (ref 21–32)
CREAT SERPL-MCNC: 0.91 MG/DL (ref 0.6–1.3)
ERYTHROCYTE [DISTWIDTH] IN BLOOD BY AUTOMATED COUNT: 12.9 % (ref 11.6–15.1)
GFR SERPL CREATININE-BSD FRML MDRD: 91 ML/MIN/1.73SQ M
GLUCOSE SERPL-MCNC: 105 MG/DL (ref 65–140)
HCT VFR BLD AUTO: 46.2 % (ref 36.5–49.3)
HGB BLD-MCNC: 14.9 G/DL (ref 12–17)
MAGNESIUM SERPL-MCNC: 1.8 MG/DL (ref 1.9–2.7)
MCH RBC QN AUTO: 28.9 PG (ref 26.8–34.3)
MCHC RBC AUTO-ENTMCNC: 32.3 G/DL (ref 31.4–37.4)
MCV RBC AUTO: 90 FL (ref 82–98)
PHOSPHATE SERPL-MCNC: 1.8 MG/DL (ref 2.7–4.5)
PLATELET # BLD AUTO: 147 THOUSANDS/UL (ref 149–390)
PMV BLD AUTO: 10.4 FL (ref 8.9–12.7)
POTASSIUM SERPL-SCNC: 3.8 MMOL/L (ref 3.5–5.3)
PROT SERPL-MCNC: 5.9 G/DL (ref 6.4–8.4)
RBC # BLD AUTO: 5.16 MILLION/UL (ref 3.88–5.62)
SODIUM SERPL-SCNC: 138 MMOL/L (ref 135–147)
WBC # BLD AUTO: 6 THOUSAND/UL (ref 4.31–10.16)

## 2025-02-20 PROCEDURE — 83735 ASSAY OF MAGNESIUM: CPT | Performed by: PHYSICIAN ASSISTANT

## 2025-02-20 PROCEDURE — 74019 RADEX ABDOMEN 2 VIEWS: CPT

## 2025-02-20 PROCEDURE — 85027 COMPLETE CBC AUTOMATED: CPT | Performed by: PHYSICIAN ASSISTANT

## 2025-02-20 PROCEDURE — 99232 SBSQ HOSP IP/OBS MODERATE 35: CPT | Performed by: SURGERY

## 2025-02-20 PROCEDURE — 84100 ASSAY OF PHOSPHORUS: CPT | Performed by: PHYSICIAN ASSISTANT

## 2025-02-20 PROCEDURE — 80053 COMPREHEN METABOLIC PANEL: CPT | Performed by: PHYSICIAN ASSISTANT

## 2025-02-20 RX ORDER — DIATRIZOATE MEGLUMINE AND DIATRIZOATE SODIUM 660; 100 MG/ML; MG/ML
120 SOLUTION ORAL; RECTAL
Status: COMPLETED | OUTPATIENT
Start: 2025-02-20 | End: 2025-02-20

## 2025-02-20 RX ORDER — MAGNESIUM SULFATE HEPTAHYDRATE 40 MG/ML
2 INJECTION, SOLUTION INTRAVENOUS ONCE
Status: COMPLETED | OUTPATIENT
Start: 2025-02-20 | End: 2025-02-20

## 2025-02-20 RX ADMIN — DEXTROSE, SODIUM CHLORIDE, AND POTASSIUM CHLORIDE 125 ML/HR: 5; .45; .15 INJECTION INTRAVENOUS at 01:16

## 2025-02-20 RX ADMIN — DIATRIZOATE MEGLUMINE AND DIATRIZOATE SODIUM 120 ML: 660; 100 LIQUID ORAL; RECTAL at 12:47

## 2025-02-20 RX ADMIN — DEXTROSE, SODIUM CHLORIDE, AND POTASSIUM CHLORIDE 125 ML/HR: 5; .45; .15 INJECTION INTRAVENOUS at 12:14

## 2025-02-20 RX ADMIN — HEPARIN SODIUM 5000 UNITS: 5000 INJECTION, SOLUTION INTRAVENOUS; SUBCUTANEOUS at 13:26

## 2025-02-20 RX ADMIN — POTASSIUM PHOSPHATE, MONOBASIC AND POTASSIUM PHOSPHATE, DIBASIC 12 MMOL: 224; 236 INJECTION, SOLUTION, CONCENTRATE INTRAVENOUS at 12:04

## 2025-02-20 RX ADMIN — LAMOTRIGINE 150 MG: 25 TABLET ORAL at 12:04

## 2025-02-20 RX ADMIN — PANTOPRAZOLE SODIUM 40 MG: 40 INJECTION, POWDER, FOR SOLUTION INTRAVENOUS at 12:03

## 2025-02-20 RX ADMIN — HEPARIN SODIUM 5000 UNITS: 5000 INJECTION, SOLUTION INTRAVENOUS; SUBCUTANEOUS at 21:54

## 2025-02-20 RX ADMIN — LAMOTRIGINE 150 MG: 25 TABLET ORAL at 17:59

## 2025-02-20 RX ADMIN — HEPARIN SODIUM 5000 UNITS: 5000 INJECTION, SOLUTION INTRAVENOUS; SUBCUTANEOUS at 06:20

## 2025-02-20 RX ADMIN — MAGNESIUM SULFATE HEPTAHYDRATE 2 G: 40 INJECTION, SOLUTION INTRAVENOUS at 12:04

## 2025-02-20 NOTE — PROGRESS NOTES
Progress Note - Surgery-General   Name: Mario Rollins 59 y.o. male I MRN: [id number]  Unit/Bed#: 2 [facility] 204 A I Date of Admission: 2025   Date of Service: 2025 I Hospital Day: 2     Assessment & Plan  Small bowel obstruction (HCC)     Past surgical hx exploratory laparotomy, CARMEN, secondary to small bowel volvulus Chuck 2024  SBO 2024 treated medically.     CT a/p   1.  Small bowel obstruction with a transition point in the right lower quadrant at the level of the ileum. Small volume ascites, which is nonspecific but can be seen with associated ischemia.   2.  Patchy groundglass opacities in the lung bases, likely infectious/inflammatory.   3.  Hiatal hernia with distal esophagitis.   4.  Diffuse urinary bladder wall thickening, which could reflect cystitis. Correlate with urinalysis     NGT output:1,050 ml   Urine output: 2.3L   Ma.8 (1.7)  Phos: 1.8 (2.0)    No leukocytosis, Afebrile.   Patient reports no abdominal pain currently.      NPO  NGT low continuous suction.   IVF.   Gastrografin challenge.   PRN analgesics.   Electrolyte repletion.  Serial abdominal exams.  Incentive spirometry.   Am labs CBC, CMP, Mag, Phos.   UTI (urinary tract infection)  Urine culture pending.   IV antibiotics Rocephin x1. (Given in ED.)  HTN (hypertension)    Epilepsy (HCC)  Continue home         Subjective   Patient was seen and examined bedside. Patient reports no acute changes through the night. Patient reports abdominal pain has resolved .     Objective :  Temp:  [97.8 °F (36.6 °C)-98.2 °F (36.8 °C)] 97.8 °F (36.6 °C)  HR:  [67-70] 67  BP: (135-137)/(83-84) 137/83  Resp:  [18] 18  SpO2:  [94 %-97 %] 94 %  O2 Device: None (Room air)    I/O          0701   0700  0701   0700  0701   0700    I.V. 1712.5 1950.4     IV Piggyback 1050      Total Intake 2762.5 1950.4     Urine 1400 2300 500    Emesis/NG output 850 1050     Total Output 2250 3350 500    Net +512.5 -1399.6 -500                  Lines/Drains/Airways       Active Status       Name Placement date Placement time Site Days    NG/OG/Enteral Tube Nasogastric 16 Fr Left nare 02/18/25  1438  Left nare  1                  Physical Exam  Vitals and nursing note reviewed.   Constitutional:       Appearance: Normal appearance.   HENT:      Head: Normocephalic and atraumatic.      Nose: Nose normal.      Mouth/Throat:      Mouth: Mucous membranes are moist.   Cardiovascular:      Rate and Rhythm: Normal rate.   Pulmonary:      Effort: Pulmonary effort is normal. No respiratory distress.   Abdominal:      General: A surgical scar is present. There is distension.      Palpations: Abdomen is soft.      Tenderness: There is abdominal tenderness in the epigastric area. There is no guarding or rebound.   Skin:     General: Skin is warm.      Capillary Refill: Capillary refill takes less than 2 seconds.   Neurological:      Mental Status: He is alert.           Lab Results: I have reviewed the following results:  Recent Labs     02/18/25  1016 02/19/25  0540 02/20/25  0615   WBC 8.89   < > 6.00   HGB 18.9*   < > 14.9   HCT 56.8*   < > 46.2      < > 147*   SODIUM 136   < > 138   K 3.8   < > 3.8      < > 107   CO2 23   < > 23   BUN 20   < > 9   CREATININE 1.13   < > 0.91   GLUC 132   < > 105   MG  --    < > 1.8*   PHOS  --    < > 1.8*   AST 18   < > 12*   ALT 19   < > 9   ALB 4.7   < > 3.4*   TBILI 0.43   < > 0.51   ALKPHOS 87   < > 58   LACTICACID 1.7  --   --     < > = values in this interval not displayed.           VTE Pharmacologic Prophylaxis: VTE covered by:  heparin (porcine), Subcutaneous, 5,000 Units at 02/20/25 0620     VTE Mechanical Prophylaxis: sequential compression device

## 2025-02-20 NOTE — PLAN OF CARE
Problem: PAIN - ADULT  Goal: Verbalizes/displays adequate comfort level or baseline comfort level  Description: Interventions:  - Encourage patient to monitor pain and request assistance  - Assess pain using appropriate pain scale  - Administer analgesics based on type and severity of pain and evaluate response  - Implement non-pharmacological measures as appropriate and evaluate response  - Consider cultural and social influences on pain and pain management  - Notify physician/advanced practitioner if interventions unsuccessful or patient reports new pain  Outcome: Progressing     Problem: INFECTION - ADULT  Goal: Absence or prevention of progression during hospitalization  Description: INTERVENTIONS:  - Assess and monitor for signs and symptoms of infection  - Monitor lab/diagnostic results  - Monitor all insertion sites, i.e. indwelling lines, tubes, and drains  - Monitor endotracheal if appropriate and nasal secretions for changes in amount and color  - [place] appropriate cooling/warming therapies per order  - Administer medications as ordered  - Instruct and encourage patient and family to use good hand hygiene technique  - Identify and instruct in appropriate isolation precautions for identified infection/condition  Outcome: Progressing     Problem: SAFETY ADULT  Goal: Maintain or return to baseline ADL function  Description: INTERVENTIONS:  -  Assess patient's ability to carry out ADLs; assess patient's baseline for ADL function and identify physical deficits which impact ability to perform ADLs (bathing, care of mouth/teeth, toileting, grooming, dressing, etc.)  - Assess/evaluate cause of self-care deficits   - Assess range of motion  - Assess patient's mobility; develop plan if impaired  - Assess patient's need for assistive devices and provide as appropriate  - Encourage maximum independence but intervene and supervise when necessary  - Involve family in performance of ADLs  - Assess for home care  needs following discharge   - Consider OT consult to assist with ADL evaluation and planning for discharge  - Provide patient education as appropriate  Outcome: Progressing     Problem: DISCHARGE PLANNING  Goal: Discharge to home or other facility with appropriate resources  Description: INTERVENTIONS:  - Identify barriers to discharge w/patient and caregiver  - Arrange for needed discharge resources and transportation as appropriate  - Identify discharge learning needs (meds, wound care, etc.)  - Arrange for interpretive services to assist at discharge as needed  - Refer to Case Management Department for coordinating discharge planning if the patient needs post-hospital services based on physician/advanced practitioner order or complex needs related to functional status, cognitive ability, or social support system  Outcome: Progressing     Problem: GASTROINTESTINAL - ADULT  Goal: Minimal or absence of nausea and/or vomiting  Description: INTERVENTIONS:  - Administer IV fluids if ordered to ensure adequate hydration  - Maintain NPO status until nausea and vomiting are resolved  - Nasogastric tube if ordered  - Administer ordered antiemetic medications as needed  - Provide nonpharmacologic comfort measures as appropriate  - Advance diet as tolerated, if ordered  - Consider nutrition services referral to assist patient with adequate nutrition and appropriate food choices  Outcome: Progressing  Goal: Maintains or returns to baseline bowel function  Description: INTERVENTIONS:  - Assess bowel function  - Encourage oral fluids to ensure adequate hydration  - Administer IV fluids if ordered to ensure adequate hydration  - Administer ordered medications as needed  - Encourage mobilization and activity  - Consider nutritional services referral to assist patient with adequate nutrition and appropriate food choices  Outcome: Progressing  Goal: Maintains adequate nutritional intake  Description: INTERVENTIONS:  - Monitor  percentage of each meal consumed  - Identify factors contributing to decreased intake, treat as appropriate  - Assist with meals as needed  - Monitor I&O, weight, and lab values if indicated  - Obtain nutrition services referral as needed  Outcome: Progressing     Problem: Prexisting or High Potential for Compromised Skin Integrity  Goal: Skin integrity is maintained or improved  Description: INTERVENTIONS:  - Identify patients at risk for skin breakdown  - Assess and monitor skin integrity  - Assess and monitor nutrition and hydration status  - Monitor labs   - Assess for incontinence   - Turn and reposition patient  - Assist with mobility/ambulation  - Relieve pressure over bony prominences  - Avoid friction and shearing  - Provide appropriate hygiene as needed including keeping skin clean and dry  - Evaluate need for skin moisturizer/barrier cream  - Collaborate with interdisciplinary team   - Patient/family teaching  - Consider wound care consult   Outcome: Progressing

## 2025-02-20 NOTE — ASSESSMENT & PLAN NOTE
Past surgical hx exploratory laparotomy, CARMEN, secondary to small bowel volvulus Chuck 2024  SBO 2024 treated medically.     CT a/p   1.  Small bowel obstruction with a transition point in the right lower quadrant at the level of the ileum. Small volume ascites, which is nonspecific but can be seen with associated ischemia.   2.  Patchy groundglass opacities in the lung bases, likely infectious/inflammatory.   3.  Hiatal hernia with distal esophagitis.   4.  Diffuse urinary bladder wall thickening, which could reflect cystitis. Correlate with urinalysis     NGT output:1,050 ml   Urine output: 2.3L   Ma.8 (1.7)  Phos: 1.8 (2.0)    No leukocytosis, Afebrile.   Patient reports no abdominal pain currently.      NPO  NGT low continuous suction.   IVF.   Gastrografin challenge.   PRN analgesics.   Electrolyte repletion.  Serial abdominal exams.  Incentive spirometry.   Am labs CBC, CMP, Mag, Phos.

## 2025-02-21 LAB
ANION GAP SERPL CALCULATED.3IONS-SCNC: 9 MMOL/L (ref 4–13)
BACTERIA UR CULT: ABNORMAL
BACTERIA UR CULT: ABNORMAL
BUN SERPL-MCNC: 10 MG/DL (ref 5–25)
CALCIUM SERPL-MCNC: 8.7 MG/DL (ref 8.4–10.2)
CHLORIDE SERPL-SCNC: 107 MMOL/L (ref 96–108)
CO2 SERPL-SCNC: 22 MMOL/L (ref 21–32)
CREAT SERPL-MCNC: 0.94 MG/DL (ref 0.6–1.3)
ERYTHROCYTE [DISTWIDTH] IN BLOOD BY AUTOMATED COUNT: 13.1 % (ref 11.6–15.1)
GFR SERPL CREATININE-BSD FRML MDRD: 88 ML/MIN/1.73SQ M
GLUCOSE SERPL-MCNC: 96 MG/DL (ref 65–140)
HCT VFR BLD AUTO: 40.3 % (ref 36.5–49.3)
HGB BLD-MCNC: 11.4 G/DL (ref 12–17)
MAGNESIUM SERPL-MCNC: 2.2 MG/DL (ref 1.9–2.7)
MCH RBC QN AUTO: 29.2 PG (ref 26.8–34.3)
MCHC RBC AUTO-ENTMCNC: 28.3 G/DL (ref 31.4–37.4)
MCV RBC AUTO: 102 FL (ref 82–98)
PLATELET # BLD AUTO: 126 THOUSANDS/UL (ref 149–390)
PMV BLD AUTO: 11 FL (ref 8.9–12.7)
POTASSIUM SERPL-SCNC: 4 MMOL/L (ref 3.5–5.3)
RBC # BLD AUTO: 3.91 MILLION/UL (ref 3.88–5.62)
SODIUM SERPL-SCNC: 138 MMOL/L (ref 135–147)
WBC # BLD AUTO: 6.71 THOUSAND/UL (ref 4.31–10.16)

## 2025-02-21 PROCEDURE — 80048 BASIC METABOLIC PNL TOTAL CA: CPT | Performed by: PHYSICIAN ASSISTANT

## 2025-02-21 PROCEDURE — 83735 ASSAY OF MAGNESIUM: CPT | Performed by: PHYSICIAN ASSISTANT

## 2025-02-21 PROCEDURE — 99232 SBSQ HOSP IP/OBS MODERATE 35: CPT | Performed by: SURGERY

## 2025-02-21 PROCEDURE — 85027 COMPLETE CBC AUTOMATED: CPT | Performed by: PHYSICIAN ASSISTANT

## 2025-02-21 RX ORDER — ATORVASTATIN CALCIUM 10 MG/1
10 TABLET, FILM COATED ORAL DAILY
Status: DISCONTINUED | OUTPATIENT
Start: 2025-02-21 | End: 2025-02-23 | Stop reason: HOSPADM

## 2025-02-21 RX ORDER — QUETIAPINE FUMARATE 25 MG/1
50 TABLET, FILM COATED ORAL
Status: DISCONTINUED | OUTPATIENT
Start: 2025-02-21 | End: 2025-02-23 | Stop reason: HOSPADM

## 2025-02-21 RX ORDER — POLYETHYLENE GLYCOL 3350 17 G/17G
17 POWDER, FOR SOLUTION ORAL 3 TIMES WEEKLY
Status: DISCONTINUED | OUTPATIENT
Start: 2025-02-21 | End: 2025-02-23 | Stop reason: HOSPADM

## 2025-02-21 RX ADMIN — POLYETHYLENE GLYCOL 3350 17 G: 17 POWDER, FOR SOLUTION ORAL at 09:49

## 2025-02-21 RX ADMIN — DEXTROSE, SODIUM CHLORIDE, AND POTASSIUM CHLORIDE 75 ML/HR: 5; .45; .15 INJECTION INTRAVENOUS at 23:14

## 2025-02-21 RX ADMIN — LAMOTRIGINE 150 MG: 25 TABLET ORAL at 17:50

## 2025-02-21 RX ADMIN — PANTOPRAZOLE SODIUM 40 MG: 40 INJECTION, POWDER, FOR SOLUTION INTRAVENOUS at 08:38

## 2025-02-21 RX ADMIN — ATORVASTATIN CALCIUM 10 MG: 10 TABLET, FILM COATED ORAL at 23:08

## 2025-02-21 RX ADMIN — LAMOTRIGINE 150 MG: 25 TABLET ORAL at 08:38

## 2025-02-21 RX ADMIN — HEPARIN SODIUM 5000 UNITS: 5000 INJECTION, SOLUTION INTRAVENOUS; SUBCUTANEOUS at 23:09

## 2025-02-21 RX ADMIN — HEPARIN SODIUM 5000 UNITS: 5000 INJECTION, SOLUTION INTRAVENOUS; SUBCUTANEOUS at 05:53

## 2025-02-21 RX ADMIN — DEXTROSE, SODIUM CHLORIDE, AND POTASSIUM CHLORIDE 125 ML/HR: 5; .45; .15 INJECTION INTRAVENOUS at 03:02

## 2025-02-21 RX ADMIN — QUETIAPINE FUMARATE 50 MG: 25 TABLET ORAL at 23:08

## 2025-02-21 RX ADMIN — HEPARIN SODIUM 5000 UNITS: 5000 INJECTION, SOLUTION INTRAVENOUS; SUBCUTANEOUS at 13:51

## 2025-02-21 RX ADMIN — SERTRALINE HYDROCHLORIDE 50 MG: 50 TABLET ORAL at 09:49

## 2025-02-21 RX ADMIN — DEXTROSE, SODIUM CHLORIDE, AND POTASSIUM CHLORIDE 125 ML/HR: 5; .45; .15 INJECTION INTRAVENOUS at 10:47

## 2025-02-21 NOTE — CASE MANAGEMENT
Case Management Assessment & Discharge Planning Note    Patient name Mario Rollins  Location 2 [facility] /2 [facility] 204 A MRN [id number]  : 1965 Date 2025       Current Admission Date: 2025  Current Admission Diagnosis:Small bowel obstruction (HCC)   Patient Active Problem List    Diagnosis Date Noted Date Diagnosed    UTI (urinary tract infection) 2025     HTN (hypertension) 2025     History of small bowel obstruction 2024     Chronic kidney disease      Hypertension      Epilepsy (HCC)      Electrolyte abnormality 2024     Small bowel obstruction (HCC) 2024     Pneumatosis of intestines 2024     Sacroiliitis (HCC)      Localization-related symptomatic epilepsy and epileptic syndromes with complex partial seizures, intractable, without status epilepticus (HCC)      Encounter for hearing examination 2021     Right spastic hemiparesis (HCC) 2020     Acquired deformity of right hand 2020     Paronychia of toenail 2019     Lumbar radiculopathy 10/01/2018     Spinal stenosis of lumbar region 10/01/2018     Chronic left-sided low back pain with left-sided sciatica 10/01/2018     Chronic pain syndrome 10/01/2018     Cerebral palsy (HCC) 2018     Class 1 obesity with body mass index (BMI) of 30.0 to 30.9 in adult 2018     Hyperlipidemia 2018     Other constipation 2018     Acquired valgus deformity of right ankle 2017     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 10/20/2015     Acquired deformity of right ankle and foot 10/20/2015     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) 2015     Nephrocalcinosis 2015     Acquired hallux rigidus of left foot 2015     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    Primary Insurance: MEDICARE  Secondary Insurance: [employer]    ASSESSMENT:  Active Health Care Proxies    There are no active Health Care Proxies on file.       Advance Directives  Does patient have a Health Care POA?: No  Does patient have Advance Directives?: No  Primary Contact: Monica Bourne    Readmission Root Cause  30 Day Readmission: No    Patient Information  Admitted from:: Facility ([facility])  Mental Status: Alert  During Assessment patient was accompanied by: Not accompanied during assessment  Assessment information provided by:: Patient, Other - please comment ()  Primary Caregiver: Other (Comment)  Caregiver's Name:: Monica Bourne  Caregiver's Relationship to Patient:: Facility Staff  Caregiver's Telephone Number:: [phone]  Support Systems: Other (Comment) (group home staff)  County of Residence: [place]  What [facility] do you live in?: [place]  Type of Current Residence: Group home  Living Arrangements: Other (Comment) (lives in group home)    Activities of Daily Living Prior to Admission  Functional Status: Independent  Completes ADLs independently?: Yes  Ambulates independently?: Yes  Does patient use assisted devices?: Yes  Assisted Devices (DME) used: Other (Comment) (wilian-walker)  Does patient currently own DME?: Yes  What DME does the patient currently own?: Other (Comment) (wilian-walker)  Does patient currently have HHC?: No    Patient Information Continued  Income Source: SSI/SSD  Does patient have prescription coverage?: Yes ([facility] [place])  Does patient receive dialysis treatments?: No  Does patient have a history of  Mental Health Diagnosis?: No    Means of Transportation  Means of Transport to Appts:: Other (Comment) (group home staff)        DISCHARGE DETAILS:    Discharge planning discussed with:: Patient and , Monica Bourne  Freedom of Choice: Yes  Comments - Freedom of Choice: SW following to monitor needs and assist with planning.  Pt lives in an [facility].  SW met with pt to discuss plans and pt's plan is to return home when discharged.  ZACHARY spoke with , Monica Bounre, to review plans. Per Monica pt is independent at group home and he is his own guardian. She said pt is able to ambulate with wilian-walker and bathe himself.  Group home staff assist with functions that are difficult for pt due to pt's hemiparesis.  ZACHARY provided update on pt's condition and discussed surgery's anticipation of return home over weekend.  Monica requested if pt is cleared for discharge that pt return on Sunday due to coverage issues.  ZACHARY will discuss with surgical team.  ZACHARY will continue to follow to monitor progress and assist with transfer back to group home when ready.  CM contacted family/caregiver?: Yes    Contacts  Patient Contacts: Monica Bourne  Relationship to Patient:: Treatment Provider  Contact Method: Phone  Phone Number: [phone]  Reason/Outcome: Discharge Planning    Requested Home Health Care         Is the patient interested in HHC at discharge?: No    DME Referral Provided  Referral made for DME?: No    Other Referral/Resources/Interventions Provided:  Interventions: Facility Return, Group Home  Referral Comments: Pt will be returning to [facility] when discharged.    Treatment Team Recommendation: Facility Return, Group Home  Discharge Destination Plan:: Facility Return, Group Home  Transport at Discharge : Other (Comment) (group home staff)    IMM Given (Date):: 02/21/25  IMM Given to:: Patient (IMM #2 reviewed with pt. Pt verbalized  understanding.  Pt signed IMM and copy given. Copy also placed in scan bin for chart.)

## 2025-02-21 NOTE — PROGRESS NOTES
Progress Note - Surgery-General   Name: Mario Rollins 59 y.o. male I MRN: [id number]  Unit/Bed#: 2 [place] [address] A I Date of Admission: 2/18/2025   Date of Service: 2/21/2025 I Hospital Day: 3     Assessment & Plan  Small bowel obstruction (HCC)     Past surgical hx exploratory laparotomy, CARMEN, secondary to small bowel volvulus Chuck 04/2024  SBO 08/2024 treated medically.     CT a/p   1.  Small bowel obstruction with a transition point in the right lower quadrant at the level of the ileum. Small volume ascites, which is nonspecific but can be seen with associated ischemia.   2.  Patchy groundglass opacities in the lung bases, likely infectious/inflammatory.   3.  Hiatal hernia with distal esophagitis.   4.  Diffuse urinary bladder wall thickening, which could reflect cystitis. Correlate with urinalysis     NGT output:500ml   Urine output: 600L     Obstruction series shows contrast throughout the colon.  Medially    No leukocytosis, Afebrile.   Patient reports no abdominal pain currently.      NG tube.  Advance to clear liquid diet.  Serial abdominal exams.  Incentive spirometry.   Am labs CBC, CMP, Mag, Phos.   Restart home meds.  UTI (urinary tract infection)  Urine culture pending.   IV antibiotics Rocephin x1. (Given in ED.)  HTN (hypertension)  PRN lopressor systolic blood pressure greater 160 mmHg  Epilepsy (HCC)  Continue home         Subjective   Patient was seen and examined bedside. Patient reports no acute changes through the night.     Objective :  Temp:  [97.8 °F (36.6 °C)-99.8 °F (37.7 °C)] 97.8 °F (36.6 °C)  HR:  [69-85] 76  BP: (134-147)/(78-85) 147/78  Resp:  [18] 18  SpO2:  [93 %-96 %] 93 %  O2 Device: None (Room air)    I/O         02/19 0701 02/20 0700 02/20 0701 02/21 0700 02/21 0701 02/22 0700    I.V. 1950.4 3220.8     IV Piggyback       Total Intake 1950.4 3220.8     Urine 2300 500     Emesis/NG output 1050 600     Total Output 3350 1100     Net -1399.6 +2120.8                   Lines/Drains/Airways       Active Status       Name Placement date Placement time Site Days    NG/OG/Enteral Tube Nasogastric 16 Fr Left nare 02/18/25  1438  Left nare  2                  Physical Exam  Vitals and nursing note reviewed.   Constitutional:       Appearance: Normal appearance.   HENT:      Head: Normocephalic and atraumatic.      Nose: Nose normal.      Mouth/Throat:      Mouth: Mucous membranes are moist.   Cardiovascular:      Rate and Rhythm: Normal rate.   Pulmonary:      Effort: Pulmonary effort is normal.   Abdominal:      General: Bowel sounds are normal. There is no distension.      Palpations: Abdomen is soft.      Tenderness: There is no abdominal tenderness. There is no rebound.   Skin:     General: Skin is warm.      Capillary Refill: Capillary refill takes less than 2 seconds.   Neurological:      Mental Status: He is alert.           Lab Results: I have reviewed the following results:  Recent Labs     02/18/25  1016 02/19/25  0540 02/20/25  0615 02/21/25  0533   WBC 8.89   < > 6.00 6.71   HGB 18.9*   < > 14.9 11.4*   HCT 56.8*   < > 46.2 40.3      < > 147* 126*   SODIUM 136   < > 138  --    K 3.8   < > 3.8  --       < > 107  --    CO2 23   < > 23  --    BUN 20   < > 9  --    CREATININE 1.13   < > 0.91  --    GLUC 132   < > 105  --    MG  --    < > 1.8*  --    PHOS  --    < > 1.8*  --    AST 18   < > 12*  --    ALT 19   < > 9  --    ALB 4.7   < > 3.4*  --    TBILI 0.43   < > 0.51  --    ALKPHOS 87   < > 58  --    LACTICACID 1.7  --   --   --     < > = values in this interval not displayed.     CT abdomen pelvis with contrast   Final Result by Krystian Davis MD (02/18 1337)      1.  Small bowel obstruction with a transition point in the right lower quadrant at the level of the ileum. Small volume ascites, which is nonspecific but can be seen with associated ischemia.   2.  Patchy groundglass opacities in the lung bases, likely infectious/inflammatory.   3.  Hiatal  hernia with distal esophagitis.   4.  Diffuse urinary bladder wall thickening, which could reflect cystitis. Correlate with urinalysis.      The study was marked in EPIC for immediate notification.      Workstation performed: [id number]         XR abdomen complete inc upright and/or decubitus    (Results Pending)         VTE Pharmacologic Prophylaxis: VTE covered by:  heparin (porcine), Subcutaneous, 5,000 Units at 02/21/25 0553     VTE Mechanical Prophylaxis: sequential compression device

## 2025-02-21 NOTE — ASSESSMENT & PLAN NOTE
Past surgical hx exploratory laparotomy, CARMEN, secondary to small bowel volvulus Chuck 04/2024  SBO 08/2024 treated medically.     CT a/p   1.  Small bowel obstruction with a transition point in the right lower quadrant at the level of the ileum. Small volume ascites, which is nonspecific but can be seen with associated ischemia.   2.  Patchy groundglass opacities in the lung bases, likely infectious/inflammatory.   3.  Hiatal hernia with distal esophagitis.   4.  Diffuse urinary bladder wall thickening, which could reflect cystitis. Correlate with urinalysis     NGT output:500ml   Urine output: 600L     Obstruction series shows contrast throughout the colon.  Medially    No leukocytosis, Afebrile.   Patient reports no abdominal pain currently.      NG tube.  Advance to clear liquid diet.  Serial abdominal exams.  Incentive spirometry.   Am labs CBC, CMP, Mag, Phos.   Restart home meds.

## 2025-02-21 NOTE — PLAN OF CARE
Problem: PAIN - ADULT  Goal: Verbalizes/displays adequate comfort level or baseline comfort level  Description: Interventions:  - Encourage patient to monitor pain and request assistance  - Assess pain using appropriate pain scale  - Administer analgesics based on type and severity of pain and evaluate response  - Implement non-pharmacological measures as appropriate and evaluate response  - Consider cultural and social influences on pain and pain management  - Notify physician/advanced practitioner if interventions unsuccessful or patient reports new pain  Outcome: Progressing     Problem: INFECTION - ADULT  Goal: Absence or prevention of progression during hospitalization  Description: INTERVENTIONS:  - Assess and monitor for signs and symptoms of infection  - Monitor lab/diagnostic results  - Monitor all insertion sites, i.e. indwelling lines, tubes, and drains  - Monitor endotracheal if appropriate and nasal secretions for changes in amount and color  - [place] appropriate cooling/warming therapies per order  - Administer medications as ordered  - Instruct and encourage patient and family to use good hand hygiene technique  - Identify and instruct in appropriate isolation precautions for identified infection/condition  Outcome: Progressing     Problem: SAFETY ADULT  Goal: Maintain or return to baseline ADL function  Description: INTERVENTIONS:  -  Assess patient's ability to carry out ADLs; assess patient's baseline for ADL function and identify physical deficits which impact ability to perform ADLs (bathing, care of mouth/teeth, toileting, grooming, dressing, etc.)  - Assess/evaluate cause of self-care deficits   - Assess range of motion  - Assess patient's mobility; develop plan if impaired  - Assess patient's need for assistive devices and provide as appropriate  - Encourage maximum independence but intervene and supervise when necessary  - Involve family in performance of ADLs  - Assess for home care  needs following discharge   - Consider OT consult to assist with ADL evaluation and planning for discharge  - Provide patient education as appropriate  Outcome: Progressing     Problem: DISCHARGE PLANNING  Goal: Discharge to home or other facility with appropriate resources  Description: INTERVENTIONS:  - Identify barriers to discharge w/patient and caregiver  - Arrange for needed discharge resources and transportation as appropriate  - Identify discharge learning needs (meds, wound care, etc.)  - Arrange for interpretive services to assist at discharge as needed  - Refer to Case Management Department for coordinating discharge planning if the patient needs post-hospital services based on physician/advanced practitioner order or complex needs related to functional status, cognitive ability, or social support system  Outcome: Progressing     Problem: GASTROINTESTINAL - ADULT  Goal: Minimal or absence of nausea and/or vomiting  Description: INTERVENTIONS:  - Administer IV fluids if ordered to ensure adequate hydration  - Maintain NPO status until nausea and vomiting are resolved  - Nasogastric tube if ordered  - Administer ordered antiemetic medications as needed  - Provide nonpharmacologic comfort measures as appropriate  - Advance diet as tolerated, if ordered  - Consider nutrition services referral to assist patient with adequate nutrition and appropriate food choices  Outcome: Progressing  Goal: Maintains or returns to baseline bowel function  Description: INTERVENTIONS:  - Assess bowel function  - Encourage oral fluids to ensure adequate hydration  - Administer IV fluids if ordered to ensure adequate hydration  - Administer ordered medications as needed  - Encourage mobilization and activity  - Consider nutritional services referral to assist patient with adequate nutrition and appropriate food choices  Outcome: Progressing  Goal: Maintains adequate nutritional intake  Description: INTERVENTIONS:  - Monitor  percentage of each meal consumed  - Identify factors contributing to decreased intake, treat as appropriate  - Assist with meals as needed  - Monitor I&O, weight, and lab values if indicated  - Obtain nutrition services referral as needed  Outcome: Progressing     Problem: Prexisting or High Potential for Compromised Skin Integrity  Goal: Skin integrity is maintained or improved  Description: INTERVENTIONS:  - Identify patients at risk for skin breakdown  - Assess and monitor skin integrity  - Assess and monitor nutrition and hydration status  - Monitor labs   - Assess for incontinence   - Turn and reposition patient  - Assist with mobility/ambulation  - Relieve pressure over bony prominences  - Avoid friction and shearing  - Provide appropriate hygiene as needed including keeping skin clean and dry  - Evaluate need for skin moisturizer/barrier cream  - Collaborate with interdisciplinary team   - Patient/family teaching  - Consider wound care consult   Outcome: Progressing

## 2025-02-21 NOTE — PLAN OF CARE
Problem: PAIN - ADULT  Goal: Verbalizes/displays adequate comfort level or baseline comfort level  Description: Interventions:  - Encourage patient to monitor pain and request assistance  - Assess pain using appropriate pain scale  - Administer analgesics based on type and severity of pain and evaluate response  - Implement non-pharmacological measures as appropriate and evaluate response  - Consider cultural and social influences on pain and pain management  - Notify physician/advanced practitioner if interventions unsuccessful or patient reports new pain  Outcome: Progressing     Problem: GASTROINTESTINAL - ADULT  Goal: Minimal or absence of nausea and/or vomiting  Description: INTERVENTIONS:  - Administer IV fluids if ordered to ensure adequate hydration  - Maintain NPO status until nausea and vomiting are resolved  - Nasogastric tube if ordered  - Administer ordered antiemetic medications as needed  - Provide nonpharmacologic comfort measures as appropriate  - Advance diet as tolerated, if ordered  - Consider nutrition services referral to assist patient with adequate nutrition and appropriate food choices  Outcome: Progressing     Problem: GASTROINTESTINAL - ADULT  Goal: Maintains or returns to baseline bowel function  Description: INTERVENTIONS:  - Assess bowel function  - Encourage oral fluids to ensure adequate hydration  - Administer IV fluids if ordered to ensure adequate hydration  - Administer ordered medications as needed  - Encourage mobilization and activity  - Consider nutritional services referral to assist patient with adequate nutrition and appropriate food choices  Outcome: Progressing

## 2025-02-22 PROCEDURE — 99232 SBSQ HOSP IP/OBS MODERATE 35: CPT | Performed by: SURGERY

## 2025-02-22 RX ORDER — SODIUM CHLORIDE, SODIUM LACTATE, POTASSIUM CHLORIDE, CALCIUM CHLORIDE 600; 310; 30; 20 MG/100ML; MG/100ML; MG/100ML; MG/100ML
75 INJECTION, SOLUTION INTRAVENOUS CONTINUOUS
Status: DISCONTINUED | OUTPATIENT
Start: 2025-02-22 | End: 2025-02-23 | Stop reason: HOSPADM

## 2025-02-22 RX ORDER — SULFAMETHOXAZOLE AND TRIMETHOPRIM 800; 160 MG/1; MG/1
1 TABLET ORAL EVERY 12 HOURS SCHEDULED
Status: DISCONTINUED | OUTPATIENT
Start: 2025-02-22 | End: 2025-02-23 | Stop reason: HOSPADM

## 2025-02-22 RX ADMIN — ATORVASTATIN CALCIUM 10 MG: 10 TABLET, FILM COATED ORAL at 09:36

## 2025-02-22 RX ADMIN — LAMOTRIGINE 150 MG: 25 TABLET ORAL at 17:32

## 2025-02-22 RX ADMIN — HEPARIN SODIUM 5000 UNITS: 5000 INJECTION, SOLUTION INTRAVENOUS; SUBCUTANEOUS at 14:13

## 2025-02-22 RX ADMIN — DEXTROSE, SODIUM CHLORIDE, AND POTASSIUM CHLORIDE 75 ML/HR: 5; .45; .15 INJECTION INTRAVENOUS at 12:18

## 2025-02-22 RX ADMIN — HEPARIN SODIUM 5000 UNITS: 5000 INJECTION, SOLUTION INTRAVENOUS; SUBCUTANEOUS at 05:47

## 2025-02-22 RX ADMIN — PANTOPRAZOLE SODIUM 40 MG: 40 INJECTION, POWDER, FOR SOLUTION INTRAVENOUS at 09:36

## 2025-02-22 RX ADMIN — SODIUM CHLORIDE, SODIUM LACTATE, POTASSIUM CHLORIDE, AND CALCIUM CHLORIDE 75 ML/HR: .6; .31; .03; .02 INJECTION, SOLUTION INTRAVENOUS at 12:48

## 2025-02-22 RX ADMIN — LAMOTRIGINE 150 MG: 25 TABLET ORAL at 09:36

## 2025-02-22 RX ADMIN — HEPARIN SODIUM 5000 UNITS: 5000 INJECTION, SOLUTION INTRAVENOUS; SUBCUTANEOUS at 22:54

## 2025-02-22 RX ADMIN — SERTRALINE HYDROCHLORIDE 50 MG: 50 TABLET ORAL at 09:36

## 2025-02-22 RX ADMIN — SULFAMETHOXAZOLE AND TRIMETHOPRIM 1 TABLET: 800; 160 TABLET ORAL at 14:13

## 2025-02-22 RX ADMIN — QUETIAPINE FUMARATE 50 MG: 25 TABLET ORAL at 22:54

## 2025-02-22 RX ADMIN — SULFAMETHOXAZOLE AND TRIMETHOPRIM 1 TABLET: 800; 160 TABLET ORAL at 23:00

## 2025-02-22 RX ADMIN — SODIUM CHLORIDE, SODIUM LACTATE, POTASSIUM CHLORIDE, AND CALCIUM CHLORIDE 75 ML/HR: .6; .31; .03; .02 INJECTION, SOLUTION INTRAVENOUS at 23:05

## 2025-02-22 NOTE — ASSESSMENT & PLAN NOTE
Past surgical hx exploratory laparotomy, CARMEN, secondary to small bowel volvulus Chuck 04/2024  SBO 08/2024 treated medically.   Resolved  NGT removed yesterday, started on CLD  Pt passing flatus and has had 3 loose BMs  Tolerating CLD  Abdomen soft, non-distended, non-tender, normal BS  AFVSS  Labs WNL    Advance to soft diet  Serial abdominal exams.  Incentive spirometry.   Restart home meds.  If pt tolerates soft diet, anticipate discharge tomorrow

## 2025-02-22 NOTE — PLAN OF CARE
Problem: GASTROINTESTINAL - ADULT  Goal: Minimal or absence of nausea and/or vomiting  Description: INTERVENTIONS:  - Administer IV fluids if ordered to ensure adequate hydration  - Administer ordered antiemetic medications as needed  - Provide nonpharmacologic comfort measures as appropriate  - Advance diet as tolerated, if ordered  - Consider nutrition services referral to assist patient with adequate nutrition and appropriate food choices  Outcome: Progressing  Goal: Maintains or returns to baseline bowel function  Description: INTERVENTIONS:  - Assess bowel function  - Encourage oral fluids to ensure adequate hydration  - Administer IV fluids if ordered to ensure adequate hydration  - Administer ordered medications as needed  - Encourage mobilization and activity  - Consider nutritional services referral to assist patient with adequate nutrition and appropriate food choices  Outcome: Progressing  Goal: Maintains adequate nutritional intake  Description: INTERVENTIONS:  - Monitor percentage of each meal consumed  - Identify factors contributing to decreased intake, treat as appropriate  - Assist with meals as needed  - Monitor I&O, weight, and lab values if indicated  - Obtain nutrition services referral as needed  Outcome: Progressing      Problem: PAIN - ADULT  Goal: Verbalizes/displays adequate comfort level or baseline comfort level  Description: Interventions:  - Encourage patient to monitor pain and request assistance  - Assess pain using appropriate pain scale  - Administer analgesics based on type and severity of pain and evaluate response  - Implement non-pharmacological measures as appropriate and evaluate response  - Consider cultural and social influences on pain and pain management  - Notify physician/advanced practitioner if interventions unsuccessful or patient reports new pain  Outcome: Progressing     Problem: INFECTION - ADULT  Goal: Absence or prevention of progression during  hospitalization  Description: INTERVENTIONS:  - Assess and monitor for signs and symptoms of infection  - Monitor lab/diagnostic results  - Monitor all insertion sites, i.e. indwelling lines, tubes, and drains  - Administer medications as ordered  - Instruct and encourage patient and family to use good hand hygiene technique  Outcome: Progressing

## 2025-02-22 NOTE — PLAN OF CARE
Problem: PAIN - ADULT  Goal: Verbalizes/displays adequate comfort level or baseline comfort level  Description: Interventions:  - Encourage patient to monitor pain and request assistance  - Assess pain using appropriate pain scale  - Administer analgesics based on type and severity of pain and evaluate response  - Implement non-pharmacological measures as appropriate and evaluate response  - Consider cultural and social influences on pain and pain management  - Notify physician/advanced practitioner if interventions unsuccessful or patient reports new pain  2/22/2025 0358 by Ifeoma Aleman RN  Outcome: Progressing  2/22/2025 0358 by Ifeoma Aleman RN  Outcome: Progressing     Problem: INFECTION - ADULT  Goal: Absence or prevention of progression during hospitalization  Description: INTERVENTIONS:  - Assess and monitor for signs and symptoms of infection  - Monitor lab/diagnostic results  - Monitor all insertion sites, i.e. indwelling lines, tubes, and drains  - Monitor endotracheal if appropriate and nasal secretions for changes in amount and color  - [place] appropriate cooling/warming therapies per order  - Administer medications as ordered  - Instruct and encourage patient and family to use good hand hygiene technique  - Identify and instruct in appropriate isolation precautions for identified infection/condition  2/22/2025 0358 by Ifeoma Aleman RN  Outcome: Progressing  2/22/2025 0358 by Ifeoma Aleman RN  Outcome: Progressing     Problem: SAFETY ADULT  Goal: Maintain or return to baseline ADL function  Description: INTERVENTIONS:  -  Assess patient's ability to carry out ADLs; assess patient's baseline for ADL function and identify physical deficits which impact ability to perform ADLs (bathing, care of mouth/teeth, toileting, grooming, dressing, etc.)  - Assess/evaluate cause of self-care deficits   - Assess range of motion  - Assess patient's mobility; develop plan  if impaired  - Assess patient's need for assistive devices and provide as appropriate  - Encourage maximum independence but intervene and supervise when necessary  - Involve family in performance of ADLs  - Assess for home care needs following discharge   - Consider OT consult to assist with ADL evaluation and planning for discharge  - Provide patient education as appropriate  2/22/2025 0358 by Ifeoma Aleman RN  Outcome: Progressing  2/22/2025 0358 by Ifeoma Aleman RN  Outcome: Progressing     Problem: DISCHARGE PLANNING  Goal: Discharge to home or other facility with appropriate resources  Description: INTERVENTIONS:  - Identify barriers to discharge w/patient and caregiver  - Arrange for needed discharge resources and transportation as appropriate  - Identify discharge learning needs (meds, wound care, etc.)  - Arrange for interpretive services to assist at discharge as needed  - Refer to Case Management Department for coordinating discharge planning if the patient needs post-hospital services based on physician/advanced practitioner order or complex needs related to functional status, cognitive ability, or social support system  2/22/2025 0358 by Ifeoma Aleman RN  Outcome: Progressing  2/22/2025 0358 by Ifeoma Aleman RN  Outcome: Progressing     Problem: GASTROINTESTINAL - ADULT  Goal: Minimal or absence of nausea and/or vomiting  Description: INTERVENTIONS:  - Administer IV fluids if ordered to ensure adequate hydration  - Maintain NPO status until nausea and vomiting are resolved  - Nasogastric tube if ordered  - Administer ordered antiemetic medications as needed  - Provide nonpharmacologic comfort measures as appropriate  - Advance diet as tolerated, if ordered  - Consider nutrition services referral to assist patient with adequate nutrition and appropriate food choices  2/22/2025 0358 by Ifeoma Aleman RN  Outcome: Progressing  2/22/2025 0358 by Ifeoma PEARL  FELIPE Aleman  Outcome: Progressing  Goal: Maintains or returns to baseline bowel function  Description: INTERVENTIONS:  - Assess bowel function  - Encourage oral fluids to ensure adequate hydration  - Administer IV fluids if ordered to ensure adequate hydration  - Administer ordered medications as needed  - Encourage mobilization and activity  - Consider nutritional services referral to assist patient with adequate nutrition and appropriate food choices  2/22/2025 0358 by Ifeoma Aleman RN  Outcome: Progressing  2/22/2025 0358 by Ifeoma Aleman RN  Outcome: Progressing  Goal: Maintains adequate nutritional intake  Description: INTERVENTIONS:  - Monitor percentage of each meal consumed  - Identify factors contributing to decreased intake, treat as appropriate  - Assist with meals as needed  - Monitor I&O, weight, and lab values if indicated  - Obtain nutrition services referral as needed  2/22/2025 0358 by Ifeoma Aleman RN  Outcome: Progressing  2/22/2025 0358 by Ifeoma Aleman RN  Outcome: Progressing     Problem: Prexisting or High Potential for Compromised Skin Integrity  Goal: Skin integrity is maintained or improved  Description: INTERVENTIONS:  - Identify patients at risk for skin breakdown  - Assess and monitor skin integrity  - Assess and monitor nutrition and hydration status  - Monitor labs   - Assess for incontinence   - Turn and reposition patient  - Assist with mobility/ambulation  - Relieve pressure over bony prominences  - Avoid friction and shearing  - Provide appropriate hygiene as needed including keeping skin clean and dry  - Evaluate need for skin moisturizer/barrier cream  - Collaborate with interdisciplinary team   - Patient/family teaching  - Consider wound care consult   2/22/2025 0358 by Ifeoma Aleman RN  Outcome: Progressing  2/22/2025 0358 by Ifeoma Aleman RN  Outcome: Progressing

## 2025-02-22 NOTE — PROGRESS NOTES
Progress Note - Surgery-General   Name: Mario Rollins 59 y.o. male I MRN: [id number]  Unit/Bed#: 2 [facility] 204 A I Date of Admission: 2/18/2025   Date of Service: 2/22/2025 I Hospital Day: 4     Assessment & Plan  Small bowel obstruction (HCC)  Past surgical hx exploratory laparotomy, CARMEN, secondary to small bowel volvulus Chuck 04/2024  SBO 08/2024 treated medically.   Resolved  NGT removed yesterday, started on CLD  Pt passing flatus and has had 3 loose BMs  Tolerating CLD  Abdomen soft, non-distended, non-tender, normal BS  AFVSS  Labs WNL    Advance to soft diet  Serial abdominal exams.  Incentive spirometry.   Restart home meds.  If pt tolerates soft diet, anticipate discharge tomorrow  UTI (urinary tract infection)  Urine culture growing proteus mirabilis sensitive to Bactrim  Begin Bactrim  HTN (hypertension)  PRN lopressor systolic blood pressure greater 160 mmHg  Epilepsy (HCC)  Continue home meds      Subjective   Pt doing better today, having Bms. No N/V.     Objective :  Temp:  [97.6 °F (36.4 °C)-98.2 °F (36.8 °C)] 97.6 °F (36.4 °C)  HR:  [65-70] 68  BP: (121-149)/(75-80) 121/75  Resp:  [16-18] 18  SpO2:  [94 %-95 %] 95 %  O2 Device: None (Room air)    I/O         02/20 0701  02/21 0700 02/21 0701  02/22 0700 02/22 0701  02/23 0700    P.O.  890     I.V. 3220.8 1798.8     Total Intake 3220.8 2688.8     Urine 500 1950 850    Emesis/NG output 600      Stool  0     Total Output 1100 1950 850    Net +2120.8 +738.8 -850           Unmeasured Stool Occurrence  3 x           Lines/Drains/Airways       Active Status       Name Placement date Placement time Site Days    NG/OG/Enteral Tube Nasogastric 16 Fr Left nare 02/18/25  1438  Left nare  3                  Physical Exam  Vitals and nursing note reviewed.   Constitutional:       General: He is not in acute distress.     Appearance: He is not toxic-appearing.   HENT:      Head: Normocephalic and atraumatic.   Pulmonary:      Effort: Pulmonary effort is  normal. No respiratory distress.   Abdominal:      General: Bowel sounds are normal. There is no distension.      Palpations: Abdomen is soft.      Tenderness: There is no abdominal tenderness. There is no guarding.   Musculoskeletal:         General: Normal range of motion.      Cervical back: Normal range of motion.   Skin:     General: Skin is warm and dry.   Neurological:      Mental Status: He is alert.   Psychiatric:         Mood and Affect: Mood normal.         Behavior: Behavior normal.           Lab Results: I have reviewed the following results:  Recent Labs     02/20/25  0615 02/21/25  0533 02/21/25  0823   WBC 6.00 6.71  --    HGB 14.9 11.4*  --    HCT 46.2 40.3  --    * 126*  --    SODIUM 138  --  138   K 3.8  --  4.0     --  107   CO2 23  --  22   BUN 9  --  10   CREATININE 0.91  --  0.94   GLUC 105  --  96   MG 1.8*  --  2.2   PHOS 1.8*  --   --    AST 12*  --   --    ALT 9  --   --    ALB 3.4*  --   --    TBILI 0.51  --   --    ALKPHOS 58  --   --        Imaging Results Review: No pertinent imaging studies reviewed.  Other Study Results Review: No additional pertinent studies reviewed.    VTE Pharmacologic Prophylaxis: VTE covered by:  heparin (porcine), Subcutaneous, 5,000 Units at 02/22/25 0526     VTE Mechanical Prophylaxis: sequential compression device

## 2025-02-23 VITALS
HEART RATE: 76 BPM | SYSTOLIC BLOOD PRESSURE: 121 MMHG | OXYGEN SATURATION: 94 % | RESPIRATION RATE: 18 BRPM | DIASTOLIC BLOOD PRESSURE: 77 MMHG | WEIGHT: 172 LBS | BODY MASS INDEX: 27.64 KG/M2 | HEIGHT: 66 IN | TEMPERATURE: 97.5 F

## 2025-02-23 PROBLEM — K56.609 SMALL BOWEL OBSTRUCTION (HCC): Status: RESOLVED | Noted: 2024-04-06 | Resolved: 2025-02-23

## 2025-02-23 LAB
ANION GAP SERPL CALCULATED.3IONS-SCNC: 11 MMOL/L (ref 4–13)
ANISOCYTOSIS BLD QL SMEAR: PRESENT
BASOPHILS # BLD MANUAL: 0 THOUSAND/UL (ref 0–0.1)
BASOPHILS NFR MAR MANUAL: 0 % (ref 0–1)
BUN SERPL-MCNC: 11 MG/DL (ref 5–25)
CALCIUM SERPL-MCNC: 8.5 MG/DL (ref 8.4–10.2)
CHLORIDE SERPL-SCNC: 106 MMOL/L (ref 96–108)
CO2 SERPL-SCNC: 21 MMOL/L (ref 21–32)
CREAT SERPL-MCNC: 1.06 MG/DL (ref 0.6–1.3)
EOSINOPHIL # BLD MANUAL: 0.16 THOUSAND/UL (ref 0–0.4)
EOSINOPHIL NFR BLD MANUAL: 2 % (ref 0–6)
ERYTHROCYTE [DISTWIDTH] IN BLOOD BY AUTOMATED COUNT: 12.7 % (ref 11.6–15.1)
GFR SERPL CREATININE-BSD FRML MDRD: 76 ML/MIN/1.73SQ M
GLUCOSE SERPL-MCNC: 82 MG/DL (ref 65–140)
HCT VFR BLD AUTO: 45.5 % (ref 36.5–49.3)
HGB BLD-MCNC: 15 G/DL (ref 12–17)
LG PLATELETS BLD QL SMEAR: PRESENT
LYMPHOCYTES # BLD AUTO: 2.72 THOUSAND/UL (ref 0.6–4.47)
LYMPHOCYTES # BLD AUTO: 30 % (ref 14–44)
MCH RBC QN AUTO: 28.4 PG (ref 26.8–34.3)
MCHC RBC AUTO-ENTMCNC: 33 G/DL (ref 31.4–37.4)
MCV RBC AUTO: 86 FL (ref 82–98)
MONOCYTES # BLD AUTO: 0.7 THOUSAND/UL (ref 0–1.22)
MONOCYTES NFR BLD: 9 % (ref 4–12)
NEUTROPHILS # BLD MANUAL: 4.2 THOUSAND/UL (ref 1.85–7.62)
NEUTS BAND NFR BLD MANUAL: 2 % (ref 0–8)
NEUTS SEG NFR BLD AUTO: 52 % (ref 43–75)
OVALOCYTES BLD QL SMEAR: PRESENT
PLATELET # BLD AUTO: 202 THOUSANDS/UL (ref 149–390)
PLATELET BLD QL SMEAR: ADEQUATE
PMV BLD AUTO: 9.7 FL (ref 8.9–12.7)
POLYCHROMASIA BLD QL SMEAR: PRESENT
POTASSIUM SERPL-SCNC: 3.8 MMOL/L (ref 3.5–5.3)
RBC # BLD AUTO: 5.28 MILLION/UL (ref 3.88–5.62)
RBC MORPH BLD: PRESENT
SMUDGE CELLS BLD QL SMEAR: PRESENT
SODIUM SERPL-SCNC: 138 MMOL/L (ref 135–147)
VARIANT LYMPHS # BLD AUTO: 5 %
WBC # BLD AUTO: 7.77 THOUSAND/UL (ref 4.31–10.16)

## 2025-02-23 PROCEDURE — NC001 PR NO CHARGE: Performed by: SURGERY

## 2025-02-23 PROCEDURE — 80048 BASIC METABOLIC PNL TOTAL CA: CPT | Performed by: PHYSICIAN ASSISTANT

## 2025-02-23 PROCEDURE — 85007 BL SMEAR W/DIFF WBC COUNT: CPT | Performed by: PHYSICIAN ASSISTANT

## 2025-02-23 PROCEDURE — 99238 HOSP IP/OBS DSCHRG MGMT 30/<: CPT | Performed by: PHYSICIAN ASSISTANT

## 2025-02-23 PROCEDURE — 85027 COMPLETE CBC AUTOMATED: CPT | Performed by: PHYSICIAN ASSISTANT

## 2025-02-23 RX ORDER — SULFAMETHOXAZOLE AND TRIMETHOPRIM 800; 160 MG/1; MG/1
1 TABLET ORAL EVERY 12 HOURS SCHEDULED
Qty: 8 TABLET | Refills: 0 | Status: SHIPPED | OUTPATIENT
Start: 2025-02-23 | End: 2025-02-23 | Stop reason: SDUPTHER

## 2025-02-23 RX ORDER — SULFAMETHOXAZOLE AND TRIMETHOPRIM 800; 160 MG/1; MG/1
1 TABLET ORAL EVERY 12 HOURS SCHEDULED
Qty: 8 TABLET | Refills: 0 | Status: SHIPPED | OUTPATIENT
Start: 2025-02-23 | End: 2025-02-27

## 2025-02-23 RX ADMIN — HEPARIN SODIUM 5000 UNITS: 5000 INJECTION, SOLUTION INTRAVENOUS; SUBCUTANEOUS at 05:27

## 2025-02-23 RX ADMIN — PANTOPRAZOLE SODIUM 40 MG: 40 INJECTION, POWDER, FOR SOLUTION INTRAVENOUS at 09:59

## 2025-02-23 RX ADMIN — ATORVASTATIN CALCIUM 10 MG: 10 TABLET, FILM COATED ORAL at 09:49

## 2025-02-23 RX ADMIN — SERTRALINE HYDROCHLORIDE 50 MG: 50 TABLET ORAL at 09:49

## 2025-02-23 RX ADMIN — SULFAMETHOXAZOLE AND TRIMETHOPRIM 1 TABLET: 800; 160 TABLET ORAL at 09:49

## 2025-02-23 RX ADMIN — LAMOTRIGINE 150 MG: 25 TABLET ORAL at 09:49

## 2025-02-23 NOTE — ASSESSMENT & PLAN NOTE
Urine culture growing proteus mirabilis sensitive to Bactrim  Currently on Bactrim 800-160mg PO q12hrs. No urinary sx on exam today

## 2025-02-23 NOTE — NURSING NOTE
Review discharge instruction with caregiver, verbalized understanding. IV and Masimo discontinue. Pt discharge with personal belonging.

## 2025-02-23 NOTE — PLAN OF CARE
Problem: INFECTION - ADULT  Goal: Absence or prevention of progression during hospitalization  Description: INTERVENTIONS:  - Assess and monitor for signs and symptoms of infection  - Monitor lab/diagnostic results  - Monitor all insertion sites, i.e. indwelling lines, tubes, and drains  - Monitor endotracheal if appropriate and nasal secretions for changes in amount and color  - [place] appropriate cooling/warming therapies per order  - Administer medications as ordered  - Instruct and encourage patient and family to use good hand hygiene technique  - Identify and instruct in appropriate isolation precautions for identified infection/condition  2/23/2025 1104 by Idalia Hernandez RN  Outcome: Progressing  2/23/2025 1052 by Idalia Hernandez RN  Outcome: Progressing     Problem: GASTROINTESTINAL - ADULT  Goal: Maintains or returns to baseline bowel function  Description: INTERVENTIONS:  - Assess bowel function  - Encourage oral fluids to ensure adequate hydration  - Administer IV fluids if ordered to ensure adequate hydration  - Administer ordered medications as needed  - Encourage mobilization and activity  - Consider nutritional services referral to assist patient with adequate nutrition and appropriate food choices  2/23/2025 1104 by Idalia Hernandez RN  Outcome: Progressing  2/23/2025 1052 by Idalia Hernandez RN  Outcome: Progressing

## 2025-02-23 NOTE — PLAN OF CARE
Problem: PAIN - ADULT  Goal: Verbalizes/displays adequate comfort level or baseline comfort level  Description: Interventions:  - Encourage patient to monitor pain and request assistance  - Assess pain using appropriate pain scale  - Administer analgesics based on type and severity of pain and evaluate response  - Implement non-pharmacological measures as appropriate and evaluate response  - Consider cultural and social influences on pain and pain management  - Notify physician/advanced practitioner if interventions unsuccessful or patient reports new pain  Outcome: Progressing     Problem: INFECTION - ADULT  Goal: Absence or prevention of progression during hospitalization  Description: INTERVENTIONS:  - Assess and monitor for signs and symptoms of infection  - Monitor lab/diagnostic results  - Monitor all insertion sites, i.e. indwelling lines, tubes, and drains  - Monitor endotracheal if appropriate and nasal secretions for changes in amount and color  - [place] appropriate cooling/warming therapies per order  - Administer medications as ordered  - Instruct and encourage patient and family to use good hand hygiene technique  - Identify and instruct in appropriate isolation precautions for identified infection/condition  Outcome: Progressing     Problem: SAFETY ADULT  Goal: Maintain or return to baseline ADL function  Description: INTERVENTIONS:  -  Assess patient's ability to carry out ADLs; assess patient's baseline for ADL function and identify physical deficits which impact ability to perform ADLs (bathing, care of mouth/teeth, toileting, grooming, dressing, etc.)  - Assess/evaluate cause of self-care deficits   - Assess range of motion  - Assess patient's mobility; develop plan if impaired  - Assess patient's need for assistive devices and provide as appropriate  - Encourage maximum independence but intervene and supervise when necessary  - Involve family in performance of ADLs  - Assess for home care  needs following discharge   - Consider OT consult to assist with ADL evaluation and planning for discharge  - Provide patient education as appropriate  Outcome: Progressing     Problem: DISCHARGE PLANNING  Goal: Discharge to home or other facility with appropriate resources  Description: INTERVENTIONS:  - Identify barriers to discharge w/patient and caregiver  - Arrange for needed discharge resources and transportation as appropriate  - Identify discharge learning needs (meds, wound care, etc.)  - Arrange for interpretive services to assist at discharge as needed  - Refer to Case Management Department for coordinating discharge planning if the patient needs post-hospital services based on physician/advanced practitioner order or complex needs related to functional status, cognitive ability, or social support system  Outcome: Progressing     Problem: GASTROINTESTINAL - ADULT  Goal: Maintains or returns to baseline bowel function  Description: INTERVENTIONS:  - Assess bowel function  - Encourage oral fluids to ensure adequate hydration  - Administer IV fluids if ordered to ensure adequate hydration  - Administer ordered medications as needed  - Encourage mobilization and activity  - Consider nutritional services referral to assist patient with adequate nutrition and appropriate food choices  Outcome: Progressing     Problem: Prexisting or High Potential for Compromised Skin Integrity  Goal: Skin integrity is maintained or improved  Description: INTERVENTIONS:  - Identify patients at risk for skin breakdown  - Assess and monitor skin integrity  - Assess and monitor nutrition and hydration status  - Monitor labs   - Assess for incontinence   - Turn and reposition patient  - Assist with mobility/ambulation  - Relieve pressure over bony prominences  - Avoid friction and shearing  - Provide appropriate hygiene as needed including keeping skin clean and dry  - Evaluate need for skin moisturizer/barrier cream  - Collaborate  with interdisciplinary team   - Patient/family teaching  - Consider wound care consult   Outcome: Progressing

## 2025-02-23 NOTE — PROGRESS NOTES
Progress Note - Surgery-General   Name: Mario Rollins 59 y.o. male I MRN: [id number]  Unit/Bed#: 2 [facility] 204 A  Date of Admission: 2/18/2025   Date of Service: 2/23/2025 I Hospital Day: 5    Assessment & Plan  Small bowel obstruction (HCC)  AVSS, no acute concerns over the last 24hrs. Denies pain, nausea/vomiting. Passing flatus and has had 4 soft BMs since yesterday. Physical exam reassuring    -Advanced to regular diet  -Labs WNL  -Discontinue IVF  -Discharge to group home today  -Incentive spirometry  -Continue home meds  UTI (urinary tract infection)  Urine culture growing proteus mirabilis sensitive to Bactrim  Currently on Bactrim 800-160mg PO q12hrs. No urinary sx on exam today  HTN (hypertension)  PRN lopressor systolic blood pressure greater 160 mmHg  Epilepsy (HCC)  Continue home meds        Lashaun CONWAY is a 58 yo male with SBO who was seen at bedside today for evaluation of his symptoms. He reports no acute concerns over the last 24 hrs. Denies pain, nausea or vomiting. Has been passing gas and had 4 x BMs yesterday, all of which were soft. Currently on Bactrim due to positive urine cultures and reports no urinary symptoms at this time.    Objective :  Temp:  [97.5 °F (36.4 °C)-98.3 °F (36.8 °C)] 97.5 °F (36.4 °C)  HR:  [70-77] 76  BP: (121-132)/(76-84) 121/77  Resp:  [16] 16  SpO2:  [93 %-96 %] 94 %  O2 Device: None (Room air)    I/O         02/21 0701  02/22 0700 02/22 0701  02/23 0700 02/23 0701  02/24 0700    P.O. 890 120     I.V. 1798.8 820     Total Intake 2688.8 940     Urine 1950 2750     Emesis/NG output       Stool 0      Total Output 1950 2750     Net +738.8 -1810            Unmeasured Stool Occurrence 3 x            Lines/Drains/Airways       Active Status       Name Placement date Placement time Site Days    NG/OG/Enteral Tube Nasogastric 16 Fr Left nare 02/18/25  1438  Left nare  4                  Physical Exam  Constitutional:       General: He is not in acute distress.      Appearance: Normal appearance. He is not toxic-appearing or diaphoretic.   HENT:      Head: Normocephalic and atraumatic.      Right Ear: External ear normal.      Left Ear: External ear normal.   Eyes:      Extraocular Movements: Extraocular movements intact.   Cardiovascular:      Rate and Rhythm: Normal rate and regular rhythm.      Heart sounds: No murmur heard.     No friction rub. No gallop.   Pulmonary:      Effort: Pulmonary effort is normal. No respiratory distress.      Breath sounds: Normal breath sounds. No stridor. No wheezing, rhonchi or rales.   Abdominal:      General: Bowel sounds are normal. There is no distension.      Palpations: Abdomen is soft. There is no mass.      Tenderness: There is no abdominal tenderness. There is no guarding or rebound.   Musculoskeletal:         General: No swelling or tenderness.   Skin:     General: Skin is warm and dry.   Neurological:      Mental Status: He is alert. Mental status is at baseline.   Psychiatric:      Comments: Cooperative         Lab Results: I have reviewed the following results:  Recent Labs     02/21/25  0823 02/23/25  0534   WBC  --  7.77   HGB  --  15.0   HCT  --  45.5   PLT  --  202   BANDSPCT  --  2   SODIUM 138 138   K 4.0 3.8    106   CO2 22 21   BUN 10 11   CREATININE 0.94 1.06   GLUC 96 82   MG 2.2  --        Imaging Results Review: No pertinent imaging studies reviewed.  Other Study Results Review: No additional pertinent studies reviewed.    VTE Pharmacologic Prophylaxis: Heparin  VTE Mechanical Prophylaxis: sequential compression device

## 2025-02-23 NOTE — DISCHARGE SUMMARY
Discharge Summary - Surgery-General   Name: Mario Rollins 59 y.o. male I MRN: [id number]  Unit/Bed#: 2 [place] [address] A I Date of Admission: 2/18/2025   Date of Service: 2/23/2025 I Hospital Day: 5      Discharge Summary - General Surgery   Mario Rollins 59 y.o. male MRN: [id number]  Unit/Bed#: 2 [place] [address] A Encounter: [id number]    Admission Date: 2/18/2025     Discharge Date: 2/23/2025    Admitting Diagnosis: SBO (small bowel obstruction) ([facility]) [K56.609]  UTI (urinary tract infection) [N39.0]  Abdominal pain [R10.9]  Nausea and vomiting [R11.2]    Discharge Diagnosis: same    Attending and Service: Dimitrios Pulido MD    Consulting Physician(s): none    Procedures Performed: none    Imaging:  Procedure: XR abdomen complete inc upright and/or decubitus  Result Date: 2/21/2025  Narrative: XR ABDOMEN COMPLETE INC UPRIGHT AND/OR DECUBITUS INDICATION: Bowel gas pattern. Gatrografin Challenge. COMPARISON: CT abdomen pelvis 2/18/2025 FINDINGS: Enteric catheter tip in the stomach. There remains some dilated small bowel. However, enteric contrast is mostly passed into the colon and reaches the rectum. No pneumoperitoneum. No pathologic calcification or soft tissue mass. Advanced degenerative changes at the hip joints with partially imaged fixation hardware in the right femur. Examination of the chest reveals clear lungs and a normal cardiomediastinal silhouette.     Impression: No evidence of significant obstruction. Enteric contrast reaches the rectum. Workstation performed: [id number]         Hospital Course:   Mario Rollins is a 59 y.o. male who presented 2/18/2025 with partial SBO. NGT was placed and IV fluids started. Pt had resolution of partial SBO with conservative management and NGT was removed on HD#4. Pt tolerated dietary advancement. He was found to have a UTI and antibiotics were started.     Patient was discharged on HD#6. On the day of discharge, the patient was voiding spontaneously, ambulating at  baseline, tolerating a regular diet, and pain was well controlled. The patient was discharged to his group home with a script for Bactrim to complete 5 days of therapy. No surgical follow up needed.       Condition at Discharge: fair     Discharge instructions/Information to patient and family:   See after visit summary for information provided to patient and family.      Provisions for Follow-Up Care:  See after visit summary for information related to follow-up care and any pertinent home health orders.      Disposition:  group home    Planned Readmission: No    Discharge Statement   I spent 30 minutes discharging the patient. This time was spent on the day of discharge. I had direct contact with the patient on the day of discharge. Additional documentation is required if more than 30 minutes were spent on discharge.     Discharge Medications:  See after visit summary for reconciled discharge medications provided to patient and family.    Morena Broderick PA-C  2/23/2025

## 2025-02-23 NOTE — ASSESSMENT & PLAN NOTE
AVSS, no acute concerns over the last 24hrs. Denies pain, nausea/vomiting. Passing flatus and has had 4 soft BMs since yesterday. Physical exam reassuring    -Advanced to regular diet  -Labs WNL  -Discontinue IVF  -Discharge to group home today  -Incentive spirometry  -Continue home meds

## 2025-02-23 NOTE — CASE MANAGEMENT
Case Management Discharge Planning Note    Patient name Mario Rollins  Location 2 [facility] 204/2 [facility] 204 A MRN [id number]  : 1965 Date 2025       Current Admission Date: 2025  Current Admission Diagnosis:UTI (urinary tract infection)   Patient Active Problem List    Diagnosis Date Noted Date Diagnosed    UTI (urinary tract infection) 2025     HTN (hypertension) 2025     History of small bowel obstruction 2024     Chronic kidney disease      Hypertension      Epilepsy (HCC)      Electrolyte abnormality 2024     Pneumatosis of intestines 2024     Sacroiliitis (HCC)      Localization-related symptomatic epilepsy and epileptic syndromes with complex partial seizures, intractable, without status epilepticus (HCC)      Encounter for hearing examination 2021     Right spastic hemiparesis (HCC) 2020     Acquired deformity of right hand 2020     Paronychia of toenail 2019     Lumbar radiculopathy 10/01/2018     Spinal stenosis of lumbar region 10/01/2018     Chronic left-sided low back pain with left-sided sciatica 10/01/2018     Chronic pain syndrome 10/01/2018     Cerebral palsy (HCC) 2018     Class 1 obesity with body mass index (BMI) of 30.0 to 30.9 in adult 2018     Hyperlipidemia 2018     Other constipation 2018     Acquired valgus deformity of right ankle 2017     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 10/20/2015     Acquired deformity of right ankle and foot 10/20/2015     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) 2015     Nephrocalcinosis 2015     Acquired hallux rigidus of left foot 2015     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  [address]  [facility] [address]  Phone: [phone] Fax: [phone]    Primary Care Provider: No primary care provider on file.    Primary Insurance: MEDICARE  Secondary Insurance: [employer] [place] [employer] [facility]    DISCHARGE DETAILS:    Discharge planning discussed with:: Nidhi Man   Freedom of Choice: Yes  Comments - Freedom of Choice: ZACHARY following to assist with DCP.  Per surgery pt is stable to discharge back to group home today.  ZACHARY placed call to group [facility] and spoke with manager, Nidhi. Nidhi said she can pick pt up around noon. Discussed plan for for oral antibiotics with Nidhi.  Nidhi requested prescription be sent to [facility] (at [place]) because their pharmacy does not deliver on Sunday.  ZACHARY notified surgery of request.  No other discharge needs expressed.  ZACHARY notified RN of anticipated  time.  CM contacted family/caregiver?: Yes    Contacts  Patient Contacts: Nidhi Man  Relationship to Patient:: Treatment Provider ()  Phone Number: [phone]  Reason/Outcome: Discharge Planning    Requested Home Health Care         Is the patient interested in HHC at discharge?: No    DME Referral Provided  Referral made for DME?: No    Other Referral/Resources/Interventions Provided:  Interventions: None Indicated    Treatment Team Recommendation: Facility Return, Group Home  Discharge Destination Plan:: Facility Return, Group Home  Transport at Discharge : Other (Comment) (group home staff)

## 2025-02-24 ENCOUNTER — TELEPHONE (OUTPATIENT)
Age: 60
End: 2025-02-24

## 2025-02-24 NOTE — TELEPHONE ENCOUNTER
transferred in to schedule hospital follow up. SBO x 3 in past year. Appt scheduled. Following up with PCP in the interim.

## 2025-03-05 ENCOUNTER — HOSPITAL ENCOUNTER (OUTPATIENT)
Dept: RADIOLOGY | Facility: HOSPITAL | Age: 60
Discharge: HOME | End: 2025-03-05
Payer: MEDICARE

## 2025-03-05 DIAGNOSIS — Z84.1 FAMILY HISTORY OF KIDNEY STONE: ICD-10-CM

## 2025-03-05 DIAGNOSIS — N20.0 RENAL STONE: ICD-10-CM

## 2025-03-05 PROCEDURE — 76770 US EXAM ABDO BACK WALL COMP: CPT

## 2025-03-06 ENCOUNTER — OFFICE VISIT (OUTPATIENT)
Age: 60
End: 2025-03-06

## 2025-03-06 ENCOUNTER — OFFICE VISIT (OUTPATIENT)
Age: 60
End: 2025-03-06
Payer: MEDICARE

## 2025-03-06 VITALS
SYSTOLIC BLOOD PRESSURE: 133 MMHG | WEIGHT: 173 LBS | HEIGHT: 66 IN | OXYGEN SATURATION: 97 % | BODY MASS INDEX: 27.8 KG/M2 | DIASTOLIC BLOOD PRESSURE: 76 MMHG | HEART RATE: 67 BPM | TEMPERATURE: 98.4 F

## 2025-03-06 VITALS
SYSTOLIC BLOOD PRESSURE: 133 MMHG | HEART RATE: 80 BPM | BODY MASS INDEX: 27.64 KG/M2 | HEIGHT: 66 IN | WEIGHT: 172 LBS | DIASTOLIC BLOOD PRESSURE: 81 MMHG

## 2025-03-06 DIAGNOSIS — Z87.19 HISTORY OF SMALL BOWEL OBSTRUCTION: Primary | ICD-10-CM

## 2025-03-06 DIAGNOSIS — Z13.31 SCREENING FOR DEPRESSION: ICD-10-CM

## 2025-03-06 DIAGNOSIS — K59.00 CONSTIPATION, UNSPECIFIED CONSTIPATION TYPE: ICD-10-CM

## 2025-03-06 DIAGNOSIS — R39.9 UTI SYMPTOMS: ICD-10-CM

## 2025-03-06 DIAGNOSIS — R93.5 ABNORMAL CT OF THE ABDOMEN: ICD-10-CM

## 2025-03-06 DIAGNOSIS — Z23 ENCOUNTER FOR IMMUNIZATION: ICD-10-CM

## 2025-03-06 PROCEDURE — 99213 OFFICE O/P EST LOW 20 MIN: CPT | Performed by: PHYSICIAN ASSISTANT

## 2025-03-06 PROCEDURE — 99495 TRANSJ CARE MGMT MOD F2F 14D: CPT | Performed by: FAMILY MEDICINE

## 2025-03-06 RX ORDER — POLYETHYLENE GLYCOL 3350 17 G/17G
17 POWDER, FOR SOLUTION ORAL DAILY
Start: 2025-03-06

## 2025-03-06 NOTE — PROGRESS NOTES
Name: Mario Rollins      : 1965      MRN: [id number]  Encounter Provider: Mayito Esqueda PA-C  Encounter Date: 3/6/2025   Encounter department: [facility]  :  Assessment & Plan  History of small bowel obstruction  -Recurrent small bowel obstructions at the level of the terminal ileum concern for inflammatory bowel disease  -History of resection with lysis of adhesions 2024       Abnormal CT of the abdomen  -CT 2025.  Small bowel obstruction with transition point in right lower quadrant at the level of the ileum.  -Colonoscopy with adequate bowel prep on 2024 done for concerns over stenosis at the terminal ileum with terminal ileum appearing normal at least 30 cm of the distal terminal ileum that were examined.  Biopsies showed benign small intestinal mucosa.  No active inflammation or granuloma.  No dysplasia or malignancy.       Constipation, unspecified constipation type  - change miralax from 17g 3 times per to daily at 8:00AM           History of Present Illness   Mario Rollins is a 59 y.o. male  with past medical history of cerebral palsy, constipation, kidney stone, hyperlipidemia, and recent small bowel obstruction who presents today for follow-up to small bowel obstruction.  He was in the hospital in 2024 for abdominal pain nausea vomiting found to have a high-grade small bowel obstruction.  He underwent an exploratory laparotomy and had lysis of adhesions performed and and decompression of markedly dilated small bowel secondary to small bowel obstruction.  There is a question of a diagnosis of cirrhosis in the past from hemochromatosis however his multiple CAT scans recently showed normal-appearing liver.  His liver functions are normal and platelets normal.  In 2023 ferritin and iron panel were normal.   Was readmitted on 2025 for partial small bowel obstruction.  This was treated  conservatively with an NG tube and resolved.    Colonoscopy with adequate bowel prep on August 16, 2024 done for concerns over stenosis at the terminal ileum with terminal ileum appearing normal at least 30 cm of the distal terminal ileum that were examined.  Biopsies showed benign small intestinal mucosa.  No active inflammation or granuloma.  No dysplasia or malignancy.    Again with this question of cirrhosis from hemochromatosis there continues to be no evidence of this.  His liver functions are normal, platelets are normal, most recent imaging by way of CT on February 18, 2025 shows normal liver and biliary tree.  Iron studies November 2023 normal fluting ferritin.    Caregiver said he may not be going to the bathroom as often as he should and was curious about his MiraLAX every 3 days.  She wanted to know if it could be changed to daily.    HPI  History obtained from: patient  Review of Systems   Constitutional:  Negative for activity change, appetite change, chills, diaphoresis, fatigue and unexpected weight change.   HENT:  Negative for mouth sores, rhinorrhea, sore throat and trouble swallowing.    Eyes:  Negative for discharge, redness and visual disturbance.   Respiratory:  Negative for apnea, cough, choking, chest tightness and shortness of breath.    Cardiovascular:  Negative for chest pain, palpitations and leg swelling.   Gastrointestinal:  Negative for abdominal distention, abdominal pain, anal bleeding, blood in stool, constipation, diarrhea, nausea, rectal pain and vomiting.   Genitourinary:  Negative for difficulty urinating, dysuria, frequency and hematuria.   Musculoskeletal:  Negative for arthralgias, back pain, gait problem, joint swelling and myalgias.   Skin:  Negative for color change, pallor and rash.   Allergic/Immunologic: Negative for environmental allergies and food allergies.   Neurological:  Negative for dizziness, tremors, weakness, light-headedness, numbness and headaches.    Psychiatric/Behavioral:  Negative for agitation, behavioral problems, confusion and sleep disturbance. The patient is not nervous/anxious.     A complete review of systems is negative other than that noted above in the HPI.    Past Medical History   Past Medical History:   Diagnosis Date   • Ambulatory dysfunction    • Anxiety    • Bilateral impacted cerumen     last assessed 05/30/2013   • Capsulitis     last assessed 04/26/2016   • Cellulitis of finger 01/13/2012   • Chronic kidney disease    • 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)    • Closed fracture of one or more phalanges of foot 01/06/2009   • Constipation    • Deformity     left and right foot and ankle ,right hand   • Depression    • Epilepsy (HCC)    • Erythrocytosis    • Hemiplegia affecting dominant side (HCC)    • Hypertension    • Hypoglycemia 11/08/2011   • Hypokalemia     last assessed 05/30/2013   • Mental retardation    • Mood disorder (HCC)    • Nephrocalcinosis     chronic   • Polycystic kidney     Bilateral   • Small bowel obstruction (HCC)      Past Surgical History:   Procedure Laterality Date   • BRAIN SURGERY     • COLONOSCOPY     • EXPLORATORY LAPAROTOMY W/ BOWEL RESECTION N/A 04/06/2024    Procedure: LAPAROTOMY EXPLORATORY WITH LYSIS OF ADHESIONS AND DECOMPRESSION;  Surgeon: Saúl Woods MD;  Location: [facility] MAIN OR;  Service: General   • MD ARTHROCENTESIS ASPIR&/INJ MAJOR JT/BURSA W/O US Bilateral 05/27/2021    Procedure: Hip intra-articular corticosteroid injection ( 42353 12907-36);  Surgeon: Lianet Astorga MD;  Location: [facility] MAIN OR;  Service: Pain Management    • MD COLONOSCOPY FLX DX W/COLLJ SPEC WHEN PFRMD N/A 02/12/2016    Procedure: COLONOSCOPY;  Surgeon: Abhilash Ace MD;  Location: [facility] GI LAB;  Service: Gastroenterology   • MD INJECT SI JOINT ARTHRGRPHY&/ANES/STEROID W/RIMA Left 03/22/2023    Procedure: SACROILIAC joint injection (92413 );  Surgeon: Larry Smith DO;  Location: [facility] MAIN OR;  Service: Pain  Management      Family History   Problem Relation Age of Onset   • Emphysema Mother    • Nephrolithiasis Mother    • Heart attack Father         acute MI   • Hypertension Father    • Nephrolithiasis Father    • Nephrolithiasis Brother       reports that he has never smoked. He has never been exposed to tobacco smoke. He has never used smokeless tobacco. He reports that he does not currently use alcohol. He reports that he does not use drugs.  Current Outpatient Medications   Medication Instructions   • acetaminophen (TYLENOL) 650 mg, Oral, Every 6 hours PRN   • atorvastatin (LIPITOR) 10 mg, Oral, Daily, Take at 8 PM   • lamoTRIgine (LaMICtal) 150 MG tablet Take 1 tab by mouth twice a day at 8AM and 8PM   • lamoTRIgine (LaMICtal) 25 mg tablet TAKE TWO TABLETS BY MOUTH DAILY AT 8AM WITH 150MG TO TOTAL 200MG   • nebivolol (BYSTOLIC) 2.5 mg, Oral, Daily, Hold for SBP <100 or HR <60   • polyethylene glycol (MIRALAX) 17 g, Oral, 3 times weekly, Monday, wed, friday   • QUEtiapine (SEROQUEL) 50 mg, Oral, Daily at bedtime   • saccharomyces boulardii (FLORASTOR) 250 mg, Oral, Daily   • sertraline (ZOLOFT) 50 mg, Oral, Daily     Allergies   Allergen Reactions   • Aspirin Other (See Comments) and Anaphylaxis     Reaction Date: 25Aug2008;     unknown      Current Outpatient Medications on File Prior to Visit   Medication Sig Dispense Refill   • acetaminophen (TYLENOL) 325 mg tablet Take 2 tablets (650 mg total) by mouth every 6 (six) hours as needed for mild pain, moderate pain or headaches 30 tablet 0   • atorvastatin (LIPITOR) 10 mg tablet Take 1 tablet (10 mg total) by mouth daily Take at 8 PM 30 tablet 0   • lamoTRIgine (LaMICtal) 150 MG tablet Take 1 tab by mouth twice a day at 8AM and 8PM 60 tablet 0   • lamoTRIgine (LaMICtal) 25 mg tablet TAKE TWO TABLETS BY MOUTH DAILY AT 8AM WITH 150MG TO TOTAL 200MG 60 tablet 3   • polyethylene glycol (MIRALAX) 17 g packet Take 17 g by mouth 3 (three) times a week Monday, wed, friday  20 each 4   • QUEtiapine (SEROquel) 50 mg tablet Take 1 tablet (50 mg total) by mouth daily at bedtime 30 tablet 0   • saccharomyces boulardii (Florastor) 250 mg capsule Take 1 capsule (250 mg total) by mouth in the morning 30 capsule 0   • sertraline (ZOLOFT) 50 mg tablet Take 1 tablet (50 mg total) by mouth daily 30 tablet 0   • nebivolol (BYSTOLIC) 2.5 mg tablet Take 1 tablet (2.5 mg total) by mouth daily Hold for SBP <100 or HR <60 (Patient not taking: Reported on 2/14/2025) 30 tablet 0     No current facility-administered medications on file prior to visit.      Social History     Tobacco Use   • Smoking status: Never     Passive exposure: Never   • Smokeless tobacco: Never   Vaping Use   • Vaping status: Never Used   Substance and Sexual Activity   • Alcohol use: Not Currently   • Drug use: No   • Sexual activity: Not on file     Current Outpatient Medications   Medication Sig Dispense Refill   • acetaminophen (TYLENOL) 325 mg tablet Take 2 tablets (650 mg total) by mouth every 6 (six) hours as needed for mild pain, moderate pain or headaches 30 tablet 0   • atorvastatin (LIPITOR) 10 mg tablet Take 1 tablet (10 mg total) by mouth daily Take at 8 PM 30 tablet 0   • lamoTRIgine (LaMICtal) 150 MG tablet Take 1 tab by mouth twice a day at 8AM and 8PM 60 tablet 0   • lamoTRIgine (LaMICtal) 25 mg tablet TAKE TWO TABLETS BY MOUTH DAILY AT 8AM WITH 150MG TO TOTAL 200MG 60 tablet 3   • polyethylene glycol (MIRALAX) 17 g packet Take 17 g by mouth 3 (three) times a week Monday, wed, friday 20 each 4   • QUEtiapine (SEROquel) 50 mg tablet Take 1 tablet (50 mg total) by mouth daily at bedtime 30 tablet 0   • saccharomyces boulardii (Florastor) 250 mg capsule Take 1 capsule (250 mg total) by mouth in the morning 30 capsule 0   • sertraline (ZOLOFT) 50 mg tablet Take 1 tablet (50 mg total) by mouth daily 30 tablet 0   • nebivolol (BYSTOLIC) 2.5 mg tablet Take 1 tablet (2.5 mg total) by mouth daily Hold for SBP <100 or HR <60  "(Patient not taking: Reported on 2/14/2025) 30 tablet 0     No current facility-administered medications for this visit.     Objective   /81 (BP Location: Left arm, Patient Position: Sitting, Cuff Size: Standard)   Pulse 80   Ht 5' 6\" (1.676 m)   Wt 78 kg (172 lb)   BMI 27.76 kg/m²     Physical Exam  Constitutional:       General: He is not in acute distress.     Appearance: Normal appearance. He is not ill-appearing.   HENT:      Head: Normocephalic and atraumatic.   Eyes:      General: No scleral icterus.     Conjunctiva/sclera: Conjunctivae normal.   Cardiovascular:      Rate and Rhythm: Normal rate and regular rhythm.   Pulmonary:      Effort: Pulmonary effort is normal. No respiratory distress.      Breath sounds: Normal breath sounds.   Abdominal:      General: Bowel sounds are normal. There is no distension.      Palpations: Abdomen is soft.      Tenderness: There is no abdominal tenderness. There is no guarding.   Skin:     General: Skin is warm and dry.   Neurological:      Mental Status: He is alert and oriented to person, place, and time.   Psychiatric:         Mood and Affect: Mood normal.         Behavior: Behavior normal.            Lab Results: I personally reviewed relevant lab results. CBC/BMP: No new results in last 24 hours. , LFTs: No new results in last 24 hours. , PTT/INR:No new results in last 24 hours.       Results for orders placed during the hospital encounter of 08/09/24    Colonoscopy    Impression  The terminal ileum appeared normal.  The cecum, ascending colon, hepatic flexure, transverse colon, splenic flexure, descending colon and rectum appeared normal.  Diverticulosis of mild severity in the sigmoid colon  Small (grade 1) hemorrhoid        RECOMMENDATION:  Repeat screening colonoscopy in 10 years, due: 8/14/2034  Resume diet as usual.            MD Mayito Garcia PA-C  [facility] - Gastroentrology    "

## 2025-03-06 NOTE — ASSESSMENT & PLAN NOTE
Recent hospitalization with partial SBO with resolution with conservative management of IVF and NGT, removed on hospital day 4. History fo small bowel resection with lysis of adhesions in 4/2024. Follows GI, with recent colonoscopy showing benign mucosa on biopsy.   Reports no abdominal pain and regular bowel movement  Encouraged 25g of dietary fiber daily  Miralax daily per GI recs  Orders:    polyethylene glycol (MIRALAX) 17 g packet; Take 17 g by mouth daily Monday, wed, friday

## 2025-03-06 NOTE — ASSESSMENT & PLAN NOTE
-Recurrent small bowel obstructions at the level of the terminal ileum concern for inflammatory bowel disease  -History of resection with lysis of adhesions April 2024

## 2025-03-06 NOTE — PROGRESS NOTES
Transition of Care Visit  Name: Mario Rollins      : 1965      MRN: [id number]  Encounter Provider: Milton Hahn MD  Encounter Date: 3/6/2025   Encounter department: [facility]    Assessment & Plan  History of small bowel obstruction  Recent hospitalization with partial SBO with resolution with conservative management of IVF and NGT, removed on hospital day 4. History fo small bowel resection with lysis of adhesions in 2024. Follows GI, with recent colonoscopy showing benign mucosa on biopsy.   Reports no abdominal pain and regular bowel movement  Encouraged 25g of dietary fiber daily  Miralax daily per GI recs  Orders:    polyethylene glycol (MIRALAX) 17 g packet; Take 17 g by mouth daily Monday, wed, friday    UTI symptoms  Recent hospitalization for Urine Culture >100k Proteus was discharged on 5 days of Bactrim, which he completed course for. Had US bladder with pvr yesterday, pending read. Denies any urinary symptoms today.   Encouraged increased daily hydration.       Encounter for immunization  Reviewed list of vaccines for future       Screening for depression  PHQ-2/9 Depression Screening    Little interest or pleasure in doing things: 1 - several days  Feeling down, depressed, or hopeless: 0 - not at all  Trouble falling or staying asleep, or sleeping too much: 0 - not at all  Feeling tired or having little energy: 0 - not at all  Poor appetite or overeatin - not at all  Feeling bad about yourself - or that you are a failure or have let yourself or your family down: 0 - not at all  Trouble concentrating on things, such as reading the newspaper or watching television: 0 - not at all  Moving or speaking so slowly that other people could have noticed. Or the opposite - being so fidgety or restless that you have been moving around a lot more than usual: 0 - not at all  Thoughts that you would be better off dead, or of hurting yourself in some way: 0 -  not at all  PHQ-2 Score: 1  PHQ-2 Interpretation: Negative depression screen  PHQ-9 Score: 1  PHQ-9 Interpretation: No or Minimal depression              BMI Counseling: Body mass index is 27.92 kg/m². The BMI is above normal. Nutrition recommendations include decreasing portion sizes, encouraging healthy choices of fruits and vegetables and limiting drinks that contain sugar. Exercise recommendations include moderate physical activity 150 minutes/week and exercising 3-5 times per week. Rationale for BMI follow-up plan is due to patient being overweight or obese.     Depression Screening and Follow-up Plan: Patient was screened for depression during today's encounter. They screened negative with a PHQ-2 score of 1.          History of Present Illness     Transitional Care Management Review:   Mario Rollins is a 59 y.o. male here for TCM follow up.     During the TCM phone call patient stated:  TCM Call       None          TCM Call       None          Patient seen and examined in office for TCM follow-up after recent hospitalization for partial SBO that resolved spontaneously with conservative management. Patient reports doing well now with no complaints or concerns at this time.       Review of Systems   Constitutional:  Negative for chills and fever.   HENT:  Negative for ear pain and sore throat.    Eyes:  Negative for pain and visual disturbance.   Respiratory:  Negative for cough and shortness of breath.    Cardiovascular:  Negative for chest pain and palpitations.   Gastrointestinal:  Positive for abdominal pain and constipation. Negative for diarrhea, nausea and vomiting.   Genitourinary:  Positive for urgency. Negative for dysuria and hematuria.   Musculoskeletal:  Negative for arthralgias and back pain.   Skin:  Negative for color change and rash.   Neurological:  Negative for seizures and syncope.   All other systems reviewed and are negative.    Objective   /76 (BP Location: Left arm, Patient  "Position: Sitting, Cuff Size: Standard)   Pulse 67   Temp 98.4 °F (36.9 °C) (Tympanic)   Ht 5' 6\" (1.676 m)   Wt 78.5 kg (173 lb)   SpO2 97%   BMI 27.92 kg/m²     Physical Exam  Vitals and nursing note reviewed.   Constitutional:       General: He is not in acute distress.     Appearance: He is well-developed.   HENT:      Head: Normocephalic and atraumatic.   Eyes:      Conjunctiva/sclera: Conjunctivae normal.   Cardiovascular:      Rate and Rhythm: Normal rate and regular rhythm.      Pulses: Normal pulses.      Heart sounds: Normal heart sounds. No murmur heard.  Pulmonary:      Effort: Pulmonary effort is normal. No respiratory distress.      Breath sounds: Normal breath sounds.   Abdominal:      General: Bowel sounds are normal. There is no distension.      Palpations: Abdomen is soft. There is no mass.      Tenderness: There is no abdominal tenderness.   Musculoskeletal:         General: No swelling.      Cervical back: Neck supple.      Right lower leg: No edema.      Left lower leg: No edema.   Skin:     General: Skin is warm and dry.      Capillary Refill: Capillary refill takes less than 2 seconds.   Neurological:      Mental Status: He is alert.   Psychiatric:         Mood and Affect: Mood normal.       Medications have been reviewed by provider in current encounter      "

## 2025-03-11 ENCOUNTER — RESULTS FOLLOW-UP (OUTPATIENT)
Dept: UROLOGY | Facility: CLINIC | Age: 60
End: 2025-03-11

## 2025-03-12 NOTE — TELEPHONE ENCOUNTER
Spoke to Maya (Staff) and pt informed them pt has bilateral nonobstructive renal stones noted on the ultrasound. If not having any pain or discomfort can follow-up in a year as previously discussed. If having any symptoms can follow-up sooner   ----- Message from Chetan Samuel PA-C sent at 3/11/2025  2:56 PM EDT -----  Regarding: Renal ultrasound  Let patient know he has bilateral nonobstructive renal stones noted on the ultrasound.  If not having any pain or discomfort can follow-up in a year as previously discussed.  If having any symptoms need to be follow-up sooner discussed if stone intervention is warranted  ----- Message -----  From: Interface, Radiology Results In  Sent: 3/10/2025   9:59 AM EDT  To: Chetan Samuel PA-C

## 2025-03-20 PROBLEM — N39.0 UTI (URINARY TRACT INFECTION): Status: RESOLVED | Noted: 2025-02-18 | Resolved: 2025-03-20

## 2025-04-19 ENCOUNTER — APPOINTMENT (EMERGENCY)
Dept: RADIOLOGY | Facility: HOSPITAL | Age: 60
End: 2025-04-19
Payer: MEDICARE

## 2025-04-19 ENCOUNTER — HOSPITAL ENCOUNTER (EMERGENCY)
Facility: HOSPITAL | Age: 60
Discharge: HOME | End: 2025-04-19
Attending: EMERGENCY MEDICINE
Payer: MEDICARE

## 2025-04-19 VITALS
RESPIRATION RATE: 18 BRPM | OXYGEN SATURATION: 97 % | HEART RATE: 70 BPM | SYSTOLIC BLOOD PRESSURE: 126 MMHG | HEIGHT: 66 IN | TEMPERATURE: 98.4 F | DIASTOLIC BLOOD PRESSURE: 83 MMHG | BODY MASS INDEX: 27.92 KG/M2

## 2025-04-19 DIAGNOSIS — S00.01XA ABRASION OF SCALP, INITIAL ENCOUNTER: ICD-10-CM

## 2025-04-19 DIAGNOSIS — S09.90XA HEAD INJURY: Primary | ICD-10-CM

## 2025-04-19 PROCEDURE — 90471 IMMUNIZATION ADMIN: CPT

## 2025-04-19 PROCEDURE — 99283 EMERGENCY DEPT VISIT LOW MDM: CPT

## 2025-04-19 PROCEDURE — 99284 EMERGENCY DEPT VISIT MOD MDM: CPT | Performed by: PHYSICIAN ASSISTANT

## 2025-04-19 PROCEDURE — 90715 TDAP VACCINE 7 YRS/> IM: CPT | Performed by: PHYSICIAN ASSISTANT

## 2025-04-19 PROCEDURE — 70450 CT HEAD/BRAIN W/O DYE: CPT

## 2025-04-19 RX ADMIN — TETANUS TOXOID, REDUCED DIPHTHERIA TOXOID AND ACELLULAR PERTUSSIS VACCINE, ADSORBED 0.5 ML: 5; 2.5; 8; 8; 2.5 SUSPENSION INTRAMUSCULAR at 13:40

## 2025-04-19 NOTE — ED NOTES
Pt seen, assessed and d/c by provider. Pt appeared to be in no acute distress upon discharge. Pt able to ambulate well without assistance upon exiting.      Megan Merritt RN  04/19/25 3065

## 2025-04-19 NOTE — ED PROVIDER NOTES
Time reflects when diagnosis was documented in both MDM as applicable and the Disposition within this note       Time User Action Codes Description Comment    4/19/2025  4:47 PM Meetdilan Daniel Add [S09.90XA] Head injury     4/19/2025  4:47 PM Daniel Hyde Add [S00.01XA] Abrasion of scalp, initial encounter           ED Disposition       ED Disposition   Discharge    Condition   Stable    Date/Time   Sat Apr 19, 2025  4:47 PM    Comment   Mario Rollins discharge to home/self care.                   Assessment & Plan       Medical Decision Making  Several superficial abrasions on top of scalp were cleansed thoroughly with chlorhexidine, then band aids applied  Differential dx includes intracranial injury, concussion, abrasions.   CT head ordered  No acute intracranial injury  Head inj instructions reviewed  Advised f/u with [facility] for any persistent concerns  Return precautions reviewed.     Amount and/or Complexity of Data Reviewed  Radiology: ordered.    Risk  Prescription drug management.             Medications   tetanus-diphtheria-acellular pertussis (BOOSTRIX) IM injection 0.5 mL (0.5 mL Intramuscular Given 4/19/25 1340)       ED Risk Strat Scores                    No data recorded        SBIRT 22yo+      Flowsheet Row Most Recent Value   Initial Alcohol Screen: US AUDIT-C     1. How often do you have a drink containing alcohol? 0 Filed at: 04/19/2025 1309   2. How many drinks containing alcohol do you have on a typical day you are drinking?  0 Filed at: 04/19/2025 1309   3a. Male UNDER 65: How often do you have five or more drinks on one occasion? 0 Filed at: 04/19/2025 1309   3b. FEMALE Any Age, or MALE 65+: How often do you have 4 or more drinks on one occassion? 0 Filed at: 04/19/2025 1309   Audit-C Score 0 Filed at: 04/19/2025 1309   ONEAL: How many times in the past year have you...    Used an illegal drug or used a prescription medication for non-medical reasons? Never Filed at: 04/19/2025  1309                            History of Present Illness       Chief Complaint   Patient presents with    Laceration     Patient arrived EMS from group home. Patient was cleaning a window, fell forward, and broke window w/ head. Patient has laceration noted to head. Patient denies LOC or thinners.        Past Medical History:   Diagnosis Date    Ambulatory dysfunction     Anxiety     Bilateral impacted cerumen     last assessed 05/30/2013    Capsulitis     last assessed 04/26/2016    Cellulitis of finger 01/13/2012    Chronic kidney disease     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)     Closed fracture of one or more phalanges of foot 01/06/2009    Constipation     Deformity     left and right foot and ankle ,right hand    Depression     Epilepsy (HCC)     Erythrocytosis     Hemiplegia affecting dominant side (HCC)     Hypertension     Hypoglycemia 11/08/2011    Hypokalemia     last assessed 05/30/2013    Mental retardation     Mood disorder (HCC)     Nephrocalcinosis     chronic    Polycystic kidney     Bilateral    Small bowel obstruction (HCC)       Past Surgical History:   Procedure Laterality Date    BRAIN SURGERY      COLONOSCOPY      EXPLORATORY LAPAROTOMY W/ BOWEL RESECTION N/A 04/06/2024    Procedure: LAPAROTOMY EXPLORATORY WITH LYSIS OF ADHESIONS AND DECOMPRESSION;  Surgeon: Saúl Woods MD;  Location: [facility] MAIN OR;  Service: General    FL ARTHROCENTESIS ASPIR&/INJ MAJOR JT/BURSA W/O US Bilateral 05/27/2021    Procedure: Hip intra-articular corticosteroid injection ( 43219 61655-91);  Surgeon: Lianet Astorga MD;  Location: [facility] MAIN OR;  Service: Pain Management     FL COLONOSCOPY FLX DX W/COLLJ SPEC WHEN PFRMD N/A 02/12/2016    Procedure: COLONOSCOPY;  Surgeon: Abhilash Ace MD;  Location: [facility] GI LAB;  Service: Gastroenterology    FL INJECT SI JOINT ARTHRGRPHY&/ANES/STEROID W/RIMA Left 03/22/2023    Procedure: SACROILIAC joint injection ([id number] );  Surgeon: Larry Smith DO;  Location:  [facility] MAIN OR;  Service: Pain Management       Family History   Problem Relation Age of Onset    Emphysema Mother     Nephrolithiasis Mother     Heart attack Father         acute MI    Hypertension Father     Nephrolithiasis Father     Nephrolithiasis Brother       Social History     Tobacco Use    Smoking status: Never     Passive exposure: Never    Smokeless tobacco: Never   Vaping Use    Vaping status: Never Used   Substance Use Topics    Alcohol use: Not Currently    Drug use: No      E-Cigarette/Vaping    E-Cigarette Use Never User       E-Cigarette/Vaping Substances    Nicotine No     THC No     CBD No     Flavoring No     Other No     Unknown No       I have reviewed and agree with the history as documented.     Patient is a 61 yo wm with history of epilepsy, hemochromatosis, HTN, depression, R [facility] from [facility]. Per staff, patient insisted on cleaning window today. Went to lift up the window which window came out of its track and struck patient on top of head causing several wounds. No loc. No headache. No neck pain. Uncertain last tetanus. No other complaints         Review of Systems   Respiratory:  Negative for shortness of breath.    Cardiovascular:  Negative for chest pain.   Gastrointestinal:  Negative for abdominal pain and vomiting.   Musculoskeletal:  Negative for back pain and neck pain.   Skin:  Positive for wound.           Objective       ED Triage Vitals [04/19/25 1307]   Temperature Pulse Blood Pressure Respirations SpO2 Patient Position - Orthostatic VS   98.4 °F (36.9 °C) 82 138/85 18 96 % Sitting      Temp Source Heart Rate Source BP Location FiO2 (%) Pain Score    Oral Monitor Right arm -- 5      Vitals      Date and Time Temp Pulse SpO2 Resp BP Pain Score FACES Pain Rating User   04/19/25 1307 98.4 °F (36.9 °C) 82 96 % 18 138/85 5 -- CG            Physical Exam  Vitals and nursing note reviewed.   Constitutional:       General: He is not in acute distress.      Appearance: Normal appearance. He is not ill-appearing or diaphoretic.   HENT:      Head:      Comments: 3 superficial wounds to top of scalp.      Right Ear: Tympanic membrane, ear canal and external ear normal.      Left Ear: Tympanic membrane, ear canal and external ear normal.      Nose: Nose normal.      Mouth/Throat:      Mouth: Mucous membranes are moist.      Pharynx: Oropharynx is clear.   Eyes:      Extraocular Movements: Extraocular movements intact.      Conjunctiva/sclera: Conjunctivae normal.      Pupils: Pupils are equal, round, and reactive to light.   Cardiovascular:      Rate and Rhythm: Normal rate and regular rhythm.      Pulses: Normal pulses.      Heart sounds: Normal heart sounds.   Pulmonary:      Effort: Pulmonary effort is normal.      Breath sounds: Normal breath sounds.   Abdominal:      General: Abdomen is flat. Bowel sounds are normal.      Palpations: Abdomen is soft.   Musculoskeletal:         General: Normal range of motion.      Cervical back: Normal range of motion and neck supple. No tenderness.   Skin:     General: Skin is warm and dry.      Capillary Refill: Capillary refill takes less than 2 seconds.   Neurological:      Mental Status: He is alert.      Comments: R sided weakness at baseline.          Results Reviewed       None            CT head without contrast   Final Interpretation by Jefry Lennon MD (04/19 1626)      Limited examination secondary to beam hardening artifacts. No relevant prior studies available for comparison at the time of dictation.      Chronic left MCA territory infarct and frontotemporal surgery with multiple clips along the area of cystic encephalomalacia. There is overall volume loss of the left cerebral hemisphere and cranial vault which likely represents a combination of    developmental, postinfarct, and postsurgical changes. Within limitations of the examination, there is no gross intra or extra-axial hemorrhage.                   Workstation performed: [id number]             Procedures    ED Medication and Procedure Management   Prior to Admission Medications   Prescriptions Last Dose Informant Patient Reported? Taking?   QUEtiapine (SEROquel) 50 mg tablet  Outside Facility (Specify), Self No No   Sig: Take 1 tablet (50 mg total) by mouth daily at bedtime   acetaminophen (TYLENOL) 325 mg tablet  Outside Facility (Specify), Self No No   Sig: Take 2 tablets (650 mg total) by mouth every 6 (six) hours as needed for mild pain, moderate pain or headaches   atorvastatin (LIPITOR) 10 mg tablet  Outside Facility (Specify), Self No No   Sig: Take 1 tablet (10 mg total) by mouth daily Take at 8 PM   lamoTRIgine (LaMICtal) 150 MG tablet  Self No No   Sig: Take 1 tab by mouth twice a day at 8AM and 8PM   lamoTRIgine (LaMICtal) 25 mg tablet  Self No No   Sig: TAKE TWO TABLETS BY MOUTH DAILY AT 8AM WITH 150MG TO TOTAL 200MG   nebivolol (BYSTOLIC) 2.5 mg tablet  Outside Facility (Specify), Self No No   Sig: Take 1 tablet (2.5 mg total) by mouth daily Hold for SBP <100 or HR <60   Patient not taking: Reported on 2/14/2025   polyethylene glycol (MIRALAX) 17 g packet   No No   Sig: Take 17 g by mouth daily Monday, wed, friday   saccharomyces boulardii (Florastor) 250 mg capsule  Outside Facility (Specify), Self No No   Sig: Take 1 capsule (250 mg total) by mouth in the morning   sertraline (ZOLOFT) 50 mg tablet  Outside Facility (Specify), Self No No   Sig: Take 1 tablet (50 mg total) by mouth daily      Facility-Administered Medications: None     Patient's Medications   Discharge Prescriptions    No medications on file     No discharge procedures on file.  ED SEPSIS DOCUMENTATION   Time reflects when diagnosis was documented in both MDM as applicable and the Disposition within this note       Time User Action Codes Description Comment    4/19/2025  4:47 PM Daniel Hyde Add [S09.90XA] Head injury     4/19/2025  4:47 PM Daniel Hyde Add [S00.01XA]  Abrasion of scalp, initial encounter                  Daniel Hyde PA-C  04/19/25 9201

## 2025-04-25 ENCOUNTER — OFFICE VISIT (OUTPATIENT)
Age: 60
End: 2025-04-25

## 2025-04-25 VITALS
WEIGHT: 172 LBS | TEMPERATURE: 99.4 F | BODY MASS INDEX: 27.76 KG/M2 | OXYGEN SATURATION: 96 % | HEART RATE: 96 BPM | RESPIRATION RATE: 18 BRPM | DIASTOLIC BLOOD PRESSURE: 88 MMHG | SYSTOLIC BLOOD PRESSURE: 148 MMHG

## 2025-04-25 DIAGNOSIS — Z09 HOSPITAL DISCHARGE FOLLOW-UP: Primary | ICD-10-CM

## 2025-04-25 DIAGNOSIS — Z23 ENCOUNTER FOR IMMUNIZATION: ICD-10-CM

## 2025-04-25 PROCEDURE — 90471 IMMUNIZATION ADMIN: CPT | Performed by: FAMILY MEDICINE

## 2025-04-25 PROCEDURE — G2211 COMPLEX E/M VISIT ADD ON: HCPCS | Performed by: FAMILY MEDICINE

## 2025-04-25 PROCEDURE — 99213 OFFICE O/P EST LOW 20 MIN: CPT | Performed by: FAMILY MEDICINE

## 2025-04-25 PROCEDURE — 90677 PCV20 VACCINE IM: CPT | Performed by: FAMILY MEDICINE

## 2025-04-25 PROCEDURE — 90472 IMMUNIZATION ADMIN EACH ADD: CPT | Performed by: FAMILY MEDICINE

## 2025-04-25 PROCEDURE — 90750 HZV VACC RECOMBINANT IM: CPT | Performed by: FAMILY MEDICINE

## 2025-04-25 PROCEDURE — G0009 ADMIN PNEUMOCOCCAL VACCINE: HCPCS | Performed by: FAMILY MEDICINE

## 2025-04-25 NOTE — PROGRESS NOTES
Name: Mario Rollins      : 1965      MRN: [id number]  Encounter Provider: Jennifer Kennedy MD  Encounter Date: 2025   Encounter department: [facility]  :  Assessment & Plan  ER discharge follow-up  Patient was seen in the emergency department on 2025 after a window fell on top of his head  The window shattered and the glass was broken everywhere  He was noted to have abrasions of his head  There has been no changes in mental status  Abrasions appear well-healed and scabbed  No signs of erythema or warmth       Encounter for immunization  Pneumonia and zoster vaccine provided in office  Follow-up in 2 months for zoster vaccine  Orders:    Pneumococcal Conjugate Vaccine 20-valent (Pcv20)    Zoster Vaccine Recombinant IM           History of Present Illness   Is a pleasant 60-year-old male who presents to the office with daughter after recent ED visit.  At this time he has no complaints      Review of Systems   Constitutional:  Negative for chills and fever.   HENT:  Negative for ear pain and sore throat.    Eyes:  Negative for pain and visual disturbance.   Respiratory:  Negative for cough and shortness of breath.    Cardiovascular:  Negative for chest pain and palpitations.   Gastrointestinal:  Negative for abdominal pain and vomiting.   Genitourinary:  Negative for dysuria and hematuria.   Musculoskeletal:  Negative for arthralgias and back pain.   Skin:  Negative for color change and rash.   Neurological:  Negative for seizures and syncope.   All other systems reviewed and are negative.      Objective   /88 (BP Location: Left arm, Patient Position: Sitting, Cuff Size: Standard)   Pulse 96   Temp 99.4 °F (37.4 °C) (Tympanic)   Resp 18   Wt 78 kg (172 lb)   SpO2 96%   BMI 27.76 kg/m²      Physical Exam  Vitals and nursing note reviewed.   Constitutional:       General: He is not in acute distress.     Appearance: He is well-developed.    HENT:      Head: Normocephalic and atraumatic.      Comments: 3 small scabbed abrasions noted on the top of the head     Right Ear: External ear normal.      Left Ear: External ear normal.      Nose: Nose normal.      Mouth/Throat:      Mouth: Mucous membranes are moist.   Eyes:      Extraocular Movements: Extraocular movements intact.      Conjunctiva/sclera: Conjunctivae normal.   Cardiovascular:      Rate and Rhythm: Normal rate and regular rhythm.      Heart sounds: No murmur heard.  Pulmonary:      Effort: Pulmonary effort is normal. No respiratory distress.      Breath sounds: Normal breath sounds.   Abdominal:      Palpations: Abdomen is soft.      Tenderness: There is no abdominal tenderness.   Musculoskeletal:         General: No swelling.      Cervical back: Neck supple.   Skin:     General: Skin is warm and dry.      Capillary Refill: Capillary refill takes less than 2 seconds.   Neurological:      Mental Status: He is alert.   Psychiatric:         Mood and Affect: Mood normal.

## 2025-05-01 ENCOUNTER — TELEPHONE (OUTPATIENT)
Age: 60
End: 2025-05-01

## 2025-05-01 NOTE — TELEPHONE ENCOUNTER
Spoke with home in regards to the missed appointment he said Rufina will call back tomorrow to r/s

## 2025-05-19 ENCOUNTER — TELEPHONE (OUTPATIENT)
Age: 60
End: 2025-05-19

## 2025-05-19 NOTE — TELEPHONE ENCOUNTER
Form from [facility] care   Scanned into encounter and placed in folder    Call [phone] when complete

## 2025-05-21 ENCOUNTER — TELEPHONE (OUTPATIENT)
Age: 60
End: 2025-05-21

## 2025-05-21 NOTE — TELEPHONE ENCOUNTER
Vm on appt line:    Hi, this is Rufina calling from [facility] regarding Mario Buitrago and . If you could please give me a call back [phone]. Thank you. Bye.  You received a voice mail from [phone].      Spoke with group home, they wanted to reschedule AWV, reschedule with another provider.

## 2025-05-22 ENCOUNTER — TELEPHONE (OUTPATIENT)
Age: 60
End: 2025-05-22

## 2025-05-22 NOTE — TELEPHONE ENCOUNTER
"Called patient to inform form ready for .     Made copies of completed form and placed in \"to be scanned\" bin. Original placed in envelope and placed in  bin for .     [facility] care   "

## 2025-05-22 NOTE — TELEPHONE ENCOUNTER
Over the Counter Medication Form    Scanned copy into encounter    Placed in Dr. Hahn's folder in precepting room.    Call when complete: [phone]

## 2025-05-22 NOTE — TELEPHONE ENCOUNTER
"Called patient to inform form ready for .     Made copies of completed form and placed in \"to be scanned\" bin. Original placed in envelope and placed in  bin for .     Over the Counter Medication Form        "

## 2025-06-05 ENCOUNTER — OFFICE VISIT (OUTPATIENT)
Age: 60
End: 2025-06-05

## 2025-06-05 VITALS
SYSTOLIC BLOOD PRESSURE: 144 MMHG | HEIGHT: 66 IN | WEIGHT: 179 LBS | BODY MASS INDEX: 28.77 KG/M2 | RESPIRATION RATE: 16 BRPM | TEMPERATURE: 97.4 F | DIASTOLIC BLOOD PRESSURE: 83 MMHG | OXYGEN SATURATION: 98 % | HEART RATE: 69 BPM

## 2025-06-05 DIAGNOSIS — K59.09 OTHER CONSTIPATION: ICD-10-CM

## 2025-06-05 DIAGNOSIS — N18.30 STAGE 3 CHRONIC KIDNEY DISEASE, UNSPECIFIED WHETHER STAGE 3A OR 3B CKD (HCC): ICD-10-CM

## 2025-06-05 DIAGNOSIS — Z87.19 HISTORY OF SMALL BOWEL OBSTRUCTION: ICD-10-CM

## 2025-06-05 DIAGNOSIS — Z00.00 MEDICARE ANNUAL WELLNESS VISIT, SUBSEQUENT: Primary | ICD-10-CM

## 2025-06-05 PROCEDURE — G0439 PPPS, SUBSEQ VISIT: HCPCS | Performed by: FAMILY MEDICINE

## 2025-06-05 RX ORDER — SACCHAROMYCES BOULARDII 250 MG
250 CAPSULE ORAL DAILY
Qty: 30 CAPSULE | Refills: 0 | Status: SHIPPED | OUTPATIENT
Start: 2025-06-05

## 2025-06-05 RX ORDER — POLYETHYLENE GLYCOL 3350 17 G/17G
17 POWDER, FOR SOLUTION ORAL DAILY
Qty: 510 G | Refills: 5 | Status: SHIPPED | OUTPATIENT
Start: 2025-06-05 | End: 2025-07-05

## 2025-06-05 NOTE — PATIENT INSTRUCTIONS
Medicare Preventive Visit Patient Instructions  Thank you for completing your Welcome to Medicare Visit or Medicare Annual Wellness Visit today. Your next wellness visit will be due in one year (6/6/2026).  The screening/preventive services that you may require over the next 5-10 years are detailed below. Some tests may not apply to you based off risk factors and/or age. Screening tests ordered at today's visit but not completed yet may show as past due. Also, please note that scanned in results may not display below.  Preventive Screenings:  Service Recommendations Previous Testing/Comments   Colorectal Cancer Screening  Colonoscopy    Fecal Occult Blood Test (FOBT)/Fecal Immunochemical Test (FIT)  Fecal DNA/Cologuard Test  Flexible Sigmoidoscopy Age: 45-75 years old   Colonoscopy: every 10 years (May be performed more frequently if at higher risk)  OR  FOBT/FIT: every 1 year  OR  Cologuard: every 3 years  OR  Sigmoidoscopy: every 5 years  Screening may be recommended earlier than age 45 if at higher risk for colorectal cancer. Also, an individualized decision between you and your healthcare provider will decide whether screening between the ages of 76-85 would be appropriate. Colonoscopy: 08/16/2024  FOBT/FIT: Not on file  Cologuard: Not on file  Sigmoidoscopy: Not on file    Screening Current     Prostate Cancer Screening Individualized decision between patient and health care provider in men between ages of 55-69   Medicare will cover every 12 months beginning on the day after your 50th birthday PSA: No results in last 5 years           Hepatitis C Screening Once for adults born between 1945 and 1965  More frequently in patients at high risk for Hepatitis C Hep C Antibody: Not on file        Diabetes Screening 1-2 times per year if you're at risk for diabetes or have pre-diabetes Fasting glucose: 87 mg/dL (6/21/2021)  A1C: 5.1 % (6/21/2021)  Screening Current   Cholesterol Screening Once every 5 years if you  don't have a lipid disorder. May order more often based on risk factors. Lipid panel: Not on file  Screening Not Indicated  History Lipid Disorder      Other Preventive Screenings Covered by Medicare:  Abdominal Aortic Aneurysm (AAA) Screening: covered once if your at risk. You're considered to be at risk if you have a family history of AAA or a male between the age of 65-75 who smoking at least 100 cigarettes in your lifetime.  Lung Cancer Screening: covers low dose CT scan once per year if you meet all of the following conditions: (1) Age 55-77; (2) No signs or symptoms of lung cancer; (3) Current smoker or have quit smoking within the last 15 years; (4) You have a tobacco smoking history of at least 20 pack years (packs per day x number of years you smoked); (5) You get a written order from a healthcare provider.  Glaucoma Screening: covered annually if you're considered high risk: (1) You have diabetes OR (2) Family history of glaucoma OR (3)  aged 50 and older OR (4)  American aged 65 and older  Osteoporosis Screening: covered every 2 years if you meet one of the following conditions: (1) Have a vertebral abnormality; (2) On glucocorticoid therapy for more than 3 months; (3) Have primary hyperparathyroidism; (4) On osteoporosis medications and need to assess response to drug therapy.  HIV Screening: covered annually if you're between the age of 15-65. Also covered annually if you are younger than 15 and older than 65 with risk factors for HIV infection. For pregnant patients, it is covered up to 3 times per pregnancy.    Immunizations:  Immunization Recommendations   Influenza Vaccine Annual influenza vaccination during flu season is recommended for all persons aged >= 6 months who do not have contraindications   Pneumococcal Vaccine   * Pneumococcal conjugate vaccine = PCV13 (Prevnar 13), PCV15 (Vaxneuvance), PCV20 (Prevnar 20)  * Pneumococcal polysaccharide vaccine = PPSV23 (Pneumovax)  Adults 19-63 yo with certain risk factors or if 65+ yo  If never received any pneumonia vaccine: recommend Prevnar 20 (PCV20)  Give PCV20 if previously received 1 dose of PCV13 or PPSV23   Hepatitis B Vaccine 3 dose series if at intermediate or high risk (ex: diabetes, end stage renal disease, liver disease)   Respiratory syncytial virus (RSV) Vaccine - COVERED BY MEDICARE PART D  * RSVPreF3 (Arexvy) CDC recommends that adults 60 years of age and older may receive a single dose of RSV vaccine using shared clinical decision-making (SCDM)   Tetanus (Td) Vaccine - COST NOT COVERED BY MEDICARE PART B Following completion of primary series, a booster dose should be given every 10 years to maintain immunity against tetanus. Td may also be given as tetanus wound prophylaxis.   Tdap Vaccine - COST NOT COVERED BY MEDICARE PART B Recommended at least once for all adults. For pregnant patients, recommended with each pregnancy.   Shingles Vaccine (Shingrix) - COST NOT COVERED BY MEDICARE PART B  2 shot series recommended in those 19 years and older who have or will have weakened immune systems or those 50 years and older     Health Maintenance Due:      Topic Date Due   • Hepatitis C Screening  Never done   • HIV Screening  Never done   • Colorectal Cancer Screening  08/14/2034     Immunizations Due:      Topic Date Due   • Hepatitis A Vaccine (1 of 2 - Risk 2-dose series) Never done   • Hepatitis B Vaccine (3 of 3 - Risk 3-dose series) 09/07/2011   • COVID-19 Vaccine (5 - 2024-25 season) 09/01/2024   • Influenza Vaccine (Season Ended) 09/01/2025     Advance Directives   What are advance directives?  Advance directives are legal documents that state your wishes and plans for medical care. These plans are made ahead of time in case you lose your ability to make decisions for yourself. Advance directives can apply to any medical decision, such as the treatments you want, and if you want to donate organs.   What are the types of  advance directives?  There are many types of advance directives, and each state has rules about how to use them. You may choose a combination of any of the following:  Living will:  This is a written record of the treatment you want. You can also choose which treatments you do not want, which to limit, and which to stop at a certain time. This includes surgery, medicine, IV fluid, and tube feedings.   Durable power of  for healthcare (DPAHC):  This is a written record that states who you want to make healthcare choices for you when you are unable to make them for yourself. This person, called a proxy, is usually a family member or a friend. You may choose more than 1 proxy.  Do not resuscitate (DNR) order:  A DNR order is used in case your heart stops beating or you stop breathing. It is a request not to have certain forms of treatment, such as CPR. A DNR order may be included in other types of advance directives.  Medical directive:  This covers the care that you want if you are in a coma, near death, or unable to make decisions for yourself. You can list the treatments you want for each condition. Treatment may include pain medicine, surgery, blood transfusions, dialysis, IV or tube feedings, and a ventilator (breathing machine).  Values history:  This document has questions about your views, beliefs, and how you feel and think about life. This information can help others choose the care that you would choose.  Why are advance directives important?  An advance directive helps you control your care. Although spoken wishes may be used, it is better to have your wishes written down. Spoken wishes can be misunderstood, or not followed. Treatments may be given even if you do not want them. An advance directive may make it easier for your family to make difficult choices about your care.   Weight Management   Why it is important to manage your weight:  Being overweight increases your risk of health conditions  such as heart disease, high blood pressure, type 2 diabetes, and certain types of cancer. It can also increase your risk for osteoarthritis, sleep apnea, and other respiratory problems. Aim for a slow, steady weight loss. Even a small amount of weight loss can lower your risk of health problems.  How to lose weight safely:  A safe and healthy way to lose weight is to eat fewer calories and get regular exercise. You can lose up about 1 pound a week by decreasing the number of calories you eat by 500 calories each day.   Healthy meal plan for weight management:  A healthy meal plan includes a variety of foods, contains fewer calories, and helps you stay healthy. A healthy meal plan includes the following:  Eat whole-grain foods more often.  A healthy meal plan should contain fiber. Fiber is the part of grains, fruits, and vegetables that is not broken down by your body. Whole-grain foods are healthy and provide extra fiber in your diet. Some examples of whole-grain foods are whole-wheat breads and pastas, oatmeal, brown rice, and bulgur.  Eat a variety of vegetables every day.  Include dark, leafy greens such as spinach, kale, sindy greens, and mustard greens. Eat yellow and orange vegetables such as carrots, sweet potatoes, and winter squash.   Eat a variety of fruits every day.  Choose fresh or canned fruit (canned in its own juice or light syrup) instead of juice. Fruit juice has very little or no fiber.  Eat low-fat dairy foods.  Drink fat-free (skim) milk or 1% milk. Eat fat-free yogurt and low-fat cottage cheese. Try low-fat cheeses such as mozzarella and other reduced-fat cheeses.  Choose meat and other protein foods that are low in fat.  Choose beans or other legumes such as split peas or lentils. Choose fish, skinless poultry (chicken or turkey), or lean cuts of red meat (beef or pork). Before you cook meat or poultry, cut off any visible fat.   Use less fat and oil.  Try baking foods instead of frying  them. Add less fat, such as margarine, sour cream, regular salad dressing and mayonnaise to foods. Eat fewer high-fat foods. Some examples of high-fat foods include french fries, doughnuts, ice cream, and cakes.  Eat fewer sweets.  Limit foods and drinks that are high in sugar. This includes candy, cookies, regular soda, and sweetened drinks.  Exercise:  Exercise at least 30 minutes per day on most days of the week. Some examples of exercise include walking, biking, dancing, and swimming. You can also fit in more physical activity by taking the stairs instead of the elevator or parking farther away from stores. Ask your healthcare provider about the best exercise plan for you.    © Copyright [employer] 2018 Information is for End User's use only and may not be sold, redistributed or otherwise used for commercial purposes. All illustrations and images included in CareNotes® are the copyrighted property of A.D.A.M., Inc. or [employer]

## 2025-06-05 NOTE — LETTER
June 5, 2025     Patient: Mario Rollins  YOB: 1965  Date of Visit: 6/5/2025      To Whom it May Concern:    Mario Rollins is under my professional care. Mario was seen in my office on 6/5/2025.     Mario requires the following medical equipment:    A medical Grab Bar    A transport Chair to be used PRN    An Elevated toilet seat    A wilian walker    He has a chronic condition of cerebral palsy which requires these supplies.    If you have any questions or concerns, please don't hesitate to call.         Sincerely,          Flako Coyne,         CC: No Recipients

## 2025-06-05 NOTE — PROGRESS NOTES
Name: Mario Rollins      : 1965      MRN: [id number]  Encounter Provider: Flako Coyne DO  Encounter Date: 2025   Encounter department: [facility]  :  Assessment & Plan  Medicare annual wellness visit, subsequent  Anticipitory guidance as noted       History of small bowel obstruction  On Mirilax daily and doing well also on high fiber 25 gram diet.   Orders:  •  polyethylene glycol (MIRALAX) 17 g packet; Take 17 g by mouth in the morning. Daily at 0800.    Other constipation  As noted on mirilax   Orders:  •  saccharomyces boulardii (Florastor) 250 mg capsule; Take 1 capsule (250 mg total) by mouth in the morning    Stage 3 chronic kidney disease, unspecified whether stage 3a or 3b CKD (HCC)  Lab Results   Component Value Date    EGFR 76 2025    EGFR 88 2025    EGFR 91 2025    CREATININE 1.06 2025    CREATININE 0.94 2025    CREATININE 0.91 2025   Recent labs stable will continue to monitor.      Reviewed plan with patient and care giver.  All questions answered.  Rx given for medical equip to continue.  Grab Bar, Walker etc.  OTC medication list from halfway reviewed and determined what is safe for him to have PRN.     Depression Screening and Follow-up Plan: Patient was screened for depression during today's encounter. They screened negative with a PHQ-2 score of 0.        Preventive health issues were discussed with patient, and age appropriate screening tests were ordered as noted in patient's After Visit Summary. Personalized health advice and appropriate referrals for health education or preventive services given if needed, as noted in patient's After Visit Summary.    History of Present Illness     Here for wellness visit. Here with .  No specific problems.        Patient Care Team:  Milton Hahn MD as PCP - General (Family Medicine)  Bethel Ricketts MD as PCP - [facility]CP-[facility] ([facility]  [facility]  Shamika as PCP - PCP-[facility] ([facility])  MD Abhilash Oliveira MD as Endoscopist    Review of Systems   Constitutional:  Negative for chills and fever.   HENT:  Negative for ear pain and sore throat.    Eyes:  Negative for pain and visual disturbance.   Respiratory:  Negative for cough and shortness of breath.    Cardiovascular:  Negative for chest pain and palpitations.   Gastrointestinal:  Negative for abdominal pain and vomiting.   Genitourinary:  Negative for dysuria and hematuria.   Musculoskeletal:  Positive for arthralgias, gait problem and myalgias. Negative for back pain.   Skin:  Negative for color change and rash.   Neurological:  Negative for seizures and syncope.   All other systems reviewed and are negative.    Medical History Reviewed by provider this encounter:  Tobacco  Allergies  Meds  Problems  Med Hx  Surg Hx  Fam Hx       Annual Wellness Visit Questionnaire   Last Medicare Wellness visit information reviewed, patient interviewed and updates made to the record today.      Health Risk Assessment:   Patient rates overall health as good. Patient feels that their physical health rating is slightly better. Patient is satisfied with their life. Eyesight was rated as same. Hearing was rated as same. Patient feels that their emotional and mental health rating is same. Patients states they are sometimes angry. Patient states they are never, rarely unusually tired/fatigued. Pain experienced in the last 7 days has been none. Patient states that he has experienced no weight loss or gain in last 6 months.     Depression Screening:   PHQ-2 Score: 0      Fall Risk Screening:   In the past year, patient has experienced: no history of falling in past year      Home Safety:  Patient has trouble with stairs inside or outside of their home. Patient has working smoke alarms and has working carbon monoxide detector. Home safety hazards include: none.     Nutrition:   Current diet is No Added Salt.      Medications:   Patient is not currently taking any over-the-counter supplements. Patient is not able to manage medications.     Activities of Daily Living (ADLs)/Instrumental Activities of Daily Living (IADLs):   Walk and transfer into and out of bed and chair?: Yes  Dress and groom yourself?: Yes    Bathe or shower yourself?: Yes    Feed yourself? Yes  Do your laundry/housekeeping?: No  Manage your money, pay your bills and track your expenses?: No  Make your own meals?: No    Do your own shopping?: No    Previous Hospitalizations:   Any hospitalizations or ED visits within the last 12 months?: Yes    How many hospitalizations have you had in the last year?: 3-4    Hospitalization Comments: Bowel obstruction  Window fell on head and broke- no sutures or staples needed    Advance Care Planning:   Living will: No    Durable POA for healthcare: No    Advanced directive: No    Advanced directive counseling given: Yes    ACP document given: Yes    Patient declined ACP directive: Yes    End of Life Decisions reviewed with patient: No    Provider agrees with end of life decisions: No      Preventive Screenings      Cardiovascular Screening:    General: Screening Not Indicated and History Lipid Disorder      Diabetes Screening:     General: Screening Current      Colorectal Cancer Screening:     General: Screening Current      Lung Cancer Screening:     General: Screening Not Indicated    Immunizations:  - Immunizations due: Hepatitis A and Hepatitis B    Screening, Brief Intervention, and Referral to Treatment (SBIRT)     Screening  Typical number of drinks in a day: 0  Typical number of drinks in a week: 0  Interpretation: Low risk drinking behavior.    Single Item Drug Screening:  How often have you used an illegal drug (including marijuana) or a prescription medication for non-medical reasons in the past year? never    Single Item Drug Screen Score: 0  Interpretation: Negative screen for possible drug use  "disorder    Brief Intervention  Alcohol & drug use screenings were reviewed. No concerns regarding substance use disorder identified.     Social Drivers of Health     Financial Resource Strain: Low Risk  (6/5/2025)    Overall Financial Resource Strain (CARDIA)    • Difficulty of Paying Living Expenses: Not hard at all   Food Insecurity: No Food Insecurity (6/5/2025)    Nursing - Inadequate Food Risk Classification    • Worried About Running Out of Food in the Last Year: Never true    • Ran Out of Food in the Last Year: Never true    • Ran Out of Food in the Last Year: Never true   Transportation Needs: No Transportation Needs (6/5/2025)    PRAPARE - Transportation    • Lack of Transportation (Medical): No    • Lack of Transportation (Non-Medical): No   Housing Stability: Low Risk  (6/5/2025)    Housing Stability Vital Sign    • Unable to Pay for Housing in the Last Year: No    • Number of Times Moved in the Last Year: 0    • Homeless in the Last Year: No   Utilities: Not At Risk (6/5/2025)    [facility] Utilities    • Threatened with loss of utilities: No     No results found.    Objective   /83 (BP Location: Left arm, Patient Position: Sitting, Cuff Size: Adult)   Pulse 69   Temp (!) 97.4 °F (36.3 °C)   Resp 16   Ht 5' 6\" (1.676 m)   Wt 81.2 kg (179 lb)   SpO2 98%   BMI 28.89 kg/m²     Physical Exam  Vitals and nursing note reviewed.   Constitutional:       General: He is not in acute distress.     Appearance: Normal appearance. He is well-developed.   HENT:      Head: Normocephalic and atraumatic.      Right Ear: Tympanic membrane normal.      Left Ear: Tympanic membrane normal.      Nose: Nose normal.      Mouth/Throat:      Mouth: Mucous membranes are moist.      Pharynx: Oropharynx is clear.     Eyes:      Extraocular Movements: Extraocular movements intact.      Conjunctiva/sclera: Conjunctivae normal.      Pupils: Pupils are equal, round, and reactive to light.       Cardiovascular:      Rate and " Rhythm: Normal rate and regular rhythm.      Pulses: Normal pulses.      Heart sounds: Normal heart sounds. No murmur heard.  Pulmonary:      Effort: Pulmonary effort is normal. No respiratory distress.      Breath sounds: No wheezing, rhonchi or rales.   Abdominal:      General: Bowel sounds are normal.      Palpations: Abdomen is soft.      Tenderness: There is no abdominal tenderness.     Musculoskeletal:         General: Deformity present. No swelling.      Cervical back: Neck supple.      Comments: Muscle contracture right UE due to CP  Difficulty with gait due to CP LE     Skin:     General: Skin is warm and dry.      Capillary Refill: Capillary refill takes less than 2 seconds.     Neurological:      General: No focal deficit present.      Mental Status: He is alert and oriented to person, place, and time. Mental status is at baseline.      Cranial Nerves: No cranial nerve deficit.     Psychiatric:         Mood and Affect: Mood normal.

## 2025-06-05 NOTE — ASSESSMENT & PLAN NOTE
On Mirilax daily and doing well also on high fiber 25 gram diet.   Orders:  •  polyethylene glycol (MIRALAX) 17 g packet; Take 17 g by mouth in the morning. Daily at 0800.

## 2025-06-05 NOTE — ASSESSMENT & PLAN NOTE
As noted on mirilax   Orders:  •  saccharomyces boulardii (Florastor) 250 mg capsule; Take 1 capsule (250 mg total) by mouth in the morning

## 2025-06-05 NOTE — ASSESSMENT & PLAN NOTE
Lab Results   Component Value Date    EGFR 76 02/23/2025    EGFR 88 02/21/2025    EGFR 91 02/20/2025    CREATININE 1.06 02/23/2025    CREATININE 0.94 02/21/2025    CREATININE 0.91 02/20/2025   Recent labs stable will continue to monitor.      Reviewed plan with patient and care giver.  All questions answered.  Rx given for medical equip to continue.  Grab Bar, Walker etc.  OTC medication list from [facility] reviewed and determined what is safe for him to have PRN.

## 2025-06-28 ENCOUNTER — APPOINTMENT (OUTPATIENT)
Dept: LAB | Facility: HOSPITAL | Age: 60
End: 2025-06-28
Payer: MEDICARE

## 2025-06-28 DIAGNOSIS — Z79.899 ENCOUNTER FOR LONG-TERM (CURRENT) USE OF MEDICATIONS: ICD-10-CM

## 2025-06-28 DIAGNOSIS — F32.9 CURRENT EPISODE OF MAJOR DEPRESSIVE DISORDER WITHOUT PRIOR EPISODE, UNSPECIFIED DEPRESSION EPISODE SEVERITY: ICD-10-CM

## 2025-06-28 DIAGNOSIS — G40.219 LOCALIZATION-RELATED SYMPTOMATIC EPILEPSY AND EPILEPTIC SYNDROMES WITH COMPLEX PARTIAL SEIZURES, INTRACTABLE, WITHOUT STATUS EPILEPTICUS (HCC): ICD-10-CM

## 2025-06-28 DIAGNOSIS — Z13.89 SCREENING FOR GOUT: ICD-10-CM

## 2025-06-28 DIAGNOSIS — F41.9 ANXIETY DISORDER OF CHILDHOOD OR ADOLESCENCE: ICD-10-CM

## 2025-06-28 PROCEDURE — 80175 DRUG SCREEN QUAN LAMOTRIGINE: CPT

## 2025-06-28 PROCEDURE — 36415 COLL VENOUS BLD VENIPUNCTURE: CPT

## 2025-06-30 ENCOUNTER — RESULTS FOLLOW-UP (OUTPATIENT)
Dept: OTHER | Facility: HOSPITAL | Age: 60
End: 2025-06-30

## 2025-06-30 LAB — LAMOTRIGINE SERPL-MCNC: 4.7 UG/ML (ref 2–20)

## 2025-07-03 NOTE — TELEPHONE ENCOUNTER
Called pt and lvm to let him know we will have to reschedule his 8/15 appt due to  being OOO that day. Provided call back number.

## 2025-07-08 DIAGNOSIS — G40.219 LOCALIZATION-RELATED SYMPTOMATIC EPILEPSY AND EPILEPTIC SYNDROMES WITH COMPLEX PARTIAL SEIZURES, INTRACTABLE, WITHOUT STATUS EPILEPTICUS (HCC): ICD-10-CM

## 2025-07-08 RX ORDER — LAMOTRIGINE 25 MG/1
TABLET ORAL
Qty: 60 TABLET | Refills: 3 | Status: SHIPPED | OUTPATIENT
Start: 2025-07-08

## 2025-07-08 NOTE — TELEPHONE ENCOUNTER
Call made to patients , Monica and rescheduled patients upcoming appointment with Dr. Whitley.    Monica requested refill for patient.     Medication refill check list    Correct patient? yes   Correct medication name, dose, and pill size? yes   Correct provider? yes   Last and Next appt. scheduled? Yes, last date 02/17/25 & next date 11/19/25   Right pharmacy listed? yes   Correct quantity for 30 or 90 days? yes   Is the patient out of refills? When was it last prescribed? Yes, date 2/17/25   Directions match what the patient says they are taking? yes   Enough refills? (none for controlled substances, 1 year for routine medications) no

## 2025-07-10 ENCOUNTER — PROCEDURE VISIT (OUTPATIENT)
Age: 60
End: 2025-07-10
Payer: MEDICARE

## 2025-07-10 VITALS — HEIGHT: 66 IN | BODY MASS INDEX: 28.77 KG/M2 | WEIGHT: 179 LBS | RESPIRATION RATE: 17 BRPM

## 2025-07-10 DIAGNOSIS — B35.9 DERMATOPHYTOSIS: Primary | ICD-10-CM

## 2025-07-10 DIAGNOSIS — M79.672 PAIN IN BOTH FEET: ICD-10-CM

## 2025-07-10 DIAGNOSIS — M79.671 PAIN IN BOTH FEET: ICD-10-CM

## 2025-07-10 PROCEDURE — 11000 DBRDMT ECZ/INFECTED SKIN<10%: CPT | Performed by: PODIATRIST

## 2025-07-10 PROCEDURE — RECHECK: Performed by: PODIATRIST

## 2025-07-14 ENCOUNTER — TELEPHONE (OUTPATIENT)
Age: 60
End: 2025-07-14

## 2025-07-14 NOTE — TELEPHONE ENCOUNTER
VM left on Rx Line     hi good afternoon my name is seth i'm calling from [facility] calling to follow up on a refill that we requested for patient [place] [phone] from doctor skilling patient needs the caramel sees 250 milligram he takes one daily at 8:00 AM umm i'm sorry uh let's see yes i'm sorry one daily at 8:00 AM and gets 30 at a time our number here is [phone] if you would like to call us or if you prefer you can send electronic order to [facility] pharmacy thank you so much and have a great day bye bye  You received a voice mail from [facility] [employer].

## 2025-07-15 DIAGNOSIS — K59.09 OTHER CONSTIPATION: ICD-10-CM

## 2025-07-15 RX ORDER — SACCHAROMYCES BOULARDII 250 MG
250 CAPSULE ORAL DAILY
Qty: 30 CAPSULE | Refills: 3 | Status: SHIPPED | OUTPATIENT
Start: 2025-07-15

## 2025-08-15 ENCOUNTER — APPOINTMENT (OUTPATIENT)
Dept: LAB | Facility: CLINIC | Age: 60
End: 2025-08-15
Attending: STUDENT IN AN ORGANIZED HEALTH CARE EDUCATION/TRAINING PROGRAM
Payer: MEDICARE

## 2025-08-19 DIAGNOSIS — M67.432 GANGLION OF LEFT WRIST: Primary | ICD-10-CM

## 2025-08-19 PROCEDURE — 99213 OFFICE O/P EST LOW 20 MIN: CPT | Performed by: STUDENT IN AN ORGANIZED HEALTH CARE EDUCATION/TRAINING PROGRAM

## 2025-08-20 ENCOUNTER — OFFICE VISIT (OUTPATIENT)
Dept: AUDIOLOGY | Facility: CLINIC | Age: 60
End: 2025-08-20
Attending: NURSE PRACTITIONER
Payer: MEDICARE

## 2025-08-20 DIAGNOSIS — H90.3 SENSORINEURAL HEARING LOSS, BILATERAL: Primary | ICD-10-CM

## 2025-08-20 PROCEDURE — 92557 COMPREHENSIVE HEARING TEST: CPT | Performed by: AUDIOLOGIST

## 2025-08-20 PROCEDURE — 92567 TYMPANOMETRY: CPT | Performed by: AUDIOLOGIST

## (undated) DEVICE — FLEXIBLE ADHESIVE BANDAGE,X-LARGE: Brand: CURITY

## (undated) DEVICE — SYRINGE 10ML LL

## (undated) DEVICE — STERILE DOUBLE BASIN SET PACK: Brand: CARDINAL HEALTH

## (undated) DEVICE — TRAY EPIDURAL PERIFIX 20GA X 3.5IN TUOHY 8ML

## (undated) DEVICE — Device: Brand: PORTEX

## (undated) DEVICE — PACK GENERAL LF

## (undated) DEVICE — SUT VICRYL 3-0 SH 27 IN J416H

## (undated) DEVICE — TRAY FOLEY 16FR URIMETER SURESTEP

## (undated) DEVICE — NEPTUNE E-SEP SMOKE EVACUATION PENCIL, COATED, 70MM BLADE, PUSH BUTTON SWITCH: Brand: NEPTUNE E-SEP

## (undated) DEVICE — WIPES BABY PAMPERS SENSITIVE 36/PK

## (undated) DEVICE — SUT VICRYL 2-0 18 IN J905T

## (undated) DEVICE — GLOVE SRG BIOGEL 7.5

## (undated) DEVICE — TOWEL SET X-RAY

## (undated) DEVICE — IV SET EXT SM BORE CARESITE 8IN

## (undated) DEVICE — PLASTIC ADHESIVE BANDAGE: Brand: CURITY

## (undated) DEVICE — DRAPE UTILITY

## (undated) DEVICE — CHLORAPREP HI-LITE 10.5ML ORANGE

## (undated) DEVICE — PROXIMATE SKIN STAPLERS (35 WIDE) CONTAINS 35 STAINLESS STEEL STAPLES (FIXED HEAD): Brand: PROXIMATE

## (undated) DEVICE — ASTOUND IMPERVIOUS SURGICAL GOWN: Brand: CONVERTORS

## (undated) DEVICE — SUT SILK 3-0 SH 30 IN K832H

## (undated) DEVICE — RADIOLOGY STERILE LABELS: Brand: CENTURION

## (undated) DEVICE — DRAPE EQUIPMENT RF WAND

## (undated) DEVICE — GLOVE SRG BIOGEL 7

## (undated) DEVICE — NEEDLE SPINAL 22G X 3.5IN  QUINCKE

## (undated) DEVICE — SUT PDS II 0 TP-1 60 IN Z991G

## (undated) DEVICE — DRESSING MEPILEX AG BORDER POST-OP 4 X 10 IN

## (undated) DEVICE — SPONGE LAP 18 X 18 IN STRL RFD

## (undated) DEVICE — NEEDLE BLUNT 18 G X 1 1/2 W FILTER

## (undated) DEVICE — CHLORAPREP APPLICATOR TINTED 10.5ML ONE-STEP

## (undated) DEVICE — NEEDLE SPINAL 25G X 3.5 IN QUINCKE

## (undated) DEVICE — SYRINGE 5ML LL

## (undated) DEVICE — CHLORAPREP HI-LITE 26ML ORANGE

## (undated) DEVICE — SEPRA FILM 6 X 5

## (undated) DEVICE — SMALL NEEDLE COUNTER NEST

## (undated) DEVICE — TIBURON LAPAROTOMY DRAPE: Brand: CONVERTORS

## (undated) DEVICE — PROXIMATE PLUS MD MULTI-DIRECTIONAL RELEASE SKIN STAPLERS CONTAINS 35 STAINLESS STEEL STAPLES APPROXIMATE CLOSED DIMENSIONS: 6.9MM X 3.9MM WIDE: Brand: PROXIMATE

## (undated) DEVICE — SUT VICRYL 0 18 IN J906G

## (undated) DEVICE — GLOVE INDICATOR PI UNDERGLOVE SZ 7 BLUE

## (undated) DEVICE — SUT SILK 3-0 SH CR/8 18 IN C013D

## (undated) DEVICE — POOLE SUCTION HANDLE: Brand: CARDINAL HEALTH